# Patient Record
Sex: MALE | Race: OTHER | NOT HISPANIC OR LATINO | URBAN - METROPOLITAN AREA
[De-identification: names, ages, dates, MRNs, and addresses within clinical notes are randomized per-mention and may not be internally consistent; named-entity substitution may affect disease eponyms.]

---

## 2017-03-30 ENCOUNTER — OUTPATIENT (OUTPATIENT)
Dept: OUTPATIENT SERVICES | Facility: HOSPITAL | Age: 67
LOS: 1 days | Discharge: HOME | End: 2017-03-30
Payer: MEDICARE

## 2017-03-31 DIAGNOSIS — F20.9 SCHIZOPHRENIA, UNSPECIFIED: ICD-10-CM

## 2017-04-27 PROCEDURE — 99214 OFFICE O/P EST MOD 30 MIN: CPT

## 2017-07-31 ENCOUNTER — RX RENEWAL (OUTPATIENT)
Age: 67
End: 2017-07-31

## 2017-09-15 ENCOUNTER — RESULT REVIEW (OUTPATIENT)
Age: 67
End: 2017-09-15

## 2019-01-29 PROCEDURE — 99213 OFFICE O/P EST LOW 20 MIN: CPT

## 2020-01-01 ENCOUNTER — INPATIENT (INPATIENT)
Facility: HOSPITAL | Age: 70
LOS: 38 days | DRG: 4 | End: 2020-05-05
Attending: INTERNAL MEDICINE | Admitting: STUDENT IN AN ORGANIZED HEALTH CARE EDUCATION/TRAINING PROGRAM
Payer: MEDICARE

## 2020-01-01 ENCOUNTER — TRANSCRIPTION ENCOUNTER (OUTPATIENT)
Age: 70
End: 2020-01-01

## 2020-01-01 VITALS
HEIGHT: 69 IN | DIASTOLIC BLOOD PRESSURE: 71 MMHG | OXYGEN SATURATION: 89 % | WEIGHT: 169.98 LBS | HEART RATE: 82 BPM | TEMPERATURE: 99 F | SYSTOLIC BLOOD PRESSURE: 157 MMHG | RESPIRATION RATE: 24 BRPM

## 2020-01-01 VITALS — HEART RATE: 69 BPM | TEMPERATURE: 96 F | RESPIRATION RATE: 13 BRPM | OXYGEN SATURATION: 78 %

## 2020-01-01 DIAGNOSIS — U07.1 COVID-19: ICD-10-CM

## 2020-01-01 DIAGNOSIS — G93.41 METABOLIC ENCEPHALOPATHY: ICD-10-CM

## 2020-01-01 DIAGNOSIS — Z51.5 ENCOUNTER FOR PALLIATIVE CARE: ICD-10-CM

## 2020-01-01 DIAGNOSIS — Z71.89 OTHER SPECIFIED COUNSELING: ICD-10-CM

## 2020-01-01 DIAGNOSIS — R09.02 HYPOXEMIA: ICD-10-CM

## 2020-01-01 DIAGNOSIS — Z93.0 TRACHEOSTOMY STATUS: ICD-10-CM

## 2020-01-01 DIAGNOSIS — J96.01 ACUTE RESPIRATORY FAILURE WITH HYPOXIA: ICD-10-CM

## 2020-01-01 DIAGNOSIS — J96.00 ACUTE RESPIRATORY FAILURE, UNSPECIFIED WHETHER WITH HYPOXIA OR HYPERCAPNIA: ICD-10-CM

## 2020-01-01 DIAGNOSIS — R53.2 FUNCTIONAL QUADRIPLEGIA: ICD-10-CM

## 2020-01-01 LAB
-  AMIKACIN: SIGNIFICANT CHANGE UP
-  AMIKACIN: SIGNIFICANT CHANGE UP
-  AZTREONAM: SIGNIFICANT CHANGE UP
-  AZTREONAM: SIGNIFICANT CHANGE UP
-  CEFEPIME: SIGNIFICANT CHANGE UP
-  CEFEPIME: SIGNIFICANT CHANGE UP
-  CEFTAZIDIME: SIGNIFICANT CHANGE UP
-  CEFTAZIDIME: SIGNIFICANT CHANGE UP
-  CIPROFLOXACIN: SIGNIFICANT CHANGE UP
-  CIPROFLOXACIN: SIGNIFICANT CHANGE UP
-  COAGULASE NEGATIVE STAPHYLOCOCCUS: SIGNIFICANT CHANGE UP
-  GENTAMICIN: SIGNIFICANT CHANGE UP
-  GENTAMICIN: SIGNIFICANT CHANGE UP
-  IMIPENEM: SIGNIFICANT CHANGE UP
-  IMIPENEM: SIGNIFICANT CHANGE UP
-  LEVOFLOXACIN: SIGNIFICANT CHANGE UP
-  LEVOFLOXACIN: SIGNIFICANT CHANGE UP
-  MEROPENEM: SIGNIFICANT CHANGE UP
-  MEROPENEM: SIGNIFICANT CHANGE UP
-  PIPERACILLIN/TAZOBACTAM: SIGNIFICANT CHANGE UP
-  PIPERACILLIN/TAZOBACTAM: SIGNIFICANT CHANGE UP
-  TOBRAMYCIN: SIGNIFICANT CHANGE UP
-  TOBRAMYCIN: SIGNIFICANT CHANGE UP
4/8 RATIO: 2.11 RATIO — SIGNIFICANT CHANGE UP (ref 0.86–4.14)
4/8 RATIO: 2.32 RATIO — SIGNIFICANT CHANGE UP (ref 0.86–4.14)
4/8 RATIO: 3.01 RATIO — SIGNIFICANT CHANGE UP (ref 0.86–4.14)
4/8 RATIO: 3.46 RATIO — SIGNIFICANT CHANGE UP (ref 0.86–4.14)
4/8 RATIO: 3.81 RATIO — SIGNIFICANT CHANGE UP (ref 0.86–4.14)
ABS CD8: 123 /UL — SIGNIFICANT CHANGE UP (ref 90–775)
ABS CD8: 144 /UL — SIGNIFICANT CHANGE UP (ref 90–775)
ABS CD8: 145 /UL — SIGNIFICANT CHANGE UP (ref 90–775)
ABS CD8: 215 /UL — SIGNIFICANT CHANGE UP (ref 90–775)
ABS CD8: 81 /UL — LOW (ref 90–775)
ALBUMIN SERPL ELPH-MCNC: 1.9 G/DL — LOW (ref 3.3–5)
ALBUMIN SERPL ELPH-MCNC: 2.1 G/DL — LOW (ref 3.3–5)
ALBUMIN SERPL ELPH-MCNC: 2.2 G/DL — LOW (ref 3.3–5)
ALBUMIN SERPL ELPH-MCNC: 2.3 G/DL — LOW (ref 3.3–5)
ALBUMIN SERPL ELPH-MCNC: 2.4 G/DL — LOW (ref 3.3–5)
ALBUMIN SERPL ELPH-MCNC: 2.5 G/DL — LOW (ref 3.3–5)
ALBUMIN SERPL ELPH-MCNC: 2.6 G/DL — LOW (ref 3.3–5)
ALBUMIN SERPL ELPH-MCNC: 2.7 G/DL — LOW (ref 3.3–5)
ALBUMIN SERPL ELPH-MCNC: 2.8 G/DL — LOW (ref 3.3–5)
ALBUMIN SERPL ELPH-MCNC: 2.9 G/DL — LOW (ref 3.3–5)
ALBUMIN SERPL ELPH-MCNC: 2.9 G/DL — LOW (ref 3.3–5)
ALBUMIN SERPL ELPH-MCNC: 3 G/DL — LOW (ref 3.3–5)
ALBUMIN SERPL ELPH-MCNC: 3.1 G/DL — LOW (ref 3.3–5)
ALBUMIN SERPL ELPH-MCNC: 3.4 G/DL — SIGNIFICANT CHANGE UP (ref 3.3–5)
ALP SERPL-CCNC: 101 U/L — SIGNIFICANT CHANGE UP (ref 40–120)
ALP SERPL-CCNC: 101 U/L — SIGNIFICANT CHANGE UP (ref 40–120)
ALP SERPL-CCNC: 108 U/L — SIGNIFICANT CHANGE UP (ref 40–120)
ALP SERPL-CCNC: 111 U/L — SIGNIFICANT CHANGE UP (ref 40–120)
ALP SERPL-CCNC: 114 U/L — SIGNIFICANT CHANGE UP (ref 40–120)
ALP SERPL-CCNC: 115 U/L — SIGNIFICANT CHANGE UP (ref 40–120)
ALP SERPL-CCNC: 128 U/L — HIGH (ref 40–120)
ALP SERPL-CCNC: 137 U/L — HIGH (ref 40–120)
ALP SERPL-CCNC: 144 U/L — HIGH (ref 40–120)
ALP SERPL-CCNC: 146 U/L — HIGH (ref 40–120)
ALP SERPL-CCNC: 155 U/L — HIGH (ref 40–120)
ALP SERPL-CCNC: 155 U/L — HIGH (ref 40–120)
ALP SERPL-CCNC: 157 U/L — HIGH (ref 40–120)
ALP SERPL-CCNC: 158 U/L — HIGH (ref 40–120)
ALP SERPL-CCNC: 163 U/L — HIGH (ref 40–120)
ALP SERPL-CCNC: 166 U/L — HIGH (ref 40–120)
ALP SERPL-CCNC: 168 U/L — HIGH (ref 40–120)
ALP SERPL-CCNC: 170 U/L — HIGH (ref 40–120)
ALP SERPL-CCNC: 198 U/L — HIGH (ref 40–120)
ALP SERPL-CCNC: 205 U/L — HIGH (ref 40–120)
ALP SERPL-CCNC: 52 U/L — SIGNIFICANT CHANGE UP (ref 40–120)
ALP SERPL-CCNC: 53 U/L — SIGNIFICANT CHANGE UP (ref 40–120)
ALP SERPL-CCNC: 55 U/L — SIGNIFICANT CHANGE UP (ref 40–120)
ALP SERPL-CCNC: 59 U/L — SIGNIFICANT CHANGE UP (ref 40–120)
ALP SERPL-CCNC: 60 U/L — SIGNIFICANT CHANGE UP (ref 40–120)
ALP SERPL-CCNC: 60 U/L — SIGNIFICANT CHANGE UP (ref 40–120)
ALP SERPL-CCNC: 61 U/L — SIGNIFICANT CHANGE UP (ref 40–120)
ALP SERPL-CCNC: 62 U/L — SIGNIFICANT CHANGE UP (ref 40–120)
ALP SERPL-CCNC: 62 U/L — SIGNIFICANT CHANGE UP (ref 40–120)
ALP SERPL-CCNC: 64 U/L — SIGNIFICANT CHANGE UP (ref 40–120)
ALP SERPL-CCNC: 67 U/L — SIGNIFICANT CHANGE UP (ref 40–120)
ALP SERPL-CCNC: 92 U/L — SIGNIFICANT CHANGE UP (ref 40–120)
ALP SERPL-CCNC: 93 U/L — SIGNIFICANT CHANGE UP (ref 40–120)
ALP SERPL-CCNC: 98 U/L — SIGNIFICANT CHANGE UP (ref 40–120)
ALP SERPL-CCNC: 98 U/L — SIGNIFICANT CHANGE UP (ref 40–120)
ALP SERPL-CCNC: 99 U/L — SIGNIFICANT CHANGE UP (ref 40–120)
ALT FLD-CCNC: 14 U/L — SIGNIFICANT CHANGE UP (ref 10–45)
ALT FLD-CCNC: 14 U/L — SIGNIFICANT CHANGE UP (ref 10–45)
ALT FLD-CCNC: 15 U/L — SIGNIFICANT CHANGE UP (ref 10–45)
ALT FLD-CCNC: 17 U/L — SIGNIFICANT CHANGE UP (ref 10–45)
ALT FLD-CCNC: 17 U/L — SIGNIFICANT CHANGE UP (ref 10–45)
ALT FLD-CCNC: 18 U/L — SIGNIFICANT CHANGE UP (ref 10–45)
ALT FLD-CCNC: 19 U/L — SIGNIFICANT CHANGE UP (ref 10–45)
ALT FLD-CCNC: 20 U/L — SIGNIFICANT CHANGE UP (ref 10–45)
ALT FLD-CCNC: 21 U/L — SIGNIFICANT CHANGE UP (ref 10–45)
ALT FLD-CCNC: 22 U/L — SIGNIFICANT CHANGE UP (ref 10–45)
ALT FLD-CCNC: 23 U/L — SIGNIFICANT CHANGE UP (ref 10–45)
ALT FLD-CCNC: 23 U/L — SIGNIFICANT CHANGE UP (ref 10–45)
ALT FLD-CCNC: 24 U/L — SIGNIFICANT CHANGE UP (ref 10–45)
ALT FLD-CCNC: 25 U/L — SIGNIFICANT CHANGE UP (ref 10–45)
ALT FLD-CCNC: 29 U/L — SIGNIFICANT CHANGE UP (ref 10–45)
ALT FLD-CCNC: 30 U/L — SIGNIFICANT CHANGE UP (ref 10–45)
ALT FLD-CCNC: 32 U/L — SIGNIFICANT CHANGE UP (ref 10–45)
ALT FLD-CCNC: 33 U/L — SIGNIFICANT CHANGE UP (ref 10–45)
ALT FLD-CCNC: 34 U/L — SIGNIFICANT CHANGE UP (ref 10–45)
ALT FLD-CCNC: 36 U/L — SIGNIFICANT CHANGE UP (ref 10–45)
ALT FLD-CCNC: 36 U/L — SIGNIFICANT CHANGE UP (ref 10–45)
ALT FLD-CCNC: 38 U/L — SIGNIFICANT CHANGE UP (ref 10–45)
ALT FLD-CCNC: 39 U/L — SIGNIFICANT CHANGE UP (ref 10–45)
ALT FLD-CCNC: 42 U/L — SIGNIFICANT CHANGE UP (ref 10–45)
ALT FLD-CCNC: 44 U/L — SIGNIFICANT CHANGE UP (ref 10–45)
ALT FLD-CCNC: 47 U/L — HIGH (ref 10–45)
ALT FLD-CCNC: 52 U/L — HIGH (ref 10–45)
ANION GAP SERPL CALC-SCNC: 10 MMOL/L — SIGNIFICANT CHANGE UP (ref 5–17)
ANION GAP SERPL CALC-SCNC: 11 MMOL/L — SIGNIFICANT CHANGE UP (ref 5–17)
ANION GAP SERPL CALC-SCNC: 12 MMOL/L — SIGNIFICANT CHANGE UP (ref 5–17)
ANION GAP SERPL CALC-SCNC: 13 MMOL/L — SIGNIFICANT CHANGE UP (ref 5–17)
ANION GAP SERPL CALC-SCNC: 13 MMOL/L — SIGNIFICANT CHANGE UP (ref 5–17)
ANION GAP SERPL CALC-SCNC: 14 MMOL/L — SIGNIFICANT CHANGE UP (ref 5–17)
ANION GAP SERPL CALC-SCNC: 15 MMOL/L — SIGNIFICANT CHANGE UP (ref 5–17)
ANION GAP SERPL CALC-SCNC: 16 MMOL/L — SIGNIFICANT CHANGE UP (ref 5–17)
ANION GAP SERPL CALC-SCNC: 18 MMOL/L — HIGH (ref 5–17)
ANION GAP SERPL CALC-SCNC: 5 MMOL/L — SIGNIFICANT CHANGE UP (ref 5–17)
ANION GAP SERPL CALC-SCNC: 7 MMOL/L — SIGNIFICANT CHANGE UP (ref 5–17)
ANION GAP SERPL CALC-SCNC: 8 MMOL/L — SIGNIFICANT CHANGE UP (ref 5–17)
ANION GAP SERPL CALC-SCNC: 9 MMOL/L — SIGNIFICANT CHANGE UP (ref 5–17)
APPEARANCE UR: ABNORMAL
APPEARANCE UR: CLEAR — SIGNIFICANT CHANGE UP
APPEARANCE UR: CLEAR — SIGNIFICANT CHANGE UP
APTT BLD: 31.3 SEC — SIGNIFICANT CHANGE UP (ref 27.5–36.3)
APTT BLD: 31.7 SEC — SIGNIFICANT CHANGE UP (ref 27.5–36.3)
APTT BLD: 32 SEC — SIGNIFICANT CHANGE UP (ref 27.5–36.3)
APTT BLD: 32 SEC — SIGNIFICANT CHANGE UP (ref 27.5–36.3)
APTT BLD: 32.1 SEC — SIGNIFICANT CHANGE UP (ref 27.5–36.3)
APTT BLD: 32.1 SEC — SIGNIFICANT CHANGE UP (ref 27.5–36.3)
APTT BLD: 32.4 SEC — SIGNIFICANT CHANGE UP (ref 27.5–36.3)
APTT BLD: 34.3 SEC — SIGNIFICANT CHANGE UP (ref 27.5–36.3)
APTT BLD: 34.4 SEC — SIGNIFICANT CHANGE UP (ref 27.5–36.3)
APTT BLD: 34.7 SEC — SIGNIFICANT CHANGE UP (ref 27.5–36.3)
APTT BLD: 35.4 SEC — SIGNIFICANT CHANGE UP (ref 27.5–36.3)
APTT BLD: 36.6 SEC — HIGH (ref 27.5–36.3)
APTT BLD: 37.1 SEC — HIGH (ref 27.5–36.3)
APTT BLD: 37.2 SEC — HIGH (ref 27.5–36.3)
APTT BLD: 37.4 SEC — HIGH (ref 27.5–36.3)
APTT BLD: 40.4 SEC — HIGH (ref 27.5–36.3)
APTT BLD: 41.1 SEC — HIGH (ref 27.5–36.3)
APTT BLD: 41.2 SEC — HIGH (ref 27.5–36.3)
AST SERPL-CCNC: 10 U/L — SIGNIFICANT CHANGE UP (ref 10–40)
AST SERPL-CCNC: 10 U/L — SIGNIFICANT CHANGE UP (ref 10–40)
AST SERPL-CCNC: 11 U/L — SIGNIFICANT CHANGE UP (ref 10–40)
AST SERPL-CCNC: 12 U/L — SIGNIFICANT CHANGE UP (ref 10–40)
AST SERPL-CCNC: 13 U/L — SIGNIFICANT CHANGE UP (ref 10–40)
AST SERPL-CCNC: 14 U/L — SIGNIFICANT CHANGE UP (ref 10–40)
AST SERPL-CCNC: 15 U/L — SIGNIFICANT CHANGE UP (ref 10–40)
AST SERPL-CCNC: 16 U/L — SIGNIFICANT CHANGE UP (ref 10–40)
AST SERPL-CCNC: 16 U/L — SIGNIFICANT CHANGE UP (ref 10–40)
AST SERPL-CCNC: 17 U/L — SIGNIFICANT CHANGE UP (ref 10–40)
AST SERPL-CCNC: 19 U/L — SIGNIFICANT CHANGE UP (ref 10–40)
AST SERPL-CCNC: 20 U/L — SIGNIFICANT CHANGE UP (ref 10–40)
AST SERPL-CCNC: 20 U/L — SIGNIFICANT CHANGE UP (ref 10–40)
AST SERPL-CCNC: 21 U/L — SIGNIFICANT CHANGE UP (ref 10–40)
AST SERPL-CCNC: 22 U/L — SIGNIFICANT CHANGE UP (ref 10–40)
AST SERPL-CCNC: 24 U/L — SIGNIFICANT CHANGE UP (ref 10–40)
AST SERPL-CCNC: 24 U/L — SIGNIFICANT CHANGE UP (ref 10–40)
AST SERPL-CCNC: 25 U/L — SIGNIFICANT CHANGE UP (ref 10–40)
AST SERPL-CCNC: 25 U/L — SIGNIFICANT CHANGE UP (ref 10–40)
AST SERPL-CCNC: 29 U/L — SIGNIFICANT CHANGE UP (ref 10–40)
AST SERPL-CCNC: 30 U/L — SIGNIFICANT CHANGE UP (ref 10–40)
AST SERPL-CCNC: 49 U/L — HIGH (ref 10–40)
AST SERPL-CCNC: 65 U/L — HIGH (ref 10–40)
AST SERPL-CCNC: 66 U/L — HIGH (ref 10–40)
AST SERPL-CCNC: 9 U/L — LOW (ref 10–40)
BACTERIA # UR AUTO: ABNORMAL
BASE EXCESS BLDA CALC-SCNC: 0.4 MMOL/L — SIGNIFICANT CHANGE UP (ref -2–2)
BASE EXCESS BLDA CALC-SCNC: 4.7 MMOL/L — HIGH (ref -2–2)
BASE EXCESS BLDA CALC-SCNC: 8.5 MMOL/L — HIGH (ref -2–2)
BASE EXCESS BLDV CALC-SCNC: -1.4 MMOL/L — SIGNIFICANT CHANGE UP (ref -2–2)
BASOPHILS # BLD AUTO: 0 K/UL — SIGNIFICANT CHANGE UP (ref 0–0.2)
BASOPHILS # BLD AUTO: 0.01 K/UL — SIGNIFICANT CHANGE UP (ref 0–0.2)
BASOPHILS # BLD AUTO: 0.01 K/UL — SIGNIFICANT CHANGE UP (ref 0–0.2)
BASOPHILS # BLD AUTO: 0.02 K/UL — SIGNIFICANT CHANGE UP (ref 0–0.2)
BASOPHILS # BLD AUTO: 0.04 K/UL — SIGNIFICANT CHANGE UP (ref 0–0.2)
BASOPHILS # BLD AUTO: 0.04 K/UL — SIGNIFICANT CHANGE UP (ref 0–0.2)
BASOPHILS # BLD AUTO: 0.05 K/UL — SIGNIFICANT CHANGE UP (ref 0–0.2)
BASOPHILS # BLD AUTO: 0.07 K/UL — SIGNIFICANT CHANGE UP (ref 0–0.2)
BASOPHILS NFR BLD AUTO: 0 % — SIGNIFICANT CHANGE UP (ref 0–2)
BASOPHILS NFR BLD AUTO: 0.1 % — SIGNIFICANT CHANGE UP (ref 0–2)
BASOPHILS NFR BLD AUTO: 0.2 % — SIGNIFICANT CHANGE UP (ref 0–2)
BASOPHILS NFR BLD AUTO: 0.3 % — SIGNIFICANT CHANGE UP (ref 0–2)
BASOPHILS NFR BLD AUTO: 0.3 % — SIGNIFICANT CHANGE UP (ref 0–2)
BASOPHILS NFR BLD AUTO: 0.4 % — SIGNIFICANT CHANGE UP (ref 0–2)
BASOPHILS NFR BLD AUTO: 0.5 % — SIGNIFICANT CHANGE UP (ref 0–2)
BASOPHILS NFR BLD AUTO: 0.5 % — SIGNIFICANT CHANGE UP (ref 0–2)
BILIRUB SERPL-MCNC: 0.1 MG/DL — LOW (ref 0.2–1.2)
BILIRUB SERPL-MCNC: 0.2 MG/DL — SIGNIFICANT CHANGE UP (ref 0.2–1.2)
BILIRUB SERPL-MCNC: 0.3 MG/DL — SIGNIFICANT CHANGE UP (ref 0.2–1.2)
BILIRUB SERPL-MCNC: 0.4 MG/DL — SIGNIFICANT CHANGE UP (ref 0.2–1.2)
BILIRUB SERPL-MCNC: 0.5 MG/DL — SIGNIFICANT CHANGE UP (ref 0.2–1.2)
BILIRUB SERPL-MCNC: 0.5 MG/DL — SIGNIFICANT CHANGE UP (ref 0.2–1.2)
BILIRUB SERPL-MCNC: 0.7 MG/DL — SIGNIFICANT CHANGE UP (ref 0.2–1.2)
BILIRUB SERPL-MCNC: 0.8 MG/DL — SIGNIFICANT CHANGE UP (ref 0.2–1.2)
BILIRUB SERPL-MCNC: 0.8 MG/DL — SIGNIFICANT CHANGE UP (ref 0.2–1.2)
BILIRUB UR-MCNC: NEGATIVE — SIGNIFICANT CHANGE UP
BLD GP AB SCN SERPL QL: NEGATIVE — SIGNIFICANT CHANGE UP
BLD GP AB SCN SERPL QL: NEGATIVE — SIGNIFICANT CHANGE UP
BUN SERPL-MCNC: 11 MG/DL — SIGNIFICANT CHANGE UP (ref 7–23)
BUN SERPL-MCNC: 12 MG/DL — SIGNIFICANT CHANGE UP (ref 7–23)
BUN SERPL-MCNC: 12 MG/DL — SIGNIFICANT CHANGE UP (ref 7–23)
BUN SERPL-MCNC: 14 MG/DL — SIGNIFICANT CHANGE UP (ref 7–23)
BUN SERPL-MCNC: 14 MG/DL — SIGNIFICANT CHANGE UP (ref 7–23)
BUN SERPL-MCNC: 16 MG/DL — SIGNIFICANT CHANGE UP (ref 7–23)
BUN SERPL-MCNC: 17 MG/DL — SIGNIFICANT CHANGE UP (ref 7–23)
BUN SERPL-MCNC: 19 MG/DL — SIGNIFICANT CHANGE UP (ref 7–23)
BUN SERPL-MCNC: 20 MG/DL — SIGNIFICANT CHANGE UP (ref 7–23)
BUN SERPL-MCNC: 22 MG/DL — SIGNIFICANT CHANGE UP (ref 7–23)
BUN SERPL-MCNC: 24 MG/DL — HIGH (ref 7–23)
BUN SERPL-MCNC: 24 MG/DL — HIGH (ref 7–23)
BUN SERPL-MCNC: 26 MG/DL — HIGH (ref 7–23)
BUN SERPL-MCNC: 26 MG/DL — HIGH (ref 7–23)
BUN SERPL-MCNC: 29 MG/DL — HIGH (ref 7–23)
BUN SERPL-MCNC: 30 MG/DL — HIGH (ref 7–23)
BUN SERPL-MCNC: 32 MG/DL — HIGH (ref 7–23)
BUN SERPL-MCNC: 34 MG/DL — HIGH (ref 7–23)
BUN SERPL-MCNC: 35 MG/DL — HIGH (ref 7–23)
BUN SERPL-MCNC: 36 MG/DL — HIGH (ref 7–23)
BUN SERPL-MCNC: 36 MG/DL — HIGH (ref 7–23)
BUN SERPL-MCNC: 37 MG/DL — HIGH (ref 7–23)
BUN SERPL-MCNC: 38 MG/DL — HIGH (ref 7–23)
BUN SERPL-MCNC: 39 MG/DL — HIGH (ref 7–23)
BUN SERPL-MCNC: 40 MG/DL — HIGH (ref 7–23)
BUN SERPL-MCNC: 42 MG/DL — HIGH (ref 7–23)
BUN SERPL-MCNC: 43 MG/DL — HIGH (ref 7–23)
BUN SERPL-MCNC: 44 MG/DL — HIGH (ref 7–23)
BUN SERPL-MCNC: 7 MG/DL — SIGNIFICANT CHANGE UP (ref 7–23)
BUN SERPL-MCNC: 8 MG/DL — SIGNIFICANT CHANGE UP (ref 7–23)
BUN SERPL-MCNC: 9 MG/DL — SIGNIFICANT CHANGE UP (ref 7–23)
C DIFF GDH STL QL: NEGATIVE — SIGNIFICANT CHANGE UP
C DIFF GDH STL QL: SIGNIFICANT CHANGE UP
CA-I SERPL-SCNC: 1.14 MMOL/L — SIGNIFICANT CHANGE UP (ref 1.12–1.3)
CALCIUM SERPL-MCNC: 7.8 MG/DL — LOW (ref 8.4–10.5)
CALCIUM SERPL-MCNC: 7.9 MG/DL — LOW (ref 8.4–10.5)
CALCIUM SERPL-MCNC: 8 MG/DL — LOW (ref 8.4–10.5)
CALCIUM SERPL-MCNC: 8 MG/DL — LOW (ref 8.4–10.5)
CALCIUM SERPL-MCNC: 8.1 MG/DL — LOW (ref 8.4–10.5)
CALCIUM SERPL-MCNC: 8.2 MG/DL — LOW (ref 8.4–10.5)
CALCIUM SERPL-MCNC: 8.2 MG/DL — LOW (ref 8.4–10.5)
CALCIUM SERPL-MCNC: 8.3 MG/DL — LOW (ref 8.4–10.5)
CALCIUM SERPL-MCNC: 8.4 MG/DL — SIGNIFICANT CHANGE UP (ref 8.4–10.5)
CALCIUM SERPL-MCNC: 8.5 MG/DL — SIGNIFICANT CHANGE UP (ref 8.4–10.5)
CALCIUM SERPL-MCNC: 8.6 MG/DL — SIGNIFICANT CHANGE UP (ref 8.4–10.5)
CALCIUM SERPL-MCNC: 8.7 MG/DL — SIGNIFICANT CHANGE UP (ref 8.4–10.5)
CALCIUM SERPL-MCNC: 8.8 MG/DL — SIGNIFICANT CHANGE UP (ref 8.4–10.5)
CALCIUM SERPL-MCNC: 8.8 MG/DL — SIGNIFICANT CHANGE UP (ref 8.4–10.5)
CALCIUM SERPL-MCNC: 8.9 MG/DL — SIGNIFICANT CHANGE UP (ref 8.4–10.5)
CALCIUM SERPL-MCNC: 8.9 MG/DL — SIGNIFICANT CHANGE UP (ref 8.4–10.5)
CALCIUM SERPL-MCNC: 9 MG/DL — SIGNIFICANT CHANGE UP (ref 8.4–10.5)
CALCIUM SERPL-MCNC: 9.2 MG/DL — SIGNIFICANT CHANGE UP (ref 8.4–10.5)
CALCIUM SERPL-MCNC: 9.2 MG/DL — SIGNIFICANT CHANGE UP (ref 8.4–10.5)
CALCIUM SERPL-MCNC: 9.3 MG/DL — SIGNIFICANT CHANGE UP (ref 8.4–10.5)
CD3 BLASTS SPEC-ACNC: 338 /UL — LOW (ref 396–2024)
CD3 BLASTS SPEC-ACNC: 406 /UL — SIGNIFICANT CHANGE UP (ref 396–2024)
CD3 BLASTS SPEC-ACNC: 669 /UL — SIGNIFICANT CHANGE UP (ref 396–2024)
CD3 BLASTS SPEC-ACNC: 67 % — SIGNIFICANT CHANGE UP (ref 58–84)
CD3 BLASTS SPEC-ACNC: 720 /UL — SIGNIFICANT CHANGE UP (ref 396–2024)
CD3 BLASTS SPEC-ACNC: 73 % — SIGNIFICANT CHANGE UP (ref 58–84)
CD3 BLASTS SPEC-ACNC: 743 /UL — SIGNIFICANT CHANGE UP (ref 396–2024)
CD3 BLASTS SPEC-ACNC: 80 % — SIGNIFICANT CHANGE UP (ref 58–84)
CD3 BLASTS SPEC-ACNC: 80 % — SIGNIFICANT CHANGE UP (ref 58–84)
CD3 BLASTS SPEC-ACNC: 85 % — HIGH (ref 58–84)
CD4 %: 43 % — SIGNIFICANT CHANGE UP (ref 30–56)
CD4 %: 52 % — SIGNIFICANT CHANGE UP (ref 30–56)
CD4 %: 53 % — SIGNIFICANT CHANGE UP (ref 30–56)
CD4 %: 59 % — HIGH (ref 30–56)
CD4 %: 66 % — HIGH (ref 30–56)
CD8 %: 17 % — SIGNIFICANT CHANGE UP (ref 11–43)
CD8 %: 20 % — SIGNIFICANT CHANGE UP (ref 11–43)
CD8 %: 23 % — SIGNIFICANT CHANGE UP (ref 11–43)
CHLORIDE BLDV-SCNC: 100 MMOL/L — SIGNIFICANT CHANGE UP (ref 96–108)
CHLORIDE SERPL-SCNC: 100 MMOL/L — SIGNIFICANT CHANGE UP (ref 96–108)
CHLORIDE SERPL-SCNC: 101 MMOL/L — SIGNIFICANT CHANGE UP (ref 96–108)
CHLORIDE SERPL-SCNC: 101 MMOL/L — SIGNIFICANT CHANGE UP (ref 96–108)
CHLORIDE SERPL-SCNC: 102 MMOL/L — SIGNIFICANT CHANGE UP (ref 96–108)
CHLORIDE SERPL-SCNC: 103 MMOL/L — SIGNIFICANT CHANGE UP (ref 96–108)
CHLORIDE SERPL-SCNC: 103 MMOL/L — SIGNIFICANT CHANGE UP (ref 96–108)
CHLORIDE SERPL-SCNC: 104 MMOL/L — SIGNIFICANT CHANGE UP (ref 96–108)
CHLORIDE SERPL-SCNC: 104 MMOL/L — SIGNIFICANT CHANGE UP (ref 96–108)
CHLORIDE SERPL-SCNC: 105 MMOL/L — SIGNIFICANT CHANGE UP (ref 96–108)
CHLORIDE SERPL-SCNC: 106 MMOL/L — SIGNIFICANT CHANGE UP (ref 96–108)
CHLORIDE SERPL-SCNC: 107 MMOL/L — SIGNIFICANT CHANGE UP (ref 96–108)
CHLORIDE SERPL-SCNC: 108 MMOL/L — SIGNIFICANT CHANGE UP (ref 96–108)
CHLORIDE SERPL-SCNC: 109 MMOL/L — HIGH (ref 96–108)
CHLORIDE SERPL-SCNC: 93 MMOL/L — LOW (ref 96–108)
CHLORIDE SERPL-SCNC: 94 MMOL/L — LOW (ref 96–108)
CHLORIDE SERPL-SCNC: 96 MMOL/L — SIGNIFICANT CHANGE UP (ref 96–108)
CHLORIDE SERPL-SCNC: 96 MMOL/L — SIGNIFICANT CHANGE UP (ref 96–108)
CHLORIDE SERPL-SCNC: 97 MMOL/L — SIGNIFICANT CHANGE UP (ref 96–108)
CHLORIDE SERPL-SCNC: 98 MMOL/L — SIGNIFICANT CHANGE UP (ref 96–108)
CHLORIDE SERPL-SCNC: 98 MMOL/L — SIGNIFICANT CHANGE UP (ref 96–108)
CHLORIDE SERPL-SCNC: 99 MMOL/L — SIGNIFICANT CHANGE UP (ref 96–108)
CHOLEST SERPL-MCNC: 108 MG/DL — SIGNIFICANT CHANGE UP (ref 10–199)
CK MB CFR SERPL CALC: 1.5 NG/ML — SIGNIFICANT CHANGE UP (ref 0–6.7)
CK SERPL-CCNC: 11 U/L — LOW (ref 30–200)
CK SERPL-CCNC: 14 U/L — LOW (ref 30–200)
CK SERPL-CCNC: 18 U/L — LOW (ref 30–200)
CK SERPL-CCNC: 19 U/L — LOW (ref 30–200)
CK SERPL-CCNC: 20 U/L — LOW (ref 30–200)
CK SERPL-CCNC: 24 U/L — LOW (ref 30–200)
CK SERPL-CCNC: 379 U/L — HIGH (ref 30–200)
CK SERPL-CCNC: 44 U/L — SIGNIFICANT CHANGE UP (ref 30–200)
CK SERPL-CCNC: 45 U/L — SIGNIFICANT CHANGE UP (ref 30–200)
CK SERPL-CCNC: 47 U/L — SIGNIFICANT CHANGE UP (ref 30–200)
CK SERPL-CCNC: 49 U/L — SIGNIFICANT CHANGE UP (ref 30–200)
CK SERPL-CCNC: 637 U/L — HIGH (ref 30–200)
CO2 BLDA-SCNC: 23 MMOL/L — SIGNIFICANT CHANGE UP (ref 22–30)
CO2 BLDA-SCNC: 31 MMOL/L — HIGH (ref 22–30)
CO2 BLDA-SCNC: 36 MMOL/L — HIGH (ref 22–30)
CO2 BLDV-SCNC: 25 MMOL/L — SIGNIFICANT CHANGE UP (ref 22–30)
CO2 SERPL-SCNC: 16 MMOL/L — LOW (ref 22–31)
CO2 SERPL-SCNC: 19 MMOL/L — LOW (ref 22–31)
CO2 SERPL-SCNC: 20 MMOL/L — LOW (ref 22–31)
CO2 SERPL-SCNC: 21 MMOL/L — LOW (ref 22–31)
CO2 SERPL-SCNC: 23 MMOL/L — SIGNIFICANT CHANGE UP (ref 22–31)
CO2 SERPL-SCNC: 24 MMOL/L — SIGNIFICANT CHANGE UP (ref 22–31)
CO2 SERPL-SCNC: 25 MMOL/L — SIGNIFICANT CHANGE UP (ref 22–31)
CO2 SERPL-SCNC: 26 MMOL/L — SIGNIFICANT CHANGE UP (ref 22–31)
CO2 SERPL-SCNC: 26 MMOL/L — SIGNIFICANT CHANGE UP (ref 22–31)
CO2 SERPL-SCNC: 27 MMOL/L — SIGNIFICANT CHANGE UP (ref 22–31)
CO2 SERPL-SCNC: 28 MMOL/L — SIGNIFICANT CHANGE UP (ref 22–31)
CO2 SERPL-SCNC: 29 MMOL/L — SIGNIFICANT CHANGE UP (ref 22–31)
CO2 SERPL-SCNC: 30 MMOL/L — SIGNIFICANT CHANGE UP (ref 22–31)
CO2 SERPL-SCNC: 31 MMOL/L — SIGNIFICANT CHANGE UP (ref 22–31)
CO2 SERPL-SCNC: 32 MMOL/L — HIGH (ref 22–31)
CO2 SERPL-SCNC: 33 MMOL/L — HIGH (ref 22–31)
CO2 SERPL-SCNC: 34 MMOL/L — HIGH (ref 22–31)
CO2 SERPL-SCNC: 35 MMOL/L — HIGH (ref 22–31)
CO2 SERPL-SCNC: 37 MMOL/L — HIGH (ref 22–31)
CO2 SERPL-SCNC: 37 MMOL/L — HIGH (ref 22–31)
CO2 SERPL-SCNC: 38 MMOL/L — HIGH (ref 22–31)
CO2 SERPL-SCNC: 41 MMOL/L — HIGH (ref 22–31)
COLOR SPEC: SIGNIFICANT CHANGE UP
COLOR SPEC: YELLOW — SIGNIFICANT CHANGE UP
COLOR SPEC: YELLOW — SIGNIFICANT CHANGE UP
CORTIS AM PEAK SERPL-MCNC: 4.1 UG/DL — LOW (ref 6–18.4)
CREAT SERPL-MCNC: 0.3 MG/DL — LOW (ref 0.5–1.3)
CREAT SERPL-MCNC: 0.31 MG/DL — LOW (ref 0.5–1.3)
CREAT SERPL-MCNC: 0.35 MG/DL — LOW (ref 0.5–1.3)
CREAT SERPL-MCNC: 0.35 MG/DL — LOW (ref 0.5–1.3)
CREAT SERPL-MCNC: 0.37 MG/DL — LOW (ref 0.5–1.3)
CREAT SERPL-MCNC: 0.38 MG/DL — LOW (ref 0.5–1.3)
CREAT SERPL-MCNC: 0.41 MG/DL — LOW (ref 0.5–1.3)
CREAT SERPL-MCNC: 0.43 MG/DL — LOW (ref 0.5–1.3)
CREAT SERPL-MCNC: 0.47 MG/DL — LOW (ref 0.5–1.3)
CREAT SERPL-MCNC: 0.47 MG/DL — LOW (ref 0.5–1.3)
CREAT SERPL-MCNC: 0.48 MG/DL — LOW (ref 0.5–1.3)
CREAT SERPL-MCNC: 0.51 MG/DL — SIGNIFICANT CHANGE UP (ref 0.5–1.3)
CREAT SERPL-MCNC: 0.52 MG/DL — SIGNIFICANT CHANGE UP (ref 0.5–1.3)
CREAT SERPL-MCNC: 0.54 MG/DL — SIGNIFICANT CHANGE UP (ref 0.5–1.3)
CREAT SERPL-MCNC: 0.57 MG/DL — SIGNIFICANT CHANGE UP (ref 0.5–1.3)
CREAT SERPL-MCNC: 0.61 MG/DL — SIGNIFICANT CHANGE UP (ref 0.5–1.3)
CREAT SERPL-MCNC: 0.62 MG/DL — SIGNIFICANT CHANGE UP (ref 0.5–1.3)
CREAT SERPL-MCNC: 0.63 MG/DL — SIGNIFICANT CHANGE UP (ref 0.5–1.3)
CREAT SERPL-MCNC: 0.65 MG/DL — SIGNIFICANT CHANGE UP (ref 0.5–1.3)
CREAT SERPL-MCNC: 0.66 MG/DL — SIGNIFICANT CHANGE UP (ref 0.5–1.3)
CREAT SERPL-MCNC: 0.67 MG/DL — SIGNIFICANT CHANGE UP (ref 0.5–1.3)
CREAT SERPL-MCNC: 0.68 MG/DL — SIGNIFICANT CHANGE UP (ref 0.5–1.3)
CREAT SERPL-MCNC: 0.69 MG/DL — SIGNIFICANT CHANGE UP (ref 0.5–1.3)
CREAT SERPL-MCNC: 0.7 MG/DL — SIGNIFICANT CHANGE UP (ref 0.5–1.3)
CREAT SERPL-MCNC: 0.73 MG/DL — SIGNIFICANT CHANGE UP (ref 0.5–1.3)
CREAT SERPL-MCNC: 0.73 MG/DL — SIGNIFICANT CHANGE UP (ref 0.5–1.3)
CREAT SERPL-MCNC: 0.74 MG/DL — SIGNIFICANT CHANGE UP (ref 0.5–1.3)
CREAT SERPL-MCNC: 0.78 MG/DL — SIGNIFICANT CHANGE UP (ref 0.5–1.3)
CREAT SERPL-MCNC: 0.79 MG/DL — SIGNIFICANT CHANGE UP (ref 0.5–1.3)
CREAT SERPL-MCNC: 0.87 MG/DL — SIGNIFICANT CHANGE UP (ref 0.5–1.3)
CREAT SERPL-MCNC: 0.89 MG/DL — SIGNIFICANT CHANGE UP (ref 0.5–1.3)
CREAT SERPL-MCNC: 1.01 MG/DL — SIGNIFICANT CHANGE UP (ref 0.5–1.3)
CREAT SERPL-MCNC: 1.06 MG/DL — SIGNIFICANT CHANGE UP (ref 0.5–1.3)
CREAT SERPL-MCNC: 1.11 MG/DL — SIGNIFICANT CHANGE UP (ref 0.5–1.3)
CREAT SERPL-MCNC: <0.3 MG/DL — LOW (ref 0.5–1.3)
CRP SERPL-MCNC: 1 MG/DL — HIGH (ref 0–0.4)
CRP SERPL-MCNC: 10.36 MG/DL — HIGH (ref 0–0.4)
CRP SERPL-MCNC: 10.73 MG/DL — HIGH (ref 0–0.4)
CRP SERPL-MCNC: 11.41 MG/DL — HIGH (ref 0–0.4)
CRP SERPL-MCNC: 11.54 MG/DL — HIGH (ref 0–0.4)
CRP SERPL-MCNC: 11.8 MG/DL — HIGH (ref 0–0.4)
CRP SERPL-MCNC: 12.56 MG/DL — HIGH (ref 0–0.4)
CRP SERPL-MCNC: 13.18 MG/DL — HIGH (ref 0–0.4)
CRP SERPL-MCNC: 13.34 MG/DL — HIGH (ref 0–0.4)
CRP SERPL-MCNC: 13.55 MG/DL — HIGH (ref 0–0.4)
CRP SERPL-MCNC: 14.5 MG/DL — HIGH (ref 0–0.4)
CRP SERPL-MCNC: 14.85 MG/DL — HIGH (ref 0–0.4)
CRP SERPL-MCNC: 15.09 MG/DL — HIGH (ref 0–0.4)
CRP SERPL-MCNC: 15.88 MG/DL — HIGH (ref 0–0.4)
CRP SERPL-MCNC: 16.09 MG/DL — HIGH (ref 0–0.4)
CRP SERPL-MCNC: 16.32 MG/DL — HIGH (ref 0–0.4)
CRP SERPL-MCNC: 17.66 MG/DL — HIGH (ref 0–0.4)
CRP SERPL-MCNC: 19.68 MG/DL — HIGH (ref 0–0.4)
CRP SERPL-MCNC: 2.79 MG/DL — HIGH (ref 0–0.4)
CRP SERPL-MCNC: 5.7 MG/DL — HIGH (ref 0–0.4)
CRP SERPL-MCNC: 5.78 MG/DL — HIGH (ref 0–0.4)
CRP SERPL-MCNC: 6.08 MG/DL — HIGH (ref 0–0.4)
CRP SERPL-MCNC: 7.43 MG/DL — HIGH (ref 0–0.4)
CRP SERPL-MCNC: 7.89 MG/DL — HIGH (ref 0–0.4)
CRP SERPL-MCNC: 8.64 MG/DL — HIGH (ref 0–0.4)
CRP SERPL-MCNC: 8.95 MG/DL — HIGH (ref 0–0.4)
CULTURE RESULTS: NO GROWTH — SIGNIFICANT CHANGE UP
CULTURE RESULTS: NO GROWTH — SIGNIFICANT CHANGE UP
CULTURE RESULTS: SIGNIFICANT CHANGE UP
D DIMER BLD IA.RAPID-MCNC: 177 NG/ML DDU — SIGNIFICANT CHANGE UP
D DIMER BLD IA.RAPID-MCNC: 202 NG/ML DDU — SIGNIFICANT CHANGE UP
D DIMER BLD IA.RAPID-MCNC: 218 NG/ML DDU — SIGNIFICANT CHANGE UP
D DIMER BLD IA.RAPID-MCNC: 224 NG/ML DDU — SIGNIFICANT CHANGE UP
D DIMER BLD IA.RAPID-MCNC: 248 NG/ML DDU — HIGH
D DIMER BLD IA.RAPID-MCNC: 282 NG/ML DDU — HIGH
D DIMER BLD IA.RAPID-MCNC: 294 NG/ML DDU — HIGH
D DIMER BLD IA.RAPID-MCNC: 351 NG/ML DDU — HIGH
D DIMER BLD IA.RAPID-MCNC: 361 NG/ML DDU — HIGH
D DIMER BLD IA.RAPID-MCNC: 387 NG/ML DDU — HIGH
D DIMER BLD IA.RAPID-MCNC: 417 NG/ML DDU — HIGH
D DIMER BLD IA.RAPID-MCNC: 489 NG/ML DDU — HIGH
D DIMER BLD IA.RAPID-MCNC: 531 NG/ML DDU — HIGH
D DIMER BLD IA.RAPID-MCNC: 551 NG/ML DDU — HIGH
D DIMER BLD IA.RAPID-MCNC: 552 NG/ML DDU — HIGH
D DIMER BLD IA.RAPID-MCNC: 572 NG/ML DDU — HIGH
DIFF PNL FLD: ABNORMAL
DIFF PNL FLD: ABNORMAL
DIFF PNL FLD: SIGNIFICANT CHANGE UP
EOSINOPHIL # BLD AUTO: 0 K/UL — SIGNIFICANT CHANGE UP (ref 0–0.5)
EOSINOPHIL # BLD AUTO: 0.03 K/UL — SIGNIFICANT CHANGE UP (ref 0–0.5)
EOSINOPHIL # BLD AUTO: 0.05 K/UL — SIGNIFICANT CHANGE UP (ref 0–0.5)
EOSINOPHIL # BLD AUTO: 0.11 K/UL — SIGNIFICANT CHANGE UP (ref 0–0.5)
EOSINOPHIL # BLD AUTO: 0.14 K/UL — SIGNIFICANT CHANGE UP (ref 0–0.5)
EOSINOPHIL # BLD AUTO: 0.25 K/UL — SIGNIFICANT CHANGE UP (ref 0–0.5)
EOSINOPHIL # BLD AUTO: 0.26 K/UL — SIGNIFICANT CHANGE UP (ref 0–0.5)
EOSINOPHIL # BLD AUTO: 0.26 K/UL — SIGNIFICANT CHANGE UP (ref 0–0.5)
EOSINOPHIL # BLD AUTO: 0.54 K/UL — HIGH (ref 0–0.5)
EOSINOPHIL NFR BLD AUTO: 0 % — SIGNIFICANT CHANGE UP (ref 0–6)
EOSINOPHIL NFR BLD AUTO: 0.3 % — SIGNIFICANT CHANGE UP (ref 0–6)
EOSINOPHIL NFR BLD AUTO: 0.6 % — SIGNIFICANT CHANGE UP (ref 0–6)
EOSINOPHIL NFR BLD AUTO: 1 % — SIGNIFICANT CHANGE UP (ref 0–6)
EOSINOPHIL NFR BLD AUTO: 1 % — SIGNIFICANT CHANGE UP (ref 0–6)
EOSINOPHIL NFR BLD AUTO: 2 % — SIGNIFICANT CHANGE UP (ref 0–6)
EOSINOPHIL NFR BLD AUTO: 2.1 % — SIGNIFICANT CHANGE UP (ref 0–6)
EOSINOPHIL NFR BLD AUTO: 2.3 % — SIGNIFICANT CHANGE UP (ref 0–6)
EOSINOPHIL NFR BLD AUTO: 5.7 % — SIGNIFICANT CHANGE UP (ref 0–6)
EPI CELLS # UR: SIGNIFICANT CHANGE UP
ERYTHROCYTE [SEDIMENTATION RATE] IN BLOOD: 115 MM/HR — HIGH (ref 0–20)
ERYTHROCYTE [SEDIMENTATION RATE] IN BLOOD: 120 MM/HR — HIGH (ref 0–20)
ERYTHROCYTE [SEDIMENTATION RATE] IN BLOOD: 43 MM/HR — HIGH (ref 0–20)
ERYTHROCYTE [SEDIMENTATION RATE] IN BLOOD: 64 MM/HR — HIGH (ref 0–20)
ERYTHROCYTE [SEDIMENTATION RATE] IN BLOOD: 74 MM/HR — HIGH (ref 0–20)
ERYTHROCYTE [SEDIMENTATION RATE] IN BLOOD: 86 MM/HR — HIGH (ref 0–20)
ERYTHROCYTE [SEDIMENTATION RATE] IN BLOOD: 90 MM/HR — HIGH (ref 0–20)
ERYTHROCYTE [SEDIMENTATION RATE] IN BLOOD: 97 MM/HR — HIGH (ref 0–20)
FERRITIN SERPL-MCNC: 1036 NG/ML — HIGH (ref 30–400)
FERRITIN SERPL-MCNC: 1095 NG/ML — HIGH (ref 30–400)
FERRITIN SERPL-MCNC: 432 NG/ML — HIGH (ref 30–400)
FERRITIN SERPL-MCNC: 505 NG/ML — HIGH (ref 30–400)
FERRITIN SERPL-MCNC: 513 NG/ML — HIGH (ref 30–400)
FERRITIN SERPL-MCNC: 558 NG/ML — HIGH (ref 30–400)
FERRITIN SERPL-MCNC: 560 NG/ML — HIGH (ref 30–400)
FERRITIN SERPL-MCNC: 584 NG/ML — HIGH (ref 30–400)
FERRITIN SERPL-MCNC: 607 NG/ML — HIGH (ref 30–400)
FERRITIN SERPL-MCNC: 660 NG/ML — HIGH (ref 30–400)
FERRITIN SERPL-MCNC: 662 NG/ML — HIGH (ref 30–400)
FERRITIN SERPL-MCNC: 715 NG/ML — HIGH (ref 30–400)
FERRITIN SERPL-MCNC: 724 NG/ML — HIGH (ref 30–400)
FERRITIN SERPL-MCNC: 730 NG/ML — HIGH (ref 30–400)
FERRITIN SERPL-MCNC: 758 NG/ML — HIGH (ref 30–400)
FERRITIN SERPL-MCNC: 973 NG/ML — HIGH (ref 30–400)
FIBRINOGEN PPP-MCNC: 678 MG/DL — HIGH (ref 350–510)
FIBRINOGEN PPP-MCNC: 679 MG/DL — HIGH (ref 350–510)
FIBRINOGEN PPP-MCNC: 964 MG/DL — HIGH (ref 350–510)
GAMMA INTERFERON BACKGROUND BLD IA-ACNC: 0.01 IU/ML — SIGNIFICANT CHANGE UP
GAS PNL BLDA: SIGNIFICANT CHANGE UP
GAS PNL BLDV: 132 MMOL/L — LOW (ref 135–145)
GAS PNL BLDV: SIGNIFICANT CHANGE UP
GAS PNL BLDV: SIGNIFICANT CHANGE UP
GIANT PLATELETS BLD QL SMEAR: PRESENT — SIGNIFICANT CHANGE UP
GLUCOSE BLDC GLUCOMTR-MCNC: 100 MG/DL — HIGH (ref 70–99)
GLUCOSE BLDC GLUCOMTR-MCNC: 104 MG/DL — HIGH (ref 70–99)
GLUCOSE BLDC GLUCOMTR-MCNC: 105 MG/DL — HIGH (ref 70–99)
GLUCOSE BLDC GLUCOMTR-MCNC: 107 MG/DL — HIGH (ref 70–99)
GLUCOSE BLDC GLUCOMTR-MCNC: 109 MG/DL — HIGH (ref 70–99)
GLUCOSE BLDC GLUCOMTR-MCNC: 111 MG/DL — HIGH (ref 70–99)
GLUCOSE BLDC GLUCOMTR-MCNC: 112 MG/DL — HIGH (ref 70–99)
GLUCOSE BLDC GLUCOMTR-MCNC: 116 MG/DL — HIGH (ref 70–99)
GLUCOSE BLDC GLUCOMTR-MCNC: 117 MG/DL — HIGH (ref 70–99)
GLUCOSE BLDC GLUCOMTR-MCNC: 118 MG/DL — HIGH (ref 70–99)
GLUCOSE BLDC GLUCOMTR-MCNC: 120 MG/DL — HIGH (ref 70–99)
GLUCOSE BLDC GLUCOMTR-MCNC: 122 MG/DL — HIGH (ref 70–99)
GLUCOSE BLDC GLUCOMTR-MCNC: 123 MG/DL — HIGH (ref 70–99)
GLUCOSE BLDC GLUCOMTR-MCNC: 127 MG/DL — HIGH (ref 70–99)
GLUCOSE BLDC GLUCOMTR-MCNC: 127 MG/DL — HIGH (ref 70–99)
GLUCOSE BLDC GLUCOMTR-MCNC: 128 MG/DL — HIGH (ref 70–99)
GLUCOSE BLDC GLUCOMTR-MCNC: 129 MG/DL — HIGH (ref 70–99)
GLUCOSE BLDC GLUCOMTR-MCNC: 130 MG/DL — HIGH (ref 70–99)
GLUCOSE BLDC GLUCOMTR-MCNC: 130 MG/DL — HIGH (ref 70–99)
GLUCOSE BLDC GLUCOMTR-MCNC: 131 MG/DL — HIGH (ref 70–99)
GLUCOSE BLDC GLUCOMTR-MCNC: 132 MG/DL — HIGH (ref 70–99)
GLUCOSE BLDC GLUCOMTR-MCNC: 134 MG/DL — HIGH (ref 70–99)
GLUCOSE BLDC GLUCOMTR-MCNC: 134 MG/DL — HIGH (ref 70–99)
GLUCOSE BLDC GLUCOMTR-MCNC: 138 MG/DL — HIGH (ref 70–99)
GLUCOSE BLDC GLUCOMTR-MCNC: 139 MG/DL — HIGH (ref 70–99)
GLUCOSE BLDC GLUCOMTR-MCNC: 142 MG/DL — HIGH (ref 70–99)
GLUCOSE BLDC GLUCOMTR-MCNC: 144 MG/DL — HIGH (ref 70–99)
GLUCOSE BLDC GLUCOMTR-MCNC: 145 MG/DL — HIGH (ref 70–99)
GLUCOSE BLDC GLUCOMTR-MCNC: 147 MG/DL — HIGH (ref 70–99)
GLUCOSE BLDC GLUCOMTR-MCNC: 148 MG/DL — HIGH (ref 70–99)
GLUCOSE BLDC GLUCOMTR-MCNC: 150 MG/DL — HIGH (ref 70–99)
GLUCOSE BLDC GLUCOMTR-MCNC: 152 MG/DL — HIGH (ref 70–99)
GLUCOSE BLDC GLUCOMTR-MCNC: 153 MG/DL — HIGH (ref 70–99)
GLUCOSE BLDC GLUCOMTR-MCNC: 153 MG/DL — HIGH (ref 70–99)
GLUCOSE BLDC GLUCOMTR-MCNC: 154 MG/DL — HIGH (ref 70–99)
GLUCOSE BLDC GLUCOMTR-MCNC: 157 MG/DL — HIGH (ref 70–99)
GLUCOSE BLDC GLUCOMTR-MCNC: 158 MG/DL — HIGH (ref 70–99)
GLUCOSE BLDC GLUCOMTR-MCNC: 158 MG/DL — HIGH (ref 70–99)
GLUCOSE BLDC GLUCOMTR-MCNC: 160 MG/DL — HIGH (ref 70–99)
GLUCOSE BLDC GLUCOMTR-MCNC: 162 MG/DL — HIGH (ref 70–99)
GLUCOSE BLDC GLUCOMTR-MCNC: 163 MG/DL — HIGH (ref 70–99)
GLUCOSE BLDC GLUCOMTR-MCNC: 164 MG/DL — HIGH (ref 70–99)
GLUCOSE BLDC GLUCOMTR-MCNC: 164 MG/DL — HIGH (ref 70–99)
GLUCOSE BLDC GLUCOMTR-MCNC: 165 MG/DL — HIGH (ref 70–99)
GLUCOSE BLDC GLUCOMTR-MCNC: 170 MG/DL — HIGH (ref 70–99)
GLUCOSE BLDC GLUCOMTR-MCNC: 171 MG/DL — HIGH (ref 70–99)
GLUCOSE BLDC GLUCOMTR-MCNC: 175 MG/DL — HIGH (ref 70–99)
GLUCOSE BLDC GLUCOMTR-MCNC: 175 MG/DL — HIGH (ref 70–99)
GLUCOSE BLDC GLUCOMTR-MCNC: 176 MG/DL — HIGH (ref 70–99)
GLUCOSE BLDC GLUCOMTR-MCNC: 177 MG/DL — HIGH (ref 70–99)
GLUCOSE BLDC GLUCOMTR-MCNC: 179 MG/DL — HIGH (ref 70–99)
GLUCOSE BLDC GLUCOMTR-MCNC: 180 MG/DL — HIGH (ref 70–99)
GLUCOSE BLDC GLUCOMTR-MCNC: 181 MG/DL — HIGH (ref 70–99)
GLUCOSE BLDC GLUCOMTR-MCNC: 183 MG/DL — HIGH (ref 70–99)
GLUCOSE BLDC GLUCOMTR-MCNC: 185 MG/DL — HIGH (ref 70–99)
GLUCOSE BLDC GLUCOMTR-MCNC: 186 MG/DL — HIGH (ref 70–99)
GLUCOSE BLDC GLUCOMTR-MCNC: 187 MG/DL — HIGH (ref 70–99)
GLUCOSE BLDC GLUCOMTR-MCNC: 187 MG/DL — HIGH (ref 70–99)
GLUCOSE BLDC GLUCOMTR-MCNC: 190 MG/DL — HIGH (ref 70–99)
GLUCOSE BLDC GLUCOMTR-MCNC: 191 MG/DL — HIGH (ref 70–99)
GLUCOSE BLDC GLUCOMTR-MCNC: 191 MG/DL — HIGH (ref 70–99)
GLUCOSE BLDC GLUCOMTR-MCNC: 194 MG/DL — HIGH (ref 70–99)
GLUCOSE BLDC GLUCOMTR-MCNC: 195 MG/DL — HIGH (ref 70–99)
GLUCOSE BLDC GLUCOMTR-MCNC: 198 MG/DL — HIGH (ref 70–99)
GLUCOSE BLDC GLUCOMTR-MCNC: 198 MG/DL — HIGH (ref 70–99)
GLUCOSE BLDC GLUCOMTR-MCNC: 199 MG/DL — HIGH (ref 70–99)
GLUCOSE BLDC GLUCOMTR-MCNC: 199 MG/DL — HIGH (ref 70–99)
GLUCOSE BLDC GLUCOMTR-MCNC: 200 MG/DL — HIGH (ref 70–99)
GLUCOSE BLDC GLUCOMTR-MCNC: 202 MG/DL — HIGH (ref 70–99)
GLUCOSE BLDC GLUCOMTR-MCNC: 203 MG/DL — HIGH (ref 70–99)
GLUCOSE BLDC GLUCOMTR-MCNC: 204 MG/DL — HIGH (ref 70–99)
GLUCOSE BLDC GLUCOMTR-MCNC: 204 MG/DL — HIGH (ref 70–99)
GLUCOSE BLDC GLUCOMTR-MCNC: 206 MG/DL — HIGH (ref 70–99)
GLUCOSE BLDC GLUCOMTR-MCNC: 206 MG/DL — HIGH (ref 70–99)
GLUCOSE BLDC GLUCOMTR-MCNC: 207 MG/DL — HIGH (ref 70–99)
GLUCOSE BLDC GLUCOMTR-MCNC: 207 MG/DL — HIGH (ref 70–99)
GLUCOSE BLDC GLUCOMTR-MCNC: 208 MG/DL — HIGH (ref 70–99)
GLUCOSE BLDC GLUCOMTR-MCNC: 209 MG/DL — HIGH (ref 70–99)
GLUCOSE BLDC GLUCOMTR-MCNC: 211 MG/DL — HIGH (ref 70–99)
GLUCOSE BLDC GLUCOMTR-MCNC: 214 MG/DL — HIGH (ref 70–99)
GLUCOSE BLDC GLUCOMTR-MCNC: 215 MG/DL — HIGH (ref 70–99)
GLUCOSE BLDC GLUCOMTR-MCNC: 216 MG/DL — HIGH (ref 70–99)
GLUCOSE BLDC GLUCOMTR-MCNC: 220 MG/DL — HIGH (ref 70–99)
GLUCOSE BLDC GLUCOMTR-MCNC: 223 MG/DL — HIGH (ref 70–99)
GLUCOSE BLDC GLUCOMTR-MCNC: 224 MG/DL — HIGH (ref 70–99)
GLUCOSE BLDC GLUCOMTR-MCNC: 225 MG/DL — HIGH (ref 70–99)
GLUCOSE BLDC GLUCOMTR-MCNC: 226 MG/DL — HIGH (ref 70–99)
GLUCOSE BLDC GLUCOMTR-MCNC: 230 MG/DL — HIGH (ref 70–99)
GLUCOSE BLDC GLUCOMTR-MCNC: 234 MG/DL — HIGH (ref 70–99)
GLUCOSE BLDC GLUCOMTR-MCNC: 235 MG/DL — HIGH (ref 70–99)
GLUCOSE BLDC GLUCOMTR-MCNC: 240 MG/DL — HIGH (ref 70–99)
GLUCOSE BLDC GLUCOMTR-MCNC: 241 MG/DL — HIGH (ref 70–99)
GLUCOSE BLDC GLUCOMTR-MCNC: 253 MG/DL — HIGH (ref 70–99)
GLUCOSE BLDC GLUCOMTR-MCNC: 266 MG/DL — HIGH (ref 70–99)
GLUCOSE BLDC GLUCOMTR-MCNC: 266 MG/DL — HIGH (ref 70–99)
GLUCOSE BLDC GLUCOMTR-MCNC: 53 MG/DL — LOW (ref 70–99)
GLUCOSE BLDC GLUCOMTR-MCNC: 61 MG/DL — LOW (ref 70–99)
GLUCOSE BLDC GLUCOMTR-MCNC: 70 MG/DL — SIGNIFICANT CHANGE UP (ref 70–99)
GLUCOSE BLDC GLUCOMTR-MCNC: 75 MG/DL — SIGNIFICANT CHANGE UP (ref 70–99)
GLUCOSE BLDC GLUCOMTR-MCNC: 87 MG/DL — SIGNIFICANT CHANGE UP (ref 70–99)
GLUCOSE BLDC GLUCOMTR-MCNC: 88 MG/DL — SIGNIFICANT CHANGE UP (ref 70–99)
GLUCOSE BLDC GLUCOMTR-MCNC: 98 MG/DL — SIGNIFICANT CHANGE UP (ref 70–99)
GLUCOSE BLDV-MCNC: 191 MG/DL — HIGH (ref 70–99)
GLUCOSE SERPL-MCNC: 107 MG/DL — HIGH (ref 70–99)
GLUCOSE SERPL-MCNC: 108 MG/DL — HIGH (ref 70–99)
GLUCOSE SERPL-MCNC: 112 MG/DL — HIGH (ref 70–99)
GLUCOSE SERPL-MCNC: 116 MG/DL — HIGH (ref 70–99)
GLUCOSE SERPL-MCNC: 122 MG/DL — HIGH (ref 70–99)
GLUCOSE SERPL-MCNC: 134 MG/DL — HIGH (ref 70–99)
GLUCOSE SERPL-MCNC: 139 MG/DL — HIGH (ref 70–99)
GLUCOSE SERPL-MCNC: 139 MG/DL — HIGH (ref 70–99)
GLUCOSE SERPL-MCNC: 140 MG/DL — HIGH (ref 70–99)
GLUCOSE SERPL-MCNC: 149 MG/DL — HIGH (ref 70–99)
GLUCOSE SERPL-MCNC: 149 MG/DL — HIGH (ref 70–99)
GLUCOSE SERPL-MCNC: 151 MG/DL — HIGH (ref 70–99)
GLUCOSE SERPL-MCNC: 155 MG/DL — HIGH (ref 70–99)
GLUCOSE SERPL-MCNC: 156 MG/DL — HIGH (ref 70–99)
GLUCOSE SERPL-MCNC: 158 MG/DL — HIGH (ref 70–99)
GLUCOSE SERPL-MCNC: 168 MG/DL — HIGH (ref 70–99)
GLUCOSE SERPL-MCNC: 170 MG/DL — HIGH (ref 70–99)
GLUCOSE SERPL-MCNC: 171 MG/DL — HIGH (ref 70–99)
GLUCOSE SERPL-MCNC: 173 MG/DL — HIGH (ref 70–99)
GLUCOSE SERPL-MCNC: 175 MG/DL — HIGH (ref 70–99)
GLUCOSE SERPL-MCNC: 175 MG/DL — HIGH (ref 70–99)
GLUCOSE SERPL-MCNC: 177 MG/DL — HIGH (ref 70–99)
GLUCOSE SERPL-MCNC: 181 MG/DL — HIGH (ref 70–99)
GLUCOSE SERPL-MCNC: 188 MG/DL — HIGH (ref 70–99)
GLUCOSE SERPL-MCNC: 192 MG/DL — HIGH (ref 70–99)
GLUCOSE SERPL-MCNC: 194 MG/DL — HIGH (ref 70–99)
GLUCOSE SERPL-MCNC: 195 MG/DL — HIGH (ref 70–99)
GLUCOSE SERPL-MCNC: 199 MG/DL — HIGH (ref 70–99)
GLUCOSE SERPL-MCNC: 210 MG/DL — HIGH (ref 70–99)
GLUCOSE SERPL-MCNC: 211 MG/DL — HIGH (ref 70–99)
GLUCOSE SERPL-MCNC: 218 MG/DL — HIGH (ref 70–99)
GLUCOSE SERPL-MCNC: 219 MG/DL — HIGH (ref 70–99)
GLUCOSE SERPL-MCNC: 225 MG/DL — HIGH (ref 70–99)
GLUCOSE SERPL-MCNC: 232 MG/DL — HIGH (ref 70–99)
GLUCOSE SERPL-MCNC: 234 MG/DL — HIGH (ref 70–99)
GLUCOSE SERPL-MCNC: 244 MG/DL — HIGH (ref 70–99)
GLUCOSE SERPL-MCNC: 324 MG/DL — HIGH (ref 70–99)
GLUCOSE SERPL-MCNC: 99 MG/DL — SIGNIFICANT CHANGE UP (ref 70–99)
GLUCOSE UR QL: ABNORMAL
GLUCOSE UR QL: NEGATIVE — SIGNIFICANT CHANGE UP
GLUCOSE UR QL: NEGATIVE — SIGNIFICANT CHANGE UP
GRAM STN FLD: SIGNIFICANT CHANGE UP
HAV IGM SER-ACNC: SIGNIFICANT CHANGE UP
HBA1C BLD-MCNC: 7.2 % — HIGH (ref 4–5.6)
HBV CORE AB SER-ACNC: SIGNIFICANT CHANGE UP
HBV CORE IGM SER-ACNC: SIGNIFICANT CHANGE UP
HBV SURFACE AB SER-ACNC: SIGNIFICANT CHANGE UP
HBV SURFACE AG SER-ACNC: SIGNIFICANT CHANGE UP
HBV SURFACE AG SER-ACNC: SIGNIFICANT CHANGE UP
HCO3 BLDA-SCNC: 22 MMOL/L — SIGNIFICANT CHANGE UP (ref 21–29)
HCO3 BLDA-SCNC: 30 MMOL/L — HIGH (ref 21–29)
HCO3 BLDA-SCNC: 34 MMOL/L — HIGH (ref 21–29)
HCO3 BLDV-SCNC: 23 MMOL/L — SIGNIFICANT CHANGE UP (ref 21–29)
HCT VFR BLD CALC: 27.1 % — LOW (ref 39–50)
HCT VFR BLD CALC: 27.4 % — LOW (ref 39–50)
HCT VFR BLD CALC: 27.6 % — LOW (ref 39–50)
HCT VFR BLD CALC: 27.8 % — LOW (ref 39–50)
HCT VFR BLD CALC: 27.9 % — LOW (ref 39–50)
HCT VFR BLD CALC: 28.1 % — LOW (ref 39–50)
HCT VFR BLD CALC: 28.3 % — LOW (ref 39–50)
HCT VFR BLD CALC: 28.4 % — LOW (ref 39–50)
HCT VFR BLD CALC: 28.6 % — LOW (ref 39–50)
HCT VFR BLD CALC: 28.6 % — LOW (ref 39–50)
HCT VFR BLD CALC: 29.2 % — LOW (ref 39–50)
HCT VFR BLD CALC: 29.3 % — LOW (ref 39–50)
HCT VFR BLD CALC: 29.4 % — LOW (ref 39–50)
HCT VFR BLD CALC: 29.7 % — LOW (ref 39–50)
HCT VFR BLD CALC: 29.8 % — LOW (ref 39–50)
HCT VFR BLD CALC: 29.9 % — LOW (ref 39–50)
HCT VFR BLD CALC: 30.1 % — LOW (ref 39–50)
HCT VFR BLD CALC: 30.1 % — LOW (ref 39–50)
HCT VFR BLD CALC: 30.3 % — LOW (ref 39–50)
HCT VFR BLD CALC: 31.6 % — LOW (ref 39–50)
HCT VFR BLD CALC: 32.1 % — LOW (ref 39–50)
HCT VFR BLD CALC: 32.5 % — LOW (ref 39–50)
HCT VFR BLD CALC: 32.8 % — LOW (ref 39–50)
HCT VFR BLD CALC: 34.1 % — LOW (ref 39–50)
HCT VFR BLD CALC: 34.4 % — LOW (ref 39–50)
HCT VFR BLD CALC: 34.6 % — LOW (ref 39–50)
HCT VFR BLD CALC: 34.7 % — LOW (ref 39–50)
HCT VFR BLD CALC: 35.5 % — LOW (ref 39–50)
HCT VFR BLD CALC: 35.9 % — LOW (ref 39–50)
HCT VFR BLD CALC: 36.1 % — LOW (ref 39–50)
HCT VFR BLD CALC: 36.8 % — LOW (ref 39–50)
HCT VFR BLD CALC: 36.8 % — LOW (ref 39–50)
HCT VFR BLD CALC: 37 % — LOW (ref 39–50)
HCT VFR BLD CALC: 37.3 % — LOW (ref 39–50)
HCT VFR BLD CALC: 37.3 % — LOW (ref 39–50)
HCT VFR BLD CALC: 38.5 % — LOW (ref 39–50)
HCT VFR BLD CALC: 39 % — SIGNIFICANT CHANGE UP (ref 39–50)
HCT VFR BLD CALC: 40.1 % — SIGNIFICANT CHANGE UP (ref 34.5–45)
HCT VFR BLDA CALC: 42 % — SIGNIFICANT CHANGE UP (ref 39–50)
HCV AB S/CO SERPL IA: 0.27 S/CO — SIGNIFICANT CHANGE UP (ref 0–0.99)
HCV AB S/CO SERPL IA: 0.87 S/CO — SIGNIFICANT CHANGE UP (ref 0–0.99)
HCV AB S/CO SERPL IA: 0.96 S/CO — SIGNIFICANT CHANGE UP (ref 0–0.99)
HCV AB SERPL-IMP: SIGNIFICANT CHANGE UP
HCV RNA FLD QL NAA+PROBE: SIGNIFICANT CHANGE UP
HCV RNA SPEC QL PROBE+SIG AMP: SIGNIFICANT CHANGE UP
HDLC SERPL-MCNC: 35 MG/DL — LOW
HGB BLD CALC-MCNC: 13.6 G/DL — SIGNIFICANT CHANGE UP (ref 11.5–15.5)
HGB BLD-MCNC: 10.1 G/DL — LOW (ref 13–17)
HGB BLD-MCNC: 10.5 G/DL — LOW (ref 13–17)
HGB BLD-MCNC: 10.8 G/DL — LOW (ref 13–17)
HGB BLD-MCNC: 11 G/DL — LOW (ref 13–17)
HGB BLD-MCNC: 11.2 G/DL — LOW (ref 13–17)
HGB BLD-MCNC: 11.4 G/DL — LOW (ref 13–17)
HGB BLD-MCNC: 11.5 G/DL — LOW (ref 13–17)
HGB BLD-MCNC: 11.7 G/DL — LOW (ref 13–17)
HGB BLD-MCNC: 11.7 G/DL — LOW (ref 13–17)
HGB BLD-MCNC: 11.8 G/DL — LOW (ref 13–17)
HGB BLD-MCNC: 11.9 G/DL — LOW (ref 13–17)
HGB BLD-MCNC: 12 G/DL — LOW (ref 13–17)
HGB BLD-MCNC: 12.1 G/DL — LOW (ref 13–17)
HGB BLD-MCNC: 12.1 G/DL — LOW (ref 13–17)
HGB BLD-MCNC: 13.1 G/DL — SIGNIFICANT CHANGE UP (ref 11.5–15.5)
HGB BLD-MCNC: 8 G/DL — LOW (ref 13–17)
HGB BLD-MCNC: 8.1 G/DL — LOW (ref 13–17)
HGB BLD-MCNC: 8.1 G/DL — LOW (ref 13–17)
HGB BLD-MCNC: 8.2 G/DL — LOW (ref 13–17)
HGB BLD-MCNC: 8.2 G/DL — LOW (ref 13–17)
HGB BLD-MCNC: 8.3 G/DL — LOW (ref 13–17)
HGB BLD-MCNC: 8.4 G/DL — LOW (ref 13–17)
HGB BLD-MCNC: 8.5 G/DL — LOW (ref 13–17)
HGB BLD-MCNC: 8.6 G/DL — LOW (ref 13–17)
HGB BLD-MCNC: 8.7 G/DL — LOW (ref 13–17)
HGB BLD-MCNC: 8.8 G/DL — LOW (ref 13–17)
HGB BLD-MCNC: 8.9 G/DL — LOW (ref 13–17)
HGB BLD-MCNC: 9 G/DL — LOW (ref 13–17)
HGB BLD-MCNC: 9.1 G/DL — LOW (ref 13–17)
HGB BLD-MCNC: 9.2 G/DL — LOW (ref 13–17)
HGB BLD-MCNC: 9.6 G/DL — LOW (ref 13–17)
HGB BLD-MCNC: 9.8 G/DL — LOW (ref 13–17)
HIV 1+2 AB+HIV1 P24 AG SERPL QL IA: SIGNIFICANT CHANGE UP
HOROWITZ INDEX BLDA+IHG-RTO: 60 — SIGNIFICANT CHANGE UP
HYALINE CASTS # UR AUTO: ABNORMAL /LPF
IMM GRANULOCYTES NFR BLD AUTO: 0.7 % — SIGNIFICANT CHANGE UP (ref 0–1.5)
IMM GRANULOCYTES NFR BLD AUTO: 0.8 % — SIGNIFICANT CHANGE UP (ref 0–1.5)
IMM GRANULOCYTES NFR BLD AUTO: 1.3 % — SIGNIFICANT CHANGE UP (ref 0–1.5)
IMM GRANULOCYTES NFR BLD AUTO: 1.5 % — SIGNIFICANT CHANGE UP (ref 0–1.5)
IMM GRANULOCYTES NFR BLD AUTO: 1.7 % — HIGH (ref 0–1.5)
IMM GRANULOCYTES NFR BLD AUTO: 1.9 % — HIGH (ref 0–1.5)
IMM GRANULOCYTES NFR BLD AUTO: 2.5 % — HIGH (ref 0–1.5)
IMM GRANULOCYTES NFR BLD AUTO: 3.8 % — HIGH (ref 0–1.5)
IMM GRANULOCYTES NFR BLD AUTO: 3.8 % — HIGH (ref 0–1.5)
IMM GRANULOCYTES NFR BLD AUTO: 5.3 % — HIGH (ref 0–1.5)
IMM GRANULOCYTES NFR BLD AUTO: 6.3 % — HIGH (ref 0–1.5)
INR BLD: 0.96 RATIO — SIGNIFICANT CHANGE UP (ref 0.88–1.16)
INR BLD: 0.96 RATIO — SIGNIFICANT CHANGE UP (ref 0.88–1.16)
INR BLD: 1 RATIO — SIGNIFICANT CHANGE UP (ref 0.88–1.16)
INR BLD: 1.03 RATIO — SIGNIFICANT CHANGE UP (ref 0.88–1.16)
INR BLD: 1.13 RATIO — SIGNIFICANT CHANGE UP (ref 0.88–1.16)
INR BLD: 1.18 RATIO — HIGH (ref 0.88–1.16)
INR BLD: 1.18 RATIO — HIGH (ref 0.88–1.16)
INR BLD: 1.19 RATIO — HIGH (ref 0.88–1.16)
INR BLD: 1.25 RATIO — HIGH (ref 0.88–1.16)
INR BLD: 1.28 RATIO — HIGH (ref 0.88–1.16)
INR BLD: 1.32 RATIO — HIGH (ref 0.88–1.16)
INR BLD: 1.34 RATIO — HIGH (ref 0.88–1.16)
INR BLD: 1.42 RATIO — HIGH (ref 0.88–1.16)
INR BLD: 1.44 RATIO — HIGH (ref 0.88–1.16)
INR BLD: 1.49 RATIO — HIGH (ref 0.88–1.16)
INR BLD: 1.49 RATIO — HIGH (ref 0.88–1.16)
INR BLD: 1.58 RATIO — HIGH (ref 0.88–1.16)
INR BLD: 1.59 RATIO — HIGH (ref 0.88–1.16)
INR BLD: 1.64 RATIO — HIGH (ref 0.88–1.16)
KETONES UR-MCNC: NEGATIVE — SIGNIFICANT CHANGE UP
LACTATE BLDV-MCNC: 3.1 MMOL/L — HIGH (ref 0.7–2)
LDH SERPL L TO P-CCNC: 146 U/L — SIGNIFICANT CHANGE UP (ref 50–242)
LDH SERPL L TO P-CCNC: 156 U/L — SIGNIFICANT CHANGE UP (ref 50–242)
LDH SERPL L TO P-CCNC: 158 U/L — SIGNIFICANT CHANGE UP (ref 50–242)
LDH SERPL L TO P-CCNC: 167 U/L — SIGNIFICANT CHANGE UP (ref 50–242)
LDH SERPL L TO P-CCNC: 171 U/L — SIGNIFICANT CHANGE UP (ref 50–242)
LDH SERPL L TO P-CCNC: 178 U/L — SIGNIFICANT CHANGE UP (ref 50–242)
LDH SERPL L TO P-CCNC: 195 U/L — SIGNIFICANT CHANGE UP (ref 50–242)
LDH SERPL L TO P-CCNC: 239 U/L — SIGNIFICANT CHANGE UP (ref 50–242)
LDH SERPL L TO P-CCNC: 280 U/L — HIGH (ref 50–242)
LDH SERPL L TO P-CCNC: 290 U/L — HIGH (ref 50–242)
LDH SERPL L TO P-CCNC: 355 U/L — HIGH (ref 50–242)
LDH SERPL L TO P-CCNC: 383 U/L — HIGH (ref 50–242)
LDH SERPL L TO P-CCNC: 429 U/L — HIGH (ref 50–242)
LDH SERPL L TO P-CCNC: 588 U/L — HIGH (ref 50–242)
LEGIONELLA DNA SPEC NAA+PROBE: NEGATIVE — SIGNIFICANT CHANGE UP
LEUKOCYTE ESTERASE UR-ACNC: NEGATIVE — SIGNIFICANT CHANGE UP
LG PLATELETS BLD QL AUTO: SLIGHT — SIGNIFICANT CHANGE UP
LIPID PNL WITH DIRECT LDL SERPL: 45 MG/DL — SIGNIFICANT CHANGE UP
LMWH PPP CHRO-ACNC: 0.35 IU/ML — LOW (ref 0.5–1.1)
LYMPHOCYTES # BLD AUTO: 0.36 K/UL — LOW (ref 1–3.3)
LYMPHOCYTES # BLD AUTO: 0.39 K/UL — LOW (ref 1–3.3)
LYMPHOCYTES # BLD AUTO: 0.41 K/UL — LOW (ref 1–3.3)
LYMPHOCYTES # BLD AUTO: 0.51 K/UL — LOW (ref 1–3.3)
LYMPHOCYTES # BLD AUTO: 0.76 K/UL — LOW (ref 1–3.3)
LYMPHOCYTES # BLD AUTO: 0.78 K/UL — LOW (ref 1–3.3)
LYMPHOCYTES # BLD AUTO: 0.79 K/UL — LOW (ref 1–3.3)
LYMPHOCYTES # BLD AUTO: 0.83 K/UL — LOW (ref 1–3.3)
LYMPHOCYTES # BLD AUTO: 0.87 K/UL — LOW (ref 1–3.3)
LYMPHOCYTES # BLD AUTO: 1.06 K/UL — SIGNIFICANT CHANGE UP (ref 1–3.3)
LYMPHOCYTES # BLD AUTO: 1.07 K/UL — SIGNIFICANT CHANGE UP (ref 1–3.3)
LYMPHOCYTES # BLD AUTO: 1.23 K/UL — SIGNIFICANT CHANGE UP (ref 1–3.3)
LYMPHOCYTES # BLD AUTO: 10.9 % — LOW (ref 13–44)
LYMPHOCYTES # BLD AUTO: 12.3 % — LOW (ref 13–44)
LYMPHOCYTES # BLD AUTO: 16.3 % — SIGNIFICANT CHANGE UP (ref 13–44)
LYMPHOCYTES # BLD AUTO: 3 % — LOW (ref 13–44)
LYMPHOCYTES # BLD AUTO: 3.3 % — LOW (ref 13–44)
LYMPHOCYTES # BLD AUTO: 4.1 % — LOW (ref 13–44)
LYMPHOCYTES # BLD AUTO: 6.6 % — LOW (ref 13–44)
LYMPHOCYTES # BLD AUTO: 7.4 % — LOW (ref 13–44)
LYMPHOCYTES # BLD AUTO: 9 % — LOW (ref 13–44)
LYMPHOCYTES # BLD AUTO: 9 % — LOW (ref 13–44)
LYMPHOCYTES # BLD AUTO: 9.2 % — LOW (ref 13–44)
LYMPHOCYTES # BLD AUTO: 9.4 % — LOW (ref 13–44)
M TB IFN-G BLD-IMP: NEGATIVE — SIGNIFICANT CHANGE UP
M TB IFN-G CD4+ BCKGRND COR BLD-ACNC: 0 IU/ML — SIGNIFICANT CHANGE UP
M TB IFN-G CD4+CD8+ BCKGRND COR BLD-ACNC: 0 IU/ML — SIGNIFICANT CHANGE UP
MAGNESIUM SERPL-MCNC: 1.8 MG/DL — SIGNIFICANT CHANGE UP (ref 1.6–2.6)
MAGNESIUM SERPL-MCNC: 1.8 MG/DL — SIGNIFICANT CHANGE UP (ref 1.6–2.6)
MAGNESIUM SERPL-MCNC: 1.9 MG/DL — SIGNIFICANT CHANGE UP (ref 1.6–2.6)
MAGNESIUM SERPL-MCNC: 2 MG/DL — SIGNIFICANT CHANGE UP (ref 1.6–2.6)
MAGNESIUM SERPL-MCNC: 2.1 MG/DL — SIGNIFICANT CHANGE UP (ref 1.6–2.6)
MAGNESIUM SERPL-MCNC: 2.2 MG/DL — SIGNIFICANT CHANGE UP (ref 1.6–2.6)
MAGNESIUM SERPL-MCNC: 2.3 MG/DL — SIGNIFICANT CHANGE UP (ref 1.6–2.6)
MAGNESIUM SERPL-MCNC: 2.4 MG/DL — SIGNIFICANT CHANGE UP (ref 1.6–2.6)
MAGNESIUM SERPL-MCNC: 2.5 MG/DL — SIGNIFICANT CHANGE UP (ref 1.6–2.6)
MAGNESIUM SERPL-MCNC: 2.6 MG/DL — SIGNIFICANT CHANGE UP (ref 1.6–2.6)
MAGNESIUM SERPL-MCNC: 2.7 MG/DL — HIGH (ref 1.6–2.6)
MAGNESIUM SERPL-MCNC: 2.8 MG/DL — HIGH (ref 1.6–2.6)
MAGNESIUM SERPL-MCNC: 2.8 MG/DL — HIGH (ref 1.6–2.6)
MAGNESIUM SERPL-MCNC: 2.9 MG/DL — HIGH (ref 1.6–2.6)
MANUAL SMEAR VERIFICATION: SIGNIFICANT CHANGE UP
MCHC RBC-ENTMCNC: 27.3 PG — SIGNIFICANT CHANGE UP (ref 27–34)
MCHC RBC-ENTMCNC: 27.4 PG — SIGNIFICANT CHANGE UP (ref 27–34)
MCHC RBC-ENTMCNC: 27.5 PG — SIGNIFICANT CHANGE UP (ref 27–34)
MCHC RBC-ENTMCNC: 27.6 PG — SIGNIFICANT CHANGE UP (ref 27–34)
MCHC RBC-ENTMCNC: 27.8 PG — SIGNIFICANT CHANGE UP (ref 27–34)
MCHC RBC-ENTMCNC: 27.8 PG — SIGNIFICANT CHANGE UP (ref 27–34)
MCHC RBC-ENTMCNC: 27.9 PG — SIGNIFICANT CHANGE UP (ref 27–34)
MCHC RBC-ENTMCNC: 28 PG — SIGNIFICANT CHANGE UP (ref 27–34)
MCHC RBC-ENTMCNC: 28 PG — SIGNIFICANT CHANGE UP (ref 27–34)
MCHC RBC-ENTMCNC: 28.1 PG — SIGNIFICANT CHANGE UP (ref 27–34)
MCHC RBC-ENTMCNC: 28.2 PG — SIGNIFICANT CHANGE UP (ref 27–34)
MCHC RBC-ENTMCNC: 28.3 PG — SIGNIFICANT CHANGE UP (ref 27–34)
MCHC RBC-ENTMCNC: 28.4 PG — SIGNIFICANT CHANGE UP (ref 27–34)
MCHC RBC-ENTMCNC: 28.5 PG — SIGNIFICANT CHANGE UP (ref 27–34)
MCHC RBC-ENTMCNC: 28.6 PG — SIGNIFICANT CHANGE UP (ref 27–34)
MCHC RBC-ENTMCNC: 28.7 GM/DL — LOW (ref 32–36)
MCHC RBC-ENTMCNC: 28.7 PG — SIGNIFICANT CHANGE UP (ref 27–34)
MCHC RBC-ENTMCNC: 28.8 GM/DL — LOW (ref 32–36)
MCHC RBC-ENTMCNC: 28.8 GM/DL — LOW (ref 32–36)
MCHC RBC-ENTMCNC: 28.8 PG — SIGNIFICANT CHANGE UP (ref 27–34)
MCHC RBC-ENTMCNC: 29 PG — SIGNIFICANT CHANGE UP (ref 27–34)
MCHC RBC-ENTMCNC: 29.1 PG — SIGNIFICANT CHANGE UP (ref 27–34)
MCHC RBC-ENTMCNC: 29.2 GM/DL — LOW (ref 32–36)
MCHC RBC-ENTMCNC: 29.2 GM/DL — LOW (ref 32–36)
MCHC RBC-ENTMCNC: 29.2 PG — SIGNIFICANT CHANGE UP (ref 27–34)
MCHC RBC-ENTMCNC: 29.3 GM/DL — LOW (ref 32–36)
MCHC RBC-ENTMCNC: 29.3 PG — SIGNIFICANT CHANGE UP (ref 27–34)
MCHC RBC-ENTMCNC: 29.4 GM/DL — LOW (ref 32–36)
MCHC RBC-ENTMCNC: 29.4 GM/DL — LOW (ref 32–36)
MCHC RBC-ENTMCNC: 29.6 GM/DL — LOW (ref 32–36)
MCHC RBC-ENTMCNC: 29.6 GM/DL — LOW (ref 32–36)
MCHC RBC-ENTMCNC: 29.6 PG — SIGNIFICANT CHANGE UP (ref 27–34)
MCHC RBC-ENTMCNC: 29.7 GM/DL — LOW (ref 32–36)
MCHC RBC-ENTMCNC: 29.7 PG — SIGNIFICANT CHANGE UP (ref 27–34)
MCHC RBC-ENTMCNC: 29.9 GM/DL — LOW (ref 32–36)
MCHC RBC-ENTMCNC: 29.9 GM/DL — LOW (ref 32–36)
MCHC RBC-ENTMCNC: 30.2 GM/DL — LOW (ref 32–36)
MCHC RBC-ENTMCNC: 30.3 GM/DL — LOW (ref 32–36)
MCHC RBC-ENTMCNC: 30.4 GM/DL — LOW (ref 32–36)
MCHC RBC-ENTMCNC: 30.6 GM/DL — LOW (ref 32–36)
MCHC RBC-ENTMCNC: 30.7 GM/DL — LOW (ref 32–36)
MCHC RBC-ENTMCNC: 30.8 GM/DL — LOW (ref 32–36)
MCHC RBC-ENTMCNC: 31 GM/DL — LOW (ref 32–36)
MCHC RBC-ENTMCNC: 31.4 GM/DL — LOW (ref 32–36)
MCHC RBC-ENTMCNC: 31.8 GM/DL — LOW (ref 32–36)
MCHC RBC-ENTMCNC: 31.9 GM/DL — LOW (ref 32–36)
MCHC RBC-ENTMCNC: 32.3 GM/DL — SIGNIFICANT CHANGE UP (ref 32–36)
MCHC RBC-ENTMCNC: 32.3 GM/DL — SIGNIFICANT CHANGE UP (ref 32–36)
MCHC RBC-ENTMCNC: 32.4 GM/DL — SIGNIFICANT CHANGE UP (ref 32–36)
MCHC RBC-ENTMCNC: 32.6 GM/DL — SIGNIFICANT CHANGE UP (ref 32–36)
MCHC RBC-ENTMCNC: 32.7 GM/DL — SIGNIFICANT CHANGE UP (ref 32–36)
MCHC RBC-ENTMCNC: 32.9 GM/DL — SIGNIFICANT CHANGE UP (ref 32–36)
MCHC RBC-ENTMCNC: 32.9 GM/DL — SIGNIFICANT CHANGE UP (ref 32–36)
MCHC RBC-ENTMCNC: 33.2 GM/DL — SIGNIFICANT CHANGE UP (ref 32–36)
MCHC RBC-ENTMCNC: 33.5 GM/DL — SIGNIFICANT CHANGE UP (ref 32–36)
MCHC RBC-ENTMCNC: 33.7 GM/DL — SIGNIFICANT CHANGE UP (ref 32–36)
MCV RBC AUTO: 86.5 FL — SIGNIFICANT CHANGE UP (ref 80–100)
MCV RBC AUTO: 86.8 FL — SIGNIFICANT CHANGE UP (ref 80–100)
MCV RBC AUTO: 87 FL — SIGNIFICANT CHANGE UP (ref 80–100)
MCV RBC AUTO: 87.6 FL — SIGNIFICANT CHANGE UP (ref 80–100)
MCV RBC AUTO: 87.7 FL — SIGNIFICANT CHANGE UP (ref 80–100)
MCV RBC AUTO: 89.4 FL — SIGNIFICANT CHANGE UP (ref 80–100)
MCV RBC AUTO: 89.5 FL — SIGNIFICANT CHANGE UP (ref 80–100)
MCV RBC AUTO: 90.1 FL — SIGNIFICANT CHANGE UP (ref 80–100)
MCV RBC AUTO: 90.2 FL — SIGNIFICANT CHANGE UP (ref 80–100)
MCV RBC AUTO: 91.4 FL — SIGNIFICANT CHANGE UP (ref 80–100)
MCV RBC AUTO: 91.9 FL — SIGNIFICANT CHANGE UP (ref 80–100)
MCV RBC AUTO: 92.1 FL — SIGNIFICANT CHANGE UP (ref 80–100)
MCV RBC AUTO: 92.7 FL — SIGNIFICANT CHANGE UP (ref 80–100)
MCV RBC AUTO: 93.1 FL — SIGNIFICANT CHANGE UP (ref 80–100)
MCV RBC AUTO: 93.1 FL — SIGNIFICANT CHANGE UP (ref 80–100)
MCV RBC AUTO: 93.3 FL — SIGNIFICANT CHANGE UP (ref 80–100)
MCV RBC AUTO: 93.5 FL — SIGNIFICANT CHANGE UP (ref 80–100)
MCV RBC AUTO: 93.8 FL — SIGNIFICANT CHANGE UP (ref 80–100)
MCV RBC AUTO: 93.9 FL — SIGNIFICANT CHANGE UP (ref 80–100)
MCV RBC AUTO: 94 FL — SIGNIFICANT CHANGE UP (ref 80–100)
MCV RBC AUTO: 94.2 FL — SIGNIFICANT CHANGE UP (ref 80–100)
MCV RBC AUTO: 94.7 FL — SIGNIFICANT CHANGE UP (ref 80–100)
MCV RBC AUTO: 94.9 FL — SIGNIFICANT CHANGE UP (ref 80–100)
MCV RBC AUTO: 95 FL — SIGNIFICANT CHANGE UP (ref 80–100)
MCV RBC AUTO: 95.1 FL — SIGNIFICANT CHANGE UP (ref 80–100)
MCV RBC AUTO: 95.3 FL — SIGNIFICANT CHANGE UP (ref 80–100)
MCV RBC AUTO: 95.5 FL — SIGNIFICANT CHANGE UP (ref 80–100)
MCV RBC AUTO: 95.6 FL — SIGNIFICANT CHANGE UP (ref 80–100)
MCV RBC AUTO: 95.6 FL — SIGNIFICANT CHANGE UP (ref 80–100)
MCV RBC AUTO: 95.7 FL — SIGNIFICANT CHANGE UP (ref 80–100)
MCV RBC AUTO: 95.7 FL — SIGNIFICANT CHANGE UP (ref 80–100)
MCV RBC AUTO: 96.1 FL — SIGNIFICANT CHANGE UP (ref 80–100)
MCV RBC AUTO: 97.4 FL — SIGNIFICANT CHANGE UP (ref 80–100)
METAMYELOCYTES # FLD: 4 % — HIGH (ref 0–0)
METHOD TYPE: SIGNIFICANT CHANGE UP
MONOCYTES # BLD AUTO: 0.38 K/UL — SIGNIFICANT CHANGE UP (ref 0–0.9)
MONOCYTES # BLD AUTO: 0.42 K/UL — SIGNIFICANT CHANGE UP (ref 0–0.9)
MONOCYTES # BLD AUTO: 0.47 K/UL — SIGNIFICANT CHANGE UP (ref 0–0.9)
MONOCYTES # BLD AUTO: 0.52 K/UL — SIGNIFICANT CHANGE UP (ref 0–0.9)
MONOCYTES # BLD AUTO: 0.54 K/UL — SIGNIFICANT CHANGE UP (ref 0–0.9)
MONOCYTES # BLD AUTO: 0.57 K/UL — SIGNIFICANT CHANGE UP (ref 0–0.9)
MONOCYTES # BLD AUTO: 0.62 K/UL — SIGNIFICANT CHANGE UP (ref 0–0.9)
MONOCYTES # BLD AUTO: 0.68 K/UL — SIGNIFICANT CHANGE UP (ref 0–0.9)
MONOCYTES # BLD AUTO: 0.72 K/UL — SIGNIFICANT CHANGE UP (ref 0–0.9)
MONOCYTES # BLD AUTO: 0.77 K/UL — SIGNIFICANT CHANGE UP (ref 0–0.9)
MONOCYTES # BLD AUTO: 0.78 K/UL — SIGNIFICANT CHANGE UP (ref 0–0.9)
MONOCYTES # BLD AUTO: 0.84 K/UL — SIGNIFICANT CHANGE UP (ref 0–0.9)
MONOCYTES NFR BLD AUTO: 4.9 % — SIGNIFICANT CHANGE UP (ref 2–14)
MONOCYTES NFR BLD AUTO: 5 % — SIGNIFICANT CHANGE UP (ref 2–14)
MONOCYTES NFR BLD AUTO: 5.1 % — SIGNIFICANT CHANGE UP (ref 2–14)
MONOCYTES NFR BLD AUTO: 5.8 % — SIGNIFICANT CHANGE UP (ref 2–14)
MONOCYTES NFR BLD AUTO: 6 % — SIGNIFICANT CHANGE UP (ref 2–14)
MONOCYTES NFR BLD AUTO: 6.1 % — SIGNIFICANT CHANGE UP (ref 2–14)
MONOCYTES NFR BLD AUTO: 6.2 % — SIGNIFICANT CHANGE UP (ref 2–14)
MONOCYTES NFR BLD AUTO: 6.3 % — SIGNIFICANT CHANGE UP (ref 2–14)
MONOCYTES NFR BLD AUTO: 7.1 % — SIGNIFICANT CHANGE UP (ref 2–14)
MONOCYTES NFR BLD AUTO: 7.5 % — SIGNIFICANT CHANGE UP (ref 2–14)
MONOCYTES NFR BLD AUTO: 8.4 % — SIGNIFICANT CHANGE UP (ref 2–14)
MONOCYTES NFR BLD AUTO: 8.6 % — SIGNIFICANT CHANGE UP (ref 2–14)
MYELOCYTES NFR BLD: 1 % — HIGH (ref 0–0)
NEUTROPHILS # BLD AUTO: 10.7 K/UL — HIGH (ref 1.8–7.4)
NEUTROPHILS # BLD AUTO: 10.88 K/UL — HIGH (ref 1.8–7.4)
NEUTROPHILS # BLD AUTO: 3.6 K/UL — SIGNIFICANT CHANGE UP (ref 1.8–7.4)
NEUTROPHILS # BLD AUTO: 4.83 K/UL — SIGNIFICANT CHANGE UP (ref 1.8–7.4)
NEUTROPHILS # BLD AUTO: 5.79 K/UL — SIGNIFICANT CHANGE UP (ref 1.8–7.4)
NEUTROPHILS # BLD AUTO: 6.3 K/UL — SIGNIFICANT CHANGE UP (ref 1.8–7.4)
NEUTROPHILS # BLD AUTO: 7.39 K/UL — SIGNIFICANT CHANGE UP (ref 1.8–7.4)
NEUTROPHILS # BLD AUTO: 8.73 K/UL — HIGH (ref 1.8–7.4)
NEUTROPHILS # BLD AUTO: 9.14 K/UL — HIGH (ref 1.8–7.4)
NEUTROPHILS # BLD AUTO: 9.21 K/UL — HIGH (ref 1.8–7.4)
NEUTROPHILS # BLD AUTO: 9.31 K/UL — HIGH (ref 1.8–7.4)
NEUTROPHILS # BLD AUTO: 9.47 K/UL — HIGH (ref 1.8–7.4)
NEUTROPHILS NFR BLD AUTO: 74.1 % — SIGNIFICANT CHANGE UP (ref 43–77)
NEUTROPHILS NFR BLD AUTO: 77.8 % — HIGH (ref 43–77)
NEUTROPHILS NFR BLD AUTO: 78.1 % — HIGH (ref 43–77)
NEUTROPHILS NFR BLD AUTO: 78.6 % — HIGH (ref 43–77)
NEUTROPHILS NFR BLD AUTO: 80.3 % — HIGH (ref 43–77)
NEUTROPHILS NFR BLD AUTO: 80.9 % — HIGH (ref 43–77)
NEUTROPHILS NFR BLD AUTO: 81 % — HIGH (ref 43–77)
NEUTROPHILS NFR BLD AUTO: 81.2 % — HIGH (ref 43–77)
NEUTROPHILS NFR BLD AUTO: 81.3 % — HIGH (ref 43–77)
NEUTROPHILS NFR BLD AUTO: 82 % — HIGH (ref 43–77)
NEUTROPHILS NFR BLD AUTO: 85.3 % — HIGH (ref 43–77)
NEUTROPHILS NFR BLD AUTO: 87.6 % — HIGH (ref 43–77)
NEUTS BAND # BLD: 2 % — SIGNIFICANT CHANGE UP (ref 0–8)
NITRITE UR-MCNC: NEGATIVE — SIGNIFICANT CHANGE UP
NRBC # BLD: 0 /100 WBCS — SIGNIFICANT CHANGE UP (ref 0–0)
NRBC # BLD: 0 /100 — SIGNIFICANT CHANGE UP (ref 0–0)
NT-PROBNP SERPL-SCNC: 261 PG/ML — SIGNIFICANT CHANGE UP (ref 0–300)
ORGANISM # SPEC MICROSCOPIC CNT: SIGNIFICANT CHANGE UP
PCO2 BLDA: 30 MMHG — LOW (ref 32–46)
PCO2 BLDA: 48 MMHG — HIGH (ref 32–46)
PCO2 BLDA: 61 MMHG — HIGH (ref 32–46)
PCO2 BLDV: 42 MMHG — SIGNIFICANT CHANGE UP (ref 35–50)
PH BLDA: 7.37 — SIGNIFICANT CHANGE UP (ref 7.35–7.45)
PH BLDA: 7.4 — SIGNIFICANT CHANGE UP (ref 7.35–7.45)
PH BLDA: 7.49 — HIGH (ref 7.35–7.45)
PH BLDV: 7.37 — SIGNIFICANT CHANGE UP (ref 7.35–7.45)
PH UR: 6 — SIGNIFICANT CHANGE UP (ref 5–8)
PH UR: 6 — SIGNIFICANT CHANGE UP (ref 5–8)
PH UR: 6.5 — SIGNIFICANT CHANGE UP (ref 5–8)
PHOSPHATE SERPL-MCNC: 1.8 MG/DL — LOW (ref 2.5–4.5)
PHOSPHATE SERPL-MCNC: 1.8 MG/DL — LOW (ref 2.5–4.5)
PHOSPHATE SERPL-MCNC: 2.1 MG/DL — LOW (ref 2.5–4.5)
PHOSPHATE SERPL-MCNC: 2.1 MG/DL — LOW (ref 2.5–4.5)
PHOSPHATE SERPL-MCNC: 2.3 MG/DL — LOW (ref 2.5–4.5)
PHOSPHATE SERPL-MCNC: 2.3 MG/DL — LOW (ref 2.5–4.5)
PHOSPHATE SERPL-MCNC: 2.4 MG/DL — LOW (ref 2.5–4.5)
PHOSPHATE SERPL-MCNC: 2.5 MG/DL — SIGNIFICANT CHANGE UP (ref 2.5–4.5)
PHOSPHATE SERPL-MCNC: 2.6 MG/DL — SIGNIFICANT CHANGE UP (ref 2.5–4.5)
PHOSPHATE SERPL-MCNC: 2.6 MG/DL — SIGNIFICANT CHANGE UP (ref 2.5–4.5)
PHOSPHATE SERPL-MCNC: 2.7 MG/DL — SIGNIFICANT CHANGE UP (ref 2.5–4.5)
PHOSPHATE SERPL-MCNC: 2.8 MG/DL — SIGNIFICANT CHANGE UP (ref 2.5–4.5)
PHOSPHATE SERPL-MCNC: 2.9 MG/DL — SIGNIFICANT CHANGE UP (ref 2.5–4.5)
PHOSPHATE SERPL-MCNC: 2.9 MG/DL — SIGNIFICANT CHANGE UP (ref 2.5–4.5)
PHOSPHATE SERPL-MCNC: 3 MG/DL — SIGNIFICANT CHANGE UP (ref 2.5–4.5)
PHOSPHATE SERPL-MCNC: 3.1 MG/DL — SIGNIFICANT CHANGE UP (ref 2.5–4.5)
PHOSPHATE SERPL-MCNC: 3.1 MG/DL — SIGNIFICANT CHANGE UP (ref 2.5–4.5)
PHOSPHATE SERPL-MCNC: 3.2 MG/DL — SIGNIFICANT CHANGE UP (ref 2.5–4.5)
PHOSPHATE SERPL-MCNC: 3.2 MG/DL — SIGNIFICANT CHANGE UP (ref 2.5–4.5)
PHOSPHATE SERPL-MCNC: 3.4 MG/DL — SIGNIFICANT CHANGE UP (ref 2.5–4.5)
PHOSPHATE SERPL-MCNC: 3.5 MG/DL — SIGNIFICANT CHANGE UP (ref 2.5–4.5)
PHOSPHATE SERPL-MCNC: 3.7 MG/DL — SIGNIFICANT CHANGE UP (ref 2.5–4.5)
PHOSPHATE SERPL-MCNC: 3.8 MG/DL — SIGNIFICANT CHANGE UP (ref 2.5–4.5)
PHOSPHATE SERPL-MCNC: 3.8 MG/DL — SIGNIFICANT CHANGE UP (ref 2.5–4.5)
PHOSPHATE SERPL-MCNC: 3.9 MG/DL — SIGNIFICANT CHANGE UP (ref 2.5–4.5)
PHOSPHATE SERPL-MCNC: 3.9 MG/DL — SIGNIFICANT CHANGE UP (ref 2.5–4.5)
PHOSPHATE SERPL-MCNC: 4.1 MG/DL — SIGNIFICANT CHANGE UP (ref 2.5–4.5)
PHOSPHATE SERPL-MCNC: 4.2 MG/DL — SIGNIFICANT CHANGE UP (ref 2.5–4.5)
PHOSPHATE SERPL-MCNC: 5 MG/DL — HIGH (ref 2.5–4.5)
PLAT MORPH BLD: ABNORMAL
PLATELET # BLD AUTO: 155 K/UL — SIGNIFICANT CHANGE UP (ref 150–400)
PLATELET # BLD AUTO: 162 K/UL — SIGNIFICANT CHANGE UP (ref 150–400)
PLATELET # BLD AUTO: 198 K/UL — SIGNIFICANT CHANGE UP (ref 150–400)
PLATELET # BLD AUTO: 199 K/UL — SIGNIFICANT CHANGE UP (ref 150–400)
PLATELET # BLD AUTO: 202 K/UL — SIGNIFICANT CHANGE UP (ref 150–400)
PLATELET # BLD AUTO: 208 K/UL — SIGNIFICANT CHANGE UP (ref 150–400)
PLATELET # BLD AUTO: 210 K/UL — SIGNIFICANT CHANGE UP (ref 150–400)
PLATELET # BLD AUTO: 216 K/UL — SIGNIFICANT CHANGE UP (ref 150–400)
PLATELET # BLD AUTO: 224 K/UL — SIGNIFICANT CHANGE UP (ref 150–400)
PLATELET # BLD AUTO: 226 K/UL — SIGNIFICANT CHANGE UP (ref 150–400)
PLATELET # BLD AUTO: 226 K/UL — SIGNIFICANT CHANGE UP (ref 150–400)
PLATELET # BLD AUTO: 228 K/UL — SIGNIFICANT CHANGE UP (ref 150–400)
PLATELET # BLD AUTO: 231 K/UL — SIGNIFICANT CHANGE UP (ref 150–400)
PLATELET # BLD AUTO: 231 K/UL — SIGNIFICANT CHANGE UP (ref 150–400)
PLATELET # BLD AUTO: 232 K/UL — SIGNIFICANT CHANGE UP (ref 150–400)
PLATELET # BLD AUTO: 235 K/UL — SIGNIFICANT CHANGE UP (ref 150–400)
PLATELET # BLD AUTO: 236 K/UL — SIGNIFICANT CHANGE UP (ref 150–400)
PLATELET # BLD AUTO: 242 K/UL — SIGNIFICANT CHANGE UP (ref 150–400)
PLATELET # BLD AUTO: 249 K/UL — SIGNIFICANT CHANGE UP (ref 150–400)
PLATELET # BLD AUTO: 254 K/UL — SIGNIFICANT CHANGE UP (ref 150–400)
PLATELET # BLD AUTO: 254 K/UL — SIGNIFICANT CHANGE UP (ref 150–400)
PLATELET # BLD AUTO: 256 K/UL — SIGNIFICANT CHANGE UP (ref 150–400)
PLATELET # BLD AUTO: 260 K/UL — SIGNIFICANT CHANGE UP (ref 150–400)
PLATELET # BLD AUTO: 261 K/UL — SIGNIFICANT CHANGE UP (ref 150–400)
PLATELET # BLD AUTO: 265 K/UL — SIGNIFICANT CHANGE UP (ref 150–400)
PLATELET # BLD AUTO: 269 K/UL — SIGNIFICANT CHANGE UP (ref 150–400)
PLATELET # BLD AUTO: 271 K/UL — SIGNIFICANT CHANGE UP (ref 150–400)
PLATELET # BLD AUTO: 272 K/UL — SIGNIFICANT CHANGE UP (ref 150–400)
PLATELET # BLD AUTO: 286 K/UL — SIGNIFICANT CHANGE UP (ref 150–400)
PLATELET # BLD AUTO: 291 K/UL — SIGNIFICANT CHANGE UP (ref 150–400)
PLATELET # BLD AUTO: 298 K/UL — SIGNIFICANT CHANGE UP (ref 150–400)
PLATELET # BLD AUTO: 301 K/UL — SIGNIFICANT CHANGE UP (ref 150–400)
PLATELET # BLD AUTO: 304 K/UL — SIGNIFICANT CHANGE UP (ref 150–400)
PLATELET # BLD AUTO: 308 K/UL — SIGNIFICANT CHANGE UP (ref 150–400)
PLATELET # BLD AUTO: 325 K/UL — SIGNIFICANT CHANGE UP (ref 150–400)
PLATELET # BLD AUTO: 331 K/UL — SIGNIFICANT CHANGE UP (ref 150–400)
PLATELET # BLD AUTO: 342 K/UL — SIGNIFICANT CHANGE UP (ref 150–400)
PLATELET # BLD AUTO: 366 K/UL — SIGNIFICANT CHANGE UP (ref 150–400)
PLATELET CLUMP BLD QL SMEAR: ABNORMAL
PO2 BLDA: 104 MMHG — SIGNIFICANT CHANGE UP (ref 74–108)
PO2 BLDA: 106 MMHG — SIGNIFICANT CHANGE UP (ref 74–108)
PO2 BLDA: 49 MMHG — CRITICAL LOW (ref 74–108)
PO2 BLDV: 27 MMHG — SIGNIFICANT CHANGE UP (ref 25–45)
POTASSIUM BLDV-SCNC: 4 MMOL/L — SIGNIFICANT CHANGE UP (ref 3.5–5.3)
POTASSIUM SERPL-MCNC: 3.2 MMOL/L — LOW (ref 3.5–5.3)
POTASSIUM SERPL-MCNC: 3.4 MMOL/L — LOW (ref 3.5–5.3)
POTASSIUM SERPL-MCNC: 3.6 MMOL/L — SIGNIFICANT CHANGE UP (ref 3.5–5.3)
POTASSIUM SERPL-MCNC: 3.7 MMOL/L — SIGNIFICANT CHANGE UP (ref 3.5–5.3)
POTASSIUM SERPL-MCNC: 3.9 MMOL/L — SIGNIFICANT CHANGE UP (ref 3.5–5.3)
POTASSIUM SERPL-MCNC: 4 MMOL/L — SIGNIFICANT CHANGE UP (ref 3.5–5.3)
POTASSIUM SERPL-MCNC: 4.1 MMOL/L — SIGNIFICANT CHANGE UP (ref 3.5–5.3)
POTASSIUM SERPL-MCNC: 4.1 MMOL/L — SIGNIFICANT CHANGE UP (ref 3.5–5.3)
POTASSIUM SERPL-MCNC: 4.2 MMOL/L — SIGNIFICANT CHANGE UP (ref 3.5–5.3)
POTASSIUM SERPL-MCNC: 4.3 MMOL/L — SIGNIFICANT CHANGE UP (ref 3.5–5.3)
POTASSIUM SERPL-MCNC: 4.4 MMOL/L — SIGNIFICANT CHANGE UP (ref 3.5–5.3)
POTASSIUM SERPL-MCNC: 4.5 MMOL/L — SIGNIFICANT CHANGE UP (ref 3.5–5.3)
POTASSIUM SERPL-MCNC: 4.6 MMOL/L — SIGNIFICANT CHANGE UP (ref 3.5–5.3)
POTASSIUM SERPL-MCNC: 4.7 MMOL/L — SIGNIFICANT CHANGE UP (ref 3.5–5.3)
POTASSIUM SERPL-MCNC: 4.8 MMOL/L — SIGNIFICANT CHANGE UP (ref 3.5–5.3)
POTASSIUM SERPL-MCNC: 4.9 MMOL/L — SIGNIFICANT CHANGE UP (ref 3.5–5.3)
POTASSIUM SERPL-MCNC: 4.9 MMOL/L — SIGNIFICANT CHANGE UP (ref 3.5–5.3)
POTASSIUM SERPL-MCNC: 5 MMOL/L — SIGNIFICANT CHANGE UP (ref 3.5–5.3)
POTASSIUM SERPL-MCNC: 5 MMOL/L — SIGNIFICANT CHANGE UP (ref 3.5–5.3)
POTASSIUM SERPL-MCNC: 5.1 MMOL/L — SIGNIFICANT CHANGE UP (ref 3.5–5.3)
POTASSIUM SERPL-MCNC: 5.1 MMOL/L — SIGNIFICANT CHANGE UP (ref 3.5–5.3)
POTASSIUM SERPL-MCNC: 5.6 MMOL/L — HIGH (ref 3.5–5.3)
POTASSIUM SERPL-SCNC: 3.2 MMOL/L — LOW (ref 3.5–5.3)
POTASSIUM SERPL-SCNC: 3.4 MMOL/L — LOW (ref 3.5–5.3)
POTASSIUM SERPL-SCNC: 3.6 MMOL/L — SIGNIFICANT CHANGE UP (ref 3.5–5.3)
POTASSIUM SERPL-SCNC: 3.7 MMOL/L — SIGNIFICANT CHANGE UP (ref 3.5–5.3)
POTASSIUM SERPL-SCNC: 3.9 MMOL/L — SIGNIFICANT CHANGE UP (ref 3.5–5.3)
POTASSIUM SERPL-SCNC: 4 MMOL/L — SIGNIFICANT CHANGE UP (ref 3.5–5.3)
POTASSIUM SERPL-SCNC: 4.1 MMOL/L — SIGNIFICANT CHANGE UP (ref 3.5–5.3)
POTASSIUM SERPL-SCNC: 4.1 MMOL/L — SIGNIFICANT CHANGE UP (ref 3.5–5.3)
POTASSIUM SERPL-SCNC: 4.2 MMOL/L — SIGNIFICANT CHANGE UP (ref 3.5–5.3)
POTASSIUM SERPL-SCNC: 4.3 MMOL/L — SIGNIFICANT CHANGE UP (ref 3.5–5.3)
POTASSIUM SERPL-SCNC: 4.4 MMOL/L — SIGNIFICANT CHANGE UP (ref 3.5–5.3)
POTASSIUM SERPL-SCNC: 4.5 MMOL/L — SIGNIFICANT CHANGE UP (ref 3.5–5.3)
POTASSIUM SERPL-SCNC: 4.6 MMOL/L — SIGNIFICANT CHANGE UP (ref 3.5–5.3)
POTASSIUM SERPL-SCNC: 4.7 MMOL/L — SIGNIFICANT CHANGE UP (ref 3.5–5.3)
POTASSIUM SERPL-SCNC: 4.8 MMOL/L — SIGNIFICANT CHANGE UP (ref 3.5–5.3)
POTASSIUM SERPL-SCNC: 4.9 MMOL/L — SIGNIFICANT CHANGE UP (ref 3.5–5.3)
POTASSIUM SERPL-SCNC: 4.9 MMOL/L — SIGNIFICANT CHANGE UP (ref 3.5–5.3)
POTASSIUM SERPL-SCNC: 5 MMOL/L — SIGNIFICANT CHANGE UP (ref 3.5–5.3)
POTASSIUM SERPL-SCNC: 5 MMOL/L — SIGNIFICANT CHANGE UP (ref 3.5–5.3)
POTASSIUM SERPL-SCNC: 5.1 MMOL/L — SIGNIFICANT CHANGE UP (ref 3.5–5.3)
POTASSIUM SERPL-SCNC: 5.1 MMOL/L — SIGNIFICANT CHANGE UP (ref 3.5–5.3)
POTASSIUM SERPL-SCNC: 5.6 MMOL/L — HIGH (ref 3.5–5.3)
PROCALCITONIN SERPL-MCNC: 0.08 NG/ML — SIGNIFICANT CHANGE UP (ref 0.02–0.1)
PROCALCITONIN SERPL-MCNC: 0.14 NG/ML — HIGH (ref 0.02–0.1)
PROCALCITONIN SERPL-MCNC: 0.16 NG/ML — HIGH (ref 0.02–0.1)
PROCALCITONIN SERPL-MCNC: 0.24 NG/ML — HIGH (ref 0.02–0.1)
PROCALCITONIN SERPL-MCNC: 0.27 NG/ML — HIGH (ref 0.02–0.1)
PROCALCITONIN SERPL-MCNC: 0.27 NG/ML — HIGH (ref 0.02–0.1)
PROCALCITONIN SERPL-MCNC: 0.29 NG/ML — HIGH (ref 0.02–0.1)
PROCALCITONIN SERPL-MCNC: 0.35 NG/ML — HIGH (ref 0.02–0.1)
PROCALCITONIN SERPL-MCNC: 0.35 NG/ML — HIGH (ref 0.02–0.1)
PROCALCITONIN SERPL-MCNC: 0.39 NG/ML — HIGH (ref 0.02–0.1)
PROCALCITONIN SERPL-MCNC: 0.42 NG/ML — HIGH (ref 0.02–0.1)
PROCALCITONIN SERPL-MCNC: 0.42 NG/ML — HIGH (ref 0.02–0.1)
PROCALCITONIN SERPL-MCNC: 0.53 NG/ML — HIGH (ref 0.02–0.1)
PROCALCITONIN SERPL-MCNC: 0.54 NG/ML — HIGH (ref 0.02–0.1)
PROCALCITONIN SERPL-MCNC: 0.74 NG/ML — HIGH (ref 0.02–0.1)
PROT SERPL-MCNC: 5.4 G/DL — LOW (ref 6–8.3)
PROT SERPL-MCNC: 5.5 G/DL — LOW (ref 6–8.3)
PROT SERPL-MCNC: 5.6 G/DL — LOW (ref 6–8.3)
PROT SERPL-MCNC: 5.7 G/DL — LOW (ref 6–8.3)
PROT SERPL-MCNC: 5.8 G/DL — LOW (ref 6–8.3)
PROT SERPL-MCNC: 5.9 G/DL — LOW (ref 6–8.3)
PROT SERPL-MCNC: 6 G/DL — SIGNIFICANT CHANGE UP (ref 6–8.3)
PROT SERPL-MCNC: 6.1 G/DL — SIGNIFICANT CHANGE UP (ref 6–8.3)
PROT SERPL-MCNC: 6.2 G/DL — SIGNIFICANT CHANGE UP (ref 6–8.3)
PROT SERPL-MCNC: 6.2 G/DL — SIGNIFICANT CHANGE UP (ref 6–8.3)
PROT SERPL-MCNC: 6.3 G/DL — SIGNIFICANT CHANGE UP (ref 6–8.3)
PROT SERPL-MCNC: 6.4 G/DL — SIGNIFICANT CHANGE UP (ref 6–8.3)
PROT SERPL-MCNC: 6.5 G/DL — SIGNIFICANT CHANGE UP (ref 6–8.3)
PROT SERPL-MCNC: 6.7 G/DL — SIGNIFICANT CHANGE UP (ref 6–8.3)
PROT SERPL-MCNC: 6.8 G/DL — SIGNIFICANT CHANGE UP (ref 6–8.3)
PROT SERPL-MCNC: 6.8 G/DL — SIGNIFICANT CHANGE UP (ref 6–8.3)
PROT SERPL-MCNC: 7.2 G/DL — SIGNIFICANT CHANGE UP (ref 6–8.3)
PROT UR-MCNC: 100 — SIGNIFICANT CHANGE UP
PROT UR-MCNC: ABNORMAL
PROT UR-MCNC: ABNORMAL
PROTHROM AB SERPL-ACNC: 10.9 SEC — SIGNIFICANT CHANGE UP (ref 10–12.9)
PROTHROM AB SERPL-ACNC: 10.9 SEC — SIGNIFICANT CHANGE UP (ref 10–12.9)
PROTHROM AB SERPL-ACNC: 11.5 SEC — SIGNIFICANT CHANGE UP (ref 10–12.9)
PROTHROM AB SERPL-ACNC: 11.9 SEC — SIGNIFICANT CHANGE UP (ref 10–12.9)
PROTHROM AB SERPL-ACNC: 12.9 SEC — SIGNIFICANT CHANGE UP (ref 10–12.9)
PROTHROM AB SERPL-ACNC: 13.6 SEC — HIGH (ref 10–12.9)
PROTHROM AB SERPL-ACNC: 13.6 SEC — HIGH (ref 10–12.9)
PROTHROM AB SERPL-ACNC: 13.7 SEC — HIGH (ref 10–12.9)
PROTHROM AB SERPL-ACNC: 14.3 SEC — HIGH (ref 10–12.9)
PROTHROM AB SERPL-ACNC: 14.7 SEC — HIGH (ref 10–12.9)
PROTHROM AB SERPL-ACNC: 15.3 SEC — HIGH (ref 10–12.9)
PROTHROM AB SERPL-ACNC: 15.5 SEC — HIGH (ref 10–12.9)
PROTHROM AB SERPL-ACNC: 16.4 SEC — HIGH (ref 10–12.9)
PROTHROM AB SERPL-ACNC: 16.8 SEC — HIGH (ref 10–12.9)
PROTHROM AB SERPL-ACNC: 17.3 SEC — HIGH (ref 10–12.9)
PROTHROM AB SERPL-ACNC: 17.4 SEC — HIGH (ref 10–12.9)
PROTHROM AB SERPL-ACNC: 18.4 SEC — HIGH (ref 10–12.9)
PROTHROM AB SERPL-ACNC: 18.6 SEC — HIGH (ref 10–12.9)
PROTHROM AB SERPL-ACNC: 19 SEC — HIGH (ref 10–12.9)
QUANT TB PLUS MITOGEN MINUS NIL: 0.52 IU/ML — SIGNIFICANT CHANGE UP
RAPID RVP RESULT: SIGNIFICANT CHANGE UP
RBC # BLD: 2.88 M/UL — LOW (ref 4.2–5.8)
RBC # BLD: 2.89 M/UL — LOW (ref 4.2–5.8)
RBC # BLD: 2.91 M/UL — LOW (ref 4.2–5.8)
RBC # BLD: 2.93 M/UL — LOW (ref 4.2–5.8)
RBC # BLD: 2.94 M/UL — LOW (ref 4.2–5.8)
RBC # BLD: 2.94 M/UL — LOW (ref 4.2–5.8)
RBC # BLD: 2.97 M/UL — LOW (ref 4.2–5.8)
RBC # BLD: 2.99 M/UL — LOW (ref 4.2–5.8)
RBC # BLD: 2.99 M/UL — LOW (ref 4.2–5.8)
RBC # BLD: 3.04 M/UL — LOW (ref 4.2–5.8)
RBC # BLD: 3.04 M/UL — LOW (ref 4.2–5.8)
RBC # BLD: 3.06 M/UL — LOW (ref 4.2–5.8)
RBC # BLD: 3.15 M/UL — LOW (ref 4.2–5.8)
RBC # BLD: 3.16 M/UL — LOW (ref 4.2–5.8)
RBC # BLD: 3.16 M/UL — LOW (ref 4.2–5.8)
RBC # BLD: 3.17 M/UL — LOW (ref 4.2–5.8)
RBC # BLD: 3.18 M/UL — LOW (ref 4.2–5.8)
RBC # BLD: 3.2 M/UL — LOW (ref 4.2–5.8)
RBC # BLD: 3.27 M/UL — LOW (ref 4.2–5.8)
RBC # BLD: 3.38 M/UL — LOW (ref 4.2–5.8)
RBC # BLD: 3.39 M/UL — LOW (ref 4.2–5.8)
RBC # BLD: 3.61 M/UL — LOW (ref 4.2–5.8)
RBC # BLD: 3.71 M/UL — LOW (ref 4.2–5.8)
RBC # BLD: 3.77 M/UL — LOW (ref 4.2–5.8)
RBC # BLD: 3.84 M/UL — LOW (ref 4.2–5.8)
RBC # BLD: 3.89 M/UL — LOW (ref 4.2–5.8)
RBC # BLD: 3.92 M/UL — LOW (ref 4.2–5.8)
RBC # BLD: 3.97 M/UL — LOW (ref 4.2–5.8)
RBC # BLD: 4.01 M/UL — LOW (ref 4.2–5.8)
RBC # BLD: 4.01 M/UL — LOW (ref 4.2–5.8)
RBC # BLD: 4.06 M/UL — LOW (ref 4.2–5.8)
RBC # BLD: 4.1 M/UL — LOW (ref 4.2–5.8)
RBC # BLD: 4.16 M/UL — LOW (ref 4.2–5.8)
RBC # BLD: 4.18 M/UL — LOW (ref 4.2–5.8)
RBC # BLD: 4.19 M/UL — LOW (ref 4.2–5.8)
RBC # BLD: 4.2 M/UL — SIGNIFICANT CHANGE UP (ref 4.2–5.8)
RBC # BLD: 4.26 M/UL — SIGNIFICANT CHANGE UP (ref 4.2–5.8)
RBC # BLD: 4.58 M/UL — SIGNIFICANT CHANGE UP (ref 3.8–5.2)
RBC # FLD: 13 % — SIGNIFICANT CHANGE UP (ref 10.3–14.5)
RBC # FLD: 13.1 % — SIGNIFICANT CHANGE UP (ref 10.3–14.5)
RBC # FLD: 13.1 % — SIGNIFICANT CHANGE UP (ref 10.3–14.5)
RBC # FLD: 13.2 % — SIGNIFICANT CHANGE UP (ref 10.3–14.5)
RBC # FLD: 13.3 % — SIGNIFICANT CHANGE UP (ref 10.3–14.5)
RBC # FLD: 13.3 % — SIGNIFICANT CHANGE UP (ref 10.3–14.5)
RBC # FLD: 13.5 % — SIGNIFICANT CHANGE UP (ref 10.3–14.5)
RBC # FLD: 13.5 % — SIGNIFICANT CHANGE UP (ref 10.3–14.5)
RBC # FLD: 13.6 % — SIGNIFICANT CHANGE UP (ref 10.3–14.5)
RBC # FLD: 13.6 % — SIGNIFICANT CHANGE UP (ref 10.3–14.5)
RBC # FLD: 13.7 % — SIGNIFICANT CHANGE UP (ref 10.3–14.5)
RBC # FLD: 14.1 % — SIGNIFICANT CHANGE UP (ref 10.3–14.5)
RBC # FLD: 14.1 % — SIGNIFICANT CHANGE UP (ref 10.3–14.5)
RBC # FLD: 14.2 % — SIGNIFICANT CHANGE UP (ref 10.3–14.5)
RBC # FLD: 14.3 % — SIGNIFICANT CHANGE UP (ref 10.3–14.5)
RBC # FLD: 14.4 % — SIGNIFICANT CHANGE UP (ref 10.3–14.5)
RBC # FLD: 14.5 % — SIGNIFICANT CHANGE UP (ref 10.3–14.5)
RBC # FLD: 14.5 % — SIGNIFICANT CHANGE UP (ref 10.3–14.5)
RBC # FLD: 14.6 % — HIGH (ref 10.3–14.5)
RBC # FLD: 14.9 % — HIGH (ref 10.3–14.5)
RBC # FLD: 15.1 % — HIGH (ref 10.3–14.5)
RBC # FLD: 15.1 % — HIGH (ref 10.3–14.5)
RBC BLD AUTO: SIGNIFICANT CHANGE UP
RBC CASTS # UR COMP ASSIST: SIGNIFICANT CHANGE UP /HPF (ref 0–4)
RH IG SCN BLD-IMP: POSITIVE — SIGNIFICANT CHANGE UP
RH IG SCN BLD-IMP: POSITIVE — SIGNIFICANT CHANGE UP
SAO2 % BLDA: 86 % — LOW (ref 92–96)
SAO2 % BLDA: 98 % — HIGH (ref 92–96)
SAO2 % BLDA: 98 % — HIGH (ref 92–96)
SAO2 % BLDV: 41 % — LOW (ref 67–88)
SARS-COV-2 RNA SPEC QL NAA+PROBE: DETECTED
SARS-COV-2 RNA SPEC QL NAA+PROBE: DETECTED
SODIUM SERPL-SCNC: 133 MMOL/L — LOW (ref 135–145)
SODIUM SERPL-SCNC: 134 MMOL/L — LOW (ref 135–145)
SODIUM SERPL-SCNC: 135 MMOL/L — SIGNIFICANT CHANGE UP (ref 135–145)
SODIUM SERPL-SCNC: 136 MMOL/L — SIGNIFICANT CHANGE UP (ref 135–145)
SODIUM SERPL-SCNC: 136 MMOL/L — SIGNIFICANT CHANGE UP (ref 135–145)
SODIUM SERPL-SCNC: 137 MMOL/L — SIGNIFICANT CHANGE UP (ref 135–145)
SODIUM SERPL-SCNC: 138 MMOL/L — SIGNIFICANT CHANGE UP (ref 135–145)
SODIUM SERPL-SCNC: 138 MMOL/L — SIGNIFICANT CHANGE UP (ref 135–145)
SODIUM SERPL-SCNC: 139 MMOL/L — SIGNIFICANT CHANGE UP (ref 135–145)
SODIUM SERPL-SCNC: 140 MMOL/L — SIGNIFICANT CHANGE UP (ref 135–145)
SODIUM SERPL-SCNC: 142 MMOL/L — SIGNIFICANT CHANGE UP (ref 135–145)
SODIUM SERPL-SCNC: 143 MMOL/L — SIGNIFICANT CHANGE UP (ref 135–145)
SODIUM SERPL-SCNC: 144 MMOL/L — SIGNIFICANT CHANGE UP (ref 135–145)
SODIUM SERPL-SCNC: 145 MMOL/L — SIGNIFICANT CHANGE UP (ref 135–145)
SODIUM SERPL-SCNC: 146 MMOL/L — HIGH (ref 135–145)
SODIUM SERPL-SCNC: 147 MMOL/L — HIGH (ref 135–145)
SODIUM SERPL-SCNC: 151 MMOL/L — HIGH (ref 135–145)
SP GR SPEC: 1.01 — SIGNIFICANT CHANGE UP (ref 1.01–1.02)
SP GR SPEC: 1.02 — SIGNIFICANT CHANGE UP (ref 1.01–1.02)
SP GR SPEC: 1.02 — SIGNIFICANT CHANGE UP (ref 1.01–1.02)
SPECIMEN SOURCE: SIGNIFICANT CHANGE UP
T-CELL CD4 SUBSET PNL BLD: 242 /UL — LOW (ref 325–1251)
T-CELL CD4 SUBSET PNL BLD: 259 /UL — LOW (ref 325–1251)
T-CELL CD4 SUBSET PNL BLD: 498 /UL — SIGNIFICANT CHANGE UP (ref 325–1251)
T-CELL CD4 SUBSET PNL BLD: 500 /UL — SIGNIFICANT CHANGE UP (ref 325–1251)
T-CELL CD4 SUBSET PNL BLD: 553 /UL — SIGNIFICANT CHANGE UP (ref 325–1251)
T3 SERPL-MCNC: 43 NG/DL — LOW (ref 80–200)
T3 SERPL-MCNC: 45 NG/DL — LOW (ref 80–200)
T3FREE SERPL-MCNC: 1.05 PG/ML — LOW (ref 1.8–4.6)
T4 AB SER-ACNC: 4.6 UG/DL — SIGNIFICANT CHANGE UP (ref 4.6–12)
T4 AB SER-ACNC: 4.9 UG/DL — SIGNIFICANT CHANGE UP (ref 4.6–12)
T4 FREE SERPL-MCNC: 0.87 NG/DL — SIGNIFICANT CHANGE UP
TOTAL CHOLESTEROL/HDL RATIO MEASUREMENT: 3.1 RATIO — LOW (ref 3.4–9.6)
TRIGL SERPL-MCNC: 103 MG/DL — SIGNIFICANT CHANGE UP (ref 10–149)
TRIGL SERPL-MCNC: 107 MG/DL — SIGNIFICANT CHANGE UP (ref 10–149)
TRIGL SERPL-MCNC: 128 MG/DL — SIGNIFICANT CHANGE UP (ref 10–149)
TRIGL SERPL-MCNC: 135 MG/DL — SIGNIFICANT CHANGE UP (ref 10–149)
TRIGL SERPL-MCNC: 143 MG/DL — SIGNIFICANT CHANGE UP (ref 10–149)
TRIGL SERPL-MCNC: 203 MG/DL — HIGH (ref 10–149)
TROPONIN T, HIGH SENSITIVITY RESULT: 12 NG/L — SIGNIFICANT CHANGE UP (ref 0–51)
TROPONIN T, HIGH SENSITIVITY RESULT: 18 NG/L — SIGNIFICANT CHANGE UP (ref 0–51)
TROPONIN T, HIGH SENSITIVITY RESULT: 22 NG/L — SIGNIFICANT CHANGE UP (ref 0–51)
TROPONIN T, HIGH SENSITIVITY RESULT: 23 NG/L — SIGNIFICANT CHANGE UP (ref 0–51)
TROPONIN T, HIGH SENSITIVITY RESULT: 23 NG/L — SIGNIFICANT CHANGE UP (ref 0–51)
TROPONIN T, HIGH SENSITIVITY RESULT: 25 NG/L — SIGNIFICANT CHANGE UP (ref 0–51)
TROPONIN T, HIGH SENSITIVITY RESULT: 28 NG/L — SIGNIFICANT CHANGE UP (ref 0–51)
TSH SERPL-MCNC: 0.24 UIU/ML — LOW (ref 0.27–4.2)
TSH SERPL-MCNC: 0.83 UIU/ML — SIGNIFICANT CHANGE UP (ref 0.27–4.2)
TSH SERPL-MCNC: 13.9 UIU/ML — HIGH (ref 0.27–4.2)
TSH SERPL-MCNC: 6.2 UIU/ML — HIGH (ref 0.27–4.2)
UROBILINOGEN FLD QL: ABNORMAL
UROBILINOGEN FLD QL: NEGATIVE — SIGNIFICANT CHANGE UP
UROBILINOGEN FLD QL: SIGNIFICANT CHANGE UP
VANCOMYCIN TROUGH SERPL-MCNC: 11.4 UG/ML — SIGNIFICANT CHANGE UP (ref 10–20)
VANCOMYCIN TROUGH SERPL-MCNC: 18 UG/ML — SIGNIFICANT CHANGE UP (ref 10–20)
VANCOMYCIN TROUGH SERPL-MCNC: 22.1 UG/ML — HIGH (ref 10–20)
WBC # BLD: 10.16 K/UL — SIGNIFICANT CHANGE UP (ref 3.8–10.5)
WBC # BLD: 10.75 K/UL — HIGH (ref 3.8–10.5)
WBC # BLD: 10.91 K/UL — HIGH (ref 3.8–10.5)
WBC # BLD: 11.05 K/UL — HIGH (ref 3.8–10.5)
WBC # BLD: 11.2 K/UL — HIGH (ref 3.8–10.5)
WBC # BLD: 11.27 K/UL — HIGH (ref 3.8–10.5)
WBC # BLD: 11.35 K/UL — HIGH (ref 3.8–10.5)
WBC # BLD: 11.41 K/UL — HIGH (ref 3.8–10.5)
WBC # BLD: 11.48 K/UL — HIGH (ref 3.8–10.5)
WBC # BLD: 11.49 K/UL — HIGH (ref 3.8–10.5)
WBC # BLD: 11.66 K/UL — HIGH (ref 3.8–10.5)
WBC # BLD: 11.79 K/UL — HIGH (ref 3.8–10.5)
WBC # BLD: 11.8 K/UL — HIGH (ref 3.8–10.5)
WBC # BLD: 11.88 K/UL — HIGH (ref 3.8–10.5)
WBC # BLD: 12.95 K/UL — HIGH (ref 3.8–10.5)
WBC # BLD: 13.13 K/UL — HIGH (ref 3.8–10.5)
WBC # BLD: 13.7 K/UL — HIGH (ref 3.8–10.5)
WBC # BLD: 14.98 K/UL — HIGH (ref 3.8–10.5)
WBC # BLD: 15.29 K/UL — HIGH (ref 3.8–10.5)
WBC # BLD: 16.8 K/UL — HIGH (ref 3.8–10.5)
WBC # BLD: 18.01 K/UL — HIGH (ref 3.8–10.5)
WBC # BLD: 26.19 K/UL — HIGH (ref 3.8–10.5)
WBC # BLD: 4.86 K/UL — SIGNIFICANT CHANGE UP (ref 3.8–10.5)
WBC # BLD: 6.17 K/UL — SIGNIFICANT CHANGE UP (ref 3.8–10.5)
WBC # BLD: 6.2 K/UL — SIGNIFICANT CHANGE UP (ref 3.8–10.5)
WBC # BLD: 6.38 K/UL — SIGNIFICANT CHANGE UP (ref 3.8–10.5)
WBC # BLD: 7.16 K/UL — SIGNIFICANT CHANGE UP (ref 3.8–10.5)
WBC # BLD: 7.39 K/UL — SIGNIFICANT CHANGE UP (ref 3.8–10.5)
WBC # BLD: 7.69 K/UL — SIGNIFICANT CHANGE UP (ref 3.8–10.5)
WBC # BLD: 7.75 K/UL — SIGNIFICANT CHANGE UP (ref 3.8–10.5)
WBC # BLD: 8.09 K/UL — SIGNIFICANT CHANGE UP (ref 3.8–10.5)
WBC # BLD: 8.18 K/UL — SIGNIFICANT CHANGE UP (ref 3.8–10.5)
WBC # BLD: 8.28 K/UL — SIGNIFICANT CHANGE UP (ref 3.8–10.5)
WBC # BLD: 8.34 K/UL — SIGNIFICANT CHANGE UP (ref 3.8–10.5)
WBC # BLD: 9.41 K/UL — SIGNIFICANT CHANGE UP (ref 3.8–10.5)
WBC # BLD: 9.67 K/UL — SIGNIFICANT CHANGE UP (ref 3.8–10.5)
WBC # BLD: 9.96 K/UL — SIGNIFICANT CHANGE UP (ref 3.8–10.5)
WBC # BLD: 9.97 K/UL — SIGNIFICANT CHANGE UP (ref 3.8–10.5)
WBC # FLD AUTO: 10.16 K/UL — SIGNIFICANT CHANGE UP (ref 3.8–10.5)
WBC # FLD AUTO: 10.75 K/UL — HIGH (ref 3.8–10.5)
WBC # FLD AUTO: 10.91 K/UL — HIGH (ref 3.8–10.5)
WBC # FLD AUTO: 11.05 K/UL — HIGH (ref 3.8–10.5)
WBC # FLD AUTO: 11.2 K/UL — HIGH (ref 3.8–10.5)
WBC # FLD AUTO: 11.27 K/UL — HIGH (ref 3.8–10.5)
WBC # FLD AUTO: 11.35 K/UL — HIGH (ref 3.8–10.5)
WBC # FLD AUTO: 11.41 K/UL — HIGH (ref 3.8–10.5)
WBC # FLD AUTO: 11.48 K/UL — HIGH (ref 3.8–10.5)
WBC # FLD AUTO: 11.49 K/UL — HIGH (ref 3.8–10.5)
WBC # FLD AUTO: 11.66 K/UL — HIGH (ref 3.8–10.5)
WBC # FLD AUTO: 11.79 K/UL — HIGH (ref 3.8–10.5)
WBC # FLD AUTO: 11.8 K/UL — HIGH (ref 3.8–10.5)
WBC # FLD AUTO: 11.88 K/UL — HIGH (ref 3.8–10.5)
WBC # FLD AUTO: 12.95 K/UL — HIGH (ref 3.8–10.5)
WBC # FLD AUTO: 13.13 K/UL — HIGH (ref 3.8–10.5)
WBC # FLD AUTO: 13.7 K/UL — HIGH (ref 3.8–10.5)
WBC # FLD AUTO: 14.98 K/UL — HIGH (ref 3.8–10.5)
WBC # FLD AUTO: 15.29 K/UL — HIGH (ref 3.8–10.5)
WBC # FLD AUTO: 16.8 K/UL — HIGH (ref 3.8–10.5)
WBC # FLD AUTO: 18.01 K/UL — HIGH (ref 3.8–10.5)
WBC # FLD AUTO: 26.19 K/UL — HIGH (ref 3.8–10.5)
WBC # FLD AUTO: 4.86 K/UL — SIGNIFICANT CHANGE UP (ref 3.8–10.5)
WBC # FLD AUTO: 6.17 K/UL — SIGNIFICANT CHANGE UP (ref 3.8–10.5)
WBC # FLD AUTO: 6.2 K/UL — SIGNIFICANT CHANGE UP (ref 3.8–10.5)
WBC # FLD AUTO: 6.38 K/UL — SIGNIFICANT CHANGE UP (ref 3.8–10.5)
WBC # FLD AUTO: 7.16 K/UL — SIGNIFICANT CHANGE UP (ref 3.8–10.5)
WBC # FLD AUTO: 7.39 K/UL — SIGNIFICANT CHANGE UP (ref 3.8–10.5)
WBC # FLD AUTO: 7.69 K/UL — SIGNIFICANT CHANGE UP (ref 3.8–10.5)
WBC # FLD AUTO: 7.75 K/UL — SIGNIFICANT CHANGE UP (ref 3.8–10.5)
WBC # FLD AUTO: 8.09 K/UL — SIGNIFICANT CHANGE UP (ref 3.8–10.5)
WBC # FLD AUTO: 8.18 K/UL — SIGNIFICANT CHANGE UP (ref 3.8–10.5)
WBC # FLD AUTO: 8.28 K/UL — SIGNIFICANT CHANGE UP (ref 3.8–10.5)
WBC # FLD AUTO: 8.34 K/UL — SIGNIFICANT CHANGE UP (ref 3.8–10.5)
WBC # FLD AUTO: 9.41 K/UL — SIGNIFICANT CHANGE UP (ref 3.8–10.5)
WBC # FLD AUTO: 9.67 K/UL — SIGNIFICANT CHANGE UP (ref 3.8–10.5)
WBC # FLD AUTO: 9.96 K/UL — SIGNIFICANT CHANGE UP (ref 3.8–10.5)
WBC # FLD AUTO: 9.97 K/UL — SIGNIFICANT CHANGE UP (ref 3.8–10.5)
WBC UR QL: SIGNIFICANT CHANGE UP

## 2020-01-01 PROCEDURE — 99291 CRITICAL CARE FIRST HOUR: CPT

## 2020-01-01 PROCEDURE — 99291 CRITICAL CARE FIRST HOUR: CPT | Mod: 25

## 2020-01-01 PROCEDURE — 99292 CRITICAL CARE ADDL 30 MIN: CPT

## 2020-01-01 PROCEDURE — 99291 CRITICAL CARE FIRST HOUR: CPT | Mod: CS

## 2020-01-01 PROCEDURE — 93010 ELECTROCARDIOGRAM REPORT: CPT

## 2020-01-01 PROCEDURE — 71045 X-RAY EXAM CHEST 1 VIEW: CPT | Mod: 26

## 2020-01-01 PROCEDURE — 71045 X-RAY EXAM CHEST 1 VIEW: CPT | Mod: 26,77

## 2020-01-01 PROCEDURE — 71045 X-RAY EXAM CHEST 1 VIEW: CPT | Mod: 26,CS

## 2020-01-01 PROCEDURE — 99223 1ST HOSP IP/OBS HIGH 75: CPT | Mod: CS

## 2020-01-01 PROCEDURE — 36556 INSERT NON-TUNNEL CV CATH: CPT | Mod: GC

## 2020-01-01 PROCEDURE — 31600 PLANNED TRACHEOSTOMY: CPT | Mod: CS

## 2020-01-01 PROCEDURE — 99292 CRITICAL CARE ADDL 30 MIN: CPT | Mod: CS

## 2020-01-01 PROCEDURE — 93010 ELECTROCARDIOGRAM REPORT: CPT | Mod: 59

## 2020-01-01 PROCEDURE — 99231 SBSQ HOSP IP/OBS SF/LOW 25: CPT

## 2020-01-01 PROCEDURE — 99231 SBSQ HOSP IP/OBS SF/LOW 25: CPT | Mod: CS

## 2020-01-01 PROCEDURE — 99497 ADVNCD CARE PLAN 30 MIN: CPT | Mod: CS

## 2020-01-01 PROCEDURE — 36620 INSERTION CATHETER ARTERY: CPT | Mod: GC

## 2020-01-01 PROCEDURE — 74018 RADEX ABDOMEN 1 VIEW: CPT | Mod: 26

## 2020-01-01 PROCEDURE — 36556 INSERT NON-TUNNEL CV CATH: CPT

## 2020-01-01 PROCEDURE — 31500 INSERT EMERGENCY AIRWAY: CPT

## 2020-01-01 RX ORDER — FAMOTIDINE 10 MG/ML
20 INJECTION INTRAVENOUS
Refills: 0 | Status: DISCONTINUED | OUTPATIENT
Start: 2020-01-01 | End: 2020-01-01

## 2020-01-01 RX ORDER — ROBINUL 0.2 MG/ML
0.2 INJECTION INTRAMUSCULAR; INTRAVENOUS ONCE
Refills: 0 | Status: COMPLETED | OUTPATIENT
Start: 2020-01-01 | End: 2020-01-01

## 2020-01-01 RX ORDER — CLONAZEPAM 1 MG
1 TABLET ORAL EVERY 8 HOURS
Refills: 0 | Status: DISCONTINUED | OUTPATIENT
Start: 2020-01-01 | End: 2020-01-01

## 2020-01-01 RX ORDER — KETAMINE HYDROCHLORIDE 100 MG/ML
2 INJECTION INTRAMUSCULAR; INTRAVENOUS
Qty: 1000 | Refills: 0 | Status: DISCONTINUED | OUTPATIENT
Start: 2020-01-01 | End: 2020-01-01

## 2020-01-01 RX ORDER — FUROSEMIDE 40 MG
20 TABLET ORAL EVERY 12 HOURS
Refills: 0 | Status: DISCONTINUED | OUTPATIENT
Start: 2020-01-01 | End: 2020-01-01

## 2020-01-01 RX ORDER — NOREPINEPHRINE BITARTRATE/D5W 8 MG/250ML
0.05 PLASTIC BAG, INJECTION (ML) INTRAVENOUS
Qty: 8 | Refills: 0 | Status: DISCONTINUED | OUTPATIENT
Start: 2020-01-01 | End: 2020-01-01

## 2020-01-01 RX ORDER — POTASSIUM CHLORIDE 20 MEQ
40 PACKET (EA) ORAL ONCE
Refills: 0 | Status: COMPLETED | OUTPATIENT
Start: 2020-01-01 | End: 2020-01-01

## 2020-01-01 RX ORDER — MIDAZOLAM HYDROCHLORIDE 1 MG/ML
0.05 INJECTION, SOLUTION INTRAMUSCULAR; INTRAVENOUS
Qty: 100 | Refills: 0 | Status: DISCONTINUED | OUTPATIENT
Start: 2020-01-01 | End: 2020-01-01

## 2020-01-01 RX ORDER — DEXMEDETOMIDINE HYDROCHLORIDE IN 0.9% SODIUM CHLORIDE 4 UG/ML
0.2 INJECTION INTRAVENOUS
Qty: 200 | Refills: 0 | Status: DISCONTINUED | OUTPATIENT
Start: 2020-01-01 | End: 2020-01-01

## 2020-01-01 RX ORDER — FUROSEMIDE 40 MG
40 TABLET ORAL EVERY 12 HOURS
Refills: 0 | Status: DISCONTINUED | OUTPATIENT
Start: 2020-01-01 | End: 2020-01-01

## 2020-01-01 RX ORDER — INSULIN LISPRO 100/ML
VIAL (ML) SUBCUTANEOUS EVERY 6 HOURS
Refills: 0 | Status: DISCONTINUED | OUTPATIENT
Start: 2020-01-01 | End: 2020-01-01

## 2020-01-01 RX ORDER — OXYCODONE HYDROCHLORIDE 5 MG/1
10 TABLET ORAL EVERY 4 HOURS
Refills: 0 | Status: DISCONTINUED | OUTPATIENT
Start: 2020-01-01 | End: 2020-01-01

## 2020-01-01 RX ORDER — ROBINUL 0.2 MG/ML
2 INJECTION INTRAMUSCULAR; INTRAVENOUS ONCE
Refills: 0 | Status: DISCONTINUED | OUTPATIENT
Start: 2020-01-01 | End: 2020-01-01

## 2020-01-01 RX ORDER — ROCURONIUM BROMIDE 10 MG/ML
50 VIAL (ML) INTRAVENOUS ONCE
Refills: 0 | Status: COMPLETED | OUTPATIENT
Start: 2020-01-01 | End: 2020-01-01

## 2020-01-01 RX ORDER — POTASSIUM CHLORIDE 20 MEQ
20 PACKET (EA) ORAL ONCE
Refills: 0 | Status: COMPLETED | OUTPATIENT
Start: 2020-01-01 | End: 2020-01-01

## 2020-01-01 RX ORDER — PROPOFOL 10 MG/ML
15 INJECTION, EMULSION INTRAVENOUS
Qty: 500 | Refills: 0 | Status: DISCONTINUED | OUTPATIENT
Start: 2020-01-01 | End: 2020-01-01

## 2020-01-01 RX ORDER — ALBUTEROL 90 UG/1
2 AEROSOL, METERED ORAL EVERY 8 HOURS
Refills: 0 | Status: DISCONTINUED | OUTPATIENT
Start: 2020-01-01 | End: 2020-01-01

## 2020-01-01 RX ORDER — FENTANYL CITRATE 50 UG/ML
50 INJECTION INTRAVENOUS ONCE
Refills: 0 | Status: DISCONTINUED | OUTPATIENT
Start: 2020-01-01 | End: 2020-01-01

## 2020-01-01 RX ORDER — ATROPINE SULFATE 0.1 MG/ML
0.5 SYRINGE (ML) INJECTION ONCE
Refills: 0 | Status: COMPLETED | OUTPATIENT
Start: 2020-01-01 | End: 2020-01-01

## 2020-01-01 RX ORDER — CEFTRIAXONE 500 MG/1
1000 INJECTION, POWDER, FOR SOLUTION INTRAMUSCULAR; INTRAVENOUS ONCE
Refills: 0 | Status: COMPLETED | OUTPATIENT
Start: 2020-01-01 | End: 2020-01-01

## 2020-01-01 RX ORDER — VASOPRESSIN 20 [USP'U]/ML
0.04 INJECTION INTRAVENOUS
Qty: 50 | Refills: 0 | Status: DISCONTINUED | OUTPATIENT
Start: 2020-01-01 | End: 2020-01-01

## 2020-01-01 RX ORDER — THIAMINE MONONITRATE (VIT B1) 100 MG
200 TABLET ORAL
Refills: 0 | Status: DISCONTINUED | OUTPATIENT
Start: 2020-01-01 | End: 2020-01-01

## 2020-01-01 RX ORDER — OXYCODONE HYDROCHLORIDE 5 MG/1
10 TABLET ORAL EVERY 6 HOURS
Refills: 0 | Status: DISCONTINUED | OUTPATIENT
Start: 2020-01-01 | End: 2020-01-01

## 2020-01-01 RX ORDER — CHLORHEXIDINE GLUCONATE 213 G/1000ML
1 SOLUTION TOPICAL
Refills: 0 | Status: DISCONTINUED | OUTPATIENT
Start: 2020-01-01 | End: 2020-01-01

## 2020-01-01 RX ORDER — IPRATROPIUM/ALBUTEROL SULFATE 18-103MCG
3 AEROSOL WITH ADAPTER (GRAM) INHALATION ONCE
Refills: 0 | Status: DISCONTINUED | OUTPATIENT
Start: 2020-01-01 | End: 2020-01-01

## 2020-01-01 RX ORDER — HYDROMORPHONE HYDROCHLORIDE 2 MG/ML
2.5 INJECTION INTRAMUSCULAR; INTRAVENOUS; SUBCUTANEOUS
Qty: 100 | Refills: 0 | Status: DISCONTINUED | OUTPATIENT
Start: 2020-01-01 | End: 2020-01-01

## 2020-01-01 RX ORDER — INSULIN GLARGINE 100 [IU]/ML
14 INJECTION, SOLUTION SUBCUTANEOUS
Refills: 0 | Status: DISCONTINUED | OUTPATIENT
Start: 2020-01-01 | End: 2020-01-01

## 2020-01-01 RX ORDER — VANCOMYCIN HCL 1 G
750 VIAL (EA) INTRAVENOUS EVERY 12 HOURS
Refills: 0 | Status: DISCONTINUED | OUTPATIENT
Start: 2020-01-01 | End: 2020-01-01

## 2020-01-01 RX ORDER — FAMOTIDINE 10 MG/ML
20 INJECTION INTRAVENOUS DAILY
Refills: 0 | Status: DISCONTINUED | OUTPATIENT
Start: 2020-01-01 | End: 2020-01-01

## 2020-01-01 RX ORDER — FUROSEMIDE 40 MG
40 TABLET ORAL ONCE
Refills: 0 | Status: DISCONTINUED | OUTPATIENT
Start: 2020-01-01 | End: 2020-01-01

## 2020-01-01 RX ORDER — METOCLOPRAMIDE HCL 10 MG
5 TABLET ORAL THREE TIMES A DAY
Refills: 0 | Status: DISCONTINUED | OUTPATIENT
Start: 2020-01-01 | End: 2020-01-01

## 2020-01-01 RX ORDER — MIDAZOLAM HYDROCHLORIDE 1 MG/ML
0.02 INJECTION, SOLUTION INTRAMUSCULAR; INTRAVENOUS
Qty: 100 | Refills: 0 | Status: DISCONTINUED | OUTPATIENT
Start: 2020-01-01 | End: 2020-01-01

## 2020-01-01 RX ORDER — HYDROMORPHONE HYDROCHLORIDE 2 MG/ML
1 INJECTION INTRAMUSCULAR; INTRAVENOUS; SUBCUTANEOUS ONCE
Refills: 0 | Status: DISCONTINUED | OUTPATIENT
Start: 2020-01-01 | End: 2020-01-01

## 2020-01-01 RX ORDER — CALCIUM GLUCONATE 100 MG/ML
1 VIAL (ML) INTRAVENOUS ONCE
Refills: 0 | Status: COMPLETED | OUTPATIENT
Start: 2020-01-01 | End: 2020-01-01

## 2020-01-01 RX ORDER — FUROSEMIDE 40 MG
40 TABLET ORAL ONCE
Refills: 0 | Status: COMPLETED | OUTPATIENT
Start: 2020-01-01 | End: 2020-01-01

## 2020-01-01 RX ORDER — AZITHROMYCIN 500 MG/1
TABLET, FILM COATED ORAL
Refills: 0 | Status: DISCONTINUED | OUTPATIENT
Start: 2020-01-01 | End: 2020-01-01

## 2020-01-01 RX ORDER — VANCOMYCIN HCL 1 G
1250 VIAL (EA) INTRAVENOUS EVERY 12 HOURS
Refills: 0 | Status: DISCONTINUED | OUTPATIENT
Start: 2020-01-01 | End: 2020-01-01

## 2020-01-01 RX ORDER — DEXTROSE 50 % IN WATER 50 %
25 SYRINGE (ML) INTRAVENOUS ONCE
Refills: 0 | Status: COMPLETED | OUTPATIENT
Start: 2020-01-01 | End: 2020-01-01

## 2020-01-01 RX ORDER — CLONAZEPAM 1 MG
2 TABLET ORAL EVERY 8 HOURS
Refills: 0 | Status: DISCONTINUED | OUTPATIENT
Start: 2020-01-01 | End: 2020-01-01

## 2020-01-01 RX ORDER — ASCORBIC ACID 60 MG
1500 TABLET,CHEWABLE ORAL EVERY 6 HOURS
Refills: 0 | Status: DISCONTINUED | OUTPATIENT
Start: 2020-01-01 | End: 2020-01-01

## 2020-01-01 RX ORDER — SODIUM CHLORIDE 9 MG/ML
500 INJECTION INTRAMUSCULAR; INTRAVENOUS; SUBCUTANEOUS ONCE
Refills: 0 | Status: COMPLETED | OUTPATIENT
Start: 2020-01-01 | End: 2020-01-01

## 2020-01-01 RX ORDER — LEVOTHYROXINE SODIUM 125 MCG
0 TABLET ORAL
Qty: 0 | Refills: 0 | DISCHARGE

## 2020-01-01 RX ORDER — MORPHINE SULFATE 50 MG/1
6 CAPSULE, EXTENDED RELEASE ORAL ONCE
Refills: 0 | Status: DISCONTINUED | OUTPATIENT
Start: 2020-01-01 | End: 2020-01-01

## 2020-01-01 RX ORDER — PROPOFOL 10 MG/ML
20 INJECTION, EMULSION INTRAVENOUS
Qty: 1000 | Refills: 0 | Status: DISCONTINUED | OUTPATIENT
Start: 2020-01-01 | End: 2020-01-01

## 2020-01-01 RX ORDER — AZITHROMYCIN 500 MG/1
500 TABLET, FILM COATED ORAL ONCE
Refills: 0 | Status: COMPLETED | OUTPATIENT
Start: 2020-01-01 | End: 2020-01-01

## 2020-01-01 RX ORDER — HUMAN INSULIN 100 [IU]/ML
11 INJECTION, SUSPENSION SUBCUTANEOUS EVERY 6 HOURS
Refills: 0 | Status: DISCONTINUED | OUTPATIENT
Start: 2020-01-01 | End: 2020-01-01

## 2020-01-01 RX ORDER — HEPARIN SODIUM 5000 [USP'U]/ML
5000 INJECTION INTRAVENOUS; SUBCUTANEOUS EVERY 8 HOURS
Refills: 0 | Status: DISCONTINUED | OUTPATIENT
Start: 2020-01-01 | End: 2020-01-01

## 2020-01-01 RX ORDER — DOPAMINE HYDROCHLORIDE 40 MG/ML
5.01 INJECTION, SOLUTION, CONCENTRATE INTRAVENOUS
Qty: 400 | Refills: 0 | Status: DISCONTINUED | OUTPATIENT
Start: 2020-01-01 | End: 2020-01-01

## 2020-01-01 RX ORDER — SODIUM CHLORIDE 9 MG/ML
1000 INJECTION, SOLUTION INTRAVENOUS
Refills: 0 | Status: DISCONTINUED | OUTPATIENT
Start: 2020-01-01 | End: 2020-01-01

## 2020-01-01 RX ORDER — FUROSEMIDE 40 MG
60 TABLET ORAL EVERY 8 HOURS
Refills: 0 | Status: DISCONTINUED | OUTPATIENT
Start: 2020-01-01 | End: 2020-01-01

## 2020-01-01 RX ORDER — ETOMIDATE 2 MG/ML
20 INJECTION INTRAVENOUS ONCE
Refills: 0 | Status: COMPLETED | OUTPATIENT
Start: 2020-01-01 | End: 2020-01-01

## 2020-01-01 RX ORDER — FENTANYL CITRATE 50 UG/ML
100 INJECTION INTRAVENOUS ONCE
Refills: 0 | Status: DISCONTINUED | OUTPATIENT
Start: 2020-01-01 | End: 2020-01-01

## 2020-01-01 RX ORDER — SENNA PLUS 8.6 MG/1
10 TABLET ORAL AT BEDTIME
Refills: 0 | Status: DISCONTINUED | OUTPATIENT
Start: 2020-01-01 | End: 2020-01-01

## 2020-01-01 RX ORDER — MAGNESIUM SULFATE 500 MG/ML
1 VIAL (ML) INJECTION ONCE
Refills: 0 | Status: COMPLETED | OUTPATIENT
Start: 2020-01-01 | End: 2020-01-01

## 2020-01-01 RX ORDER — HYDROMORPHONE HYDROCHLORIDE 2 MG/ML
2 INJECTION INTRAMUSCULAR; INTRAVENOUS; SUBCUTANEOUS ONCE
Refills: 0 | Status: DISCONTINUED | OUTPATIENT
Start: 2020-01-01 | End: 2020-01-01

## 2020-01-01 RX ORDER — MIDAZOLAM HYDROCHLORIDE 1 MG/ML
2 INJECTION, SOLUTION INTRAMUSCULAR; INTRAVENOUS ONCE
Refills: 0 | Status: DISCONTINUED | OUTPATIENT
Start: 2020-01-01 | End: 2020-01-01

## 2020-01-01 RX ORDER — MEPERIDINE HYDROCHLORIDE 50 MG/ML
50 INJECTION INTRAMUSCULAR; INTRAVENOUS; SUBCUTANEOUS ONCE
Refills: 0 | Status: DISCONTINUED | OUTPATIENT
Start: 2020-01-01 | End: 2020-01-01

## 2020-01-01 RX ORDER — HYDROXYCHLOROQUINE SULFATE 200 MG
200 TABLET ORAL EVERY 12 HOURS
Refills: 0 | Status: COMPLETED | OUTPATIENT
Start: 2020-01-01 | End: 2020-01-01

## 2020-01-01 RX ORDER — POTASSIUM PHOSPHATE, MONOBASIC POTASSIUM PHOSPHATE, DIBASIC 236; 224 MG/ML; MG/ML
15 INJECTION, SOLUTION INTRAVENOUS ONCE
Refills: 0 | Status: COMPLETED | OUTPATIENT
Start: 2020-01-01 | End: 2020-01-01

## 2020-01-01 RX ORDER — CEFEPIME 1 G/1
INJECTION, POWDER, FOR SOLUTION INTRAMUSCULAR; INTRAVENOUS
Refills: 0 | Status: COMPLETED | OUTPATIENT
Start: 2020-01-01 | End: 2020-01-01

## 2020-01-01 RX ORDER — MIDAZOLAM HYDROCHLORIDE 1 MG/ML
4 INJECTION, SOLUTION INTRAMUSCULAR; INTRAVENOUS ONCE
Refills: 0 | Status: DISCONTINUED | OUTPATIENT
Start: 2020-01-01 | End: 2020-01-01

## 2020-01-01 RX ORDER — DEXAMETHASONE 0.5 MG/5ML
20 ELIXIR ORAL DAILY
Refills: 0 | Status: COMPLETED | OUTPATIENT
Start: 2020-01-01 | End: 2020-01-01

## 2020-01-01 RX ORDER — ACETAMINOPHEN 500 MG
1000 TABLET ORAL ONCE
Refills: 0 | Status: COMPLETED | OUTPATIENT
Start: 2020-01-01 | End: 2020-01-01

## 2020-01-01 RX ORDER — SODIUM CHLORIDE 9 MG/ML
250 INJECTION, SOLUTION INTRAVENOUS ONCE
Refills: 0 | Status: COMPLETED | OUTPATIENT
Start: 2020-01-01 | End: 2020-01-01

## 2020-01-01 RX ORDER — CEFEPIME 1 G/1
2000 INJECTION, POWDER, FOR SOLUTION INTRAMUSCULAR; INTRAVENOUS EVERY 12 HOURS
Refills: 0 | Status: COMPLETED | OUTPATIENT
Start: 2020-01-01 | End: 2020-01-01

## 2020-01-01 RX ORDER — CEFEPIME 1 G/1
INJECTION, POWDER, FOR SOLUTION INTRAMUSCULAR; INTRAVENOUS
Refills: 0 | Status: DISCONTINUED | OUTPATIENT
Start: 2020-01-01 | End: 2020-01-01

## 2020-01-01 RX ORDER — ROCURONIUM BROMIDE 10 MG/ML
40 VIAL (ML) INTRAVENOUS ONCE
Refills: 0 | Status: DISCONTINUED | OUTPATIENT
Start: 2020-01-01 | End: 2020-01-01

## 2020-01-01 RX ORDER — MAGNESIUM SULFATE 500 MG/ML
2 VIAL (ML) INJECTION ONCE
Refills: 0 | Status: COMPLETED | OUTPATIENT
Start: 2020-01-01 | End: 2020-01-01

## 2020-01-01 RX ORDER — VASOPRESSIN 20 [USP'U]/ML
0.02 INJECTION INTRAVENOUS
Qty: 50 | Refills: 0 | Status: DISCONTINUED | OUTPATIENT
Start: 2020-01-01 | End: 2020-01-01

## 2020-01-01 RX ORDER — CEFEPIME 1 G/1
1000 INJECTION, POWDER, FOR SOLUTION INTRAMUSCULAR; INTRAVENOUS EVERY 8 HOURS
Refills: 0 | Status: DISCONTINUED | OUTPATIENT
Start: 2020-01-01 | End: 2020-01-01

## 2020-01-01 RX ORDER — SODIUM,POTASSIUM PHOSPHATES 278-250MG
1 POWDER IN PACKET (EA) ORAL EVERY 8 HOURS
Refills: 0 | Status: COMPLETED | OUTPATIENT
Start: 2020-01-01 | End: 2020-01-01

## 2020-01-01 RX ORDER — LACTULOSE 10 G/15ML
10 SOLUTION ORAL EVERY 12 HOURS
Refills: 0 | Status: COMPLETED | OUTPATIENT
Start: 2020-01-01 | End: 2020-01-01

## 2020-01-01 RX ORDER — VANCOMYCIN HCL 1 G
1000 VIAL (EA) INTRAVENOUS EVERY 12 HOURS
Refills: 0 | Status: DISCONTINUED | OUTPATIENT
Start: 2020-01-01 | End: 2020-01-01

## 2020-01-01 RX ORDER — VECURONIUM BROMIDE 20 MG/1
10 INJECTION, POWDER, FOR SOLUTION INTRAVENOUS ONCE
Refills: 0 | Status: COMPLETED | OUTPATIENT
Start: 2020-01-01 | End: 2020-01-01

## 2020-01-01 RX ORDER — KETAMINE HYDROCHLORIDE 100 MG/ML
1000 INJECTION INTRAMUSCULAR; INTRAVENOUS
Qty: 1000 | Refills: 0 | Status: DISCONTINUED | OUTPATIENT
Start: 2020-01-01 | End: 2020-01-01

## 2020-01-01 RX ORDER — VANCOMYCIN HCL 1 G
1000 VIAL (EA) INTRAVENOUS ONCE
Refills: 0 | Status: DISCONTINUED | OUTPATIENT
Start: 2020-01-01 | End: 2020-01-01

## 2020-01-01 RX ORDER — PANTOPRAZOLE SODIUM 20 MG/1
40 TABLET, DELAYED RELEASE ORAL DAILY
Refills: 0 | Status: DISCONTINUED | OUTPATIENT
Start: 2020-01-01 | End: 2020-01-01

## 2020-01-01 RX ORDER — PHENYLEPHRINE HYDROCHLORIDE 10 MG/ML
0.4 INJECTION INTRAVENOUS
Qty: 40 | Refills: 0 | Status: DISCONTINUED | OUTPATIENT
Start: 2020-01-01 | End: 2020-01-01

## 2020-01-01 RX ORDER — MORPHINE SULFATE 50 MG/1
1.5 CAPSULE, EXTENDED RELEASE ORAL
Qty: 100 | Refills: 0 | Status: DISCONTINUED | OUTPATIENT
Start: 2020-01-01 | End: 2020-01-01

## 2020-01-01 RX ORDER — FUROSEMIDE 40 MG
40 TABLET ORAL DAILY
Refills: 0 | Status: DISCONTINUED | OUTPATIENT
Start: 2020-01-01 | End: 2020-01-01

## 2020-01-01 RX ORDER — ACETAMINOPHEN 500 MG
650 TABLET ORAL EVERY 6 HOURS
Refills: 0 | Status: DISCONTINUED | OUTPATIENT
Start: 2020-01-01 | End: 2020-01-01

## 2020-01-01 RX ORDER — MORPHINE SULFATE 50 MG/1
12 CAPSULE, EXTENDED RELEASE ORAL ONCE
Refills: 0 | Status: DISCONTINUED | OUTPATIENT
Start: 2020-01-01 | End: 2020-01-01

## 2020-01-01 RX ORDER — POTASSIUM CHLORIDE 20 MEQ
10 PACKET (EA) ORAL
Refills: 0 | Status: COMPLETED | OUTPATIENT
Start: 2020-01-01 | End: 2020-01-01

## 2020-01-01 RX ORDER — FUROSEMIDE 40 MG
20 TABLET ORAL ONCE
Refills: 0 | Status: COMPLETED | OUTPATIENT
Start: 2020-01-01 | End: 2020-01-01

## 2020-01-01 RX ORDER — ALBUTEROL 90 UG/1
2 AEROSOL, METERED ORAL EVERY 4 HOURS
Refills: 0 | Status: DISCONTINUED | OUTPATIENT
Start: 2020-01-01 | End: 2020-01-01

## 2020-01-01 RX ORDER — PROPOFOL 10 MG/ML
15 INJECTION, EMULSION INTRAVENOUS
Qty: 1000 | Refills: 0 | Status: DISCONTINUED | OUTPATIENT
Start: 2020-01-01 | End: 2020-01-01

## 2020-01-01 RX ORDER — HUMAN INSULIN 100 [IU]/ML
8 INJECTION, SUSPENSION SUBCUTANEOUS EVERY 6 HOURS
Refills: 0 | Status: DISCONTINUED | OUTPATIENT
Start: 2020-01-01 | End: 2020-01-01

## 2020-01-01 RX ORDER — SODIUM CHLORIDE 9 MG/ML
250 INJECTION INTRAMUSCULAR; INTRAVENOUS; SUBCUTANEOUS ONCE
Refills: 0 | Status: COMPLETED | OUTPATIENT
Start: 2020-01-01 | End: 2020-01-01

## 2020-01-01 RX ORDER — HYDROMORPHONE HYDROCHLORIDE 2 MG/ML
1 INJECTION INTRAMUSCULAR; INTRAVENOUS; SUBCUTANEOUS EVERY 4 HOURS
Refills: 0 | Status: DISCONTINUED | OUTPATIENT
Start: 2020-01-01 | End: 2020-01-01

## 2020-01-01 RX ORDER — OXYCODONE HYDROCHLORIDE 5 MG/1
20 TABLET ORAL EVERY 8 HOURS
Refills: 0 | Status: DISCONTINUED | OUTPATIENT
Start: 2020-01-01 | End: 2020-01-01

## 2020-01-01 RX ORDER — CLONAZEPAM 1 MG
0.5 TABLET ORAL EVERY 8 HOURS
Refills: 0 | Status: DISCONTINUED | OUTPATIENT
Start: 2020-01-01 | End: 2020-01-01

## 2020-01-01 RX ORDER — NOREPINEPHRINE BITARTRATE/D5W 8 MG/250ML
0.11 PLASTIC BAG, INJECTION (ML) INTRAVENOUS
Qty: 16 | Refills: 0 | Status: DISCONTINUED | OUTPATIENT
Start: 2020-01-01 | End: 2020-01-01

## 2020-01-01 RX ORDER — POTASSIUM CHLORIDE 20 MEQ
40 PACKET (EA) ORAL
Refills: 0 | Status: COMPLETED | OUTPATIENT
Start: 2020-01-01 | End: 2020-01-01

## 2020-01-01 RX ORDER — LEVOTHYROXINE SODIUM 125 MCG
37.5 TABLET ORAL AT BEDTIME
Refills: 0 | Status: DISCONTINUED | OUTPATIENT
Start: 2020-01-01 | End: 2020-01-01

## 2020-01-01 RX ORDER — KETAMINE HYDROCHLORIDE 100 MG/ML
0.25 INJECTION INTRAMUSCULAR; INTRAVENOUS
Qty: 1000 | Refills: 0 | Status: DISCONTINUED | OUTPATIENT
Start: 2020-01-01 | End: 2020-01-01

## 2020-01-01 RX ORDER — HUMAN INSULIN 100 [IU]/ML
9 INJECTION, SUSPENSION SUBCUTANEOUS EVERY 6 HOURS
Refills: 0 | Status: DISCONTINUED | OUTPATIENT
Start: 2020-01-01 | End: 2020-01-01

## 2020-01-01 RX ORDER — ROCURONIUM BROMIDE 10 MG/ML
70 VIAL (ML) INTRAVENOUS ONCE
Refills: 0 | Status: DISCONTINUED | OUTPATIENT
Start: 2020-01-01 | End: 2020-01-01

## 2020-01-01 RX ORDER — MIDAZOLAM HYDROCHLORIDE 1 MG/ML
1 INJECTION, SOLUTION INTRAMUSCULAR; INTRAVENOUS ONCE
Refills: 0 | Status: DISCONTINUED | OUTPATIENT
Start: 2020-01-01 | End: 2020-01-01

## 2020-01-01 RX ORDER — OXYCODONE HYDROCHLORIDE 5 MG/1
20 TABLET ORAL EVERY 6 HOURS
Refills: 0 | Status: DISCONTINUED | OUTPATIENT
Start: 2020-01-01 | End: 2020-01-01

## 2020-01-01 RX ORDER — DEXMEDETOMIDINE HYDROCHLORIDE IN 0.9% SODIUM CHLORIDE 4 UG/ML
0.5 INJECTION INTRAVENOUS
Qty: 200 | Refills: 0 | Status: DISCONTINUED | OUTPATIENT
Start: 2020-01-01 | End: 2020-01-01

## 2020-01-01 RX ORDER — DOBUTAMINE HCL 250MG/20ML
2.5 VIAL (ML) INTRAVENOUS
Qty: 500 | Refills: 0 | Status: DISCONTINUED | OUTPATIENT
Start: 2020-01-01 | End: 2020-01-01

## 2020-01-01 RX ORDER — CEFEPIME 1 G/1
2000 INJECTION, POWDER, FOR SOLUTION INTRAMUSCULAR; INTRAVENOUS ONCE
Refills: 0 | Status: COMPLETED | OUTPATIENT
Start: 2020-01-01 | End: 2020-01-01

## 2020-01-01 RX ORDER — VECURONIUM BROMIDE 20 MG/1
20 INJECTION, POWDER, FOR SOLUTION INTRAVENOUS ONCE
Refills: 0 | Status: COMPLETED | OUTPATIENT
Start: 2020-01-01 | End: 2020-01-01

## 2020-01-01 RX ORDER — DEXAMETHASONE 0.5 MG/5ML
10 ELIXIR ORAL DAILY
Refills: 0 | Status: COMPLETED | OUTPATIENT
Start: 2020-01-01 | End: 2020-01-01

## 2020-01-01 RX ORDER — INSULIN GLARGINE 100 [IU]/ML
10 INJECTION, SOLUTION SUBCUTANEOUS
Refills: 0 | Status: DISCONTINUED | OUTPATIENT
Start: 2020-01-01 | End: 2020-01-01

## 2020-01-01 RX ORDER — ANAKINRA 100MG/0.67
100 SYRINGE (ML) SUBCUTANEOUS
Refills: 0 | Status: COMPLETED | OUTPATIENT
Start: 2020-01-01 | End: 2020-01-01

## 2020-01-01 RX ORDER — NOREPINEPHRINE BITARTRATE/D5W 8 MG/250ML
0.12 PLASTIC BAG, INJECTION (ML) INTRAVENOUS
Qty: 8 | Refills: 0 | Status: DISCONTINUED | OUTPATIENT
Start: 2020-01-01 | End: 2020-01-01

## 2020-01-01 RX ORDER — ASCORBIC ACID 60 MG
1500 TABLET,CHEWABLE ORAL EVERY 6 HOURS
Refills: 0 | Status: COMPLETED | OUTPATIENT
Start: 2020-01-01 | End: 2020-01-01

## 2020-01-01 RX ORDER — FUROSEMIDE 40 MG
40 TABLET ORAL EVERY 8 HOURS
Refills: 0 | Status: DISCONTINUED | OUTPATIENT
Start: 2020-01-01 | End: 2020-01-01

## 2020-01-01 RX ORDER — INSULIN GLARGINE 100 [IU]/ML
10 INJECTION, SOLUTION SUBCUTANEOUS ONCE
Refills: 0 | Status: COMPLETED | OUTPATIENT
Start: 2020-01-01 | End: 2020-01-01

## 2020-01-01 RX ORDER — PIPERACILLIN AND TAZOBACTAM 4; .5 G/20ML; G/20ML
3.38 INJECTION, POWDER, LYOPHILIZED, FOR SOLUTION INTRAVENOUS ONCE
Refills: 0 | Status: COMPLETED | OUTPATIENT
Start: 2020-01-01 | End: 2020-01-01

## 2020-01-01 RX ORDER — CEFEPIME 1 G/1
1000 INJECTION, POWDER, FOR SOLUTION INTRAMUSCULAR; INTRAVENOUS ONCE
Refills: 0 | Status: COMPLETED | OUTPATIENT
Start: 2020-01-01 | End: 2020-01-01

## 2020-01-01 RX ORDER — HYDROXYCHLOROQUINE SULFATE 200 MG
400 TABLET ORAL EVERY 12 HOURS
Refills: 0 | Status: COMPLETED | OUTPATIENT
Start: 2020-01-01 | End: 2020-01-01

## 2020-01-01 RX ORDER — AZITHROMYCIN 500 MG/1
500 TABLET, FILM COATED ORAL EVERY 24 HOURS
Refills: 0 | Status: DISCONTINUED | OUTPATIENT
Start: 2020-01-01 | End: 2020-01-01

## 2020-01-01 RX ORDER — NOREPINEPHRINE BITARTRATE/D5W 8 MG/250ML
0.03 PLASTIC BAG, INJECTION (ML) INTRAVENOUS
Qty: 8 | Refills: 0 | Status: DISCONTINUED | OUTPATIENT
Start: 2020-01-01 | End: 2020-01-01

## 2020-01-01 RX ORDER — POLYETHYLENE GLYCOL 3350 17 G/17G
17 POWDER, FOR SOLUTION ORAL EVERY 12 HOURS
Refills: 0 | Status: DISCONTINUED | OUTPATIENT
Start: 2020-01-01 | End: 2020-01-01

## 2020-01-01 RX ORDER — DEXMEDETOMIDINE HYDROCHLORIDE IN 0.9% SODIUM CHLORIDE 4 UG/ML
1 INJECTION INTRAVENOUS
Qty: 200 | Refills: 0 | Status: DISCONTINUED | OUTPATIENT
Start: 2020-01-01 | End: 2020-01-01

## 2020-01-01 RX ORDER — POTASSIUM CHLORIDE 20 MEQ
20 PACKET (EA) ORAL ONCE
Refills: 0 | Status: DISCONTINUED | OUTPATIENT
Start: 2020-01-01 | End: 2020-01-01

## 2020-01-01 RX ORDER — CLONAZEPAM 1 MG
0.5 TABLET ORAL EVERY 6 HOURS
Refills: 0 | Status: DISCONTINUED | OUTPATIENT
Start: 2020-01-01 | End: 2020-01-01

## 2020-01-01 RX ORDER — NOREPINEPHRINE BITARTRATE/D5W 8 MG/250ML
0.04 PLASTIC BAG, INJECTION (ML) INTRAVENOUS
Qty: 8 | Refills: 0 | Status: DISCONTINUED | OUTPATIENT
Start: 2020-01-01 | End: 2020-01-01

## 2020-01-01 RX ORDER — HALOPERIDOL DECANOATE 100 MG/ML
0.5 INJECTION INTRAMUSCULAR EVERY 12 HOURS
Refills: 0 | Status: DISCONTINUED | OUTPATIENT
Start: 2020-01-01 | End: 2020-01-01

## 2020-01-01 RX ORDER — HYDROMORPHONE HYDROCHLORIDE 2 MG/ML
0.5 INJECTION INTRAMUSCULAR; INTRAVENOUS; SUBCUTANEOUS ONCE
Refills: 0 | Status: DISCONTINUED | OUTPATIENT
Start: 2020-01-01 | End: 2020-01-01

## 2020-01-01 RX ORDER — MORPHINE SULFATE 50 MG/1
3 CAPSULE, EXTENDED RELEASE ORAL ONCE
Refills: 0 | Status: DISCONTINUED | OUTPATIENT
Start: 2020-01-01 | End: 2020-01-01

## 2020-01-01 RX ORDER — HYDROMORPHONE HYDROCHLORIDE 2 MG/ML
0.5 INJECTION INTRAMUSCULAR; INTRAVENOUS; SUBCUTANEOUS
Refills: 0 | Status: DISCONTINUED | OUTPATIENT
Start: 2020-01-01 | End: 2020-01-01

## 2020-01-01 RX ORDER — CHLORHEXIDINE GLUCONATE 213 G/1000ML
15 SOLUTION TOPICAL EVERY 12 HOURS
Refills: 0 | Status: DISCONTINUED | OUTPATIENT
Start: 2020-01-01 | End: 2020-01-01

## 2020-01-01 RX ORDER — LEVOTHYROXINE SODIUM 125 MCG
50 TABLET ORAL AT BEDTIME
Refills: 0 | Status: DISCONTINUED | OUTPATIENT
Start: 2020-01-01 | End: 2020-01-01

## 2020-01-01 RX ORDER — IPRATROPIUM BROMIDE 0.2 MG/ML
1 SOLUTION, NON-ORAL INHALATION EVERY 6 HOURS
Refills: 0 | Status: DISCONTINUED | OUTPATIENT
Start: 2020-01-01 | End: 2020-01-01

## 2020-01-01 RX ORDER — OXYCODONE HYDROCHLORIDE 5 MG/1
10 TABLET ORAL EVERY 8 HOURS
Refills: 0 | Status: DISCONTINUED | OUTPATIENT
Start: 2020-01-01 | End: 2020-01-01

## 2020-01-01 RX ORDER — HYDROXYCHLOROQUINE SULFATE 200 MG
TABLET ORAL
Refills: 0 | Status: COMPLETED | OUTPATIENT
Start: 2020-01-01 | End: 2020-01-01

## 2020-01-01 RX ORDER — ALBUTEROL 90 UG/1
1 AEROSOL, METERED ORAL ONCE
Refills: 0 | Status: COMPLETED | OUTPATIENT
Start: 2020-01-01 | End: 2020-01-01

## 2020-01-01 RX ORDER — DOPAMINE HYDROCHLORIDE 40 MG/ML
5 INJECTION, SOLUTION, CONCENTRATE INTRAVENOUS
Qty: 400 | Refills: 0 | Status: DISCONTINUED | OUTPATIENT
Start: 2020-01-01 | End: 2020-01-01

## 2020-01-01 RX ORDER — MORPHINE SULFATE 50 MG/1
9 CAPSULE, EXTENDED RELEASE ORAL
Refills: 0 | Status: DISCONTINUED | OUTPATIENT
Start: 2020-01-01 | End: 2020-01-01

## 2020-01-01 RX ORDER — LACTULOSE 10 G/15ML
20 SOLUTION ORAL EVERY 8 HOURS
Refills: 0 | Status: DISCONTINUED | OUTPATIENT
Start: 2020-01-01 | End: 2020-01-01

## 2020-01-01 RX ORDER — ANAKINRA 100MG/0.67
100 SYRINGE (ML) SUBCUTANEOUS
Refills: 0 | Status: DISCONTINUED | OUTPATIENT
Start: 2020-01-01 | End: 2020-01-01

## 2020-01-01 RX ORDER — ACETAMINOPHEN 500 MG
650 TABLET ORAL ONCE
Refills: 0 | Status: COMPLETED | OUTPATIENT
Start: 2020-01-01 | End: 2020-01-01

## 2020-01-01 RX ORDER — CEFTRIAXONE 500 MG/1
1000 INJECTION, POWDER, FOR SOLUTION INTRAMUSCULAR; INTRAVENOUS EVERY 24 HOURS
Refills: 0 | Status: DISCONTINUED | OUTPATIENT
Start: 2020-01-01 | End: 2020-01-01

## 2020-01-01 RX ORDER — HYDROMORPHONE HYDROCHLORIDE 2 MG/ML
0.25 INJECTION INTRAMUSCULAR; INTRAVENOUS; SUBCUTANEOUS
Qty: 100 | Refills: 0 | Status: DISCONTINUED | OUTPATIENT
Start: 2020-01-01 | End: 2020-01-01

## 2020-01-01 RX ORDER — KETAMINE HYDROCHLORIDE 100 MG/ML
0.2 INJECTION INTRAMUSCULAR; INTRAVENOUS
Qty: 1000 | Refills: 0 | Status: DISCONTINUED | OUTPATIENT
Start: 2020-01-01 | End: 2020-01-01

## 2020-01-01 RX ORDER — KETAMINE HYDROCHLORIDE 100 MG/ML
0.05 INJECTION INTRAMUSCULAR; INTRAVENOUS
Qty: 1000 | Refills: 0 | Status: DISCONTINUED | OUTPATIENT
Start: 2020-01-01 | End: 2020-01-01

## 2020-01-01 RX ORDER — QUETIAPINE FUMARATE 200 MG/1
25 TABLET, FILM COATED ORAL
Refills: 0 | Status: DISCONTINUED | OUTPATIENT
Start: 2020-01-01 | End: 2020-01-01

## 2020-01-01 RX ORDER — ROCURONIUM BROMIDE 10 MG/ML
8 VIAL (ML) INTRAVENOUS
Qty: 500 | Refills: 0 | Status: DISCONTINUED | OUTPATIENT
Start: 2020-01-01 | End: 2020-01-01

## 2020-01-01 RX ORDER — MORPHINE SULFATE 50 MG/1
3 CAPSULE, EXTENDED RELEASE ORAL
Qty: 100 | Refills: 0 | Status: DISCONTINUED | OUTPATIENT
Start: 2020-01-01 | End: 2020-01-01

## 2020-01-01 RX ORDER — SODIUM CHLORIDE 9 MG/ML
10 INJECTION INTRAMUSCULAR; INTRAVENOUS; SUBCUTANEOUS
Refills: 0 | Status: DISCONTINUED | OUTPATIENT
Start: 2020-01-01 | End: 2020-01-01

## 2020-01-01 RX ORDER — ENOXAPARIN SODIUM 100 MG/ML
40 INJECTION SUBCUTANEOUS
Refills: 0 | Status: DISCONTINUED | OUTPATIENT
Start: 2020-01-01 | End: 2020-01-01

## 2020-01-01 RX ORDER — PIPERACILLIN AND TAZOBACTAM 4; .5 G/20ML; G/20ML
3.38 INJECTION, POWDER, LYOPHILIZED, FOR SOLUTION INTRAVENOUS EVERY 8 HOURS
Refills: 0 | Status: DISCONTINUED | OUTPATIENT
Start: 2020-01-01 | End: 2020-01-01

## 2020-01-01 RX ORDER — ASPIRIN/CALCIUM CARB/MAGNESIUM 324 MG
81 TABLET ORAL DAILY
Refills: 0 | Status: DISCONTINUED | OUTPATIENT
Start: 2020-01-01 | End: 2020-01-01

## 2020-01-01 RX ORDER — ALBUMIN HUMAN 25 %
250 VIAL (ML) INTRAVENOUS ONCE
Refills: 0 | Status: COMPLETED | OUTPATIENT
Start: 2020-01-01 | End: 2020-01-01

## 2020-01-01 RX ORDER — POTASSIUM PHOSPHATE, MONOBASIC POTASSIUM PHOSPHATE, DIBASIC 236; 224 MG/ML; MG/ML
30 INJECTION, SOLUTION INTRAVENOUS ONCE
Refills: 0 | Status: COMPLETED | OUTPATIENT
Start: 2020-01-01 | End: 2020-01-01

## 2020-01-01 RX ORDER — LEVOTHYROXINE SODIUM 125 MCG
75 TABLET ORAL AT BEDTIME
Refills: 0 | Status: DISCONTINUED | OUTPATIENT
Start: 2020-01-01 | End: 2020-01-01

## 2020-01-01 RX ADMIN — HUMAN INSULIN 9 UNIT(S): 100 INJECTION, SUSPENSION SUBCUTANEOUS at 02:25

## 2020-01-01 RX ADMIN — Medication 200 MILLIGRAM(S): at 22:26

## 2020-01-01 RX ADMIN — KETAMINE HYDROCHLORIDE 1.16 MILLIGRAM(S): 100 INJECTION INTRAMUSCULAR; INTRAVENOUS at 08:00

## 2020-01-01 RX ADMIN — HYDROMORPHONE HYDROCHLORIDE 3 MG/HR: 2 INJECTION INTRAMUSCULAR; INTRAVENOUS; SUBCUTANEOUS at 18:23

## 2020-01-01 RX ADMIN — Medication 60 MILLIGRAM(S): at 02:59

## 2020-01-01 RX ADMIN — MIDAZOLAM HYDROCHLORIDE 2 MILLIGRAM(S): 1 INJECTION, SOLUTION INTRAMUSCULAR; INTRAVENOUS at 17:48

## 2020-01-01 RX ADMIN — Medication 81 MILLIGRAM(S): at 12:37

## 2020-01-01 RX ADMIN — HUMAN INSULIN 9 UNIT(S): 100 INJECTION, SUSPENSION SUBCUTANEOUS at 11:38

## 2020-01-01 RX ADMIN — ENOXAPARIN SODIUM 40 MILLIGRAM(S): 100 INJECTION SUBCUTANEOUS at 16:32

## 2020-01-01 RX ADMIN — HUMAN INSULIN 8 UNIT(S): 100 INJECTION, SUSPENSION SUBCUTANEOUS at 01:33

## 2020-01-01 RX ADMIN — Medication 2 MILLIGRAM(S): at 21:21

## 2020-01-01 RX ADMIN — Medication 81 MILLIGRAM(S): at 11:13

## 2020-01-01 RX ADMIN — CHLORHEXIDINE GLUCONATE 15 MILLILITER(S): 213 SOLUTION TOPICAL at 05:27

## 2020-01-01 RX ADMIN — CHLORHEXIDINE GLUCONATE 1 APPLICATION(S): 213 SOLUTION TOPICAL at 04:47

## 2020-01-01 RX ADMIN — SODIUM CHLORIDE 750 MILLILITER(S): 9 INJECTION INTRAMUSCULAR; INTRAVENOUS; SUBCUTANEOUS at 20:00

## 2020-01-01 RX ADMIN — Medication 4: at 05:36

## 2020-01-01 RX ADMIN — Medication 4: at 11:52

## 2020-01-01 RX ADMIN — Medication 250 MILLIGRAM(S): at 17:55

## 2020-01-01 RX ADMIN — HALOPERIDOL DECANOATE 0.5 MILLIGRAM(S): 100 INJECTION INTRAMUSCULAR at 16:32

## 2020-01-01 RX ADMIN — CHLORHEXIDINE GLUCONATE 1 APPLICATION(S): 213 SOLUTION TOPICAL at 04:14

## 2020-01-01 RX ADMIN — Medication 2: at 05:06

## 2020-01-01 RX ADMIN — Medication 75 MICROGRAM(S): at 22:04

## 2020-01-01 RX ADMIN — CEFEPIME 100 MILLIGRAM(S): 1 INJECTION, POWDER, FOR SOLUTION INTRAMUSCULAR; INTRAVENOUS at 18:39

## 2020-01-01 RX ADMIN — Medication 1 TABLET(S): at 00:20

## 2020-01-01 RX ADMIN — Medication 40 MILLIGRAM(S): at 05:54

## 2020-01-01 RX ADMIN — SENNA PLUS 10 MILLILITER(S): 8.6 TABLET ORAL at 22:55

## 2020-01-01 RX ADMIN — Medication 0.5 MILLIGRAM(S): at 05:17

## 2020-01-01 RX ADMIN — Medication 1 APPLICATION(S): at 18:45

## 2020-01-01 RX ADMIN — HALOPERIDOL DECANOATE 0.5 MILLIGRAM(S): 100 INJECTION INTRAMUSCULAR at 17:07

## 2020-01-01 RX ADMIN — Medication 400 MILLIGRAM(S): at 12:05

## 2020-01-01 RX ADMIN — Medication 2: at 02:00

## 2020-01-01 RX ADMIN — VECURONIUM BROMIDE 20 MILLIGRAM(S): 20 INJECTION, POWDER, FOR SOLUTION INTRAVENOUS at 17:47

## 2020-01-01 RX ADMIN — MIDAZOLAM HYDROCHLORIDE 4 MILLIGRAM(S): 1 INJECTION, SOLUTION INTRAMUSCULAR; INTRAVENOUS at 18:05

## 2020-01-01 RX ADMIN — Medication 2: at 02:02

## 2020-01-01 RX ADMIN — SENNA PLUS 10 MILLILITER(S): 8.6 TABLET ORAL at 21:33

## 2020-01-01 RX ADMIN — ENOXAPARIN SODIUM 40 MILLIGRAM(S): 100 INJECTION SUBCUTANEOUS at 05:11

## 2020-01-01 RX ADMIN — KETAMINE HYDROCHLORIDE 15.4 MG/KG/HR: 100 INJECTION INTRAMUSCULAR; INTRAVENOUS at 14:17

## 2020-01-01 RX ADMIN — Medication 81 MILLIGRAM(S): at 11:23

## 2020-01-01 RX ADMIN — Medication 40 MILLIGRAM(S): at 10:28

## 2020-01-01 RX ADMIN — CHLORHEXIDINE GLUCONATE 1 APPLICATION(S): 213 SOLUTION TOPICAL at 04:28

## 2020-01-01 RX ADMIN — OXYCODONE HYDROCHLORIDE 10 MILLIGRAM(S): 5 TABLET ORAL at 05:59

## 2020-01-01 RX ADMIN — POLYETHYLENE GLYCOL 3350 17 GRAM(S): 17 POWDER, FOR SOLUTION ORAL at 06:18

## 2020-01-01 RX ADMIN — Medication 100 MILLIGRAM(S): at 05:08

## 2020-01-01 RX ADMIN — POLYETHYLENE GLYCOL 3350 17 GRAM(S): 17 POWDER, FOR SOLUTION ORAL at 04:20

## 2020-01-01 RX ADMIN — Medication 4: at 20:00

## 2020-01-01 RX ADMIN — Medication 20 MILLIGRAM(S): at 05:01

## 2020-01-01 RX ADMIN — SODIUM CHLORIDE 10 MILLILITER(S): 9 INJECTION INTRAMUSCULAR; INTRAVENOUS; SUBCUTANEOUS at 19:13

## 2020-01-01 RX ADMIN — Medication 20 MILLIGRAM(S): at 17:35

## 2020-01-01 RX ADMIN — HUMAN INSULIN 9 UNIT(S): 100 INJECTION, SUSPENSION SUBCUTANEOUS at 09:49

## 2020-01-01 RX ADMIN — FAMOTIDINE 20 MILLIGRAM(S): 10 INJECTION INTRAVENOUS at 17:52

## 2020-01-01 RX ADMIN — HUMAN INSULIN 11 UNIT(S): 100 INJECTION, SUSPENSION SUBCUTANEOUS at 04:39

## 2020-01-01 RX ADMIN — HUMAN INSULIN 11 UNIT(S): 100 INJECTION, SUSPENSION SUBCUTANEOUS at 12:13

## 2020-01-01 RX ADMIN — SENNA PLUS 10 MILLILITER(S): 8.6 TABLET ORAL at 20:51

## 2020-01-01 RX ADMIN — FAMOTIDINE 20 MILLIGRAM(S): 10 INJECTION INTRAVENOUS at 16:59

## 2020-01-01 RX ADMIN — CEFEPIME 100 MILLIGRAM(S): 1 INJECTION, POWDER, FOR SOLUTION INTRAMUSCULAR; INTRAVENOUS at 17:51

## 2020-01-01 RX ADMIN — Medication 17.3 MICROGRAM(S)/KG/MIN: at 17:49

## 2020-01-01 RX ADMIN — CEFEPIME 100 MILLIGRAM(S): 1 INJECTION, POWDER, FOR SOLUTION INTRAMUSCULAR; INTRAVENOUS at 14:16

## 2020-01-01 RX ADMIN — SENNA PLUS 10 MILLILITER(S): 8.6 TABLET ORAL at 01:33

## 2020-01-01 RX ADMIN — SENNA PLUS 10 MILLILITER(S): 8.6 TABLET ORAL at 20:44

## 2020-01-01 RX ADMIN — CHLORHEXIDINE GLUCONATE 15 MILLILITER(S): 213 SOLUTION TOPICAL at 16:51

## 2020-01-01 RX ADMIN — MIDAZOLAM HYDROCHLORIDE 1.54 MG/KG/HR: 1 INJECTION, SOLUTION INTRAMUSCULAR; INTRAVENOUS at 07:38

## 2020-01-01 RX ADMIN — Medication 1 MILLIGRAM(S): at 13:26

## 2020-01-01 RX ADMIN — CHLORHEXIDINE GLUCONATE 15 MILLILITER(S): 213 SOLUTION TOPICAL at 05:19

## 2020-01-01 RX ADMIN — SENNA PLUS 10 MILLILITER(S): 8.6 TABLET ORAL at 22:51

## 2020-01-01 RX ADMIN — CHLORHEXIDINE GLUCONATE 15 MILLILITER(S): 213 SOLUTION TOPICAL at 03:02

## 2020-01-01 RX ADMIN — FAMOTIDINE 20 MILLIGRAM(S): 10 INJECTION INTRAVENOUS at 06:01

## 2020-01-01 RX ADMIN — ENOXAPARIN SODIUM 40 MILLIGRAM(S): 100 INJECTION SUBCUTANEOUS at 17:08

## 2020-01-01 RX ADMIN — Medication 81 MILLIGRAM(S): at 13:27

## 2020-01-01 RX ADMIN — HYDROMORPHONE HYDROCHLORIDE 1 MG/HR: 2 INJECTION INTRAMUSCULAR; INTRAVENOUS; SUBCUTANEOUS at 07:37

## 2020-01-01 RX ADMIN — Medication 81 MILLIGRAM(S): at 14:40

## 2020-01-01 RX ADMIN — OXYCODONE HYDROCHLORIDE 20 MILLIGRAM(S): 5 TABLET ORAL at 17:26

## 2020-01-01 RX ADMIN — Medication 650 MILLIGRAM(S): at 20:49

## 2020-01-01 RX ADMIN — Medication 20 MILLIGRAM(S): at 05:02

## 2020-01-01 RX ADMIN — HUMAN INSULIN 11 UNIT(S): 100 INJECTION, SUSPENSION SUBCUTANEOUS at 04:45

## 2020-01-01 RX ADMIN — Medication 1 APPLICATION(S): at 16:17

## 2020-01-01 RX ADMIN — OXYCODONE HYDROCHLORIDE 10 MILLIGRAM(S): 5 TABLET ORAL at 23:05

## 2020-01-01 RX ADMIN — CHLORHEXIDINE GLUCONATE 15 MILLILITER(S): 213 SOLUTION TOPICAL at 17:02

## 2020-01-01 RX ADMIN — HUMAN INSULIN 11 UNIT(S): 100 INJECTION, SUSPENSION SUBCUTANEOUS at 01:54

## 2020-01-01 RX ADMIN — Medication 153 MILLIGRAM(S): at 12:07

## 2020-01-01 RX ADMIN — CHLORHEXIDINE GLUCONATE 15 MILLILITER(S): 213 SOLUTION TOPICAL at 16:23

## 2020-01-01 RX ADMIN — CHLORHEXIDINE GLUCONATE 15 MILLILITER(S): 213 SOLUTION TOPICAL at 05:03

## 2020-01-01 RX ADMIN — HYDROMORPHONE HYDROCHLORIDE 1 MILLIGRAM(S): 2 INJECTION INTRAMUSCULAR; INTRAVENOUS; SUBCUTANEOUS at 18:08

## 2020-01-01 RX ADMIN — CHLORHEXIDINE GLUCONATE 15 MILLILITER(S): 213 SOLUTION TOPICAL at 05:02

## 2020-01-01 RX ADMIN — HUMAN INSULIN 11 UNIT(S): 100 INJECTION, SUSPENSION SUBCUTANEOUS at 13:06

## 2020-01-01 RX ADMIN — HUMAN INSULIN 11 UNIT(S): 100 INJECTION, SUSPENSION SUBCUTANEOUS at 18:12

## 2020-01-01 RX ADMIN — MIDAZOLAM HYDROCHLORIDE 1 MILLIGRAM(S): 1 INJECTION, SOLUTION INTRAMUSCULAR; INTRAVENOUS at 02:39

## 2020-01-01 RX ADMIN — Medication 2 MILLIGRAM(S): at 04:11

## 2020-01-01 RX ADMIN — Medication 4: at 06:05

## 2020-01-01 RX ADMIN — HUMAN INSULIN 8 UNIT(S): 100 INJECTION, SUSPENSION SUBCUTANEOUS at 02:36

## 2020-01-01 RX ADMIN — Medication 81 MILLIGRAM(S): at 10:23

## 2020-01-01 RX ADMIN — CHLORHEXIDINE GLUCONATE 15 MILLILITER(S): 213 SOLUTION TOPICAL at 17:06

## 2020-01-01 RX ADMIN — Medication 102 MILLIGRAM(S): at 04:47

## 2020-01-01 RX ADMIN — HALOPERIDOL DECANOATE 0.5 MILLIGRAM(S): 100 INJECTION INTRAMUSCULAR at 05:07

## 2020-01-01 RX ADMIN — OXYCODONE HYDROCHLORIDE 10 MILLIGRAM(S): 5 TABLET ORAL at 05:16

## 2020-01-01 RX ADMIN — FAMOTIDINE 20 MILLIGRAM(S): 10 INJECTION INTRAVENOUS at 07:39

## 2020-01-01 RX ADMIN — INSULIN GLARGINE 10 UNIT(S): 100 INJECTION, SOLUTION SUBCUTANEOUS at 01:58

## 2020-01-01 RX ADMIN — Medication 0.5 MILLIGRAM(S): at 17:29

## 2020-01-01 RX ADMIN — Medication 2: at 11:10

## 2020-01-01 RX ADMIN — Medication 2 MILLIGRAM(S): at 16:18

## 2020-01-01 RX ADMIN — SENNA PLUS 10 MILLILITER(S): 8.6 TABLET ORAL at 22:31

## 2020-01-01 RX ADMIN — FAMOTIDINE 20 MILLIGRAM(S): 10 INJECTION INTRAVENOUS at 16:18

## 2020-01-01 RX ADMIN — HUMAN INSULIN 11 UNIT(S): 100 INJECTION, SUSPENSION SUBCUTANEOUS at 10:25

## 2020-01-01 RX ADMIN — ENOXAPARIN SODIUM 40 MILLIGRAM(S): 100 INJECTION SUBCUTANEOUS at 05:02

## 2020-01-01 RX ADMIN — CHLORHEXIDINE GLUCONATE 15 MILLILITER(S): 213 SOLUTION TOPICAL at 04:28

## 2020-01-01 RX ADMIN — CEFEPIME 100 MILLIGRAM(S): 1 INJECTION, POWDER, FOR SOLUTION INTRAMUSCULAR; INTRAVENOUS at 06:27

## 2020-01-01 RX ADMIN — CEFEPIME 100 MILLIGRAM(S): 1 INJECTION, POWDER, FOR SOLUTION INTRAMUSCULAR; INTRAVENOUS at 04:47

## 2020-01-01 RX ADMIN — Medication 2: at 02:54

## 2020-01-01 RX ADMIN — OXYCODONE HYDROCHLORIDE 20 MILLIGRAM(S): 5 TABLET ORAL at 12:36

## 2020-01-01 RX ADMIN — Medication 1 APPLICATION(S): at 17:40

## 2020-01-01 RX ADMIN — Medication 2: at 17:20

## 2020-01-01 RX ADMIN — OXYCODONE HYDROCHLORIDE 20 MILLIGRAM(S): 5 TABLET ORAL at 04:50

## 2020-01-01 RX ADMIN — Medication 50 MICROGRAM(S): at 22:53

## 2020-01-01 RX ADMIN — Medication 110 MILLIGRAM(S): at 13:19

## 2020-01-01 RX ADMIN — CHLORHEXIDINE GLUCONATE 15 MILLILITER(S): 213 SOLUTION TOPICAL at 05:17

## 2020-01-01 RX ADMIN — Medication 2: at 08:00

## 2020-01-01 RX ADMIN — HUMAN INSULIN 9 UNIT(S): 100 INJECTION, SUSPENSION SUBCUTANEOUS at 01:30

## 2020-01-01 RX ADMIN — Medication 75 MICROGRAM(S): at 21:21

## 2020-01-01 RX ADMIN — FAMOTIDINE 20 MILLIGRAM(S): 10 INJECTION INTRAVENOUS at 16:14

## 2020-01-01 RX ADMIN — Medication 2 MILLIGRAM(S): at 14:16

## 2020-01-01 RX ADMIN — Medication 62.5 MILLIMOLE(S): at 01:30

## 2020-01-01 RX ADMIN — Medication 2: at 13:16

## 2020-01-01 RX ADMIN — POLYETHYLENE GLYCOL 3350 17 GRAM(S): 17 POWDER, FOR SOLUTION ORAL at 05:07

## 2020-01-01 RX ADMIN — Medication 40 MILLIGRAM(S): at 19:15

## 2020-01-01 RX ADMIN — Medication 40 MILLIGRAM(S): at 14:35

## 2020-01-01 RX ADMIN — Medication 40 MILLIEQUIVALENT(S): at 02:07

## 2020-01-01 RX ADMIN — OXYCODONE HYDROCHLORIDE 20 MILLIGRAM(S): 5 TABLET ORAL at 22:50

## 2020-01-01 RX ADMIN — PROPOFOL 9.25 MICROGRAM(S)/KG/MIN: 10 INJECTION, EMULSION INTRAVENOUS at 18:16

## 2020-01-01 RX ADMIN — Medication 166.67 MILLIGRAM(S): at 05:09

## 2020-01-01 RX ADMIN — Medication 1 APPLICATION(S): at 04:07

## 2020-01-01 RX ADMIN — CHLORHEXIDINE GLUCONATE 15 MILLILITER(S): 213 SOLUTION TOPICAL at 04:47

## 2020-01-01 RX ADMIN — Medication 2 MILLIGRAM(S): at 13:27

## 2020-01-01 RX ADMIN — Medication 1 APPLICATION(S): at 17:02

## 2020-01-01 RX ADMIN — Medication 100 GRAM(S): at 01:29

## 2020-01-01 RX ADMIN — Medication 40 MILLIEQUIVALENT(S): at 23:11

## 2020-01-01 RX ADMIN — Medication 40 MILLIGRAM(S): at 12:53

## 2020-01-01 RX ADMIN — FENTANYL CITRATE 100 MICROGRAM(S): 50 INJECTION INTRAVENOUS at 17:50

## 2020-01-01 RX ADMIN — PANTOPRAZOLE SODIUM 40 MILLIGRAM(S): 20 TABLET, DELAYED RELEASE ORAL at 11:35

## 2020-01-01 RX ADMIN — Medication 40 MILLIGRAM(S): at 17:53

## 2020-01-01 RX ADMIN — ENOXAPARIN SODIUM 40 MILLIGRAM(S): 100 INJECTION SUBCUTANEOUS at 04:20

## 2020-01-01 RX ADMIN — HUMAN INSULIN 9 UNIT(S): 100 INJECTION, SUSPENSION SUBCUTANEOUS at 08:00

## 2020-01-01 RX ADMIN — POLYETHYLENE GLYCOL 3350 17 GRAM(S): 17 POWDER, FOR SOLUTION ORAL at 06:13

## 2020-01-01 RX ADMIN — Medication 75 MICROGRAM(S): at 21:45

## 2020-01-01 RX ADMIN — SENNA PLUS 10 MILLILITER(S): 8.6 TABLET ORAL at 21:50

## 2020-01-01 RX ADMIN — Medication 81 MILLIGRAM(S): at 12:00

## 2020-01-01 RX ADMIN — ENOXAPARIN SODIUM 40 MILLIGRAM(S): 100 INJECTION SUBCUTANEOUS at 17:59

## 2020-01-01 RX ADMIN — Medication 40 MILLIGRAM(S): at 06:11

## 2020-01-01 RX ADMIN — HALOPERIDOL DECANOATE 0.5 MILLIGRAM(S): 100 INJECTION INTRAMUSCULAR at 04:11

## 2020-01-01 RX ADMIN — HUMAN INSULIN 8 UNIT(S): 100 INJECTION, SUSPENSION SUBCUTANEOUS at 13:19

## 2020-01-01 RX ADMIN — KETAMINE HYDROCHLORIDE 0.39 MG/KG/HR: 100 INJECTION INTRAMUSCULAR; INTRAVENOUS at 21:39

## 2020-01-01 RX ADMIN — Medication 2 MILLIGRAM(S): at 14:14

## 2020-01-01 RX ADMIN — Medication 1 APPLICATION(S): at 18:27

## 2020-01-01 RX ADMIN — POLYETHYLENE GLYCOL 3350 17 GRAM(S): 17 POWDER, FOR SOLUTION ORAL at 05:24

## 2020-01-01 RX ADMIN — POLYETHYLENE GLYCOL 3350 17 GRAM(S): 17 POWDER, FOR SOLUTION ORAL at 17:08

## 2020-01-01 RX ADMIN — MORPHINE SULFATE 12 MILLIGRAM(S): 50 CAPSULE, EXTENDED RELEASE ORAL at 14:41

## 2020-01-01 RX ADMIN — Medication 37.5 MICROGRAM(S): at 22:34

## 2020-01-01 RX ADMIN — Medication 40 MILLIEQUIVALENT(S): at 09:05

## 2020-01-01 RX ADMIN — HUMAN INSULIN 8 UNIT(S): 100 INJECTION, SUSPENSION SUBCUTANEOUS at 08:44

## 2020-01-01 RX ADMIN — Medication 250 MILLIGRAM(S): at 09:50

## 2020-01-01 RX ADMIN — FAMOTIDINE 20 MILLIGRAM(S): 10 INJECTION INTRAVENOUS at 05:02

## 2020-01-01 RX ADMIN — Medication 1 APPLICATION(S): at 04:22

## 2020-01-01 RX ADMIN — AZITHROMYCIN 500 MILLIGRAM(S): 500 TABLET, FILM COATED ORAL at 20:33

## 2020-01-01 RX ADMIN — Medication 2 MILLIGRAM(S): at 05:17

## 2020-01-01 RX ADMIN — FAMOTIDINE 20 MILLIGRAM(S): 10 INJECTION INTRAVENOUS at 17:26

## 2020-01-01 RX ADMIN — Medication 50 MICROGRAM(S): at 22:31

## 2020-01-01 RX ADMIN — Medication 1 APPLICATION(S): at 19:39

## 2020-01-01 RX ADMIN — POLYETHYLENE GLYCOL 3350 17 GRAM(S): 17 POWDER, FOR SOLUTION ORAL at 04:10

## 2020-01-01 RX ADMIN — Medication 153 MILLIGRAM(S): at 20:12

## 2020-01-01 RX ADMIN — HALOPERIDOL DECANOATE 0.5 MILLIGRAM(S): 100 INJECTION INTRAMUSCULAR at 05:28

## 2020-01-01 RX ADMIN — POLYETHYLENE GLYCOL 3350 17 GRAM(S): 17 POWDER, FOR SOLUTION ORAL at 16:18

## 2020-01-01 RX ADMIN — OXYCODONE HYDROCHLORIDE 20 MILLIGRAM(S): 5 TABLET ORAL at 10:24

## 2020-01-01 RX ADMIN — Medication 2: at 17:08

## 2020-01-01 RX ADMIN — Medication 2 MILLIGRAM(S): at 05:08

## 2020-01-01 RX ADMIN — POLYETHYLENE GLYCOL 3350 17 GRAM(S): 17 POWDER, FOR SOLUTION ORAL at 05:08

## 2020-01-01 RX ADMIN — CHLORHEXIDINE GLUCONATE 15 MILLILITER(S): 213 SOLUTION TOPICAL at 17:34

## 2020-01-01 RX ADMIN — MORPHINE SULFATE 6 MILLIGRAM(S): 50 CAPSULE, EXTENDED RELEASE ORAL at 13:05

## 2020-01-01 RX ADMIN — Medication 40 MILLIGRAM(S): at 05:28

## 2020-01-01 RX ADMIN — HUMAN INSULIN 11 UNIT(S): 100 INJECTION, SUSPENSION SUBCUTANEOUS at 05:07

## 2020-01-01 RX ADMIN — SENNA PLUS 10 MILLILITER(S): 8.6 TABLET ORAL at 21:40

## 2020-01-01 RX ADMIN — HUMAN INSULIN 9 UNIT(S): 100 INJECTION, SUSPENSION SUBCUTANEOUS at 20:00

## 2020-01-01 RX ADMIN — FAMOTIDINE 20 MILLIGRAM(S): 10 INJECTION INTRAVENOUS at 05:54

## 2020-01-01 RX ADMIN — Medication 81 MILLIGRAM(S): at 12:06

## 2020-01-01 RX ADMIN — OXYCODONE HYDROCHLORIDE 10 MILLIGRAM(S): 5 TABLET ORAL at 17:22

## 2020-01-01 RX ADMIN — AZITHROMYCIN 250 MILLIGRAM(S): 500 TABLET, FILM COATED ORAL at 22:26

## 2020-01-01 RX ADMIN — FAMOTIDINE 20 MILLIGRAM(S): 10 INJECTION INTRAVENOUS at 05:01

## 2020-01-01 RX ADMIN — CHLORHEXIDINE GLUCONATE 15 MILLILITER(S): 213 SOLUTION TOPICAL at 05:16

## 2020-01-01 RX ADMIN — Medication 153 MILLIGRAM(S): at 04:46

## 2020-01-01 RX ADMIN — ENOXAPARIN SODIUM 40 MILLIGRAM(S): 100 INJECTION SUBCUTANEOUS at 17:52

## 2020-01-01 RX ADMIN — OXYCODONE HYDROCHLORIDE 10 MILLIGRAM(S): 5 TABLET ORAL at 13:46

## 2020-01-01 RX ADMIN — Medication 2: at 12:17

## 2020-01-01 RX ADMIN — OXYCODONE HYDROCHLORIDE 10 MILLIGRAM(S): 5 TABLET ORAL at 13:21

## 2020-01-01 RX ADMIN — HUMAN INSULIN 11 UNIT(S): 100 INJECTION, SUSPENSION SUBCUTANEOUS at 23:44

## 2020-01-01 RX ADMIN — OXYCODONE HYDROCHLORIDE 20 MILLIGRAM(S): 5 TABLET ORAL at 13:07

## 2020-01-01 RX ADMIN — HUMAN INSULIN 11 UNIT(S): 100 INJECTION, SUSPENSION SUBCUTANEOUS at 12:39

## 2020-01-01 RX ADMIN — FENTANYL CITRATE 100 MICROGRAM(S): 50 INJECTION INTRAVENOUS at 09:59

## 2020-01-01 RX ADMIN — CHLORHEXIDINE GLUCONATE 15 MILLILITER(S): 213 SOLUTION TOPICAL at 04:31

## 2020-01-01 RX ADMIN — Medication 40 MILLIGRAM(S): at 00:14

## 2020-01-01 RX ADMIN — CHLORHEXIDINE GLUCONATE 15 MILLILITER(S): 213 SOLUTION TOPICAL at 17:15

## 2020-01-01 RX ADMIN — HYDROMORPHONE HYDROCHLORIDE 0.5 MILLIGRAM(S): 2 INJECTION INTRAMUSCULAR; INTRAVENOUS; SUBCUTANEOUS at 18:00

## 2020-01-01 RX ADMIN — POLYETHYLENE GLYCOL 3350 17 GRAM(S): 17 POWDER, FOR SOLUTION ORAL at 03:45

## 2020-01-01 RX ADMIN — SENNA PLUS 10 MILLILITER(S): 8.6 TABLET ORAL at 22:26

## 2020-01-01 RX ADMIN — Medication 2: at 05:20

## 2020-01-01 RX ADMIN — CHLORHEXIDINE GLUCONATE 1 APPLICATION(S): 213 SOLUTION TOPICAL at 04:09

## 2020-01-01 RX ADMIN — Medication 1 APPLICATION(S): at 17:22

## 2020-01-01 RX ADMIN — Medication 1 APPLICATION(S): at 17:08

## 2020-01-01 RX ADMIN — Medication 2: at 10:31

## 2020-01-01 RX ADMIN — OXYCODONE HYDROCHLORIDE 10 MILLIGRAM(S): 5 TABLET ORAL at 04:21

## 2020-01-01 RX ADMIN — SENNA PLUS 10 MILLILITER(S): 8.6 TABLET ORAL at 01:14

## 2020-01-01 RX ADMIN — Medication 1 APPLICATION(S): at 17:59

## 2020-01-01 RX ADMIN — INSULIN GLARGINE 10 UNIT(S): 100 INJECTION, SOLUTION SUBCUTANEOUS at 02:08

## 2020-01-01 RX ADMIN — HUMAN INSULIN 11 UNIT(S): 100 INJECTION, SUSPENSION SUBCUTANEOUS at 18:20

## 2020-01-01 RX ADMIN — Medication 102 MILLIGRAM(S): at 04:09

## 2020-01-01 RX ADMIN — OXYCODONE HYDROCHLORIDE 20 MILLIGRAM(S): 5 TABLET ORAL at 04:18

## 2020-01-01 RX ADMIN — CHLORHEXIDINE GLUCONATE 15 MILLILITER(S): 213 SOLUTION TOPICAL at 04:51

## 2020-01-01 RX ADMIN — Medication 0.5 MILLIGRAM(S): at 13:51

## 2020-01-01 RX ADMIN — HALOPERIDOL DECANOATE 0.5 MILLIGRAM(S): 100 INJECTION INTRAMUSCULAR at 19:26

## 2020-01-01 RX ADMIN — AZITHROMYCIN 250 MILLIGRAM(S): 500 TABLET, FILM COATED ORAL at 19:02

## 2020-01-01 RX ADMIN — CEFEPIME 100 MILLIGRAM(S): 1 INJECTION, POWDER, FOR SOLUTION INTRAMUSCULAR; INTRAVENOUS at 04:00

## 2020-01-01 RX ADMIN — Medication 0.5 MILLIGRAM(S): at 22:29

## 2020-01-01 RX ADMIN — Medication 2 MILLIGRAM(S): at 21:33

## 2020-01-01 RX ADMIN — Medication 40 MILLIGRAM(S): at 17:50

## 2020-01-01 RX ADMIN — Medication 2 MILLIGRAM(S): at 23:10

## 2020-01-01 RX ADMIN — Medication 1 APPLICATION(S): at 17:09

## 2020-01-01 RX ADMIN — ENOXAPARIN SODIUM 40 MILLIGRAM(S): 100 INJECTION SUBCUTANEOUS at 04:11

## 2020-01-01 RX ADMIN — OXYCODONE HYDROCHLORIDE 20 MILLIGRAM(S): 5 TABLET ORAL at 10:29

## 2020-01-01 RX ADMIN — Medication 2 MILLIGRAM(S): at 12:09

## 2020-01-01 RX ADMIN — Medication 37.5 MICROGRAM(S): at 03:05

## 2020-01-01 RX ADMIN — POLYETHYLENE GLYCOL 3350 17 GRAM(S): 17 POWDER, FOR SOLUTION ORAL at 18:28

## 2020-01-01 RX ADMIN — FENTANYL CITRATE 100 MICROGRAM(S): 50 INJECTION INTRAVENOUS at 19:00

## 2020-01-01 RX ADMIN — FAMOTIDINE 20 MILLIGRAM(S): 10 INJECTION INTRAVENOUS at 16:50

## 2020-01-01 RX ADMIN — ENOXAPARIN SODIUM 40 MILLIGRAM(S): 100 INJECTION SUBCUTANEOUS at 18:18

## 2020-01-01 RX ADMIN — HYDROMORPHONE HYDROCHLORIDE 0.5 MILLIGRAM(S): 2 INJECTION INTRAMUSCULAR; INTRAVENOUS; SUBCUTANEOUS at 00:30

## 2020-01-01 RX ADMIN — KETAMINE HYDROCHLORIDE 1.93 MG/KG/HR: 100 INJECTION INTRAMUSCULAR; INTRAVENOUS at 21:21

## 2020-01-01 RX ADMIN — SENNA PLUS 10 MILLILITER(S): 8.6 TABLET ORAL at 22:13

## 2020-01-01 RX ADMIN — OXYCODONE HYDROCHLORIDE 10 MILLIGRAM(S): 5 TABLET ORAL at 10:33

## 2020-01-01 RX ADMIN — Medication 2: at 21:05

## 2020-01-01 RX ADMIN — ENOXAPARIN SODIUM 40 MILLIGRAM(S): 100 INJECTION SUBCUTANEOUS at 04:07

## 2020-01-01 RX ADMIN — FAMOTIDINE 20 MILLIGRAM(S): 10 INJECTION INTRAVENOUS at 05:28

## 2020-01-01 RX ADMIN — Medication 40 MILLIGRAM(S): at 05:36

## 2020-01-01 RX ADMIN — HALOPERIDOL DECANOATE 0.5 MILLIGRAM(S): 100 INJECTION INTRAMUSCULAR at 05:08

## 2020-01-01 RX ADMIN — ENOXAPARIN SODIUM 40 MILLIGRAM(S): 100 INJECTION SUBCUTANEOUS at 06:13

## 2020-01-01 RX ADMIN — FAMOTIDINE 20 MILLIGRAM(S): 10 INJECTION INTRAVENOUS at 18:30

## 2020-01-01 RX ADMIN — AZITHROMYCIN 250 MILLIGRAM(S): 500 TABLET, FILM COATED ORAL at 23:53

## 2020-01-01 RX ADMIN — POLYETHYLENE GLYCOL 3350 17 GRAM(S): 17 POWDER, FOR SOLUTION ORAL at 17:13

## 2020-01-01 RX ADMIN — Medication 81 MILLIGRAM(S): at 12:39

## 2020-01-01 RX ADMIN — OXYCODONE HYDROCHLORIDE 10 MILLIGRAM(S): 5 TABLET ORAL at 17:35

## 2020-01-01 RX ADMIN — Medication 37.5 MICROGRAM(S): at 21:38

## 2020-01-01 RX ADMIN — Medication 2: at 06:36

## 2020-01-01 RX ADMIN — Medication 0.5 MILLIGRAM(S): at 04:59

## 2020-01-01 RX ADMIN — CHLORHEXIDINE GLUCONATE 15 MILLILITER(S): 213 SOLUTION TOPICAL at 04:50

## 2020-01-01 RX ADMIN — OXYCODONE HYDROCHLORIDE 10 MILLIGRAM(S): 5 TABLET ORAL at 16:19

## 2020-01-01 RX ADMIN — Medication 0.5 MILLIGRAM(S): at 15:31

## 2020-01-01 RX ADMIN — MIDAZOLAM HYDROCHLORIDE 1.54 MG/KG/HR: 1 INJECTION, SOLUTION INTRAMUSCULAR; INTRAVENOUS at 20:10

## 2020-01-01 RX ADMIN — PIPERACILLIN AND TAZOBACTAM 25 GRAM(S): 4; .5 INJECTION, POWDER, LYOPHILIZED, FOR SOLUTION INTRAVENOUS at 09:49

## 2020-01-01 RX ADMIN — CEFEPIME 100 MILLIGRAM(S): 1 INJECTION, POWDER, FOR SOLUTION INTRAMUSCULAR; INTRAVENOUS at 22:36

## 2020-01-01 RX ADMIN — ENOXAPARIN SODIUM 40 MILLIGRAM(S): 100 INJECTION SUBCUTANEOUS at 16:18

## 2020-01-01 RX ADMIN — Medication 40 MILLIEQUIVALENT(S): at 20:00

## 2020-01-01 RX ADMIN — Medication 40 MILLIEQUIVALENT(S): at 13:15

## 2020-01-01 RX ADMIN — Medication 37.5 MICROGRAM(S): at 20:44

## 2020-01-01 RX ADMIN — ENOXAPARIN SODIUM 40 MILLIGRAM(S): 100 INJECTION SUBCUTANEOUS at 06:38

## 2020-01-01 RX ADMIN — Medication 0.5 MILLIGRAM(S): at 16:16

## 2020-01-01 RX ADMIN — Medication 75 MICROGRAM(S): at 01:02

## 2020-01-01 RX ADMIN — Medication 81 MILLIGRAM(S): at 11:45

## 2020-01-01 RX ADMIN — FAMOTIDINE 20 MILLIGRAM(S): 10 INJECTION INTRAVENOUS at 17:06

## 2020-01-01 RX ADMIN — Medication 25 MILLILITER(S): at 23:45

## 2020-01-01 RX ADMIN — MORPHINE SULFATE 6 MILLIGRAM(S): 50 CAPSULE, EXTENDED RELEASE ORAL at 14:08

## 2020-01-01 RX ADMIN — Medication 40 MILLIGRAM(S): at 23:11

## 2020-01-01 RX ADMIN — ENOXAPARIN SODIUM 40 MILLIGRAM(S): 100 INJECTION SUBCUTANEOUS at 07:39

## 2020-01-01 RX ADMIN — ENOXAPARIN SODIUM 40 MILLIGRAM(S): 100 INJECTION SUBCUTANEOUS at 16:55

## 2020-01-01 RX ADMIN — Medication 40 MILLIEQUIVALENT(S): at 22:04

## 2020-01-01 RX ADMIN — Medication 40 MILLIEQUIVALENT(S): at 17:08

## 2020-01-01 RX ADMIN — QUETIAPINE FUMARATE 25 MILLIGRAM(S): 200 TABLET, FILM COATED ORAL at 05:17

## 2020-01-01 RX ADMIN — HALOPERIDOL DECANOATE 0.5 MILLIGRAM(S): 100 INJECTION INTRAMUSCULAR at 17:22

## 2020-01-01 RX ADMIN — HYDROMORPHONE HYDROCHLORIDE 0.5 MILLIGRAM(S): 2 INJECTION INTRAMUSCULAR; INTRAVENOUS; SUBCUTANEOUS at 16:20

## 2020-01-01 RX ADMIN — HUMAN INSULIN 8 UNIT(S): 100 INJECTION, SUSPENSION SUBCUTANEOUS at 13:48

## 2020-01-01 RX ADMIN — ROBINUL 0.2 MILLIGRAM(S): 0.2 INJECTION INTRAMUSCULAR; INTRAVENOUS at 16:45

## 2020-01-01 RX ADMIN — CHLORHEXIDINE GLUCONATE 15 MILLILITER(S): 213 SOLUTION TOPICAL at 04:14

## 2020-01-01 RX ADMIN — HUMAN INSULIN 9 UNIT(S): 100 INJECTION, SUSPENSION SUBCUTANEOUS at 13:00

## 2020-01-01 RX ADMIN — PROPOFOL 9.25 MICROGRAM(S)/KG/MIN: 10 INJECTION, EMULSION INTRAVENOUS at 21:11

## 2020-01-01 RX ADMIN — POTASSIUM PHOSPHATE, MONOBASIC POTASSIUM PHOSPHATE, DIBASIC 83.33 MILLIMOLE(S): 236; 224 INJECTION, SOLUTION INTRAVENOUS at 06:13

## 2020-01-01 RX ADMIN — CHLORHEXIDINE GLUCONATE 1 APPLICATION(S): 213 SOLUTION TOPICAL at 06:04

## 2020-01-01 RX ADMIN — CHLORHEXIDINE GLUCONATE 15 MILLILITER(S): 213 SOLUTION TOPICAL at 18:31

## 2020-01-01 RX ADMIN — Medication 2 MILLIGRAM(S): at 20:57

## 2020-01-01 RX ADMIN — HUMAN INSULIN 9 UNIT(S): 100 INJECTION, SUSPENSION SUBCUTANEOUS at 09:54

## 2020-01-01 RX ADMIN — ENOXAPARIN SODIUM 40 MILLIGRAM(S): 100 INJECTION SUBCUTANEOUS at 05:00

## 2020-01-01 RX ADMIN — PIPERACILLIN AND TAZOBACTAM 25 GRAM(S): 4; .5 INJECTION, POWDER, LYOPHILIZED, FOR SOLUTION INTRAVENOUS at 09:27

## 2020-01-01 RX ADMIN — Medication 0.5 MILLIGRAM(S): at 20:00

## 2020-01-01 RX ADMIN — SODIUM CHLORIDE 500 MILLILITER(S): 9 INJECTION INTRAMUSCULAR; INTRAVENOUS; SUBCUTANEOUS at 01:05

## 2020-01-01 RX ADMIN — Medication 100 MILLIGRAM(S): at 05:54

## 2020-01-01 RX ADMIN — Medication 81 MILLIGRAM(S): at 12:12

## 2020-01-01 RX ADMIN — ENOXAPARIN SODIUM 40 MILLIGRAM(S): 100 INJECTION SUBCUTANEOUS at 04:59

## 2020-01-01 RX ADMIN — Medication 50 MILLIEQUIVALENT(S): at 11:40

## 2020-01-01 RX ADMIN — Medication 7.95 MICROGRAM(S)/KG/MIN: at 14:17

## 2020-01-01 RX ADMIN — Medication 200 MILLIGRAM(S): at 09:53

## 2020-01-01 RX ADMIN — HYDROMORPHONE HYDROCHLORIDE 1 MILLIGRAM(S): 2 INJECTION INTRAMUSCULAR; INTRAVENOUS; SUBCUTANEOUS at 03:10

## 2020-01-01 RX ADMIN — Medication 2 MILLIGRAM(S): at 13:21

## 2020-01-01 RX ADMIN — OXYCODONE HYDROCHLORIDE 20 MILLIGRAM(S): 5 TABLET ORAL at 01:04

## 2020-01-01 RX ADMIN — Medication 250 MILLIGRAM(S): at 16:54

## 2020-01-01 RX ADMIN — FAMOTIDINE 20 MILLIGRAM(S): 10 INJECTION INTRAVENOUS at 04:47

## 2020-01-01 RX ADMIN — DEXMEDETOMIDINE HYDROCHLORIDE IN 0.9% SODIUM CHLORIDE 9.64 MICROGRAM(S)/KG/HR: 4 INJECTION INTRAVENOUS at 01:26

## 2020-01-01 RX ADMIN — HYDROMORPHONE HYDROCHLORIDE 1 MILLIGRAM(S): 2 INJECTION INTRAMUSCULAR; INTRAVENOUS; SUBCUTANEOUS at 11:55

## 2020-01-01 RX ADMIN — OXYCODONE HYDROCHLORIDE 20 MILLIGRAM(S): 5 TABLET ORAL at 12:39

## 2020-01-01 RX ADMIN — Medication 2 MILLIGRAM(S): at 22:37

## 2020-01-01 RX ADMIN — CEFEPIME 100 MILLIGRAM(S): 1 INJECTION, POWDER, FOR SOLUTION INTRAMUSCULAR; INTRAVENOUS at 16:17

## 2020-01-01 RX ADMIN — Medication 100 MILLIGRAM(S): at 11:40

## 2020-01-01 RX ADMIN — HUMAN INSULIN 9 UNIT(S): 100 INJECTION, SUSPENSION SUBCUTANEOUS at 14:35

## 2020-01-01 RX ADMIN — OXYCODONE HYDROCHLORIDE 10 MILLIGRAM(S): 5 TABLET ORAL at 20:09

## 2020-01-01 RX ADMIN — OXYCODONE HYDROCHLORIDE 20 MILLIGRAM(S): 5 TABLET ORAL at 22:29

## 2020-01-01 RX ADMIN — HUMAN INSULIN 11 UNIT(S): 100 INJECTION, SUSPENSION SUBCUTANEOUS at 17:15

## 2020-01-01 RX ADMIN — HUMAN INSULIN 11 UNIT(S): 100 INJECTION, SUSPENSION SUBCUTANEOUS at 12:08

## 2020-01-01 RX ADMIN — Medication 153 MILLIGRAM(S): at 05:33

## 2020-01-01 RX ADMIN — POTASSIUM PHOSPHATE, MONOBASIC POTASSIUM PHOSPHATE, DIBASIC 62.5 MILLIMOLE(S): 236; 224 INJECTION, SOLUTION INTRAVENOUS at 03:05

## 2020-01-01 RX ADMIN — HYDROMORPHONE HYDROCHLORIDE 2 MILLIGRAM(S): 2 INJECTION INTRAMUSCULAR; INTRAVENOUS; SUBCUTANEOUS at 18:00

## 2020-01-01 RX ADMIN — KETAMINE HYDROCHLORIDE 15.4 MG/KG/HR: 100 INJECTION INTRAMUSCULAR; INTRAVENOUS at 23:05

## 2020-01-01 RX ADMIN — Medication 250 MILLIGRAM(S): at 22:24

## 2020-01-01 RX ADMIN — FAMOTIDINE 20 MILLIGRAM(S): 10 INJECTION INTRAVENOUS at 17:08

## 2020-01-01 RX ADMIN — SENNA PLUS 10 MILLILITER(S): 8.6 TABLET ORAL at 21:45

## 2020-01-01 RX ADMIN — HUMAN INSULIN 11 UNIT(S): 100 INJECTION, SUSPENSION SUBCUTANEOUS at 17:25

## 2020-01-01 RX ADMIN — ENOXAPARIN SODIUM 40 MILLIGRAM(S): 100 INJECTION SUBCUTANEOUS at 07:23

## 2020-01-01 RX ADMIN — Medication 20 MILLIEQUIVALENT(S): at 04:47

## 2020-01-01 RX ADMIN — CHLORHEXIDINE GLUCONATE 1 APPLICATION(S): 213 SOLUTION TOPICAL at 05:18

## 2020-01-01 RX ADMIN — CHLORHEXIDINE GLUCONATE 15 MILLILITER(S): 213 SOLUTION TOPICAL at 05:08

## 2020-01-01 RX ADMIN — Medication 1 APPLICATION(S): at 18:08

## 2020-01-01 RX ADMIN — Medication 102 MILLIGRAM(S): at 19:01

## 2020-01-01 RX ADMIN — OXYCODONE HYDROCHLORIDE 10 MILLIGRAM(S): 5 TABLET ORAL at 02:35

## 2020-01-01 RX ADMIN — CHLORHEXIDINE GLUCONATE 1 APPLICATION(S): 213 SOLUTION TOPICAL at 04:02

## 2020-01-01 RX ADMIN — POLYETHYLENE GLYCOL 3350 17 GRAM(S): 17 POWDER, FOR SOLUTION ORAL at 07:40

## 2020-01-01 RX ADMIN — PANTOPRAZOLE SODIUM 40 MILLIGRAM(S): 20 TABLET, DELAYED RELEASE ORAL at 12:06

## 2020-01-01 RX ADMIN — POLYETHYLENE GLYCOL 3350 17 GRAM(S): 17 POWDER, FOR SOLUTION ORAL at 05:17

## 2020-01-01 RX ADMIN — Medication 2: at 14:21

## 2020-01-01 RX ADMIN — Medication 110 MILLIGRAM(S): at 04:17

## 2020-01-01 RX ADMIN — Medication 2 MILLIGRAM(S): at 04:56

## 2020-01-01 RX ADMIN — FENTANYL CITRATE 100 MICROGRAM(S): 50 INJECTION INTRAVENOUS at 20:15

## 2020-01-01 RX ADMIN — Medication 50 MICROGRAM(S): at 23:33

## 2020-01-01 RX ADMIN — Medication 4: at 06:06

## 2020-01-01 RX ADMIN — ENOXAPARIN SODIUM 40 MILLIGRAM(S): 100 INJECTION SUBCUTANEOUS at 17:29

## 2020-01-01 RX ADMIN — Medication 250 MILLIGRAM(S): at 05:52

## 2020-01-01 RX ADMIN — HUMAN INSULIN 11 UNIT(S): 100 INJECTION, SUSPENSION SUBCUTANEOUS at 04:49

## 2020-01-01 RX ADMIN — Medication 200 MILLIGRAM(S): at 21:39

## 2020-01-01 RX ADMIN — CEFEPIME 100 MILLIGRAM(S): 1 INJECTION, POWDER, FOR SOLUTION INTRAMUSCULAR; INTRAVENOUS at 17:08

## 2020-01-01 RX ADMIN — Medication 200 MILLIGRAM(S): at 21:40

## 2020-01-01 RX ADMIN — OXYCODONE HYDROCHLORIDE 20 MILLIGRAM(S): 5 TABLET ORAL at 04:13

## 2020-01-01 RX ADMIN — CEFEPIME 100 MILLIGRAM(S): 1 INJECTION, POWDER, FOR SOLUTION INTRAMUSCULAR; INTRAVENOUS at 12:36

## 2020-01-01 RX ADMIN — Medication 0.5 MILLIGRAM(S): at 06:13

## 2020-01-01 RX ADMIN — Medication 6: at 17:33

## 2020-01-01 RX ADMIN — Medication 2: at 20:47

## 2020-01-01 RX ADMIN — Medication 2: at 22:51

## 2020-01-01 RX ADMIN — CEFEPIME 100 MILLIGRAM(S): 1 INJECTION, POWDER, FOR SOLUTION INTRAMUSCULAR; INTRAVENOUS at 04:10

## 2020-01-01 RX ADMIN — MORPHINE SULFATE 1.5 MG/HR: 50 CAPSULE, EXTENDED RELEASE ORAL at 14:06

## 2020-01-01 RX ADMIN — OXYCODONE HYDROCHLORIDE 10 MILLIGRAM(S): 5 TABLET ORAL at 00:00

## 2020-01-01 RX ADMIN — Medication 81 MILLIGRAM(S): at 13:15

## 2020-01-01 RX ADMIN — HYDROMORPHONE HYDROCHLORIDE 1 MILLIGRAM(S): 2 INJECTION INTRAMUSCULAR; INTRAVENOUS; SUBCUTANEOUS at 18:40

## 2020-01-01 RX ADMIN — Medication 2 MILLIGRAM(S): at 12:07

## 2020-01-01 RX ADMIN — FAMOTIDINE 20 MILLIGRAM(S): 10 INJECTION INTRAVENOUS at 04:54

## 2020-01-01 RX ADMIN — Medication 4: at 09:24

## 2020-01-01 RX ADMIN — Medication 8: at 01:20

## 2020-01-01 RX ADMIN — Medication 2: at 04:50

## 2020-01-01 RX ADMIN — SENNA PLUS 10 MILLILITER(S): 8.6 TABLET ORAL at 21:27

## 2020-01-01 RX ADMIN — Medication 40 MILLIGRAM(S): at 17:09

## 2020-01-01 RX ADMIN — Medication 50 MICROGRAM(S): at 22:00

## 2020-01-01 RX ADMIN — Medication 4: at 11:56

## 2020-01-01 RX ADMIN — ENOXAPARIN SODIUM 40 MILLIGRAM(S): 100 INJECTION SUBCUTANEOUS at 17:34

## 2020-01-01 RX ADMIN — Medication 1 TABLET(S): at 01:06

## 2020-01-01 RX ADMIN — CHLORHEXIDINE GLUCONATE 15 MILLILITER(S): 213 SOLUTION TOPICAL at 17:40

## 2020-01-01 RX ADMIN — FAMOTIDINE 20 MILLIGRAM(S): 10 INJECTION INTRAVENOUS at 05:34

## 2020-01-01 RX ADMIN — POLYETHYLENE GLYCOL 3350 17 GRAM(S): 17 POWDER, FOR SOLUTION ORAL at 16:51

## 2020-01-01 RX ADMIN — Medication 1 APPLICATION(S): at 19:35

## 2020-01-01 RX ADMIN — ENOXAPARIN SODIUM 40 MILLIGRAM(S): 100 INJECTION SUBCUTANEOUS at 18:36

## 2020-01-01 RX ADMIN — CHLORHEXIDINE GLUCONATE 1 APPLICATION(S): 213 SOLUTION TOPICAL at 04:59

## 2020-01-01 RX ADMIN — HUMAN INSULIN 9 UNIT(S): 100 INJECTION, SUSPENSION SUBCUTANEOUS at 21:18

## 2020-01-01 RX ADMIN — Medication 650 MILLIGRAM(S): at 01:48

## 2020-01-01 RX ADMIN — Medication 37.5 MICROGRAM(S): at 22:00

## 2020-01-01 RX ADMIN — CHLORHEXIDINE GLUCONATE 15 MILLILITER(S): 213 SOLUTION TOPICAL at 06:13

## 2020-01-01 RX ADMIN — CHLORHEXIDINE GLUCONATE 15 MILLILITER(S): 213 SOLUTION TOPICAL at 06:14

## 2020-01-01 RX ADMIN — CHLORHEXIDINE GLUCONATE 1 APPLICATION(S): 213 SOLUTION TOPICAL at 06:01

## 2020-01-01 RX ADMIN — Medication 40 MILLIGRAM(S): at 16:17

## 2020-01-01 RX ADMIN — Medication 2 MILLIGRAM(S): at 12:12

## 2020-01-01 RX ADMIN — HALOPERIDOL DECANOATE 0.5 MILLIGRAM(S): 100 INJECTION INTRAMUSCULAR at 04:48

## 2020-01-01 RX ADMIN — HYDROMORPHONE HYDROCHLORIDE 0.5 MILLIGRAM(S): 2 INJECTION INTRAMUSCULAR; INTRAVENOUS; SUBCUTANEOUS at 15:00

## 2020-01-01 RX ADMIN — HALOPERIDOL DECANOATE 0.5 MILLIGRAM(S): 100 INJECTION INTRAMUSCULAR at 18:19

## 2020-01-01 RX ADMIN — SENNA PLUS 10 MILLILITER(S): 8.6 TABLET ORAL at 23:33

## 2020-01-01 RX ADMIN — POLYETHYLENE GLYCOL 3350 17 GRAM(S): 17 POWDER, FOR SOLUTION ORAL at 18:37

## 2020-01-01 RX ADMIN — Medication 1 APPLICATION(S): at 05:07

## 2020-01-01 RX ADMIN — OXYCODONE HYDROCHLORIDE 10 MILLIGRAM(S): 5 TABLET ORAL at 16:54

## 2020-01-01 RX ADMIN — PANTOPRAZOLE SODIUM 40 MILLIGRAM(S): 20 TABLET, DELAYED RELEASE ORAL at 11:39

## 2020-01-01 RX ADMIN — OXYCODONE HYDROCHLORIDE 10 MILLIGRAM(S): 5 TABLET ORAL at 05:03

## 2020-01-01 RX ADMIN — HUMAN INSULIN 11 UNIT(S): 100 INJECTION, SUSPENSION SUBCUTANEOUS at 11:52

## 2020-01-01 RX ADMIN — SENNA PLUS 10 MILLILITER(S): 8.6 TABLET ORAL at 22:37

## 2020-01-01 RX ADMIN — HUMAN INSULIN 11 UNIT(S): 100 INJECTION, SUSPENSION SUBCUTANEOUS at 14:33

## 2020-01-01 RX ADMIN — HUMAN INSULIN 11 UNIT(S): 100 INJECTION, SUSPENSION SUBCUTANEOUS at 18:10

## 2020-01-01 RX ADMIN — HUMAN INSULIN 11 UNIT(S): 100 INJECTION, SUSPENSION SUBCUTANEOUS at 05:18

## 2020-01-01 RX ADMIN — ENOXAPARIN SODIUM 40 MILLIGRAM(S): 100 INJECTION SUBCUTANEOUS at 18:08

## 2020-01-01 RX ADMIN — HUMAN INSULIN 11 UNIT(S): 100 INJECTION, SUSPENSION SUBCUTANEOUS at 06:24

## 2020-01-01 RX ADMIN — OXYCODONE HYDROCHLORIDE 10 MILLIGRAM(S): 5 TABLET ORAL at 11:24

## 2020-01-01 RX ADMIN — HYDROMORPHONE HYDROCHLORIDE 0.5 MILLIGRAM(S): 2 INJECTION INTRAMUSCULAR; INTRAVENOUS; SUBCUTANEOUS at 08:50

## 2020-01-01 RX ADMIN — Medication 2 MILLIGRAM(S): at 21:47

## 2020-01-01 RX ADMIN — Medication 60 MILLIGRAM(S): at 11:40

## 2020-01-01 RX ADMIN — Medication 4: at 01:58

## 2020-01-01 RX ADMIN — Medication 200 MILLIGRAM(S): at 08:06

## 2020-01-01 RX ADMIN — HUMAN INSULIN 11 UNIT(S): 100 INJECTION, SUSPENSION SUBCUTANEOUS at 12:00

## 2020-01-01 RX ADMIN — Medication 2 MILLIGRAM(S): at 14:06

## 2020-01-01 RX ADMIN — MIDAZOLAM HYDROCHLORIDE 2 MILLIGRAM(S): 1 INJECTION, SOLUTION INTRAMUSCULAR; INTRAVENOUS at 19:00

## 2020-01-01 RX ADMIN — HYDROMORPHONE HYDROCHLORIDE 0.5 MILLIGRAM(S): 2 INJECTION INTRAMUSCULAR; INTRAVENOUS; SUBCUTANEOUS at 03:00

## 2020-01-01 RX ADMIN — HUMAN INSULIN 11 UNIT(S): 100 INJECTION, SUSPENSION SUBCUTANEOUS at 17:31

## 2020-01-01 RX ADMIN — Medication 81 MILLIGRAM(S): at 13:16

## 2020-01-01 RX ADMIN — CHLORHEXIDINE GLUCONATE 15 MILLILITER(S): 213 SOLUTION TOPICAL at 17:09

## 2020-01-01 RX ADMIN — CHLORHEXIDINE GLUCONATE 1 APPLICATION(S): 213 SOLUTION TOPICAL at 05:26

## 2020-01-01 RX ADMIN — CEFEPIME 100 MILLIGRAM(S): 1 INJECTION, POWDER, FOR SOLUTION INTRAMUSCULAR; INTRAVENOUS at 13:27

## 2020-01-01 RX ADMIN — HYDROMORPHONE HYDROCHLORIDE 1 MILLIGRAM(S): 2 INJECTION INTRAMUSCULAR; INTRAVENOUS; SUBCUTANEOUS at 13:00

## 2020-01-01 RX ADMIN — Medication 81 MILLIGRAM(S): at 11:42

## 2020-01-01 RX ADMIN — Medication 2 MILLIGRAM(S): at 13:31

## 2020-01-01 RX ADMIN — OXYCODONE HYDROCHLORIDE 10 MILLIGRAM(S): 5 TABLET ORAL at 17:29

## 2020-01-01 RX ADMIN — OXYCODONE HYDROCHLORIDE 10 MILLIGRAM(S): 5 TABLET ORAL at 17:08

## 2020-01-01 RX ADMIN — Medication 1 TABLET(S): at 09:13

## 2020-01-01 RX ADMIN — HYDROMORPHONE HYDROCHLORIDE 0.5 MILLIGRAM(S): 2 INJECTION INTRAMUSCULAR; INTRAVENOUS; SUBCUTANEOUS at 18:05

## 2020-01-01 RX ADMIN — Medication 2 MILLIGRAM(S): at 20:50

## 2020-01-01 RX ADMIN — OXYCODONE HYDROCHLORIDE 10 MILLIGRAM(S): 5 TABLET ORAL at 13:20

## 2020-01-01 RX ADMIN — Medication 60 MILLIGRAM(S): at 17:52

## 2020-01-01 RX ADMIN — HUMAN INSULIN 8 UNIT(S): 100 INJECTION, SUSPENSION SUBCUTANEOUS at 13:16

## 2020-01-01 RX ADMIN — OXYCODONE HYDROCHLORIDE 10 MILLIGRAM(S): 5 TABLET ORAL at 01:48

## 2020-01-01 RX ADMIN — HUMAN INSULIN 11 UNIT(S): 100 INJECTION, SUSPENSION SUBCUTANEOUS at 22:51

## 2020-01-01 RX ADMIN — CEFEPIME 100 MILLIGRAM(S): 1 INJECTION, POWDER, FOR SOLUTION INTRAMUSCULAR; INTRAVENOUS at 13:32

## 2020-01-01 RX ADMIN — Medication 250 MILLIGRAM(S): at 18:30

## 2020-01-01 RX ADMIN — Medication 50 MILLIEQUIVALENT(S): at 09:05

## 2020-01-01 RX ADMIN — HYDROMORPHONE HYDROCHLORIDE 1 MILLIGRAM(S): 2 INJECTION INTRAMUSCULAR; INTRAVENOUS; SUBCUTANEOUS at 12:08

## 2020-01-01 RX ADMIN — Medication 1 APPLICATION(S): at 17:00

## 2020-01-01 RX ADMIN — CHLORHEXIDINE GLUCONATE 15 MILLILITER(S): 213 SOLUTION TOPICAL at 05:34

## 2020-01-01 RX ADMIN — Medication 2: at 02:24

## 2020-01-01 RX ADMIN — LACTULOSE 10 GRAM(S): 10 SOLUTION ORAL at 17:09

## 2020-01-01 RX ADMIN — MIDAZOLAM HYDROCHLORIDE 4 MILLIGRAM(S): 1 INJECTION, SOLUTION INTRAMUSCULAR; INTRAVENOUS at 19:00

## 2020-01-01 RX ADMIN — Medication 40 MILLIGRAM(S): at 17:25

## 2020-01-01 RX ADMIN — CHLORHEXIDINE GLUCONATE 1 APPLICATION(S): 213 SOLUTION TOPICAL at 05:01

## 2020-01-01 RX ADMIN — FAMOTIDINE 20 MILLIGRAM(S): 10 INJECTION INTRAVENOUS at 05:17

## 2020-01-01 RX ADMIN — HUMAN INSULIN 11 UNIT(S): 100 INJECTION, SUSPENSION SUBCUTANEOUS at 12:09

## 2020-01-01 RX ADMIN — FAMOTIDINE 20 MILLIGRAM(S): 10 INJECTION INTRAVENOUS at 11:45

## 2020-01-01 RX ADMIN — POLYETHYLENE GLYCOL 3350 17 GRAM(S): 17 POWDER, FOR SOLUTION ORAL at 17:53

## 2020-01-01 RX ADMIN — CEFEPIME 100 MILLIGRAM(S): 1 INJECTION, POWDER, FOR SOLUTION INTRAMUSCULAR; INTRAVENOUS at 05:56

## 2020-01-01 RX ADMIN — FENTANYL CITRATE 50 MICROGRAM(S): 50 INJECTION INTRAVENOUS at 15:39

## 2020-01-01 RX ADMIN — Medication 4: at 00:45

## 2020-01-01 RX ADMIN — ENOXAPARIN SODIUM 40 MILLIGRAM(S): 100 INJECTION SUBCUTANEOUS at 17:12

## 2020-01-01 RX ADMIN — Medication 650 MILLIGRAM(S): at 14:16

## 2020-01-01 RX ADMIN — Medication 1 APPLICATION(S): at 17:28

## 2020-01-01 RX ADMIN — Medication 2: at 04:39

## 2020-01-01 RX ADMIN — Medication 62.5 MILLIMOLE(S): at 09:28

## 2020-01-01 RX ADMIN — Medication 125 MILLILITER(S): at 01:33

## 2020-01-01 RX ADMIN — Medication 2 MILLIGRAM(S): at 04:19

## 2020-01-01 RX ADMIN — Medication 50 MICROGRAM(S): at 21:50

## 2020-01-01 RX ADMIN — Medication 2: at 16:18

## 2020-01-01 RX ADMIN — Medication 62.5 MILLIMOLE(S): at 06:00

## 2020-01-01 RX ADMIN — POLYETHYLENE GLYCOL 3350 17 GRAM(S): 17 POWDER, FOR SOLUTION ORAL at 19:28

## 2020-01-01 RX ADMIN — FAMOTIDINE 20 MILLIGRAM(S): 10 INJECTION INTRAVENOUS at 18:37

## 2020-01-01 RX ADMIN — FAMOTIDINE 20 MILLIGRAM(S): 10 INJECTION INTRAVENOUS at 04:57

## 2020-01-01 RX ADMIN — OXYCODONE HYDROCHLORIDE 10 MILLIGRAM(S): 5 TABLET ORAL at 04:11

## 2020-01-01 RX ADMIN — HUMAN INSULIN 11 UNIT(S): 100 INJECTION, SUSPENSION SUBCUTANEOUS at 23:50

## 2020-01-01 RX ADMIN — OXYCODONE HYDROCHLORIDE 10 MILLIGRAM(S): 5 TABLET ORAL at 22:30

## 2020-01-01 RX ADMIN — Medication 1 APPLICATION(S): at 05:26

## 2020-01-01 RX ADMIN — Medication 40 MILLIGRAM(S): at 11:36

## 2020-01-01 RX ADMIN — Medication 6: at 18:40

## 2020-01-01 RX ADMIN — MIDAZOLAM HYDROCHLORIDE 1 MILLIGRAM(S): 1 INJECTION, SOLUTION INTRAMUSCULAR; INTRAVENOUS at 22:29

## 2020-01-01 RX ADMIN — ENOXAPARIN SODIUM 40 MILLIGRAM(S): 100 INJECTION SUBCUTANEOUS at 05:56

## 2020-01-01 RX ADMIN — Medication 1 APPLICATION(S): at 16:00

## 2020-01-01 RX ADMIN — Medication 2: at 06:07

## 2020-01-01 RX ADMIN — OXYCODONE HYDROCHLORIDE 10 MILLIGRAM(S): 5 TABLET ORAL at 17:09

## 2020-01-01 RX ADMIN — PIPERACILLIN AND TAZOBACTAM 25 GRAM(S): 4; .5 INJECTION, POWDER, LYOPHILIZED, FOR SOLUTION INTRAVENOUS at 23:17

## 2020-01-01 RX ADMIN — Medication 1 APPLICATION(S): at 16:19

## 2020-01-01 RX ADMIN — Medication 40 MILLIGRAM(S): at 17:06

## 2020-01-01 RX ADMIN — HYDROMORPHONE HYDROCHLORIDE 1 MILLIGRAM(S): 2 INJECTION INTRAMUSCULAR; INTRAVENOUS; SUBCUTANEOUS at 10:51

## 2020-01-01 RX ADMIN — HALOPERIDOL DECANOATE 0.5 MILLIGRAM(S): 100 INJECTION INTRAMUSCULAR at 17:35

## 2020-01-01 RX ADMIN — POLYETHYLENE GLYCOL 3350 17 GRAM(S): 17 POWDER, FOR SOLUTION ORAL at 05:27

## 2020-01-01 RX ADMIN — PIPERACILLIN AND TAZOBACTAM 25 GRAM(S): 4; .5 INJECTION, POWDER, LYOPHILIZED, FOR SOLUTION INTRAVENOUS at 16:41

## 2020-01-01 RX ADMIN — Medication 40 MILLIGRAM(S): at 13:09

## 2020-01-01 RX ADMIN — ENOXAPARIN SODIUM 40 MILLIGRAM(S): 100 INJECTION SUBCUTANEOUS at 16:23

## 2020-01-01 RX ADMIN — Medication 100 MILLIGRAM(S): at 00:26

## 2020-01-01 RX ADMIN — HUMAN INSULIN 11 UNIT(S): 100 INJECTION, SUSPENSION SUBCUTANEOUS at 10:31

## 2020-01-01 RX ADMIN — CHLORHEXIDINE GLUCONATE 1 APPLICATION(S): 213 SOLUTION TOPICAL at 04:56

## 2020-01-01 RX ADMIN — HUMAN INSULIN 11 UNIT(S): 100 INJECTION, SUSPENSION SUBCUTANEOUS at 11:23

## 2020-01-01 RX ADMIN — Medication 153 MILLIGRAM(S): at 02:08

## 2020-01-01 RX ADMIN — FAMOTIDINE 20 MILLIGRAM(S): 10 INJECTION INTRAVENOUS at 16:27

## 2020-01-01 RX ADMIN — OXYCODONE HYDROCHLORIDE 10 MILLIGRAM(S): 5 TABLET ORAL at 10:39

## 2020-01-01 RX ADMIN — CHLORHEXIDINE GLUCONATE 15 MILLILITER(S): 213 SOLUTION TOPICAL at 18:20

## 2020-01-01 RX ADMIN — HUMAN INSULIN 11 UNIT(S): 100 INJECTION, SUSPENSION SUBCUTANEOUS at 21:05

## 2020-01-01 RX ADMIN — Medication 2: at 04:48

## 2020-01-01 RX ADMIN — CHLORHEXIDINE GLUCONATE 15 MILLILITER(S): 213 SOLUTION TOPICAL at 05:54

## 2020-01-01 RX ADMIN — FAMOTIDINE 20 MILLIGRAM(S): 10 INJECTION INTRAVENOUS at 16:23

## 2020-01-01 RX ADMIN — HUMAN INSULIN 11 UNIT(S): 100 INJECTION, SUSPENSION SUBCUTANEOUS at 06:02

## 2020-01-01 RX ADMIN — Medication 0.5 MILLIGRAM(S): at 21:15

## 2020-01-01 RX ADMIN — FAMOTIDINE 20 MILLIGRAM(S): 10 INJECTION INTRAVENOUS at 04:20

## 2020-01-01 RX ADMIN — Medication 200 MILLIGRAM(S): at 09:05

## 2020-01-01 RX ADMIN — FAMOTIDINE 20 MILLIGRAM(S): 10 INJECTION INTRAVENOUS at 17:21

## 2020-01-01 RX ADMIN — CHLORHEXIDINE GLUCONATE 15 MILLILITER(S): 213 SOLUTION TOPICAL at 17:51

## 2020-01-01 RX ADMIN — POLYETHYLENE GLYCOL 3350 17 GRAM(S): 17 POWDER, FOR SOLUTION ORAL at 17:41

## 2020-01-01 RX ADMIN — HYDROMORPHONE HYDROCHLORIDE 0.5 MILLIGRAM(S): 2 INJECTION INTRAMUSCULAR; INTRAVENOUS; SUBCUTANEOUS at 18:45

## 2020-01-01 RX ADMIN — Medication 40 MILLIEQUIVALENT(S): at 01:25

## 2020-01-01 RX ADMIN — OXYCODONE HYDROCHLORIDE 20 MILLIGRAM(S): 5 TABLET ORAL at 11:30

## 2020-01-01 RX ADMIN — Medication 2 MILLIGRAM(S): at 21:50

## 2020-01-01 RX ADMIN — FAMOTIDINE 20 MILLIGRAM(S): 10 INJECTION INTRAVENOUS at 17:11

## 2020-01-01 RX ADMIN — ENOXAPARIN SODIUM 40 MILLIGRAM(S): 100 INJECTION SUBCUTANEOUS at 17:25

## 2020-01-01 RX ADMIN — OXYCODONE HYDROCHLORIDE 10 MILLIGRAM(S): 5 TABLET ORAL at 22:54

## 2020-01-01 RX ADMIN — FAMOTIDINE 20 MILLIGRAM(S): 10 INJECTION INTRAVENOUS at 16:32

## 2020-01-01 RX ADMIN — Medication 81 MILLIGRAM(S): at 10:33

## 2020-01-01 RX ADMIN — Medication 2 MILLIGRAM(S): at 07:38

## 2020-01-01 RX ADMIN — Medication 50 MILLIEQUIVALENT(S): at 13:30

## 2020-01-01 RX ADMIN — Medication 75 MICROGRAM(S): at 21:55

## 2020-01-01 RX ADMIN — Medication 1 APPLICATION(S): at 05:03

## 2020-01-01 RX ADMIN — Medication 2: at 04:36

## 2020-01-01 RX ADMIN — FAMOTIDINE 20 MILLIGRAM(S): 10 INJECTION INTRAVENOUS at 16:03

## 2020-01-01 RX ADMIN — HUMAN INSULIN 11 UNIT(S): 100 INJECTION, SUSPENSION SUBCUTANEOUS at 23:10

## 2020-01-01 RX ADMIN — ENOXAPARIN SODIUM 40 MILLIGRAM(S): 100 INJECTION SUBCUTANEOUS at 17:11

## 2020-01-01 RX ADMIN — OXYCODONE HYDROCHLORIDE 20 MILLIGRAM(S): 5 TABLET ORAL at 06:00

## 2020-01-01 RX ADMIN — Medication 2 MILLIGRAM(S): at 13:15

## 2020-01-01 RX ADMIN — Medication 1 APPLICATION(S): at 17:13

## 2020-01-01 RX ADMIN — HALOPERIDOL DECANOATE 0.5 MILLIGRAM(S): 100 INJECTION INTRAMUSCULAR at 16:27

## 2020-01-01 RX ADMIN — CEFTRIAXONE 100 MILLIGRAM(S): 500 INJECTION, POWDER, FOR SOLUTION INTRAMUSCULAR; INTRAVENOUS at 03:48

## 2020-01-01 RX ADMIN — Medication 4: at 23:59

## 2020-01-01 RX ADMIN — Medication 4: at 12:08

## 2020-01-01 RX ADMIN — HYDROMORPHONE HYDROCHLORIDE 0.5 MILLIGRAM(S): 2 INJECTION INTRAMUSCULAR; INTRAVENOUS; SUBCUTANEOUS at 01:36

## 2020-01-01 RX ADMIN — Medication 50 MICROGRAM(S): at 20:57

## 2020-01-01 RX ADMIN — CHLORHEXIDINE GLUCONATE 1 APPLICATION(S): 213 SOLUTION TOPICAL at 05:02

## 2020-01-01 RX ADMIN — FAMOTIDINE 20 MILLIGRAM(S): 10 INJECTION INTRAVENOUS at 06:17

## 2020-01-01 RX ADMIN — Medication 2: at 00:38

## 2020-01-01 RX ADMIN — HYDROMORPHONE HYDROCHLORIDE 2 MILLIGRAM(S): 2 INJECTION INTRAMUSCULAR; INTRAVENOUS; SUBCUTANEOUS at 15:34

## 2020-01-01 RX ADMIN — LACTULOSE 20 GRAM(S): 10 SOLUTION ORAL at 05:35

## 2020-01-01 RX ADMIN — CHLORHEXIDINE GLUCONATE 1 APPLICATION(S): 213 SOLUTION TOPICAL at 05:17

## 2020-01-01 RX ADMIN — Medication 50 MICROGRAM(S): at 21:47

## 2020-01-01 RX ADMIN — KETAMINE HYDROCHLORIDE 1.16 MILLIGRAM(S): 100 INJECTION INTRAMUSCULAR; INTRAVENOUS at 07:38

## 2020-01-01 RX ADMIN — HUMAN INSULIN 11 UNIT(S): 100 INJECTION, SUSPENSION SUBCUTANEOUS at 18:39

## 2020-01-01 RX ADMIN — CHLORHEXIDINE GLUCONATE 15 MILLILITER(S): 213 SOLUTION TOPICAL at 04:09

## 2020-01-01 RX ADMIN — Medication 4: at 06:00

## 2020-01-01 RX ADMIN — FAMOTIDINE 20 MILLIGRAM(S): 10 INJECTION INTRAVENOUS at 04:30

## 2020-01-01 RX ADMIN — Medication 2 MILLIGRAM(S): at 06:04

## 2020-01-01 RX ADMIN — HUMAN INSULIN 9 UNIT(S): 100 INJECTION, SUSPENSION SUBCUTANEOUS at 06:36

## 2020-01-01 RX ADMIN — CHLORHEXIDINE GLUCONATE 15 MILLILITER(S): 213 SOLUTION TOPICAL at 16:17

## 2020-01-01 RX ADMIN — FAMOTIDINE 20 MILLIGRAM(S): 10 INJECTION INTRAVENOUS at 04:11

## 2020-01-01 RX ADMIN — Medication 2: at 14:35

## 2020-01-01 RX ADMIN — Medication 40 MILLIGRAM(S): at 00:06

## 2020-01-01 RX ADMIN — Medication 50 MILLIGRAM(S): at 18:35

## 2020-01-01 RX ADMIN — Medication 102 MILLIGRAM(S): at 20:00

## 2020-01-01 RX ADMIN — Medication 153 MILLIGRAM(S): at 02:40

## 2020-01-01 RX ADMIN — KETAMINE HYDROCHLORIDE 15.4 MG/KG/HR: 100 INJECTION INTRAMUSCULAR; INTRAVENOUS at 16:32

## 2020-01-01 RX ADMIN — OXYCODONE HYDROCHLORIDE 10 MILLIGRAM(S): 5 TABLET ORAL at 01:07

## 2020-01-01 RX ADMIN — FAMOTIDINE 20 MILLIGRAM(S): 10 INJECTION INTRAVENOUS at 07:57

## 2020-01-01 RX ADMIN — CHLORHEXIDINE GLUCONATE 15 MILLILITER(S): 213 SOLUTION TOPICAL at 05:01

## 2020-01-01 RX ADMIN — HUMAN INSULIN 11 UNIT(S): 100 INJECTION, SUSPENSION SUBCUTANEOUS at 01:04

## 2020-01-01 RX ADMIN — Medication 2: at 00:15

## 2020-01-01 RX ADMIN — POLYETHYLENE GLYCOL 3350 17 GRAM(S): 17 POWDER, FOR SOLUTION ORAL at 04:12

## 2020-01-01 RX ADMIN — HUMAN INSULIN 11 UNIT(S): 100 INJECTION, SUSPENSION SUBCUTANEOUS at 23:56

## 2020-01-01 RX ADMIN — Medication 17.3 MICROGRAM(S)/KG/MIN: at 20:09

## 2020-01-01 RX ADMIN — OXYCODONE HYDROCHLORIDE 10 MILLIGRAM(S): 5 TABLET ORAL at 13:51

## 2020-01-01 RX ADMIN — CEFEPIME 100 MILLIGRAM(S): 1 INJECTION, POWDER, FOR SOLUTION INTRAMUSCULAR; INTRAVENOUS at 22:52

## 2020-01-01 RX ADMIN — HUMAN INSULIN 11 UNIT(S): 100 INJECTION, SUSPENSION SUBCUTANEOUS at 22:52

## 2020-01-01 RX ADMIN — ENOXAPARIN SODIUM 40 MILLIGRAM(S): 100 INJECTION SUBCUTANEOUS at 18:00

## 2020-01-01 RX ADMIN — Medication 2 MILLIGRAM(S): at 21:00

## 2020-01-01 RX ADMIN — FAMOTIDINE 20 MILLIGRAM(S): 10 INJECTION INTRAVENOUS at 05:03

## 2020-01-01 RX ADMIN — HUMAN INSULIN 11 UNIT(S): 100 INJECTION, SUSPENSION SUBCUTANEOUS at 17:46

## 2020-01-01 RX ADMIN — Medication 2: at 19:00

## 2020-01-01 RX ADMIN — Medication 2: at 05:18

## 2020-01-01 RX ADMIN — OXYCODONE HYDROCHLORIDE 10 MILLIGRAM(S): 5 TABLET ORAL at 13:30

## 2020-01-01 RX ADMIN — OXYCODONE HYDROCHLORIDE 20 MILLIGRAM(S): 5 TABLET ORAL at 22:31

## 2020-01-01 RX ADMIN — Medication 81 MILLIGRAM(S): at 12:53

## 2020-01-01 RX ADMIN — ENOXAPARIN SODIUM 40 MILLIGRAM(S): 100 INJECTION SUBCUTANEOUS at 17:15

## 2020-01-01 RX ADMIN — Medication 2 MILLIGRAM(S): at 05:02

## 2020-01-01 RX ADMIN — Medication 4: at 12:23

## 2020-01-01 RX ADMIN — Medication 1 APPLICATION(S): at 04:14

## 2020-01-01 RX ADMIN — OXYCODONE HYDROCHLORIDE 10 MILLIGRAM(S): 5 TABLET ORAL at 11:46

## 2020-01-01 RX ADMIN — ENOXAPARIN SODIUM 40 MILLIGRAM(S): 100 INJECTION SUBCUTANEOUS at 05:28

## 2020-01-01 RX ADMIN — MIDAZOLAM HYDROCHLORIDE 2 MILLIGRAM(S): 1 INJECTION, SOLUTION INTRAMUSCULAR; INTRAVENOUS at 20:15

## 2020-01-01 RX ADMIN — POLYETHYLENE GLYCOL 3350 17 GRAM(S): 17 POWDER, FOR SOLUTION ORAL at 17:02

## 2020-01-01 RX ADMIN — HYDROMORPHONE HYDROCHLORIDE 1 MG/HR: 2 INJECTION INTRAMUSCULAR; INTRAVENOUS; SUBCUTANEOUS at 08:00

## 2020-01-01 RX ADMIN — POLYETHYLENE GLYCOL 3350 17 GRAM(S): 17 POWDER, FOR SOLUTION ORAL at 05:18

## 2020-01-01 RX ADMIN — Medication 102 MILLIGRAM(S): at 22:25

## 2020-01-01 RX ADMIN — Medication 2: at 04:42

## 2020-01-01 RX ADMIN — Medication 100 GRAM(S): at 09:48

## 2020-01-01 RX ADMIN — HEPARIN SODIUM 5000 UNIT(S): 5000 INJECTION INTRAVENOUS; SUBCUTANEOUS at 05:24

## 2020-01-01 RX ADMIN — HYDROMORPHONE HYDROCHLORIDE 1 MILLIGRAM(S): 2 INJECTION INTRAMUSCULAR; INTRAVENOUS; SUBCUTANEOUS at 18:49

## 2020-01-01 RX ADMIN — Medication 100 MILLIGRAM(S): at 04:47

## 2020-01-01 RX ADMIN — VECURONIUM BROMIDE 10 MILLIGRAM(S): 20 INJECTION, POWDER, FOR SOLUTION INTRAVENOUS at 04:21

## 2020-01-01 RX ADMIN — Medication 2: at 09:54

## 2020-01-01 RX ADMIN — Medication 100 MILLIGRAM(S): at 14:04

## 2020-01-01 RX ADMIN — ENOXAPARIN SODIUM 40 MILLIGRAM(S): 100 INJECTION SUBCUTANEOUS at 04:30

## 2020-01-01 RX ADMIN — ENOXAPARIN SODIUM 40 MILLIGRAM(S): 100 INJECTION SUBCUTANEOUS at 17:40

## 2020-01-01 RX ADMIN — HUMAN INSULIN 11 UNIT(S): 100 INJECTION, SUSPENSION SUBCUTANEOUS at 17:27

## 2020-01-01 RX ADMIN — ENOXAPARIN SODIUM 40 MILLIGRAM(S): 100 INJECTION SUBCUTANEOUS at 17:06

## 2020-01-01 RX ADMIN — OXYCODONE HYDROCHLORIDE 20 MILLIGRAM(S): 5 TABLET ORAL at 23:26

## 2020-01-01 RX ADMIN — CEFEPIME 100 MILLIGRAM(S): 1 INJECTION, POWDER, FOR SOLUTION INTRAMUSCULAR; INTRAVENOUS at 06:14

## 2020-01-01 RX ADMIN — Medication 100 MILLIGRAM(S): at 00:12

## 2020-01-01 RX ADMIN — OXYCODONE HYDROCHLORIDE 20 MILLIGRAM(S): 5 TABLET ORAL at 11:13

## 2020-01-01 RX ADMIN — HUMAN INSULIN 9 UNIT(S): 100 INJECTION, SUSPENSION SUBCUTANEOUS at 02:15

## 2020-01-01 RX ADMIN — Medication 250 MILLIGRAM(S): at 05:57

## 2020-01-01 RX ADMIN — HYDROMORPHONE HYDROCHLORIDE 0.5 MILLIGRAM(S): 2 INJECTION INTRAMUSCULAR; INTRAVENOUS; SUBCUTANEOUS at 12:39

## 2020-01-01 RX ADMIN — Medication 2 MILLIGRAM(S): at 22:50

## 2020-01-01 RX ADMIN — Medication 40 MILLIGRAM(S): at 02:30

## 2020-01-01 RX ADMIN — ENOXAPARIN SODIUM 40 MILLIGRAM(S): 100 INJECTION SUBCUTANEOUS at 17:26

## 2020-01-01 RX ADMIN — Medication 2 MILLIGRAM(S): at 05:28

## 2020-01-01 RX ADMIN — OXYCODONE HYDROCHLORIDE 10 MILLIGRAM(S): 5 TABLET ORAL at 05:17

## 2020-01-01 RX ADMIN — Medication 2 MILLIGRAM(S): at 13:46

## 2020-01-01 RX ADMIN — Medication 2: at 17:00

## 2020-01-01 RX ADMIN — Medication 40 MILLIGRAM(S): at 12:20

## 2020-01-01 RX ADMIN — CHLORHEXIDINE GLUCONATE 15 MILLILITER(S): 213 SOLUTION TOPICAL at 17:39

## 2020-01-01 RX ADMIN — Medication 1 APPLICATION(S): at 06:00

## 2020-01-01 RX ADMIN — FAMOTIDINE 20 MILLIGRAM(S): 10 INJECTION INTRAVENOUS at 06:04

## 2020-01-01 RX ADMIN — Medication 81 MILLIGRAM(S): at 11:49

## 2020-01-01 RX ADMIN — Medication 1 APPLICATION(S): at 03:03

## 2020-01-01 RX ADMIN — Medication 153 MILLIGRAM(S): at 09:41

## 2020-01-01 RX ADMIN — Medication 153 MILLIGRAM(S): at 10:34

## 2020-01-01 RX ADMIN — HUMAN INSULIN 11 UNIT(S): 100 INJECTION, SUSPENSION SUBCUTANEOUS at 11:35

## 2020-01-01 RX ADMIN — HUMAN INSULIN 11 UNIT(S): 100 INJECTION, SUSPENSION SUBCUTANEOUS at 17:08

## 2020-01-01 RX ADMIN — Medication 153 MILLIGRAM(S): at 17:51

## 2020-01-01 RX ADMIN — Medication 60 MILLIGRAM(S): at 17:09

## 2020-01-01 RX ADMIN — Medication 2 MILLIGRAM(S): at 14:01

## 2020-01-01 RX ADMIN — HUMAN INSULIN 11 UNIT(S): 100 INJECTION, SUSPENSION SUBCUTANEOUS at 12:16

## 2020-01-01 RX ADMIN — HYDROMORPHONE HYDROCHLORIDE 1 MILLIGRAM(S): 2 INJECTION INTRAMUSCULAR; INTRAVENOUS; SUBCUTANEOUS at 02:00

## 2020-01-01 RX ADMIN — FAMOTIDINE 20 MILLIGRAM(S): 10 INJECTION INTRAVENOUS at 18:08

## 2020-01-01 RX ADMIN — POLYETHYLENE GLYCOL 3350 17 GRAM(S): 17 POWDER, FOR SOLUTION ORAL at 06:14

## 2020-01-01 RX ADMIN — HYDROMORPHONE HYDROCHLORIDE 0.5 MILLIGRAM(S): 2 INJECTION INTRAMUSCULAR; INTRAVENOUS; SUBCUTANEOUS at 03:02

## 2020-01-01 RX ADMIN — Medication 50 GRAM(S): at 09:50

## 2020-01-01 RX ADMIN — CEFEPIME 100 MILLIGRAM(S): 1 INJECTION, POWDER, FOR SOLUTION INTRAMUSCULAR; INTRAVENOUS at 22:03

## 2020-01-01 RX ADMIN — Medication 153 MILLIGRAM(S): at 01:46

## 2020-01-01 RX ADMIN — Medication 2: at 08:54

## 2020-01-01 RX ADMIN — Medication 20 MILLIGRAM(S): at 17:21

## 2020-01-01 RX ADMIN — HUMAN INSULIN 9 UNIT(S): 100 INJECTION, SUSPENSION SUBCUTANEOUS at 18:28

## 2020-01-01 RX ADMIN — CEFEPIME 100 MILLIGRAM(S): 1 INJECTION, POWDER, FOR SOLUTION INTRAMUSCULAR; INTRAVENOUS at 05:33

## 2020-01-01 RX ADMIN — HUMAN INSULIN 11 UNIT(S): 100 INJECTION, SUSPENSION SUBCUTANEOUS at 06:01

## 2020-01-01 RX ADMIN — Medication 81 MILLIGRAM(S): at 12:47

## 2020-01-01 RX ADMIN — ETOMIDATE 20 MILLIGRAM(S): 2 INJECTION INTRAVENOUS at 19:47

## 2020-01-01 RX ADMIN — HYDROMORPHONE HYDROCHLORIDE 1 MILLIGRAM(S): 2 INJECTION INTRAMUSCULAR; INTRAVENOUS; SUBCUTANEOUS at 02:30

## 2020-01-01 RX ADMIN — OXYCODONE HYDROCHLORIDE 10 MILLIGRAM(S): 5 TABLET ORAL at 12:13

## 2020-01-01 RX ADMIN — HUMAN INSULIN 11 UNIT(S): 100 INJECTION, SUSPENSION SUBCUTANEOUS at 00:30

## 2020-01-01 RX ADMIN — OXYCODONE HYDROCHLORIDE 20 MILLIGRAM(S): 5 TABLET ORAL at 17:59

## 2020-01-01 RX ADMIN — ENOXAPARIN SODIUM 40 MILLIGRAM(S): 100 INJECTION SUBCUTANEOUS at 18:25

## 2020-01-01 RX ADMIN — LACTULOSE 20 GRAM(S): 10 SOLUTION ORAL at 22:33

## 2020-01-01 RX ADMIN — OXYCODONE HYDROCHLORIDE 10 MILLIGRAM(S): 5 TABLET ORAL at 01:06

## 2020-01-01 RX ADMIN — MEPERIDINE HYDROCHLORIDE 50 MILLIGRAM(S): 50 INJECTION INTRAMUSCULAR; INTRAVENOUS; SUBCUTANEOUS at 21:35

## 2020-01-01 RX ADMIN — Medication 153 MILLIGRAM(S): at 17:55

## 2020-01-01 RX ADMIN — HUMAN INSULIN 9 UNIT(S): 100 INJECTION, SUSPENSION SUBCUTANEOUS at 02:23

## 2020-01-01 RX ADMIN — HUMAN INSULIN 11 UNIT(S): 100 INJECTION, SUSPENSION SUBCUTANEOUS at 17:06

## 2020-01-01 RX ADMIN — CEFEPIME 100 MILLIGRAM(S): 1 INJECTION, POWDER, FOR SOLUTION INTRAMUSCULAR; INTRAVENOUS at 20:10

## 2020-01-01 RX ADMIN — Medication 40 MILLIEQUIVALENT(S): at 01:56

## 2020-01-01 RX ADMIN — OXYCODONE HYDROCHLORIDE 10 MILLIGRAM(S): 5 TABLET ORAL at 14:55

## 2020-01-01 RX ADMIN — CHLORHEXIDINE GLUCONATE 15 MILLILITER(S): 213 SOLUTION TOPICAL at 04:49

## 2020-01-01 RX ADMIN — Medication 110 MILLIGRAM(S): at 04:53

## 2020-01-01 RX ADMIN — PIPERACILLIN AND TAZOBACTAM 200 GRAM(S): 4; .5 INJECTION, POWDER, LYOPHILIZED, FOR SOLUTION INTRAVENOUS at 01:25

## 2020-01-01 RX ADMIN — OXYCODONE HYDROCHLORIDE 20 MILLIGRAM(S): 5 TABLET ORAL at 00:53

## 2020-01-01 RX ADMIN — OXYCODONE HYDROCHLORIDE 20 MILLIGRAM(S): 5 TABLET ORAL at 06:04

## 2020-01-01 RX ADMIN — OXYCODONE HYDROCHLORIDE 10 MILLIGRAM(S): 5 TABLET ORAL at 23:14

## 2020-01-01 RX ADMIN — Medication 1 APPLICATION(S): at 04:23

## 2020-01-01 RX ADMIN — CHLORHEXIDINE GLUCONATE 15 MILLILITER(S): 213 SOLUTION TOPICAL at 17:26

## 2020-01-01 RX ADMIN — HYDROMORPHONE HYDROCHLORIDE 1 MILLIGRAM(S): 2 INJECTION INTRAMUSCULAR; INTRAVENOUS; SUBCUTANEOUS at 11:23

## 2020-01-01 RX ADMIN — Medication 50 MICROGRAM(S): at 23:10

## 2020-01-01 RX ADMIN — Medication 102 MILLIGRAM(S): at 08:07

## 2020-01-01 RX ADMIN — CHLORHEXIDINE GLUCONATE 15 MILLILITER(S): 213 SOLUTION TOPICAL at 16:32

## 2020-01-01 RX ADMIN — ENOXAPARIN SODIUM 40 MILLIGRAM(S): 100 INJECTION SUBCUTANEOUS at 05:54

## 2020-01-01 RX ADMIN — HUMAN INSULIN 11 UNIT(S): 100 INJECTION, SUSPENSION SUBCUTANEOUS at 10:05

## 2020-01-01 RX ADMIN — Medication 40 MILLIGRAM(S): at 18:26

## 2020-01-01 RX ADMIN — Medication 2: at 17:07

## 2020-01-01 RX ADMIN — Medication 0.5 MILLIGRAM(S): at 01:07

## 2020-01-01 RX ADMIN — PROPOFOL 6.94 MICROGRAM(S)/KG/MIN: 10 INJECTION, EMULSION INTRAVENOUS at 21:24

## 2020-01-01 RX ADMIN — OXYCODONE HYDROCHLORIDE 20 MILLIGRAM(S): 5 TABLET ORAL at 12:09

## 2020-01-01 RX ADMIN — SENNA PLUS 10 MILLILITER(S): 8.6 TABLET ORAL at 21:20

## 2020-01-01 RX ADMIN — OXYCODONE HYDROCHLORIDE 20 MILLIGRAM(S): 5 TABLET ORAL at 01:40

## 2020-01-01 RX ADMIN — FAMOTIDINE 20 MILLIGRAM(S): 10 INJECTION INTRAVENOUS at 05:08

## 2020-01-01 RX ADMIN — Medication 75 MICROGRAM(S): at 00:00

## 2020-01-01 RX ADMIN — Medication 2 MILLIGRAM(S): at 13:59

## 2020-01-01 RX ADMIN — Medication 2 MILLIGRAM(S): at 20:58

## 2020-01-01 RX ADMIN — ENOXAPARIN SODIUM 40 MILLIGRAM(S): 100 INJECTION SUBCUTANEOUS at 07:57

## 2020-01-01 RX ADMIN — FAMOTIDINE 20 MILLIGRAM(S): 10 INJECTION INTRAVENOUS at 18:27

## 2020-01-01 RX ADMIN — Medication 1 APPLICATION(S): at 05:02

## 2020-01-01 RX ADMIN — Medication 37.5 MICROGRAM(S): at 21:47

## 2020-01-01 RX ADMIN — ROBINUL 0.2 MILLIGRAM(S): 0.2 INJECTION INTRAMUSCULAR; INTRAVENOUS at 16:06

## 2020-01-01 RX ADMIN — FAMOTIDINE 20 MILLIGRAM(S): 10 INJECTION INTRAVENOUS at 05:00

## 2020-01-01 RX ADMIN — ENOXAPARIN SODIUM 40 MILLIGRAM(S): 100 INJECTION SUBCUTANEOUS at 04:47

## 2020-01-01 RX ADMIN — CHLORHEXIDINE GLUCONATE 15 MILLILITER(S): 213 SOLUTION TOPICAL at 19:25

## 2020-01-01 RX ADMIN — PROPOFOL 9.25 MICROGRAM(S)/KG/MIN: 10 INJECTION, EMULSION INTRAVENOUS at 01:24

## 2020-01-01 RX ADMIN — Medication 81 MILLIGRAM(S): at 10:29

## 2020-01-01 RX ADMIN — CEFEPIME 100 MILLIGRAM(S): 1 INJECTION, POWDER, FOR SOLUTION INTRAMUSCULAR; INTRAVENOUS at 00:00

## 2020-01-01 RX ADMIN — OXYCODONE HYDROCHLORIDE 20 MILLIGRAM(S): 5 TABLET ORAL at 05:02

## 2020-01-01 RX ADMIN — Medication 81 MILLIGRAM(S): at 11:40

## 2020-01-01 RX ADMIN — FAMOTIDINE 20 MILLIGRAM(S): 10 INJECTION INTRAVENOUS at 17:51

## 2020-01-01 RX ADMIN — OXYCODONE HYDROCHLORIDE 10 MILLIGRAM(S): 5 TABLET ORAL at 05:00

## 2020-01-01 RX ADMIN — HUMAN INSULIN 11 UNIT(S): 100 INJECTION, SUSPENSION SUBCUTANEOUS at 02:02

## 2020-01-01 RX ADMIN — Medication 50 MICROGRAM(S): at 22:50

## 2020-01-01 RX ADMIN — SENNA PLUS 10 MILLILITER(S): 8.6 TABLET ORAL at 21:43

## 2020-01-01 RX ADMIN — ENOXAPARIN SODIUM 40 MILLIGRAM(S): 100 INJECTION SUBCUTANEOUS at 05:17

## 2020-01-01 RX ADMIN — Medication 102 MILLIGRAM(S): at 07:27

## 2020-01-01 RX ADMIN — Medication 81 MILLIGRAM(S): at 11:24

## 2020-01-01 RX ADMIN — HUMAN INSULIN 11 UNIT(S): 100 INJECTION, SUSPENSION SUBCUTANEOUS at 01:01

## 2020-01-01 RX ADMIN — Medication 60 MILLIGRAM(S): at 02:51

## 2020-01-01 RX ADMIN — FAMOTIDINE 20 MILLIGRAM(S): 10 INJECTION INTRAVENOUS at 04:48

## 2020-01-01 RX ADMIN — CEFEPIME 100 MILLIGRAM(S): 1 INJECTION, POWDER, FOR SOLUTION INTRAMUSCULAR; INTRAVENOUS at 20:08

## 2020-01-01 RX ADMIN — MIDAZOLAM HYDROCHLORIDE 4 MILLIGRAM(S): 1 INJECTION, SOLUTION INTRAMUSCULAR; INTRAVENOUS at 17:15

## 2020-01-01 RX ADMIN — PANTOPRAZOLE SODIUM 40 MILLIGRAM(S): 20 TABLET, DELAYED RELEASE ORAL at 12:13

## 2020-01-01 RX ADMIN — Medication 2 MILLIGRAM(S): at 23:25

## 2020-01-01 RX ADMIN — Medication 4: at 06:19

## 2020-01-01 RX ADMIN — Medication 40 MILLIEQUIVALENT(S): at 04:11

## 2020-01-01 RX ADMIN — Medication 40 MILLIGRAM(S): at 01:19

## 2020-01-01 RX ADMIN — Medication 1 APPLICATION(S): at 06:04

## 2020-01-01 RX ADMIN — HALOPERIDOL DECANOATE 0.5 MILLIGRAM(S): 100 INJECTION INTRAMUSCULAR at 17:20

## 2020-01-01 RX ADMIN — OXYCODONE HYDROCHLORIDE 10 MILLIGRAM(S): 5 TABLET ORAL at 18:21

## 2020-01-01 RX ADMIN — ENOXAPARIN SODIUM 40 MILLIGRAM(S): 100 INJECTION SUBCUTANEOUS at 16:14

## 2020-01-01 RX ADMIN — HYDROMORPHONE HYDROCHLORIDE 0.5 MILLIGRAM(S): 2 INJECTION INTRAMUSCULAR; INTRAVENOUS; SUBCUTANEOUS at 18:21

## 2020-01-01 RX ADMIN — LACTULOSE 10 GRAM(S): 10 SOLUTION ORAL at 05:43

## 2020-01-01 RX ADMIN — Medication 2: at 17:26

## 2020-01-01 RX ADMIN — HUMAN INSULIN 11 UNIT(S): 100 INJECTION, SUSPENSION SUBCUTANEOUS at 04:42

## 2020-01-01 RX ADMIN — HALOPERIDOL DECANOATE 0.5 MILLIGRAM(S): 100 INJECTION INTRAMUSCULAR at 05:02

## 2020-01-01 RX ADMIN — FAMOTIDINE 20 MILLIGRAM(S): 10 INJECTION INTRAVENOUS at 04:22

## 2020-01-01 RX ADMIN — Medication 100 MILLIGRAM(S): at 12:12

## 2020-01-01 RX ADMIN — Medication 1 APPLICATION(S): at 19:27

## 2020-01-01 RX ADMIN — Medication 7.95 MICROGRAM(S)/KG/MIN: at 00:20

## 2020-01-01 RX ADMIN — ENOXAPARIN SODIUM 40 MILLIGRAM(S): 100 INJECTION SUBCUTANEOUS at 17:09

## 2020-01-01 RX ADMIN — Medication 40 MILLIGRAM(S): at 14:41

## 2020-01-01 RX ADMIN — CHLORHEXIDINE GLUCONATE 15 MILLILITER(S): 213 SOLUTION TOPICAL at 05:18

## 2020-01-01 RX ADMIN — Medication 2 MILLIGRAM(S): at 14:56

## 2020-01-01 RX ADMIN — Medication 2: at 06:09

## 2020-01-01 RX ADMIN — Medication 1 APPLICATION(S): at 16:24

## 2020-01-01 RX ADMIN — Medication 1 APPLICATION(S): at 04:12

## 2020-01-01 RX ADMIN — OXYCODONE HYDROCHLORIDE 10 MILLIGRAM(S): 5 TABLET ORAL at 05:02

## 2020-01-01 RX ADMIN — CHLORHEXIDINE GLUCONATE 15 MILLILITER(S): 213 SOLUTION TOPICAL at 16:59

## 2020-01-01 RX ADMIN — Medication 1 APPLICATION(S): at 05:18

## 2020-01-01 RX ADMIN — Medication 1 APPLICATION(S): at 17:35

## 2020-01-01 RX ADMIN — Medication 153 MILLIGRAM(S): at 14:03

## 2020-01-01 RX ADMIN — Medication 2: at 06:33

## 2020-01-01 RX ADMIN — SODIUM CHLORIDE 1000 MILLILITER(S): 9 INJECTION INTRAMUSCULAR; INTRAVENOUS; SUBCUTANEOUS at 03:15

## 2020-01-01 RX ADMIN — FAMOTIDINE 20 MILLIGRAM(S): 10 INJECTION INTRAVENOUS at 17:59

## 2020-01-01 RX ADMIN — Medication 1 APPLICATION(S): at 05:19

## 2020-01-01 RX ADMIN — CHLORHEXIDINE GLUCONATE 15 MILLILITER(S): 213 SOLUTION TOPICAL at 04:10

## 2020-01-01 RX ADMIN — Medication 40 MILLIGRAM(S): at 04:21

## 2020-01-01 RX ADMIN — Medication 166.67 MILLIGRAM(S): at 05:55

## 2020-01-01 RX ADMIN — SENNA PLUS 10 MILLILITER(S): 8.6 TABLET ORAL at 21:42

## 2020-01-01 RX ADMIN — Medication 2 MILLIGRAM(S): at 22:54

## 2020-01-01 RX ADMIN — HYDROMORPHONE HYDROCHLORIDE 0.5 MILLIGRAM(S): 2 INJECTION INTRAMUSCULAR; INTRAVENOUS; SUBCUTANEOUS at 10:32

## 2020-01-01 RX ADMIN — INSULIN GLARGINE 14 UNIT(S): 100 INJECTION, SOLUTION SUBCUTANEOUS at 02:30

## 2020-01-01 RX ADMIN — Medication 1 APPLICATION(S): at 05:00

## 2020-01-01 RX ADMIN — HUMAN INSULIN 8 UNIT(S): 100 INJECTION, SUSPENSION SUBCUTANEOUS at 03:13

## 2020-01-01 RX ADMIN — ENOXAPARIN SODIUM 40 MILLIGRAM(S): 100 INJECTION SUBCUTANEOUS at 05:43

## 2020-01-01 RX ADMIN — CEFEPIME 100 MILLIGRAM(S): 1 INJECTION, POWDER, FOR SOLUTION INTRAMUSCULAR; INTRAVENOUS at 05:00

## 2020-01-01 RX ADMIN — ENOXAPARIN SODIUM 40 MILLIGRAM(S): 100 INJECTION SUBCUTANEOUS at 17:20

## 2020-01-01 RX ADMIN — ENOXAPARIN SODIUM 40 MILLIGRAM(S): 100 INJECTION SUBCUTANEOUS at 04:10

## 2020-01-01 RX ADMIN — Medication 40 MILLIEQUIVALENT(S): at 00:30

## 2020-01-01 RX ADMIN — ENOXAPARIN SODIUM 40 MILLIGRAM(S): 100 INJECTION SUBCUTANEOUS at 04:18

## 2020-01-01 RX ADMIN — SENNA PLUS 10 MILLILITER(S): 8.6 TABLET ORAL at 20:55

## 2020-01-01 RX ADMIN — HUMAN INSULIN 8 UNIT(S): 100 INJECTION, SUSPENSION SUBCUTANEOUS at 06:33

## 2020-01-01 RX ADMIN — Medication 4: at 00:53

## 2020-01-01 RX ADMIN — POLYETHYLENE GLYCOL 3350 17 GRAM(S): 17 POWDER, FOR SOLUTION ORAL at 16:24

## 2020-01-01 RX ADMIN — FAMOTIDINE 20 MILLIGRAM(S): 10 INJECTION INTRAVENOUS at 04:59

## 2020-01-01 RX ADMIN — FAMOTIDINE 20 MILLIGRAM(S): 10 INJECTION INTRAVENOUS at 06:38

## 2020-01-01 RX ADMIN — HUMAN INSULIN 11 UNIT(S): 100 INJECTION, SUSPENSION SUBCUTANEOUS at 00:37

## 2020-01-01 RX ADMIN — Medication 102 MILLIGRAM(S): at 04:56

## 2020-01-01 RX ADMIN — Medication 2 MILLIGRAM(S): at 01:02

## 2020-01-01 RX ADMIN — Medication 2: at 18:01

## 2020-01-01 RX ADMIN — CHLORHEXIDINE GLUCONATE 15 MILLILITER(S): 213 SOLUTION TOPICAL at 06:00

## 2020-01-01 RX ADMIN — Medication 81 MILLIGRAM(S): at 11:35

## 2020-01-01 RX ADMIN — LACTULOSE 20 GRAM(S): 10 SOLUTION ORAL at 04:53

## 2020-01-01 RX ADMIN — Medication 81 MILLIGRAM(S): at 12:41

## 2020-01-01 RX ADMIN — HUMAN INSULIN 8 UNIT(S): 100 INJECTION, SUSPENSION SUBCUTANEOUS at 08:21

## 2020-01-01 RX ADMIN — QUETIAPINE FUMARATE 25 MILLIGRAM(S): 200 TABLET, FILM COATED ORAL at 21:13

## 2020-01-01 RX ADMIN — HYDROMORPHONE HYDROCHLORIDE 1 MILLIGRAM(S): 2 INJECTION INTRAMUSCULAR; INTRAVENOUS; SUBCUTANEOUS at 15:00

## 2020-01-01 RX ADMIN — Medication 50 MILLIEQUIVALENT(S): at 12:18

## 2020-01-01 RX ADMIN — Medication 4: at 21:17

## 2020-01-01 RX ADMIN — Medication 50 MICROGRAM(S): at 20:50

## 2020-01-01 RX ADMIN — Medication 40 MILLIGRAM(S): at 07:58

## 2020-01-01 RX ADMIN — Medication 81 MILLIGRAM(S): at 13:35

## 2020-01-01 RX ADMIN — FAMOTIDINE 20 MILLIGRAM(S): 10 INJECTION INTRAVENOUS at 18:26

## 2020-01-01 RX ADMIN — OXYCODONE HYDROCHLORIDE 10 MILLIGRAM(S): 5 TABLET ORAL at 05:10

## 2020-01-01 RX ADMIN — Medication 2 MILLIGRAM(S): at 20:51

## 2020-01-01 RX ADMIN — OXYCODONE HYDROCHLORIDE 10 MILLIGRAM(S): 5 TABLET ORAL at 21:48

## 2020-01-01 RX ADMIN — SENNA PLUS 10 MILLILITER(S): 8.6 TABLET ORAL at 02:20

## 2020-01-01 RX ADMIN — Medication 2 MILLIGRAM(S): at 11:46

## 2020-01-01 RX ADMIN — VASOPRESSIN 2.4 UNIT(S)/MIN: 20 INJECTION INTRAVENOUS at 21:21

## 2020-01-01 RX ADMIN — CEFEPIME 100 MILLIGRAM(S): 1 INJECTION, POWDER, FOR SOLUTION INTRAMUSCULAR; INTRAVENOUS at 13:15

## 2020-01-01 RX ADMIN — Medication 4: at 12:39

## 2020-01-01 RX ADMIN — FAMOTIDINE 20 MILLIGRAM(S): 10 INJECTION INTRAVENOUS at 18:19

## 2020-01-01 RX ADMIN — POLYETHYLENE GLYCOL 3350 17 GRAM(S): 17 POWDER, FOR SOLUTION ORAL at 17:29

## 2020-01-01 RX ADMIN — Medication 4: at 17:31

## 2020-01-01 RX ADMIN — Medication 2 MILLIGRAM(S): at 13:22

## 2020-01-01 RX ADMIN — HYDROMORPHONE HYDROCHLORIDE 1 MILLIGRAM(S): 2 INJECTION INTRAMUSCULAR; INTRAVENOUS; SUBCUTANEOUS at 00:00

## 2020-01-01 RX ADMIN — OXYCODONE HYDROCHLORIDE 10 MILLIGRAM(S): 5 TABLET ORAL at 20:57

## 2020-01-01 RX ADMIN — Medication 153 MILLIGRAM(S): at 01:38

## 2020-01-01 RX ADMIN — FAMOTIDINE 20 MILLIGRAM(S): 10 INJECTION INTRAVENOUS at 16:17

## 2020-01-01 RX ADMIN — Medication 2: at 00:45

## 2020-01-01 RX ADMIN — OXYCODONE HYDROCHLORIDE 20 MILLIGRAM(S): 5 TABLET ORAL at 05:18

## 2020-01-01 RX ADMIN — Medication 6: at 20:34

## 2020-01-01 RX ADMIN — CHLORHEXIDINE GLUCONATE 1 APPLICATION(S): 213 SOLUTION TOPICAL at 05:50

## 2020-01-01 RX ADMIN — OXYCODONE HYDROCHLORIDE 10 MILLIGRAM(S): 5 TABLET ORAL at 21:15

## 2020-01-01 RX ADMIN — POLYETHYLENE GLYCOL 3350 17 GRAM(S): 17 POWDER, FOR SOLUTION ORAL at 17:22

## 2020-01-01 RX ADMIN — CHLORHEXIDINE GLUCONATE 15 MILLILITER(S): 213 SOLUTION TOPICAL at 17:29

## 2020-01-01 RX ADMIN — HUMAN INSULIN 8 UNIT(S): 100 INJECTION, SUSPENSION SUBCUTANEOUS at 21:45

## 2020-01-01 RX ADMIN — Medication 5 MILLIGRAM(S): at 04:07

## 2020-01-01 RX ADMIN — HYDROMORPHONE HYDROCHLORIDE 1 MG/HR: 2 INJECTION INTRAMUSCULAR; INTRAVENOUS; SUBCUTANEOUS at 20:08

## 2020-01-01 RX ADMIN — Medication 75 MICROGRAM(S): at 22:37

## 2020-01-01 RX ADMIN — OXYCODONE HYDROCHLORIDE 20 MILLIGRAM(S): 5 TABLET ORAL at 04:57

## 2020-01-01 RX ADMIN — CEFEPIME 100 MILLIGRAM(S): 1 INJECTION, POWDER, FOR SOLUTION INTRAMUSCULAR; INTRAVENOUS at 05:08

## 2020-01-01 RX ADMIN — Medication 2 MILLIGRAM(S): at 04:09

## 2020-01-01 RX ADMIN — Medication 100 GRAM(S): at 18:01

## 2020-01-01 RX ADMIN — Medication 2 MILLIGRAM(S): at 13:16

## 2020-01-01 RX ADMIN — Medication 1 APPLICATION(S): at 16:51

## 2020-01-01 RX ADMIN — OXYCODONE HYDROCHLORIDE 20 MILLIGRAM(S): 5 TABLET ORAL at 16:59

## 2020-01-01 RX ADMIN — HUMAN INSULIN 9 UNIT(S): 100 INJECTION, SUSPENSION SUBCUTANEOUS at 08:55

## 2020-01-01 RX ADMIN — Medication 200 MILLIGRAM(S): at 22:04

## 2020-01-01 RX ADMIN — Medication 20 MILLIGRAM(S): at 04:59

## 2020-01-01 RX ADMIN — OXYCODONE HYDROCHLORIDE 10 MILLIGRAM(S): 5 TABLET ORAL at 13:19

## 2020-01-01 RX ADMIN — Medication 50 MICROGRAM(S): at 21:18

## 2020-01-01 RX ADMIN — HUMAN INSULIN 9 UNIT(S): 100 INJECTION, SUSPENSION SUBCUTANEOUS at 14:20

## 2020-01-01 RX ADMIN — ENOXAPARIN SODIUM 40 MILLIGRAM(S): 100 INJECTION SUBCUTANEOUS at 18:30

## 2020-01-01 RX ADMIN — Medication 50 MILLIEQUIVALENT(S): at 13:27

## 2020-01-01 RX ADMIN — CEFEPIME 100 MILLIGRAM(S): 1 INJECTION, POWDER, FOR SOLUTION INTRAMUSCULAR; INTRAVENOUS at 20:58

## 2020-01-01 RX ADMIN — SODIUM CHLORIDE 500 MILLILITER(S): 9 INJECTION, SOLUTION INTRAVENOUS at 17:45

## 2020-01-01 RX ADMIN — POLYETHYLENE GLYCOL 3350 17 GRAM(S): 17 POWDER, FOR SOLUTION ORAL at 05:43

## 2020-01-01 RX ADMIN — Medication 4: at 11:34

## 2020-01-01 RX ADMIN — Medication 75 MICROGRAM(S): at 20:57

## 2020-01-01 RX ADMIN — CHLORHEXIDINE GLUCONATE 15 MILLILITER(S): 213 SOLUTION TOPICAL at 18:36

## 2020-01-01 RX ADMIN — OXYCODONE HYDROCHLORIDE 10 MILLIGRAM(S): 5 TABLET ORAL at 05:18

## 2020-01-01 RX ADMIN — FAMOTIDINE 20 MILLIGRAM(S): 10 INJECTION INTRAVENOUS at 05:22

## 2020-01-01 RX ADMIN — Medication 81 MILLIGRAM(S): at 11:32

## 2020-01-01 RX ADMIN — Medication 6: at 11:38

## 2020-01-01 RX ADMIN — FAMOTIDINE 20 MILLIGRAM(S): 10 INJECTION INTRAVENOUS at 06:14

## 2020-01-01 RX ADMIN — POLYETHYLENE GLYCOL 3350 17 GRAM(S): 17 POWDER, FOR SOLUTION ORAL at 04:52

## 2020-01-01 RX ADMIN — Medication 1 MILLIGRAM(S): at 13:25

## 2020-01-01 RX ADMIN — Medication 60 MILLIGRAM(S): at 02:00

## 2020-01-01 RX ADMIN — ENOXAPARIN SODIUM 40 MILLIGRAM(S): 100 INJECTION SUBCUTANEOUS at 06:04

## 2020-01-01 RX ADMIN — Medication 650 MILLIGRAM(S): at 23:45

## 2020-01-01 RX ADMIN — DOPAMINE HYDROCHLORIDE 14.5 MICROGRAM(S)/KG/MIN: 40 INJECTION, SOLUTION, CONCENTRATE INTRAVENOUS at 11:20

## 2020-01-01 RX ADMIN — ENOXAPARIN SODIUM 40 MILLIGRAM(S): 100 INJECTION SUBCUTANEOUS at 18:33

## 2020-01-01 RX ADMIN — FAMOTIDINE 20 MILLIGRAM(S): 10 INJECTION INTRAVENOUS at 05:06

## 2020-01-01 RX ADMIN — Medication 250 MILLIGRAM(S): at 00:00

## 2020-01-01 RX ADMIN — OXYCODONE HYDROCHLORIDE 20 MILLIGRAM(S): 5 TABLET ORAL at 20:50

## 2020-01-01 RX ADMIN — FAMOTIDINE 20 MILLIGRAM(S): 10 INJECTION INTRAVENOUS at 17:40

## 2020-01-01 RX ADMIN — OXYCODONE HYDROCHLORIDE 10 MILLIGRAM(S): 5 TABLET ORAL at 12:36

## 2020-01-01 RX ADMIN — Medication 4: at 06:11

## 2020-01-01 RX ADMIN — Medication 0.5 MILLIGRAM(S): at 04:21

## 2020-01-01 RX ADMIN — Medication 1 APPLICATION(S): at 05:35

## 2020-01-01 RX ADMIN — POLYETHYLENE GLYCOL 3350 17 GRAM(S): 17 POWDER, FOR SOLUTION ORAL at 17:59

## 2020-01-01 RX ADMIN — Medication 2 MILLIGRAM(S): at 15:04

## 2020-01-01 RX ADMIN — POLYETHYLENE GLYCOL 3350 17 GRAM(S): 17 POWDER, FOR SOLUTION ORAL at 04:02

## 2020-01-01 RX ADMIN — Medication 1 APPLICATION(S): at 04:49

## 2020-01-01 RX ADMIN — HALOPERIDOL DECANOATE 0.5 MILLIGRAM(S): 100 INJECTION INTRAMUSCULAR at 17:08

## 2020-01-01 RX ADMIN — FAMOTIDINE 20 MILLIGRAM(S): 10 INJECTION INTRAVENOUS at 04:10

## 2020-01-01 RX ADMIN — Medication 1 APPLICATION(S): at 18:21

## 2020-01-01 RX ADMIN — POLYETHYLENE GLYCOL 3350 17 GRAM(S): 17 POWDER, FOR SOLUTION ORAL at 05:00

## 2020-01-01 RX ADMIN — Medication 50 MILLIGRAM(S): at 10:00

## 2020-01-01 RX ADMIN — Medication 2: at 13:31

## 2020-01-01 RX ADMIN — Medication 81 MILLIGRAM(S): at 13:46

## 2020-01-01 RX ADMIN — FAMOTIDINE 20 MILLIGRAM(S): 10 INJECTION INTRAVENOUS at 16:19

## 2020-01-01 RX ADMIN — ENOXAPARIN SODIUM 40 MILLIGRAM(S): 100 INJECTION SUBCUTANEOUS at 16:17

## 2020-01-01 RX ADMIN — Medication 1 APPLICATION(S): at 18:37

## 2020-01-01 RX ADMIN — Medication 1 APPLICATION(S): at 05:54

## 2020-01-01 RX ADMIN — Medication 2: at 01:12

## 2020-01-01 RX ADMIN — CHLORHEXIDINE GLUCONATE 15 MILLILITER(S): 213 SOLUTION TOPICAL at 06:01

## 2020-01-01 RX ADMIN — ENOXAPARIN SODIUM 40 MILLIGRAM(S): 100 INJECTION SUBCUTANEOUS at 06:17

## 2020-01-01 RX ADMIN — CHLORHEXIDINE GLUCONATE 15 MILLILITER(S): 213 SOLUTION TOPICAL at 16:14

## 2020-01-01 RX ADMIN — POLYETHYLENE GLYCOL 3350 17 GRAM(S): 17 POWDER, FOR SOLUTION ORAL at 17:00

## 2020-01-01 RX ADMIN — Medication 2: at 14:33

## 2020-01-01 RX ADMIN — Medication 1 APPLICATION(S): at 04:08

## 2020-01-01 RX ADMIN — Medication 50 GRAM(S): at 04:35

## 2020-01-01 RX ADMIN — Medication 75 MICROGRAM(S): at 21:35

## 2020-01-01 RX ADMIN — Medication 1 APPLICATION(S): at 04:47

## 2020-01-01 RX ADMIN — Medication 4: at 06:17

## 2020-01-01 RX ADMIN — Medication 166.67 MILLIGRAM(S): at 17:33

## 2020-01-01 RX ADMIN — OXYCODONE HYDROCHLORIDE 10 MILLIGRAM(S): 5 TABLET ORAL at 05:06

## 2020-01-01 RX ADMIN — Medication 40 MILLIGRAM(S): at 04:47

## 2020-01-01 RX ADMIN — Medication 102 MILLIGRAM(S): at 07:54

## 2020-01-01 RX ADMIN — Medication 40 MILLIGRAM(S): at 13:33

## 2020-01-01 RX ADMIN — HYDROMORPHONE HYDROCHLORIDE 1 MILLIGRAM(S): 2 INJECTION INTRAMUSCULAR; INTRAVENOUS; SUBCUTANEOUS at 14:30

## 2020-01-01 RX ADMIN — Medication 81 MILLIGRAM(S): at 13:19

## 2020-01-01 RX ADMIN — Medication 40 MILLIGRAM(S): at 20:57

## 2020-01-01 RX ADMIN — CEFTRIAXONE 100 MILLIGRAM(S): 500 INJECTION, POWDER, FOR SOLUTION INTRAMUSCULAR; INTRAVENOUS at 18:53

## 2020-01-01 RX ADMIN — OXYCODONE HYDROCHLORIDE 10 MILLIGRAM(S): 5 TABLET ORAL at 22:37

## 2020-01-01 RX ADMIN — Medication 1 APPLICATION(S): at 06:18

## 2020-01-01 RX ADMIN — OXYCODONE HYDROCHLORIDE 10 MILLIGRAM(S): 5 TABLET ORAL at 02:07

## 2020-01-01 RX ADMIN — HUMAN INSULIN 9 UNIT(S): 100 INJECTION, SUSPENSION SUBCUTANEOUS at 13:34

## 2020-01-01 RX ADMIN — FENTANYL CITRATE 50 MICROGRAM(S): 50 INJECTION INTRAVENOUS at 18:00

## 2020-01-01 RX ADMIN — HYDROMORPHONE HYDROCHLORIDE 1 MILLIGRAM(S): 2 INJECTION INTRAMUSCULAR; INTRAVENOUS; SUBCUTANEOUS at 23:00

## 2020-01-01 RX ADMIN — Medication 37.5 MICROGRAM(S): at 22:28

## 2020-01-01 RX ADMIN — Medication 2 MILLIGRAM(S): at 01:31

## 2020-01-01 RX ADMIN — Medication 153 MILLIGRAM(S): at 17:08

## 2020-01-01 RX ADMIN — ENOXAPARIN SODIUM 40 MILLIGRAM(S): 100 INJECTION SUBCUTANEOUS at 05:16

## 2020-01-01 RX ADMIN — Medication 100 MILLIGRAM(S): at 20:16

## 2020-01-01 RX ADMIN — Medication 50 MICROGRAM(S): at 22:30

## 2020-01-01 RX ADMIN — CHLORHEXIDINE GLUCONATE 15 MILLILITER(S): 213 SOLUTION TOPICAL at 16:18

## 2020-01-01 RX ADMIN — Medication 50 MICROGRAM(S): at 22:54

## 2020-01-01 RX ADMIN — MIDAZOLAM HYDROCHLORIDE 1 MILLIGRAM(S): 1 INJECTION, SOLUTION INTRAMUSCULAR; INTRAVENOUS at 05:00

## 2020-01-01 RX ADMIN — Medication 1 APPLICATION(S): at 04:48

## 2020-01-01 RX ADMIN — Medication 81 MILLIGRAM(S): at 12:35

## 2020-01-01 RX ADMIN — ENOXAPARIN SODIUM 40 MILLIGRAM(S): 100 INJECTION SUBCUTANEOUS at 05:08

## 2020-01-01 RX ADMIN — Medication 40 MILLIGRAM(S): at 12:43

## 2020-01-01 RX ADMIN — HYDROMORPHONE HYDROCHLORIDE 0.5 MILLIGRAM(S): 2 INJECTION INTRAMUSCULAR; INTRAVENOUS; SUBCUTANEOUS at 10:26

## 2020-01-01 RX ADMIN — CHLORHEXIDINE GLUCONATE 15 MILLILITER(S): 213 SOLUTION TOPICAL at 16:55

## 2020-01-01 RX ADMIN — Medication 125 MILLILITER(S): at 05:19

## 2020-01-01 RX ADMIN — HYDROMORPHONE HYDROCHLORIDE 1 MILLIGRAM(S): 2 INJECTION INTRAMUSCULAR; INTRAVENOUS; SUBCUTANEOUS at 01:28

## 2020-01-01 RX ADMIN — CEFEPIME 100 MILLIGRAM(S): 1 INJECTION, POWDER, FOR SOLUTION INTRAMUSCULAR; INTRAVENOUS at 05:02

## 2020-01-01 RX ADMIN — ALBUTEROL 1 PUFF(S): 90 AEROSOL, METERED ORAL at 17:14

## 2020-01-01 RX ADMIN — CEFTRIAXONE 1000 MILLIGRAM(S): 500 INJECTION, POWDER, FOR SOLUTION INTRAMUSCULAR; INTRAVENOUS at 19:00

## 2020-01-01 RX ADMIN — Medication 4: at 18:04

## 2020-01-01 RX ADMIN — CHLORHEXIDINE GLUCONATE 15 MILLILITER(S): 213 SOLUTION TOPICAL at 17:19

## 2020-01-01 RX ADMIN — Medication 102 MILLIGRAM(S): at 20:12

## 2020-01-01 RX ADMIN — Medication 102 MILLIGRAM(S): at 11:39

## 2020-01-01 RX ADMIN — Medication 100 MILLIGRAM(S): at 16:17

## 2020-01-01 RX ADMIN — Medication 4: at 02:15

## 2020-01-01 RX ADMIN — FAMOTIDINE 20 MILLIGRAM(S): 10 INJECTION INTRAVENOUS at 04:18

## 2020-01-01 RX ADMIN — KETAMINE HYDROCHLORIDE 15.4 MG/KG/HR: 100 INJECTION INTRAMUSCULAR; INTRAVENOUS at 05:25

## 2020-01-01 RX ADMIN — CEFEPIME 100 MILLIGRAM(S): 1 INJECTION, POWDER, FOR SOLUTION INTRAMUSCULAR; INTRAVENOUS at 04:51

## 2020-01-01 RX ADMIN — Medication 1 TABLET(S): at 17:21

## 2020-01-01 RX ADMIN — INSULIN GLARGINE 10 UNIT(S): 100 INJECTION, SOLUTION SUBCUTANEOUS at 08:07

## 2020-01-01 RX ADMIN — Medication 2 MILLIGRAM(S): at 12:37

## 2020-01-01 RX ADMIN — Medication 2: at 17:52

## 2020-01-01 RX ADMIN — FAMOTIDINE 20 MILLIGRAM(S): 10 INJECTION INTRAVENOUS at 04:51

## 2020-01-01 RX ADMIN — Medication 2: at 17:43

## 2020-01-01 RX ADMIN — FAMOTIDINE 20 MILLIGRAM(S): 10 INJECTION INTRAVENOUS at 17:29

## 2020-01-01 RX ADMIN — CEFTRIAXONE 100 MILLIGRAM(S): 500 INJECTION, POWDER, FOR SOLUTION INTRAMUSCULAR; INTRAVENOUS at 01:24

## 2020-01-01 RX ADMIN — HUMAN INSULIN 11 UNIT(S): 100 INJECTION, SUSPENSION SUBCUTANEOUS at 01:50

## 2020-01-01 RX ADMIN — OXYCODONE HYDROCHLORIDE 10 MILLIGRAM(S): 5 TABLET ORAL at 05:34

## 2020-01-01 RX ADMIN — Medication 102 MILLIGRAM(S): at 07:51

## 2020-01-01 RX ADMIN — Medication 37.5 MICROGRAM(S): at 21:40

## 2020-01-01 RX ADMIN — HUMAN INSULIN 11 UNIT(S): 100 INJECTION, SUSPENSION SUBCUTANEOUS at 14:56

## 2020-01-01 RX ADMIN — Medication 40 MILLIGRAM(S): at 05:08

## 2020-01-01 RX ADMIN — OXYCODONE HYDROCHLORIDE 10 MILLIGRAM(S): 5 TABLET ORAL at 06:13

## 2020-01-01 RX ADMIN — CHLORHEXIDINE GLUCONATE 15 MILLILITER(S): 213 SOLUTION TOPICAL at 16:45

## 2020-01-01 RX ADMIN — HUMAN INSULIN 11 UNIT(S): 100 INJECTION, SUSPENSION SUBCUTANEOUS at 04:48

## 2020-01-01 RX ADMIN — POLYETHYLENE GLYCOL 3350 17 GRAM(S): 17 POWDER, FOR SOLUTION ORAL at 05:35

## 2020-01-01 RX ADMIN — Medication 2 MILLIGRAM(S): at 13:32

## 2020-01-01 RX ADMIN — FAMOTIDINE 20 MILLIGRAM(S): 10 INJECTION INTRAVENOUS at 04:07

## 2020-01-01 RX ADMIN — SODIUM CHLORIDE 1500 MILLILITER(S): 9 INJECTION INTRAMUSCULAR; INTRAVENOUS; SUBCUTANEOUS at 05:55

## 2020-01-01 RX ADMIN — CHLORHEXIDINE GLUCONATE 15 MILLILITER(S): 213 SOLUTION TOPICAL at 04:59

## 2020-01-01 RX ADMIN — Medication 40 MILLIGRAM(S): at 05:43

## 2020-01-01 RX ADMIN — Medication 1 APPLICATION(S): at 05:59

## 2020-01-01 RX ADMIN — Medication 2: at 12:00

## 2020-01-01 RX ADMIN — CHLORHEXIDINE GLUCONATE 15 MILLILITER(S): 213 SOLUTION TOPICAL at 04:21

## 2020-01-01 RX ADMIN — ENOXAPARIN SODIUM 40 MILLIGRAM(S): 100 INJECTION SUBCUTANEOUS at 19:26

## 2020-01-01 RX ADMIN — PROPOFOL 9.25 MICROGRAM(S)/KG/MIN: 10 INJECTION, EMULSION INTRAVENOUS at 05:17

## 2020-01-01 RX ADMIN — Medication 2 MILLIGRAM(S): at 21:13

## 2020-01-01 RX ADMIN — OXYCODONE HYDROCHLORIDE 20 MILLIGRAM(S): 5 TABLET ORAL at 12:30

## 2020-01-01 RX ADMIN — Medication 4: at 08:52

## 2020-01-01 RX ADMIN — Medication 81 MILLIGRAM(S): at 11:46

## 2020-01-01 RX ADMIN — Medication 4: at 12:12

## 2020-01-01 RX ADMIN — SENNA PLUS 10 MILLILITER(S): 8.6 TABLET ORAL at 01:03

## 2020-01-01 RX ADMIN — Medication 102 MILLIGRAM(S): at 05:17

## 2020-01-01 RX ADMIN — HUMAN INSULIN 11 UNIT(S): 100 INJECTION, SUSPENSION SUBCUTANEOUS at 16:24

## 2020-01-01 RX ADMIN — OXYCODONE HYDROCHLORIDE 10 MILLIGRAM(S): 5 TABLET ORAL at 16:23

## 2020-01-01 RX ADMIN — CEFEPIME 100 MILLIGRAM(S): 1 INJECTION, POWDER, FOR SOLUTION INTRAMUSCULAR; INTRAVENOUS at 13:16

## 2020-01-01 RX ADMIN — ENOXAPARIN SODIUM 40 MILLIGRAM(S): 100 INJECTION SUBCUTANEOUS at 04:48

## 2020-01-01 RX ADMIN — HUMAN INSULIN 8 UNIT(S): 100 INJECTION, SUSPENSION SUBCUTANEOUS at 02:55

## 2020-01-01 RX ADMIN — HUMAN INSULIN 11 UNIT(S): 100 INJECTION, SUSPENSION SUBCUTANEOUS at 16:47

## 2020-01-01 RX ADMIN — HYDROMORPHONE HYDROCHLORIDE 1 MILLIGRAM(S): 2 INJECTION INTRAMUSCULAR; INTRAVENOUS; SUBCUTANEOUS at 10:26

## 2020-01-01 RX ADMIN — KETAMINE HYDROCHLORIDE 1.16 MILLIGRAM(S): 100 INJECTION INTRAMUSCULAR; INTRAVENOUS at 20:08

## 2020-01-01 RX ADMIN — PROPOFOL 9.25 MICROGRAM(S)/KG/MIN: 10 INJECTION, EMULSION INTRAVENOUS at 14:43

## 2020-01-01 RX ADMIN — OXYCODONE HYDROCHLORIDE 10 MILLIGRAM(S): 5 TABLET ORAL at 17:30

## 2020-01-01 RX ADMIN — CHLORHEXIDINE GLUCONATE 15 MILLILITER(S): 213 SOLUTION TOPICAL at 17:08

## 2020-01-01 RX ADMIN — Medication 1 APPLICATION(S): at 17:06

## 2020-01-01 RX ADMIN — Medication 2 MILLIGRAM(S): at 22:31

## 2020-01-01 RX ADMIN — OXYCODONE HYDROCHLORIDE 20 MILLIGRAM(S): 5 TABLET ORAL at 17:27

## 2020-01-01 RX ADMIN — HYDROMORPHONE HYDROCHLORIDE 2 MILLIGRAM(S): 2 INJECTION INTRAMUSCULAR; INTRAVENOUS; SUBCUTANEOUS at 15:50

## 2020-01-01 RX ADMIN — Medication 50 MILLIEQUIVALENT(S): at 04:20

## 2020-01-01 RX ADMIN — CHLORHEXIDINE GLUCONATE 1 APPLICATION(S): 213 SOLUTION TOPICAL at 06:14

## 2020-01-01 RX ADMIN — HYDROMORPHONE HYDROCHLORIDE 0.5 MILLIGRAM(S): 2 INJECTION INTRAMUSCULAR; INTRAVENOUS; SUBCUTANEOUS at 02:35

## 2020-01-01 RX ADMIN — Medication 60 MILLIGRAM(S): at 10:34

## 2020-01-01 RX ADMIN — CEFEPIME 100 MILLIGRAM(S): 1 INJECTION, POWDER, FOR SOLUTION INTRAMUSCULAR; INTRAVENOUS at 13:46

## 2020-01-01 RX ADMIN — Medication 100 MILLIGRAM(S): at 23:47

## 2020-01-01 RX ADMIN — Medication 110 MILLIGRAM(S): at 07:38

## 2020-01-01 RX ADMIN — OXYCODONE HYDROCHLORIDE 20 MILLIGRAM(S): 5 TABLET ORAL at 16:32

## 2020-01-01 RX ADMIN — Medication 40 MILLIGRAM(S): at 22:04

## 2020-01-01 RX ADMIN — POLYETHYLENE GLYCOL 3350 17 GRAM(S): 17 POWDER, FOR SOLUTION ORAL at 17:09

## 2020-01-01 RX ADMIN — OXYCODONE HYDROCHLORIDE 20 MILLIGRAM(S): 5 TABLET ORAL at 05:07

## 2020-01-01 RX ADMIN — KETAMINE HYDROCHLORIDE 15.4 MG/KG/HR: 100 INJECTION INTRAMUSCULAR; INTRAVENOUS at 17:48

## 2020-01-01 RX ADMIN — ENOXAPARIN SODIUM 40 MILLIGRAM(S): 100 INJECTION SUBCUTANEOUS at 04:53

## 2020-01-01 RX ADMIN — Medication 2 MILLIGRAM(S): at 12:39

## 2020-01-01 RX ADMIN — HUMAN INSULIN 11 UNIT(S): 100 INJECTION, SUSPENSION SUBCUTANEOUS at 08:32

## 2020-01-01 RX ADMIN — CHLORHEXIDINE GLUCONATE 15 MILLILITER(S): 213 SOLUTION TOPICAL at 16:03

## 2020-01-01 RX ADMIN — CHLORHEXIDINE GLUCONATE 1 APPLICATION(S): 213 SOLUTION TOPICAL at 03:02

## 2020-01-01 RX ADMIN — Medication 50 MICROGRAM(S): at 21:28

## 2020-01-01 RX ADMIN — Medication 2: at 11:40

## 2020-01-01 RX ADMIN — Medication 2: at 01:30

## 2020-01-01 RX ADMIN — Medication 2: at 18:19

## 2020-01-01 RX ADMIN — Medication 1 APPLICATION(S): at 16:18

## 2020-01-01 RX ADMIN — CHLORHEXIDINE GLUCONATE 1 APPLICATION(S): 213 SOLUTION TOPICAL at 04:49

## 2020-01-01 RX ADMIN — HUMAN INSULIN 11 UNIT(S): 100 INJECTION, SUSPENSION SUBCUTANEOUS at 17:00

## 2020-01-01 RX ADMIN — ENOXAPARIN SODIUM 40 MILLIGRAM(S): 100 INJECTION SUBCUTANEOUS at 05:50

## 2020-01-01 RX ADMIN — POLYETHYLENE GLYCOL 3350 17 GRAM(S): 17 POWDER, FOR SOLUTION ORAL at 06:38

## 2020-01-01 RX ADMIN — POLYETHYLENE GLYCOL 3350 17 GRAM(S): 17 POWDER, FOR SOLUTION ORAL at 20:55

## 2020-01-01 RX ADMIN — Medication 17.3 MICROGRAM(S)/KG/MIN: at 07:38

## 2020-01-01 RX ADMIN — ENOXAPARIN SODIUM 40 MILLIGRAM(S): 100 INJECTION SUBCUTANEOUS at 16:05

## 2020-01-01 RX ADMIN — Medication 102 MILLIGRAM(S): at 04:20

## 2020-01-01 RX ADMIN — SENNA PLUS 10 MILLILITER(S): 8.6 TABLET ORAL at 22:34

## 2020-01-01 RX ADMIN — POLYETHYLENE GLYCOL 3350 17 GRAM(S): 17 POWDER, FOR SOLUTION ORAL at 04:57

## 2020-01-01 RX ADMIN — CHLORHEXIDINE GLUCONATE 15 MILLILITER(S): 213 SOLUTION TOPICAL at 04:02

## 2020-01-01 RX ADMIN — Medication 81 MILLIGRAM(S): at 12:08

## 2020-01-01 RX ADMIN — ENOXAPARIN SODIUM 40 MILLIGRAM(S): 100 INJECTION SUBCUTANEOUS at 17:50

## 2020-01-01 RX ADMIN — CHLORHEXIDINE GLUCONATE 15 MILLILITER(S): 213 SOLUTION TOPICAL at 17:21

## 2020-01-01 RX ADMIN — Medication 2 MILLIGRAM(S): at 05:01

## 2020-01-01 RX ADMIN — CEFEPIME 100 MILLIGRAM(S): 1 INJECTION, POWDER, FOR SOLUTION INTRAMUSCULAR; INTRAVENOUS at 06:01

## 2020-01-01 RX ADMIN — HUMAN INSULIN 8 UNIT(S): 100 INJECTION, SUSPENSION SUBCUTANEOUS at 20:51

## 2020-01-01 RX ADMIN — Medication 2: at 18:10

## 2020-01-01 RX ADMIN — CHLORHEXIDINE GLUCONATE 15 MILLILITER(S): 213 SOLUTION TOPICAL at 18:07

## 2020-01-01 RX ADMIN — CHLORHEXIDINE GLUCONATE 1 APPLICATION(S): 213 SOLUTION TOPICAL at 04:51

## 2020-01-01 RX ADMIN — HUMAN INSULIN 11 UNIT(S): 100 INJECTION, SUSPENSION SUBCUTANEOUS at 00:53

## 2020-01-01 RX ADMIN — Medication 4: at 13:55

## 2020-01-01 RX ADMIN — Medication 81 MILLIGRAM(S): at 10:40

## 2020-01-01 RX ADMIN — POLYETHYLENE GLYCOL 3350 17 GRAM(S): 17 POWDER, FOR SOLUTION ORAL at 04:38

## 2020-01-01 RX ADMIN — HUMAN INSULIN 9 UNIT(S): 100 INJECTION, SUSPENSION SUBCUTANEOUS at 02:34

## 2020-01-01 RX ADMIN — HUMAN INSULIN 11 UNIT(S): 100 INJECTION, SUSPENSION SUBCUTANEOUS at 06:07

## 2020-01-01 RX ADMIN — ENOXAPARIN SODIUM 40 MILLIGRAM(S): 100 INJECTION SUBCUTANEOUS at 16:19

## 2020-01-01 RX ADMIN — Medication 200 MILLIGRAM(S): at 11:40

## 2020-01-01 RX ADMIN — OXYCODONE HYDROCHLORIDE 20 MILLIGRAM(S): 5 TABLET ORAL at 23:50

## 2020-01-01 RX ADMIN — Medication 50 MICROGRAM(S): at 22:01

## 2020-01-01 RX ADMIN — OXYCODONE HYDROCHLORIDE 10 MILLIGRAM(S): 5 TABLET ORAL at 19:27

## 2020-01-01 RX ADMIN — PROPOFOL 9.25 MICROGRAM(S)/KG/MIN: 10 INJECTION, EMULSION INTRAVENOUS at 05:52

## 2020-01-01 RX ADMIN — Medication 50 GRAM(S): at 04:21

## 2020-01-01 RX ADMIN — SODIUM CHLORIDE 1000 MILLILITER(S): 9 INJECTION, SOLUTION INTRAVENOUS at 10:00

## 2020-01-01 RX ADMIN — Medication 50 MILLIGRAM(S): at 19:10

## 2020-01-01 RX ADMIN — Medication 2: at 16:50

## 2020-01-01 RX ADMIN — Medication 2 MILLIGRAM(S): at 05:03

## 2020-01-01 RX ADMIN — Medication 110 MILLIGRAM(S): at 04:30

## 2020-01-01 RX ADMIN — Medication 153 MILLIGRAM(S): at 23:46

## 2020-01-01 RX ADMIN — Medication 62.5 MILLIMOLE(S): at 04:49

## 2020-01-01 RX ADMIN — Medication 50 MILLIEQUIVALENT(S): at 02:55

## 2020-01-01 RX ADMIN — OXYCODONE HYDROCHLORIDE 10 MILLIGRAM(S): 5 TABLET ORAL at 23:39

## 2020-01-01 RX ADMIN — CHLORHEXIDINE GLUCONATE 1 APPLICATION(S): 213 SOLUTION TOPICAL at 04:21

## 2020-01-01 RX ADMIN — Medication 37.5 MICROGRAM(S): at 21:43

## 2020-01-01 RX ADMIN — OXYCODONE HYDROCHLORIDE 10 MILLIGRAM(S): 5 TABLET ORAL at 05:51

## 2020-01-01 RX ADMIN — MIDAZOLAM HYDROCHLORIDE 3.86 MG/KG/HR: 1 INJECTION, SOLUTION INTRAMUSCULAR; INTRAVENOUS at 13:51

## 2020-01-01 RX ADMIN — MIDAZOLAM HYDROCHLORIDE 1.54 MG/KG/HR: 1 INJECTION, SOLUTION INTRAMUSCULAR; INTRAVENOUS at 08:00

## 2020-01-01 RX ADMIN — HUMAN INSULIN 11 UNIT(S): 100 INJECTION, SUSPENSION SUBCUTANEOUS at 00:16

## 2020-01-01 RX ADMIN — Medication 0.5 MILLIGRAM(S): at 12:14

## 2020-01-01 RX ADMIN — OXYCODONE HYDROCHLORIDE 10 MILLIGRAM(S): 5 TABLET ORAL at 16:27

## 2020-01-01 RX ADMIN — Medication 2 MILLIGRAM(S): at 21:29

## 2020-01-01 RX ADMIN — HUMAN INSULIN 11 UNIT(S): 100 INJECTION, SUSPENSION SUBCUTANEOUS at 12:14

## 2020-01-01 RX ADMIN — QUETIAPINE FUMARATE 25 MILLIGRAM(S): 200 TABLET, FILM COATED ORAL at 16:54

## 2020-01-01 RX ADMIN — Medication 5 MILLIGRAM(S): at 21:47

## 2020-01-01 RX ADMIN — CHLORHEXIDINE GLUCONATE 15 MILLILITER(S): 213 SOLUTION TOPICAL at 06:38

## 2020-01-01 RX ADMIN — Medication 250 MILLIGRAM(S): at 18:42

## 2020-01-01 RX ADMIN — HUMAN INSULIN 11 UNIT(S): 100 INJECTION, SUSPENSION SUBCUTANEOUS at 12:23

## 2020-01-01 RX ADMIN — OXYCODONE HYDROCHLORIDE 20 MILLIGRAM(S): 5 TABLET ORAL at 18:03

## 2020-01-01 RX ADMIN — Medication 4: at 16:08

## 2020-01-01 RX ADMIN — CHLORHEXIDINE GLUCONATE 1 APPLICATION(S): 213 SOLUTION TOPICAL at 04:11

## 2020-01-01 RX ADMIN — OXYCODONE HYDROCHLORIDE 10 MILLIGRAM(S): 5 TABLET ORAL at 09:52

## 2020-01-01 RX ADMIN — SENNA PLUS 10 MILLILITER(S): 8.6 TABLET ORAL at 22:27

## 2020-01-01 RX ADMIN — POLYETHYLENE GLYCOL 3350 17 GRAM(S): 17 POWDER, FOR SOLUTION ORAL at 17:43

## 2020-01-01 RX ADMIN — OXYCODONE HYDROCHLORIDE 10 MILLIGRAM(S): 5 TABLET ORAL at 17:23

## 2020-01-01 RX ADMIN — HUMAN INSULIN 11 UNIT(S): 100 INJECTION, SUSPENSION SUBCUTANEOUS at 14:11

## 2020-01-01 RX ADMIN — Medication 2 MILLIGRAM(S): at 21:34

## 2020-01-01 RX ADMIN — Medication 2 MILLIGRAM(S): at 05:07

## 2020-01-01 RX ADMIN — HUMAN INSULIN 11 UNIT(S): 100 INJECTION, SUSPENSION SUBCUTANEOUS at 13:00

## 2020-01-01 RX ADMIN — Medication 1 APPLICATION(S): at 16:54

## 2020-01-01 RX ADMIN — Medication 153 MILLIGRAM(S): at 15:34

## 2020-01-01 RX ADMIN — Medication 40 MILLIGRAM(S): at 18:30

## 2020-01-01 RX ADMIN — HUMAN INSULIN 8 UNIT(S): 100 INJECTION, SUSPENSION SUBCUTANEOUS at 21:10

## 2020-01-01 RX ADMIN — HYDROMORPHONE HYDROCHLORIDE 1 MILLIGRAM(S): 2 INJECTION INTRAMUSCULAR; INTRAVENOUS; SUBCUTANEOUS at 14:00

## 2020-01-01 RX ADMIN — Medication 2: at 21:00

## 2020-01-01 RX ADMIN — Medication 1 APPLICATION(S): at 06:38

## 2020-01-01 RX ADMIN — Medication 153 MILLIGRAM(S): at 08:43

## 2020-01-01 RX ADMIN — CHLORHEXIDINE GLUCONATE 15 MILLILITER(S): 213 SOLUTION TOPICAL at 18:18

## 2020-01-01 RX ADMIN — POLYETHYLENE GLYCOL 3350 17 GRAM(S): 17 POWDER, FOR SOLUTION ORAL at 18:30

## 2020-01-01 RX ADMIN — FAMOTIDINE 20 MILLIGRAM(S): 10 INJECTION INTRAVENOUS at 19:26

## 2020-01-01 RX ADMIN — ENOXAPARIN SODIUM 40 MILLIGRAM(S): 100 INJECTION SUBCUTANEOUS at 05:34

## 2020-01-01 RX ADMIN — Medication 250 MILLIGRAM(S): at 01:23

## 2020-01-01 RX ADMIN — OXYCODONE HYDROCHLORIDE 10 MILLIGRAM(S): 5 TABLET ORAL at 16:16

## 2020-01-01 RX ADMIN — HUMAN INSULIN 11 UNIT(S): 100 INJECTION, SUSPENSION SUBCUTANEOUS at 04:57

## 2020-01-01 RX ADMIN — Medication 1 MILLIGRAM(S): at 21:05

## 2020-01-01 RX ADMIN — Medication 100 MILLIGRAM(S): at 17:08

## 2020-01-01 RX ADMIN — HUMAN INSULIN 11 UNIT(S): 100 INJECTION, SUSPENSION SUBCUTANEOUS at 05:16

## 2020-01-01 RX ADMIN — Medication 50 MILLIEQUIVALENT(S): at 10:26

## 2020-01-01 RX ADMIN — LACTULOSE 20 GRAM(S): 10 SOLUTION ORAL at 13:17

## 2020-01-01 RX ADMIN — ENOXAPARIN SODIUM 40 MILLIGRAM(S): 100 INJECTION SUBCUTANEOUS at 16:27

## 2020-01-01 RX ADMIN — Medication 81 MILLIGRAM(S): at 13:06

## 2020-01-01 RX ADMIN — Medication 40 MILLIGRAM(S): at 05:56

## 2020-01-01 RX ADMIN — Medication 1 APPLICATION(S): at 04:57

## 2020-01-01 RX ADMIN — Medication 250 MILLIGRAM(S): at 06:33

## 2020-01-01 RX ADMIN — ENOXAPARIN SODIUM 40 MILLIGRAM(S): 100 INJECTION SUBCUTANEOUS at 17:21

## 2020-01-01 RX ADMIN — Medication 50 MILLIEQUIVALENT(S): at 02:36

## 2020-01-01 RX ADMIN — Medication 1 MILLIGRAM(S): at 04:59

## 2020-01-01 RX ADMIN — HALOPERIDOL DECANOATE 0.5 MILLIGRAM(S): 100 INJECTION INTRAMUSCULAR at 06:01

## 2020-01-01 RX ADMIN — OXYCODONE HYDROCHLORIDE 10 MILLIGRAM(S): 5 TABLET ORAL at 12:07

## 2020-01-01 RX ADMIN — HALOPERIDOL DECANOATE 0.5 MILLIGRAM(S): 100 INJECTION INTRAMUSCULAR at 04:47

## 2020-01-01 RX ADMIN — POLYETHYLENE GLYCOL 3350 17 GRAM(S): 17 POWDER, FOR SOLUTION ORAL at 05:19

## 2020-01-01 RX ADMIN — OXYCODONE HYDROCHLORIDE 10 MILLIGRAM(S): 5 TABLET ORAL at 04:47

## 2020-01-01 RX ADMIN — HUMAN INSULIN 11 UNIT(S): 100 INJECTION, SUSPENSION SUBCUTANEOUS at 16:50

## 2020-01-01 RX ADMIN — FAMOTIDINE 20 MILLIGRAM(S): 10 INJECTION INTRAVENOUS at 19:39

## 2020-01-01 RX ADMIN — Medication 81 MILLIGRAM(S): at 11:16

## 2020-01-01 RX ADMIN — HUMAN INSULIN 9 UNIT(S): 100 INJECTION, SUSPENSION SUBCUTANEOUS at 13:12

## 2020-01-01 RX ADMIN — CHLORHEXIDINE GLUCONATE 1 APPLICATION(S): 213 SOLUTION TOPICAL at 05:09

## 2020-01-01 RX ADMIN — Medication 1 APPLICATION(S): at 05:20

## 2020-01-01 RX ADMIN — Medication 2 MILLIGRAM(S): at 06:01

## 2020-01-01 RX ADMIN — Medication 2 MILLIGRAM(S): at 04:47

## 2020-01-01 RX ADMIN — Medication 1 APPLICATION(S): at 06:14

## 2020-01-01 RX ADMIN — SENNA PLUS 10 MILLILITER(S): 8.6 TABLET ORAL at 20:10

## 2020-01-01 RX ADMIN — Medication 250 MILLIGRAM(S): at 05:30

## 2020-01-01 RX ADMIN — ENOXAPARIN SODIUM 40 MILLIGRAM(S): 100 INJECTION SUBCUTANEOUS at 16:49

## 2020-01-01 RX ADMIN — Medication 1 APPLICATION(S): at 06:13

## 2020-01-01 RX ADMIN — HUMAN INSULIN 9 UNIT(S): 100 INJECTION, SUSPENSION SUBCUTANEOUS at 09:25

## 2020-01-01 RX ADMIN — POLYETHYLENE GLYCOL 3350 17 GRAM(S): 17 POWDER, FOR SOLUTION ORAL at 05:54

## 2020-01-01 RX ADMIN — ROBINUL 0.2 MILLIGRAM(S): 0.2 INJECTION INTRAMUSCULAR; INTRAVENOUS at 13:30

## 2020-01-01 RX ADMIN — HYDROMORPHONE HYDROCHLORIDE 1 MILLIGRAM(S): 2 INJECTION INTRAMUSCULAR; INTRAVENOUS; SUBCUTANEOUS at 20:30

## 2020-01-01 RX ADMIN — Medication 400 MILLIGRAM(S): at 00:36

## 2020-01-01 RX ADMIN — Medication 75 MICROGRAM(S): at 20:35

## 2020-01-01 RX ADMIN — SENNA PLUS 10 MILLILITER(S): 8.6 TABLET ORAL at 21:38

## 2020-01-01 RX ADMIN — CHLORHEXIDINE GLUCONATE 15 MILLILITER(S): 213 SOLUTION TOPICAL at 04:07

## 2020-01-01 RX ADMIN — OXYCODONE HYDROCHLORIDE 10 MILLIGRAM(S): 5 TABLET ORAL at 04:57

## 2020-01-01 RX ADMIN — Medication 40 MILLIGRAM(S): at 16:18

## 2020-01-01 RX ADMIN — Medication 2 MILLIGRAM(S): at 04:18

## 2020-01-01 RX ADMIN — CHLORHEXIDINE GLUCONATE 15 MILLILITER(S): 213 SOLUTION TOPICAL at 05:24

## 2020-01-01 RX ADMIN — Medication 153 MILLIGRAM(S): at 05:24

## 2020-01-01 RX ADMIN — HUMAN INSULIN 9 UNIT(S): 100 INJECTION, SUSPENSION SUBCUTANEOUS at 02:00

## 2020-01-01 RX ADMIN — OXYCODONE HYDROCHLORIDE 10 MILLIGRAM(S): 5 TABLET ORAL at 05:29

## 2020-01-01 RX ADMIN — OXYCODONE HYDROCHLORIDE 10 MILLIGRAM(S): 5 TABLET ORAL at 23:11

## 2020-01-01 RX ADMIN — CHLORHEXIDINE GLUCONATE 1 APPLICATION(S): 213 SOLUTION TOPICAL at 04:31

## 2020-01-01 RX ADMIN — CHLORHEXIDINE GLUCONATE 15 MILLILITER(S): 213 SOLUTION TOPICAL at 18:28

## 2020-01-01 RX ADMIN — Medication 40 MILLIGRAM(S): at 18:37

## 2020-01-01 RX ADMIN — Medication 110 MILLIGRAM(S): at 05:02

## 2020-01-01 RX ADMIN — Medication 1 APPLICATION(S): at 05:47

## 2020-01-01 RX ADMIN — SENNA PLUS 10 MILLILITER(S): 8.6 TABLET ORAL at 22:23

## 2020-01-01 RX ADMIN — Medication 40 MILLIGRAM(S): at 01:00

## 2020-01-01 RX ADMIN — Medication 40 MILLIGRAM(S): at 06:18

## 2020-01-01 RX ADMIN — ENOXAPARIN SODIUM 40 MILLIGRAM(S): 100 INJECTION SUBCUTANEOUS at 05:06

## 2020-01-01 RX ADMIN — Medication 1 APPLICATION(S): at 16:33

## 2020-01-01 RX ADMIN — FAMOTIDINE 20 MILLIGRAM(S): 10 INJECTION INTRAVENOUS at 17:35

## 2020-01-01 RX ADMIN — CHLORHEXIDINE GLUCONATE 15 MILLILITER(S): 213 SOLUTION TOPICAL at 17:12

## 2020-01-01 RX ADMIN — CHLORHEXIDINE GLUCONATE 15 MILLILITER(S): 213 SOLUTION TOPICAL at 18:01

## 2020-01-01 RX ADMIN — OXYCODONE HYDROCHLORIDE 10 MILLIGRAM(S): 5 TABLET ORAL at 00:37

## 2020-01-01 RX ADMIN — FAMOTIDINE 20 MILLIGRAM(S): 10 INJECTION INTRAVENOUS at 17:20

## 2020-01-01 RX ADMIN — Medication 2: at 23:38

## 2020-01-01 RX ADMIN — Medication 1 APPLICATION(S): at 04:50

## 2020-01-01 RX ADMIN — HUMAN INSULIN 11 UNIT(S): 100 INJECTION, SUSPENSION SUBCUTANEOUS at 05:47

## 2020-01-01 RX ADMIN — CHLORHEXIDINE GLUCONATE 1 APPLICATION(S): 213 SOLUTION TOPICAL at 03:48

## 2020-01-01 RX ADMIN — CHLORHEXIDINE GLUCONATE 15 MILLILITER(S): 213 SOLUTION TOPICAL at 17:58

## 2020-01-01 RX ADMIN — Medication 400 MILLIGRAM(S): at 18:17

## 2020-01-01 RX ADMIN — CEFEPIME 100 MILLIGRAM(S): 1 INJECTION, POWDER, FOR SOLUTION INTRAMUSCULAR; INTRAVENOUS at 21:34

## 2020-01-01 RX ADMIN — Medication 40 MILLIGRAM(S): at 13:00

## 2020-01-01 RX ADMIN — CHLORHEXIDINE GLUCONATE 15 MILLILITER(S): 213 SOLUTION TOPICAL at 05:50

## 2020-01-01 RX ADMIN — Medication 2 MILLIGRAM(S): at 22:03

## 2020-01-01 RX ADMIN — CHLORHEXIDINE GLUCONATE 15 MILLILITER(S): 213 SOLUTION TOPICAL at 16:27

## 2020-01-01 RX ADMIN — Medication 4: at 11:16

## 2020-01-01 RX ADMIN — Medication 153 MILLIGRAM(S): at 21:11

## 2020-01-01 RX ADMIN — QUETIAPINE FUMARATE 25 MILLIGRAM(S): 200 TABLET, FILM COATED ORAL at 05:51

## 2020-01-01 RX ADMIN — HUMAN INSULIN 9 UNIT(S): 100 INJECTION, SUSPENSION SUBCUTANEOUS at 21:16

## 2020-01-01 RX ADMIN — Medication 4: at 01:37

## 2020-01-01 RX ADMIN — Medication 2: at 17:15

## 2020-01-01 RX ADMIN — CHLORHEXIDINE GLUCONATE 1 APPLICATION(S): 213 SOLUTION TOPICAL at 04:22

## 2020-01-01 RX ADMIN — Medication 50 MICROGRAM(S): at 23:25

## 2020-01-01 RX ADMIN — CHLORHEXIDINE GLUCONATE 15 MILLILITER(S): 213 SOLUTION TOPICAL at 04:56

## 2020-01-01 RX ADMIN — Medication 1 APPLICATION(S): at 05:51

## 2020-01-01 RX ADMIN — HYDROMORPHONE HYDROCHLORIDE 1 MILLIGRAM(S): 2 INJECTION INTRAMUSCULAR; INTRAVENOUS; SUBCUTANEOUS at 20:13

## 2020-01-01 RX ADMIN — Medication 2: at 13:06

## 2020-01-01 RX ADMIN — Medication 7.4 MICROGRAM(S)/KG/MIN: at 20:01

## 2020-01-01 RX ADMIN — HEPARIN SODIUM 5000 UNIT(S): 5000 INJECTION INTRAVENOUS; SUBCUTANEOUS at 00:35

## 2020-01-01 RX ADMIN — POLYETHYLENE GLYCOL 3350 17 GRAM(S): 17 POWDER, FOR SOLUTION ORAL at 04:47

## 2020-01-01 RX ADMIN — Medication 650 MILLIGRAM(S): at 04:30

## 2020-01-01 RX ADMIN — OXYCODONE HYDROCHLORIDE 10 MILLIGRAM(S): 5 TABLET ORAL at 01:14

## 2020-01-01 RX ADMIN — Medication 1 APPLICATION(S): at 17:07

## 2020-01-01 RX ADMIN — CHLORHEXIDINE GLUCONATE 1 APPLICATION(S): 213 SOLUTION TOPICAL at 05:00

## 2020-01-01 RX ADMIN — Medication 153 MILLIGRAM(S): at 11:40

## 2020-01-01 RX ADMIN — Medication 4: at 02:36

## 2020-01-01 RX ADMIN — AZITHROMYCIN 250 MILLIGRAM(S): 500 TABLET, FILM COATED ORAL at 23:09

## 2020-01-01 RX ADMIN — POLYETHYLENE GLYCOL 3350 17 GRAM(S): 17 POWDER, FOR SOLUTION ORAL at 17:06

## 2020-01-01 RX ADMIN — Medication 1 APPLICATION(S): at 05:06

## 2020-01-01 RX ADMIN — Medication 50 MICROGRAM(S): at 23:00

## 2020-01-01 RX ADMIN — POLYETHYLENE GLYCOL 3350 17 GRAM(S): 17 POWDER, FOR SOLUTION ORAL at 16:33

## 2020-01-01 RX ADMIN — Medication 1 TABLET(S): at 15:55

## 2020-01-01 RX ADMIN — Medication 0.5 MILLIGRAM(S): at 10:40

## 2020-01-01 RX ADMIN — ENOXAPARIN SODIUM 40 MILLIGRAM(S): 100 INJECTION SUBCUTANEOUS at 18:20

## 2020-01-01 RX ADMIN — Medication 20 MILLIGRAM(S): at 14:29

## 2020-01-01 RX ADMIN — Medication 2: at 04:45

## 2020-01-01 RX ADMIN — HUMAN INSULIN 9 UNIT(S): 100 INJECTION, SUSPENSION SUBCUTANEOUS at 20:33

## 2020-01-01 RX ADMIN — Medication 1 APPLICATION(S): at 17:53

## 2020-01-01 RX ADMIN — HUMAN INSULIN 11 UNIT(S): 100 INJECTION, SUSPENSION SUBCUTANEOUS at 05:06

## 2020-01-01 RX ADMIN — MORPHINE SULFATE 3 MILLIGRAM(S): 50 CAPSULE, EXTENDED RELEASE ORAL at 12:08

## 2020-01-01 RX ADMIN — Medication 2 MILLIGRAM(S): at 21:45

## 2020-01-01 RX ADMIN — OXYCODONE HYDROCHLORIDE 10 MILLIGRAM(S): 5 TABLET ORAL at 09:33

## 2020-01-01 RX ADMIN — ENOXAPARIN SODIUM 40 MILLIGRAM(S): 100 INJECTION SUBCUTANEOUS at 05:01

## 2020-01-01 RX ADMIN — Medication 75 MICROGRAM(S): at 21:38

## 2020-01-01 RX ADMIN — Medication 40 MILLIEQUIVALENT(S): at 22:26

## 2020-01-01 RX ADMIN — Medication 102 MILLIGRAM(S): at 20:21

## 2020-01-01 RX ADMIN — ENOXAPARIN SODIUM 40 MILLIGRAM(S): 100 INJECTION SUBCUTANEOUS at 06:01

## 2020-03-27 NOTE — ED PROVIDER NOTE - CLINICAL SUMMARY MEDICAL DECISION MAKING FREE TEXT BOX
70M p/w sob in setting of flu like symptoms. Pt visibly dyspneic with 88% o2 sat on 4LNC. Will switch to NRB and trial mdi. if pts sat do not  / he appears to be tiring, will have to escalate to intubation. Labs, gas, xray, ekg, cultures, reassess. 70M p/w sob in setting of flu like symptoms. Pt visibly dyspneic with 88% o2 sat on 4LNC. Will switch to NRB and trial mdi. if pts sat do not  / he appears to be tiring, will have to escalate to intubation. Labs, gas, xray, ekg, cultures, reassess. ZR

## 2020-03-27 NOTE — ED PROVIDER NOTE - OBJECTIVE STATEMENT
70M hx dm, hypothyroid, hld, p/w cough, low grade fever x 2-3 days, now with SOB since this AM. Pt denies cp. Says he feels sob at rest and dyspneic with exertion. Non smoker, no cardiac history or stents.

## 2020-03-27 NOTE — H&P ADULT - NSHPLABSRESULTS_GEN_ALL_CORE
CBC Full  -  ( 27 Mar 2020 17:20 )  WBC Count : 4.86 K/uL  Hemoglobin : 13.1 g/dL  Hematocrit : 40.1 %  Platelet Count - Automated : 155 K/uL  Mean Cell Volume : 87.6 fl  Mean Cell Hemoglobin : 28.6 pg  Mean Cell Hemoglobin Concentration : 32.7 gm/dL  Auto Neutrophil # : 3.60 K/uL  Auto Lymphocyte # : 0.79 K/uL  Auto Monocyte # : 0.42 K/uL  Auto Eosinophil # : 0.00 K/uL  Auto Basophil # : 0.01 K/uL  Auto Neutrophil % : 74.1 %  Auto Lymphocyte % : 16.3 %  Auto Monocyte % : 8.6 %  Auto Eosinophil % : 0.0 %  Auto Basophil % : 0.2 %    03-27    134<L>  |  97  |  8   ----------------------------<  194<H>  3.7   |  21<L>  |  0.61    Ca    8.4      27 Mar 2020 17:20    TPro  6.4  /  Alb  3.4  /  TBili  0.2  /  DBili  x   /  AST  65<H>  /  ALT  47<H>  /  AlkPhos  64  03-27    LIVER FUNCTIONS - ( 27 Mar 2020 17:20 )  Alb: 3.4 g/dL / Pro: 6.4 g/dL / ALK PHOS: 64 U/L / ALT: 47 U/L / AST: 65 U/L / GGT: x           17:05 - VBG - pH: 7.37  | pCO2: 42    | pO2: 27    | Lactate: 3.1      COVID-19 PCR . (03.27.20 @ 17:41)    COVID-19 PCR: Detected: All “detected” results on this new test are considered presumptively  positive results, are clinically actionable, and specimens will be  forwarded to [facility] for confirmation testing.  Another report (corrected report) will only be issued if discordant  results occur.  This test has been validated by [employer] to be accurate;  though it has not been FDA cleared/approved by the usual pathway.  As with all laboratory tests, results should be correlated with clinical  findings.        EKG:       Imaging:  < from: Xray Chest 1 View-PORTABLE IMMEDIATE (03.27.20 @ 20:01) >    AP radiograph of the chest demonstrates slight improvement in BILATERAL perihilar infiltrates. The cardiac silhouette is normal in size. Osseous structures are intact.    Impression:slight improvement in BILATERAL perihilar infiltrates.      < end of copied text >

## 2020-03-27 NOTE — ED PROVIDER NOTE - PROGRESS NOTE DETAILS
Jovi Starks DO, PGY-3: pt holding 90-92% sat on NRB, still appears sob and sat abruptly drops even when NRB taken off briefly. Attending would like an ICU consult given pt likely to decline in next 24hrs. Jovi Starks DO, PGY-3: MICU consulted for patient's persistent hypoxia and tenuous respiratory status. MICU currently cannot offer bed unless pt intubated. Will continue to monitor situation. hypoxic on 15 l of o2 ,will be intubating the patient Allison Henley M.D. Resident  Pt hypoxic to 83 % on 15L NC aqnd 15L NRB. repositioned pt in bed, spo2 89%, with significant work of breathing, able to speak in single words. pt verbally consented to intubation for respiratory failure. Daughter aware of intubation after patient was intubated. Daughter is internist, and would appreciate updates.

## 2020-03-27 NOTE — H&P ADULT - NSHPPHYSICALEXAM_GEN_ALL_CORE
PHYSICAL EXAM:    Vital Signs Last 24 Hrs  T(C): 37.1 (27 Mar 2020 18:51), Max: 37.2 (27 Mar 2020 16:33)  T(F): 98.7 (27 Mar 2020 18:51), Max: 98.9 (27 Mar 2020 16:33)  HR: 80 (27 Mar 2020 18:51) (80 - 83)  BP: 152/63 (27 Mar 2020 18:51) (125/58 - 157/71)  BP(mean): --  RR: 30 (27 Mar 2020 18:51) (22 - 30)  SpO2: 90% (27 Mar 2020 18:51) (88% - 91%)    General: Sedated  HEENT: ETT in place  Neck: No JVD  Heart: RRR.  Normal S1 and S2.  No murmurs  Lungs: Coarse breath sounds  Abdomen: BS+, soft, NT/ND.  No organomegaly.  Skin: Warm and dry.  No rashes.  Extremities: No edema.  2+ peripheral pulses b/l.  Musculoskeletal: No edema  Neuro: Sedated

## 2020-03-27 NOTE — ED PROVIDER NOTE - CARE PLAN
Principal Discharge DX:	Dyspnea Principal Discharge DX:	Hypoxia  Secondary Diagnosis:	Respiratory failure

## 2020-03-27 NOTE — H&P ADULT - ASSESSMENT
64 M former smoker with no other known PMH who presented to [facility] on 3/23 with fevers, cough, and dyspnea 4 days prior present found to have hypoxic respiratory failure 2/2 COVID-19    #Neuro  - sedated on propofol    #Cardiovascular  - levophed titrate to maintain MAP > 65  - serial EKG for QTc check    #Pulm  Currently with hypoxic respiratory failure 2/2 COVID 19  - c/w mechanical ventilation with lung protective protocol  - titrate to ABG  - c/w albuterol MDI    #Renal  - Cr wnl  - monitor I/O  - replete electrolytes K > 4, Mg > 2    #ID  1. COVID infection  - c/w plaquenil (3/27- ), ascorbic acid, and thiamine   - c/w azithro (3/27-   - trend fever curve  - trend ferritin, CRP, CK, procal, pro-BNP, ESR, ddimer,   - c/w CTX x 5 days for empiric CAP coverage    #GI  1. Nutrition  - place OGT, CXR to confirm placement    2. Elevated transaminases   - mild elevation likely in setting of viral infection  - continue to trend      #Skin  - peripherals    #Ethics  - full code

## 2020-03-27 NOTE — ED PROCEDURE NOTE - ATTENDING CONTRIBUTION TO CARE
pt with COVID-19 with persistent, refractory hypoxia despite O2 via nasal cannula and NRB.  intubed with procedural sedation Etomidate 20mg and Alex 70 mgs.  pt intubated with VL on first pass without difficulty or trauma. Cuff pressure checked.

## 2020-03-27 NOTE — ED ADULT NURSE NOTE - OBJECTIVE STATEMENT
70 year old male presents by EMS from home stating fevers x1 week associated with a cough. Pt presents as 87% on 4LNC and tachypneic. No chest pain, reports SOB. Crackles auscultated in all lobes. Nonproductive cough. Denies abd pain, nausea, vomiting, diarrhea, or urinary symptoms. Abd soft non tender, nondistended. Moves all extremities x4. reports feeling weak and lethargic. Reports dizziness earlier in the week. Reports a baseline tremor x2 years and will be followed up by his PMD 70 year old male presents by EMS from home stating fevers x1 week associated with a cough. Pt presents as 87% on 4LNC and tachypneic. No chest pain, reports SOB. Crackles auscultated in all lobes. Nonproductive cough. Denies abd pain, nausea, vomiting, diarrhea, or urinary symptoms. Abd soft non tender, nondistended. Moves all extremities x4. reports feeling weak and lethargic. Reports dizziness earlier in the week. Reports a baseline tremor x2 years and will be followed up by his PMD. Pt denies hx of foreign travel, no hx of recent surgeries.     Wife (Leonila) [phone]/ daughter (Ileana) [phone] 70 year old male presents by EMS from home stating fevers x1 week associated with a sob/ cough. Pt presents as 87% on 4LNC and tachypneic. No chest pain, reports SOB. Crackles auscultated in all lobes. Nonproductive cough. Denies abd pain, nausea, vomiting, diarrhea, or urinary symptoms. Abd soft non tender, nondistended. Moves all extremities x4. reports feeling weak and lethargic. Reports dizziness earlier in the week. Reports a baseline tremor x2 years and will be followed up by his PMD. Pt denies hx of foreign travel, no hx of recent surgeries.     Wife (Leonila) [phone]/ daughter (Ileana) [phone]

## 2020-03-27 NOTE — CONSULT NOTE ADULT - SUBJECTIVE AND OBJECTIVE BOX
CHIEF COMPLAINT:    HPI:  70M hx dm, hypothyroid, hld, p/w cough, low grade fever x 2-3 days, now with SOB since this AM. Pt denies cp. Says he feels sob at rest and dyspneic with exertion. Non smoker, no cardiac history or stents.    PAST MEDICAL & SURGICAL HISTORY:  Hyperlipidemia  Hypothyroid  No significant past surgical history      FAMILY HISTORY:      SOCIAL HISTORY:  Smoking: __ packs x ___ years  EtOH Use:  Marital Status: yes  Occupation:  Recent Travel:  Country of Birth:  Advance Directives: full code    Allergies    No Known Allergies    Intolerances        HOME MEDICATIONS:    OBJECTIVE:  ICU Vital Signs Last 24 Hrs  T(C): 37.1 (27 Mar 2020 18:51), Max: 37.2 (27 Mar 2020 16:33)  T(F): 98.7 (27 Mar 2020 18:51), Max: 98.9 (27 Mar 2020 16:33)  HR: 80 (27 Mar 2020 18:51) (80 - 83)  BP: 152/63 (27 Mar 2020 18:51) (125/58 - 157/71)  BP(mean): --  ABP: --  ABP(mean): --  RR: 30 (27 Mar 2020 18:51) (22 - 30)  SpO2: 90% (27 Mar 2020 18:51) (88% - 91%)        CAPILLARY BLOOD GLUCOSE          PHYSICAL EXAM:  General: resp distress  HEENT: dry mucous membranes  Respiratory: tachypnea, 94% on NRB +6L NC  Cardiovascular: regular tachycardia  Abdomen: soft  Extremities: no LE edema  Neurological: AOx3    HOSPITAL MEDICATIONS:  MEDICATIONS  (STANDING):  acetaminophen   Tablet .. 650 milliGRAM(s) Oral once    MEDICATIONS  (PRN):      LABS:                        13.1   4.86  )-----------( 155      ( 27 Mar 2020 17:20 )             40.1     03-27    134<L>  |  97  |  8   ----------------------------<  194<H>  3.7   |  21<L>  |  0.61    Ca    8.4      27 Mar 2020 17:20    TPro  6.4  /  Alb  3.4  /  TBili  0.2  /  DBili  x   /  AST  65<H>  /  ALT  47<H>  /  AlkPhos  64  03-27          Venous Blood Gas:  03-27 @ 17:05  7.37/42/27/23/41  VBG Lactate: 3.1      MICROBIOLOGY:     RADIOLOGY:  [ ] Reviewed and interpreted by me    EKG: sinus tachycardia

## 2020-03-27 NOTE — H&P ADULT - ATTENDING COMMENTS
care provided 3/27/20  I have personally provided 40 minutes of critical care time concurrently with the resident/fellow.  This time excludes time spent on separate procedures and time spent teaching.  I have reviewed the resident/fellow's documentation and I agree with the assessment and plan of care.     Patient seen and examined.  Agree with resident note as above.  Patient with hx as noted including HTN DM who presents with cough/fever/dyspnea to ER. Was briefly stable on NRB and then had worsening hypoxemia and required intubation in ER- now accepted to COVID ICU for further care.  Paitent with acute resp failure due to COVID19 pneumonia.  Plan as above and I have edited as appropriate.

## 2020-03-27 NOTE — CONSULT NOTE ADULT - ATTENDING COMMENTS
Patient seen and examined.  Agree with resident note as above.  On arrival to ER to examine patient he was becoming increasingly hypoxic and required intubation - now accepted to ICU service.  Please see H&P for full details.

## 2020-03-27 NOTE — CONSULT NOTE ADULT - ASSESSMENT
70M hx dm, hypothyroid, hld, p/w cough, low grade fever x 2-3 days, now with SOB x2 days w/ hypoxemic respiratory failure and bilateral infiltrate secondary to COVID.    COVID  - Daily CBC w/ diff, Q12 CMP w/ Mg, Phos, Daily ABG w/ lactate  -would check ABG now    If PaO2>60   - Every 2-3 days ESR, t-cell, D-dimer, LDH, Ferritin, CKs, Coags, Trop, CRP  -consider intermittent proning  -consider early COVID trial  -if elevated CRP>30 could consider methylprednisolone 1mg/kg daily  -keep net even I+Os  - EKG BID while on Chloroquine, keep Mg >3 and K >4  - Vitamin C 1500mg Q6 x 6 days  - Thiamine 200mg Q12 x 6 days  - Chloroquine 500 mg BID x 5 days (If no chloroquine then hydroxycloroquine 400mg BID x 1 day, then 200mg BID x 4 days)  - can continue cftx/azithro  - monitor QTC  -f/u BCx  Not currently MICU candidate please reconsult as needed. 70M hx dm, hypothyroid, hld, p/w cough, low grade fever x 2-3 days, now with SOB x2 days w/ hypoxemic respiratory failure and bilateral infiltrate secondary to COVID.    COVID  - Daily CBC w/ diff, Q12 CMP w/ Mg, Phos, Daily ABG w/ lactate  -would check ABG now    If PaO2>60   - Every 2-3 days ESR, t-cell, D-dimer, LDH, Ferritin, CKs, Coags, Trop, CRP  -consider intermittent proning  -consider early COVID trial  -if elevated CRP>30 could consider methylprednisolone 1mg/kg daily  -keep net even I+Os  - EKG BID while on Chloroquine, keep Mg >3 and K >4  - Vitamin C 1500mg Q6 x 6 days  - Thiamine 200mg Q12 x 6 days  - Chloroquine 500 mg BID x 5 days (If no chloroquine then hydroxycloroquine 400mg BID x 1 day, then 200mg BID x 4 days)  - can continue cftx/azithro  - monitor QTC  -f/u BCx    Patient decompensated during evaluation and intubated. Care to be assumed by PICU COVID attending

## 2020-03-27 NOTE — H&P ADULT - HISTORY OF PRESENT ILLNESS
70M with PMH presents with T dm, hypothyroidism, hld, p/w cough, low grade fever x 2-3 days. Today patient had worsening dyspnea at rest as well as exertion which prompted to come to the ED. Patient otherwise denied chest pain, leg swelling. He denies prior lung disease. Denies any history of smoking.     In ED, patient was noted to be hypoxic on 6L NC to 89% and significant tachypnea. VS otherwise, afebrile, HR in 80s, SBP 150s. Labs notable for CBC wnl. CMP with mild elevated transaminases AST 65 and ALT 47. PAtient noted to be positive for COVID 19. Due to worsening respiratory distress patient was emergently intubated in the ER.

## 2020-03-28 NOTE — PROGRESS NOTE ADULT - ATTENDING COMMENTS
Patient seen and evaluated with team, agree with above.    64M PMH former smoker, HTN, HLD, and DM2 presents with acute hypoxemic respiratory failure and ARDS due to COVID-19 pneumonia with septic shock. Has diffuse B lines on ultrasound with normal LV systolic function, LV>RV, and dilated IVC. Continue hydroxychloroquine and azithromycin. ECG BID Patient seen and evaluated with team, agree with above.    64M PMH former smoker, HTN, HLD, and DM2 presents with acute hypoxemic respiratory failure and ARDS due to COVID-19 pneumonia with septic shock. Has diffuse B lines on ultrasound with normal LV systolic function, LV>RV, and dilated IVC. Continue hydroxychloroquine and azithromycin. ECG BID. Keep K>4, Mg>2.2. Start methylprednisolone. High dose vitamin C and thiamine. Will try to arrange for anakinra given cytokine storm. Lung protective ventilation. Now on volume a/c 20/420/40%/10. Close monitoring of kidney function and strict I/Os. DVT ppx with enoxaparin. Prognosis guarded, full code.    CC time spent: 35 min

## 2020-03-28 NOTE — PROGRESS NOTE ADULT - ASSESSMENT
64 M former smoker with no other known PMH who presented to [facility] on 3/23 with fevers, cough, and dyspnea 4 days prior present found to have hypoxic respiratory failure 2/2 COVID-19    #Neuro  - sedated on propofol  - Maintain Rass of -3 to -4 / vent compliance     #Cardiovascular  Distributive shock 2/2 to Covid 19  - Vasopressor support as needed for goal MAP of 65m or greater   - serial EKG for QTc check  - monitor CK   - f/u with daily POCUS for assessment of cardiac function     #Pulm  Currently with hypoxic respiratory failure 2/2 COVID 19  - c/w mechanical ventilation with lung protective protocol now on 14/420/14/80%  - titrate to ABG  - c/w albuterol MDI  - will attempt to yusuf Vent settings for goal Pao2 of 60 or greater     #Renal  - Cr wnl on admission (P) CMP this morning   - strict  I/O  - replete electrolytes K  4, Mg > 2    #ID  1. COVID infection  - c/w plaquenil (3/27- ), ascorbic acid, and thiamine   - c/w azithro (3/27-   - trend fever curve  - trend ferritin, CRP, CK, procal, pro-BNP, ESR, ddimer,   - c/w CTX x 5 days for empiric CAP coverage    #GI  1. Nutrition  - Continue with TF as tolerated   - monitor gastric residual   - bowel regimen     2. Elevated transaminases   - mild elevation likely in setting of viral infection  - continue to trend      #Ethics  - full code    VTE  - continue with heparin SQ 64 M former smoker with no other known PMH who presented to [facility] on 3/23 with fevers, cough, and dyspnea 4 days prior present found to have hypoxic respiratory failure 2/2 COVID-19    #Neuro  - sedated on propofol/versed  - Maintain Rass of -3 to -4 / vent compliance     #Cardiovascular  Distributive (Septic) Shock secondary to Covid 19  - Currently on Levophed (Goal MAP > 65)  - Monitor CK   - Daily POCUS for assessment of cardiac function     #Pulm  Acute hypoxic respiratory failure secondary to COVID 19 s/p intubation  - C/w mechanical ventilation with lung protective protocol now on 20/420/40%/14  - Titrate setting based on O2 sat/ABG  - Albuterol MDI  - Will give 40mg IV Lasix x 1 given significant B-lines on POCUS  - Peak: 30, Plateau: 20    #Renal  Anion Gap Metabolic Acidosis, Bicarb 16: (Gap 18):   - Unclear etiology at this time; Potentially lab error given normal Bicarb on ABG. No evidence of DKA, Uremic acidosis, lactic acidosis or toxicities.   - Cr/ stable (WNL)  - strict I/O (24 hour: +46.3ml)  - replete electrolytes K  4, Mg > 2  - Lasix 40mg IV x 1 given significant B-lines    #ID  COVID 19 infection  - c/w plaquenil (3/27-4/1), ascorbic acid, and thiamine   - c/w azithro (3/27- 3/31)  - trend fever curve  - trend ferritin, CRP, CK, procal, pro-BNP, ESR, ddimer,   - c/w CTX x 5 days for empiric CAP coverage    # GI/Hep  Elevated transaminases   - Mild elevation likely in setting of viral infection  - continue to trend     Nutrition  - Continue with TF as tolerated   - monitor gastric residual   - bowel regimen     #Ethics  - full code    VTE  - Switch to Lovenox given COVID infection

## 2020-03-28 NOTE — PROGRESS NOTE ADULT - SUBJECTIVE AND OBJECTIVE BOX
CHIEF COMPLAINT: (+) fever and SOB    Interval Events:  70M with PMH presents with T dm, hypothyroidism, hld, p/w cough, low grade fever x 2-3 days.  Found to be in hypoxic respiratory failure (+) COVID -19     REVIEW OF SYSTEMS:  Constitutional: [ ] fevers [ ] chills [ ] weight loss [ ] weight gain  HEENT: [ ] dry eyes [ ] eye irritation [ ] postnasal drip [ ] nasal congestion  CV: [ ] chest pain [ ] orthopnea [ ] palpitations [ ] murmur  Resp: [ ] cough [ ] shortness of breath [ ] dyspnea [ ] wheezing [ ] sputum [ ] hemoptysis  GI: [ ] nausea [ ] vomiting [ ] diarrhea [ ] constipation [ ] abd pain [ ] dysphagia   : [ ] dysuria [ ] nocturia [ ] hematuria [ ] increased urinary frequency  Musculoskeletal: [ ] back pain [ ] myalgias [ ] arthralgias [ ] fracture  Skin: [ ] rash [ ] itch  Neurological: [ ] headache [ ] dizziness [ ] syncope [ ] weakness [ ] numbness  Psychiatric: [ ] anxiety [ ] depression  Endocrine: [ ] diabetes [ ] thyroid problem  Hematologic/Lymphatic: [ ] anemia [ ] bleeding problem  Allergic/Immunologic: [ ] itchy eyes [ ] nasal discharge [ ] hives [ ] angioedema  [ ] All other systems negative  [x ] Unable to assess ROS because ________    OBJECTIVE:  ICU Vital Signs Last 24 Hrs  T(C): 37.2 (28 Mar 2020 04:00), Max: 37.6 (27 Mar 2020 23:00)  T(F): 99 (28 Mar 2020 04:00), Max: 99.7 (27 Mar 2020 23:00)  HR: 63 (28 Mar 2020 05:30) (63 - 91)  BP: 88/52 (27 Mar 2020 23:30) (74/45 - 157/71)  BP(mean): 68 (27 Mar 2020 23:30) (54 - 80)  ABP: 66/29 (28 Mar 2020 05:30) (66/29 - 129/55)  ABP(mean): 40 (28 Mar 2020 05:30) (40 - 73)  RR: 20 (28 Mar 2020 04:00) (16 - 30)  SpO2: 100% (28 Mar 2020 05:30) (88% - 100%)    Mode: AC/ CMV (Assist Control/ Continuous Mandatory Ventilation), RR (machine): 20, TV (machine): 420, FiO2: 80, PEEP: 14, ITime: 1, MAP: 19, PIP: 32     @ 07:01  -   @ 05:54  --------------------------------------------------------  IN: 560.5 mL / OUT: 560 mL / NET: 0.5 mL      CAPILLARY BLOOD GLUCOSE      POCT Blood Glucose.: 215 mg/dL (28 Mar 2020 05:38)      PHYSICAL EXAM:  General:   HEENT:   Lymph Nodes:  Neck:   Respiratory:   Cardiovascular:   Abdomen:   Extremities:   Skin:   Neurological:  Psychiatry:    LINES:  CVP A line     HOSPITAL MEDICATIONS:  MEDICATIONS  (STANDING):  ascorbic acid IVPB 1500 milliGRAM(s) IV Intermittent every 6 hours  aspirin  chewable 81 milliGRAM(s) Oral daily  azithromycin  IVPB      azithromycin  IVPB 500 milliGRAM(s) IV Intermittent every 24 hours  cefTRIAXone   IVPB 1000 milliGRAM(s) IV Intermittent every 24 hours  chlorhexidine 0.12% Liquid 15 milliLiter(s) Oral Mucosa every 12 hours  chlorhexidine 4% Liquid 1 Application(s) Topical <User Schedule>  chlorhexidine 4% Liquid 1 Application(s) Topical <User Schedule>  heparin  Injectable 5000 Unit(s) SubCutaneous every 8 hours  hydroxychloroquine   Oral   hydroxychloroquine 400 milliGRAM(s) Oral every 12 hours  hydroxychloroquine 200 milliGRAM(s) Oral every 12 hours  insulin lispro (HumaLOG) corrective regimen sliding scale   SubCutaneous every 6 hours  midazolam Infusion 0.02 mG/kG/Hr (1.54 mL/Hr) IV Continuous <Continuous>  norepinephrine Infusion 0.05 MICROgram(s)/kG/Min (7.23 mL/Hr) IV Continuous <Continuous>  pantoprazole  Injectable 40 milliGRAM(s) IV Push daily  polyethylene glycol 3350 17 Gram(s) Oral every 12 hours  propofol Infusion 15 MICROgram(s)/kG/Min (6.94 mL/Hr) IV Continuous <Continuous>  senna Syrup 10 milliLiter(s) Oral at bedtime  thiamine IVPB 200 milliGRAM(s) IV Intermittent <User Schedule>    MEDICATIONS  (PRN):  sodium chloride 0.9% lock flush 10 milliLiter(s) IV Push every 1 hour PRN Pre/post blood products, medications, blood draw, and to maintain line patency      LABS:                        13.1   4.86  )-----------( 155      ( 27 Mar 2020 17:20 )             40.1     Hgb Trend: 13.1<--      134<L>  |  97  |  8   ----------------------------<  194<H>  3.7   |  21<L>  |  0.61    Ca    8.4      27 Mar 2020 17:20  Phos  3.8       Mg     2.1         TPro  6.4  /  Alb  3.4  /  TBili  0.2  /  DBili  x   /  AST  65<H>  /  ALT  47<H>  /  AlkPhos  64      Creatinine Trend: 0.61<--  PT/INR - ( 28 Mar 2020 01:58 )   PT: 10.9 sec;   INR: 0.96 ratio         PTT - ( 28 Mar 2020 01:58 )  PTT:32.4 sec  Urinalysis Basic - ( 27 Mar 2020 23:52 )    Color: Yellow / Appearance: Clear / S.022 / pH: x  Gluc: x / Ketone: Negative  / Bili: Negative / Urobili: Negative   Blood: x / Protein: 100 / Nitrite: Negative   Leuk Esterase: Negative / RBC: 5 /hpf / WBC 2 /HPF   Sq Epi: x / Non Sq Epi: 3 /hpf / Bacteria: Negative      Arterial Blood Gas:   @ 01:30  7.32/44/126/22/98/-3.5  ABG lactate: --    Venous Blood Gas:   @ 17:05  7.37/42/27/23/41  VBG Lactate: 3.1      MICROBIOLOGY:   (P)   RADIOLOGY:  < from: Xray Chest 1 View-PORTABLE IMMEDIATE (20 @ 20:01) >  Chest:  one view.      Comparison: 2020    AP radiograph of the chest demonstrates slight improvement in BILATERAL perihilar infiltrates. The cardiac silhouette is normal in size. Osseous structures are intact.    Impression:slight improvement in BILATERAL perihilar infiltrates.    < from: Xray Chest 1 View- PORTABLE-Urgent (20 @ 17:25) >  FINDINGS:    The heart size cannot be assessed on this projection.  Patchy bilateral lung opacities.  No pleural effusion or pneumothorax.    IMPRESSION:  Patchy bilateral lung opacities. Compatible with pneumonia.      < end of copied text >        < end of copied text >      EKG:      Veronika Stanford [facility] - [place]  ex 1621 CHIEF COMPLAINT: (+) fever and SOB    Interval Events:  70M with PMH presents with T dm, hypothyroidism, hld, p/w cough, low grade fever x 2-3 days.  Found to be in hypoxic respiratory failure (+) COVID -19     REVIEW OF SYSTEMS:  Constitutional: [ ] fevers [ ] chills [ ] weight loss [ ] weight gain  HEENT: [ ] dry eyes [ ] eye irritation [ ] postnasal drip [ ] nasal congestion  CV: [ ] chest pain [ ] orthopnea [ ] palpitations [ ] murmur  Resp: [ ] cough [ ] shortness of breath [ ] dyspnea [ ] wheezing [ ] sputum [ ] hemoptysis  GI: [ ] nausea [ ] vomiting [ ] diarrhea [ ] constipation [ ] abd pain [ ] dysphagia   : [ ] dysuria [ ] nocturia [ ] hematuria [ ] increased urinary frequency  Musculoskeletal: [ ] back pain [ ] myalgias [ ] arthralgias [ ] fracture  Skin: [ ] rash [ ] itch  Neurological: [ ] headache [ ] dizziness [ ] syncope [ ] weakness [ ] numbness  Psychiatric: [ ] anxiety [ ] depression  Endocrine: [ ] diabetes [ ] thyroid problem  Hematologic/Lymphatic: [ ] anemia [ ] bleeding problem  Allergic/Immunologic: [ ] itchy eyes [ ] nasal discharge [ ] hives [ ] angioedema  [ ] All other systems negative  [x ] Unable to assess ROS because intubated/sedated    OBJECTIVE:  ICU Vital Signs Last 24 Hrs  T(C): 37.2 (28 Mar 2020 04:00), Max: 37.6 (27 Mar 2020 23:00)  T(F): 99 (28 Mar 2020 04:00), Max: 99.7 (27 Mar 2020 23:00)  HR: 63 (28 Mar 2020 05:30) (63 - 91)  BP: 88/52 (27 Mar 2020 23:30) (74/45 - 157/71)  BP(mean): 68 (27 Mar 2020 23:30) (54 - 80)  ABP: 66/29 (28 Mar 2020 05:30) (66/29 - 129/55)  ABP(mean): 40 (28 Mar 2020 05:30) (40 - 73)  RR: 20 (28 Mar 2020 04:00) (16 - 30)  SpO2: 100% (28 Mar 2020 05:30) (88% - 100%)    Mode: AC/ CMV (Assist Control/ Continuous Mandatory Ventilation), RR (machine): 20, TV (machine): 420, FiO2: 80, PEEP: 14, ITime: 1, MAP: 19, PIP: 32    03- @ 07:01  -   @ 05:54  --------------------------------------------------------  IN: 560.5 mL / OUT: 560 mL / NET: 0.5 mL    CAPILLARY BLOOD GLUCOSE    POCT Blood Glucose.: 215 mg/dL (28 Mar 2020 05:38)    PHYSICAL EXAM:  General: NAD  HEENT: NC/AT  Respiratory: Normal breath sounds/Intubated  Cardiovascular: RRR, S1+S2  Abdomen: NT, ND, BS+. Soft  Extremities: No edema or cyanosis  Skin: Warm/Dry  Neurological: Intubated/Sedated    LINES:  CVP A line     [facility] MEDICATIONS:  MEDICATIONS  (STANDING):  ascorbic acid IVPB 1500 milliGRAM(s) IV Intermittent every 6 hours  aspirin  chewable 81 milliGRAM(s) Oral daily  azithromycin  IVPB      azithromycin  IVPB 500 milliGRAM(s) IV Intermittent every 24 hours  cefTRIAXone   IVPB 1000 milliGRAM(s) IV Intermittent every 24 hours  chlorhexidine 0.12% Liquid 15 milliLiter(s) Oral Mucosa every 12 hours  chlorhexidine 4% Liquid 1 Application(s) Topical <User Schedule>  chlorhexidine 4% Liquid 1 Application(s) Topical <User Schedule>  heparin  Injectable 5000 Unit(s) SubCutaneous every 8 hours  hydroxychloroquine   Oral   hydroxychloroquine 400 milliGRAM(s) Oral every 12 hours  hydroxychloroquine 200 milliGRAM(s) Oral every 12 hours  insulin lispro (HumaLOG) corrective regimen sliding scale   SubCutaneous every 6 hours  midazolam Infusion 0.02 mG/kG/Hr (1.54 mL/Hr) IV Continuous <Continuous>  norepinephrine Infusion 0.05 MICROgram(s)/kG/Min (7.23 mL/Hr) IV Continuous <Continuous>  pantoprazole  Injectable 40 milliGRAM(s) IV Push daily  polyethylene glycol 3350 17 Gram(s) Oral every 12 hours  propofol Infusion 15 MICROgram(s)/kG/Min (6.94 mL/Hr) IV Continuous <Continuous>  senna Syrup 10 milliLiter(s) Oral at bedtime  thiamine IVPB 200 milliGRAM(s) IV Intermittent <User Schedule>    MEDICATIONS  (PRN):  sodium chloride 0.9% lock flush 10 milliLiter(s) IV Push every 1 hour PRN Pre/post blood products, medications, blood draw, and to maintain line patency    LABS:                        13.1   4.86  )-----------( 155      ( 27 Mar 2020 17:20 )             40.1     Hgb Trend: 13.1<--      134<L>  |  97  |  8   ----------------------------<  194<H>  3.7   |  21<L>  |  0.61    Ca    8.4      27 Mar 2020 17:20  Phos  3.8       Mg     2.1         TPro  6.4  /  Alb  3.4  /  TBili  0.2  /  DBili  x   /  AST  65<H>  /  ALT  47<H>  /  AlkPhos  64      Creatinine Trend: 0.61<--  PT/INR - ( 28 Mar 2020 01:58 )   PT: 10.9 sec;   INR: 0.96 ratio       PTT - ( 28 Mar 2020 01:58 )  PTT:32.4 sec  Urinalysis Basic - ( 27 Mar 2020 23:52 )    Color: Yellow / Appearance: Clear / S.022 / pH: x  Gluc: x / Ketone: Negative  / Bili: Negative / Urobili: Negative   Blood: x / Protein: 100 / Nitrite: Negative   Leuk Esterase: Negative / RBC: 5 /hpf / WBC 2 /HPF   Sq Epi: x / Non Sq Epi: 3 /hpf / Bacteria: Negative    Arterial Blood Gas:   @ 01:30  7.32/44/126/22/98/-3.5  ABG lactate: --    Venous Blood Gas:   @ 17:05  7.37/42/27/23/41  VBG Lactate: 3.1    MICROBIOLOGY:   (P)   RADIOLOGY:  < from: Xray Chest 1 View-PORTABLE IMMEDIATE (20 @ 20:01) >  Chest:  one view.      Comparison: 2020    AP radiograph of the chest demonstrates slight improvement in BILATERAL perihilar infiltrates. The cardiac silhouette is normal in size. Osseous structures are intact.    Impression:slight improvement in BILATERAL perihilar infiltrates.    < from: Xray Chest 1 View- PORTABLE-Urgent (20 @ 17:25) >  FINDINGS:    The heart size cannot be assessed on this projection.  Patchy bilateral lung opacities.  No pleural effusion or pneumothorax.    IMPRESSION:  Patchy bilateral lung opacities. Compatible with pneumonia.      < end of copied text >    < end of copied text >    EKG:      Veronika Stanford [facility] - [place]  ex 1622 CHIEF COMPLAINT: (+) fever and SOB    Interval Events:  70M with PMH presents with T dm, hypothyroidism, hld, p/w cough, low grade fever x 2-3 days. Found to be in hypoxic respiratory failure (+) COVID -19     REVIEW OF SYSTEMS:  Constitutional: [ ] fevers [ ] chills [ ] weight loss [ ] weight gain  HEENT: [ ] dry eyes [ ] eye irritation [ ] postnasal drip [ ] nasal congestion  CV: [ ] chest pain [ ] orthopnea [ ] palpitations [ ] murmur  Resp: [ ] cough [ ] shortness of breath [ ] dyspnea [ ] wheezing [ ] sputum [ ] hemoptysis  GI: [ ] nausea [ ] vomiting [ ] diarrhea [ ] constipation [ ] abd pain [ ] dysphagia   : [ ] dysuria [ ] nocturia [ ] hematuria [ ] increased urinary frequency  Musculoskeletal: [ ] back pain [ ] myalgias [ ] arthralgias [ ] fracture  Skin: [ ] rash [ ] itch  Neurological: [ ] headache [ ] dizziness [ ] syncope [ ] weakness [ ] numbness  Psychiatric: [ ] anxiety [ ] depression  Endocrine: [ ] diabetes [ ] thyroid problem  Hematologic/Lymphatic: [ ] anemia [ ] bleeding problem  Allergic/Immunologic: [ ] itchy eyes [ ] nasal discharge [ ] hives [ ] angioedema  [ ] All other systems negative  [x ] Unable to assess ROS because intubated/sedated    OBJECTIVE:  ICU Vital Signs Last 24 Hrs  T(C): 37.2 (28 Mar 2020 04:00), Max: 37.6 (27 Mar 2020 23:00)  T(F): 99 (28 Mar 2020 04:00), Max: 99.7 (27 Mar 2020 23:00)  HR: 63 (28 Mar 2020 05:30) (63 - 91)  BP: 88/52 (27 Mar 2020 23:30) (74/45 - 157/71)  BP(mean): 68 (27 Mar 2020 23:30) (54 - 80)  ABP: 66/29 (28 Mar 2020 05:30) (66/29 - 129/55)  ABP(mean): 40 (28 Mar 2020 05:30) (40 - 73)  RR: 20 (28 Mar 2020 04:00) (16 - 30)  SpO2: 100% (28 Mar 2020 05:30) (88% - 100%)    Mode: AC/ CMV (Assist Control/ Continuous Mandatory Ventilation), RR (machine): 20, TV (machine): 420, FiO2: 80, PEEP: 14, ITime: 1, MAP: 19, PIP: 32    03-27 @ 07:01  -   @ 05:54  --------------------------------------------------------  IN: 560.5 mL / OUT: 560 mL / NET: 0.5 mL    CAPILLARY BLOOD GLUCOSE    POCT Blood Glucose.: 215 mg/dL (28 Mar 2020 05:38)    PHYSICAL EXAM:  General: NAD  HEENT: NC/AT  Respiratory: Normal breath sounds anteriorly /Intubated  Cardiovascular: RRR, S1+S2  Abdomen: NT, ND, BS+. Soft  Extremities: No edema or cyanosis  Skin: Cool extremities with palpable pulses  Neurological: Intubated/Sedated    LINES:  CVP A line     HOSPITAL MEDICATIONS:  MEDICATIONS  (STANDING):  ascorbic acid IVPB 1500 milliGRAM(s) IV Intermittent every 6 hours  aspirin  chewable 81 milliGRAM(s) Oral daily  azithromycin  IVPB      azithromycin  IVPB 500 milliGRAM(s) IV Intermittent every 24 hours  cefTRIAXone   IVPB 1000 milliGRAM(s) IV Intermittent every 24 hours  chlorhexidine 0.12% Liquid 15 milliLiter(s) Oral Mucosa every 12 hours  chlorhexidine 4% Liquid 1 Application(s) Topical <User Schedule>  chlorhexidine 4% Liquid 1 Application(s) Topical <User Schedule>  heparin  Injectable 5000 Unit(s) SubCutaneous every 8 hours  hydroxychloroquine   Oral   hydroxychloroquine 400 milliGRAM(s) Oral every 12 hours  hydroxychloroquine 200 milliGRAM(s) Oral every 12 hours  insulin lispro (HumaLOG) corrective regimen sliding scale   SubCutaneous every 6 hours  midazolam Infusion 0.02 mG/kG/Hr (1.54 mL/Hr) IV Continuous <Continuous>  norepinephrine Infusion 0.05 MICROgram(s)/kG/Min (7.23 mL/Hr) IV Continuous <Continuous>  pantoprazole  Injectable 40 milliGRAM(s) IV Push daily  polyethylene glycol 3350 17 Gram(s) Oral every 12 hours  propofol Infusion 15 MICROgram(s)/kG/Min (6.94 mL/Hr) IV Continuous <Continuous>  senna Syrup 10 milliLiter(s) Oral at bedtime  thiamine IVPB 200 milliGRAM(s) IV Intermittent <User Schedule>    MEDICATIONS  (PRN):  sodium chloride 0.9% lock flush 10 milliLiter(s) IV Push every 1 hour PRN Pre/post blood products, medications, blood draw, and to maintain line patency    LABS:                        13.1   4.86  )-----------( 155      ( 27 Mar 2020 17:20 )             40.1     Hgb Trend: 13.1<--      134<L>  |  97  |  8   ----------------------------<  194<H>  3.7   |  21<L>  |  0.61    Ca    8.4      27 Mar 2020 17:20  Phos  3.8       Mg     2.1         TPro  6.4  /  Alb  3.4  /  TBili  0.2  /  DBili  x   /  AST  65<H>  /  ALT  47<H>  /  AlkPhos  64      Creatinine Trend: 0.61<--  PT/INR - ( 28 Mar 2020 01:58 )   PT: 10.9 sec;   INR: 0.96 ratio       PTT - ( 28 Mar 2020 01:58 )  PTT:32.4 sec  Urinalysis Basic - ( 27 Mar 2020 23:52 )    Color: Yellow / Appearance: Clear / S.022 / pH: x  Gluc: x / Ketone: Negative  / Bili: Negative / Urobili: Negative   Blood: x / Protein: 100 / Nitrite: Negative   Leuk Esterase: Negative / RBC: 5 /hpf / WBC 2 /HPF   Sq Epi: x / Non Sq Epi: 3 /hpf / Bacteria: Negative    Arterial Blood Gas:   @ 01:30  7.32/44/126/22/98/-3.5  ABG lactate: --    Venous Blood Gas:   @ 17:05  7.37/42/27/23/41  VBG Lactate: 3.1    MICROBIOLOGY:   (P)   RADIOLOGY:  < from: Xray Chest 1 View-PORTABLE IMMEDIATE (20 @ 20:01) >  Chest:  one view.      Comparison: 2020    AP radiograph of the chest demonstrates slight improvement in BILATERAL perihilar infiltrates. The cardiac silhouette is normal in size. Osseous structures are intact.    Impression:slight improvement in BILATERAL perihilar infiltrates.    < from: Xray Chest 1 View- PORTABLE-Urgent (20 @ 17:25) >  FINDINGS:    The heart size cannot be assessed on this projection.  Patchy bilateral lung opacities.  No pleural effusion or pneumothorax.    IMPRESSION:  Patchy bilateral lung opacities. Compatible with pneumonia.      < end of copied text >    < end of copied text >    EKG:      Veronika [facility] - [place]  ex 1622

## 2020-03-29 NOTE — PROGRESS NOTE ADULT - ASSESSMENT
64 M former smoker with no other known PMH who presented to [facility] on 3/23 with fevers, cough, and dyspnea 4 days prior present found to have hypoxic respiratory failure 2/2 COVID-19    #Neuro  - sedated on propofol/versed  - Maintain Rass of -3 to -4 / vent compliance     #Cardiovascular  Distributive (Septic) Shock secondary to Covid 19  - Currently on Levophed (Goal MAP > 65)  - Monitor CK   - Daily POCUS for assessment of cardiac function     #Pulm  Acute hypoxic respiratory failure secondary to COVID 19 s/p intubation  - C/w mechanical ventilation with lung protective protocol now on 20/420/40%/8  - Titrate setting based on O2 sat/ABG  - Albuterol MDI  - may need additional  Lasix prior to extubation     #Renal  Anion Gap Metabolic Acidosis, Bicarb 16: (Gap 18):   - Unclear etiology at this time; Potentially lab error given normal Bicarb on ABG. No evidence of DKA, Uremic acidosis, lactic acidosis or toxicities.   - Cr/ stable (WNL)  - strict I/O (24 hour: +46.3ml)  - replete electrolytes K  4, Mg > 2      #ID  COVID 19 infection  - c/w plaquenil (3/27-4/1), ascorbic acid, and thiamine   - c/w azithro (3/27- 3/31)  - trend fever curve  - trend ferritin, CRP, CK, procal, pro-BNP, ESR, ddimer,   - c/w CTX x 5 days for empiric CAP coverage    # GI/Hep  Elevated transaminases   - Mild elevation likely in setting of viral infection  - continue to trend     Nutrition  - Continue with TF as tolerated   - monitor gastric residual   - bowel regimen     #Ethics  - full code    VTE  - Switch to Lovenox given COVID infection

## 2020-03-29 NOTE — PROGRESS NOTE ADULT - SUBJECTIVE AND OBJECTIVE BOX
CHIEF COMPLAINT: Hypoxic respiratory failure     Interval Events:  70M with PMH presents with T dm, hypothyroidism, hld, p/w cough, low grade fever x 2-3 days. Found to be in hypoxic respiratory failure (+) COVID -19     Overnight:         REVIEW OF SYSTEMS:  Constitutional: [ ] fevers [ ] chills [ ] weight loss [ ] weight gain  HEENT: [ ] dry eyes [ ] eye irritation [ ] postnasal drip [ ] nasal congestion  CV: [ ] chest pain [ ] orthopnea [ ] palpitations [ ] murmur  Resp: [ ] cough [ ] shortness of breath [ ] dyspnea [ ] wheezing [ ] sputum [ ] hemoptysis  GI: [ ] nausea [ ] vomiting [ ] diarrhea [ ] constipation [ ] abd pain [ ] dysphagia   : [ ] dysuria [ ] nocturia [ ] hematuria [ ] increased urinary frequency  Musculoskeletal: [ ] back pain [ ] myalgias [ ] arthralgias [ ] fracture  Skin: [ ] rash [ ] itch  Neurological: [ ] headache [ ] dizziness [ ] syncope [ ] weakness [ ] numbness  Psychiatric: [ ] anxiety [ ] depression  Endocrine: [ ] diabetes [ ] thyroid problem  Hematologic/Lymphatic: [ ] anemia [ ] bleeding problem  Allergic/Immunologic: [ ] itchy eyes [ ] nasal discharge [ ] hives [ ] angioedema  [ ] All other systems negative  [ ] Unable to assess ROS because ________    OBJECTIVE:  ICU Vital Signs Last 24 Hrs  T(C): 37.2 (29 Mar 2020 04:00), Max: 37.2 (29 Mar 2020 04:00)  T(F): 98.9 (29 Mar 2020 04:00), Max: 98.9 (29 Mar 2020 04:00)  HR: 70 (29 Mar 2020 06:45) (49 - 94)  BP: --  BP(mean): --  ABP: 118/51 (29 Mar 2020 06:45) (39/41 - 148/57)  ABP(mean): 73 (29 Mar 2020 06:45) (39 - 92)  RR: 20 (28 Mar 2020 08:00) (20 - 20)  SpO2: 90% (29 Mar 2020 06:45) (88% - 100%)    Mode: AC/ CMV (Assist Control/ Continuous Mandatory Ventilation), RR (machine): 20, TV (machine): 420, FiO2: 40, PEEP: 8, ITime: 1, MAP: 14, PIP: 27    03-28 @ 07:01  -   @ 07:00  --------------------------------------------------------  IN: 2218.1 mL / OUT: 4145 mL / NET: -1926.9 mL      CAPILLARY BLOOD GLUCOSE      POCT Blood Glucose.: 191 mg/dL (29 Mar 2020 05:45)      PHYSICAL EXAM:  General:   HEENT:   Lymph Nodes:  Neck:   Respiratory:   Cardiovascular:   Abdomen:   Extremities:   Skin:   Neurological:  Psychiatry:    LINES:    HOSPITAL MEDICATIONS:  MEDICATIONS  (STANDING):  anakinra Injectable 100 milliGRAM(s) SubCutaneous <User Schedule>  ascorbic acid IVPB 1500 milliGRAM(s) IV Intermittent every 6 hours  aspirin  chewable 81 milliGRAM(s) Oral daily  azithromycin  IVPB      azithromycin  IVPB 500 milliGRAM(s) IV Intermittent every 24 hours  cefTRIAXone   IVPB 1000 milliGRAM(s) IV Intermittent every 24 hours  chlorhexidine 0.12% Liquid 15 milliLiter(s) Oral Mucosa every 12 hours  chlorhexidine 4% Liquid 1 Application(s) Topical <User Schedule>  chlorhexidine 4% Liquid 1 Application(s) Topical <User Schedule>  enoxaparin Injectable 40 milliGRAM(s) SubCutaneous two times a day  hydroxychloroquine   Oral   hydroxychloroquine 200 milliGRAM(s) Oral every 12 hours  insulin lispro (HumaLOG) corrective regimen sliding scale   SubCutaneous every 6 hours  methylPREDNISolone sodium succinate Injectable 40 milliGRAM(s) IV Push every 12 hours  midazolam Infusion 0.02 mG/kG/Hr (1.54 mL/Hr) IV Continuous <Continuous>  norepinephrine Infusion 0.05 MICROgram(s)/kG/Min (7.23 mL/Hr) IV Continuous <Continuous>  pantoprazole  Injectable 40 milliGRAM(s) IV Push daily  phenylephrine    Infusion 0.4 MICROgram(s)/kG/Min (11.6 mL/Hr) IV Continuous <Continuous>  polyethylene glycol 3350 17 Gram(s) Oral every 12 hours  propofol Infusion 15 MICROgram(s)/kG/Min (6.94 mL/Hr) IV Continuous <Continuous>  senna Syrup 10 milliLiter(s) Oral at bedtime  thiamine IVPB 200 milliGRAM(s) IV Intermittent <User Schedule>  vasopressin Infusion 0.04 Unit(s)/Min (2.4 mL/Hr) IV Continuous <Continuous>    MEDICATIONS  (PRN):  sodium chloride 0.9% lock flush 10 milliLiter(s) IV Push every 1 hour PRN Pre/post blood products, medications, blood draw, and to maintain line patency      LABS:                        12.1   6.17  )-----------( 162      ( 29 Mar 2020 00:38 )             36.1     Hgb Trend: 12.1<--, 13.1<--      140  |  109<H>  |  7   ----------------------------<  211<H>  4.0   |  19<L>  |  0.68    Ca    8.0<L>      29 Mar 2020 00:32  Phos  2.5       Mg     2.1         TPro  5.7<L>  /  Alb  2.3<L>  /  TBili  0.2  /  DBili  x   /  AST  49<H>  /  ALT  44  /  AlkPhos  55      Creatinine Trend: 0.68<--, 0.67<--, 0.61<--  PT/INR - ( 28 Mar 2020 01:58 )   PT: 10.9 sec;   INR: 0.96 ratio         PTT - ( 28 Mar 2020 01:58 )  PTT:32.4 sec  Urinalysis Basic - ( 27 Mar 2020 23:52 )    Color: Yellow / Appearance: Clear / S.022 / pH: x  Gluc: x / Ketone: Negative  / Bili: Negative / Urobili: Negative   Blood: x / Protein: 100 / Nitrite: Negative   Leuk Esterase: Negative / RBC: 5 /hpf / WBC 2 /HPF   Sq Epi: x / Non Sq Epi: 3 /hpf / Bacteria: Negative      Arterial Blood Gas:   @ 23:52  7.38/39/62/23/92/-1.6  ABG lactate: --  Arterial Blood Gas:   @ 19:33  7.36/39/67/21/93/-3.3  ABG lactate: --  Arterial Blood Gas:   @ 01:30  7.32/44/126/22/98/-3.5  ABG lactate: --    Venous Blood Gas:   @ 17:05  7.37/42//  VBG Lactate: 3.1      MICROBIOLOGY:     RADIOLOGY:  [ ] Reviewed and interpreted by me    EKG:      Veronika Stanford [facility] - BC  ex [id number] CHIEF COMPLAINT: Hypoxic respiratory failure     Interval Events:  70M with PMH presents with T dm, hypothyroidism, hld, p/w cough, low grade fever x 2-3 days. Found to be in hypoxic respiratory failure (+) COVID -19     Overnight:   No events tolerating Peep of 8 (+) bradycardia over night        REVIEW OF SYSTEMS:  Constitutional: [ ] fevers [ ] chills [ ] weight loss [ ] weight gain  HEENT: [ ] dry eyes [ ] eye irritation [ ] postnasal drip [ ] nasal congestion  CV: [ ] chest pain [ ] orthopnea [ ] palpitations [ ] murmur  Resp: [ ] cough [ ] shortness of breath [ ] dyspnea [ ] wheezing [ ] sputum [ ] hemoptysis  GI: [ ] nausea [ ] vomiting [ ] diarrhea [ ] constipation [ ] abd pain [ ] dysphagia   : [ ] dysuria [ ] nocturia [ ] hematuria [ ] increased urinary frequency  Musculoskeletal: [ ] back pain [ ] myalgias [ ] arthralgias [ ] fracture  Skin: [ ] rash [ ] itch  Neurological: [ ] headache [ ] dizziness [ ] syncope [ ] weakness [ ] numbness  Psychiatric: [ ] anxiety [ ] depression  Endocrine: [ ] diabetes [ ] thyroid problem  Hematologic/Lymphatic: [ ] anemia [ ] bleeding problem  Allergic/Immunologic: [ ] itchy eyes [ ] nasal discharge [ ] hives [ ] angioedema  [ ] All other systems negative  [x ] Unable to assess ROS because ________    OBJECTIVE:  ICU Vital Signs Last 24 Hrs  T(C): 37.2 (29 Mar 2020 04:00), Max: 37.2 (29 Mar 2020 04:00)  T(F): 98.9 (29 Mar 2020 04:00), Max: 98.9 (29 Mar 2020 04:00)  HR: 70 (29 Mar 2020 06:45) (49 - 94)  BP: --  BP(mean): --  ABP: 118/51 (29 Mar 2020 06:45) (39/41 - 148/57)  ABP(mean): 73 (29 Mar 2020 06:45) (39 - 92)  RR: 20 (28 Mar 2020 08:00) (20 - 20)  SpO2: 90% (29 Mar 2020 06:45) (88% - 100%)    Mode: AC/ CMV (Assist Control/ Continuous Mandatory Ventilation), RR (machine): 20, TV (machine): 420, FiO2: 40, PEEP: 8, ITime: 1, MAP: 14, PIP: 27     @ 07:01  -   @ 07:00  --------------------------------------------------------  IN: 2218.1 mL / OUT: 4145 mL / NET: -1926.9 mL      CAPILLARY BLOOD GLUCOSE      POCT Blood Glucose.: 191 mg/dL (29 Mar 2020 05:45)      PHYSICAL EXAM:  General:   HEENT:   Lymph Nodes:  Neck:   Respiratory:   Cardiovascular:   Abdomen:   Extremities:   Skin:   Neurological:  Psychiatry:    LINES:    HOSPITAL MEDICATIONS:  MEDICATIONS  (STANDING):  anakinra Injectable 100 milliGRAM(s) SubCutaneous <User Schedule>  ascorbic acid IVPB 1500 milliGRAM(s) IV Intermittent every 6 hours  aspirin  chewable 81 milliGRAM(s) Oral daily  azithromycin  IVPB      azithromycin  IVPB 500 milliGRAM(s) IV Intermittent every 24 hours  cefTRIAXone   IVPB 1000 milliGRAM(s) IV Intermittent every 24 hours  chlorhexidine 0.12% Liquid 15 milliLiter(s) Oral Mucosa every 12 hours  chlorhexidine 4% Liquid 1 Application(s) Topical <User Schedule>  chlorhexidine 4% Liquid 1 Application(s) Topical <User Schedule>  enoxaparin Injectable 40 milliGRAM(s) SubCutaneous two times a day  hydroxychloroquine   Oral   hydroxychloroquine 200 milliGRAM(s) Oral every 12 hours  insulin lispro (HumaLOG) corrective regimen sliding scale   SubCutaneous every 6 hours  methylPREDNISolone sodium succinate Injectable 40 milliGRAM(s) IV Push every 12 hours  midazolam Infusion 0.02 mG/kG/Hr (1.54 mL/Hr) IV Continuous <Continuous>  norepinephrine Infusion 0.05 MICROgram(s)/kG/Min (7.23 mL/Hr) IV Continuous <Continuous>  pantoprazole  Injectable 40 milliGRAM(s) IV Push daily  phenylephrine    Infusion 0.4 MICROgram(s)/kG/Min (11.6 mL/Hr) IV Continuous <Continuous>  polyethylene glycol 3350 17 Gram(s) Oral every 12 hours  propofol Infusion 15 MICROgram(s)/kG/Min (6.94 mL/Hr) IV Continuous <Continuous>  senna Syrup 10 milliLiter(s) Oral at bedtime  thiamine IVPB 200 milliGRAM(s) IV Intermittent <User Schedule>  vasopressin Infusion 0.04 Unit(s)/Min (2.4 mL/Hr) IV Continuous <Continuous>    MEDICATIONS  (PRN):  sodium chloride 0.9% lock flush 10 milliLiter(s) IV Push every 1 hour PRN Pre/post blood products, medications, blood draw, and to maintain line patency      LABS:                        12.1   6.17  )-----------( 162      ( 29 Mar 2020 00:38 )             36.1     Hgb Trend: 12.1<--, 13.1<--      140  |  109<H>  |  7   ----------------------------<  211<H>  4.0   |  19<L>  |  0.68    Ca    8.0<L>      29 Mar 2020 00:32  Phos  2.5       Mg     2.1         TPro  5.7<L>  /  Alb  2.3<L>  /  TBili  0.2  /  DBili  x   /  AST  49<H>  /  ALT  44  /  AlkPhos  55      Creatinine Trend: 0.68<--, 0.67<--, 0.61<--  PT/INR - ( 28 Mar 2020 01:58 )   PT: 10.9 sec;   INR: 0.96 ratio         PTT - ( 28 Mar 2020 01:58 )  PTT:32.4 sec  Urinalysis Basic - ( 27 Mar 2020 23:52 )    Color: Yellow / Appearance: Clear / S.022 / pH: x  Gluc: x / Ketone: Negative  / Bili: Negative / Urobili: Negative   Blood: x / Protein: 100 / Nitrite: Negative   Leuk Esterase: Negative / RBC: 5 /hpf / WBC 2 /HPF   Sq Epi: x / Non Sq Epi: 3 /hpf / Bacteria: Negative      Arterial Blood Gas:   @ 23:52  7.38/39/62/23/92/-1.6  ABG lactate: --  Arterial Blood Gas:   @ 19:33  7.36/39/67//93/-3.3  ABG lactate: --  Arterial Blood Gas:   @ 01:30  7.32/44/126/22/98/-3.5  ABG lactate: --    Venous Blood Gas:   @ 17:05  7.37/42/27/23/41  VBG Lactate: 3.1      MICROBIOLOGY:   Culture - Sputum . (20 @ 06:14)    Gram Stain:   Rare Squamous epithelial cells per low power field  Few polymorphonuclear leukocytes per low power field  No organisms seen    Specimen Source: .Sputum Sputum    Culture - Urine (20 @ 03:35)    Specimen Source: .Urine Catheterized    Culture Results:   No growth    Culture - Blood (20 @ 21:20)    Specimen Source: .Blood Blood-Peripheral    Culture Results:   No growth to date.    Culture - Blood (20 @ 21:20)    Specimen Source: .Blood Blood-Peripheral    Culture Results:   No growth to date.    RADIOLOGY:    < from: Xray Chest 1 View- PORTABLE-Urgent (20 @ 02:08) >    IMPRESSION:     Lines and tubes in good position.    Unchanged diffuse bilateral patchy opacities..    < end of copied text >      EKG:        Veronika Stanford [facility] - [facility]  ex [id number]

## 2020-03-29 NOTE — PROGRESS NOTE ADULT - ATTENDING COMMENTS
Patient seen and evaluated with team, agree with above.    64M PMH former smoker, HTN, HLD, and DM2 presents with acute hypoxemic respiratory failure and ARDS due to COVID-19 pneumonia with septic shock. Has diffuse B lines on ultrasound with normal LV systolic function, LV>RV, and dilated IVC. Continue hydroxychloroquine and azithromycin. ECG BID, QTc 434. Keep K>4, Mg>2.2. Continue methylprednisolone. Start anakinra for cytokine storm. Check quantiferon. High dose vitamin C and thiamine. Lung protective ventilation. Now on volume a/c 20/420/60%/8. Close monitoring of kidney function and strict I/Os. Diuresis with furosemide. DVT ppx with enoxaparin. Resolved hypothermia. Sinus bradycardia. Prognosis guarded, full code.    CC time spent: 35 min Patient seen and evaluated with team, agree with above.    64M PMH former smoker, HTN, HLD, and DM2 presents with acute hypoxemic respiratory failure and ARDS due to COVID-19 pneumonia with septic shock. Has diffuse B lines on ultrasound with normal LV systolic function, LV>RV, and dilated IVC. Continue hydroxychloroquine and azithromycin. ECG BID, QTc 434. Keep K>4, Mg>2.2. Continue methylprednisolone. Start anakinra for cytokine storm. Check quantiferon. High dose vitamin C and thiamine. Lung protective ventilation. Now on volume a/c 20/420/60%/8. Pplat 20, driving pressure 14. Close monitoring of kidney function and strict I/Os. Diuresis with furosemide. DVT ppx with enoxaparin. Resolved hypothermia. Sinus bradycardia. Prognosis guarded, full code.    CC time spent: 35 min

## 2020-03-30 NOTE — PROGRESS NOTE ADULT - ATTENDING COMMENTS
Patient seen and evaluated with team, agree with above.    64 year old former smoker with HTN, HLD, and DM2 presents with acute hypoxemic respiratory failure and ARDS due to COVID-19 pneumonia.     AHRF, ARDS, COVID-19:  Continue Plaquenil, Anakinra, Solumedrol, VC, Thiamine. Complete Azithro today. Quant pending.   Continue lung protective ventilation  Remains critically ill, on vasopressor support    RYLEE: Suspect ATN in the setting of distributive shock 2/2 COVID. Creatinine 0.68-->1.09-->1.11 UOP dropping off, redosed lasix, will need close monitoring of renal function and strict I/Os.     Sinus bradycardia: ECG reviewed. Medication list reviewed. Close eye on I/O, electrolytes. BP stable    DVT ppx with enoxaparin.   Prognosis guarded, full code.    CC time spent: 35 min

## 2020-03-30 NOTE — DIETITIAN INITIAL EVALUATION ADULT. - ENERGY NEEDS
Ht: 5'9", admission Wt: 170lbs, BMI: 25.1kg/m2, IBW: 160lbs(+/-10%), 116%IBW  Pertinent information: 70 year old male with PMHx of DM, hypothyroidism, HLD. Presented with fever, cough and SOB. Found to be COVID19+, required emergent intubation in ED.   No Edema, Skin: intact

## 2020-03-30 NOTE — PROGRESS NOTE ADULT - SUBJECTIVE AND OBJECTIVE BOX
INTERVAL HPI/OVERNIGHT EVENTS:    SUBJECTIVE: intubated.      OBJECTIVE:    VITAL SIGNS:  ICU Vital Signs Last 24 Hrs  T(C): 36.4 (30 Mar 2020 04:00), Max: 37.1 (30 Mar 2020 00:00)  T(F): 97.5 (30 Mar 2020 04:00), Max: 98.7 (30 Mar 2020 00:00)  HR: 53 (30 Mar 2020 07:00) (49 - 85)  BP: --  BP(mean): --  ABP: 113/48 (30 Mar 2020 07:00) (101/46 - 162/60)  ABP(mean): 71 (30 Mar 2020 07:00) (66 - 97)  RR: 28 (30 Mar 2020 07:00) (28 - 30)  SpO2: 94% (30 Mar 2020 07:00) (86% - 98%)    Mode: AC/ CMV (Assist Control/ Continuous Mandatory Ventilation), RR (machine): 28, TV (machine): 420, FiO2: 60, PEEP: 12, ITime: 1, MAP: 18, PIP: 32    03-29 @ 07:01  -  03-30 @ 07:00  --------------------------------------------------------  IN: 2178.8 mL / OUT: 2005 mL / NET: 173.8 mL      CAPILLARY BLOOD GLUCOSE      POCT Blood Glucose.: 234 mg/dL (29 Mar 2020 17:53)      PHYSICAL EXAM:    General: NAD  HEENT: NC/AT; PERRL, clear conjunctiva  Neck: supple  Respiratory: CTA b/l  Cardiovascular: +S1/S2; RRR  Abdomen: soft, NT/ND; +BS x4  Extremities: WWP, 2+ peripheral pulses b/l; no LE edema  Skin: normal color and turgor; no rash    Neurological:    MEDICATIONS:  MEDICATIONS  (STANDING):  ALBUTerol    90 MICROgram(s) HFA Inhaler 2 Puff(s) Inhalation every 4 hours  anakinra Injectable 100 milliGRAM(s) SubCutaneous <User Schedule>  ascorbic acid IVPB 1500 milliGRAM(s) IV Intermittent every 6 hours  aspirin  chewable 81 milliGRAM(s) Oral daily  azithromycin  IVPB      azithromycin  IVPB 500 milliGRAM(s) IV Intermittent every 24 hours  cefepime   IVPB      cefepime   IVPB 2000 milliGRAM(s) IV Intermittent every 12 hours  chlorhexidine 0.12% Liquid 15 milliLiter(s) Oral Mucosa every 12 hours  enoxaparin Injectable 40 milliGRAM(s) SubCutaneous two times a day  hydroxychloroquine   Oral   hydroxychloroquine 200 milliGRAM(s) Oral every 12 hours  insulin glargine Injectable (LANTUS) 10 Unit(s) SubCutaneous <User Schedule>  insulin lispro (HumaLOG) corrective regimen sliding scale   SubCutaneous every 6 hours  levothyroxine Injectable 37.5 MICROGram(s) IV Push at bedtime  methylPREDNISolone sodium succinate Injectable 40 milliGRAM(s) IV Push every 12 hours  midazolam Infusion 0.02 mG/kG/Hr (1.54 mL/Hr) IV Continuous <Continuous>  norepinephrine Infusion 0.05 MICROgram(s)/kG/Min (7.23 mL/Hr) IV Continuous <Continuous>  pantoprazole  Injectable 40 milliGRAM(s) IV Push daily  polyethylene glycol 3350 17 Gram(s) Oral every 12 hours  senna Syrup 10 milliLiter(s) Oral at bedtime  thiamine IVPB 200 milliGRAM(s) IV Intermittent <User Schedule>  vancomycin  IVPB 1250 milliGRAM(s) IV Intermittent every 12 hours  vasopressin Infusion 0.04 Unit(s)/Min (2.4 mL/Hr) IV Continuous <Continuous>    MEDICATIONS  (PRN):  sodium chloride 0.9% lock flush 10 milliLiter(s) IV Push every 1 hour PRN Pre/post blood products, medications, blood draw, and to maintain line patency      ALLERGIES:  Allergies    No Known Allergies    Intolerances        LABS:                        11.4   6.38  )-----------( 208      ( 30 Mar 2020 01:10 )             34.7     03-30    140  |  106  |  22  ----------------------------<  324<H>  4.9   |  20<L>  |  1.06    Ca    8.1<L>      30 Mar 2020 01:10  Phos  2.9     03-30  Mg     1.9     03-30    TPro  5.9<L>  /  Alb  2.5<L>  /  TBili  0.1<L>  /  DBili  x   /  AST  29  /  ALT  38  /  AlkPhos  52  03-30    PT/INR - ( 30 Mar 2020 01:10 )   PT: 10.9 sec;   INR: 0.96 ratio    PTT - ( 30 Mar 2020 01:10 )  PTT:32.0 sec      RADIOLOGY & ADDITIONAL TESTS: Reviewed.  < from: Xray Chest 1 View- PORTABLE-Urgent (03.28.20 @ 02:08) >  PROCEDURE DATE:  03/28/2020      INTERPRETATION:  CLINICAL INFORMATION: Endotracheal and OG tube placement. COVID positive.    TECHNIQUE: Frontal radiograph of the chest.    COMPARISON: Chest radiograph 2/27/2020.    IMPRESSION:     Lines and tubes in good position.    Unchanged diffuse bilateral patchy opacities..    < end of copied text > INTERVAL HPI/OVERNIGHT EVENTS:    SUBJECTIVE: intubated.      OBJECTIVE:    VITAL SIGNS:  ICU Vital Signs Last 24 Hrs  T(C): 36.4 (30 Mar 2020 04:00), Max: 37.1 (30 Mar 2020 00:00)  T(F): 97.5 (30 Mar 2020 04:00), Max: 98.7 (30 Mar 2020 00:00)  HR: 53 (30 Mar 2020 07:00) (49 - 85)  BP: --  BP(mean): --  ABP: 113/48 (30 Mar 2020 07:00) (101/46 - 162/60)  ABP(mean): 71 (30 Mar 2020 07:00) (66 - 97)  RR: 28 (30 Mar 2020 07:00) (28 - 30)  SpO2: 94% (30 Mar 2020 07:00) (86% - 98%)    Mode: AC/ CMV (Assist Control/ Continuous Mandatory Ventilation), RR (machine): 28, TV (machine): 420, FiO2: 60, PEEP: 12, ITime: 1, MAP: 18, PIP: 32    03-29 @ 07:01  -  03-30 @ 07:00  --------------------------------------------------------  IN: 2178.8 mL / OUT: 2005 mL / NET: 173.8 mL      CAPILLARY BLOOD GLUCOSE      POCT Blood Glucose.: 234 mg/dL (29 Mar 2020 17:53)      PHYSICAL EXAM:    General: NAD      Neurological:    MEDICATIONS:  MEDICATIONS  (STANDING):  ALBUTerol    90 MICROgram(s) HFA Inhaler 2 Puff(s) Inhalation every 4 hours  anakinra Injectable 100 milliGRAM(s) SubCutaneous <User Schedule>  ascorbic acid IVPB 1500 milliGRAM(s) IV Intermittent every 6 hours  aspirin  chewable 81 milliGRAM(s) Oral daily  azithromycin  IVPB      azithromycin  IVPB 500 milliGRAM(s) IV Intermittent every 24 hours  cefepime   IVPB      cefepime   IVPB 2000 milliGRAM(s) IV Intermittent every 12 hours  chlorhexidine 0.12% Liquid 15 milliLiter(s) Oral Mucosa every 12 hours  enoxaparin Injectable 40 milliGRAM(s) SubCutaneous two times a day  hydroxychloroquine   Oral   hydroxychloroquine 200 milliGRAM(s) Oral every 12 hours  insulin glargine Injectable (LANTUS) 10 Unit(s) SubCutaneous <User Schedule>  insulin lispro (HumaLOG) corrective regimen sliding scale   SubCutaneous every 6 hours  levothyroxine Injectable 37.5 MICROGram(s) IV Push at bedtime  methylPREDNISolone sodium succinate Injectable 40 milliGRAM(s) IV Push every 12 hours  midazolam Infusion 0.02 mG/kG/Hr (1.54 mL/Hr) IV Continuous <Continuous>  norepinephrine Infusion 0.05 MICROgram(s)/kG/Min (7.23 mL/Hr) IV Continuous <Continuous>  pantoprazole  Injectable 40 milliGRAM(s) IV Push daily  polyethylene glycol 3350 17 Gram(s) Oral every 12 hours  senna Syrup 10 milliLiter(s) Oral at bedtime  thiamine IVPB 200 milliGRAM(s) IV Intermittent <User Schedule>  vancomycin  IVPB 1250 milliGRAM(s) IV Intermittent every 12 hours  vasopressin Infusion 0.04 Unit(s)/Min (2.4 mL/Hr) IV Continuous <Continuous>    MEDICATIONS  (PRN):  sodium chloride 0.9% lock flush 10 milliLiter(s) IV Push every 1 hour PRN Pre/post blood products, medications, blood draw, and to maintain line patency      ALLERGIES:  Allergies    No Known Allergies    Intolerances        LABS:                        11.4   6.38  )-----------( 208      ( 30 Mar 2020 01:10 )             34.7     03-30    140  |  106  |  22  ----------------------------<  324<H>  4.9   |  20<L>  |  1.06    Ca    8.1<L>      30 Mar 2020 01:10  Phos  2.9     03-30  Mg     1.9     03-30    TPro  5.9<L>  /  Alb  2.5<L>  /  TBili  0.1<L>  /  DBili  x   /  AST  29  /  ALT  38  /  AlkPhos  52  03-30    PT/INR - ( 30 Mar 2020 01:10 )   PT: 10.9 sec;   INR: 0.96 ratio    PTT - ( 30 Mar 2020 01:10 )  PTT:32.0 sec        RADIOLOGY & ADDITIONAL TESTS: Reviewed.  < from: Xray Chest 1 View- PORTABLE-Urgent (03.28.20 @ 02:08) >  PROCEDURE DATE:  03/28/2020      INTERPRETATION:  CLINICAL INFORMATION: Endotracheal and OG tube placement. COVID positive.    TECHNIQUE: Frontal radiograph of the chest.    COMPARISON: Chest radiograph 2/27/2020.    IMPRESSION:     Lines and tubes in good position.    Unchanged diffuse bilateral patchy opacities..    < end of copied text > INTERVAL HPI/OVERNIGHT EVENTS:    SUBJECTIVE: intubated.   limited contact for COVID positive patient.       OBJECTIVE:    VITAL SIGNS:  ICU Vital Signs Last 24 Hrs  T(C): 36.4 (30 Mar 2020 04:00), Max: 37.1 (30 Mar 2020 00:00)  T(F): 97.5 (30 Mar 2020 04:00), Max: 98.7 (30 Mar 2020 00:00)  HR: 53 (30 Mar 2020 07:00) (49 - 85)  BP: --  BP(mean): --  ABP: 113/48 (30 Mar 2020 07:00) (101/46 - 162/60)  ABP(mean): 71 (30 Mar 2020 07:00) (66 - 97)  RR: 28 (30 Mar 2020 07:00) (28 - 30)  SpO2: 94% (30 Mar 2020 07:00) (86% - 98%)    Mode: AC/ CMV (Assist Control/ Continuous Mandatory Ventilation), RR (machine): 28, TV (machine): 420, FiO2: 60, PEEP: 12, ITime: 1, MAP: 18, PIP: 32    03-29 @ 07:01  -  03-30 @ 07:00  --------------------------------------------------------  IN: 2178.8 mL / OUT: 2005 mL / NET: 173.8 mL      CAPILLARY BLOOD GLUCOSE      POCT Blood Glucose.: 234 mg/dL (29 Mar 2020 17:53)      PHYSICAL EXAM:    General: NAD      Neurological:    MEDICATIONS:  MEDICATIONS  (STANDING):  ALBUTerol    90 MICROgram(s) HFA Inhaler 2 Puff(s) Inhalation every 4 hours  anakinra Injectable 100 milliGRAM(s) SubCutaneous <User Schedule>  ascorbic acid IVPB 1500 milliGRAM(s) IV Intermittent every 6 hours  aspirin  chewable 81 milliGRAM(s) Oral daily  azithromycin  IVPB      azithromycin  IVPB 500 milliGRAM(s) IV Intermittent every 24 hours  cefepime   IVPB      cefepime   IVPB 2000 milliGRAM(s) IV Intermittent every 12 hours  chlorhexidine 0.12% Liquid 15 milliLiter(s) Oral Mucosa every 12 hours  enoxaparin Injectable 40 milliGRAM(s) SubCutaneous two times a day  hydroxychloroquine   Oral   hydroxychloroquine 200 milliGRAM(s) Oral every 12 hours  insulin glargine Injectable (LANTUS) 10 Unit(s) SubCutaneous <User Schedule>  insulin lispro (HumaLOG) corrective regimen sliding scale   SubCutaneous every 6 hours  levothyroxine Injectable 37.5 MICROGram(s) IV Push at bedtime  methylPREDNISolone sodium succinate Injectable 40 milliGRAM(s) IV Push every 12 hours  midazolam Infusion 0.02 mG/kG/Hr (1.54 mL/Hr) IV Continuous <Continuous>  norepinephrine Infusion 0.05 MICROgram(s)/kG/Min (7.23 mL/Hr) IV Continuous <Continuous>  pantoprazole  Injectable 40 milliGRAM(s) IV Push daily  polyethylene glycol 3350 17 Gram(s) Oral every 12 hours  senna Syrup 10 milliLiter(s) Oral at bedtime  thiamine IVPB 200 milliGRAM(s) IV Intermittent <User Schedule>  vancomycin  IVPB 1250 milliGRAM(s) IV Intermittent every 12 hours  vasopressin Infusion 0.04 Unit(s)/Min (2.4 mL/Hr) IV Continuous <Continuous>    MEDICATIONS  (PRN):  sodium chloride 0.9% lock flush 10 milliLiter(s) IV Push every 1 hour PRN Pre/post blood products, medications, blood draw, and to maintain line patency      ALLERGIES:  Allergies    No Known Allergies    Intolerances        LABS:                        11.4   6.38  )-----------( 208      ( 30 Mar 2020 01:10 )             34.7     03-30    140  |  106  |  22  ----------------------------<  324<H>  4.9   |  20<L>  |  1.06    Ca    8.1<L>      30 Mar 2020 01:10  Phos  2.9     03-30  Mg     1.9     03-30    TPro  5.9<L>  /  Alb  2.5<L>  /  TBili  0.1<L>  /  DBili  x   /  AST  29  /  ALT  38  /  AlkPhos  52  03-30    PT/INR - ( 30 Mar 2020 01:10 )   PT: 10.9 sec;   INR: 0.96 ratio    PTT - ( 30 Mar 2020 01:10 )  PTT:32.0 sec        RADIOLOGY & ADDITIONAL TESTS: Reviewed.  < from: Xray Chest 1 View- PORTABLE-Urgent (03.28.20 @ 02:08) >  PROCEDURE DATE:  03/28/2020      INTERPRETATION:  CLINICAL INFORMATION: Endotracheal and OG tube placement. COVID positive.    TECHNIQUE: Frontal radiograph of the chest.    COMPARISON: Chest radiograph 2/27/2020.    IMPRESSION:     Lines and tubes in good position.    Unchanged diffuse bilateral patchy opacities..    < end of copied text > INTERVAL HPI/OVERNIGHT EVENTS:  O/N: PaO2 75 FiO2 40 PEEP 12  Given cefepime, culture for fever  Started on vanco  Platelets decreased.       SUBJECTIVE: intubated.   limited contact for COVID positive patient.       OBJECTIVE:    VITAL SIGNS:  ICU Vital Signs Last 24 Hrs  T(C): 36.4 (30 Mar 2020 04:00), Max: 37.1 (30 Mar 2020 00:00)  T(F): 97.5 (30 Mar 2020 04:00), Max: 98.7 (30 Mar 2020 00:00)  HR: 53 (30 Mar 2020 07:00) (49 - 85)  BP: --  BP(mean): --  ABP: 113/48 (30 Mar 2020 07:00) (101/46 - 162/60)  ABP(mean): 71 (30 Mar 2020 07:00) (66 - 97)  RR: 28 (30 Mar 2020 07:00) (28 - 30)  SpO2: 94% (30 Mar 2020 07:00) (86% - 98%)    Mode: AC/ CMV (Assist Control/ Continuous Mandatory Ventilation), RR (machine): 28, TV (machine): 420, FiO2: 60, PEEP: 12, ITime: 1, MAP: 18, PIP: 32    03-29 @ 07:01  -  03-30 @ 07:00  --------------------------------------------------------  IN: 2178.8 mL / OUT: 2005 mL / NET: 173.8 mL      CAPILLARY BLOOD GLUCOSE      POCT Blood Glucose.: 234 mg/dL (29 Mar 2020 17:53)      PHYSICAL EXAM:    General: NAD      Neurological:    MEDICATIONS:  MEDICATIONS  (STANDING):  ALBUTerol    90 MICROgram(s) HFA Inhaler 2 Puff(s) Inhalation every 4 hours  anakinra Injectable 100 milliGRAM(s) SubCutaneous <User Schedule>  ascorbic acid IVPB 1500 milliGRAM(s) IV Intermittent every 6 hours  aspirin  chewable 81 milliGRAM(s) Oral daily  azithromycin  IVPB      azithromycin  IVPB 500 milliGRAM(s) IV Intermittent every 24 hours  cefepime   IVPB      cefepime   IVPB 2000 milliGRAM(s) IV Intermittent every 12 hours  chlorhexidine 0.12% Liquid 15 milliLiter(s) Oral Mucosa every 12 hours  enoxaparin Injectable 40 milliGRAM(s) SubCutaneous two times a day  hydroxychloroquine   Oral   hydroxychloroquine 200 milliGRAM(s) Oral every 12 hours  insulin glargine Injectable (LANTUS) 10 Unit(s) SubCutaneous <User Schedule>  insulin lispro (HumaLOG) corrective regimen sliding scale   SubCutaneous every 6 hours  levothyroxine Injectable 37.5 MICROGram(s) IV Push at bedtime  methylPREDNISolone sodium succinate Injectable 40 milliGRAM(s) IV Push every 12 hours  midazolam Infusion 0.02 mG/kG/Hr (1.54 mL/Hr) IV Continuous <Continuous>  norepinephrine Infusion 0.05 MICROgram(s)/kG/Min (7.23 mL/Hr) IV Continuous <Continuous>  pantoprazole  Injectable 40 milliGRAM(s) IV Push daily  polyethylene glycol 3350 17 Gram(s) Oral every 12 hours  senna Syrup 10 milliLiter(s) Oral at bedtime  thiamine IVPB 200 milliGRAM(s) IV Intermittent <User Schedule>  vancomycin  IVPB 1250 milliGRAM(s) IV Intermittent every 12 hours  vasopressin Infusion 0.04 Unit(s)/Min (2.4 mL/Hr) IV Continuous <Continuous>    MEDICATIONS  (PRN):  sodium chloride 0.9% lock flush 10 milliLiter(s) IV Push every 1 hour PRN Pre/post blood products, medications, blood draw, and to maintain line patency      ALLERGIES:  Allergies    No Known Allergies    Intolerances        LABS:                        11.4   6.38  )-----------( 208      ( 30 Mar 2020 01:10 )             34.7     03-30    140  |  106  |  22  ----------------------------<  324<H>  4.9   |  20<L>  |  1.06    Ca    8.1<L>      30 Mar 2020 01:10  Phos  2.9     03-30  Mg     1.9     03-30    TPro  5.9<L>  /  Alb  2.5<L>  /  TBili  0.1<L>  /  DBili  x   /  AST  29  /  ALT  38  /  AlkPhos  52  03-30    PT/INR - ( 30 Mar 2020 01:10 )   PT: 10.9 sec;   INR: 0.96 ratio    PTT - ( 30 Mar 2020 01:10 )  PTT:32.0 sec        RADIOLOGY & ADDITIONAL TESTS: Reviewed.  < from: Xray Chest 1 View- PORTABLE-Urgent (03.28.20 @ 02:08) >  PROCEDURE DATE:  03/28/2020      INTERPRETATION:  CLINICAL INFORMATION: Endotracheal and OG tube placement. COVID positive.    TECHNIQUE: Frontal radiograph of the chest.    COMPARISON: Chest radiograph 2/27/2020.    IMPRESSION:     Lines and tubes in good position.    Unchanged diffuse bilateral patchy opacities..    < end of copied text >

## 2020-03-30 NOTE — DIETITIAN INITIAL EVALUATION ADULT. - PHYSICAL APPEARANCE
other (specify) Unable to conduct Nutrition Focused Physical Assessment due to contact/isolation precautions r/t current medical condition.

## 2020-03-30 NOTE — PROGRESS NOTE ADULT - ASSESSMENT
64 M former smoker with no other known PMH who presented to [facility] on 3/23 with fevers, cough, and dyspnea 4 days prior present found to have hypoxic respiratory failure 2/2 COVID-19. Intubated on 3/27    #Neuro  - sedated on versed    #Cardiovascular  Currently on vaso 0.04  goal MAP >65  - wean as tolerated     #Pulm  Acute hypoxic respiratory failure secondary to COVID 19   - C/w mechanical ventilation with lung protective protocol now on 28/420/60%/12  - Last ABG: pH 7.33 pCO2 55 pO2 82 HCO3 28 SaO2 95%  - Albuterol MDI    #Renal  - trend SCR  - respiratory acidosis   - strict I/O  - K> 4 Mg>3    #ID  COVID 19 infection  - c/w plaquenil (3/27-4/1), ascorbic acid, and thiamine   - c/w azithro (3/27- 3/31)  - check EKG BID   - trend fever curve  - trend ferritin, CRP, CK, procal, pro-BNP, ESR, ddimer,   - vanc/cefepime started last night - continue for total 5 days   - anakinra (3/28- )    # GI/Hep  - Feeds:  - Bowel regimen:     #Endo  - cw synthroid  - Lantus 10  - ISS         #Heme  trend H/H  - cw lovenox, cw ASA       #Ethics  - full code 64 M former smoker with no other known PMH who presented to [facility] on 3/23 with fevers, cough, and dyspnea 4 days prior present found to have hypoxic respiratory failure 2/2 COVID-19. Intubated on 3/27    #Neuro  - sedated on versed    #Cardiovascular  Currently on vaso 0.04  goal MAP >65  - wean as tolerated  sinus bradycardia - check lytes     #Pulm  Acute hypoxic respiratory failure secondary to COVID 19   - C/w mechanical ventilation with lung protective protocol now on 28/420/60%/12  - Last ABG: pH 7.33 pCO2 55 pO2 82 HCO3 28 SaO2 95%  - Albuterol MDI    #Renal  - trend SCR  - respiratory acidosis   - strict I/O  - K> 4 Mg>3    #ID  COVID 19 infection  - c/w plaquenil (3/27-4/1), ascorbic acid, and thiamine   - c/w azithro (3/27- 3/31)  - check EKG BID   - trend fever curve  - trend ferritin, CRP, CK, procal, pro-BNP, ESR, ddimer,   - vanc/cefepime started last night - continue for total 5 days   - anakinra (3/28- )    # GI/Hep  - Feeds: continue  - Bowel regimen: escalate as needed     #Endo  - cw synthroid  - Lantus 10  - ISS         #Heme  trend H/H  - cw lovenox, cw ASA       #Ethics  - full code

## 2020-03-30 NOTE — DIETITIAN INITIAL EVALUATION ADULT. - ENTERAL
Recommend to increase Glucerna 1.5 to 300ml bolus q6hrs. Increase volume will provide 1800kcals and 99gm protein (23.3kcals.kg and 1.28gm pro/kg based on admission Wt: 77.1kg)

## 2020-03-30 NOTE — DIETITIAN INITIAL EVALUATION ADULT. - OTHER INFO
Limited subjective information collected at this time.   Diet and weight history PTA unknown.     Currently intubated, sedated, receiving Glucerna 1.5 @ 100ml bolus q6hrs. (600kcals and 33gm protein). See recommendations below to better meet Pt's estimated nutrient needs.   No known GI distress, chewing/swallowing difficulty, micronutrient supplements PTA. NKFA

## 2020-03-30 NOTE — DIETITIAN INITIAL EVALUATION ADULT. - PERTINENT MEDS FT
Kineret, hydroxychloroquine, Ascorbic acid, Thiamine, Lantus, insulin sliding scale, synthroid, versed, Levophed, protonix, miralax, senna.

## 2020-03-30 NOTE — DIETITIAN INITIAL EVALUATION ADULT. - REASON INDICATOR FOR ASSESSMENT
Pt seen for LOS   Pt intubated/sedated. Due to medical condition and airborne/contact isolation precautions, unable to obtain comprehensive nutrition assessment. Information obtained via chart review.

## 2020-03-31 NOTE — PROGRESS NOTE ADULT - ASSESSMENT
64 M former smoker with no other known PMH who presented to [facility] on 3/23 with fevers, cough, and dyspnea 4 days prior present found to have hypoxic respiratory failure 2/2 COVID-19. Intubated on 3/27    #Neuro  - sedated on versed and dilaudid  - goal RASS-5 for now    #Cardiovascular  Sinus bradycardia to 40's - low 50's. May be 2/2 benzo ggt. MAPs still >65  - decreased versed  - continue with dopamine, started last night  - monitor lytes  - EKG QD    #Pulm  Acute hypoxic respiratory failure secondary to COVID 19   - ARDSNet protocol  - COVID 19+ on 3/27  - Vent settings: 28/400/60%/10; Last ABG: pH 7.44 pCO2 37 pO2 66 HCO3 25 SaO2 92%  - Albuterol MDI    #Renal  - RYLEE with SCr 1.01, baseline 0.6  - strict I/O  - K> 4 Mg>3  - dose lasix BID    #ID  COVID 19 infection  - c/w plaquenil (3/27-4/1), ascorbic acid, and thiamine   - c/w azithro (3/27- 3/31)  - EKG as above   - COVID daily labs: CBC, CMP, coags, CRP  - COVID every other day labs: ferritin, CK, procal, pro-BNP, ESR, ddimer, LDH  - vanc/cefepime started 3/30 -  continue for total 5 days   - anakinra (3/28- 3/31 )    # GI/Hep  - High residuals this morning; will hold for now  - Monitor stool count     #Endo  - cw synthroid  - Lantus 10  - ISS     #Heme  trend H/H  - cw lovenox, cw ASA       #Ethics  - full code 64 M former smoker with no other known PMH who presented to [facility] on 3/23 with fevers, cough, and dyspnea 4 days prior present found to have hypoxic respiratory failure 2/2 COVID-19. Intubated on 3/27    #Neuro  - sedated on versed and dilaudid  - goal RASS-5 for now    #Cardiovascular  Sinus bradycardia to 40's - low 50's. May be 2/2 benzo ggt. MAPs still >65  - decreased versed  - continue with dopamine, started last night  - monitor lytes  - EKG QD    #Pulm  Acute hypoxic respiratory failure secondary to COVID 19   - ARDSNet protocol  - COVID 19+ on 3/27  - Vent settings: 28/400/60%/10; Last ABG: pH 7.44 pCO2 37 pO2 66 HCO3 25 SaO2 92%; Plan to increase PEEP to 12.   - Albuterol MDI    #Renal  - RYLEE with SCr 1.01, baseline 0.6  - strict I/O  - K> 4 Mg>3  - cw lasix 60mg IV TID     #ID  COVID 19 infection  - c/w plaquenil (3/27-4/1), ascorbic acid, and thiamine   - c/w azithro (3/27- 3/31)  - EKG as above   - COVID daily labs: CBC, CMP, coags, CRP  - COVID every other day labs: ferritin, CK, procal, pro-BNP, ESR, ddimer, LDH  - vanc/cefepime started 3/30 -  continue for total 5 days   - anakinra (3/28- 3/31 )    # GI/Hep  - High residuals this morning; will hold for now  - Monitor stool count     #Endo  - cw synthroid  - Lantus 14 units at 2am - will change to NPH tomorrow   - ISS     #Heme  trend H/H  - cw lovenox, cw ASA       #Ethics  - full code

## 2020-03-31 NOTE — PROGRESS NOTE ADULT - ATTENDING COMMENTS
Patient seen and evaluated with team, agree with above.    64 year old former smoker with HTN, HLD, and DM2 presents with acute hypoxemic respiratory failure and ARDS due to COVID-19 pneumonia.     AHRF, ARDS, COVID-19:  - Continue Plaquenil, Anakinra, Solumedrol, VC, Thiamine. Completed Azithro. Quant pending.   - Continue lung protective ventilation 25/400/60/10. ARDS protocol  - Will complete 5d Cefepime for presumed pneumonia    RYLEE: Suspect ATN in the setting of distributive shock 2/2 COVID. Creatinine 0.68-->1.09-->1.11-->1.01. UOP/Cr improved with diuresis, will continue lasix 60mg q 6 hr. Strict I/O. will need close monitoring of renal function and strict I/Os.     Sinus bradycardia: Improved on DA, off Levo. Close eye on I/O, electrolytes.    DVT ppx with enoxaparin.   Remains critically ill, prognosis guarded, full code.  CC time spent: 35 min

## 2020-03-31 NOTE — PROGRESS NOTE ADULT - SUBJECTIVE AND OBJECTIVE BOX
INTERVAL HPI/OVERNIGHT EVENTS:    O/N: Bradycardic, so stopped versed and vasopressin  Added dopamine/ levo  Given 60 IV lasix           OBJECTIVE:    VITAL SIGNS:  ICU Vital Signs Last 24 Hrs  T(C): 36.4 (31 Mar 2020 04:00), Max: 37.2 (30 Mar 2020 11:00)  T(F): 97.5 (31 Mar 2020 04:00), Max: 98.9 (30 Mar 2020 11:00)  HR: 58 (31 Mar 2020 07:00) (37 - 66)  BP: --  BP(mean): --  ABP: 147/50 (31 Mar 2020 07:00) (99/41 - 175/65)  ABP(mean): 78 (31 Mar 2020 07:00) (40 - 94)  RR: 25 (31 Mar 2020 07:00) (25 - 29)  SpO2: 91% (31 Mar 2020 07:00) (85% - 99%)    Mode: AC/ CMV (Assist Control/ Continuous Mandatory Ventilation), RR (machine): 25, TV (machine): 400, FiO2: 60, PEEP: 10, ITime: 1, MAP: 16, PIP: 29    03-30 @ 07:01  -  03-31 @ 07:00  --------------------------------------------------------  IN: 2516.9 mL / OUT: 1940 mL / NET: 576.9 mL      CAPILLARY BLOOD GLUCOSE      POCT Blood Glucose.: 253 mg/dL (30 Mar 2020 17:00)      PHYSICAL EXAM:      MEDICATIONS:  MEDICATIONS  (STANDING):  ALBUTerol    90 MICROgram(s) HFA Inhaler 2 Puff(s) Inhalation every 4 hours  anakinra Injectable 100 milliGRAM(s) SubCutaneous <User Schedule>  ascorbic acid IVPB 1500 milliGRAM(s) IV Intermittent every 6 hours  aspirin  chewable 81 milliGRAM(s) Oral daily  cefepime   IVPB      cefepime   IVPB 2000 milliGRAM(s) IV Intermittent every 12 hours  chlorhexidine 0.12% Liquid 15 milliLiter(s) Oral Mucosa every 12 hours  DOPamine Infusion 5 MICROgram(s)/kG/Min (14.5 mL/Hr) IV Continuous <Continuous>  enoxaparin Injectable 40 milliGRAM(s) SubCutaneous two times a day  furosemide   Injectable 60 milliGRAM(s) IV Push every 8 hours  HYDROmorphone Infusion 0.25 mG/Hr (0.25 mL/Hr) IV Continuous <Continuous>  hydroxychloroquine   Oral   hydroxychloroquine 200 milliGRAM(s) Oral every 12 hours  insulin glargine Injectable (LANTUS) 14 Unit(s) SubCutaneous <User Schedule>  insulin lispro (HumaLOG) corrective regimen sliding scale   SubCutaneous every 6 hours  lactulose Syrup 10 Gram(s) Oral every 12 hours  levothyroxine Injectable 37.5 MICROGram(s) IV Push at bedtime  methylPREDNISolone sodium succinate Injectable 40 milliGRAM(s) IV Push every 12 hours  midazolam Infusion 0.02 mG/kG/Hr (1.54 mL/Hr) IV Continuous <Continuous>  norepinephrine Infusion 0.05 MICROgram(s)/kG/Min (7.23 mL/Hr) IV Continuous <Continuous>  pantoprazole  Injectable 40 milliGRAM(s) IV Push daily  polyethylene glycol 3350 17 Gram(s) Oral every 12 hours  senna Syrup 10 milliLiter(s) Oral at bedtime  thiamine IVPB 200 milliGRAM(s) IV Intermittent <User Schedule>  vancomycin  IVPB 1250 milliGRAM(s) IV Intermittent every 12 hours    MEDICATIONS  (PRN):  sodium chloride 0.9% lock flush 10 milliLiter(s) IV Push every 1 hour PRN Pre/post blood products, medications, blood draw, and to maintain line patency      ALLERGIES:  Allergies    No Known Allergies    Intolerances        LABS:                        10.5   7.16  )-----------( 231      ( 31 Mar 2020 01:17 )             32.5     03-31    140  |  105  |  44<H>  ----------------------------<  244<H>  4.2   |  21<L>  |  1.01    Ca    8.0<L>      31 Mar 2020 01:15  Phos  4.1     03-31  Mg     2.4     03-31    TPro  5.7<L>  /  Alb  2.5<L>  /  TBili  0.1<L>  /  DBili  x   /  AST  20  /  ALT  32  /  AlkPhos  53  03-31    PT/INR - ( 30 Mar 2020 13:18 )   PT: 11.5 sec;   INR: 1.00 ratio         PTT - ( 30 Mar 2020 01:10 )  PTT:32.0 sec      RADIOLOGY & ADDITIONAL TESTS: Reviewed.

## 2020-04-01 NOTE — PROGRESS NOTE ADULT - ATTENDING COMMENTS
Patient seen and evaluated with team, agree with above.    64 year old former smoker with HTN, HLD, and DM2 presents with acute hypoxemic respiratory failure and ARDS due to COVID-19 pneumonia.     AHRF, ARDS, COVID-19:  - Plaquenil, Anakinra, Solumedrol, VC, Thiamine. Completed Azithro. Quant pending.   - Continue lung protective ventilation ARDS protocol  - Will complete 5d Cefepime for presumed pneumonia    RYLEE: Suspect ATN in the setting of distributive shock 2/2 COVID. Creatinine 0.68-->1.09-->1.11-->1.01-->0.75. UOP/Cr improved with diuresis, will continue lasix 60mg q 6 hr. Strict I/O. will need close monitoring of renal function and strict I/Os.     Sinus bradycardia: Improved on DA, off Levo. Close eye on I/O, electrolytes.    DVT ppx with enoxaparin.   Remains critically ill, prognosis guarded, full code.  CC time spent: 35 min

## 2020-04-01 NOTE — PROGRESS NOTE ADULT - ASSESSMENT
64 M former smoker with no other known PMH who presented to [facility] on 3/23 with fevers, cough, and dyspnea 4 days prior present found to have hypoxic respiratory failure 2/2 COVID-19. Intubated on 3/27    #Neuro  - sedated on versed, dilaudid, ketamine   - goal RASS-5 for now    #Cardiovascular  Sinus bradycardia to 50's. May be 2/2 benzo ggt. MAPs still >65  - continue with dopamine  - monitor lytes  - EKG QD    #Pulm  Acute hypoxic respiratory failure secondary to COVID 19   - ARDSNet protocol  - COVID 19+ on 3/27  - Vent settings: 25/400/80%/14; Last ABG: pH 7.4 pCO2 44 pO2 88 HCO3 27 SaO2 96%;  - Albuterol MDI    #Renal  - Had Kumar with brenda SCr 1.1; now trending down to 0.79, baseline 0.6  - strict I/O  - K> 4 Mg>3  - cw lasix 60mg IV TID     #ID  COVID 19 infection  - c/w plaquenil (3/27-4/1), ascorbic acid, and thiamine   - c/w azithro (3/27- 3/31)  - EKG as above   - COVID daily labs: CBC, CMP, coags, CRP  - COVID every other day labs: ferritin, CK, procal, pro-BNP, ESR, ddimer, LDH  - vanc/cefepime started 3/30 -  continue for total 5 days   - anakinra (3/28- 3/31)    # GI/Hep  - Bolus feeds; previously had high residuals, will check this AM  - Monitor stool count - no stools for several days; will increase lactulose as needed.     #Endo  - cw synthroid  - NPH 8 q6 - monitor FS  - ISS     #Heme  trend H/H  - cw lovenox, cw ASA       #Ethics  - full code 64 M former smoker with no other known PMH who presented to [facility] on 3/23 with fevers, cough, and dyspnea 4 days prior present found to have hypoxic respiratory failure 2/2 COVID-19. Intubated on 3/27    #Neuro  - sedated on versed, dilaudid, ketamine   - goal RASS-5 for now    #Cardiovascular  Sinus bradycardia to 50's. May be 2/2 benzo ggt. MAPs still >65  - continue with dopamine  - monitor lytes  - EKG QD    #Pulm  Acute hypoxic respiratory failure secondary to COVID 19   - ARDSNet protocol  - COVID 19+ on 3/27  - Vent settings: 25/400/80%/14; Last ABG: pH 7.4 pCO2 44 pO2 88 HCO3 27 SaO2 96%;  - Albuterol MDI    #Renal  - Had Kumar with brenda SCr 1.1; now trending down to 0.79, baseline 0.6  - strict I/O  - K> 4 Mg>3  - cw lasix 60mg IV TID     #ID  COVID 19 infection  - c/w plaquenil (3/27-4/1), ascorbic acid, and thiamine   - c/w azithro (3/27- 3/31)  - EKG as above   - COVID daily labs: CBC, CMP, coags, CRP  - COVID every other day labs: ferritin, CK, procal, pro-BNP, ESR, ddimer, LDH  - vanc/cefepime started 3/30 -  continue for total 5 days   - anakinra (3/28- 3/31)    # GI/Hep  - Bolus feeds; previously had high residuals, will check this AM  - Monitor stool count - no stools for several days; will increase lactulose as needed.   - switch protonix to pepcid     #Endo  - cw synthroid  - NPH 8 q6 - monitor FS  - ISS     #Heme  trend H/H  - cw lovenox, cw ASA     #Ethics  - full code

## 2020-04-01 NOTE — PROGRESS NOTE ADULT - SUBJECTIVE AND OBJECTIVE BOX
INTERVAL HPI/OVERNIGHT EVENTS:    O/N: did not synchronize with vent settings  dilaudid given, ketamine added  Fio2 at 60%  Lasix change to 60mg q8 IV  NPH changed to 8 q6hr.     OBJECTIVE:    VITAL SIGNS:  ICU Vital Signs Last 24 Hrs  T(C): 36.4 (01 Apr 2020 00:00), Max: 36.6 (31 Mar 2020 20:00)  T(F): 97.5 (01 Apr 2020 00:00), Max: 97.8 (31 Mar 2020 20:00)  HR: 53 (01 Apr 2020 08:00) (48 - 69)  BP: --  BP(mean): --  ABP: 145/53 (01 Apr 2020 08:00) (119/48 - 154/59)  ABP(mean): 81 (01 Apr 2020 08:00) (62 - 87)  RR: --  SpO2: 94% (01 Apr 2020 08:00) (86% - 95%)    Mode: AC/ CMV (Assist Control/ Continuous Mandatory Ventilation), RR (machine): 25, TV (machine): 400, FiO2: 80, PEEP: 14, ITime: 1, MAP: 18, PIP: 35    03-31 @ 07:01 - 04-01 @ 07:00  --------------------------------------------------------  IN: 2531.6 mL / OUT: 4340 mL / NET: -1808.4 mL    04-01 @ 07:01 - 04-01 @ 08:23  --------------------------------------------------------  IN: 428.3 mL / OUT: 250 mL / NET: 178.3 mL      CAPILLARY BLOOD GLUCOSE      POCT Blood Glucose.: 207 mg/dL (01 Apr 2020 06:10)          MEDICATIONS:  MEDICATIONS  (STANDING):  ALBUTerol    90 MICROgram(s) HFA Inhaler 2 Puff(s) Inhalation every 4 hours  ascorbic acid IVPB 1500 milliGRAM(s) IV Intermittent every 6 hours  aspirin  chewable 81 milliGRAM(s) Oral daily  cefepime   IVPB      cefepime   IVPB 2000 milliGRAM(s) IV Intermittent every 12 hours  chlorhexidine 0.12% Liquid 15 milliLiter(s) Oral Mucosa every 12 hours  DOPamine Infusion 5 MICROgram(s)/kG/Min (14.5 mL/Hr) IV Continuous <Continuous>  enoxaparin Injectable 40 milliGRAM(s) SubCutaneous two times a day  furosemide   Injectable 60 milliGRAM(s) IV Push every 8 hours  HYDROmorphone Infusion 1 mG/Hr (1 mL/Hr) IV Continuous <Continuous>  hydroxychloroquine   Oral   hydroxychloroquine 200 milliGRAM(s) Oral every 12 hours  insulin lispro (HumaLOG) corrective regimen sliding scale   SubCutaneous every 6 hours  insulin NPH human recombinant 8 Unit(s) SubCutaneous every 6 hours  ketamine Infusion 0.15 mG/kG/Hr (1.16 mL/Hr) IV Continuous <Continuous>  levothyroxine Injectable 37.5 MICROGram(s) IV Push at bedtime  methylPREDNISolone sodium succinate Injectable 40 milliGRAM(s) IV Push every 12 hours  midazolam Infusion 0.02 mG/kG/Hr (1.54 mL/Hr) IV Continuous <Continuous>  pantoprazole  Injectable 40 milliGRAM(s) IV Push daily  petrolatum Ophthalmic Ointment 1 Application(s) Both EYES two times a day  polyethylene glycol 3350 17 Gram(s) Oral every 12 hours  senna Syrup 10 milliLiter(s) Oral at bedtime  thiamine IVPB 200 milliGRAM(s) IV Intermittent <User Schedule>  vancomycin  IVPB 750 milliGRAM(s) IV Intermittent every 12 hours    MEDICATIONS  (PRN):  sodium chloride 0.9% lock flush 10 milliLiter(s) IV Push every 1 hour PRN Pre/post blood products, medications, blood draw, and to maintain line patency      ALLERGIES:  Allergies    No Known Allergies    Intolerances        LABS:                        11.5   8.28  )-----------( 242      ( 01 Apr 2020 02:41 )             34.1     04-01    143  |  104  |  44<H>  ----------------------------<  218<H>  4.2   |  24  |  0.79    Ca    8.3<L>      01 Apr 2020 02:41  Phos  4.1     04-01  Mg     2.6     04-01    TPro  6.2  /  Alb  2.8<L>  /  TBili  0.1<L>  /  DBili  x   /  AST  15  /  ALT  32  /  AlkPhos  59  04-01    PT/INR - ( 30 Mar 2020 13:18 )   PT: 11.5 sec;   INR: 1.00 ratio               RADIOLOGY & ADDITIONAL TESTS: Reviewed.

## 2020-04-02 NOTE — PROGRESS NOTE ADULT - ATTENDING COMMENTS
Patient has hypoxic respiratory failure and ARDS in setting of COVID.   Vent settings adjusted throughout day, currently on 28/350/10/60%FiO2.   Atetmpted to bring down to 50% FiO2, had abrupt episode of hypotension to MAP 35, quickly resolved with initiaion of levophed x 2 today.   -unclear etiology, POCUS at bedside without evidence of myocarditis, normal to slightly hyperdynamic LV.  -HOLD lasix, no need for fluids at this time,.  -stopped dopamine at this time and increase levophed, monitor HR.    Patient remains critically ill and required continued ICU care.   Critical care time 50 minutes. Patient has hypoxic respiratory failure and ARDS in setting of COVID.   Vent settings adjusted throughout day, currently on 28/350/10/60%FiO2.   Atetmpted to bring down to 50% FiO2, had abrupt episode of hypotension to MAP 35, quickly resolved with initiaion of levophed x 2 today.   -unclear etiology, POCUS at bedside without evidence of myocarditis, normal to slightly hyperdynamic LV.  -HOLD lasix, no need for fluids at this time,.  -stopped dopamine at this time and increase levophed, monitor HR. Patient with labile BP, hypothermic, bradycardic, concerned for autonomic process, will continue to search for one.     Patient remains critically ill and required continued ICU care.   Critical care time 50 minutes.

## 2020-04-02 NOTE — PROGRESS NOTE ADULT - SUBJECTIVE AND OBJECTIVE BOX
INTERVAL HPI/OVERNIGHT EVENTS:    FiO2 decreased to 60%  No BM's so given lactulose  TTE ordered     OBJECTIVE:    VITAL SIGNS:  ICU Vital Signs Last 24 Hrs  T(C): 35.8 (02 Apr 2020 00:00), Max: 35.8 (01 Apr 2020 08:00)  T(F): 96.4 (02 Apr 2020 00:00), Max: 96.4 (01 Apr 2020 08:00)  HR: 54 (02 Apr 2020 07:00) (51 - 58)  BP: --  BP(mean): --  ABP: 145/55 (02 Apr 2020 07:00) (104/44 - 145/55)  ABP(mean): 84 (02 Apr 2020 07:00) (63 - 84)  RR: 28 (02 Apr 2020 06:00) (28 - 28)  SpO2: 97% (02 Apr 2020 07:00) (92% - 97%)    Mode: AC/ CMV (Assist Control/ Continuous Mandatory Ventilation), RR (machine): 28, TV (machine): 350, FiO2: 60, PEEP: 12, ITime: 1, MAP: 18, PIP: 39    04-01 @ 07:01  -  04-02 @ 07:00  --------------------------------------------------------  IN: 3239.2 mL / OUT: 4305 mL / NET: -1065.8 mL      CAPILLARY BLOOD GLUCOSE      POCT Blood Glucose.: 180 mg/dL (02 Apr 2020 06:18)          MEDICATIONS:  MEDICATIONS  (STANDING):  ALBUTerol    90 MICROgram(s) HFA Inhaler 2 Puff(s) Inhalation every 4 hours  aspirin  chewable 81 milliGRAM(s) Oral daily  cefepime   IVPB      cefepime   IVPB 2000 milliGRAM(s) IV Intermittent every 12 hours  chlorhexidine 0.12% Liquid 15 milliLiter(s) Oral Mucosa every 12 hours  DOPamine Infusion 5 MICROgram(s)/kG/Min (14.5 mL/Hr) IV Continuous <Continuous>  enoxaparin Injectable 40 milliGRAM(s) SubCutaneous two times a day  famotidine Injectable 20 milliGRAM(s) IV Push two times a day  furosemide   Injectable 60 milliGRAM(s) IV Push every 8 hours  HYDROmorphone Infusion 1 mG/Hr (1 mL/Hr) IV Continuous <Continuous>  insulin lispro (HumaLOG) corrective regimen sliding scale   SubCutaneous every 6 hours  insulin NPH human recombinant 8 Unit(s) SubCutaneous every 6 hours  ketamine Infusion 0.15 mG/kG/Hr (1.16 mL/Hr) IV Continuous <Continuous>  lactulose Syrup 20 Gram(s) Oral every 8 hours  levothyroxine Injectable 37.5 MICROGram(s) IV Push at bedtime  methylPREDNISolone sodium succinate Injectable 40 milliGRAM(s) IV Push every 12 hours  midazolam Infusion 0.02 mG/kG/Hr (1.54 mL/Hr) IV Continuous <Continuous>  petrolatum Ophthalmic Ointment 1 Application(s) Both EYES two times a day  polyethylene glycol 3350 17 Gram(s) Oral every 12 hours  senna Syrup 10 milliLiter(s) Oral at bedtime  thiamine IVPB 200 milliGRAM(s) IV Intermittent <User Schedule>  vancomycin  IVPB 750 milliGRAM(s) IV Intermittent every 12 hours    MEDICATIONS  (PRN):  sodium chloride 0.9% lock flush 10 milliLiter(s) IV Push every 1 hour PRN Pre/post blood products, medications, blood draw, and to maintain line patency      ALLERGIES:  Allergies    No Known Allergies    Intolerances        LABS:                        11.9   8.09  )-----------( 261      ( 02 Apr 2020 02:28 )             36.8     04-02    143  |  101  |  42<H>  ----------------------------<  170<H>  4.4   |  27  |  0.69    Ca    8.4      02 Apr 2020 02:28  Phos  3.9     04-02  Mg     2.8     04-02    TPro  6.0  /  Alb  3.0<L>  /  TBili  0.2  /  DBili  x   /  AST  9<L>  /  ALT  24  /  AlkPhos  61  04-02          RADIOLOGY & ADDITIONAL TESTS: Reviewed.

## 2020-04-02 NOTE — PROGRESS NOTE ADULT - ASSESSMENT
64 M former smoker with no other known PMH who presented to [facility] on 3/23 with fevers, cough, and dyspnea 4 days prior present found to have hypoxic respiratory failure 2/2 COVID-19. Intubated on 3/27    #Neuro  - sedated on versed, dilaudid, ketamine   - goal RASS-5 for now    #Cardiovascular  Sinus bradycardia to 50's. May be 2/2 benzo ggt. MAPs still >65  - continue with dopamine  - monitor lytes  - EKG QD  - TTE    #Pulm  Acute hypoxic respiratory failure secondary to COVID 19   - ARDSNet protocol  - COVID 19+ on 3/27  - Vent settings: 28/350/60%/12; Last ABG: pH 7.41 pCO2 46 pO2 103 HCO3 32 SaO2 98%;  - Albuterol MDI - not receiving from Respiratory team   - Solumedrol (40mg BID 3/28-4/2)    #Renal  - Had Kumar with brenda SCr 1.1; now trending down to 0.69, baseline 0.6  - strict I/O  - K> 4 Mg>3  - cw lasix 60mg IV TID     #ID  COVID 19 infection  - s/p plaquenil (3/27-4/1), ascorbic acid, and thiamine   - c/w azithro (3/27- 3/31)  - EKG as above   - COVID daily labs: CBC, CMP, coags, CRP  - COVID every other day labs: ferritin, CK, procal, pro-BNP, ESR, ddimer, LDH  - vanc/cefepime started 3/30 -  continue for total 5 days; day 4  - anakinra (3/28- 3/31)    # GI/Hep  - Bolus feeds; previously had high residuals, will check this AM  - Monitor stool count - no stools for several days; will increase lactulose as needed.   - switch protonix to pepcid     #Endo  - cw synthroid  - NPH 8 q6 - monitor FS  - ISS     #Heme  trend H/H  - cw lovenox, cw ASA     #Ethics  - full code 64 M former smoker with no other known PMH who presented to [facility] on 3/23 with fevers, cough, and dyspnea 4 days prior present found to have hypoxic respiratory failure 2/2 COVID-19. Intubated on 3/27    #Neuro  - sedated on versed, dilaudid, ketamine   - goal RASS-5 for now    #Cardiovascular  Sinus bradycardia to 50's. May be 2/2 benzo ggt. MAPs still >65  - continue with dopamine; titrate off.   - monitor lytes  - EKG QD  - TTE    #Pulm  Acute hypoxic respiratory failure secondary to COVID 19   - ARDSNet protocol  - COVID 19+ on 3/27  - Vent settings: 28/350/60%/12; Last ABG: pH 7.41 pCO2 46 pO2 103 HCO3 32 SaO2 98%;  - Albuterol MDI - not receiving from Respiratory team   - Solumedrol (40mg BID 3/28-4/2)    #Renal  - Had Kumar with brenda SCr 1.1; now trending down to 0.69, baseline 0.6  - strict I/O  - K> 4 Mg>3  - cw lasix 60mg IV TID     #ID  COVID 19 infection  - s/p plaquenil (3/27-4/1), ascorbic acid, and thiamine   - c/w azithro (3/27- 3/31)  - EKG as above   - COVID daily labs: CBC, CMP, coags, CRP  - COVID every other day labs: ferritin, CK, procal, pro-BNP, ESR, ddimer, LDH  - vanc/cefepime started 3/30 -  continue for total 5 days; day 4  - anakinra (3/28- 3/31)    # GI/Hep  - Bolus feeds; previously had high residuals, will check this AM  - Monitor stool count - no stools for several days; will increase lactulose as needed.   - cw pepcid     #Endo  - cw synthroid  - NPH 8 q6 - monitor FS  - ISS     #Heme  trend H/H  - cw lovenox, cw ASA     #Ethics  - full code

## 2020-04-03 NOTE — CHART NOTE - NSCHARTNOTEFT_GEN_A_CORE
Patient with progressive hypoxemia and desaturation on monitor. Called to bedside for evaluation.   Patient having difficulty obtaining tidal volumes on ventilator and with noted saturations in mid 80s. PEEP and FiO2 increased temporarily to improve saturation. Patient noted to have a distended abdomen with decreased BS concerning for ileus/obstruction. Will get AXR. Patient with bilateral breath sounds and lower likelihood of pneumothorax. Will check CXR to evaluate ETT position. 1 cc of air placed in ETT cuff in case of cuff deflation. We will monitor to see if this loses volume over the next few hours and if it does may need to consider ETT change. Will increase sedation and use paralytics to improve ventilator synchrony. Will sit patient upright to offload intraabdominal pressure from abdominal distension.    Lungs: Bilateral breath sounds with coarse sound at base. No significant wheeze.  Cards: RRR, S1S2  Abdomen: distended with hypoactive bowel sounds.    If ABG with worsened P/F ratio may need to consider proning.  Patient previously on Anakinra with reduction in inflammatory markers - now with a rise in CRP. Will restart steroids and may need to consider anti-IL 6 therapy.  Dr. Gurrola spoke with patients daughter earlier.  I spoke with patients daughter a few minutes ago (~ 10pm) and updated her on his care and current plan. She is a physician in [place], [place] and has an understanding of his clinical course and is aware of the poor prognosis.    This patient was critically ill with one or more vital organ systems at a high probability of imminent or life threatening deterioration. Complex decision making was required to assess and treat single or multiple vital organ system failure and/or prevent further life threatening deterioration of the patient’s condition.  All recent labwork and imaging was reviewed. Patient required frequent bedside visits with therapy change.   I have provided 35 minutes of noncontinuous critical care time independent of procedures and/or teaching services.

## 2020-04-03 NOTE — PROVIDER CONTACT NOTE (CHANGE IN STATUS NOTIFICATION) - ACTION/TREATMENT ORDERED:
50mcg rocuronium, 20mg vecuronium IVP by MD Nolen. 100 fentanyl, 2mg Dilaudid, 8mg total versed IVP. Abdominal and chest x-ray ordered.     Nightshift team ordered additional 50mcg rocuronium, 100 fentanyl, 6mg total versed IVP, 1g Tylenol IV and rocuronium gtt. 50mcg rocuronium, 20mg vecuronium IVP by MD Nolen. 100 fentanyl, 2mg Dilaudid, 8mg total versed IVP. Abdominal and chest x-ray ordered.     Nightshift team ordered additional 50mcg rocuronium, 100 fentanyl, 6mg total versed IVP, 1g Tylenol IV and rocuronium gtt.    Pt stabilized, rocuronium gtt pending from pharmacy.

## 2020-04-03 NOTE — CHART NOTE - NSCHARTNOTEFT_GEN_A_CORE
Nutrition Follow Up Note  Patient seen for: Nutritional follow up     Source: RN, Medical record,    Chart reviewed, events noted: 70 year old male with PMHx of DM, hypothyroidism, HLD. Presented with fever, cough and SOB. Found to be COVID19+, required emergent intubation in ED. Pt remains intubated and sedated at this time. RYLEE improving.     Diet : NPO with EN via NGT  Glucerna 1.5 to 300ml bolus q6hrs. Provides 1800kcals and 99gm protein (23.3kcals.kg and 1.28gm pro/kg based on admission Wt: 77.1kg)    EN provision: Good. 1 bolus held on 3/31 and 3/2 for high residuals.   EN residuals  3/31: 350ml  : 450ml    GI: last BM on 3/29. Had been receiving senna and Miralax. Lactulose now added.       Daily Weight in k.1 (), Weight in k.1 (), Weight in k.1 ()  % Weight Change    Pertinent Medications: MEDICATIONS  (STANDING):  aspirin  chewable 81 milliGRAM(s) Oral daily  cefepime   IVPB      cefepime   IVPB 2000 milliGRAM(s) IV Intermittent every 12 hours  chlorhexidine 0.12% Liquid 15 milliLiter(s) Oral Mucosa every 12 hours  enoxaparin Injectable 40 milliGRAM(s) SubCutaneous two times a day  famotidine Injectable 20 milliGRAM(s) IV Push two times a day  HYDROmorphone Infusion 1 mG/Hr (1 mL/Hr) IV Continuous <Continuous>  insulin lispro (HumaLOG) corrective regimen sliding scale   SubCutaneous every 6 hours  insulin NPH human recombinant 8 Unit(s) SubCutaneous every 6 hours  ketamine Infusion 0.15 mG/kG/Hr (1.16 mL/Hr) IV Continuous <Continuous>  lactulose Syrup 20 Gram(s) Oral every 8 hours  levothyroxine Injectable 37.5 MICROGram(s) IV Push at bedtime  midazolam Infusion 0.02 mG/kG/Hr (1.54 mL/Hr) IV Continuous <Continuous>  norepinephrine Infusion 0.12 MICROgram(s)/kG/Min (17.3 mL/Hr) IV Continuous <Continuous>  petrolatum Ophthalmic Ointment 1 Application(s) Both EYES two times a day  polyethylene glycol 3350 17 Gram(s) Oral every 12 hours  senna Syrup 10 milliLiter(s) Oral at bedtime  vancomycin  IVPB 750 milliGRAM(s) IV Intermittent every 12 hours    MEDICATIONS  (PRN):  sodium chloride 0.9% lock flush 10 milliLiter(s) IV Push every 1 hour PRN Pre/post blood products, medications, blood draw, and to maintain line patency    Pertinent Labs:  @ 01:59: Na 142, BUN 42<H>, Cr 0.63, <H>, K+ 4.8, Phos 3.1, Mg 2.9<H>, Alk Phos 62, ALT/SGPT 24, AST/SGOT 13, HbA1c --    Finger Sticks: fair control   POCT Blood Glucose.: 208 mg/dL ( @ 08:20)  POCT Blood Glucose.: 223 mg/dL ( @ 05:23)  POCT Blood Glucose.: 130 mg/dL ( @ 20:59)  POCT Blood Glucose.: 160 mg/dL ( @ 11:49)      Skin per nursing documentation: intact  Edema: +1 generalized, +2 land hand and +3 left hand     Estimated Needs:   [ X] no change since previous assessment based on admission wt: 77.1kg)    Estimated Energy Needs  · From (20 adelina/kg)	1542  · To (25 adelina/kg)	1927    Estimated Protein Needs  · From (1.2 g/kg)	92.52  · To (1.4 g/kg)	107.94      Previous Nutrition Diagnosis: none    New Nutrition Diagnosis: None      Interventions:     Recommend  1. Recommend to continue Glucerna 1.5 to 300ml bolus q6hrs. Provides 1800kcals and 99gm protein (23.3kcals.kg and 1.28gm pro/kg based on admission Wt: 77.1kg)  2. Continue bowel regimen.  3. Consider Reglan to promote GI motility.   4. If altered GI tolerance continues, can consider Pivot 1.5 for improved GI tolerance.     Monitoring and Evaluation:     Continue to monitor Nutritional intake, Tolerance to diet prescription, weights, labs, skin integrity    RD remains available upon request and will follow up per protocol  Sarah Siegler RD, RDN, [facility] Pager #[phone]

## 2020-04-03 NOTE — PROGRESS NOTE ADULT - SUBJECTIVE AND OBJECTIVE BOX
INTERVAL HPI/OVERNIGHT EVENTS:    FiO2 changed to 50%    OBJECTIVE:    VITAL SIGNS:  ICU Vital Signs Last 24 Hrs  T(C): 36.1 (03 Apr 2020 04:00), Max: 36.5 (02 Apr 2020 20:00)  T(F): 96.9 (03 Apr 2020 04:00), Max: 97.7 (02 Apr 2020 20:00)  HR: 63 (03 Apr 2020 07:00) (52 - 67)  BP: 110/55 (03 Apr 2020 07:00) (63/37 - 146/65)  BP(mean): 78 (03 Apr 2020 07:00) (45 - 94)  ABP: 133/43 (03 Apr 2020 07:00) (-7/-11 - 178/53)  ABP(mean): 66 (03 Apr 2020 07:00) (-10 - 86)  RR: 28 (03 Apr 2020 07:00) (28 - 29)  SpO2: 92% (03 Apr 2020 07:00) (87% - 99%)    Mode: AC/ CMV (Assist Control/ Continuous Mandatory Ventilation), RR (machine): 28, TV (machine): 350, FiO2: 60, PEEP: 10, ITime: 1, MAP: 15, PIP: 26    04-02 @ 07:01  -  04-03 @ 07:00  --------------------------------------------------------  IN: 3461.5 mL / OUT: 1110 mL / NET: 2351.5 mL      CAPILLARY BLOOD GLUCOSE      POCT Blood Glucose.: 223 mg/dL (03 Apr 2020 05:23)      PHYSICAL EXAM:      MEDICATIONS:  MEDICATIONS  (STANDING):  aspirin  chewable 81 milliGRAM(s) Oral daily  cefepime   IVPB      cefepime   IVPB 2000 milliGRAM(s) IV Intermittent every 12 hours  chlorhexidine 0.12% Liquid 15 milliLiter(s) Oral Mucosa every 12 hours  enoxaparin Injectable 40 milliGRAM(s) SubCutaneous two times a day  famotidine Injectable 20 milliGRAM(s) IV Push two times a day  HYDROmorphone Infusion 1 mG/Hr (1 mL/Hr) IV Continuous <Continuous>  insulin lispro (HumaLOG) corrective regimen sliding scale   SubCutaneous every 6 hours  insulin NPH human recombinant 8 Unit(s) SubCutaneous every 6 hours  ketamine Infusion 0.15 mG/kG/Hr (1.16 mL/Hr) IV Continuous <Continuous>  lactulose Syrup 20 Gram(s) Oral every 8 hours  levothyroxine Injectable 37.5 MICROGram(s) IV Push at bedtime  midazolam Infusion 0.02 mG/kG/Hr (1.54 mL/Hr) IV Continuous <Continuous>  norepinephrine Infusion 0.12 MICROgram(s)/kG/Min (17.3 mL/Hr) IV Continuous <Continuous>  petrolatum Ophthalmic Ointment 1 Application(s) Both EYES two times a day  polyethylene glycol 3350 17 Gram(s) Oral every 12 hours  senna Syrup 10 milliLiter(s) Oral at bedtime  vancomycin  IVPB 750 milliGRAM(s) IV Intermittent every 12 hours    MEDICATIONS  (PRN):  sodium chloride 0.9% lock flush 10 milliLiter(s) IV Push every 1 hour PRN Pre/post blood products, medications, blood draw, and to maintain line patency      ALLERGIES:  Allergies    No Known Allergies    Intolerances        LABS:                        11.8   10.91 )-----------( 265      ( 03 Apr 2020 01:59 )             35.5     04-03    142  |  102  |  42<H>  ----------------------------<  155<H>  4.8   |  28  |  0.63    Ca    8.5      03 Apr 2020 01:59  Phos  3.1     04-03  Mg     2.9     04-03    TPro  6.1  /  Alb  2.7<L>  /  TBili  0.2  /  DBili  x   /  AST  13  /  ALT  24  /  AlkPhos  62  04-03    PT/INR - ( 03 Apr 2020 01:59 )   PT: 11.9 sec;   INR: 1.03 ratio         PTT - ( 03 Apr 2020 01:59 )  PTT:32.1 sec      RADIOLOGY & ADDITIONAL TESTS: Reviewed.

## 2020-04-03 NOTE — PROGRESS NOTE ADULT - ASSESSMENT
64 M former smoker with no other known PMH who presented to [facility] on 3/23 with fevers, cough, and dyspnea 4 days prior present found to have hypoxic respiratory failure 2/2 COVID-19. Intubated on 3/27    #Neuro  - sedated on versed, dilaudid, ketamine   - goal RASS-5 for now    #Cardiovascular  Sinus bradycardia to 50's. May be 2/2 benzo ggt. Had episodes of transient hypotension  - dc'ed dopamine on 4/2. Started on levo, titrate to MAP>65  - Bedside POCUS without evidence of cardiac dysfunction    #Pulm  Acute hypoxic respiratory failure secondary to COVID 19   - ARDSNet protocol  - COVID 19+ on 3/27  - Vent settings: 28/350/50%/10; Last ABG: pH 7.42 pCO2 48 pO2 80 HCO3 31 SaO2 985%;  - Solumedrol (40mg BID 3/28-4/2)    #Renal  - Had Kumar with brenda SCr 1.1; now trending down to 0.63  - strict I/O  - K> 4 Mg>3  - cw lasix 60mg IV TID     #ID  COVID 19 infection  - s/p plaquenil (3/27-4/1), ascorbic acid, and thiamine   - c/w azithro (3/27- 3/31)  - EKG as above   - COVID daily labs: CBC, CMP, coags, CRP  - COVID every other day labs: ferritin, CK, procal, pro-BNP, ESR, ddimer, LDH  - vanc/cefepime started 3/30 -  continue for total 5 days; day 4  - anakinra (3/28- 3/31)    # GI/Hep  - Bolus feeds; previously had high residuals   - Monitor stool count - no stools for several days; will increase lactulose as needed.   - cw pepcid     #Endo  - cw synthroid  - NPH 8 q6 - monitor FS  - ISS     #Heme  trend H/H  - cw lovenox, cw ASA     #Ethics  - full code 64 M former smoker with no other known PMH who presented to [facility] on 3/23 with fevers, cough, and dyspnea 4 days prior present found to have hypoxic respiratory failure 2/2 COVID-19. Intubated on 3/27    #Neuro  - sedated on versed, dilaudid, ketamine; goal to decrease ketamine today.   - goal RASS-5 for now    #Cardiovascular  Sinus bradycardia to 50's. May be 2/2 benzo ggt. Had episodes of transient hypotension  - dc'ed dopamine on 4/2. Started on levo, titrate to MAP>65  - Bedside POCUS without evidence of cardiac dysfunction    #Pulm  Acute hypoxic respiratory failure secondary to COVID 19   - ARDSNet protocol  - COVID 19+ on 3/27  - Vent settings: 28/350/50%/10; Last ABG: pH 7.42 pCO2 48 pO2 80 HCO3 31 SaO2 95%;  - Solumedrol (40mg BID 3/28-4/2)    #Renal  - Had Kumar with brenda SCr 1.1; now trending down to 0.63  - strict I/O  - K> 4 Mg>3  - lasix prn     #ID  COVID 19 infection  - s/p plaquenil (3/27-4/1), ascorbic acid, and thiamine   - c/w azithro (3/27- 3/31)  - EKG as above   - COVID daily labs: CBC, CMP, coags, CRP  - COVID every other day labs: ferritin, CK, procal, pro-BNP, ESR, ddimer, LDH  - vanc/cefepime started 3/30 -  continue for total 5 days; day 4  - anakinra (3/28- 3/31)    # GI/Hep  - Bolus feeds; previously had high residuals   - Monitor stool count - no stools for several days; SMOG enema today   - cw pepcid     #Endo  - cw synthroid  - NPH 8 q6 - monitor FS  - ISS     #Heme  trend H/H  - cw lovenox, cw ASA     #Ethics  - full code

## 2020-04-03 NOTE — PROGRESS NOTE ADULT - ATTENDING COMMENTS
Patient with ARDS in setting of COVID  Patient was doing well throughout day, however acute decompensation in afternoon, with high peak pressures, over breathing vent with elevated platuau pressures, transiently improved with versed pushes, but recurred. Paralyzed patient. Xray abdomen reviewed-->ileus. lactate normal, malia/brown colored stools, lessl ikely bowel ischemia  Poor prognosis at this time, continue close ICU monitoring. Family made aware of changes  Critical care time 75 minutes.

## 2020-04-03 NOTE — PROVIDER CONTACT NOTE (CHANGE IN STATUS NOTIFICATION) - SITUATION
Pt desaturating on pulse ox, pulling no volume on ventilator. RN and respiratory therapist at bedside.

## 2020-04-03 NOTE — PROVIDER CONTACT NOTE (CHANGE IN STATUS NOTIFICATION) - ASSESSMENT
Patients abdomen is extremely distended but soft, larger on the left than the right side. MD Philippe at doorway. Pt pulling zero volume on ventilator, destating to O2 in 70s%, tachypnic to 35 and stacking breaths, tachycardic to 120s. Respiratory therapist and RN at bedside. RT suctioned, ambu bag with no improvement, OG tube connected to suction. Patients abdomen is extremely distended but soft, larger on the left than the right side. MD Philippe at doorway. Pt pulling zero to minimal volume on ventilator, destating to O2 in 70s%, tachypnic to 35 and stacking breaths, tachycardic to 120s. Respiratory therapist and RN at bedside. RT suctioned, ambu bag with no improvement, OG tube connected to suction.

## 2020-04-03 NOTE — PROVIDER CONTACT NOTE (CHANGE IN STATUS NOTIFICATION) - BACKGROUND
Pt COVID positive, admitted 3/29. PMH diabetes, hypothyroid, HLD, fever and cough for 2-3 days prior to admission. After bowel movement, pt head of bed lowered slightly to begin cleaning. Dilaudid gtt at 1mg/hr, ketamine gtt at 0.15mg/kg/hr, versed gtt at 0.15mg/kg/hr, levo at 0.08 infusing. BCx2 sent 4/2

## 2020-04-04 NOTE — PROGRESS NOTE ADULT - SUBJECTIVE AND OBJECTIVE BOX
Patient is a 70y old  Male who presents with a chief complaint of respiratory distress, COVID 19 (03 Apr 2020 08:14)      24 hour events: ***    REVIEW OF SYSTEMS:  Constitutional: [ ] fevers [ ] chills [ ] weight loss [ ] weight gain  HEENT: [ ] dry eyes [ ] eye irritation [ ] postnasal drip [ ] nasal congestion  CV: [ ] chest pain [ ] orthopnea [ ] palpitations [ ] murmur  Resp: [ ] cough [ ] shortness of breath [ ] dyspnea [ ] wheezing [ ] sputum [ ] hemoptysis  GI: [ ] nausea [ ] vomiting [ ] diarrhea [ ] constipation [ ] abd pain [ ] dysphagia   : [ ] dysuria [ ] nocturia [ ] hematuria [ ] increased urinary frequency  Musculoskeletal: [ ] back pain [ ] myalgias [ ] arthralgias [ ] fracture  Skin: [ ] rash [ ] itch  Neurological: [ ] headache [ ] dizziness [ ] syncope [ ] weakness [ ] numbness  Psychiatric: [ ] anxiety [ ] depression  Endocrine: [ ] diabetes [ ] thyroid problem  Hematologic/Lymphatic: [ ] anemia [ ] bleeding problem  Allergic/Immunologic: [ ] itchy eyes [ ] nasal discharge [ ] hives [ ] angioedema  [ ] All other systems negative  [ ] Unable to assess ROS because ________    OBJECTIVE:  ICU Vital Signs Last 24 Hrs  T(C): 35.6 (04 Apr 2020 04:00), Max: 37.8 (03 Apr 2020 14:00)  T(F): 96.1 (04 Apr 2020 04:00), Max: 100.1 (03 Apr 2020 14:00)  HR: 60 (04 Apr 2020 06:00) (60 - 125)  BP: 115/58 (03 Apr 2020 16:00) (107/55 - 125/61)  BP(mean): 82 (03 Apr 2020 16:00) (77 - 88)  ABP: 108/47 (04 Apr 2020 06:00) (108/47 - 163/49)  ABP(mean): 68 (04 Apr 2020 06:00) (58 - 91)  RR: 32 (04 Apr 2020 06:00) (19 - 40)  SpO2: 95% (04 Apr 2020 06:00) (88% - 98%)    Mode: AC/ CMV (Assist Control/ Continuous Mandatory Ventilation), RR (machine): 32, TV (machine): 300, FiO2: 80, PEEP: 12, ITime: 1, MAP: 20, PIP: 41    04-03 @ 07:01  -  04-04 @ 07:00  --------------------------------------------------------  IN: 1973.2 mL / OUT: 1380 mL / NET: 593.2 mL      CAPILLARY BLOOD GLUCOSE      POCT Blood Glucose.: 190 mg/dL (03 Apr 2020 20:47)      PHYSICAL EXAM:  GENERAL: NAD, well-developed  HEAD:  Atraumatic, Normocephalic  EYES: EOMI, PERRLA, conjunctiva and sclera clear  NECK: Supple, No JVD, Thyroid not palpable, non tender, Trachea midline  CHEST/LUNG: ( )ETT in place, ( )Tracheostomy in place, ( )no chest deformity,  ( )  Normal expansion/effort/palpation,  ( )Normal percussion/auscultation,  Clear to auscultation bilaterally; No wheeze  HEART: Regular rate and rhythm; No murmurs, rubs, or gallops, ( ) No JVD, ( )Normal Pulses, ( )Edema   ABDOMEN: Soft, Nontender, Nondistended; Bowel sounds presen, ( ) No Masses, (  ) No organomegaly,  (  ) Non-tender normal BS   : Garcia in Place, Voiding freely  Musculoskeletal/EXTREMITIES:(  ) Normal strength, movement, and tone, (  ) No focal atropy, (  ) Normal ROM, (  ) Normal digits and nails,  2+ Peripheral Pulses, No clubbing, cyanosis, or edema  PSYCH: AAOx3, (  ) Normal mood/affect/judgment/insight, (  ) Intact memory,   NEUROLOGY: non-focal, exam  SKIN: No rashes or lesions      LINES:    HOSPITAL MEDICATIONS:  MEDICATIONS  (STANDING):  aspirin  chewable 81 milliGRAM(s) Oral daily  chlorhexidine 0.12% Liquid 15 milliLiter(s) Oral Mucosa every 12 hours  enoxaparin Injectable 40 milliGRAM(s) SubCutaneous two times a day  famotidine Injectable 20 milliGRAM(s) IV Push two times a day  HYDROmorphone Infusion 3 mG/Hr (3 mL/Hr) IV Continuous <Continuous>  insulin lispro (HumaLOG) corrective regimen sliding scale   SubCutaneous every 6 hours  insulin NPH human recombinant 9 Unit(s) SubCutaneous every 6 hours  ketamine Infusion 0.15 mG/kG/Hr (1.16 mL/Hr) IV Continuous <Continuous>  lactulose Syrup 20 Gram(s) Oral every 8 hours  levothyroxine Injectable 37.5 MICROGram(s) IV Push at bedtime  methylPREDNISolone sodium succinate Injectable 40 milliGRAM(s) IV Push every 12 hours  metoclopramide Injectable 5 milliGRAM(s) IV Push three times a day  midazolam Infusion 0.02 mG/kG/Hr (1.54 mL/Hr) IV Continuous <Continuous>  norepinephrine Infusion 0.12 MICROgram(s)/kG/Min (17.3 mL/Hr) IV Continuous <Continuous>  petrolatum Ophthalmic Ointment 1 Application(s) Both EYES two times a day  polyethylene glycol 3350 17 Gram(s) Oral every 12 hours  rocuronium Infusion 8 MICROgram(s)/kG/Min (7.4 mL/Hr) IV Continuous <Continuous>  senna Syrup 10 milliLiter(s) Oral at bedtime  vancomycin  IVPB 1000 milliGRAM(s) IV Intermittent every 12 hours    MEDICATIONS  (PRN):  sodium chloride 0.9% lock flush 10 milliLiter(s) IV Push every 1 hour PRN Pre/post blood products, medications, blood draw, and to maintain line patency      LABS:                        11.8   11.41 )-----------( 269      ( 04 Apr 2020 01:38 )             38.5     Hgb Trend: 11.8<--, 11.8<--, 11.8<--, 11.9<--, 11.5<--  04-04    138  |  100  |  36<H>  ----------------------------<  219<H>  4.3   |  26  |  0.73    Ca    8.2<L>      04 Apr 2020 01:38  Phos  3.9     04-04  Mg     2.6     04-04    TPro  6.1  /  Alb  2.9<L>  /  TBili  0.3  /  DBili  x   /  AST  15  /  ALT  22  /  AlkPhos  67  04-04    Creatinine Trend: 0.73<--, 0.66<--, 0.63<--, 0.69<--, 0.73<--, 0.79<--  PT/INR - ( 03 Apr 2020 01:59 )   PT: 11.9 sec;   INR: 1.03 ratio         PTT - ( 03 Apr 2020 01:59 )  PTT:32.1 sec    Arterial Blood Gas:  04-04 @ 01:58  7.31/61/100/30/97/2.6  ABG lactate: --  Arterial Blood Gas:  04-03 @ 17:43  7.34/58/72/30/93/3.7  ABG lactate: --  Arterial Blood Gas:  04-03 @ 10:36  7.42/47/62/30/91/5.1  ABG lactate: --  Arterial Blood Gas:  04-03 @ 01:19  7.42/48/80/31/95/5.9  ABG lactate: --  Arterial Blood Gas:  04-02 @ 16:18  7.44/46/96/31/98/6.7  ABG lactate: --  Arterial Blood Gas:  04-02 @ 08:58  7.44/48/75/32/95/7.3  ABG lactate: --        EKG:    MICROBIOLOGY:     RADIOLOGY:  [ ] Reviewed and interpreted by me    EKG:  MICROBIOLOGY:     Radiology: ***    Bedside lung ultrasound: ***    Bedside ECHO: ***    EKG:    CENTRAL LINE: Y/N          DATE INSERTED:              REMOVE: Y/N    GARCIA: Y/N                        DATE INSERTED:              REMOVE: Y/N    A-LINE: Y/N                       DATE INSERTED:              REMOVE: Y/N    GLOBAL ISSUE/BEST PRACTICE:  Analgesia:  Sedation:  HOB elevation: yes  Stress ulcer prophylaxis:  VTE prophylaxis:  Glycemic control:  Nutrition:    CODE STATUS: ***  [facility] discussion: Y Patient is a 70y old  Male who presents with a chief complaint of respiratory distress, COVID 19 (03 Apr 2020 08:14)      24 hour events:     -Overnight @ approx 10 pm pt w/ progressive hypoxemia & desats to 80s w/ distended abdomen & decreased BS. Sedation increased d/t vent desynchrony.     REVIEW OF SYSTEMS:  Constitutional: [ ] fevers [ ] chills [ ] weight loss [ ] weight gain  HEENT: [ ] dry eyes [ ] eye irritation [ ] postnasal drip [ ] nasal congestion  CV: [ ] chest pain [ ] orthopnea [ ] palpitations [ ] murmur  Resp: [ ] cough [ ] shortness of breath [ ] dyspnea [ ] wheezing [ ] sputum [ ] hemoptysis  GI: [ ] nausea [ ] vomiting [ ] diarrhea [ ] constipation [ ] abd pain [ ] dysphagia   : [ ] dysuria [ ] nocturia [ ] hematuria [ ] increased urinary frequency  Musculoskeletal: [ ] back pain [ ] myalgias [ ] arthralgias [ ] fracture  Skin: [ ] rash [ ] itch  Neurological: [ ] headache [ ] dizziness [ ] syncope [ ] weakness [ ] numbness  Psychiatric: [ ] anxiety [ ] depression  Endocrine: [ ] diabetes [ ] thyroid problem  Hematologic/Lymphatic: [ ] anemia [ ] bleeding problem  Allergic/Immunologic: [ ] itchy eyes [ ] nasal discharge [ ] hives [ ] angioedema  [ ] All other systems negative  [ ] Unable to assess ROS because ________    OBJECTIVE:  ICU Vital Signs Last 24 Hrs  T(C): 35.6 (04 Apr 2020 04:00), Max: 37.8 (03 Apr 2020 14:00)  T(F): 96.1 (04 Apr 2020 04:00), Max: 100.1 (03 Apr 2020 14:00)  HR: 60 (04 Apr 2020 06:00) (60 - 125)  BP: 115/58 (03 Apr 2020 16:00) (107/55 - 125/61)  BP(mean): 82 (03 Apr 2020 16:00) (77 - 88)  ABP: 108/47 (04 Apr 2020 06:00) (108/47 - 163/49)  ABP(mean): 68 (04 Apr 2020 06:00) (58 - 91)  RR: 32 (04 Apr 2020 06:00) (19 - 40)  SpO2: 95% (04 Apr 2020 06:00) (88% - 98%)    Mode: AC/ CMV (Assist Control/ Continuous Mandatory Ventilation), RR (machine): 32, TV (machine): 300, FiO2: 80, PEEP: 12, ITime: 1, MAP: 20, PIP: 41    04-03 @ 07:01  -  04-04 @ 07:00  --------------------------------------------------------  IN: 1973.2 mL / OUT: 1380 mL / NET: 593.2 mL      CAPILLARY BLOOD GLUCOSE      POCT Blood Glucose.: 190 mg/dL (03 Apr 2020 20:47)      PHYSICAL EXAM:  GENERAL: NAD, well-developed  HEAD:  Atraumatic, Normocephalic  EYES: EOMI, PERRLA, conjunctiva and sclera clear  NECK: Supple, No JVD, Thyroid not palpable, non tender, Trachea midline  CHEST/LUNG: ( )ETT in place, ( )Tracheostomy in place, ( )no chest deformity,  ( )  Normal expansion/effort/palpation,  ( )Normal percussion/auscultation,  Clear to auscultation bilaterally; No wheeze  HEART: Regular rate and rhythm; No murmurs, rubs, or gallops, ( ) No JVD, ( )Normal Pulses, ( )Edema   ABDOMEN: Soft, Nontender, Nondistended; Bowel sounds presen, ( ) No Masses, (  ) No organomegaly,  (  ) Non-tender normal BS   : Garcia in Place, Voiding freely  Musculoskeletal/EXTREMITIES:(  ) Normal strength, movement, and tone, (  ) No focal atropy, (  ) Normal ROM, (  ) Normal digits and nails,  2+ Peripheral Pulses, No clubbing, cyanosis, or edema  PSYCH: AAOx3, (  ) Normal mood/affect/judgment/insight, (  ) Intact memory,   NEUROLOGY: non-focal, exam  SKIN: No rashes or lesions      LINES:    HOSPITAL MEDICATIONS:  MEDICATIONS  (STANDING):  aspirin  chewable 81 milliGRAM(s) Oral daily  chlorhexidine 0.12% Liquid 15 milliLiter(s) Oral Mucosa every 12 hours  enoxaparin Injectable 40 milliGRAM(s) SubCutaneous two times a day  famotidine Injectable 20 milliGRAM(s) IV Push two times a day  HYDROmorphone Infusion 3 mG/Hr (3 mL/Hr) IV Continuous <Continuous>  insulin lispro (HumaLOG) corrective regimen sliding scale   SubCutaneous every 6 hours  insulin NPH human recombinant 9 Unit(s) SubCutaneous every 6 hours  ketamine Infusion 0.15 mG/kG/Hr (1.16 mL/Hr) IV Continuous <Continuous>  lactulose Syrup 20 Gram(s) Oral every 8 hours  levothyroxine Injectable 37.5 MICROGram(s) IV Push at bedtime  methylPREDNISolone sodium succinate Injectable 40 milliGRAM(s) IV Push every 12 hours  metoclopramide Injectable 5 milliGRAM(s) IV Push three times a day  midazolam Infusion 0.02 mG/kG/Hr (1.54 mL/Hr) IV Continuous <Continuous>  norepinephrine Infusion 0.12 MICROgram(s)/kG/Min (17.3 mL/Hr) IV Continuous <Continuous>  petrolatum Ophthalmic Ointment 1 Application(s) Both EYES two times a day  polyethylene glycol 3350 17 Gram(s) Oral every 12 hours  rocuronium Infusion 8 MICROgram(s)/kG/Min (7.4 mL/Hr) IV Continuous <Continuous>  senna Syrup 10 milliLiter(s) Oral at bedtime  vancomycin  IVPB 1000 milliGRAM(s) IV Intermittent every 12 hours    MEDICATIONS  (PRN):  sodium chloride 0.9% lock flush 10 milliLiter(s) IV Push every 1 hour PRN Pre/post blood products, medications, blood draw, and to maintain line patency      LABS:                        11.8   11.41 )-----------( 269      ( 04 Apr 2020 01:38 )             38.5     Hgb Trend: 11.8<--, 11.8<--, 11.8<--, 11.9<--, 11.5<--  04-04    138  |  100  |  36<H>  ----------------------------<  219<H>  4.3   |  26  |  0.73    Ca    8.2<L>      04 Apr 2020 01:38  Phos  3.9     04-04  Mg     2.6     04-04    TPro  6.1  /  Alb  2.9<L>  /  TBili  0.3  /  DBili  x   /  AST  15  /  ALT  22  /  AlkPhos  67  04-04    Creatinine Trend: 0.73<--, 0.66<--, 0.63<--, 0.69<--, 0.73<--, 0.79<--  PT/INR - ( 03 Apr 2020 01:59 )   PT: 11.9 sec;   INR: 1.03 ratio         PTT - ( 03 Apr 2020 01:59 )  PTT:32.1 sec    Arterial Blood Gas:  04-04 @ 01:58  7.31/61/100/30/97/2.6  ABG lactate: --  Arterial Blood Gas:  04-03 @ 17:43  7.34/58/72/30/93/3.7  ABG lactate: --  Arterial Blood Gas:  04-03 @ 10:36  7.42/47/62/30/91/5.1  ABG lactate: --  Arterial Blood Gas:  04-03 @ 01:19  7.42/48/80/31/95/5.9  ABG lactate: --  Arterial Blood Gas:  04-02 @ 16:18  7.44/46/96/31/98/6.7  ABG lactate: --  Arterial Blood Gas:  04-02 @ 08:58  7.44/48/75/32/95/7.3  ABG lactate: --        EKG:    MICROBIOLOGY:     RADIOLOGY:  [ ] Reviewed and interpreted by me    EKG:  MICROBIOLOGY:     Radiology: ***    Bedside lung ultrasound: ***    Bedside ECHO: ***    EKG:    CENTRAL LINE: Y/N          DATE INSERTED:              REMOVE: Y/N    GARCIA: Y/N                        DATE INSERTED:              REMOVE: Y/N    A-LINE: Y/N                       DATE INSERTED:              REMOVE: Y/N    GLOBAL ISSUE/BEST PRACTICE:  Analgesia:  Sedation:  HOB elevation: yes  Stress ulcer prophylaxis:  VTE prophylaxis:  Glycemic control:  Nutrition:    CODE STATUS: ***  [facility] discussion: Y Patient is a 70y old  Male who presents with a chief complaint of respiratory distress, COVID 19 (03 Apr 2020 08:14)      24 hour events:     -Overnight @ approx 10 pm pt w/ progressive hypoxemia & desats to 80s w/ distended abdomen & decreased BS. Sedation increased d/t vent desynchrony. XR abd showed distended abd w/ concern for ileus vs obstruction   -s/p BMs, OG to intermittent suction w/ bowel rest, prokinetics, reglan dc  -NPH 8 -> 9 Q6h overnight     REVIEW OF SYSTEMS:  Constitutional: [ ] fevers [ ] chills [ ] weight loss [ ] weight gain  HEENT: [ ] dry eyes [ ] eye irritation [ ] postnasal drip [ ] nasal congestion  CV: [ ] chest pain [ ] orthopnea [ ] palpitations [ ] murmur  Resp: [ ] cough [ ] shortness of breath [ ] dyspnea [ ] wheezing [ ] sputum [ ] hemoptysis  GI: [ ] nausea [ ] vomiting [ ] diarrhea [ ] constipation [ ] abd pain [ ] dysphagia   : [ ] dysuria [ ] nocturia [ ] hematuria [ ] increased urinary frequency  Musculoskeletal: [ ] back pain [ ] myalgias [ ] arthralgias [ ] fracture  Skin: [ ] rash [ ] itch  Neurological: [ ] headache [ ] dizziness [ ] syncope [ ] weakness [ ] numbness  Psychiatric: [ ] anxiety [ ] depression  Endocrine: [ ] diabetes [ ] thyroid problem  Hematologic/Lymphatic: [ ] anemia [ ] bleeding problem  Allergic/Immunologic: [ ] itchy eyes [ ] nasal discharge [ ] hives [ ] angioedema  [ ] All other systems negative  [ ] Unable to assess ROS because ________    OBJECTIVE:  ICU Vital Signs Last 24 Hrs  T(C): 35.6 (04 Apr 2020 04:00), Max: 37.8 (03 Apr 2020 14:00)  T(F): 96.1 (04 Apr 2020 04:00), Max: 100.1 (03 Apr 2020 14:00)  HR: 60 (04 Apr 2020 06:00) (60 - 125)  BP: 115/58 (03 Apr 2020 16:00) (107/55 - 125/61)  BP(mean): 82 (03 Apr 2020 16:00) (77 - 88)  ABP: 108/47 (04 Apr 2020 06:00) (108/47 - 163/49)  ABP(mean): 68 (04 Apr 2020 06:00) (58 - 91)  RR: 32 (04 Apr 2020 06:00) (19 - 40)  SpO2: 95% (04 Apr 2020 06:00) (88% - 98%)    Mode: AC/ CMV (Assist Control/ Continuous Mandatory Ventilation), RR (machine): 32, TV (machine): 300, FiO2: 80, PEEP: 12, ITime: 1, MAP: 20, PIP: 41    04-03 @ 07:01  -  04-04 @ 07:00  --------------------------------------------------------  IN: 1973.2 mL / OUT: 1380 mL / NET: 593.2 mL      CAPILLARY BLOOD GLUCOSE      POCT Blood Glucose.: 190 mg/dL (03 Apr 2020 20:47)      PHYSICAL EXAM:  GENERAL: NAD, well-developed  HEAD:  Atraumatic, Normocephalic  EYES: EOMI, PERRLA, conjunctiva and sclera clear  NECK: Supple, No JVD, Thyroid not palpable, non tender, Trachea midline  CHEST/LUNG: ( )ETT in place, ( )Tracheostomy in place, ( )no chest deformity,  ( )  Normal expansion/effort/palpation,  ( )Normal percussion/auscultation,  Clear to auscultation bilaterally; No wheeze  HEART: Regular rate and rhythm; No murmurs, rubs, or gallops, ( ) No JVD, ( )Normal Pulses, ( )Edema   ABDOMEN: Soft, Nontender, Nondistended; Bowel sounds presen, ( ) No Masses, (  ) No organomegaly,  (  ) Non-tender normal BS   : Garcia in Place, Voiding freely  Musculoskeletal/EXTREMITIES:(  ) Normal strength, movement, and tone, (  ) No focal atropy, (  ) Normal ROM, (  ) Normal digits and nails,  2+ Peripheral Pulses, No clubbing, cyanosis, or edema  PSYCH: AAOx3, (  ) Normal mood/affect/judgment/insight, (  ) Intact memory,   NEUROLOGY: non-focal, exam  SKIN: No rashes or lesions      LINES:    HOSPITAL MEDICATIONS:  MEDICATIONS  (STANDING):  aspirin  chewable 81 milliGRAM(s) Oral daily  chlorhexidine 0.12% Liquid 15 milliLiter(s) Oral Mucosa every 12 hours  enoxaparin Injectable 40 milliGRAM(s) SubCutaneous two times a day  famotidine Injectable 20 milliGRAM(s) IV Push two times a day  HYDROmorphone Infusion 3 mG/Hr (3 mL/Hr) IV Continuous <Continuous>  insulin lispro (HumaLOG) corrective regimen sliding scale   SubCutaneous every 6 hours  insulin NPH human recombinant 9 Unit(s) SubCutaneous every 6 hours  ketamine Infusion 0.15 mG/kG/Hr (1.16 mL/Hr) IV Continuous <Continuous>  lactulose Syrup 20 Gram(s) Oral every 8 hours  levothyroxine Injectable 37.5 MICROGram(s) IV Push at bedtime  methylPREDNISolone sodium succinate Injectable 40 milliGRAM(s) IV Push every 12 hours  metoclopramide Injectable 5 milliGRAM(s) IV Push three times a day  midazolam Infusion 0.02 mG/kG/Hr (1.54 mL/Hr) IV Continuous <Continuous>  norepinephrine Infusion 0.12 MICROgram(s)/kG/Min (17.3 mL/Hr) IV Continuous <Continuous>  petrolatum Ophthalmic Ointment 1 Application(s) Both EYES two times a day  polyethylene glycol 3350 17 Gram(s) Oral every 12 hours  rocuronium Infusion 8 MICROgram(s)/kG/Min (7.4 mL/Hr) IV Continuous <Continuous>  senna Syrup 10 milliLiter(s) Oral at bedtime  vancomycin  IVPB 1000 milliGRAM(s) IV Intermittent every 12 hours    MEDICATIONS  (PRN):  sodium chloride 0.9% lock flush 10 milliLiter(s) IV Push every 1 hour PRN Pre/post blood products, medications, blood draw, and to maintain line patency      LABS:                        11.8   11.41 )-----------( 269      ( 04 Apr 2020 01:38 )             38.5     Hgb Trend: 11.8<--, 11.8<--, 11.8<--, 11.9<--, 11.5<--  04-04    138  |  100  |  36<H>  ----------------------------<  219<H>  4.3   |  26  |  0.73    Ca    8.2<L>      04 Apr 2020 01:38  Phos  3.9     04-04  Mg     2.6     04-04    TPro  6.1  /  Alb  2.9<L>  /  TBili  0.3  /  DBili  x   /  AST  15  /  ALT  22  /  AlkPhos  67  04-04    Creatinine Trend: 0.73<--, 0.66<--, 0.63<--, 0.69<--, 0.73<--, 0.79<--  PT/INR - ( 03 Apr 2020 01:59 )   PT: 11.9 sec;   INR: 1.03 ratio         PTT - ( 03 Apr 2020 01:59 )  PTT:32.1 sec    Arterial Blood Gas:  04-04 @ 01:58  7.31/61/100/30/97/2.6  ABG lactate: --  Arterial Blood Gas:  04-03 @ 17:43  7.34/58/72/30/93/3.7  ABG lactate: --  Arterial Blood Gas:  04-03 @ 10:36  7.42/47/62/30/91/5.1  ABG lactate: --  Arterial Blood Gas:  04-03 @ 01:19  7.42/48/80/31/95/5.9  ABG lactate: --  Arterial Blood Gas:  04-02 @ 16:18  7.44/46/96/31/98/6.7  ABG lactate: --  Arterial Blood Gas:  04-02 @ 08:58  7.44/48/75/32/95/7.3  ABG lactate: --        EKG:    MICROBIOLOGY:     RADIOLOGY:  [ ] Reviewed and interpreted by me    EKG:  MICROBIOLOGY:     Radiology: ***    Bedside lung ultrasound: ***    Bedside ECHO: ***    EKG:    CENTRAL LINE: Y/N          DATE INSERTED:              REMOVE: Y/N    GARCIA: Y/N                        DATE INSERTED:              REMOVE: Y/N    A-LINE: Y/N                       DATE INSERTED:              REMOVE: Y/N    GLOBAL ISSUE/BEST PRACTICE:  Analgesia:  Sedation:  HOB elevation: yes  Stress ulcer prophylaxis:  VTE prophylaxis:  Glycemic control:  Nutrition:    CODE STATUS: ***  [facility] discussion: Y Patient is a 70y old  Male who presents with a chief complaint of respiratory distress, COVID 19 (03 Apr 2020 08:14)      24 hour events:     -Overnight @ approx 10 pm pt w/ progressive hypoxemia & desats to 80s w/ distended abdomen & decreased BS. Sedation increased d/t vent desynchrony. XR abd showed distended abd w/ concern for ileus vs obstruction   -Later in evening, fio2 decr back to 60 from 100%, RR 28 -> 32  -s/p BMs, OG to intermittent suction w/ bowel rest, prokinetics, reglan dc  -NPH 8 -> 9 Q6h overnight d/t hyperglycemia   - Pt started on new course of 3 days solumedrol 40mg BID      REVIEW OF SYSTEMS:    [x] Unable to assess ROS because _intubated_______    OBJECTIVE:  ICU Vital Signs Last 24 Hrs  T(C): 35.6 (04 Apr 2020 04:00), Max: 37.8 (03 Apr 2020 14:00)  T(F): 96.1 (04 Apr 2020 04:00), Max: 100.1 (03 Apr 2020 14:00)  HR: 60 (04 Apr 2020 06:00) (60 - 125)  BP: 115/58 (03 Apr 2020 16:00) (107/55 - 125/61)  BP(mean): 82 (03 Apr 2020 16:00) (77 - 88)  ABP: 108/47 (04 Apr 2020 06:00) (108/47 - 163/49)  ABP(mean): 68 (04 Apr 2020 06:00) (58 - 91)  RR: 32 (04 Apr 2020 06:00) (19 - 40)  SpO2: 95% (04 Apr 2020 06:00) (88% - 98%)    Mode: AC/ CMV (Assist Control/ Continuous Mandatory Ventilation), RR (machine): 32, TV (machine): 300, FiO2: 80, PEEP: 12, ITime: 1, MAP: 20, PIP: 41    04-03 @ 07:01  -  04-04 @ 07:00  --------------------------------------------------------  IN: 1973.2 mL / OUT: 1380 mL / NET: 593.2 mL      CAPILLARY BLOOD GLUCOSE      POCT Blood Glucose.: 190 mg/dL (03 Apr 2020 20:47)        PHYSICAL EXAM:  General: NAD  HEENT: NC/AT  Respiratory: Normal breath sounds anteriorly /Intubated  Cardiovascular: RRR, S1+S2  Abdomen: NT, ND, BS+. Soft  Extremities: No edema or cyanosis  Skin: Cool extremities with palpable pulses  Neurological: Intubated/Sedated      LINES:    HOSPITAL MEDICATIONS:  MEDICATIONS  (STANDING):  aspirin  chewable 81 milliGRAM(s) Oral daily  chlorhexidine 0.12% Liquid 15 milliLiter(s) Oral Mucosa every 12 hours  enoxaparin Injectable 40 milliGRAM(s) SubCutaneous two times a day  famotidine Injectable 20 milliGRAM(s) IV Push two times a day  HYDROmorphone Infusion 3 mG/Hr (3 mL/Hr) IV Continuous <Continuous>  insulin lispro (HumaLOG) corrective regimen sliding scale   SubCutaneous every 6 hours  insulin NPH human recombinant 9 Unit(s) SubCutaneous every 6 hours  ketamine Infusion 0.15 mG/kG/Hr (1.16 mL/Hr) IV Continuous <Continuous>  lactulose Syrup 20 Gram(s) Oral every 8 hours  levothyroxine Injectable 37.5 MICROGram(s) IV Push at bedtime  methylPREDNISolone sodium succinate Injectable 40 milliGRAM(s) IV Push every 12 hours  metoclopramide Injectable 5 milliGRAM(s) IV Push three times a day  midazolam Infusion 0.02 mG/kG/Hr (1.54 mL/Hr) IV Continuous <Continuous>  norepinephrine Infusion 0.12 MICROgram(s)/kG/Min (17.3 mL/Hr) IV Continuous <Continuous>  petrolatum Ophthalmic Ointment 1 Application(s) Both EYES two times a day  polyethylene glycol 3350 17 Gram(s) Oral every 12 hours  rocuronium Infusion 8 MICROgram(s)/kG/Min (7.4 mL/Hr) IV Continuous <Continuous>  senna Syrup 10 milliLiter(s) Oral at bedtime  vancomycin  IVPB 1000 milliGRAM(s) IV Intermittent every 12 hours    MEDICATIONS  (PRN):  sodium chloride 0.9% lock flush 10 milliLiter(s) IV Push every 1 hour PRN Pre/post blood products, medications, blood draw, and to maintain line patency      LABS:                        11.8   11.41 )-----------( 269      ( 04 Apr 2020 01:38 )             38.5     Hgb Trend: 11.8<--, 11.8<--, 11.8<--, 11.9<--, 11.5<--  04-04    138  |  100  |  36<H>  ----------------------------<  219<H>  4.3   |  26  |  0.73    Ca    8.2<L>      04 Apr 2020 01:38  Phos  3.9     04-04  Mg     2.6     04-04    TPro  6.1  /  Alb  2.9<L>  /  TBili  0.3  /  DBili  x   /  AST  15  /  ALT  22  /  AlkPhos  67  04-04    Creatinine Trend: 0.73<--, 0.66<--, 0.63<--, 0.69<--, 0.73<--, 0.79<--  PT/INR - ( 03 Apr 2020 01:59 )   PT: 11.9 sec;   INR: 1.03 ratio         PTT - ( 03 Apr 2020 01:59 )  PTT:32.1 sec    Arterial Blood Gas:  04-04 @ 01:58  7.31/61/100/30/97/2.6  ABG lactate: --  Arterial Blood Gas:  04-03 @ 17:43  7.34/58/72/30/93/3.7  ABG lactate: --  Arterial Blood Gas:  04-03 @ 10:36  7.42/47/62/30/91/5.1  ABG lactate: --  Arterial Blood Gas:  04-03 @ 01:19  7.42/48/80/31/95/5.9  ABG lactate: --  Arterial Blood Gas:  04-02 @ 16:18  7.44/46/96/31/98/6.7  ABG lactate: --  Arterial Blood Gas:  04-02 @ 08:58  7.44/48/75/32/95/7.3  ABG lactate: --        EKG:    MICROBIOLOGY:     RADIOLOGY:  [ ] Reviewed and interpreted by me    EKG:  MICROBIOLOGY:     Radiology: abd xray (4/3):   No abnormal bowel distention.         [facility] discussion: MARIAH

## 2020-04-04 NOTE — PROGRESS NOTE ADULT - ATTENDING COMMENTS
Patient with ARDS in setting of COVID  Patient with eventful night yesterday, paralyzed, but not proned.     #ARDS  -patient with PaO2 61, P/F ratio around 100  -adjusted vent settings to increase TV (6cc/kg around 420) and reduced RR to match minute ventilation  -would like to prone, but concern given distended abdomen whether proning will worsen patient, along with bradycardia that came back after resolving 48 hours ago.   -if P/F ratio worsens, will try to prone.  -restarted steroids, unclear utility?-->will limit to 3 day course      #Ileus  -bowel rest and intermittent suction.     #Bradycardia  -now back on levo, will monitor HR. If stays in 50's will not , if continues to drop will consider restarting dopamine.     Patient is critically ill and requires continued ICU level care  Critical care time 75 minutes,

## 2020-04-05 NOTE — PROGRESS NOTE ADULT - SUBJECTIVE AND OBJECTIVE BOX
Interval/Overnight Events:    REVIEW OF SYSTEMS:    [x] Unable to assess ROS because patient is intubated/sedated    OBJECTIVE:  ICU Vital Signs Last 24 Hrs  T(C): 36 (04 Apr 2020 20:00), Max: 36 (04 Apr 2020 20:00)  T(F): 96.8 (04 Apr 2020 20:00), Max: 96.8 (04 Apr 2020 20:00)  HR: 41 (05 Apr 2020 07:00) (40 - 63)  BP: --  BP(mean): --  ABP: 129/51 (05 Apr 2020 07:00) (105/40 - 173/61)  ABP(mean): 77 (05 Apr 2020 07:00) (60 - 102)  RR: 26 (05 Apr 2020 07:00) (26 - 32)  SpO2: 91% (05 Apr 2020 07:00) (89% - 96%)    Mode: AC/ CMV (Assist Control/ Continuous Mandatory Ventilation), RR (machine): 26, TV (machine): 26, FiO2: 60, PEEP: 13, ITime: 1, MAP: 19, PIP: 37    04-04 @ 07:01  -  04-05 @ 07:00  --------------------------------------------------------  IN: 904.1 mL / OUT: 1175 mL / NET: -270.9 mL      CAPILLARY BLOOD GLUCOSE      POCT Blood Glucose.: 211 mg/dL (05 Apr 2020 05:53)      PHYSICAL EXAM:  General: NAD  HEENT: NC/AT  Respiratory: Normal breath sounds anteriorly /Intubated  Cardiovascular: RRR, S1+S2  Abdomen: NT, ND, BS+. Soft  Extremities: No edema or cyanosis  Skin: Cool extremities with palpable pulses  Neurological: Intubated/Sedated    LINES:    HOSPITAL MEDICATIONS:  Standing Meds:  aspirin  chewable 81 milliGRAM(s) Oral daily  chlorhexidine 0.12% Liquid 15 milliLiter(s) Oral Mucosa every 12 hours  enoxaparin Injectable 40 milliGRAM(s) SubCutaneous two times a day  famotidine Injectable 20 milliGRAM(s) IV Push two times a day  HYDROmorphone Infusion 3 mG/Hr IV Continuous <Continuous>  insulin lispro (HumaLOG) corrective regimen sliding scale   SubCutaneous every 6 hours  insulin NPH human recombinant 9 Unit(s) SubCutaneous every 6 hours  ketamine Infusion 0.15 mG/kG/Hr IV Continuous <Continuous>  levothyroxine Injectable 37.5 MICROGram(s) IV Push at bedtime  methylPREDNISolone sodium succinate Injectable 40 milliGRAM(s) IV Push every 12 hours  midazolam Infusion 0.02 mG/kG/Hr IV Continuous <Continuous>  norepinephrine Infusion 0.12 MICROgram(s)/kG/Min IV Continuous <Continuous>  petrolatum Ophthalmic Ointment 1 Application(s) Both EYES two times a day  polyethylene glycol 3350 17 Gram(s) Oral every 12 hours  rocuronium Infusion 8 MICROgram(s)/kG/Min IV Continuous <Continuous>  senna Syrup 10 milliLiter(s) Oral at bedtime  vancomycin  IVPB 1000 milliGRAM(s) IV Intermittent every 12 hours      PRN Meds:  sodium chloride 0.9% lock flush 10 milliLiter(s) IV Push every 1 hour PRN      LABS:                        11.7   13.13 )-----------( 249      ( 05 Apr 2020 00:35 )             35.9     Hgb Trend: 11.7<--, 11.8<--, 11.8<--, 11.8<--, 11.9<--  04-05    139  |  104  |  26<H>  ----------------------------<  173<H>  5.1   |  26  |  0.62    Ca    8.7      05 Apr 2020 00:35  Phos  2.6     04-05  Mg     2.8     04-05    TPro  5.8<L>  /  Alb  2.6<L>  /  TBili  0.8  /  DBili  x   /  AST  12  /  ALT  21  /  AlkPhos  62  04-05    Creatinine Trend: 0.62<--, 0.73<--, 0.66<--, 0.63<--, 0.69<--, 0.73<--      Arterial Blood Gas:  04-05 @ 00:15  7.41/47/72/29/94/4.4  ABG lactate: --  Arterial Blood Gas:  04-04 @ 18:00  7.41/48/60/30/90/4.7  ABG lactate: --  Arterial Blood Gas:  04-04 @ 15:09  7.38/52/62/30/90/4.3  ABG lactate: --  Arterial Blood Gas:  04-04 @ 09:29  7.40/48/104/30/98/4.7  ABG lactate: --  Arterial Blood Gas:  04-04 @ 01:58  7.31/61/100/30/97/2.6  ABG lactate: --  Arterial Blood Gas:  04-03 @ 17:43  7.34/58/72/30/93/3.7  ABG lactate: --  Arterial Blood Gas:  04-03 @ 10:36  7.42/47/62/30/91/5.1  ABG lactate: --        MICROBIOLOGY:     RADIOLOGY:  [ ] Reviewed and interpreted by me Interval/Overnight Events:  - Agitation causes exacerbation of bradycardia.    REVIEW OF SYSTEMS:    [x] Unable to assess ROS because patient is intubated/sedated    OBJECTIVE:  ICU Vital Signs Last 24 Hrs  T(C): 36 (04 Apr 2020 20:00), Max: 36 (04 Apr 2020 20:00)  T(F): 96.8 (04 Apr 2020 20:00), Max: 96.8 (04 Apr 2020 20:00)  HR: 41 (05 Apr 2020 07:00) (40 - 63)  BP: --  BP(mean): --  ABP: 129/51 (05 Apr 2020 07:00) (105/40 - 173/61)  ABP(mean): 77 (05 Apr 2020 07:00) (60 - 102)  RR: 26 (05 Apr 2020 07:00) (26 - 32)  SpO2: 91% (05 Apr 2020 07:00) (89% - 96%)    Mode: AC/ CMV (Assist Control/ Continuous Mandatory Ventilation), RR (machine): 26, TV (machine): 26, FiO2: 60, PEEP: 13, ITime: 1, MAP: 19, PIP: 37    04-04 @ 07:01  -  04-05 @ 07:00  --------------------------------------------------------  IN: 904.1 mL / OUT: 1175 mL / NET: -270.9 mL      CAPILLARY BLOOD GLUCOSE      POCT Blood Glucose.: 211 mg/dL (05 Apr 2020 05:53)      PHYSICAL EXAM:  General: NAD  HEENT: NC/AT  Respiratory: Normal breath sounds anteriorly /Intubated  Cardiovascular: RRR, S1+S2  Abdomen: NT, ND, BS+. Soft  Extremities: No edema or cyanosis  Skin: Cool extremities with palpable pulses  Neurological: Intubated/Sedated    LINES:    HOSPITAL MEDICATIONS:  Standing Meds:  aspirin  chewable 81 milliGRAM(s) Oral daily  chlorhexidine 0.12% Liquid 15 milliLiter(s) Oral Mucosa every 12 hours  enoxaparin Injectable 40 milliGRAM(s) SubCutaneous two times a day  famotidine Injectable 20 milliGRAM(s) IV Push two times a day  HYDROmorphone Infusion 3 mG/Hr IV Continuous <Continuous>  insulin lispro (HumaLOG) corrective regimen sliding scale   SubCutaneous every 6 hours  insulin NPH human recombinant 9 Unit(s) SubCutaneous every 6 hours  ketamine Infusion 0.15 mG/kG/Hr IV Continuous <Continuous>  levothyroxine Injectable 37.5 MICROGram(s) IV Push at bedtime  methylPREDNISolone sodium succinate Injectable 40 milliGRAM(s) IV Push every 12 hours  midazolam Infusion 0.02 mG/kG/Hr IV Continuous <Continuous>  norepinephrine Infusion 0.12 MICROgram(s)/kG/Min IV Continuous <Continuous>  petrolatum Ophthalmic Ointment 1 Application(s) Both EYES two times a day  polyethylene glycol 3350 17 Gram(s) Oral every 12 hours  rocuronium Infusion 8 MICROgram(s)/kG/Min IV Continuous <Continuous>  senna Syrup 10 milliLiter(s) Oral at bedtime  vancomycin  IVPB 1000 milliGRAM(s) IV Intermittent every 12 hours      PRN Meds:  sodium chloride 0.9% lock flush 10 milliLiter(s) IV Push every 1 hour PRN      LABS:                        11.7   13.13 )-----------( 249      ( 05 Apr 2020 00:35 )             35.9     Hgb Trend: 11.7<--, 11.8<--, 11.8<--, 11.8<--, 11.9<--  04-05    139  |  104  |  26<H>  ----------------------------<  173<H>  5.1   |  26  |  0.62    Ca    8.7      05 Apr 2020 00:35  Phos  2.6     04-05  Mg     2.8     04-05    TPro  5.8<L>  /  Alb  2.6<L>  /  TBili  0.8  /  DBili  x   /  AST  12  /  ALT  21  /  AlkPhos  62  04-05    Creatinine Trend: 0.62<--, 0.73<--, 0.66<--, 0.63<--, 0.69<--, 0.73<--      Arterial Blood Gas:  04-05 @ 00:15  7.41/47/72/29/94/4.4  ABG lactate: --  Arterial Blood Gas:  04-04 @ 18:00  7.41/48/60/30/90/4.7  ABG lactate: --  Arterial Blood Gas:  04-04 @ 15:09  7.38/52/62/30/90/4.3  ABG lactate: --  Arterial Blood Gas:  04-04 @ 09:29  7.40/48/104/30/98/4.7  ABG lactate: --  Arterial Blood Gas:  04-04 @ 01:58  7.31/61/100/30/97/2.6  ABG lactate: --  Arterial Blood Gas:  04-03 @ 17:43  7.34/58/72/30/93/3.7  ABG lactate: --  Arterial Blood Gas:  04-03 @ 10:36  7.42/47/62/30/91/5.1  ABG lactate: --        MICROBIOLOGY:     RADIOLOGY:  [ ] Reviewed and interpreted by me

## 2020-04-05 NOTE — CONSULT NOTE ADULT - ATTENDING COMMENTS
Agree with above plans. he has baseline sinus bradycardia and any vagal stimulation will slow further.

## 2020-04-05 NOTE — PROGRESS NOTE ADULT - ATTENDING COMMENTS
Patient with ARDS in setting of COVID.    #ARDS  -paralyzed, gas currently stable on gas settings  -would D/C paralysis tomorrow   -s/p COVID cocktail    #Bradycardia  -unclear etiology. Patietn bradycardic, hypothermic. On steroids (day 2/3 solumedrol 40mv IV BID), no adrenal insufficiency, normal TSH.   -patient on levophed, not cushing's reflex, pupils pinpoint but equal.   -EP appreciated glyco vs. atropine if HR sustains below 40.  -once not paralyzed, would bring down Dilaudid first and attempt to wean off sedation which should help bradycardia    Patient is critically ill and requires continued ICU level care  Critical care time spent 60 minutes.

## 2020-04-05 NOTE — CONSULT NOTE ADULT - SUBJECTIVE AND OBJECTIVE BOX
Patient seen and evaluated @   Reason for consult:     HPI: 70 year old M pt with PMH of HLD, DM, and hypothyroidism p/w fevers and respiratory symptoms, found to be covid positive and intubated on 3/27. EP consulted for bradycardia in the setting of intubation with narcotic, sedatives, and paralytic. Of note, pt was on dopamine earlier in the hospital course due to sinus bradycardia but discontinued. On 4/3, pt became tachycardic while on levophed.    Today, pt remains intubated and appropriately sedated and paralyzed. Telemetry and EKGs reviewed, showing sinus bradycardia with no AV block/BBB patterns.    Primary Service HPI:  70M with PMH presents with T dm, hypothyroidism, hld, p/w cough, low grade fever x 2-3 days. Today patient had worsening dyspnea at rest as well as exertion which prompted to come to the ED. Patient otherwise denied chest pain, leg swelling. He denies prior lung disease. Denies any history of smoking.     In ED, patient was noted to be hypoxic on 6L NC to 89% and significant tachypnea. VS otherwise, afebrile, HR in 80s, SBP 150s. Labs notable for CBC wnl. CMP with mild elevated transaminases AST 65 and ALT 47. PAtient noted to be positive for COVID 19. Due to worsening respiratory distress patient was emergently intubated in the ER. (27 Mar 2020 21:38)    PMH:   Hyperlipidemia  Hypothyroid    PSH:   No significant past surgical history    Medications:   aspirin  chewable 81 milliGRAM(s) Oral daily  chlorhexidine 0.12% Liquid 15 milliLiter(s) Oral Mucosa every 12 hours  enoxaparin Injectable 40 milliGRAM(s) SubCutaneous two times a day  famotidine Injectable 20 milliGRAM(s) IV Push two times a day  HYDROmorphone Infusion 3 mG/Hr IV Continuous <Continuous>  insulin lispro (HumaLOG) corrective regimen sliding scale   SubCutaneous every 6 hours  insulin NPH human recombinant 9 Unit(s) SubCutaneous every 6 hours  ketamine Infusion 0.15 mG/kG/Hr IV Continuous <Continuous>  levothyroxine Injectable 37.5 MICROGram(s) IV Push at bedtime  methylPREDNISolone sodium succinate Injectable 40 milliGRAM(s) IV Push every 12 hours  midazolam Infusion 0.02 mG/kG/Hr IV Continuous <Continuous>  norepinephrine Infusion 0.12 MICROgram(s)/kG/Min IV Continuous <Continuous>  petrolatum Ophthalmic Ointment 1 Application(s) Both EYES two times a day  polyethylene glycol 3350 17 Gram(s) Oral every 12 hours  rocuronium Infusion 8 MICROgram(s)/kG/Min IV Continuous <Continuous>  senna Syrup 10 milliLiter(s) Oral at bedtime  sodium chloride 0.9% lock flush 10 milliLiter(s) IV Push every 1 hour PRN    Allergies:  No Known Allergies    FAMILY HISTORY:    Social History:  Smoking:  Alcohol:  Drugs:    Review of Systems:  Constitutional: [ ] Fever [ ] Chills [ ] Fatigue [ ] Weight change   HEENT: [ ] Blurred vision [ ] Eye Pain [ ] Headache [ ] Runny nose [ ] Sore Throat   Respiratory: [ ] Cough [ ] Wheezing [ ] Shortness of breath  Cardiovascular: [ ] Chest Pain [ ] Palpitations [ ] FLOR [ ] PND [ ] Orthopnea  Gastrointestinal: [ ] Abdominal Pain [ ] Diarrhea [ ] Constipation [ ] Hemorrhoids [ ] Nausea [ ] Vomiting  Genitourinary: [ ] Nocturia [ ] Dysuria [ ] Incontinence  Extremities: [ ] Swelling [ ] Joint Pain  Neurologic: [ ] Focal deficit [ ] Paresthesias [ ] Syncope  Lymphatic: [ ] Swelling [ ] Lymphadenopathy   Skin: [ ] Rash [ ] Ecchymoses [ ] Wounds [ ] Lesions  Psychiatry: [ ] Depression [ ] Suicidal/Homicidal Ideation [ ] Anxiety [ ] Sleep Disturbances  [ ] 10 point review of systems is otherwise negative except as mentioned above            [ ]Unable to obtain    Physical Exam:  T(C): 36 (20 @ 20:00), Max: 36 (20 @ 20:00)  HR: 41 (20 @ 07:00) (40 - 63)  BP: --  RR: 26 (20 @ 07:00) (26 - 32)  SpO2: 91% (20 @ 07:00) (89% - 96%)  Wt(kg): --     @ 07:01  -   07:00  --------------------------------------------------------  IN: 904.1 mL / OUT: 1175 mL / NET: -270.9 mL      Daily     Daily     Physical Exam: not performed, please refer to primary team note    Cardiovascular Diagnostic Testing:  ECG: Sinus bradycardia, normal OK and QRS intervals    Echo:    Stress Testing:    Cath:    Interpretation of Telemetry: Sinus bradycardia    Imaging:    Labs:                        11.7   13.13 )-----------( 249      ( 2020 00:35 )             35.9     04-05    139  |  104  |  26<H>  ----------------------------<  173<H>  5.1   |  26  |  0.62    Ca    8.7      2020 00:35  Phos  2.6     04-05  Mg     2.8     04-05    TPro  5.8<L>  /  Alb  2.6<L>  /  TBili  0.8  /  DBili  x   /  AST  12  /  ALT  21  /  AlkPhos  62  04-05            Total Cholesterol: --  LDL: --  HDL: --  T    Hemoglobin A1C, Whole Blood: 7.2 % ( @ 08:24)    Thyroid Stimulating Hormone, Serum: 0.24 uIU/mL ( @ 06:38)  Thyroid Stimulating Hormone, Serum: 0.83 uIU/mL ( @ 09:44)

## 2020-04-05 NOTE — CONSULT NOTE ADULT - ASSESSMENT
70 year old M pt with PMH of HLD, DM, and hypothyroidism p/w fevers and respiratory symptoms, found to be covid positive and intubated on 3/27. EP consulted for bradycardia in the setting of intubation with narcotic, sedatives, and paralytic.     - intubated and appropriately sedated/paralyzed  - telemetry and ekg's reviewed, sinus bradycardia with intact AV and infra-his conduction  - continue to wean dilaudid and levophed as BP tolerated  - would not start inotropic/choronotropic agents at this time  - to consider atropine prn for HR<40  - will continue to follow, please call with further questions    Parker Henley, #[phone]  Cardiology Fellow

## 2020-04-05 NOTE — PROGRESS NOTE ADULT - ASSESSMENT
64 M former smoker with no other known PMH who presented to [facility] on 3/23 with fevers, cough, and dyspnea 4 days prior present found to have hypoxic respiratory failure 2/2 COVID-19. Intubated on 3/27    #Neuro  - sedated on versed, dilaudid, and ketamine. Paralyzed with rocuronium  - goal RASS-5 for now     #Cardiovascular  Sinus bradycardia to low 40's. May be 2/2 dilaudid ggt. Reduce dilaudid to 2  - continue levo, titrate to MAP>65  - may consider restarting dopamine  - Bedside POCUS without evidence of cardiac dysfunction    #Pulm  Acute hypoxic respiratory failure secondary to COVID 19   - ARDSNet protocol  - COVID 19+ on 3/27  - Vent settings: 26/350/60%/13; follow ABG  - Solumedrol (40mg BID 3/28-4/2)  - 2nd course Solumedrol 40mg BID (4/4 - 4/6)    #Renal  - RYLEE resolved  - strict I/O  - K> 4 Mg>3    #ID  COVID 19 infection  - s/p plaquenil (3/27-4/1), ascorbic acid, and thiamine   - c/w azithro (3/27- 3/31)  - EKG as above   - COVID daily labs: CBC, CMP, coags, CRP  - COVID every other day labs: ferritin, CK, procal, pro-BNP, ESR, ddimer, LDH  - vanc/cefepime started 3/30 -  course completed 4/4  - anakinra (3/28- 3/31)  - WBC uptrending    # GI/Hep  - Bolus feeds   - last BM 4/4 12pm    #Endo  - cw synthroid  - NPH 9 q6 - monitor FS, goal < 180  - ISS    #Heme  - trend H/H  - cw lovenox, cw ASA     #Ethics  - full code 64 M former smoker with no other known PMH who presented to [facility] on 3/23 with fevers, cough, and dyspnea 4 days prior present found to have hypoxic respiratory failure 2/2 COVID-19. Intubated on 3/27    #Neuro  - sedated on versed, dilaudid, and ketamine. Paralyzed with rocuronium  - goal RASS-5 for now     #Cardiovascular  Sinus bradycardia to low 40's. May be 2/2 dilaudid ggt. Reduce dilaudid to 2.5  - continue levo, titrate to MAP>65  - Appreciate EP recommendations for bradycardia. Will consider anticholinergic agents for HR < 40  - Bedside POCUS without evidence of cardiac dysfunction    #Pulm  Acute hypoxic respiratory failure secondary to COVID 19   - ARDSNet protocol  - COVID 19+ on 3/27  - Vent settings: 26/350/60%/13; follow ABG  - Solumedrol (40mg BID 3/28-4/2)  - 2nd course Solumedrol 40mg BID (4/4 - 4/6)    #Renal  - RYLEE resolved  - strict I/O  - K> 4 Mg>3    #ID  COVID 19 infection  - s/p plaquenil (3/27-4/1), ascorbic acid, and thiamine   - c/w azithro (3/27- 3/31)  - EKG as above   - COVID daily labs: CBC, CMP, coags, CRP  - COVID every other day labs: ferritin, CK, procal, pro-BNP, ESR, ddimer, LDH  - vanc/cefepime started 3/30 -  course completed 4/4  - anakinra (3/28- 3/31)  - WBC uptrending    # GI/Hep  - Bolus feeds to 200 q6  - last BM 4/4 12pm  -     #Endo  - cw synthroid  - NPH 9 q6 - monitor FS, goal < 180  - ISS    #Heme  - trend H/H  - cw lovenox, cw ASA     #Ethics  - full code

## 2020-04-06 NOTE — PROGRESS NOTE ADULT - SUBJECTIVE AND OBJECTIVE BOX
COVID ICU Progress Note    Age/Gender: 69y/o Male  Comorbidities: T2DM, hypothyroidism, HLD    Admitted: 03-27  Intubated: 03-27    Interval/Overnight Events: Pt found to bradycardic (SR 30-40), warmed for hypothermia, HR now 70-80       COVID-PCR (03-27): Detected  RVP (03-27): NotDetec    (Flu A: --,  Flu B: --,  RSV: --)    RESPIRATORY  [ X ] Acute respiratory failure with: [ X ] hypoxemia   /   [  ] hypercapnia  [  ] ARDS    Daily vent settings / pressures:  --26 / 330:   50% / +12  /   (peak) /   (plateau)  --__ / __:   % / +  /   (peak) /   (plateau)    RR: 26 / min  IBW: 70 kg  TV: 330 mL (4 mL/kg)  (04-06-20 @ 02:12): 7.37 / 84 (PaO2) / 52 / 30  (04-05-20 @ 15:11): 7.41 / 83 (PaO2) / 46 / 29      CARDIOVASCULAR  Septic shock  --[ x ] norepinephrine infusion  --[  ] vasopressin infusion      NEURO  --[  ] propofol  --[  ] dexmedetomidine  --[ x ] midazolam  --[ X ] fentanyl  --[  ] cisatracurium  --[ x ] ketamine  -- [ x ] dilaudid      RENAL  --[  ] CKD stage __ (baseline Cr: __)  --[  ] RYLEE current BUN __ /Cr __  --[  ] on renal replacement therapy:   --[  ] Lasix infusion (started __/__)  --[ x ] Lasix intermittent 40mg IV BID  --[  ] electrolyte abnormalities:      ID  --[ X ] hydroxychloroquine x 5 days ( 03/27 - 04/01)      --[  ] thiamine x 5 days (__/__ - __/__)  --[  ] vitamin C x 5 days (__/__ - __/__)  --[  ] azithromycin (dates: 03/28 - 03/31)  --[  ] ceftriaxone (dates: __/__ - __/__)  --[  ] other:     Procalcitonin:  0.24 (04-06)  0.08 (04-02)  0.14 (04-01)  0.29 (03-31)  0.39 (03-30)    Relevant positive cultures: N/A  Relevant pending cultures: N/A      GI  --[ X ] TF Glucerna (Bolus 200cc Q6)  ----goal rate:  __ mL/hr + __ packets ProSource daily ([  ] assuming propofol @ 50 mcg/kg/min)  --[ X ] famotidine  --[  ] pantoprazole      ENDOCRINE  --[  ] insulin regimen: [  ] infusion,  [  ] Lantus,  [  ] NPH,  [  ] sliding scale  --[  ] stress-dose steroids      HEME  --[  ] chemical VTE prophylaxis: enoxaparin 40 mg daily      LINES  [ X ] central venous catheter: insertion date 3/27,  location RIJ  [ X ] arterial line: insertion date 3/27,  location R radial

## 2020-04-06 NOTE — CHART NOTE - NSCHARTNOTEFT_GEN_A_CORE
EP Note:    Telemetry & chart reviewed.    Telemetry shows SR 60-70's bpm; periods of sinus darnell 40's bpm yesterday evening    71y/o male with HLD, DM, hypothyroidism p/w covid s/p intubation & had period of sinus bradycardia in the setting of intubation & sedatives  --No EP intervention needed at this time as sinus darnell likely secondary to vagotonia      Siobhan Lim PA-C  [id number]

## 2020-04-07 NOTE — PROGRESS NOTE ADULT - SUBJECTIVE AND OBJECTIVE BOX
CLARITA POWELL  MRN-[id number]  Patient is a 70y old  Male who presents with a chief complaint of respiratory distress, COVID 19 (07 Apr 2020 11:37)    HPI:  70M with PMH presents with T dm, hypothyroidism, hld, p/w cough, low grade fever x 2-3 days. Today patient had worsening dyspnea at rest as well as exertion which prompted to come to the ED. Patient otherwise denied chest pain, leg swelling. He denies prior lung disease. Denies any history of smoking.     In ED, patient was noted to be hypoxic on 6L NC to 89% and significant tachypnea. VS otherwise, afebrile, HR in 80s, SBP 150s. Labs notable for CBC wnl. CMP with mild elevated transaminases AST 65 and ALT 47. PAtient noted to be positive for COVID 19. Due to worsening respiratory distress patient was emergently intubated in the ER. (27 Mar 2020 21:38)      Surgery/Hospital course:    Today:    REVIEW OF SYSTEMS:   Unable to assess 2/2 intubated and sedated    Physical Exam:  Vital Signs Last 24 Hrs  T(C): 38 (07 Apr 2020 18:00), Max: 38 (07 Apr 2020 18:00)  T(F): 100.4 (07 Apr 2020 18:00), Max: 100.4 (07 Apr 2020 18:00)  HR: 92 (07 Apr 2020 22:00) (64 - 102)  BP: --  BP(mean): --  RR: 26 (07 Apr 2020 22:00) (26 - 36)  SpO2: 95% (07 Apr 2020 22:00) (86% - 97%)  Gen:  Intubated and sedated- See prior physical exam 2/2 Covid protocol      ============================I/O===========================   I&O's Detail    06 Apr 2020 07:01  -  07 Apr 2020 07:00  --------------------------------------------------------  IN:    0.9% NaCl: 60 mL    Enteral Tube Flush: 210 mL    Glucerna 1.5: 600 mL    HYDROmorphone  Infusion: 55 mL    HYDROmorphone  Infusion: 5 mL    ketamine Infusion: 44.4 mL    midazolam Infusion: 278.4 mL    norepinephrine Infusion: 415.2 mL    rocuronium Infusion: 16.6 mL  Total IN: 1684.6 mL    OUT:    Indwelling Catheter - Urethral: 3170 mL  Total OUT: 3170 mL    Total NET: -1485.4 mL      07 Apr 2020 07:01  -  07 Apr 2020 22:42  --------------------------------------------------------  IN:    0.9% NaCl: 80 mL    Enteral Tube Flush: 30 mL    Glucerna 1.5: 200 mL    HYDROmorphone  Infusion: 40 mL    ketamine Infusion: 9.5 mL    ketamine Infusion: 1.9 mL    ketamine Infusion.: 19 mL    midazolam Infusion: 185.6 mL    norepinephrine Infusion: 252.7 mL    rocuronium Infusion: 96.2 mL  Total IN: 914.9 mL    OUT:    Indwelling Catheter - Urethral: 2660 mL  Total OUT: 2660 mL    Total NET: -1745.1 mL        ============================ LABS =========================                        12.0   9.97  )-----------( 298      ( 07 Apr 2020 02:53 )             39.0     04-07    143  |  102  |  44<H>  ----------------------------<  210<H>  5.6<H>   |  29  |  0.89    Ca    8.5      07 Apr 2020 02:53  Phos  3.8     04-07  Mg     2.6     04-07    TPro  5.4<L>  /  Alb  2.8<L>  /  TBili  0.5  /  DBili  x   /  AST  24  /  ALT  52<H>  /  AlkPhos  115  04-07    LIVER FUNCTIONS - ( 07 Apr 2020 02:53 )  Alb: 2.8 g/dL / Pro: 5.4 g/dL / ALK PHOS: 115 U/L / ALT: 52 U/L / AST: 24 U/L / GGT: x           PT/INR - ( 07 Apr 2020 02:53 )   PT: 13.6 sec;   INR: 1.18 ratio         PTT - ( 07 Apr 2020 02:53 )  PTT:34.7 sec  ABG - ( 07 Apr 2020 18:10 )  pH, Arterial: 7.44  pH, Blood: x     /  pCO2: 54    /  pO2: 121   / HCO3: 36    / Base Excess: 10.4  /  SaO2: 98                  ======================Micro/Rad/Cardio=================  Culture: Reviewed   CXR: Reviewed  Echo:Reviewed  ======================================================  PAST MEDICAL & SURGICAL HISTORY:  Hyperlipidemia  Hypothyroid  No significant past surgical history    ====================ASSESSMENT ==============        ====================== NEUROLOGY=====================  HYDROmorphone Infusion 2.5 mG/Hr (2.5 mL/Hr) IV Continuous <Continuous>  ketamine Infusion. 0.25 mG/kG/Hr (1.93 mL/Hr) IV Continuous <Continuous>  midazolam Infusion 0.02 mG/kG/Hr (1.54 mL/Hr) IV Continuous <Continuous>  rocuronium Infusion 8 MICROgram(s)/kG/Min (7.4 mL/Hr) IV Continuous <Continuous>      Continue to wean sedation/paralytics as tolerated  ==================== RESPIRATORY======================  Mechanical Ventilation:  Mode: AC/ CMV (Assist Control/ Continuous Mandatory Ventilation)  RR (machine): 26  TV (machine): 330  FiO2: 60  PEEP: 13  ITime: 1  MAP: 20  PIP: 32        Continue to wean FiO2 (goal to 30%, then wean PEEP as tolerated)  O2 sat goal >92%  ====================CARDIOVASCULAR==================  furosemide   Injectable 40 milliGRAM(s) IV Push every 12 hours  norepinephrine Infusion 0.12 MICROgram(s)/kG/Min (17.3 mL/Hr) IV Continuous <Continuous>      Wean vasopressors as tolerated, MAP goal 65-75  ===================HEMATOLOGIC/ONC ===================  aspirin  chewable 81 milliGRAM(s) Oral daily  enoxaparin Injectable 40 milliGRAM(s) SubCutaneous two times a day      Continue Lovenox/Argatroban for VTE prophylaxis  ===================== RENAL =========================  Continue monitoring urine output      Monitor BUN/SCr  Renally dose medications  Continue to diurese as tolerated with Lasix/Bumex  Continue CVVHD  Continue iHD as tolerated  Renal following, appreciate recommendations    ==================== GASTROINTESTINAL===================  famotidine Injectable 20 milliGRAM(s) IV Push two times a day  polyethylene glycol 3350 17 Gram(s) Oral every 12 hours  senna Syrup 10 milliLiter(s) Oral at bedtime  sodium chloride 0.9% lock flush 10 milliLiter(s) IV Push every 1 hour PRN Pre/post blood products, medications, blood draw, and to maintain line patency      Continue tube feeds: goal:   Continue pepcid for GI prophylaxis    =======================    ENDOCRINE  =====================  insulin lispro (HumaLOG) corrective regimen sliding scale   SubCutaneous every 6 hours  insulin NPH human recombinant 9 Unit(s) SubCutaneous every 6 hours  levothyroxine Injectable 50 MICROGram(s) IV Push at bedtime      Stress hyperglycemia, continue insulin drip, Lantus/ISS   Continue [id number] for 5 day course    ========================INFECTIOUS DISEASE================      +COVID  -Completed Plaquenil  -s/p Vit C/Thiamine  -s/p Anakirna    Continue antibiotics for       Patient requires continuous monitoring with bedside rhythm monitoring, arterial line, pulse oximetry, ventilator monitoring and intermittent blood gas analysis.  Care plan discussed with ICU care team.  Patient remained critical; required more than usual post op care; I have spent 30 minutes providing non-routine post op care, revaluated multiple times during the day. CLARITA POWELL  MRN-[id number]  Patient is a 70y old  Male who presents with a chief complaint of respiratory distress, COVID 19 (07 Apr 2020 11:37)    HPI:  ·	70M with PMH presents with T dm, hypothyroidism, hld, p/w cough, low grade fever x 2-3 days. Today patient had worsening dyspnea at rest as well as exertion which prompted to come to the ED. Patient otherwise denied chest pain, leg swelling. He denies prior lung disease. Denies any history of smoking. In ED, patient was noted to be hypoxic on 6L NC to 89% and significant tachypnea. VS otherwise, afebrile, HR in 80s, SBP 150s. Labs notable for CBC wnl. CMP with mild elevated transaminases AST 65 and ALT 47. PAtient noted to be positive for COVID 19. Due to worsening respiratory distress patient was emergently intubated in the ER. (27 Mar 2020 21:38)      Surgery/Hospital course:  COVID-19 PCR . (03.27.20 @ 17:41)    COVID-19 PCR: Detected:   ·	Admitted 3/27, Intubated 3/27  ·	Today: Patient with worsening hypoxia p/f ratio of 71, Proned today at 1150am, P/F ratio improved to 202 currently. Fio2 currently on 60%.    REVIEW OF SYSTEMS:   Unable to assess 2/2 intubated and sedated    Physical Exam:  Vital Signs Last 24 Hrs  T(C): 38 (07 Apr 2020 18:00), Max: 38 (07 Apr 2020 18:00)  T(F): 100.4 (07 Apr 2020 18:00), Max: 100.4 (07 Apr 2020 18:00)  HR: 92 (07 Apr 2020 22:00) (64 - 102)  BP: --  BP(mean): --  RR: 26 (07 Apr 2020 22:00) (26 - 36)  SpO2: 95% (07 Apr 2020 22:00) (86% - 97%)  Gen:  Intubated and sedated- See prior physical exam 2/2 Covid protocol      ============================I/O===========================   I&O's Detail    06 Apr 2020 07:01  -  07 Apr 2020 07:00  --------------------------------------------------------  IN:    0.9% NaCl: 60 mL    Enteral Tube Flush: 210 mL    Glucerna 1.5: 600 mL    HYDROmorphone  Infusion: 55 mL    HYDROmorphone  Infusion: 5 mL    ketamine Infusion: 44.4 mL    midazolam Infusion: 278.4 mL    norepinephrine Infusion: 415.2 mL    rocuronium Infusion: 16.6 mL  Total IN: 1684.6 mL    OUT:    Indwelling Catheter - Urethral: 3170 mL  Total OUT: 3170 mL    Total NET: -1485.4 mL      07 Apr 2020 07:01  -  07 Apr 2020 22:42  --------------------------------------------------------  IN:    0.9% NaCl: 80 mL    Enteral Tube Flush: 30 mL    Glucerna 1.5: 200 mL    HYDROmorphone  Infusion: 40 mL    ketamine Infusion: 9.5 mL    ketamine Infusion: 1.9 mL    ketamine Infusion.: 19 mL    midazolam Infusion: 185.6 mL    norepinephrine Infusion: 252.7 mL    rocuronium Infusion: 96.2 mL  Total IN: 914.9 mL    OUT:    Indwelling Catheter - Urethral: 2660 mL  Total OUT: 2660 mL    Total NET: -1745.1 mL        ============================ LABS =========================                        12.0   9.97  )-----------( 298      ( 07 Apr 2020 02:53 )             39.0     04-07    143  |  102  |  44<H>  ----------------------------<  210<H>  5.6<H>   |  29  |  0.89    Ca    8.5      07 Apr 2020 02:53  Phos  3.8     04-07  Mg     2.6     04-07    TPro  5.4<L>  /  Alb  2.8<L>  /  TBili  0.5  /  DBili  x   /  AST  24  /  ALT  52<H>  /  AlkPhos  115  04-07    LIVER FUNCTIONS - ( 07 Apr 2020 02:53 )  Alb: 2.8 g/dL / Pro: 5.4 g/dL / ALK PHOS: 115 U/L / ALT: 52 U/L / AST: 24 U/L / GGT: x           PT/INR - ( 07 Apr 2020 02:53 )   PT: 13.6 sec;   INR: 1.18 ratio         PTT - ( 07 Apr 2020 02:53 )  PTT:34.7 sec  ABG - ( 07 Apr 2020 18:10 )  pH, Arterial: 7.44  pH, Blood: x     /  pCO2: 54    /  pO2: 121   / HCO3: 36    / Base Excess: 10.4  /  SaO2: 98        COVID-19 PCR . (03.27.20 @ 17:41)    COVID-19 PCR: Detected: All “detected” results on this new test are considered presumptively  positive results, are clinically actionable, and specimens will be  forwarded to [facility] for confirmation testing.  Another report (corrected report) will only be issued if discordant  results occur.        ======================Micro/Rad/Cardio=================  Culture: Reviewed   CXR: Reviewed  Echo:Reviewed  ======================================================  PAST MEDICAL & SURGICAL HISTORY:  Hyperlipidemia  Hypothyroid  No significant past surgical history    ====================ASSESSMENT ==============  COVID-19: [ X ] confirmed    /    [  ] suspected    /    [  ] ruled out    [ X ] acute respiratory failure with hypoxemia    /   [ X ] hypercapnia    /    [ X ] ARDS  ----[ X ] mechanical ventilation  ----[ X ] high PEEP  ----[ X ] continuous sedation to synchronize with mechanical ventilator  ----[x ] paralysis  ----[ x ] prone positioning    [ X ] septic shock secondary to COVID-19 requiring:  ----[ X ] invasive hemodynamic monitoring  ----[ x ] vasopressors    [  ] acute kidney injury secondary to septic shock requiring:  ----[  ] intensive fluid management in the setting of ARDS  ----[  ] renal replacement therapy        ====================== NEUROLOGY=====================  HYDROmorphone Infusion 2.5 mG/Hr (2.5 mL/Hr) IV Continuous <Continuous>  ketamine Infusion. 0.25 mG/kG/Hr (1.93 mL/Hr) IV Continuous <Continuous>  midazolam Infusion 0.02 mG/kG/Hr (1.54 mL/Hr) IV Continuous <Continuous>  rocuronium Infusion 8 MICROgram(s)/kG/Min (7.4 mL/Hr) IV Continuous <Continuous>      Continue to wean sedation/paralytics as tolerated  ==================== RESPIRATORY======================  Mechanical Ventilation:  Mode: AC/ CMV (Assist Control/ Continuous Mandatory Ventilation)  RR (machine): 26  TV (machine): 330  FiO2: 60  PEEP: 13  ITime: 1  MAP: 20  PIP: 32  Pplat: 27      Continue to wean FiO2 (goal to 30%, then wean PEEP as tolerated)  O2 sat goal >92%  ====================CARDIOVASCULAR==================  furosemide   Injectable 40 milliGRAM(s) IV Push every 12 hours  norepinephrine Infusion 0.12 MICROgram(s)/kG/Min (17.3 mL/Hr) IV Continuous <Continuous>      Wean vasopressors as tolerated, MAP goal 65-75  ===================HEMATOLOGIC/ONC ===================  aspirin  chewable 81 milliGRAM(s) Oral daily  enoxaparin Injectable 40 milliGRAM(s) SubCutaneous two times a day      Continue Lovenox 40mg BID for VTE prophylaxis  ===================== RENAL =========================  Continue monitoring urine output  Bun/Cr 44/0.89  -200ml/Hr  Net -1.1 L/24 hrs      Monitor BUN/SCr  Continue to diurese as tolerated with Lasix 40mg BID    ==================== GASTROINTESTINAL===================  famotidine Injectable 20 milliGRAM(s) IV Push two times a day  polyethylene glycol 3350 17 Gram(s) Oral every 12 hours  senna Syrup 10 milliLiter(s) Oral at bedtime  sodium chloride 0.9% lock flush 10 milliLiter(s) IV Push every 1 hour PRN Pre/post blood products, medications, blood draw, and to maintain line patency      Continue tube feeds: Glucerna Bolus feeds 200ml q 6hrs   Continue pepcid for GI prophylaxis    =======================    ENDOCRINE  =====================  insulin lispro (HumaLOG) corrective regimen sliding scale   SubCutaneous every 6 hours  insulin NPH human recombinant 9 Unit(s) SubCutaneous every 6 hours  levothyroxine Injectable 50 MICROGram(s) IV Push at bedtime      Stress hyperglycemia, continue insulin drip, NPH and ISS   Completed Solu-medrol for 5 day course    ========================INFECTIOUS DISEASE================  WBC 9.9k, T100.4 Overnight, All cultures negative  Continue to monitor off abx    +COVID  -Completed Plaquenil and azithromycin  -s/p Anakirna      Patient requires continuous monitoring with bedside rhythm monitoring, arterial line, pulse oximetry, ventilator monitoring and intermittent blood gas analysis.  Care plan discussed with ICU care team.  Patient remained critical; required more than usual post op care; I have spent 30 minutes providing non-routine post op care, revaluated multiple times during the day.

## 2020-04-07 NOTE — PROGRESS NOTE ADULT - SUBJECTIVE AND OBJECTIVE BOX
CLARITA POWELL  MRN-[id number]  Patient is a 70y old  Male who presents with a chief complaint of respiratory distress, COVID 19 (06 Apr 2020 12:11)    HPI:  70M with PMH presents with T dm, hypothyroidism, hld, p/w cough, low grade fever x 2-3 days. Today patient had worsening dyspnea at rest as well as exertion which prompted to come to the ED. Patient otherwise denied chest pain, leg swelling. He denies prior lung disease. Denies any history of smoking.     In ED, patient was noted to be hypoxic on 6L NC to 89% and significant tachypnea. VS otherwise, afebrile, HR in 80s, SBP 150s. Labs notable for CBC wnl. CMP with mild elevated transaminases AST 65 and ALT 47. PAtient noted to be positive for COVID 19. Due to worsening respiratory distress patient was emergently intubated in the ER. (27 Mar 2020 21:38)      Surgery/Hospital course:  COVID 19 Positive   3/27 Intubated     REVIEW OF SYSTEMS:  Not obtainable, vented     Vital Signs Last 24 Hrs  T(C): 35.3 (07 Apr 2020 04:00), Max: 36.7 (06 Apr 2020 12:00)  T(F): 95.5 (07 Apr 2020 04:00), Max: 98.1 (06 Apr 2020 12:00)  HR: 93 (07 Apr 2020 10:00) (64 - 93)  BP: --  BP(mean): --  RR: 26 (07 Apr 2020 10:00) (26 - 36)  SpO2: 88% (07 Apr 2020 10:00) (86% - 95%)    ============================I/O===========================   I&O's Detail    06 Apr 2020 07:01  -  07 Apr 2020 07:00  --------------------------------------------------------  IN:    0.9% NaCl: 60 mL    Enteral Tube Flush: 210 mL    Glucerna 1.5: 600 mL    HYDROmorphone  Infusion: 55 mL    HYDROmorphone  Infusion: 5 mL    ketamine Infusion: 44.4 mL    midazolam Infusion: 278.4 mL    norepinephrine Infusion: 415.2 mL    rocuronium Infusion: 16.6 mL  Total IN: 1684.6 mL    OUT:    Indwelling Catheter - Urethral: 3170 mL  Total OUT: 3170 mL    Total NET: -1485.4 mL      07 Apr 2020 07:01  -  07 Apr 2020 11:37  --------------------------------------------------------  IN:    0.9% NaCl: 15 mL    HYDROmorphone  Infusion: 7.5 mL    ketamine Infusion: 3.8 mL    ketamine Infusion: 1.9 mL    midazolam Infusion: 34.8 mL    norepinephrine Infusion: 56.4 mL  Total IN: 119.4 mL    OUT:    Indwelling Catheter - Urethral: 500 mL  Total OUT: 500 mL    Total NET: -380.6 mL        ============================ LABS =========================                        12.0   9.97  )-----------( 298      ( 07 Apr 2020 02:53 )             39.0     04-07    143  |  102  |  44<H>  ----------------------------<  210<H>  5.6<H>   |  29  |  0.89    Ca    8.5      07 Apr 2020 02:53  Phos  3.8     04-07  Mg     2.6     04-07    TPro  5.4<L>  /  Alb  2.8<L>  /  TBili  0.5  /  DBili  x   /  AST  24  /  ALT  52<H>  /  AlkPhos  115  04-07    LIVER FUNCTIONS - ( 07 Apr 2020 02:53 )  Alb: 2.8 g/dL / Pro: 5.4 g/dL / ALK PHOS: 115 U/L / ALT: 52 U/L / AST: 24 U/L / GGT: x           PT/INR - ( 07 Apr 2020 02:53 )   PT: 13.6 sec;   INR: 1.18 ratio         PTT - ( 07 Apr 2020 02:53 )  PTT:34.7 sec  ABG - ( 07 Apr 2020 08:45 )  pH, Arterial: 7.35  pH, Blood: x     /  pCO2: 64    /  pO2: 66    / HCO3: 34    / Base Excess: 6.8   /  SaO2: 90                  ======================Micro/Rad/Cardio=================  Culture: Reviewed   CXR: Reviewed  ======================================================  PAST MEDICAL & SURGICAL HISTORY:  Hyperlipidemia  Hypothyroid  No significant past surgical history    ====================ASSESSMENT ==============  COVID 19 Positive   3/27 Intubated   Hypoxic respiratory failure   Fluid overload     Plan:  ====================== NEUROLOGY=====================  Continue sedation   Paralyzed with rocuronium     HYDROmorphone Infusion 2.5 mG/Hr (2.5 mL/Hr) IV Continuous <Continuous>  ketamine Infusion 1000 milliGRAM(s) (1.93 mL/Hr) IV Continuous <Continuous>  midazolam Infusion 0.02 mG/kG/Hr (1.54 mL/Hr) IV Continuous <Continuous>  rocuronium Infusion 8 MICROgram(s)/kG/Min (7.4 mL/Hr) IV Continuous <Continuous>  rocuronium Injectable 50 milliGRAM(s) IV Push once    ==================== RESPIRATORY======================  On full vent support     Mechanical Ventilation:  Mode: AC/ CMV (Assist Control/ Continuous Mandatory Ventilation)  RR (machine): 26  TV (machine): 330  FiO2: 90  PEEP: 12  ITime: 1  MAP: 18  PIP: 34      ====================CARDIOVASCULAR==================  Pressor support for hypotension     norepinephrine Infusion 0.12 MICROgram(s)/kG/Min (17.3 mL/Hr) IV Continuous <Continuous>    ===================HEMATOLOGIC/ONC ===================  aspirin  chewable 81 milliGRAM(s) Oral daily  DVT prophylaxis enoxaparin Injectable 40 milliGRAM(s) SubCutaneous two times a day    ===================== RENAL =========================  Continue monitoring urine output  Diuresis with Lasix     furosemide   Injectable 40 milliGRAM(s) IV Push every 12 hours  ==================== GASTROINTESTINAL===================  Tolerating tube feeds     atropine Injectable 0.5 milliGRAM(s) IV Push once  GI prophylaxis, famotidine Injectable 20 milliGRAM(s) IV Push two times a day  polyethylene glycol 3350 17 Gram(s) Oral every 12 hours  senna Syrup 10 milliLiter(s) Oral at bedtime  sodium chloride 0.9% lock flush 10 milliLiter(s) IV Push every 1 hour PRN Pre/post blood products, medications, blood draw, and to maintain line patency    =======================    ENDOCRINE  =====================  Glucose control     insulin lispro (HumaLOG) corrective regimen sliding scale   SubCutaneous every 6 hours  insulin NPH human recombinant 9 Unit(s) SubCutaneous every 6 hours  levothyroxine Injectable 50 MICROGram(s) IV Push at bedtime  methylPREDNISolone sodium succinate Injectable 40 milliGRAM(s) IV Push every 12 hours    ========================INFECTIOUS DISEASE================  Completed COVID medications       Patient requires continuous monitoring with bedside rhythm monitoring, pulse ox monitoring, and intermittent blood gas analysis. Care plan discussed with ICU care team. Patient remained critical and required more than usual post op care.    By signing my name below, I, Pretty Wallace, attest that this documentation has been prepared under the direction and in the presence of GERRY Escalante.  Electronically signed: Jonathan Lal, 04-07-20 @ 11:37    I, Nasim Oliver, personally performed the services described in this documentation. all medical record entries made by the jonathan were at my direction and in my presence. I have reviewed the chart and agree that the record reflects my personal performance and is accurate and complete  Electronically signed: GERRY Escalante CLARITA POWELL  MRN-[id number]  Patient is a 70y old  Male who presents with a chief complaint of respiratory distress, COVID 19 (06 Apr 2020 12:11)    HPI:  70M with PMH presents with T dm, hypothyroidism, hld, p/w cough, low grade fever x 2-3 days. Today patient had worsening dyspnea at rest as well as exertion which prompted to come to the ED. Patient otherwise denied chest pain, leg swelling. He denies prior lung disease. Denies any history of smoking.     In ED, patient was noted to be hypoxic on 6L NC to 89% and significant tachypnea. VS otherwise, afebrile, HR in 80s, SBP 150s. Labs notable for CBC wnl. CMP with mild elevated transaminases AST 65 and ALT 47. PAtient noted to be positive for COVID 19. Due to worsening respiratory distress patient was emergently intubated in the ER. (27 Mar 2020 21:38)    Surgery/Hospital course:  COVID 19 Positive   3/27 Intubated     REVIEW OF SYSTEMS:  Not obtainable, vented     Vital Signs Last 24 Hrs  T(C): 35.3 (07 Apr 2020 04:00), Max: 36.7 (06 Apr 2020 12:00)  T(F): 95.5 (07 Apr 2020 04:00), Max: 98.1 (06 Apr 2020 12:00)  HR: 93 (07 Apr 2020 10:00) (64 - 93)  BP: --  BP(mean): --  RR: 26 (07 Apr 2020 10:00) (26 - 36)  SpO2: 88% (07 Apr 2020 10:00) (86% - 95%)    ============================I/O===========================   I&O's Detail    06 Apr 2020 07:01  -  07 Apr 2020 07:00  --------------------------------------------------------  IN:    0.9% NaCl: 60 mL    Enteral Tube Flush: 210 mL    Glucerna 1.5: 600 mL    HYDROmorphone  Infusion: 55 mL    HYDROmorphone  Infusion: 5 mL    ketamine Infusion: 44.4 mL    midazolam Infusion: 278.4 mL    norepinephrine Infusion: 415.2 mL    rocuronium Infusion: 16.6 mL  Total IN: 1684.6 mL    OUT:    Indwelling Catheter - Urethral: 3170 mL  Total OUT: 3170 mL    Total NET: -1485.4 mL    07 Apr 2020 07:01  -  07 Apr 2020 11:37  --------------------------------------------------------  IN:    0.9% NaCl: 15 mL    HYDROmorphone  Infusion: 7.5 mL    ketamine Infusion: 3.8 mL    ketamine Infusion: 1.9 mL    midazolam Infusion: 34.8 mL    norepinephrine Infusion: 56.4 mL  Total IN: 119.4 mL    OUT:    Indwelling Catheter - Urethral: 500 mL  Total OUT: 500 mL    Total NET: -380.6 mL    ============================ LABS =========================                        12.0   9.97  )-----------( 298      ( 07 Apr 2020 02:53 )             39.0     143  |  102  |  44<H>  ----------------------------<  210<H>  5.6<H>   |  29  |  0.89    Ca    8.5      07 Apr 2020 02:53  Phos  3.8     04-07  Mg     2.6     04-07    TPro  5.4<L>  /  Alb  2.8<L>  /  TBili  0.5  /  DBili  x   /  AST  24  /  ALT  52<H>  /  AlkPhos  115  04-07    LIVER FUNCTIONS - ( 07 Apr 2020 02:53 )  Alb: 2.8 g/dL / Pro: 5.4 g/dL / ALK PHOS: 115 U/L / ALT: 52 U/L / AST: 24 U/L / GGT: x           PT/INR - ( 07 Apr 2020 02:53 )   PT: 13.6 sec;   INR: 1.18 ratio       PTT - ( 07 Apr 2020 02:53 )  PTT:34.7 sec  ABG - ( 07 Apr 2020 08:45 )  pH, Arterial: 7.35  pH, Blood: x     /  pCO2: 64    /  pO2: 66    / HCO3: 34    / Base Excess: 6.8   /  SaO2: 90        ======================Micro/Rad/Cardio=================  Culture: Reviewed   CXR: Reviewed  ======================================================  PAST MEDICAL & SURGICAL HISTORY:  Hyperlipidemia  Hypothyroid  No significant past surgical history    ====================ASSESSMENT ==============  COVID 19 Positive   3/27 Intubated   Hypoxic respiratory failure   Fluid overload     Plan:  ====================== NEUROLOGY=====================  Continue sedation with ketamine, versed, dilaudid  Paralyzed with rocuronium     HYDROmorphone Infusion 2.5 mG/Hr (2.5 mL/Hr) IV Continuous <Continuous>  ketamine Infusion 1000 milliGRAM(s) (1.93 mL/Hr) IV Continuous <Continuous>  midazolam Infusion 0.02 mG/kG/Hr (1.54 mL/Hr) IV Continuous <Continuous>  rocuronium Infusion 8 MICROgram(s)/kG/Min (7.4 mL/Hr) IV Continuous <Continuous>  rocuronium Injectable 50 milliGRAM(s) IV Push once    ==================== RESPIRATORY======================  On full vent support. Proned @ 12pm to improve oxygenation. Must be turned supine 4/8 @ 6-8am.    Mechanical Ventilation:  Mode: AC/ CMV (Assist Control/ Continuous Mandatory Ventilation)  RR (machine): 26  TV (machine): 330  FiO2: 90  PEEP: 12  ITime: 1  MAP: 18  PIP: 34    ====================CARDIOVASCULAR==================  Pressor support for hypotension     norepinephrine Infusion 0.12 MICROgram(s)/kG/Min (17.3 mL/Hr) IV Continuous <Continuous>    ===================HEMATOLOGIC/ONC ===================  aspirin  chewable 81 milliGRAM(s) Oral daily  DVT prophylaxis enoxaparin Injectable 40 milliGRAM(s) SubCutaneous two times a day    ===================== RENAL =========================  Continue monitoring urine output. Diuresis with Lasix     furosemide   Injectable 40 milliGRAM(s) IV Push every 12 hours  ==================== GASTROINTESTINAL===================  Tolerating tube feeds     GI prophylaxis, famotidine Injectable 20 milliGRAM(s) IV Push two times a day  polyethylene glycol 3350 17 Gram(s) Oral every 12 hours  senna Syrup 10 milliLiter(s) Oral at bedtime  sodium chloride 0.9% lock flush 10 milliLiter(s) IV Push every 1 hour PRN Pre/post blood products, medications, blood draw, and to maintain line patency    =======================    ENDOCRINE  =====================  Glucose control with NPH, ISS. Synthroid increased due to elevated TFTs.    insulin lispro (HumaLOG) corrective regimen sliding scale   SubCutaneous every 6 hours  insulin NPH human recombinant 9 Unit(s) SubCutaneous every 6 hours  levothyroxine Injectable 50 MICROGram(s) IV Push at bedtime    ========================INFECTIOUS DISEASE================  Completed COVID medications.    I have spent 40 minutes of critical care time with this patient.    Patient requires continuous monitoring with bedside rhythm monitoring, pulse ox monitoring, and intermittent blood gas analysis. Care plan discussed with ICU care team. Patient remained critical and required more than usual post op care.    By signing my name below, I, Pretty Wallace, attest that this documentation has been prepared under the direction and in the presence of GERRY Escalante.  Electronically signed: Rohan Lal, 04-07-20 @ 11:37    I, Nasim Oliver, personally performed the services described in this documentation. all medical record entries made by the kevinibe were at my direction and in my presence. I have reviewed the chart and agree that the record reflects my personal performance and is accurate and complete  Electronically signed: GERRY Escalante

## 2020-04-08 NOTE — PROGRESS NOTE ADULT - SUBJECTIVE AND OBJECTIVE BOX
CLARITA POWELL  MRN-[id number]  Patient is a 70y old  Male who presents with a chief complaint of respiratory distress, COVID 19 (08 Apr 2020 08:49)    HPI:  70M with PMH presents with T dm, hypothyroidism, hld, p/w cough, low grade fever x 2-3 days. Today patient had worsening dyspnea at rest as well as exertion which prompted to come to the ED. Patient otherwise denied chest pain, leg swelling. He denies prior lung disease. Denies any history of smoking.     In ED, patient was noted to be hypoxic on 6L NC to 89% and significant tachypnea. VS otherwise, afebrile, HR in 80s, SBP 150s. Labs notable for CBC wnl. CMP with mild elevated transaminases AST 65 and ALT 47. PAtient noted to be positive for COVID 19. Due to worsening respiratory distress patient was emergently intubated in the ER. (27 Mar 2020 21:38)      Surgery/Hospital course:  COVID 19 Positive   3/27 Intubated     Today:  Patient is afebrile, on full vent support. Continue to wean sedatives and paralytics as tolerated. Will be starting PO regimen. Is on no antibiotics.     ============================I/O===========================   I&O's Detail    07 Apr 2020 07:01  -  08 Apr 2020 07:00  --------------------------------------------------------  IN:    0.9% NaCl: 120 mL    Enteral Tube Flush: 30 mL    Glucerna 1.5: 400 mL    HYDROmorphone  Infusion: 47.5 mL    HYDROmorphone  Infusion: 12.5 mL    ketamine Infusion: 1.9 mL    ketamine Infusion: 9.5 mL    ketamine Infusion.: 34.2 mL    midazolam Infusion: 220.4 mL    midazolam Infusion: 58 mL    norepinephrine Infusion: 306.3 mL    rocuronium Infusion: 155.4 mL  Total IN: 1395.7 mL    OUT:    Indwelling Catheter - Urethral: 3960 mL  Total OUT: 3960 mL    Total NET: -2564.3 mL      08 Apr 2020 07:01  -  08 Apr 2020 22:59  --------------------------------------------------------  IN:    0.9% NaCl: 60 mL    HYDROmorphone  Infusion: 37.5 mL    ketamine Infusion.: 28.5 mL    midazolam Infusion: 174 mL    norepinephrine Infusion: 122.6 mL    rocuronium Infusion: 74 mL  Total IN: 496.6 mL    OUT:    Indwelling Catheter - Urethral: 1585 mL  Total OUT: 1585 mL    Total NET: -1088.4 mL        ============================ LABS =========================                        11.7   9.67  )-----------( 256      ( 08 Apr 2020 01:54 )             37.3     04-08    144  |  100  |  40<H>  ----------------------------<  122<H>  4.7   |  34<H>  |  0.78    Ca    8.9      08 Apr 2020 02:05  Phos  2.6     04-08  Mg     2.5     04-08    TPro  6.1  /  Alb  2.6<L>  /  TBili  0.5  /  DBili  x   /  AST  15  /  ALT  39  /  AlkPhos  93  04-08    LIVER FUNCTIONS - ( 08 Apr 2020 02:05 )  Alb: 2.6 g/dL / Pro: 6.1 g/dL / ALK PHOS: 93 U/L / ALT: 39 U/L / AST: 15 U/L / GGT: x           PT/INR - ( 07 Apr 2020 02:53 )   PT: 13.6 sec;   INR: 1.18 ratio         PTT - ( 07 Apr 2020 02:53 )  PTT:34.7 sec  ABG - ( 08 Apr 2020 11:01 )  pH, Arterial: 7.40  pH, Blood: x     /  pCO2: 67    /  pO2: 71    / HCO3: 40    / Base Excess: 13.0  /  SaO2: 99                  ======================Micro/Rad/Cardio=================  Culture: Reviewed   CXR: Reviewed  ======================================================  PAST MEDICAL & SURGICAL HISTORY:  Hyperlipidemia  Hypothyroid  No significant past surgical history    ====================ASSESSMENT ==============  COVID 19 Positive   3/27 Intubated   Hypoxic respiratory failure   Fluid overload    Plan:  ====================== NEUROLOGY=====================  Continue sedation with ketamine, versed, Dilaudid  Paralyzed with rocuronium   Continue to wean sedation/paralytics as tolerated    clonazePAM  Tablet 0.5 milliGRAM(s) Oral every 6 hours  HYDROmorphone Infusion 2.5 mG/Hr (2.5 mL/Hr) IV Continuous <Continuous>  ketamine Infusion. 0.25 mG/kG/Hr (1.93 mL/Hr) IV Continuous <Continuous>  midazolam Infusion 0.02 mG/kG/Hr (1.54 mL/Hr) IV Continuous <Continuous>  oxyCODONE    IR 10 milliGRAM(s) Oral every 6 hours    ==================== RESPIRATORY======================  On full vent support. Proned @ 12pm to improve oxygenation. Supine at 1600.  Continue to wean FiO2 (goal to 30%, then wean PEEP as tolerated)  O2 sat goal >92%    Mechanical Ventilation:  Mode: AC/ CMV (Assist Control/ Continuous Mandatory Ventilation)  RR (machine): 26  TV (machine): 330  FiO2: 40  PEEP: 12  ITime: 1  MAP: 17  PIP: 34    ====================CARDIOVASCULAR==================  Pressor support for hypotension     furosemide   Injectable 40 milliGRAM(s) IV Push every 12 hours  norepinephrine Infusion 0.12 MICROgram(s)/kG/Min (17.3 mL/Hr) IV Continuous <Continuous>    ===================HEMATOLOGIC/ONC ===================  aspirin  chewable 81 milliGRAM(s) Oral daily  enoxaparin Injectable 40 milliGRAM(s) SubCutaneous two times a day      Continue Lovenox/Argatroban for VTE prophylaxis  ===================== RENAL =========================  Continue monitoring urine output. Diuresis with Lasix     ==================== GASTROINTESTINAL===================  Continue tube feeds: goal:   Continue pepcid for GI prophylaxis    famotidine Injectable 20 milliGRAM(s) IV Push two times a day  polyethylene glycol 3350 17 Gram(s) Oral every 12 hours  senna Syrup 10 milliLiter(s) Oral at bedtime  sodium chloride 0.9% lock flush 10 milliLiter(s) IV Push every 1 hour PRN Pre/post blood products, medications, blood draw, and to maintain line patency      =======================    ENDOCRINE  =====================  Glucose control with NPH, ISS. Synthroid increased due to elevated TFTs.    insulin lispro (HumaLOG) corrective regimen sliding scale   SubCutaneous every 6 hours  insulin NPH human recombinant 9 Unit(s) SubCutaneous every 6 hours  levothyroxine Injectable 50 MICROGram(s) IV Push at bedtime    ========================INFECTIOUS DISEASE================  +COVID  -Completed Plaquenil  -s/p Vit C/Thiamine  -s/p Anakirna      Patient requires continuous monitoring with bedside rhythm monitoring, arterial line, pulse oximetry, ventilator monitoring and intermittent blood gas analysis.  Care plan discussed with ICU care team.  Patient remained critical; required more than usual post op care; I have spent 35 minutes providing non-routine post op care, revaluated multiple times during the day.    By signing my name below, I, Krystin Archer, attest that this documentation has been prepared under the direction and in the presence of Shantanu Ricks PA.  Electronically signed: Rohan Pritchett, 04-08-20 @ 22:59    I, Shantanu Ricks, personally performed the services described in this documentation. all medical record entries made by the kevinibe were at my direction and in my presence. I have reviewed the chart and agree that the record reflects my personal performance and is accurate and complete  Electronically signed: Shantanu Ricks PA 04-08-20 @ 22:59 CLARITA POWELL  MRN-[id number]  Patient is a 70y old  Male who presents with a chief complaint of respiratory distress, COVID 19 (08 Apr 2020 08:49)    HPI:  70M with PMH presents with T dm, hypothyroidism, hld, p/w cough, low grade fever x 2-3 days. Today patient had worsening dyspnea at rest as well as exertion which prompted to come to the ED. Patient otherwise denied chest pain, leg swelling. He denies prior lung disease. Denies any history of smoking.     In ED, patient was noted to be hypoxic on 6L NC to 89% and significant tachypnea. VS otherwise, afebrile, HR in 80s, SBP 150s. Labs notable for CBC wnl. CMP with mild elevated transaminases AST 65 and ALT 47. PAtient noted to be positive for COVID 19. Due to worsening respiratory distress patient was emergently intubated in the ER. (27 Mar 2020 21:38)      Surgery/Hospital course:  COVID 19 Positive   3/27 Intubated     Today:  Patient is afebrile, on full vent support. Continue to wean sedatives, currently off paralytics as tolerated. Will be starting PO regimen. Is on no antibiotics.     ============================I/O===========================   I&O's Detail    07 Apr 2020 07:01  -  08 Apr 2020 07:00  --------------------------------------------------------  IN:    0.9% NaCl: 120 mL    Enteral Tube Flush: 30 mL    Glucerna 1.5: 400 mL    HYDROmorphone  Infusion: 47.5 mL    HYDROmorphone  Infusion: 12.5 mL    ketamine Infusion: 1.9 mL    ketamine Infusion: 9.5 mL    ketamine Infusion.: 34.2 mL    midazolam Infusion: 220.4 mL    midazolam Infusion: 58 mL    norepinephrine Infusion: 306.3 mL    rocuronium Infusion: 155.4 mL  Total IN: 1395.7 mL    OUT:    Indwelling Catheter - Urethral: 3960 mL  Total OUT: 3960 mL    Total NET: -2564.3 mL      08 Apr 2020 07:01  -  08 Apr 2020 22:59  --------------------------------------------------------  IN:    0.9% NaCl: 60 mL    HYDROmorphone  Infusion: 37.5 mL    ketamine Infusion.: 28.5 mL    midazolam Infusion: 174 mL    norepinephrine Infusion: 122.6 mL    rocuronium Infusion: 74 mL  Total IN: 496.6 mL    OUT:    Indwelling Catheter - Urethral: 1585 mL  Total OUT: 1585 mL    Total NET: -1088.4 mL        ============================ LABS =========================                        11.7   9.67  )-----------( 256      ( 08 Apr 2020 01:54 )             37.3     04-08    144  |  100  |  40<H>  ----------------------------<  122<H>  4.7   |  34<H>  |  0.78    Ca    8.9      08 Apr 2020 02:05  Phos  2.6     04-08  Mg     2.5     04-08    TPro  6.1  /  Alb  2.6<L>  /  TBili  0.5  /  DBili  x   /  AST  15  /  ALT  39  /  AlkPhos  93  04-08    LIVER FUNCTIONS - ( 08 Apr 2020 02:05 )  Alb: 2.6 g/dL / Pro: 6.1 g/dL / ALK PHOS: 93 U/L / ALT: 39 U/L / AST: 15 U/L / GGT: x           PT/INR - ( 07 Apr 2020 02:53 )   PT: 13.6 sec;   INR: 1.18 ratio         PTT - ( 07 Apr 2020 02:53 )  PTT:34.7 sec  ABG - ( 08 Apr 2020 11:01 )  pH, Arterial: 7.40  pH, Blood: x     /  pCO2: 67    /  pO2: 71    / HCO3: 40    / Base Excess: 13.0  /  SaO2: 99                  ======================Micro/Rad/Cardio=================  Culture: Reviewed   CXR: Reviewed  ======================================================  PAST MEDICAL & SURGICAL HISTORY:  Hyperlipidemia  Hypothyroid  No significant past surgical history    ====================ASSESSMENT ==============  COVID 19 Positive   3/27 Intubated   Hypoxic respiratory failure   Fluid overload  COVID-19: [ X ] confirmed    /    [  ] suspected    /    [  ] ruled out    [ X ] acute respiratory failure with hypoxemia    /   [ X ] hypercapnia    /    [ X ] ARDS  ----[ X ] mechanical ventilation  ----[ X ] high PEEP  ----[ X ] continuous sedation to synchronize with mechanical ventilator  ----[  ] paralysis weaned today @1815  ----[ x ] prone positioning    [ X ] septic shock secondary to COVID-19 requiring:  ----[ X ] invasive hemodynamic monitoring  ----[ x ] vasopressors    [ x ] acute kidney injury secondary to septic shock requiring:  ----[ x ] intensive fluid management in the setting of ARDS, Diuresis  ----[  ] renal replacement therapy        Plan:  ====================== NEUROLOGY=====================  Continue sedation with ketamine, versed, Dilaudid d/c'd  Paralysis d/c'd at 1815   Continue to wean sedation/paralytics as tolerated  Transition to PO medications to aid in weaning    clonazePAM  Tablet 0.5 milliGRAM(s) Oral every 6 hours  HYDROmorphone Infusion 2.5 mG/Hr (2.5 mL/Hr) IV Continuous <Continuous>  ketamine Infusion. 0.25 mG/kG/Hr (1.93 mL/Hr) IV Continuous <Continuous>  midazolam Infusion 0.02 mG/kG/Hr (1.54 mL/Hr) IV Continuous <Continuous>  oxyCODONE    IR 10 milliGRAM(s) Oral every 6 hours    ==================== RESPIRATORY======================  On full vent support. Proned @ 12pm to improve oxygenation. Supine at 1600.  Continue to wean FiO2 (goal to 30%, then wean PEEP as tolerated)  O2 sat goal >92%    Mechanical Ventilation:  Mode: AC/ CMV (Assist Control/ Continuous Mandatory Ventilation)  RR (machine): 26  TV (machine): 360  FiO2: 40  PEEP: 12  ITime: 1  MAP: 17  PIP: 34    ====================CARDIOVASCULAR==================  Pressor support for hypotension   Patient Sinus Dario, -2.5L today with Lasix, will decrease to have more gentle diuresis.  Goal 1L negative daily   furosemide   Injectable 40 milliGRAM(s) IV Push every 12 hours  norepinephrine Infusion 0.06 MICROgram(s)/kG/Min (17.3 mL/Hr) IV Continuous <Continuous>    ===================HEMATOLOGIC/ONC ===================  aspirin  chewable 81 milliGRAM(s) Oral daily  enoxaparin Injectable 40 milliGRAM(s) SubCutaneous two times a day      Continue Lovenox/Argatroban for VTE prophylaxis  ===================== RENAL =========================  Continue monitoring urine output. Diuresis with Lasix     ==================== GASTROINTESTINAL===================  Continue tube feeds: Glucerna bolus feeds 200q6h   Continue pepcid for GI prophylaxis    famotidine Injectable 20 milliGRAM(s) IV Push two times a day  polyethylene glycol 3350 17 Gram(s) Oral every 12 hours  senna Syrup 10 milliLiter(s) Oral at bedtime  sodium chloride 0.9% lock flush 10 milliLiter(s) IV Push every 1 hour PRN Pre/post blood products, medications, blood draw, and to maintain line patency      =======================    ENDOCRINE  =====================  Glucose control with NPH, ISS. Synthroid increased due to elevated TFTs.    insulin lispro (HumaLOG) corrective regimen sliding scale   SubCutaneous every 6 hours  insulin NPH human recombinant 9 Unit(s) SubCutaneous every 6 hours  levothyroxine Injectable 50 MICROGram(s) IV Push at bedtime    ========================INFECTIOUS DISEASE================  +COVID  -Completed Plaquenil  -s/p Vit C/Thiamine  -s/p Anakirna  Solumedrol    Patient requires continuous monitoring with bedside rhythm monitoring, arterial line, pulse oximetry, ventilator monitoring and intermittent blood gas analysis.  Care plan discussed with ICU care team.  Patient remained critical; required more than usual post op care; I have spent 35 minutes providing non-routine post op care, revaluated multiple times during the day.    By signing my name below, I, Krystin Archer, attest that this documentation has been prepared under the direction and in the presence of Shantanu Ricks PA.  Electronically signed: Jonathan Pritchett, 04-08-20 @ 22:59    I, Shantanu Ricks, personally performed the services described in this documentation. all medical record entries made by the jonathan were at my direction and in my presence. I have reviewed the chart and agree that the record reflects my personal performance and is accurate and complete  Electronically signed: Shantanu Ricks PA 04-08-20 @ 22:59

## 2020-04-08 NOTE — CHART NOTE - NSCHARTNOTEFT_GEN_A_CORE
Nutrition Follow Up Note  Patient seen for: Nutritional follow up     Source: medical record,    Chart reviewed, events noted: 70 year old male with PMHx of DM, hypothyroidism, HLD. Presented with fever, cough and SOB. Found to be COVID19+, required emergent intubation in ED. Pt remains intubated and sedated at this time. RYLEE improving.     Diet :     Patient reports:     PO intake :     Source for PO intake:     Enteral /Parenteral Nutrition:       Daily Weight in k.1 (04-03)  % Weight Change    Pertinent Medications: MEDICATIONS  (STANDING):  aspirin  chewable 81 milliGRAM(s) Oral daily  chlorhexidine 0.12% Liquid 15 milliLiter(s) Oral Mucosa every 12 hours  enoxaparin Injectable 40 milliGRAM(s) SubCutaneous two times a day  famotidine Injectable 20 milliGRAM(s) IV Push two times a day  furosemide   Injectable 40 milliGRAM(s) IV Push every 12 hours  HYDROmorphone Infusion 2.5 mG/Hr (2.5 mL/Hr) IV Continuous <Continuous>  insulin lispro (HumaLOG) corrective regimen sliding scale   SubCutaneous every 6 hours  insulin NPH human recombinant 9 Unit(s) SubCutaneous every 6 hours  ketamine Infusion. 0.25 mG/kG/Hr (1.93 mL/Hr) IV Continuous <Continuous>  levothyroxine Injectable 50 MICROGram(s) IV Push at bedtime  midazolam Infusion 0.02 mG/kG/Hr (1.54 mL/Hr) IV Continuous <Continuous>  norepinephrine Infusion 0.12 MICROgram(s)/kG/Min (17.3 mL/Hr) IV Continuous <Continuous>  petrolatum Ophthalmic Ointment 1 Application(s) Both EYES two times a day  polyethylene glycol 3350 17 Gram(s) Oral every 12 hours  rocuronium Infusion 8 MICROgram(s)/kG/Min (7.4 mL/Hr) IV Continuous <Continuous>  senna Syrup 10 milliLiter(s) Oral at bedtime    MEDICATIONS  (PRN):  sodium chloride 0.9% lock flush 10 milliLiter(s) IV Push every 1 hour PRN Pre/post blood products, medications, blood draw, and to maintain line patency    Pertinent Labs:  @ 02:05: Na 144, BUN 40<H>, Cr 0.78, <H>, K+ 4.7, Phos 2.6, Mg 2.5, Alk Phos 93, ALT/SGPT 39, AST/SGOT 15, HbA1c --    Finger Sticks:  POCT Blood Glucose.: 191 mg/dL (08 @ 08:13)      Skin per nursing documentation:   Edema:    Estimated Needs:   [ ] no change since previous assessment  [ ] recalculated:     Previous Nutrition Diagnosis:   Nutrition Diagnosis is:    New Nutrition Diagnosis:  Related to:    As evidenced by:      Interventions:     Recommend  1)    Monitoring and Evaluation:     Continue to monitor Nutritional intake, Tolerance to diet prescription, weights, labs, skin integrity    RD remains available upon request and will follow up per protocol Nutrition Follow Up Note  Patient seen for: Nutritional follow up     Source: medical record,    Chart reviewed, events noted: 70 year old male with PMHx of DM, hypothyroidism, HLD. Presented with fever, cough and SOB. Found to be COVID19+, required emergent intubation in ED. Pt remains intubated and sedated and paralyzed.     Diet : NPO with EN  Glucerna 1.5 Bolus feeds at 200ml q6hrs.   Provides: 1200kcals and 66gm protein. (15.5kcals/kg and 0.85gm pro/kg based on admission Wt: 77.5kg)   Pt with good EN provision.     Nutrition events:   Last BM on , ordered for Miralax and senna only.   No further EN residuals noted.     Daily Weight in k.1 (04-03)  % Weight Change    Pertinent Medications: MEDICATIONS  (STANDING):  aspirin  chewable 81 milliGRAM(s) Oral daily  chlorhexidine 0.12% Liquid 15 milliLiter(s) Oral Mucosa every 12 hours  enoxaparin Injectable 40 milliGRAM(s) SubCutaneous two times a day  famotidine Injectable 20 milliGRAM(s) IV Push two times a day  furosemide   Injectable 40 milliGRAM(s) IV Push every 12 hours  HYDROmorphone Infusion 2.5 mG/Hr (2.5 mL/Hr) IV Continuous <Continuous>  insulin lispro (HumaLOG) corrective regimen sliding scale   SubCutaneous every 6 hours  insulin NPH human recombinant 9 Unit(s) SubCutaneous every 6 hours  ketamine Infusion. 0.25 mG/kG/Hr (1.93 mL/Hr) IV Continuous <Continuous>  levothyroxine Injectable 50 MICROGram(s) IV Push at bedtime  midazolam Infusion 0.02 mG/kG/Hr (1.54 mL/Hr) IV Continuous <Continuous>  norepinephrine Infusion 0.12 MICROgram(s)/kG/Min (17.3 mL/Hr) IV Continuous <Continuous>  petrolatum Ophthalmic Ointment 1 Application(s) Both EYES two times a day  polyethylene glycol 3350 17 Gram(s) Oral every 12 hours  rocuronium Infusion 8 MICROgram(s)/kG/Min (7.4 mL/Hr) IV Continuous <Continuous>  senna Syrup 10 milliLiter(s) Oral at bedtime    MEDICATIONS  (PRN):  sodium chloride 0.9% lock flush 10 milliLiter(s) IV Push every 1 hour PRN Pre/post blood products, medications, blood draw, and to maintain line patency    Pertinent Labs:  @ 02:05: Na 144, BUN 40<H>, Cr 0.78, <H>, K+ 4.7, Phos 2.6, Mg 2.5, Alk Phos 93, ALT/SGPT 39, AST/SGOT 15, HbA1c --    Finger Sticks:  POCT Blood Glucose.: 191 mg/dL ( @ 08:13)      Skin per nursing documentation: intact  Edema: +2 generalized     Estimated Needs:   [ X] no change since previous assessment  [ ] recalculated:     Previous Nutrition Diagnosis: None     New Nutrition Diagnosis: None       Interventions:     Recommend  1. Recommend to increase Glucerna 1.5 bolus to 300ml q6hrs. Increased rate will provide 1800kcals and 99gm protein (23.3kcals.kg and 1.28gm pro/kg based on admission Wt: 77.1kg)  2. Trend GI tolerance, consider more aggressive bowel regimen   3. Trend BG levels, renal indices, LFT's, electrolytes and triglycerides     Monitoring and Evaluation:     Continue to monitor Nutritional intake, Tolerance to diet prescription, weights, labs, skin integrity    RD remains available upon request and will follow up per protocol  Sarah Siegler RD, RDN, [facility] Pager #[phone]

## 2020-04-08 NOTE — PROGRESS NOTE ADULT - SUBJECTIVE AND OBJECTIVE BOX
CLARITA POWELL  MRN-[id number]  Patient is a 70y old  Male who presents with a chief complaint of respiratory distress, COVID 19 (07 Apr 2020 22:42)    HPI:  70M with PMH presents with T dm, hypothyroidism, hld, p/w cough, low grade fever x 2-3 days. Today patient had worsening dyspnea at rest as well as exertion which prompted to come to the ED. Patient otherwise denied chest pain, leg swelling. He denies prior lung disease. Denies any history of smoking.     In ED, patient was noted to be hypoxic on 6L NC to 89% and significant tachypnea. VS otherwise, afebrile, HR in 80s, SBP 150s. Labs notable for CBC wnl. CMP with mild elevated transaminases AST 65 and ALT 47. PAtient noted to be positive for COVID 19. Due to worsening respiratory distress patient was emergently intubated in the ER. (27 Mar 2020 21:38)      Surgery/Hospital course:  COVID 19 Postive   Intubated 3/27   4/7 Proned, with improved oxygenation     REVIEW OF SYSTEMS:  Not obtainable, vented     Vital Signs Last 24 Hrs  T(C): 38 (07 Apr 2020 18:00), Max: 38 (07 Apr 2020 18:00)  T(F): 100.4 (07 Apr 2020 18:00), Max: 100.4 (07 Apr 2020 18:00)  HR: 88 (08 Apr 2020 07:00) (74 - 102)  BP: --  BP(mean): --  RR: 26 (08 Apr 2020 07:00) (26 - 26)  SpO2: 94% (08 Apr 2020 07:00) (88% - 100%)    ============================I/O===========================   I&O's Detail    07 Apr 2020 07:01  -  08 Apr 2020 07:00  --------------------------------------------------------  IN:    0.9% NaCl: 120 mL    Enteral Tube Flush: 30 mL    Glucerna 1.5: 400 mL    HYDROmorphone  Infusion: 47.5 mL    HYDROmorphone  Infusion: 12.5 mL    ketamine Infusion: 1.9 mL    ketamine Infusion: 9.5 mL    ketamine Infusion.: 34.2 mL    midazolam Infusion: 220.4 mL    midazolam Infusion: 58 mL    norepinephrine Infusion: 306.3 mL    rocuronium Infusion: 155.4 mL  Total IN: 1395.7 mL    OUT:    Indwelling Catheter - Urethral: 3960 mL  Total OUT: 3960 mL    Total NET: -2564.3 mL        ============================ LABS =========================                        11.7   9.67  )-----------( 256      ( 08 Apr 2020 01:54 )             37.3     04-08    144  |  100  |  40<H>  ----------------------------<  122<H>  4.7   |  34<H>  |  0.78    Ca    8.9      08 Apr 2020 02:05  Phos  2.6     04-08  Mg     2.5     04-08    TPro  6.1  /  Alb  2.6<L>  /  TBili  0.5  /  DBili  x   /  AST  15  /  ALT  39  /  AlkPhos  93  04-08    LIVER FUNCTIONS - ( 08 Apr 2020 02:05 )  Alb: 2.6 g/dL / Pro: 6.1 g/dL / ALK PHOS: 93 U/L / ALT: 39 U/L / AST: 15 U/L / GGT: x           PT/INR - ( 07 Apr 2020 02:53 )   PT: 13.6 sec;   INR: 1.18 ratio         PTT - ( 07 Apr 2020 02:53 )  PTT:34.7 sec  ABG - ( 08 Apr 2020 05:34 )  pH, Arterial: 7.39  pH, Blood: x     /  pCO2: 67    /  pO2: 80    / HCO3: 40    / Base Excess: 12.5  /  SaO2: 95                  ======================Micro/Rad/Cardio=================  Culture: Reviewed   CXR: Reviewed  ======================================================  PAST MEDICAL & SURGICAL HISTORY:  Hyperlipidemia  Hypothyroid  No significant past surgical history    ====================ASSESSMENT ==============  COVID 19 Positive   Intubated 3/27   Hypoxic Respiratory failure   Fluid overload   Septic Shock     Plan:  ====================== NEUROLOGY=====================  Continue sedation with ketamine, versed, Dilaudid; wean as tolerated   Paralyzed with rocuronium     HYDROmorphone Infusion 2.5 mG/Hr (2.5 mL/Hr) IV Continuous <Continuous>  ketamine Infusion. 0.25 mG/kG/Hr (1.93 mL/Hr) IV Continuous <Continuous>  midazolam Infusion 0.02 mG/kG/Hr (1.54 mL/Hr) IV Continuous <Continuous>  rocuronium Infusion 8 MICROgram(s)/kG/Min (7.4 mL/Hr) IV Continuous <Continuous>    ==================== RESPIRATORY======================  On full vent support, wean PEEP as tolerated   Plan to deprone today     Mechanical Ventilation:  Mode: AC/ CMV (Assist Control/ Continuous Mandatory Ventilation)  RR (machine): 26  TV (machine): 330  FiO2: 40  PEEP: 13  ITime: 1  MAP: 18  PIP: 30      ====================CARDIOVASCULAR==================  Pressor support for hypotension     norepinephrine Infusion 0.12 MICROgram(s)/kG/Min (17.3 mL/Hr) IV Continuous <Continuous>    ===================HEMATOLOGIC/ONC ===================  aspirin  chewable 81 milliGRAM(s) Oral daily  DVT prophylaxis, enoxaparin Injectable 40 milliGRAM(s) SubCutaneous two times a day    ===================== RENAL =========================  Continue monitoring urine output  Diuresis with Lasix     furosemide   Injectable 40 milliGRAM(s) IV Push every 12 hours    ==================== GASTROINTESTINAL===================  Tolerating tube feeds, Glucerna 200     GI prophylaxis, famotidine Injectable 20 milliGRAM(s) IV Push two times a day  polyethylene glycol 3350 17 Gram(s) Oral every 12 hours  senna Syrup 10 milliLiter(s) Oral at bedtime  sodium chloride 0.9% lock flush 10 milliLiter(s) IV Push every 1 hour PRN Pre/post blood products, medications, blood draw, and to maintain line patency    =======================    ENDOCRINE  =====================  Glucose control     insulin lispro (HumaLOG) corrective regimen sliding scale   SubCutaneous every 6 hours  insulin NPH human recombinant 9 Unit(s) SubCutaneous every 6 hours  levothyroxine Injectable 50 MICROGram(s) IV Push at bedtime    ========================INFECTIOUS DISEASE================  Completed COVID medications (Azithromycin, Plaquenil, Anakinra, Solumedrol)      Patient requires continuous monitoring with bedside rhythm monitoring, pulse ox monitoring, and intermittent blood gas analysis. Care plan discussed with ICU care team. Patient remained critical and required more than usual post op care.    By signing my name below, I, Pretty Wallace, attest that this documentation has been prepared under the direction and in the presence of GERRY Escalante.  Electronically signed: Rohan Lal, 04-08-20 @ 08:50    I, Nasim Oliver, personally performed the services described in this documentation. all medical record entries made by the scribe were at my direction and in my presence. I have reviewed the chart and agree that the record reflects my personal performance and is accurate and complete  Electronically signed: GERRY Escalante CLARITA POWELL  MRN-[id number]  Patient is a 70y old  Male who presents with a chief complaint of respiratory distress, COVID 19 (07 Apr 2020 22:42)    HPI:  70M with PMH presents with T dm, hypothyroidism, hld, p/w cough, low grade fever x 2-3 days. Today patient had worsening dyspnea at rest as well as exertion which prompted to come to the ED. Patient otherwise denied chest pain, leg swelling. He denies prior lung disease. Denies any history of smoking.     In ED, patient was noted to be hypoxic on 6L NC to 89% and significant tachypnea. VS otherwise, afebrile, HR in 80s, SBP 150s. Labs notable for CBC wnl. CMP with mild elevated transaminases AST 65 and ALT 47. PAtient noted to be positive for COVID 19. Due to worsening respiratory distress patient was emergently intubated in the ER. (27 Mar 2020 21:38)    Surgery/Hospital course:  COVID 19 Postive   Intubated 3/27   4/7 Proned, with improved oxygenation  4/8 Deproned    REVIEW OF SYSTEMS:  Not obtainable, vented     Vital Signs Last 24 Hrs  T(C): 38 (07 Apr 2020 18:00), Max: 38 (07 Apr 2020 18:00)  T(F): 100.4 (07 Apr 2020 18:00), Max: 100.4 (07 Apr 2020 18:00)  HR: 88 (08 Apr 2020 07:00) (74 - 102)  BP: MAP 80  RR: 26 (08 Apr 2020 07:00) (26 - 26)  SpO2: 94% (08 Apr 2020 07:00) (88% - 100%)    ============================I/O===========================   I&O's Detail    07 Apr 2020 07:01  -  08 Apr 2020 07:00  --------------------------------------------------------  IN:    0.9% NaCl: 120 mL    Enteral Tube Flush: 30 mL    Glucerna 1.5: 400 mL    HYDROmorphone  Infusion: 47.5 mL    HYDROmorphone  Infusion: 12.5 mL    ketamine Infusion: 1.9 mL    ketamine Infusion: 9.5 mL    ketamine Infusion.: 34.2 mL    midazolam Infusion: 220.4 mL    midazolam Infusion: 58 mL    norepinephrine Infusion: 306.3 mL    rocuronium Infusion: 155.4 mL  Total IN: 1395.7 mL    OUT:    Indwelling Catheter - Urethral: 3960 mL  Total OUT: 3960 mL    Total NET: -2564.3 mL    ============================ LABS =========================                        11.7   9.67  )-----------( 256      ( 08 Apr 2020 01:54 )             37.3     144  |  100  |  40<H>  ----------------------------<  122<H>  4.7   |  34<H>  |  0.78    Ca    8.9      08 Apr 2020 02:05  Phos  2.6     04-08  Mg     2.5     04-08    TPro  6.1  /  Alb  2.6<L>  /  TBili  0.5  /  DBili  x   /  AST  15  /  ALT  39  /  AlkPhos  93  04-08    LIVER FUNCTIONS - ( 08 Apr 2020 02:05 )  Alb: 2.6 g/dL / Pro: 6.1 g/dL / ALK PHOS: 93 U/L / ALT: 39 U/L / AST: 15 U/L / GGT: x           PT/INR - ( 07 Apr 2020 02:53 )   PT: 13.6 sec;   INR: 1.18 ratio      PTT - ( 07 Apr 2020 02:53 )  PTT:34.7 sec  ABG - ( 08 Apr 2020 05:34 )  pH, Arterial: 7.39  pH, Blood: x     /  pCO2: 67    /  pO2: 80    / HCO3: 40    / Base Excess: 12.5  /  SaO2: 95        ======================Micro/Rad/Cardio=================  Culture: Reviewed   CXR: Reviewed  ======================================================  PAST MEDICAL & SURGICAL HISTORY:  Hyperlipidemia  Hypothyroid  No significant past surgical history    ====================ASSESSMENT ==============  COVID 19 Positive   Intubated 3/27   Hypoxic Respiratory failure   Fluid overload   Septic Shock     Plan:  ====================== NEUROLOGY=====================  Continue sedation with ketamine, versed, Dilaudid; wean as tolerated   Paralyzed with rocuronium, consider discontinuing if oxygenation remains adequate now that he's supine.    HYDROmorphone Infusion 2.5 mG/Hr (2.5 mL/Hr) IV Continuous <Continuous>  ketamine Infusion. 0.25 mG/kG/Hr (1.93 mL/Hr) IV Continuous <Continuous>  midazolam Infusion 0.02 mG/kG/Hr (1.54 mL/Hr) IV Continuous <Continuous>  rocuronium Infusion 8 MICROgram(s)/kG/Min (7.4 mL/Hr) IV Continuous <Continuous>    ==================== RESPIRATORY======================  On full vent support, wean PEEP & FiO2 as tolerated     Mechanical Ventilation:  Mode: AC/ CMV (Assist Control/ Continuous Mandatory Ventilation)  RR (machine): 26  TV (machine): 330  FiO2: 40  PEEP: 13  ITime: 1  MAP: 18  PIP: 30    ====================CARDIOVASCULAR==================  Pressor support for hypotension     norepinephrine Infusion 0.12 MICROgram(s)/kG/Min (17.3 mL/Hr) IV Continuous <Continuous>    ===================HEMATOLOGIC/ONC ===================  aspirin  chewable 81 milliGRAM(s) Oral daily  DVT prophylaxis, enoxaparin Injectable 40 milliGRAM(s) SubCutaneous two times a day    ===================== RENAL =========================  Continue monitoring urine output. Diuresis with Lasix     furosemide   Injectable 40 milliGRAM(s) IV Push every 12 hours    ==================== GASTROINTESTINAL===================  Tolerating tube feeds, Glucerna 200 Q6    GI prophylaxis, famotidine Injectable 20 milliGRAM(s) IV Push two times a day  polyethylene glycol 3350 17 Gram(s) Oral every 12 hours  senna Syrup 10 milliLiter(s) Oral at bedtime  sodium chloride 0.9% lock flush 10 milliLiter(s) IV Push every 1 hour PRN Pre/post blood products, medications, blood draw, and to maintain line patency    =======================    ENDOCRINE  =====================  Glucose control with NPH, sliding scale. Synthroid for hypothyroidism.    insulin lispro (HumaLOG) corrective regimen sliding scale   SubCutaneous every 6 hours  insulin NPH human recombinant 9 Unit(s) SubCutaneous every 6 hours  levothyroxine Injectable 50 MICROGram(s) IV Push at bedtime    ========================INFECTIOUS DISEASE================  Completed COVID medications (Azithromycin, Plaquenil, Anakinra, Solumedrol)    I have spent 40 minutes of critical care time with this patient.    Patient requires continuous monitoring with bedside rhythm monitoring, pulse ox monitoring, and intermittent blood gas analysis. Care plan discussed with ICU care team. Patient remained critical and required more than usual post op care.    By signing my name below, I, Pretty Wallace, attest that this documentation has been prepared under the direction and in the presence of GERRY Escalante.  Electronically signed: Jonathan Lal, 04-08-20 @ 08:50    I, Nasim Oliver, personally performed the services described in this documentation. all medical record entries made by the jonathan were at my direction and in my presence. I have reviewed the chart and agree that the record reflects my personal performance and is accurate and complete  Electronically signed: GERRY Escalante CLARITA POWELL  MRN-[id number]  Patient is a 70y old  Male who presents with a chief complaint of respiratory distress, COVID 19 (07 Apr 2020 22:42)    HPI:  70M with PMH presents with T dm, hypothyroidism, hld, p/w cough, low grade fever x 2-3 days. Today patient had worsening dyspnea at rest as well as exertion which prompted to come to the ED. Patient otherwise denied chest pain, leg swelling. He denies prior lung disease. Denies any history of smoking.     In ED, patient was noted to be hypoxic on 6L NC to 89% and significant tachypnea. VS otherwise, afebrile, HR in 80s, SBP 150s. Labs notable for CBC wnl. CMP with mild elevated transaminases AST 65 and ALT 47. PAtient noted to be positive for COVID 19. Due to worsening respiratory distress patient was emergently intubated in the ER. (27 Mar 2020 21:38)    Surgery/Hospital course:  COVID 19 Postive   Intubated 3/27   4/7 Proned, with improved oxygenation  4/8 Deproned    REVIEW OF SYSTEMS:  Not obtainable, vented     Vital Signs Last 24 Hrs  T(C): 38 (07 Apr 2020 18:00), Max: 38 (07 Apr 2020 18:00)  T(F): 100.4 (07 Apr 2020 18:00), Max: 100.4 (07 Apr 2020 18:00)  HR: 88 (08 Apr 2020 07:00) (74 - 102)  BP: MAP 80  RR: 26 (08 Apr 2020 07:00) (26 - 26)  SpO2: 94% (08 Apr 2020 07:00) (88% - 100%)    ============================I/O===========================   I&O's Detail    07 Apr 2020 07:01  -  08 Apr 2020 07:00  --------------------------------------------------------  IN:    0.9% NaCl: 120 mL    Enteral Tube Flush: 30 mL    Glucerna 1.5: 400 mL    HYDROmorphone  Infusion: 47.5 mL    HYDROmorphone  Infusion: 12.5 mL    ketamine Infusion: 1.9 mL    ketamine Infusion: 9.5 mL    ketamine Infusion.: 34.2 mL    midazolam Infusion: 220.4 mL    midazolam Infusion: 58 mL    norepinephrine Infusion: 306.3 mL    rocuronium Infusion: 155.4 mL  Total IN: 1395.7 mL    OUT:    Indwelling Catheter - Urethral: 3960 mL  Total OUT: 3960 mL    Total NET: -2564.3 mL    ============================ LABS =========================                        11.7   9.67  )-----------( 256      ( 08 Apr 2020 01:54 )             37.3     144  |  100  |  40<H>  ----------------------------<  122<H>  4.7   |  34<H>  |  0.78    Ca    8.9      08 Apr 2020 02:05  Phos  2.6     04-08  Mg     2.5     04-08    TPro  6.1  /  Alb  2.6<L>  /  TBili  0.5  /  DBili  x   /  AST  15  /  ALT  39  /  AlkPhos  93  04-08    LIVER FUNCTIONS - ( 08 Apr 2020 02:05 )  Alb: 2.6 g/dL / Pro: 6.1 g/dL / ALK PHOS: 93 U/L / ALT: 39 U/L / AST: 15 U/L / GGT: x           PT/INR - ( 07 Apr 2020 02:53 )   PT: 13.6 sec;   INR: 1.18 ratio      PTT - ( 07 Apr 2020 02:53 )  PTT:34.7 sec  ABG - ( 08 Apr 2020 05:34 )  pH, Arterial: 7.39  pH, Blood: x     /  pCO2: 67    /  pO2: 80    / HCO3: 40    / Base Excess: 12.5  /  SaO2: 95        ======================Micro/Rad/Cardio=================  Culture: Reviewed   CXR: Reviewed  ======================================================  PAST MEDICAL & SURGICAL HISTORY:  Hyperlipidemia  Hypothyroid  No significant past surgical history    ====================ASSESSMENT ==============  COVID 19 Positive   Intubated 3/27   Hypoxic Respiratory failure   Fluid overload   Septic Shock     Plan:  ====================== NEUROLOGY=====================  Continue sedation with ketamine, versed, Dilaudid; wean as tolerated   Paralyzed with rocuronium, consider discontinuing if oxygenation remains adequate now that he's supine.    HYDROmorphone Infusion 2.5 mG/Hr (2.5 mL/Hr) IV Continuous <Continuous>  ketamine Infusion. 0.25 mG/kG/Hr (1.93 mL/Hr) IV Continuous <Continuous>  midazolam Infusion 0.02 mG/kG/Hr (1.54 mL/Hr) IV Continuous <Continuous>  rocuronium Infusion 8 MICROgram(s)/kG/Min (7.4 mL/Hr) IV Continuous <Continuous>    ==================== RESPIRATORY======================  On full vent support, wean PEEP & FiO2 as tolerated     Mechanical Ventilation:  Mode: AC/ CMV (Assist Control/ Continuous Mandatory Ventilation)  RR (machine): 26  TV (machine): 330  FiO2: 40  PEEP: 13  ITime: 1  MAP: 18  PIP: 30    ====================CARDIOVASCULAR==================  Pressor support for hypotension     norepinephrine Infusion 0.12 MICROgram(s)/kG/Min (17.3 mL/Hr) IV Continuous <Continuous>    ===================HEMATOLOGIC/ONC ===================  aspirin  chewable 81 milliGRAM(s) Oral daily  DVT prophylaxis, enoxaparin Injectable 40 milliGRAM(s) SubCutaneous two times a day    ===================== RENAL =========================  Continue monitoring urine output. Diuresis with Lasix     furosemide   Injectable 40 milliGRAM(s) IV Push every 12 hours    ==================== GASTROINTESTINAL===================  Tolerating tube feeds, Glucerna 200 Q6    GI prophylaxis, famotidine Injectable 20 milliGRAM(s) IV Push two times a day  polyethylene glycol 3350 17 Gram(s) Oral every 12 hours  senna Syrup 10 milliLiter(s) Oral at bedtime  sodium chloride 0.9% lock flush 10 milliLiter(s) IV Push every 1 hour PRN Pre/post blood products, medications, blood draw, and to maintain line patency    =======================    ENDOCRINE  =====================  Glucose control with NPH, sliding scale. Synthroid for hypothyroidism.    insulin lispro (HumaLOG) corrective regimen sliding scale   SubCutaneous every 6 hours  insulin NPH human recombinant 9 Unit(s) SubCutaneous every 6 hours  levothyroxine Injectable 50 MICROGram(s) IV Push at bedtime    ========================INFECTIOUS DISEASE================  Completed COVID medications (Azithromycin, Plaquenil, Anakinra, Solumedrol)    I have spent 40 minutes of critical care time with this patient.    Patient requires continuous monitoring with bedside rhythm monitoring, pulse ox monitoring, and intermittent blood gas analysis. Care plan discussed with ICU care team. Patient remained critical and required more than usual post op care.    By signing my name below, I, Pretty Wallace, attest that this documentation has been prepared under the direction and in the presence of GERRY Escalante.  Electronically signed: Rohan Lal, 04-08-20 @ 08:50    I, Nasim Oliver, personally performed the services described in this documentation. all medical record entries made by the kevinibe were at my direction and in my presence. I have reviewed the chart and agree that the record reflects my personal performance and is accurate and complete  Electronically signed: GERRY Escalante     I spoke with patient's wife, Reanna, regarding the patient's clinical condition on 4/8 @ 11:35am. CLARITA POWELL  MRN-[id number]  Patient is a 70y old  Male who presents with a chief complaint of respiratory distress, COVID 19 (07 Apr 2020 22:42)    HPI:  70M with PMH presents with T dm, hypothyroidism, hld, p/w cough, low grade fever x 2-3 days. Today patient had worsening dyspnea at rest as well as exertion which prompted to come to the ED. Patient otherwise denied chest pain, leg swelling. He denies prior lung disease. Denies any history of smoking.     In ED, patient was noted to be hypoxic on 6L NC to 89% and significant tachypnea. VS otherwise, afebrile, HR in 80s, SBP 150s. Labs notable for CBC wnl. CMP with mild elevated transaminases AST 65 and ALT 47. PAtient noted to be positive for COVID 19. Due to worsening respiratory distress patient was emergently intubated in the ER. (27 Mar 2020 21:38)    Surgery/Hospital course:  COVID 19 Postive   Intubated 3/27   4/7 Proned, with improved oxygenation  4/8 Deproned    REVIEW OF SYSTEMS:  Not obtainable, vented     Vital Signs Last 24 Hrs  T(C): 38 (07 Apr 2020 18:00), Max: 38 (07 Apr 2020 18:00)  T(F): 100.4 (07 Apr 2020 18:00), Max: 100.4 (07 Apr 2020 18:00)  HR: 88 (08 Apr 2020 07:00) (74 - 102)  BP: MAP 80  RR: 26 (08 Apr 2020 07:00) (26 - 26)  SpO2: 94% (08 Apr 2020 07:00) (88% - 100%)    ============================I/O===========================   I&O's Detail    07 Apr 2020 07:01  -  08 Apr 2020 07:00  --------------------------------------------------------  IN:    0.9% NaCl: 120 mL    Enteral Tube Flush: 30 mL    Glucerna 1.5: 400 mL    HYDROmorphone  Infusion: 47.5 mL    HYDROmorphone  Infusion: 12.5 mL    ketamine Infusion: 1.9 mL    ketamine Infusion: 9.5 mL    ketamine Infusion.: 34.2 mL    midazolam Infusion: 220.4 mL    midazolam Infusion: 58 mL    norepinephrine Infusion: 306.3 mL    rocuronium Infusion: 155.4 mL  Total IN: 1395.7 mL    OUT:    Indwelling Catheter - Urethral: 3960 mL  Total OUT: 3960 mL    Total NET: -2564.3 mL    ============================ LABS =========================                        11.7   9.67  )-----------( 256      ( 08 Apr 2020 01:54 )             37.3     144  |  100  |  40<H>  ----------------------------<  122<H>  4.7   |  34<H>  |  0.78    Ca    8.9      08 Apr 2020 02:05  Phos  2.6     04-08  Mg     2.5     04-08    TPro  6.1  /  Alb  2.6<L>  /  TBili  0.5  /  DBili  x   /  AST  15  /  ALT  39  /  AlkPhos  93  04-08    LIVER FUNCTIONS - ( 08 Apr 2020 02:05 )  Alb: 2.6 g/dL / Pro: 6.1 g/dL / ALK PHOS: 93 U/L / ALT: 39 U/L / AST: 15 U/L / GGT: x           PT/INR - ( 07 Apr 2020 02:53 )   PT: 13.6 sec;   INR: 1.18 ratio      PTT - ( 07 Apr 2020 02:53 )  PTT:34.7 sec  ABG - ( 08 Apr 2020 05:34 )  pH, Arterial: 7.39  pH, Blood: x     /  pCO2: 67    /  pO2: 80    / HCO3: 40    / Base Excess: 12.5  /  SaO2: 95        ======================Micro/Rad/Cardio=================  Culture: Reviewed   CXR: Reviewed  ======================================================  PAST MEDICAL & SURGICAL HISTORY:  Hyperlipidemia  Hypothyroid  No significant past surgical history    ====================ASSESSMENT ==============  COVID 19 Positive   Intubated 3/27   Hypoxic Respiratory failure   Fluid overload   Septic Shock     Plan:  ====================== NEUROLOGY=====================  Continue sedation with ketamine, versed, Dilaudid; wean as tolerated   Paralyzed with rocuronium, consider discontinuing if oxygenation remains adequate now that he's supine.    HYDROmorphone Infusion 2.5 mG/Hr (2.5 mL/Hr) IV Continuous <Continuous>  ketamine Infusion. 0.25 mG/kG/Hr (1.93 mL/Hr) IV Continuous <Continuous>  midazolam Infusion 0.02 mG/kG/Hr (1.54 mL/Hr) IV Continuous <Continuous>  rocuronium Infusion 8 MICROgram(s)/kG/Min (7.4 mL/Hr) IV Continuous <Continuous>    ==================== RESPIRATORY======================  On full vent support, wean PEEP & FiO2 as tolerated     Mechanical Ventilation:  Mode: AC/ CMV (Assist Control/ Continuous Mandatory Ventilation)  RR (machine): 26  TV (machine): 330  FiO2: 40  PEEP: 13  ITime: 1  MAP: 18  PIP: 30    ====================CARDIOVASCULAR==================  Pressor support for hypotension     norepinephrine Infusion 0.12 MICROgram(s)/kG/Min (17.3 mL/Hr) IV Continuous <Continuous>    ===================HEMATOLOGIC/ONC ===================  aspirin  chewable 81 milliGRAM(s) Oral daily  DVT prophylaxis, enoxaparin Injectable 40 milliGRAM(s) SubCutaneous two times a day    ===================== RENAL =========================  Continue monitoring urine output. Diuresis with Lasix     furosemide   Injectable 40 milliGRAM(s) IV Push every 12 hours    ==================== GASTROINTESTINAL===================  Tolerating tube feeds, Glucerna 200 Q6    GI prophylaxis, famotidine Injectable 20 milliGRAM(s) IV Push two times a day  polyethylene glycol 3350 17 Gram(s) Oral every 12 hours  senna Syrup 10 milliLiter(s) Oral at bedtime  sodium chloride 0.9% lock flush 10 milliLiter(s) IV Push every 1 hour PRN Pre/post blood products, medications, blood draw, and to maintain line patency    =======================    ENDOCRINE  =====================  Glucose control with NPH, sliding scale. Synthroid for hypothyroidism.    insulin lispro (HumaLOG) corrective regimen sliding scale   SubCutaneous every 6 hours  insulin NPH human recombinant 9 Unit(s) SubCutaneous every 6 hours  levothyroxine Injectable 50 MICROGram(s) IV Push at bedtime    ========================INFECTIOUS DISEASE================  Completed COVID medications (Azithromycin, Plaquenil, Anakinra, Solumedrol)    I have spent 45 minutes of critical care time with this patient.    Patient requires continuous monitoring with bedside rhythm monitoring, pulse ox monitoring, and intermittent blood gas analysis. Care plan discussed with ICU care team. Patient remained critical and required more than usual post op care.    By signing my name below, I, Pretty Wallace, attest that this documentation has been prepared under the direction and in the presence of GERRY Escalante.  Electronically signed: Rohan Lal, 04-08-20 @ 08:50    I, Nasim Oliver, personally performed the services described in this documentation. all medical record entries made by the kevinibe were at my direction and in my presence. I have reviewed the chart and agree that the record reflects my personal performance and is accurate and complete  Electronically signed: GERRY Escalante     I spoke with patient's wife, Reanna, regarding the patient's clinical condition on 4/8 @ 11:35am.

## 2020-04-09 NOTE — PROGRESS NOTE ADULT - SUBJECTIVE AND OBJECTIVE BOX
Admit Date: 03-27-20  Length of Stay: 13d    70yMale    Reason for admission to ICU:  Patient is in acute hypoxic respiratory distress requiring intubation and sedation. Imaging c/w ARDS. Admitted to MICU for further observation and management.    INTERVAL HPI/OVERNIGHT EVENTS:   Hypoxia- increasing vent requirment      MEDICATIONS  (STANDING):  aspirin  chewable 81 milliGRAM(s) Oral daily  chlorhexidine 0.12% Liquid 15 milliLiter(s) Oral Mucosa every 12 hours  chlorhexidine 2% Cloths 1 Application(s) Topical <User Schedule>  clonazePAM  Tablet 0.5 milliGRAM(s) Oral every 6 hours  enoxaparin Injectable 40 milliGRAM(s) SubCutaneous two times a day  famotidine Injectable 20 milliGRAM(s) IV Push two times a day  furosemide   Injectable 40 milliGRAM(s) IV Push every 12 hours  insulin lispro (HumaLOG) corrective regimen sliding scale   SubCutaneous every 6 hours  insulin NPH human recombinant 9 Unit(s) SubCutaneous every 6 hours  ketamine Infusion. 0.25 mG/kG/Hr (1.93 mL/Hr) IV Continuous <Continuous>  levothyroxine Injectable 50 MICROGram(s) IV Push at bedtime  norepinephrine Infusion 0.12 MICROgram(s)/kG/Min (17.3 mL/Hr) IV Continuous <Continuous>  oxyCODONE    IR 10 milliGRAM(s) Oral every 6 hours  petrolatum Ophthalmic Ointment 1 Application(s) Both EYES two times a day  polyethylene glycol 3350 17 Gram(s) Oral every 12 hours  senna Syrup 10 milliLiter(s) Oral at bedtime    MEDICATIONS  (PRN):  sodium chloride 0.9% lock flush 10 milliLiter(s) IV Push every 1 hour PRN Pre/post blood products, medications, blood draw, and to maintain line patency      Vitals:  Vital Signs Last 24 Hrs  T(C): 38 (09 Apr 2020 20:00), Max: 38 (09 Apr 2020 20:00)  T(F): 100.4 (09 Apr 2020 20:00), Max: 100.4 (09 Apr 2020 20:00)  HR: 81 (09 Apr 2020 20:00) (56 - 81)  BP: --  BP(mean): --  RR: 29 (09 Apr 2020 20:00) (26 - 35)  SpO2: 90% (09 Apr 2020 20:00) (86% - 96%)    Vitals (Invasive):  ABP: 139/46 (04-09-20 @ 20:00) (87/40 - 147/49)  CVP(mm Hg): --  CVP(cm H2O): --  CO: --  CI: --  PA: --  PA(mean): --  PCWP: --  LA: --  RA: --  SVR: --  SVRI: --  PVR: --  PVRI: --    I&O's Summary    08 Apr 2020 07:01  -  09 Apr 2020 07:00  --------------------------------------------------------  IN: 910.8 mL / OUT: 2085 mL / NET: -1174.2 mL    09 Apr 2020 07:01  -  09 Apr 2020 20:59  --------------------------------------------------------  IN: 1270.3 mL / OUT: 1585 mL / NET: -314.7 mL    Labs:  COVID:  COVID-19 PCR: Detected (03-27-20 @ 17:41)                          11.2   11.49 )-----------( 254      ( 09 Apr 2020 02:12 )             36.8     04-09    147<H>  |  101  |  44<H>  ----------------------------<  171<H>  4.5   |  35<H>  |  0.74    Ca    9.2      09 Apr 2020 02:12  Phos  2.5     04-09  Mg     2.4     04-09    TPro  6.4  /  Alb  2.5<L>  /  TBili  0.7  /  DBili  x   /  AST  25  /  ALT  32  /  AlkPhos  98  04-09          COVID related labs:  D-dimer:  224  Calcitonin:  --  CRP:  --  LDH:  --  Lactate,Serum:  --  CK:  --  Troponin I:  --  Troponin T:  --  Troponin HS:  --  Ferritin, Serum: --  Natural Killer Cells:  --  BNP:  --      Blood Gases:  (ARTERIAL):  04-09-20 @ 20:12  pH 7.51 / pCO2 48 / pO2 67 / HCO3 38  Total CO2 39  FiO2 50  Oxygen Saturation 94  Total Hemoglobin, Calculated 12.1  Hematocrit, Calculated 37  Oxygen Content 16  Blood Gas Arterial - Calcium, Ionized 1.18  Blood Gas Arterial - Chloride 101  Blood Gas Arterial - Glucose 260  Blood Gas Arterial - Potassium 4.1  Blood Gas Arterial - Sodium 146  Blood Gas Arterial - Creatinine --  Base Excess, Arterial 13.1    (VENOUS):  03-27-20 @ 17:05  pH 7.37 / pCO2 42 / pO2 27 / HCO3 23  Total CO2, Venous 25  FiO2, Venous --  Oxygen Saturation 41  Blood Gas Venous - Creatinine --  Blood Gas Venous - Glucose 191  Blood Gas Venous - Hematocrit --  Blood Gas Venous Hemoglobin --  Blood Gas Venous - Lactate 3.1  Blood Gas Venous - Potassium 4.0  Blood Gas Venous - Sodium 132  Base Excess, Venous -1.4      Radiology:  CT chest results consistent with multifocal bilateral ground glass opacities  ID consulted, follow up recommendations    Plan:  - s/p Hydroxychloroquine 800 mG Q24H x 1 then 400 mG Q24H x 4 doses    Daily Labs:  CBC/CMP/Mg/Phos/CRP/PT, PTT & INR/D-Dimer/LDH/Ferritin/CK/Troponins    Every 3 day labs:   ESR/Tcell subset/D-dimer/LDH/Ferritin/CK/Troponins/Coags  COVID isolation protocol    Pulmonary:  Sepsis 2/2 COVID+  On arrival meeting /4 SIRS criteria ( ) with pulmonary source  WBC with % neutrophils, % lymphocytes, lactate  Hold antibiotics at present    Follow Ups:  T-Cell Subset  Urinalysis  Urine Cultures  Blood Cultures  Obtain HIV consent for testing when able    ARDS:  as above  Current vent settings:  Mode: AC/ CMV (Assist Control/ Continuous Mandatory Ventilation)  RR (machine): 26  TV (machine): 360  FiO2: 50  PEEP: 12  ITime: 1  MAP: 15  PIP: 32    Low threshold to prone patient in order to improve oxygenation and secretion drainage  Continue to closely monitor patient    Cardiovascular:  Hypotension  Patient intubated and requiring pressor support in the setting of sedation  Norepinephrine gtt  Goal MAP >65    Neurology:  Sedation  Patient currently on Ketamine gtt  RASS goal -4    Gastrointestinal:  Prophylaxis  - Pepcid 20mg IV daily while intubated    Genitourinary:  Decreased urine output  Roque in place  Lasix 40 BID  Continue to monitor strict I&O  Follow Up: Urinalysis, Creatinine    ID:  Sepsis 2/2 COVID+  as above  Follow up: Urinalysis,Blood Culture, Urine Culture  Consulted ID, follow recommandations    Diet: NPO  Electrolytes: Replete K<4, Mg<2    Code: FULL CODE    Disposition: Patient requires continued monitoring in MICU

## 2020-04-09 NOTE — CHART NOTE - NSCHARTNOTEFT_GEN_A_CORE
4/9 2pm Phone note    Spoke with patient's wife Reanna for updates. Updated her on current vent status and potential need to prone again. Patient is currently full code, wife was reluctant to change that status at this time.    Spoke with patient's daughter Ileana who is a physician in [place]. Updated on current status. Daughter also would like patient to remain full code at this time but will discuss the status with her mother.

## 2020-04-09 NOTE — PROGRESS NOTE ADULT - SUBJECTIVE AND OBJECTIVE BOX
CLARITA POWELL  MRN-[id number]  Patient is a 70y old  Male who presents with a chief complaint of respiratory distress, COVID 19 (08 Apr 2020 22:59)    HPI:  70M with PMH presents with T dm, hypothyroidism, hld, p/w cough, low grade fever x 2-3 days. Today patient had worsening dyspnea at rest as well as exertion which prompted to come to the ED. Patient otherwise denied chest pain, leg swelling. He denies prior lung disease. Denies any history of smoking.     In ED, patient was noted to be hypoxic on 6L NC to 89% and significant tachypnea. VS otherwise, afebrile, HR in 80s, SBP 150s. Labs notable for CBC wnl. CMP with mild elevated transaminases AST 65 and ALT 47. PAtient noted to be positive for COVID 19. Due to worsening respiratory distress patient was emergently intubated in the ER. (27 Mar 2020 21:38)      Surgery/Hospital course:  COVID 19 Positive  Intubated 3/27    REVIEW OF SYSTEMS:  Not obtainable, vented     Vital Signs Last 24 Hrs  T(C): 37.7 (09 Apr 2020 03:00), Max: 37.7 (08 Apr 2020 19:00)  T(F): 99.9 (09 Apr 2020 03:00), Max: 99.9 (08 Apr 2020 19:00)  HR: 74 (09 Apr 2020 07:00) (56 - 81)  BP: --  BP(mean): --  RR: 26 (09 Apr 2020 07:00) (26 - 26)  SpO2: 89% (09 Apr 2020 07:00) (86% - 97%)    ============================I/O===========================   I&O's Detail    08 Apr 2020 07:01  -  09 Apr 2020 07:00  --------------------------------------------------------  IN:    0.9% NaCl: 60 mL    Glucerna 1.5: 200 mL    HYDROmorphone  Infusion: 39 mL    ketamine Infusion.: 64.6 mL    midazolam Infusion: 245 mL    norepinephrine Infusion: 228.2 mL    rocuronium Infusion: 74 mL  Total IN: 910.8 mL    OUT:    Indwelling Catheter - Urethral: 2085 mL  Total OUT: 2085 mL    Total NET: -1174.2 mL        ============================ LABS =========================                        11.2   11.49 )-----------( 254      ( 09 Apr 2020 02:12 )             36.8     04-09    147<H>  |  101  |  44<H>  ----------------------------<  171<H>  4.5   |  35<H>  |  0.74    Ca    9.2      09 Apr 2020 02:12  Phos  2.5     04-09  Mg     2.4     04-09    TPro  6.4  /  Alb  2.5<L>  /  TBili  0.7  /  DBili  x   /  AST  25  /  ALT  32  /  AlkPhos  98  04-09    LIVER FUNCTIONS - ( 09 Apr 2020 02:12 )  Alb: 2.5 g/dL / Pro: 6.4 g/dL / ALK PHOS: 98 U/L / ALT: 32 U/L / AST: 25 U/L / GGT: x             ABG - ( 09 Apr 2020 02:02 )  pH, Arterial: 7.50  pH, Blood: x     /  pCO2: 51    /  pO2: 67    / HCO3: 39    / Base Excess: 13.9  /  SaO2: 94                  ======================Micro/Rad/Cardio=================  Culture: Reviewed   CXR: Reviewed  ======================================================  PAST MEDICAL & SURGICAL HISTORY:  Hyperlipidemia  Hypothyroid  No significant past surgical history    ====================ASSESSMENT ==============  COVID 19 Positive   3/27 Intubated   Hypoxic respiratory failure   Septic Shock  ARDS  Fluid overload    Plan:  ====================== NEUROLOGY=====================  Sedated with Klonopin, Ketamine, Midazolam     clonazePAM  Tablet 0.5 milliGRAM(s) Oral every 6 hours  ketamine Infusion. 0.25 mG/kG/Hr (1.93 mL/Hr) IV Continuous <Continuous>  midazolam Infusion 0.02 mG/kG/Hr (1.54 mL/Hr) IV Continuous <Continuous>  oxyCODONE    IR 10 milliGRAM(s) Oral every 6 hours    ==================== RESPIRATORY======================  On full vent support   Proned yesterday    Mechanical Ventilation:  Mode: AC/ CMV (Assist Control/ Continuous Mandatory Ventilation)  RR (machine): 26  TV (machine): 330  FiO2: 40  PEEP: 12  ITime: 1  MAP: 18  PIP: 35      ====================CARDIOVASCULAR==================  On pressor support for hypotension     norepinephrine Infusion 0.12 MICROgram(s)/kG/Min (17.3 mL/Hr) IV Continuous <Continuous>    ===================HEMATOLOGIC/ONC ===================  aspirin  chewable 81 milliGRAM(s) Oral daily  DVT prophylaxis, enoxaparin Injectable 40 milliGRAM(s) SubCutaneous two times a day    ===================== RENAL =========================  Continue monitoring urine output  Diuresis with Lasix     furosemide   Injectable 20 milliGRAM(s) IV Push every 12 hours  ==================== GASTROINTESTINAL===================  Tolerating tube feeds, Glucerna 200      GI prophylaxis, famotidine Injectable 20 milliGRAM(s) IV Push two times a day  polyethylene glycol 3350 17 Gram(s) Oral every 12 hours  senna Syrup 10 milliLiter(s) Oral at bedtime  sodium chloride 0.9% lock flush 10 milliLiter(s) IV Push every 1 hour PRN Pre/post blood products, medications, blood draw, and to maintain line patency    =======================    ENDOCRINE  =====================  Glucose control,   insulin lispro (HumaLOG) corrective regimen sliding scale   SubCutaneous every 6 hours  insulin NPH human recombinant 9 Unit(s) SubCutaneous every 6 hours  levothyroxine Injectable 50 MICROGram(s) IV Push at bedtime    ========================INFECTIOUS DISEASE================  Completed COVID medication (Plaquenil, Vit C/Thiamine, Anakirna, Solumedrol)     Lines: 3/27 RRAL, 3/27 RIJ TLC    Patient requires continuous monitoring with bedside rhythm monitoring, pulse ox monitoring, and intermittent blood gas analysis. Care plan discussed with ICU care team. Patient remained critical and required more than usual ICU care.    By signing my name below, I, Pretty Wallace, attest that this documentation has been prepared under the direction and in the presence of GERRY Romero.  Electronically signed: Jonathan Lal, 04-09-20 @ 07:21    I, Alana Fischer, personally performed the services described in this documentation. all medical record entries made by the jonathan were at my direction and in my presence. I have reviewed the chart and agree that the record reflects my personal performance and is accurate and complete  Electronically signed: GERRY Romero CLARITA POWELL  MRN-[id number]  Patient is a 70y old  Male who presents with a chief complaint of respiratory distress, COVID 19 (08 Apr 2020 22:59)    HPI:  70M with PMH presents with T dm, hypothyroidism, hld, p/w cough, low grade fever x 2-3 days. Today patient had worsening dyspnea at rest as well as exertion which prompted to come to the ED. Patient otherwise denied chest pain, leg swelling. He denies prior lung disease. Denies any history of smoking.     In ED, patient was noted to be hypoxic on 6L NC to 89% and significant tachypnea. VS otherwise, afebrile, HR in 80s, SBP 150s. Labs notable for CBC wnl. CMP with mild elevated transaminases AST 65 and ALT 47. PAtient noted to be positive for COVID 19. Due to worsening respiratory distress patient was emergently intubated in the ER. (27 Mar 2020 21:38)      Surgery/Hospital course:  COVID 19 Positive  Intubated 3/27    REVIEW OF SYSTEMS:  Not obtainable, vented     Vital Signs Last 24 Hrs  T(C): 37.7 (09 Apr 2020 03:00), Max: 37.7 (08 Apr 2020 19:00)  T(F): 99.9 (09 Apr 2020 03:00), Max: 99.9 (08 Apr 2020 19:00)  HR: 74 (09 Apr 2020 07:00) (56 - 81)  BP: --  BP(mean): --  RR: 26 (09 Apr 2020 07:00) (26 - 26)  SpO2: 89% (09 Apr 2020 07:00) (86% - 97%)    ============================I/O===========================   I&O's Detail    08 Apr 2020 07:01  -  09 Apr 2020 07:00  --------------------------------------------------------  IN:    0.9% NaCl: 60 mL    Glucerna 1.5: 200 mL    HYDROmorphone  Infusion: 39 mL    ketamine Infusion.: 64.6 mL    midazolam Infusion: 245 mL    norepinephrine Infusion: 228.2 mL    rocuronium Infusion: 74 mL  Total IN: 910.8 mL    OUT:    Indwelling Catheter - Urethral: 2085 mL  Total OUT: 2085 mL    Total NET: -1174.2 mL        ============================ LABS =========================                        11.2   11.49 )-----------( 254      ( 09 Apr 2020 02:12 )             36.8     04-09    147<H>  |  101  |  44<H>  ----------------------------<  171<H>  4.5   |  35<H>  |  0.74    Ca    9.2      09 Apr 2020 02:12  Phos  2.5     04-09  Mg     2.4     04-09    TPro  6.4  /  Alb  2.5<L>  /  TBili  0.7  /  DBili  x   /  AST  25  /  ALT  32  /  AlkPhos  98  04-09    LIVER FUNCTIONS - ( 09 Apr 2020 02:12 )  Alb: 2.5 g/dL / Pro: 6.4 g/dL / ALK PHOS: 98 U/L / ALT: 32 U/L / AST: 25 U/L / GGT: x             ABG - ( 09 Apr 2020 02:02 )  pH, Arterial: 7.50  pH, Blood: x     /  pCO2: 51    /  pO2: 67    / HCO3: 39    / Base Excess: 13.9  /  SaO2: 94                  ======================Micro/Rad/Cardio=================  Culture: Reviewed   CXR: Reviewed  ======================================================  PAST MEDICAL & SURGICAL HISTORY:  Hyperlipidemia  Hypothyroid  No significant past surgical history    ====================ASSESSMENT ==============  COVID 19 Positive   3/27 Intubated   Hypoxic respiratory failure   Septic Shock  ARDS  Fluid overload    Plan:  ====================== NEUROLOGY=====================  Sedated with Klonopin, Ketamine, Midazolam  Wean midazolam as tolerated     clonazePAM  Tablet 0.5 milliGRAM(s) Oral every 6 hours  ketamine Infusion. 0.25 mG/kG/Hr (1.93 mL/Hr) IV Continuous <Continuous>  midazolam Infusion 0.02 mG/kG/Hr (1.54 mL/Hr) IV Continuous <Continuous>  oxyCODONE    IR 10 milliGRAM(s) Oral every 6 hours    ==================== RESPIRATORY======================  On full vent support   Proned yesterday    Mechanical Ventilation:  Mode: AC/ CMV (Assist Control/ Continuous Mandatory Ventilation)  RR (machine): 26  TV (machine): 330  FiO2: 40  PEEP: 12  ITime: 1  MAP: 18  PIP: 35      ====================CARDIOVASCULAR==================  On pressor support for hypotension     norepinephrine Infusion 0.12 MICROgram(s)/kG/Min (17.3 mL/Hr) IV Continuous <Continuous>    ===================HEMATOLOGIC/ONC ===================  aspirin  chewable 81 milliGRAM(s) Oral daily  DVT prophylaxis, enoxaparin Injectable 40 milliGRAM(s) SubCutaneous two times a day    ===================== RENAL =========================  Continue monitoring urine output  Diuresis with Lasix     furosemide   Injectable 20 milliGRAM(s) IV Push every 12 hours  ==================== GASTROINTESTINAL===================  Tolerating tube feeds, Glucerna 200      GI prophylaxis, famotidine Injectable 20 milliGRAM(s) IV Push two times a day  polyethylene glycol 3350 17 Gram(s) Oral every 12 hours  senna Syrup 10 milliLiter(s) Oral at bedtime  sodium chloride 0.9% lock flush 10 milliLiter(s) IV Push every 1 hour PRN Pre/post blood products, medications, blood draw, and to maintain line patency    =======================    ENDOCRINE  =====================  Glucose control,   insulin lispro (HumaLOG) corrective regimen sliding scale   SubCutaneous every 6 hours  insulin NPH human recombinant 9 Unit(s) SubCutaneous every 6 hours  levothyroxine Injectable 50 MICROGram(s) IV Push at bedtime    ========================INFECTIOUS DISEASE================  Completed COVID medication (Plaquenil, Vit C/Thiamine, Anakirna, Solumedrol)     Lines: 3/27 RRAL, 3/27 RIJ TLC    Patient requires continuous monitoring with bedside rhythm monitoring, pulse ox monitoring, and intermittent blood gas analysis. Care plan discussed with ICU care team. Patient remained critical and required more than usual ICU care.    By signing my name below, I, Pretty Wallace, attest that this documentation has been prepared under the direction and in the presence of GERRY Romero.  Electronically signed: Jonathan Lal, 04-09-20 @ 07:21    I, Alana Fischer, personally performed the services described in this documentation. all medical record entries made by the jonathan were at my direction and in my presence. I have reviewed the chart and agree that the record reflects my personal performance and is accurate and complete  Electronically signed: GERRY Romero CLARITA POWELL  MRN-[id number]  Patient is a 70y old  Male who presents with a chief complaint of respiratory distress, COVID 19 (08 Apr 2020 22:59)    HPI:  70M with PMH presents with T dm, hypothyroidism, hld, p/w cough, low grade fever x 2-3 days. Today patient had worsening dyspnea at rest as well as exertion which prompted to come to the ED. Patient otherwise denied chest pain, leg swelling. He denies prior lung disease. Denies any history of smoking.     In ED, patient was noted to be hypoxic on 6L NC to 89% and significant tachypnea. VS otherwise, afebrile, HR in 80s, SBP 150s. Labs notable for CBC wnl. CMP with mild elevated transaminases AST 65 and ALT 47. PAtient noted to be positive for COVID 19. Due to worsening respiratory distress patient was emergently intubated in the ER. (27 Mar 2020 21:38)      Surgery/Hospital course:  COVID 19 Positive  Intubated 3/27    REVIEW OF SYSTEMS:  Not obtainable, vented     Vital Signs Last 24 Hrs  T(C): 37.7 (09 Apr 2020 03:00), Max: 37.7 (08 Apr 2020 19:00)  T(F): 99.9 (09 Apr 2020 03:00), Max: 99.9 (08 Apr 2020 19:00)  HR: 74 (09 Apr 2020 07:00) (56 - 81)  BP: --  BP(mean): --  RR: 26 (09 Apr 2020 07:00) (26 - 26)  SpO2: 89% (09 Apr 2020 07:00) (86% - 97%)    Physical Exam:  Gen: Awake, alert   CNS: nonfocal  Neck: no JVD   Res: clear, no wheezing  CVS: regular rhythm, normal S1/S2  Abd: soft, non-distended, bowel sounds present   Skin: no rash  Ext: no edema     ============================I/O===========================   I&O's Detail    08 Apr 2020 07:01  -  09 Apr 2020 07:00  --------------------------------------------------------  IN:    0.9% NaCl: 60 mL    Glucerna 1.5: 200 mL    HYDROmorphone  Infusion: 39 mL    ketamine Infusion.: 64.6 mL    midazolam Infusion: 245 mL    norepinephrine Infusion: 228.2 mL    rocuronium Infusion: 74 mL  Total IN: 910.8 mL    OUT:    Indwelling Catheter - Urethral: 2085 mL  Total OUT: 2085 mL    Total NET: -1174.2 mL        ============================ LABS =========================                        11.2   11.49 )-----------( 254      ( 09 Apr 2020 02:12 )             36.8     04-09    147<H>  |  101  |  44<H>  ----------------------------<  171<H>  4.5   |  35<H>  |  0.74    Ca    9.2      09 Apr 2020 02:12  Phos  2.5     04-09  Mg     2.4     04-09    TPro  6.4  /  Alb  2.5<L>  /  TBili  0.7  /  DBili  x   /  AST  25  /  ALT  32  /  AlkPhos  98  04-09    LIVER FUNCTIONS - ( 09 Apr 2020 02:12 )  Alb: 2.5 g/dL / Pro: 6.4 g/dL / ALK PHOS: 98 U/L / ALT: 32 U/L / AST: 25 U/L / GGT: x             ABG - ( 09 Apr 2020 02:02 )  pH, Arterial: 7.50  pH, Blood: x     /  pCO2: 51    /  pO2: 67    / HCO3: 39    / Base Excess: 13.9  /  SaO2: 94                  ======================Micro/Rad/Cardio=================  Culture: Reviewed   CXR: Reviewed  ======================================================  PAST MEDICAL & SURGICAL HISTORY:  Hyperlipidemia  Hypothyroid  No significant past surgical history    ====================ASSESSMENT ==============  COVID 19 Positive   3/27 Intubated   Hypoxic respiratory failure   Septic Shock  ARDS  Fluid overload    Plan:  ====================== NEUROLOGY=====================  Sedated with Klonopin, Ketamine, Midazolam  Wean midazolam as tolerated     clonazePAM  Tablet 0.5 milliGRAM(s) Oral every 6 hours  ketamine Infusion. 0.25 mG/kG/Hr (1.93 mL/Hr) IV Continuous <Continuous>  midazolam Infusion 0.02 mG/kG/Hr (1.54 mL/Hr) IV Continuous <Continuous>  oxyCODONE    IR 10 milliGRAM(s) Oral every 6 hours    ==================== RESPIRATORY======================  On full vent support   Proned yesterday    Mechanical Ventilation:  Mode: AC/ CMV (Assist Control/ Continuous Mandatory Ventilation)  RR (machine): 26  TV (machine): 330  FiO2: 40  PEEP: 12  ITime: 1  MAP: 18  PIP: 35      ====================CARDIOVASCULAR==================  On pressor support for hypotension     norepinephrine Infusion 0.12 MICROgram(s)/kG/Min (17.3 mL/Hr) IV Continuous <Continuous>    ===================HEMATOLOGIC/ONC ===================  aspirin  chewable 81 milliGRAM(s) Oral daily  DVT prophylaxis, enoxaparin Injectable 40 milliGRAM(s) SubCutaneous two times a day    ===================== RENAL =========================  Continue monitoring urine output  Diuresis with Lasix     furosemide   Injectable 20 milliGRAM(s) IV Push every 12 hours  ==================== GASTROINTESTINAL===================  Tolerating tube feeds, Glucerna 200      GI prophylaxis, famotidine Injectable 20 milliGRAM(s) IV Push two times a day  polyethylene glycol 3350 17 Gram(s) Oral every 12 hours  senna Syrup 10 milliLiter(s) Oral at bedtime  sodium chloride 0.9% lock flush 10 milliLiter(s) IV Push every 1 hour PRN Pre/post blood products, medications, blood draw, and to maintain line patency    =======================    ENDOCRINE  =====================  Glucose control,   insulin lispro (HumaLOG) corrective regimen sliding scale   SubCutaneous every 6 hours  insulin NPH human recombinant 9 Unit(s) SubCutaneous every 6 hours  levothyroxine Injectable 50 MICROGram(s) IV Push at bedtime    ========================INFECTIOUS DISEASE================  Completed COVID medication (Plaquenil, Vit C/Thiamine, Anakirna, Solumedrol)     Lines: 3/27 RRAL, 3/27 RIJ TLC    Patient requires continuous monitoring with bedside rhythm monitoring, pulse ox monitoring, and intermittent blood gas analysis. Care plan discussed with ICU care team. Patient remained critical and required more than usual ICU care.    By signing my name below, I, Pretty Wallace, attest that this documentation has been prepared under the direction and in the presence of GERRY Romero.  Electronically signed: Rohan Lal, 04-09-20 @ 07:21    I, Alana Fischer, personally performed the services described in this documentation. all medical record entries made by the kevinibtrae were at my direction and in my presence. I have reviewed the chart and agree that the record reflects my personal performance and is accurate and complete  Electronically signed: GERRY Romero CLARITA POWELL  MRN-[id number]  Patient is a 70y old  Male who presents with a chief complaint of respiratory distress, COVID 19 (08 Apr 2020 22:59)    HPI:  70M with PMH presents with T dm, hypothyroidism, hld, p/w cough, low grade fever x 2-3 days. Today patient had worsening dyspnea at rest as well as exertion which prompted to come to the ED. Patient otherwise denied chest pain, leg swelling. He denies prior lung disease. Denies any history of smoking.     In ED, patient was noted to be hypoxic on 6L NC to 89% and significant tachypnea. VS otherwise, afebrile, HR in 80s, SBP 150s. Labs notable for CBC wnl. CMP with mild elevated transaminases AST 65 and ALT 47. PAtient noted to be positive for COVID 19. Due to worsening respiratory distress patient was emergently intubated in the ER. (27 Mar 2020 21:38)      Surgery/Hospital course:  COVID 19 Positive  Intubated 3/27  4/9 Paralysis weaned off yesterday, unproned with maintained sats, tolerating diuresis    REVIEW OF SYSTEMS:  Not obtainable, vented     Vital Signs Last 24 Hrs  T(C): 37.7 (09 Apr 2020 03:00), Max: 37.7 (08 Apr 2020 19:00)  T(F): 99.9 (09 Apr 2020 03:00), Max: 99.9 (08 Apr 2020 19:00)  HR: 74 (09 Apr 2020 07:00) (56 - 81)  BP: --  BP(mean): --  RR: 26 (09 Apr 2020 07:00) (26 - 26)  SpO2: 89% (09 Apr 2020 07:00) (86% - 97%)    Physical Exam:  Gen: intubated, sedated  CNS: does not follow commands, not currently overbreathing vent  Res: diminished bilaterally  CVS: regular rhythm, normal S1/S2  Abd: soft, non-distended, bowel sounds present   Skin: no rash  Ext: no edema     ============================I/O===========================   I&O's Detail    08 Apr 2020 07:01  -  09 Apr 2020 07:00  --------------------------------------------------------  IN:    0.9% NaCl: 60 mL    Glucerna 1.5: 200 mL    HYDROmorphone  Infusion: 39 mL    ketamine Infusion.: 64.6 mL    midazolam Infusion: 245 mL    norepinephrine Infusion: 228.2 mL    rocuronium Infusion: 74 mL  Total IN: 910.8 mL    OUT:    Indwelling Catheter - Urethral: 2085 mL  Total OUT: 2085 mL    Total NET: -1174.2 mL        ============================ LABS =========================                        11.2   11.49 )-----------( 254      ( 09 Apr 2020 02:12 )             36.8     04-09    147<H>  |  101  |  44<H>  ----------------------------<  171<H>  4.5   |  35<H>  |  0.74    Ca    9.2      09 Apr 2020 02:12  Phos  2.5     04-09  Mg     2.4     04-09    TPro  6.4  /  Alb  2.5<L>  /  TBili  0.7  /  DBili  x   /  AST  25  /  ALT  32  /  AlkPhos  98  04-09    LIVER FUNCTIONS - ( 09 Apr 2020 02:12 )  Alb: 2.5 g/dL / Pro: 6.4 g/dL / ALK PHOS: 98 U/L / ALT: 32 U/L / AST: 25 U/L / GGT: x             ABG - ( 09 Apr 2020 02:02 )  pH, Arterial: 7.50  pH, Blood: x     /  pCO2: 51    /  pO2: 67    / HCO3: 39    / Base Excess: 13.9  /  SaO2: 94                  ======================Micro/Rad/Cardio=================  Culture: Reviewed   CXR: Reviewed  ======================================================  PAST MEDICAL & SURGICAL HISTORY:  Hyperlipidemia  Hypothyroid  No significant past surgical history    ====================ASSESSMENT ==============  COVID 19 Positive   3/27 Intubated   Hypoxic respiratory failure   Septic Shock  ARDS  Fluid overload    Plan:  ====================== NEUROLOGY=====================  Sedated with Klonopin, Ketamine, oxycodone po  Versed weaned off  Follow up neuro status as sedation comes off    clonazePAM  Tablet 0.5 milliGRAM(s) Oral every 6 hours  ketamine Infusion. 0.25 mG/kG/Hr (1.93 mL/Hr) IV Continuous <Continuous>  oxyCODONE    IR 10 milliGRAM(s) Oral every 6 hours    ==================== RESPIRATORY======================  On full vent support, FIO2 40%, sat low 90s  Proned yesterday, supine today, consider proning again    Mechanical Ventilation:  Mode: AC/ CMV (Assist Control/ Continuous Mandatory Ventilation)  RR (machine): 26  TV (machine): 330  FiO2: 40  PEEP: 12  ITime: 1  MAP: 18  PIP: 35      ====================CARDIOVASCULAR==================  On levophed support for hypotension, weaning down    norepinephrine Infusion 0.12 MICROgram(s)/kG/Min (17.3 mL/Hr) IV Continuous <Continuous>    ===================HEMATOLOGIC/ONC ===================  DVT prophylaxis    aspirin  chewable 81 milliGRAM(s) Oral daily  enoxaparin Injectable 40 milliGRAM(s) SubCutaneous two times a day    ===================== RENAL =========================  Lasix bid for goal -1L, tolerating well  Continue monitoring urine output    furosemide   Injectable 20 milliGRAM(s) IV Push every 12 hours  ==================== GASTROINTESTINAL===================  Tolerating bolus tube feeds, Glucerna 200q6  Continue bowel regimen    GI prophylaxis, famotidine Injectable 20 milliGRAM(s) IV Push two times a day  polyethylene glycol 3350 17 Gram(s) Oral every 12 hours  senna Syrup 10 milliLiter(s) Oral at bedtime  sodium chloride 0.9% lock flush 10 milliLiter(s) IV Push every 1 hour PRN Pre/post blood products, medications, blood draw, and to maintain line patency    =======================    ENDOCRINE  =====================  Glucose , with NPH given with tube feed bolus, continue current regimen  Continue levothyroxine, home med    insulin lispro (HumaLOG) corrective regimen sliding scale   SubCutaneous every 6 hours  insulin NPH human recombinant 9 Unit(s) SubCutaneous every 6 hours  levothyroxine Injectable 50 MICROGram(s) IV Push at bedtime    ========================INFECTIOUS DISEASE================  Completed COVID medication (Plaquenil, Vit C/Thiamine, Anakirna, Solumedrol)     Lines: 3/27 RRAL, 3/27 RIJ TLC    Patient required acute postoperative critical care management and it at risk for life threatening decompensation. I provided 40 minutes of non-continuous care to the patient.     Patient requires continuous monitoring with bedside rhythm monitoring, pulse ox monitoring, and intermittent blood gas analysis. Care plan discussed with ICU care team. Patient remained critical and required more than usual ICU care.    By signing my name below, I, Pretty Wallace, attest that this documentation has been prepared under the direction and in the presence of GERRY Romero.  Electronically signed: Rohan Lal, 04-09-20 @ 07:21    I, Alana Fischer, personally performed the services described in this documentation. all medical record entries made by the kevinibtrae were at my direction and in my presence. I have reviewed the chart and agree that the record reflects my personal performance and is accurate and complete  Electronically signed: GERRY Romero

## 2020-04-10 NOTE — PROGRESS NOTE ADULT - SUBJECTIVE AND OBJECTIVE BOX
Admit Date: 03-27-20  Length of Stay: 14d    70yMale    Reason for admission to ICU:  Patient is in acute hypoxic respiratory distress requiring intubation and sedation. Imaging c/w ARDS. Admitted to MICU for further observation and management.    INTERVAL HPI/OVERNIGHT EVENTS:  restart versed gtt      MEDICATIONS  (STANDING):  aspirin  chewable 81 milliGRAM(s) Oral daily  chlorhexidine 0.12% Liquid 15 milliLiter(s) Oral Mucosa every 12 hours  chlorhexidine 2% Cloths 1 Application(s) Topical <User Schedule>  clonazePAM  Tablet 0.5 milliGRAM(s) Oral every 8 hours  enoxaparin Injectable 40 milliGRAM(s) SubCutaneous two times a day  famotidine Injectable 20 milliGRAM(s) IV Push two times a day  insulin lispro (HumaLOG) corrective regimen sliding scale   SubCutaneous every 6 hours  insulin NPH human recombinant 11 Unit(s) SubCutaneous every 6 hours  ketamine Infusion. 0.25 mG/kG/Hr (1.93 mL/Hr) IV Continuous <Continuous>  levothyroxine Injectable 50 MICROGram(s) IV Push at bedtime  midazolam Infusion 0.05 mG/kG/Hr (3.86 mL/Hr) IV Continuous <Continuous>  norepinephrine Infusion 0.12 MICROgram(s)/kG/Min (17.3 mL/Hr) IV Continuous <Continuous>  oxyCODONE    IR 10 milliGRAM(s) Oral every 8 hours  petrolatum Ophthalmic Ointment 1 Application(s) Both EYES two times a day  polyethylene glycol 3350 17 Gram(s) Oral every 12 hours  senna Syrup 10 milliLiter(s) Oral at bedtime  sodium chloride 0.9% Bolus 250 milliLiter(s) IV Bolus once    MEDICATIONS  (PRN):  sodium chloride 0.9% lock flush 10 milliLiter(s) IV Push every 1 hour PRN Pre/post blood products, medications, blood draw, and to maintain line patency      Vitals:  Vital Signs Last 24 Hrs  T(C): 36.6 (10 Apr 2020 20:00), Max: 37.4 (10 Apr 2020 00:00)  T(F): 97.9 (10 Apr 2020 20:00), Max: 99.3 (10 Apr 2020 00:00)  HR: 73 (10 Apr 2020 21:35) (71 - 93)  BP: --  BP(mean): --  RR: 30 (10 Apr 2020 21:00) (26 - 45)  SpO2: 96% (10 Apr 2020 21:35) (90% - 96%)    Vitals (Invasive):  ABP: 118/49 (04-10-20 @ 21:00) (118/49 - 174/60)  CVP(mm Hg): --  CVP(cm H2O): --  CO: --  CI: --  PA: --  PA(mean): --  PCWP: --  LA: --  RA: --  SVR: --  SVRI: --  PVR: --  PVRI: --    I&O's Summary    09 Apr 2020 07:01  -  10 Apr 2020 07:00  --------------------------------------------------------  IN: 1870.6 mL / OUT: 2700 mL / NET: -829.4 mL    10 Apr 2020 07:01  -  10 Apr 2020 22:10  --------------------------------------------------------  IN: 1810.9 mL / OUT: 1300 mL / NET: 510.9 mL    Labs:  COVID:  COVID-19 PCR: Detected (03-27-20 @ 17:41)                          10.8   14.60 )-----------( 242      ( 10 Apr 2020 02:33 )             34.9     04-10    152<H>  |  108  |  42<H>  ----------------------------<  159<H>  3.8   |  33<H>  |  0.67    Ca    7.5<L>      10 Apr 2020 02:39  Phos  3.5     04-10  Mg     2.2     04-10    TPro  5.7<L>  /  Alb  2.3<L>  /  TBili  0.4  /  DBili  x   /  AST  15  /  ALT  23  /  AlkPhos  87  04-10    PT/INR - ( 10 Apr 2020 02:32 )   PT: 14.7 sec;   INR: 1.28 ratio         PTT - ( 10 Apr 2020 02:32 )  PTT:31.7 sec      COVID related labs:  D-dimer:  --  Calcitonin:  --  CRP:  --  LDH:  171  Lactate,Serum:  --  CK:  49  Troponin I:  --  Troponin T:  --  Troponin HS:  22  Ferritin, Serum: --  Natural Killer Cells:  --  BNP:  261      Blood Gases:  (ARTERIAL):  04-10-20 @ 20:28  pH 7.42 / pCO2 61 / pO2 124 / HCO3 40  Total CO2 41  FiO2 50  Oxygen Saturation 98  Total Hemoglobin, Calculated 10.7  Hematocrit, Calculated 33  Oxygen Content 15  Blood Gas Arterial - Calcium, Ionized 1.15  Blood Gas Arterial - Chloride 103  Blood Gas Arterial - Glucose 243  Blood Gas Arterial - Potassium 3.6  Blood Gas Arterial - Sodium 145  Blood Gas Arterial - Creatinine --  Base Excess, Arterial 13.1    (VENOUS):      Radiology:  CT chest results consistent with multifocal bilateral ground glass opacities  ID consulted, follow up recommendations    Plan:  s/p Hydroxychloroquine 800 mG Q24H x 1 then 400 mG Q24H x 4 doses    Daily Labs:  CBC/CMP/Mg/Phos/CRP/PT, PTT & INR/D-Dimer/LDH/Ferritin/CK/Troponins    Every 3 day labs:   ESR/Tcell subset/D-dimer/LDH/Ferritin/CK/Troponins/Coags  COVID isolation protocol    Pulmonary:  Sepsis 2/2 COVID+  On arrival meeting /4 SIRS criteria ( ) with pulmonary source  WBC with % neutrophils, % lymphocytes, lactate  Status Post:  Vancomycin + Piperacillin / Tazobactam in ED, L NS bolus  Hold further antibiotics at present    Follow Ups:  T-Cell Subset  Urinalysis  Urine Cultures  Blood Cultures  Obtain HIV consent for testing when able    ARDS:  as above  Current vent settings:  Mode: AC/ CMV (Assist Control/ Continuous Mandatory Ventilation)  RR (machine): 26  TV (machine): 360  FiO2: 50  PEEP: 14  ITime: 1  MAP: 23  PIP: 36    Low threshold to prone patient in order to improve oxygenation and secretion drainage  Continue to closely monitor patient    Cardiovascular:  Hypotension  Patient intubated and requiring pressor support in the setting of sedation  Norepinephrine gtt  Goal MAP >65    Neurology:  Sedation  Patient currently on ketamine gtt and versed gtt  RASS goal -4    Gastrointestinal:  Prophylaxis  - pepcid 20mg IV daily while intubated    Genitourinary:  Decreased urine output  Roque in place  Continue to monitor strict I&O  Follow Up: Urinalysis, Creatinine    ID:  Sepsis 2/2 COVID+  as above  Follow up: Urinalysis,Blood Culture, Urine Culture  Consulted ID, follow recommandations    Diet: Glucerna Bolus 200 Q6  Electrolytes: Replete K<4, Mg<2    Code: FULL CODE    Disposition: Patient requires continued monitoring in MICU

## 2020-04-10 NOTE — PROGRESS NOTE ADULT - SUBJECTIVE AND OBJECTIVE BOX
CLARITA POWELL  MRN-[id number]  Patient is a 70y old  Male who presents with a chief complaint of respiratory distress, COVID 19 (09 Apr 2020 20:58)    HPI:  70M with PMH presents with T dm, hypothyroidism, hld, p/w cough, low grade fever x 2-3 days. Today patient had worsening dyspnea at rest as well as exertion which prompted to come to the ED. Patient otherwise denied chest pain, leg swelling. He denies prior lung disease. Denies any history of smoking.     In ED, patient was noted to be hypoxic on 6L NC to 89% and significant tachypnea. VS otherwise, afebrile, HR in 80s, SBP 150s. Labs notable for CBC wnl. CMP with mild elevated transaminases AST 65 and ALT 47. PAtient noted to be positive for COVID 19. Due to worsening respiratory distress patient was emergently intubated in the ER. (27 Mar 2020 21:38)      Surgery/Hospital course:  COVID 19 Positive  Intubated 3/27  4/9 Paralysis weaned off yesterday, unproned with maintained sats, tolerating diuresis      REVIEW OF SYSTEMS:  Not obtainable, vented     Vital Signs Last 24 Hrs  T(C): 37 (10 Apr 2020 04:00), Max: 38 (09 Apr 2020 20:00)  T(F): 98.6 (10 Apr 2020 04:00), Max: 100.4 (09 Apr 2020 20:00)  HR: 75 (10 Apr 2020 06:00) (70 - 93)  BP: --  BP(mean): --  RR: 28 (10 Apr 2020 06:00) (26 - 41)  SpO2: 91% (10 Apr 2020 06:00) (87% - 96%)    ============================I/O===========================   I&O's Detail    09 Apr 2020 07:01  -  10 Apr 2020 07:00  --------------------------------------------------------  IN:    0.9% NaCl: 120 mL    Enteral Tube Flush: 90 mL    Glucerna 1.5: 1200 mL    ketamine Infusion.: 206.8 mL    midazolam Infusion: 2.3 mL    norepinephrine Infusion: 251.5 mL  Total IN: 1870.6 mL    OUT:    Indwelling Catheter - Urethral: 2150 mL  Total OUT: 2150 mL    Total NET: -279.4 mL        ============================ LABS =========================                        10.8   14.60 )-----------( 242      ( 10 Apr 2020 02:33 )             34.9     04-10    152<H>  |  108  |  42<H>  ----------------------------<  159<H>  3.8   |  33<H>  |  0.67    Ca    7.5<L>      10 Apr 2020 02:39  Phos  3.5     04-10  Mg     2.2     04-10    TPro  5.7<L>  /  Alb  2.3<L>  /  TBili  0.4  /  DBili  x   /  AST  15  /  ALT  23  /  AlkPhos  87  04-10    LIVER FUNCTIONS - ( 10 Apr 2020 02:39 )  Alb: 2.3 g/dL / Pro: 5.7 g/dL / ALK PHOS: 87 U/L / ALT: 23 U/L / AST: 15 U/L / GGT: x           PT/INR - ( 10 Apr 2020 02:32 )   PT: 14.7 sec;   INR: 1.28 ratio         PTT - ( 10 Apr 2020 02:32 )  PTT:31.7 sec  ABG - ( 10 Apr 2020 01:52 )  pH, Arterial: 7.46  pH, Blood: x     /  pCO2: 38    /  pO2: 112   / HCO3: 27    / Base Excess: 3.6   /  SaO2: 98                  ======================Micro/Rad/Cardio=================  Culture: Reviewed   CXR: Reviewed  ======================================================  PAST MEDICAL & SURGICAL HISTORY:  Hyperlipidemia  Hypothyroid  No significant past surgical history    ====================ASSESSMENT ==============  COVID 19 Positive   3/27 Intubated   Hypoxic respiratory failure   Septic Shock  ARDS  Fluid overload    Plan:  ====================== NEUROLOGY=====================  Sedated with Klonopin, Ketamine, oxycodone po    clonazePAM  Tablet 0.5 milliGRAM(s) Oral every 6 hours  clonazePAM  Tablet 0.5 milliGRAM(s) Oral every 8 hours  ketamine Infusion. 0.25 mG/kG/Hr (1.93 mL/Hr) IV Continuous <Continuous>  oxyCODONE    IR 10 milliGRAM(s) Oral every 6 hours  oxyCODONE    IR 10 milliGRAM(s) Oral every 8 hours    ==================== RESPIRATORY======================  On full vent support     Mechanical Ventilation:  Mode: AC/ CMV (Assist Control/ Continuous Mandatory Ventilation)  RR (machine): 26  TV (machine): 360  FiO2: 50  PEEP: 14  ITime: 0.8  MAP: 22  PIP: 34      ====================CARDIOVASCULAR==================  On pressors support for hypotension     norepinephrine Infusion 0.12 MICROgram(s)/kG/Min (17.3 mL/Hr) IV Continuous <Continuous>    ===================HEMATOLOGIC/ONC ===================  aspirin  chewable 81 milliGRAM(s) Oral daily  VTE prophylaxis, enoxaparin Injectable 40 milliGRAM(s) SubCutaneous two times a day    ===================== RENAL =========================  Continue monitoring urine output  Diuresis with Lasix    furosemide   Injectable 40 milliGRAM(s) IV Push every 12 hours  ==================== GASTROINTESTINAL===================  Tolerating tube feeds, Glucerna 200     GI prophylaxis, famotidine Injectable 20 milliGRAM(s) IV Push two times a day  polyethylene glycol 3350 17 Gram(s) Oral every 12 hours  senna Syrup 10 milliLiter(s) Oral at bedtime  sodium chloride 0.9% lock flush 10 milliLiter(s) IV Push every 1 hour PRN Pre/post blood products, medications, blood draw, and to maintain line patency    =======================    ENDOCRINE  =====================  Glucose control,   insulin lispro (HumaLOG) corrective regimen sliding scale   SubCutaneous every 6 hours  insulin NPH human recombinant 11 Unit(s) SubCutaneous every 6 hours  levothyroxine Injectable 50 MICROGram(s) IV Push at bedtime    ========================INFECTIOUS DISEASE================  Completed COVID medication (Plaquenil, Vit C/Thiamine, Anakirna, Solumedrol)       Patient requires continuous monitoring with bedside rhythm monitoring, pulse ox monitoring, and intermittent blood gas analysis. Care plan discussed with ICU care team. Patient remained critical and required more than usual ICU care.    By signing my name below, I, Pretty Wallace, attest that this documentation has been prepared under the direction and in the presence of GERRY Smith.  Electronically signed: Rohan Lal, 04-10-20 @ 07:31    I, Jody Ambrosio, personally performed the services described in this documentation. all medical record entries made by the kevinibe were at my direction and in my presence. I have reviewed the chart and agree that the record reflects my personal performance and is accurate and complete  Electronically signed: GERRY Smith CLARITA POWELL  MRN-[id number]  Patient is a 70y old  Male who presents with a chief complaint of respiratory distress, COVID 19 (09 Apr 2020 20:58)    HPI:  70M with PMH presents with T dm, hypothyroidism, hld, p/w cough, low grade fever x 2-3 days. Today patient had worsening dyspnea at rest as well as exertion which prompted to come to the ED. Patient otherwise denied chest pain, leg swelling. He denies prior lung disease. Denies any history of smoking.     In ED, patient was noted to be hypoxic on 6L NC to 89% and significant tachypnea. VS otherwise, afebrile, HR in 80s, SBP 150s. Labs notable for CBC wnl. CMP with mild elevated transaminases AST 65 and ALT 47. PAtient noted to be positive for COVID 19. Due to worsening respiratory distress patient was emergently intubated in the ER. (27 Mar 2020 21:38)    Surgery/Hospital course:  COVID 19 Positive  Intubated 3/27  4/9 Paralysis weaned off yesterday, unproned with maintained sats, tolerating diuresis      REVIEW OF SYSTEMS:  Not obtainable, vented     Vital Signs Last 24 Hrs  T(C): 37 (10 Apr 2020 04:00), Max: 38 (09 Apr 2020 20:00)  T(F): 98.6 (10 Apr 2020 04:00), Max: 100.4 (09 Apr 2020 20:00)  HR: 75 (10 Apr 2020 06:00) (70 - 93)  BP: --  BP(mean): --  RR: 28 (10 Apr 2020 06:00) (26 - 41)  SpO2: 91% (10 Apr 2020 06:00) (87% - 96%)    ============================I/O===========================   I&O's Detail    09 Apr 2020 07:01  -  10 Apr 2020 07:00  --------------------------------------------------------  IN:    0.9% NaCl: 120 mL    Enteral Tube Flush: 90 mL    Glucerna 1.5: 1200 mL    ketamine Infusion.: 206.8 mL    midazolam Infusion: 2.3 mL    norepinephrine Infusion: 251.5 mL  Total IN: 1870.6 mL    OUT:    Indwelling Catheter - Urethral: 2150 mL  Total OUT: 2150 mL    Total NET: -279.4 mL        ============================ LABS =========================                        10.8   14.60 )-----------( 242      ( 10 Apr 2020 02:33 )             34.9     04-10    152<H>  |  108  |  42<H>  ----------------------------<  159<H>  3.8   |  33<H>  |  0.67    Ca    7.5<L>      10 Apr 2020 02:39  Phos  3.5     04-10  Mg     2.2     04-10    TPro  5.7<L>  /  Alb  2.3<L>  /  TBili  0.4  /  DBili  x   /  AST  15  /  ALT  23  /  AlkPhos  87  04-10    LIVER FUNCTIONS - ( 10 Apr 2020 02:39 )  Alb: 2.3 g/dL / Pro: 5.7 g/dL / ALK PHOS: 87 U/L / ALT: 23 U/L / AST: 15 U/L / GGT: x           PT/INR - ( 10 Apr 2020 02:32 )   PT: 14.7 sec;   INR: 1.28 ratio         PTT - ( 10 Apr 2020 02:32 )  PTT:31.7 sec  ABG - ( 10 Apr 2020 01:52 )  pH, Arterial: 7.46  pH, Blood: x     /  pCO2: 38    /  pO2: 112   / HCO3: 27    / Base Excess: 3.6   /  SaO2: 98                  ======================Micro/Rad/Cardio=================  Culture: Reviewed   CXR: Reviewed  ======================================================  PAST MEDICAL & SURGICAL HISTORY:  Hyperlipidemia  Hypothyroid  No significant past surgical history    ====================ASSESSMENT ==============  COVID 19 Positive   3/27 Intubated   Hypoxic respiratory failure   Septic Shock  ARDS  Fluid overload    Plan:  ====================== NEUROLOGY=====================  Ketamine increased for agitation    clonazePAM  Tablet 0.5 milliGRAM(s) Oral every 6 hours  clonazePAM  Tablet 0.5 milliGRAM(s) Oral every 8 hours  ketamine Infusion. 0.25 mG/kG/Hr (1.93 mL/Hr) IV Continuous <Continuous>  oxyCODONE    IR 10 milliGRAM(s) Oral every 6 hours  oxyCODONE    IR 10 milliGRAM(s) Oral every 8 hours    ==================== RESPIRATORY======================  Put on PRVC support overnight for hypoxia  On full vent support with high PEEP, no weaning, concern for dyssynchrony with vent     Mechanical Ventilation:  Mode: AC/ CMV (Assist Control/ Continuous Mandatory Ventilation)  RR (machine): 26  TV (machine): 360  FiO2: 50  PEEP: 14  ITime: 0.8  MAP: 22  PIP: 34      ====================CARDIOVASCULAR==================  On pressors support for hypotension     norepinephrine Infusion 0.12 MICROgram(s)/kG/Min (17.3 mL/Hr) IV Continuous <Continuous>    ===================HEMATOLOGIC/ONC ===================  aspirin  chewable 81 milliGRAM(s) Oral daily  VTE prophylaxis, enoxaparin Injectable 40 milliGRAM(s) SubCutaneous two times a day    ===================== RENAL =========================  Continue monitoring urine output, overall slightly negative   Diuresis with Lasix   Hypernatremia, will start free water 250 [id number], continue to monitor urine output and fluid balance     furosemide   Injectable 40 milliGRAM(s) IV Push every 12 hours  ==================== GASTROINTESTINAL===================  Tolerating bolus tube feeds, Glucerna 200 [id number]    GI prophylaxis, famotidine Injectable 20 milliGRAM(s) IV Push two times a day  polyethylene glycol 3350 17 Gram(s) Oral every 12 hours  senna Syrup 10 milliLiter(s) Oral at bedtime  sodium chloride 0.9% lock flush 10 milliLiter(s) IV Push every 1 hour PRN Pre/post blood products, medications, blood draw, and to maintain line patency    =======================    ENDOCRINE  =====================  Hyperglycemia, increased NPH to 11 [id number]    Glucose control,   insulin lispro (HumaLOG) corrective regimen sliding scale   SubCutaneous every 6 hours  insulin NPH human recombinant 11 Unit(s) SubCutaneous every 6 hours  levothyroxine Injectable 50 MICROGram(s) IV Push at bedtime    ========================INFECTIOUS DISEASE================  Continue to monitor WBC and fever   Completed COVID medication (Plaquenil, Vit C/Thiamine, Anakirna, Solumedrol)       Patient requires continuous monitoring with bedside rhythm monitoring, pulse ox monitoring, and intermittent blood gas analysis. Care plan discussed with ICU care team. Patient remained critical and required more than usual ICU care.    By signing my name below, I, Pretty Wallace, attest that this documentation has been prepared under the direction and in the presence of GERRY Smith.  Electronically signed: Jonathan Lal, 04-10-20 @ 07:31    I, Jody Ambrosio, personally performed the services described in this documentation. all medical record entries made by the jonathan were at my direction and in my presence. I have reviewed the chart and agree that the record reflects my personal performance and is accurate and complete  Electronically signed: GERRY Smith CLARITA POWELL  MRN-[id number]  Patient is a 70y old  Male who presents with a chief complaint of respiratory distress, COVID 19 (09 Apr 2020 20:58)    HPI:  70M with PMH presents with T dm, hypothyroidism, hld, p/w cough, low grade fever x 2-3 days. Today patient had worsening dyspnea at rest as well as exertion which prompted to come to the ED. Patient otherwise denied chest pain, leg swelling. He denies prior lung disease. Denies any history of smoking.     In ED, patient was noted to be hypoxic on 6L NC to 89% and significant tachypnea. VS otherwise, afebrile, HR in 80s, SBP 150s. Labs notable for CBC wnl. CMP with mild elevated transaminases AST 65 and ALT 47. PAtient noted to be positive for COVID 19. Due to worsening respiratory distress patient was emergently intubated in the ER. (27 Mar 2020 21:38)    Surgery/Hospital course:  COVID 19 Positive  Intubated 3/27  4/9 Paralysis weaned off yesterday, unproned with maintained sats, tolerating diuresis      REVIEW OF SYSTEMS:  Not obtainable, vented     Vital Signs Last 24 Hrs  T(C): 37 (10 Apr 2020 04:00), Max: 38 (09 Apr 2020 20:00)  T(F): 98.6 (10 Apr 2020 04:00), Max: 100.4 (09 Apr 2020 20:00)  HR: 75 (10 Apr 2020 06:00) (70 - 93)  BP: --  BP(mean): --  RR: 28 (10 Apr 2020 06:00) (26 - 41)  SpO2: 91% (10 Apr 2020 06:00) (87% - 96%)    ============================I/O===========================   I&O's Detail    09 Apr 2020 07:01  -  10 Apr 2020 07:00  --------------------------------------------------------  IN:    0.9% NaCl: 120 mL    Enteral Tube Flush: 90 mL    Glucerna 1.5: 1200 mL    ketamine Infusion.: 206.8 mL    midazolam Infusion: 2.3 mL    norepinephrine Infusion: 251.5 mL  Total IN: 1870.6 mL    OUT:    Indwelling Catheter - Urethral: 2150 mL  Total OUT: 2150 mL    Total NET: -279.4 mL        ============================ LABS =========================                        10.8   14.60 )-----------( 242      ( 10 Apr 2020 02:33 )             34.9     04-10    152<H>  |  108  |  42<H>  ----------------------------<  159<H>  3.8   |  33<H>  |  0.67    Ca    7.5<L>      10 Apr 2020 02:39  Phos  3.5     04-10  Mg     2.2     04-10    TPro  5.7<L>  /  Alb  2.3<L>  /  TBili  0.4  /  DBili  x   /  AST  15  /  ALT  23  /  AlkPhos  87  04-10    LIVER FUNCTIONS - ( 10 Apr 2020 02:39 )  Alb: 2.3 g/dL / Pro: 5.7 g/dL / ALK PHOS: 87 U/L / ALT: 23 U/L / AST: 15 U/L / GGT: x           PT/INR - ( 10 Apr 2020 02:32 )   PT: 14.7 sec;   INR: 1.28 ratio         PTT - ( 10 Apr 2020 02:32 )  PTT:31.7 sec  ABG - ( 10 Apr 2020 01:52 )  pH, Arterial: 7.46  pH, Blood: x     /  pCO2: 38    /  pO2: 112   / HCO3: 27    / Base Excess: 3.6   /  SaO2: 98                  ======================Micro/Rad/Cardio=================  Culture: Reviewed   CXR: Reviewed  ======================================================  PAST MEDICAL & SURGICAL HISTORY:  Hyperlipidemia  Hypothyroid  No significant past surgical history    ====================ASSESSMENT ==============  COVID 19 Positive   3/27 Intubated   Hypoxic respiratory failure   Septic Shock  ARDS  Fluid overload    Plan:  ====================== NEUROLOGY=====================  Ketamine increased for agitation    clonazePAM  Tablet 0.5 milliGRAM(s) Oral every 6 hours  clonazePAM  Tablet 0.5 milliGRAM(s) Oral every 8 hours  ketamine Infusion. 0.25 mG/kG/Hr (1.93 mL/Hr) IV Continuous <Continuous>  oxyCODONE    IR 10 milliGRAM(s) Oral every 6 hours  oxyCODONE    IR 10 milliGRAM(s) Oral every 8 hours    ==================== RESPIRATORY======================  Put on PRVC support overnight for hypoxia  On full vent support with high PEEP, no weaning, concern for dyssynchrony with vent     Mechanical Ventilation:  Mode: AC/ CMV (Assist Control/ Continuous Mandatory Ventilation)  RR (machine): 26  TV (machine): 360  FiO2: 50  PEEP: 14  ITime: 0.8  MAP: 22  PIP: 34      ====================CARDIOVASCULAR==================  On pressors support for hypotension     norepinephrine Infusion 0.12 MICROgram(s)/kG/Min (17.3 mL/Hr) IV Continuous <Continuous>    ===================HEMATOLOGIC/ONC ===================  aspirin  chewable 81 milliGRAM(s) Oral daily  VTE prophylaxis, enoxaparin Injectable 40 milliGRAM(s) SubCutaneous two times a day    ===================== RENAL =========================  Continue monitoring urine output, overall slightly negative   Diuresis with Lasix   Hypernatremia, will start free water 250 [id number], continue to monitor urine output and fluid balance     furosemide   Injectable 40 milliGRAM(s) IV Push every 12 hours  ==================== GASTROINTESTINAL===================  Tolerating bolus tube feeds, Glucerna 200 [id number]    GI prophylaxis, famotidine Injectable 20 milliGRAM(s) IV Push two times a day  polyethylene glycol 3350 17 Gram(s) Oral every 12 hours  senna Syrup 10 milliLiter(s) Oral at bedtime  sodium chloride 0.9% lock flush 10 milliLiter(s) IV Push every 1 hour PRN Pre/post blood products, medications, blood draw, and to maintain line patency    =======================    ENDOCRINE  =====================  Hyperglycemia, increased NPH to 11 [id number]    Glucose control,   insulin lispro (HumaLOG) corrective regimen sliding scale   SubCutaneous every 6 hours  insulin NPH human recombinant 11 Unit(s) SubCutaneous every 6 hours  levothyroxine Injectable 50 MICROGram(s) IV Push at bedtime    ========================INFECTIOUS DISEASE================  Continue to monitor WBC and fever   Completed COVID medication (Plaquenil, Vit C/Thiamine, Anakirna, Solumedrol)     **Discussed with family,     Patient requires continuous monitoring with bedside rhythm monitoring, pulse ox monitoring, and intermittent blood gas analysis. Care plan discussed with ICU care team. Patient remained critical and required more than usual ICU care.    By signing my name below, I, Pretty Wallace, attest that this documentation has been prepared under the direction and in the presence of GERRY Smith.  Electronically signed: Jonathan Lal, 04-10-20 @ 07:31    I, Jody Ambrosio, personally performed the services described in this documentation. all medical record entries made by the jonathan were at my direction and in my presence. I have reviewed the chart and agree that the record reflects my personal performance and is accurate and complete  Electronically signed: GERRY Smith CLARITA POWELL  MRN-[id number]  Patient is a 70y old  Male who presents with a chief complaint of respiratory distress, COVID 19 (09 Apr 2020 20:58)    HPI:  70M with PMH presents with T dm, hypothyroidism, hld, p/w cough, low grade fever x 2-3 days. Today patient had worsening dyspnea at rest as well as exertion which prompted to come to the ED. Patient otherwise denied chest pain, leg swelling. He denies prior lung disease. Denies any history of smoking.     In ED, patient was noted to be hypoxic on 6L NC to 89% and significant tachypnea. VS otherwise, afebrile, HR in 80s, SBP 150s. Labs notable for CBC wnl. CMP with mild elevated transaminases AST 65 and ALT 47. PAtient noted to be positive for COVID 19. Due to worsening respiratory distress patient was emergently intubated in the ER. (27 Mar 2020 21:38)    Surgery/Hospital course:  COVID 19 Positive  Intubated 3/27  4/9 Paralysis weaned off yesterday, unproned with maintained sats, tolerating diuresis      REVIEW OF SYSTEMS:  Not obtainable, vented     General: intubated  HEENT:  NC/AT  Neuro: on sedation not assessed   Respiratory: B/L BS CTA, no wheeze, no rhonchi, no crackles noted  Cardiovascular: RRR, normal S1S2, no murmur noted  GI: Abd soft, NT/ND, +BSx4Q   Peripheral Vascular:  B/L LE neg edema, 2+ peripheral pulses, no clubbing, cyanosis    Vital Signs Last 24 Hrs  T(C): 37 (10 Apr 2020 04:00), Max: 38 (09 Apr 2020 20:00)  T(F): 98.6 (10 Apr 2020 04:00), Max: 100.4 (09 Apr 2020 20:00)  HR: 75 (10 Apr 2020 06:00) (70 - 93)  BP: --  BP(mean): --  RR: 28 (10 Apr 2020 06:00) (26 - 41)  SpO2: 91% (10 Apr 2020 06:00) (87% - 96%)    ============================I/O===========================   I&O's Detail    09 Apr 2020 07:01  -  10 Apr 2020 07:00  --------------------------------------------------------  IN:    0.9% NaCl: 120 mL    Enteral Tube Flush: 90 mL    Glucerna 1.5: 1200 mL    ketamine Infusion.: 206.8 mL    midazolam Infusion: 2.3 mL    norepinephrine Infusion: 251.5 mL  Total IN: 1870.6 mL    OUT:    Indwelling Catheter - Urethral: 2150 mL  Total OUT: 2150 mL    Total NET: -279.4 mL        ============================ LABS =========================                        10.8   14.60 )-----------( 242      ( 10 Apr 2020 02:33 )             34.9     04-10    152<H>  |  108  |  42<H>  ----------------------------<  159<H>  3.8   |  33<H>  |  0.67    Ca    7.5<L>      10 Apr 2020 02:39  Phos  3.5     04-10  Mg     2.2     04-10    TPro  5.7<L>  /  Alb  2.3<L>  /  TBili  0.4  /  DBili  x   /  AST  15  /  ALT  23  /  AlkPhos  87  04-10    LIVER FUNCTIONS - ( 10 Apr 2020 02:39 )  Alb: 2.3 g/dL / Pro: 5.7 g/dL / ALK PHOS: 87 U/L / ALT: 23 U/L / AST: 15 U/L / GGT: x           PT/INR - ( 10 Apr 2020 02:32 )   PT: 14.7 sec;   INR: 1.28 ratio         PTT - ( 10 Apr 2020 02:32 )  PTT:31.7 sec  ABG - ( 10 Apr 2020 01:52 )  pH, Arterial: 7.46  pH, Blood: x     /  pCO2: 38    /  pO2: 112   / HCO3: 27    / Base Excess: 3.6   /  SaO2: 98                  ======================Micro/Rad/Cardio=================  Culture: Reviewed   CXR: Reviewed  ======================================================  PAST MEDICAL & SURGICAL HISTORY:  Hyperlipidemia  Hypothyroid  No significant past surgical history    ====================ASSESSMENT ==============  COVID 19 Positive   3/27 Intubated   Hypoxic respiratory failure   Septic Shock  ARDS  Fluid overload    Plan:  ====================== NEUROLOGY=====================  Ketamine increased for agitation    clonazePAM  Tablet 0.5 milliGRAM(s) Oral every 6 hours  clonazePAM  Tablet 0.5 milliGRAM(s) Oral every 8 hours  ketamine Infusion. 0.25 mG/kG/Hr (1.93 mL/Hr) IV Continuous <Continuous>  oxyCODONE    IR 10 milliGRAM(s) Oral every 6 hours  oxyCODONE    IR 10 milliGRAM(s) Oral every 8 hours    ==================== RESPIRATORY======================  Put on PRVC support overnight for hypoxia  On full vent support with high PEEP, no weaning, concern for dyssynchrony with vent     Mechanical Ventilation:  Mode: AC/ CMV (Assist Control/ Continuous Mandatory Ventilation)  RR (machine): 26  TV (machine): 360  FiO2: 50  PEEP: 14  ITime: 0.8  MAP: 22  PIP: 34      ====================CARDIOVASCULAR==================  On pressors support for hypotension     norepinephrine Infusion 0.12 MICROgram(s)/kG/Min (17.3 mL/Hr) IV Continuous <Continuous>    ===================HEMATOLOGIC/ONC ===================  aspirin  chewable 81 milliGRAM(s) Oral daily  VTE prophylaxis, enoxaparin Injectable 40 milliGRAM(s) SubCutaneous two times a day    ===================== RENAL =========================  Continue monitoring urine output, overall slightly negative   Diuresis with Lasix   Hypernatremia, will start free water 250 [id number], continue to monitor urine output and fluid balance     furosemide   Injectable 40 milliGRAM(s) IV Push every 12 hours  ==================== GASTROINTESTINAL===================  Tolerating bolus tube feeds, Glucerna 200 [id number]    GI prophylaxis, famotidine Injectable 20 milliGRAM(s) IV Push two times a day  polyethylene glycol 3350 17 Gram(s) Oral every 12 hours  senna Syrup 10 milliLiter(s) Oral at bedtime  sodium chloride 0.9% lock flush 10 milliLiter(s) IV Push every 1 hour PRN Pre/post blood products, medications, blood draw, and to maintain line patency    =======================    ENDOCRINE  =====================  Hyperglycemia, increased NPH to 11 [id number]    Glucose control,   insulin lispro (HumaLOG) corrective regimen sliding scale   SubCutaneous every 6 hours  insulin NPH human recombinant 11 Unit(s) SubCutaneous every 6 hours  levothyroxine Injectable 50 MICROGram(s) IV Push at bedtime    ========================INFECTIOUS DISEASE================  Continue to monitor WBC and fever   Completed COVID medication (Plaquenil, Vit C/Thiamine, Anakirna, Solumedrol)     **Discussed with family,     Patient requires continuous monitoring with bedside rhythm monitoring, pulse ox monitoring, and intermittent blood gas analysis. Care plan discussed with ICU care team. Patient remained critical and required more than usual ICU care.    By signing my name below, I, Pretty Wallace, attest that this documentation has been prepared under the direction and in the presence of GERRY Smith.  Electronically signed: Rohan Lal, 04-10-20 @ 07:31    I, Jody Ambrosio, personally performed the services described in this documentation. all medical record entries made by the kevinibtrae were at my direction and in my presence. I have reviewed the chart and agree that the record reflects my personal performance and is accurate and complete.  I spent 45min of critical care time spent with the patient.   Electronically signed: GERRY Smith

## 2020-04-11 NOTE — PROVIDER CONTACT NOTE (MEDICATION) - SITUATION
PT on citrate for HD in [facility].  Notified by pharmacy the hospital is out of citrate awaiting a delivery hopefully today.

## 2020-04-11 NOTE — PROGRESS NOTE ADULT - SUBJECTIVE AND OBJECTIVE BOX
CLARITA POWELL  MRN-[id number]  Patient is a 70y old  Male who presents with a chief complaint of respiratory distress, COVID 19 (11 Apr 2020 19:31)    HPI:  70M with PMH presents with T dm, hypothyroidism, hld, p/w cough, low grade fever x 2-3 days. Today patient had worsening dyspnea at rest as well as exertion which prompted to come to the ED. Patient otherwise denied chest pain, leg swelling. He denies prior lung disease. Denies any history of smoking.     In ED, patient was noted to be hypoxic on 6L NC to 89% and significant tachypnea. VS otherwise, afebrile, HR in 80s, SBP 150s. Labs notable for CBC wnl. CMP with mild elevated transaminases AST 65 and ALT 47. PAtient noted to be positive for COVID 19. Due to worsening respiratory distress patient was emergently intubated in the ER. (27 Mar 2020 21:38)      Surgery/Hospital course:    Today:          ============================I/O===========================   I&O's Detail    10 Apr 2020 07:01  -  11 Apr 2020 07:00  --------------------------------------------------------  IN:    0.9% NaCl: 90 mL    Enteral Tube Flush: 120 mL    Free Water: 750 mL    Glucerna 1.5: 1500 mL    IV PiggyBack: 1000 mL    ketamine Infusion.: 408.1 mL    midazolam Infusion: 37.7 mL    norepinephrine Infusion: 124.3 mL  Total IN: 4030.1 mL    OUT:    Indwelling Catheter - Urethral: 1975 mL  Total OUT: 1975 mL    Total NET: 2055.1 mL      11 Apr 2020 07:01  -  11 Apr 2020 23:56  --------------------------------------------------------  IN:    Enteral Tube Flush: 180 mL    Free Water: 750 mL    Glucerna 1.5: 600 mL    ketamine Infusion.: 246.4 mL    midazolam Infusion: 62.4 mL    norepinephrine Infusion: 63.8 mL  Total IN: 1902.6 mL    OUT:    Indwelling Catheter - Urethral: 575 mL  Total OUT: 575 mL    Total NET: 1327.6 mL        ============================ LABS =========================                        10.0   12.09 )-----------( 232      ( 11 Apr 2020 00:37 )             33.7     04-11    148<H>  |  101  |  46<H>  ----------------------------<  136<H>  3.9   |  39<H>  |  0.65    Ca    9.1      11 Apr 2020 00:47  Phos  3.0     04-11  Mg     2.6     04-11    TPro  6.4  /  Alb  2.6<L>  /  TBili  0.5  /  DBili  x   /  AST  16  /  ALT  29  /  AlkPhos  101  04-11    LIVER FUNCTIONS - ( 11 Apr 2020 00:47 )  Alb: 2.6 g/dL / Pro: 6.4 g/dL / ALK PHOS: 101 U/L / ALT: 29 U/L / AST: 16 U/L / GGT: x           PT/INR - ( 10 Apr 2020 02:32 )   PT: 14.7 sec;   INR: 1.28 ratio         PTT - ( 10 Apr 2020 02:32 )  PTT:31.7 sec  ABG - ( 11 Apr 2020 18:02 )  pH, Arterial: 7.49  pH, Blood: x     /  pCO2: 50    /  pO2: 98    / HCO3: 38    / Base Excess: 12.7  /  SaO2: 98                  ======================Micro/Rad/Cardio=================  Culture: Reviewed   CXR: Reviewed  Echo:Reviewed  ======================================================  PAST MEDICAL & SURGICAL HISTORY:  Hyperlipidemia  Hypothyroid  No significant past surgical history    ====================ASSESSMENT ==============        ====================== NEUROLOGY=====================  acetaminophen    Suspension .. 650 milliGRAM(s) Oral every 6 hours PRN Temp greater or equal to 38.5C (101.3F)  clonazePAM  Tablet 0.5 milliGRAM(s) Oral every 8 hours  ketamine Infusion. 0.25 mG/kG/Hr (1.93 mL/Hr) IV Continuous <Continuous>  midazolam Infusion 0.05 mG/kG/Hr (3.86 mL/Hr) IV Continuous <Continuous>  oxyCODONE    IR 10 milliGRAM(s) Oral every 4 hours      Continue to wean sedation/paralytics as tolerated  ==================== RESPIRATORY======================  Mechanical Ventilation:  Mode: AC/ CMV (Assist Control/ Continuous Mandatory Ventilation)  RR (machine): 30  TV (machine): 360  FiO2: 50  PEEP: 12  ITime: 1  MAP: 17  PIP: 41        Continue to wean FiO2 (goal to 30%, then wean PEEP as tolerated)  O2 sat goal >92%  ====================CARDIOVASCULAR==================  norepinephrine Infusion 0.12 MICROgram(s)/kG/Min (17.3 mL/Hr) IV Continuous <Continuous>      Wean vasopressors as tolerated, MAP goal 65-75  ===================HEMATOLOGIC/ONC ===================  aspirin  chewable 81 milliGRAM(s) Oral daily  enoxaparin Injectable 40 milliGRAM(s) SubCutaneous two times a day      Continue Lovenox/Argatroban for VTE prophylaxis  ===================== RENAL =========================  Continue monitoring urine output      Monitor BUN/SCr  Renally dose medications  Continue to diurese as tolerated with Lasix/Bumex  Continue CVVHD  Continue iHD as tolerated  Renal following, appreciate recommendations    ==================== GASTROINTESTINAL===================  famotidine Injectable 20 milliGRAM(s) IV Push two times a day  polyethylene glycol 3350 17 Gram(s) Oral every 12 hours  senna Syrup 10 milliLiter(s) Oral at bedtime  sodium chloride 0.9% lock flush 10 milliLiter(s) IV Push every 1 hour PRN Pre/post blood products, medications, blood draw, and to maintain line patency      Continue tube feeds: goal:   Continue pepcid for GI prophylaxis    =======================    ENDOCRINE  =====================  insulin lispro (HumaLOG) corrective regimen sliding scale   SubCutaneous every 6 hours  insulin NPH human recombinant 11 Unit(s) SubCutaneous every 6 hours  levothyroxine Injectable 50 MICROGram(s) IV Push at bedtime      Stress hyperglycemia, continue insulin drip, Lantus/ISS   Continue [id number] for 5 day course    ========================INFECTIOUS DISEASE================      +COVID  -Completed Plaquenil  -s/p Vit C/Thiamine  -s/p Anakirna    Continue antibiotics for       Patient requires continuous monitoring with bedside rhythm monitoring, arterial line, pulse oximetry, ventilator monitoring and intermittent blood gas analysis.  Care plan discussed with ICU care team.  Patient remained critical; required more than usual post op care; I have spent 35 minutes providing non-routine post op care, revaluated multiple times during the day.    By signing my name below, I, Krystin Archer, attest that this documentation has been prepared under the direction and in the presence of ____________.  Electronically signed: Jonathan Pritchett, 04-11-20 @ 23:56    I, ______, personally performed the services described in this documentation. all medical record entries made by the jonathan were at my direction and in my presence. I have reviewed the chart and agree that the record reflects my personal performance and is accurate and complete  Electronically signed: _____, 04-11-20 @ 23:56 CLARITA POWELL  MRN-[id number]  Patient is a 70y old  Male who presents with a chief complaint of respiratory distress, COVID 19 (11 Apr 2020 19:31)    HPI:  70M with PMH presents with T dm, hypothyroidism, hld, p/w cough, low grade fever x 2-3 days. Today patient had worsening dyspnea at rest as well as exertion which prompted to come to the ED. Patient otherwise denied chest pain, leg swelling. He denies prior lung disease. Denies any history of smoking.     In ED, patient was noted to be hypoxic on 6L NC to 89% and significant tachypnea. VS otherwise, afebrile, HR in 80s, SBP 150s. Labs notable for CBC wnl. CMP with mild elevated transaminases AST 65 and ALT 47. PAtient noted to be positive for COVID 19. Due to worsening respiratory distress patient was emergently intubated in the ER. (27 Mar 2020 21:38)      Surgery/Hospital course:    Today:          ============================I/O===========================   I&O's Detail    10 Apr 2020 07:01  -  11 Apr 2020 07:00  --------------------------------------------------------  IN:    0.9% NaCl: 90 mL    Enteral Tube Flush: 120 mL    Free Water: 750 mL    Glucerna 1.5: 1500 mL    IV PiggyBack: 1000 mL    ketamine Infusion.: 408.1 mL    midazolam Infusion: 37.7 mL    norepinephrine Infusion: 124.3 mL  Total IN: 4030.1 mL    OUT:    Indwelling Catheter - Urethral: 1975 mL  Total OUT: 1975 mL    Total NET: 2055.1 mL      11 Apr 2020 07:01  -  11 Apr 2020 23:56  --------------------------------------------------------  IN:    Enteral Tube Flush: 180 mL    Free Water: 750 mL    Glucerna 1.5: 600 mL    ketamine Infusion.: 246.4 mL    midazolam Infusion: 62.4 mL    norepinephrine Infusion: 63.8 mL  Total IN: 1902.6 mL    OUT:    Indwelling Catheter - Urethral: 575 mL  Total OUT: 575 mL    Total NET: 1327.6 mL        ============================ LABS =========================                        10.0   12.09 )-----------( 232      ( 11 Apr 2020 00:37 )             33.7     04-11    148<H>  |  101  |  46<H>  ----------------------------<  136<H>  3.9   |  39<H>  |  0.65    Ca    9.1      11 Apr 2020 00:47  Phos  3.0     04-11  Mg     2.6     04-11    TPro  6.4  /  Alb  2.6<L>  /  TBili  0.5  /  DBili  x   /  AST  16  /  ALT  29  /  AlkPhos  101  04-11    LIVER FUNCTIONS - ( 11 Apr 2020 00:47 )  Alb: 2.6 g/dL / Pro: 6.4 g/dL / ALK PHOS: 101 U/L / ALT: 29 U/L / AST: 16 U/L / GGT: x           PT/INR - ( 10 Apr 2020 02:32 )   PT: 14.7 sec;   INR: 1.28 ratio         PTT - ( 10 Apr 2020 02:32 )  PTT:31.7 sec  ABG - ( 11 Apr 2020 18:02 )  pH, Arterial: 7.49  pH, Blood: x     /  pCO2: 50    /  pO2: 98    / HCO3: 38    / Base Excess: 12.7  /  SaO2: 98                  ======================Micro/Rad/Cardio=================  Culture: Reviewed   CXR: Reviewed  Echo:Reviewed  ======================================================  PAST MEDICAL & SURGICAL HISTORY:  Hyperlipidemia  Hypothyroid  No significant past surgical history    ====================ASSESSMENT ==============        ====================== NEUROLOGY=====================  acetaminophen    Suspension .. 650 milliGRAM(s) Oral every 6 hours PRN Temp greater or equal to 38.5C (101.3F)  clonazePAM  Tablet 0.5 milliGRAM(s) Oral every 8 hours  ketamine Infusion. 0.25 mG/kG/Hr (1.93 mL/Hr) IV Continuous <Continuous>  midazolam Infusion 0.05 mG/kG/Hr (3.86 mL/Hr) IV Continuous <Continuous>  oxyCODONE    IR 10 milliGRAM(s) Oral every 4 hours      Continue to wean sedation/paralytics as tolerated  ==================== RESPIRATORY======================  Mechanical Ventilation:  Mode: AC/ CMV (Assist Control/ Continuous Mandatory Ventilation)  RR (machine): 30  TV (machine): 360  FiO2: 50  PEEP: 12  ITime: 1  MAP: 17  PIP: 41        Continue to wean FiO2 (goal to 30%, then wean PEEP as tolerated)  O2 sat goal >92%  ====================CARDIOVASCULAR==================  norepinephrine Infusion 0.12 MICROgram(s)/kG/Min (17.3 mL/Hr) IV Continuous <Continuous>      Wean vasopressors as tolerated, MAP goal 65-75  ===================HEMATOLOGIC/ONC ===================  aspirin  chewable 81 milliGRAM(s) Oral daily  enoxaparin Injectable 40 milliGRAM(s) SubCutaneous two times a day      Continue Lovenox/Argatroban for VTE prophylaxis  ===================== RENAL =========================  Continue monitoring urine output      Monitor BUN/SCr  Renally dose medications  Continue to diurese as tolerated with Lasix/Bumex  Continue CVVHD  Continue iHD as tolerated  Renal following, appreciate recommendations    ==================== GASTROINTESTINAL===================  famotidine Injectable 20 milliGRAM(s) IV Push two times a day  polyethylene glycol 3350 17 Gram(s) Oral every 12 hours  senna Syrup 10 milliLiter(s) Oral at bedtime  sodium chloride 0.9% lock flush 10 milliLiter(s) IV Push every 1 hour PRN Pre/post blood products, medications, blood draw, and to maintain line patency      Continue tube feeds: goal:   Continue pepcid for GI prophylaxis    =======================    ENDOCRINE  =====================  insulin lispro (HumaLOG) corrective regimen sliding scale   SubCutaneous every 6 hours  insulin NPH human recombinant 11 Unit(s) SubCutaneous every 6 hours  levothyroxine Injectable 50 MICROGram(s) IV Push at bedtime      Stress hyperglycemia, continue insulin drip, Lantus/ISS     ========================INFECTIOUS DISEASE================      +COVID  -Completed Plaquenil  -s/p Vit C/Thiamine  -s/p Anakirna    Continue antibiotics for       Patient requires continuous monitoring with bedside rhythm monitoring, arterial line, pulse oximetry, ventilator monitoring and intermittent blood gas analysis.  Care plan discussed with ICU care team.  Patient remained critical; required more than usual post op care; I have spent 35 minutes providing non-routine post op care, revaluated multiple times during the day.    By signing my name below, I, Krystin Archer, attest that this documentation has been prepared under the direction and in the presence of ____________.  Electronically signed: Jonathan Pritchett, 04-11-20 @ 23:56    I, ______, personally performed the services described in this documentation. all medical record entries made by the jonathan were at my direction and in my presence. I have reviewed the chart and agree that the record reflects my personal performance and is accurate and complete  Electronically signed: _____, 04-11-20 @ 23:56 CLARITA POWELL  MRN-[id number]  Patient is a 70y old  Male who presents with a chief complaint of respiratory distress, COVID 19 (11 Apr 2020 19:31)    HPI:  70M with PMH presents with T dm, hypothyroidism, hld, p/w cough, low grade fever x 2-3 days. Today patient had worsening dyspnea at rest as well as exertion which prompted to come to the ED. Patient otherwise denied chest pain, leg swelling. He denies prior lung disease. Denies any history of smoking.     In ED, patient was noted to be hypoxic on 6L NC to 89% and significant tachypnea. VS otherwise, afebrile, HR in 80s, SBP 150s. Labs notable for CBC wnl. CMP with mild elevated transaminases AST 65 and ALT 47. PAtient noted to be positive for COVID 19. Due to worsening respiratory distress patient was emergently intubated in the ER. (27 Mar 2020 21:38)      Surgery/Hospital course:    COVID 19 Positive  Intubated 3/27  4/9 Paralysis weaned off yesterday, unproned with maintained sats, tolerating diuresis    Today:    ============================I/O===========================   I&O's Detail    10 Apr 2020 07:01  -  11 Apr 2020 07:00  --------------------------------------------------------  IN:    0.9% NaCl: 90 mL    Enteral Tube Flush: 120 mL    Free Water: 750 mL    Glucerna 1.5: 1500 mL    IV PiggyBack: 1000 mL    ketamine Infusion.: 408.1 mL    midazolam Infusion: 37.7 mL    norepinephrine Infusion: 124.3 mL  Total IN: 4030.1 mL    OUT:    Indwelling Catheter - Urethral: 1975 mL  Total OUT: 1975 mL    Total NET: 2055.1 mL      11 Apr 2020 07:01  -  11 Apr 2020 23:56  --------------------------------------------------------  IN:    Enteral Tube Flush: 180 mL    Free Water: 750 mL    Glucerna 1.5: 600 mL    ketamine Infusion.: 246.4 mL    midazolam Infusion: 62.4 mL    norepinephrine Infusion: 63.8 mL  Total IN: 1902.6 mL    OUT:    Indwelling Catheter - Urethral: 575 mL  Total OUT: 575 mL    Total NET: 1327.6 mL        ============================ LABS =========================                        10.0   12.09 )-----------( 232      ( 11 Apr 2020 00:37 )             33.7     04-11    148<H>  |  101  |  46<H>  ----------------------------<  136<H>  3.9   |  39<H>  |  0.65    Ca    9.1      11 Apr 2020 00:47  Phos  3.0     04-11  Mg     2.6     04-11    TPro  6.4  /  Alb  2.6<L>  /  TBili  0.5  /  DBili  x   /  AST  16  /  ALT  29  /  AlkPhos  101  04-11    LIVER FUNCTIONS - ( 11 Apr 2020 00:47 )  Alb: 2.6 g/dL / Pro: 6.4 g/dL / ALK PHOS: 101 U/L / ALT: 29 U/L / AST: 16 U/L / GGT: x           PT/INR - ( 10 Apr 2020 02:32 )   PT: 14.7 sec;   INR: 1.28 ratio         PTT - ( 10 Apr 2020 02:32 )  PTT:31.7 sec  ABG - ( 11 Apr 2020 18:02 )  pH, Arterial: 7.49  pH, Blood: x     /  pCO2: 50    /  pO2: 98    / HCO3: 38    / Base Excess: 12.7  /  SaO2: 98                  ======================Micro/Rad/Cardio=================  Culture: Reviewed   CXR: Reviewed  Echo:Reviewed  ======================================================  PAST MEDICAL & SURGICAL HISTORY:  Hyperlipidemia  Hypothyroid  No significant past surgical history    ====================ASSESSMENT ==============  COVID 19 Positive   3/27 Intubated   Hypoxic respiratory failure   Septic Shock  ARDS  Fluid overload    ====================== NEUROLOGY=====================  acetaminophen    Suspension .. 650 milliGRAM(s) Oral every 6 hours PRN Temp greater or equal to 38.5C (101.3F)  clonazePAM  Tablet 0.5 milliGRAM(s) Oral every 8 hours  ketamine Infusion. 0.25 mG/kG/Hr (1.93 mL/Hr) IV Continuous <Continuous>  midazolam Infusion 0.05 mG/kG/Hr (3.86 mL/Hr) IV Continuous <Continuous>  oxyCODONE    IR 10 milliGRAM(s) Oral every 4 hours      Continue to wean sedation/paralytics as tolerated  ==================== RESPIRATORY======================  Mechanical Ventilation:  Mode: AC/ CMV (Assist Control/ Continuous Mandatory Ventilation)  RR (machine): 30  TV (machine): 360  FiO2: 50  PEEP: 12  ITime: 1  MAP: 17  PIP: 41        Continue to wean FiO2 (goal to 30%, then wean PEEP as tolerated)  O2 sat goal >92%  ====================CARDIOVASCULAR==================  norepinephrine Infusion 0.12 MICROgram(s)/kG/Min (17.3 mL/Hr) IV Continuous <Continuous>      Wean vasopressors as tolerated, MAP goal 65-75  ===================HEMATOLOGIC/ONC ===================  aspirin  chewable 81 milliGRAM(s) Oral daily  enoxaparin Injectable 40 milliGRAM(s) SubCutaneous two times a day      Continue Lovenox/Argatroban for VTE prophylaxis  ===================== RENAL =========================  Continue monitoring urine output      Monitor BUN/SCr  Renally dose medications  Continue to diurese as tolerated with Lasix/Bumex  Continue CVVHD  Continue iHD as tolerated  Renal following, appreciate recommendations    ==================== GASTROINTESTINAL===================  famotidine Injectable 20 milliGRAM(s) IV Push two times a day  polyethylene glycol 3350 17 Gram(s) Oral every 12 hours  senna Syrup 10 milliLiter(s) Oral at bedtime  sodium chloride 0.9% lock flush 10 milliLiter(s) IV Push every 1 hour PRN Pre/post blood products, medications, blood draw, and to maintain line patency      Continue tube feeds: goal:   Continue pepcid for GI prophylaxis    =======================    ENDOCRINE  =====================  insulin lispro (HumaLOG) corrective regimen sliding scale   SubCutaneous every 6 hours  insulin NPH human recombinant 11 Unit(s) SubCutaneous every 6 hours  levothyroxine Injectable 50 MICROGram(s) IV Push at bedtime      Stress hyperglycemia, continue insulin drip, Lantus/ISS     ========================INFECTIOUS DISEASE================      +COVID  -Completed Plaquenil  -s/p Vit C/Thiamine  -s/p Anakirna    Continue antibiotics for       Patient requires continuous monitoring with bedside rhythm monitoring, arterial line, pulse oximetry, ventilator monitoring and intermittent blood gas analysis.  Care plan discussed with ICU care team.  Patient remained critical; required more than usual post op care; I have spent 35 minutes providing non-routine Critical care, revaluated multiple times during the day.    By signing my name below, I, Krystin Archer, attest that this documentation has been prepared under the direction and in the presence of Mathew Briscoe_.  Electronically signed: Rohan Pritchett, 04-11-20 @ 23:56    I, Mathew Briscoe_, personally performed the services described in this documentation. all medical record entries made by the kevinibe were at my direction and in my presence. I have reviewed the chart and agree that the record reflects my personal performance and is accurate and complete  Electronically signed: _____, 04-11-20 @ 23:56

## 2020-04-12 NOTE — PROGRESS NOTE ADULT - SUBJECTIVE AND OBJECTIVE BOX
CLARITA POWELL  MRN-[id number]  Patient is a 70y old  Male who presents with a chief complaint of respiratory distress, COVID 19 (11 Apr 2020 23:56)    HPI:  70M with PMH presents with T dm, hypothyroidism, hld, p/w cough, low grade fever x 2-3 days. Today patient had worsening dyspnea at rest as well as exertion which prompted to come to the ED. Patient otherwise denied chest pain, leg swelling. He denies prior lung disease. Denies any history of smoking.     In ED, patient was noted to be hypoxic on 6L NC to 89% and significant tachypnea. VS otherwise, afebrile, HR in 80s, SBP 150s. Labs notable for CBC wnl. CMP with mild elevated transaminases AST 65 and ALT 47. PAtient noted to be positive for COVID 19. Due to worsening respiratory distress patient was emergently intubated in the ER. (27 Mar 2020 21:38)      Surgery/Hospital course: Admit on 3/27 and intubated for resp failure 2/2 COVID. Pt prone for hypoxia and hypercapnia.      No acute events overnight     REVIEW OF SYSTEMS: Deferred Intubated     Physical Exam:  Vital Signs Last 24 Hrs  T(C): 38.1 (12 Apr 2020 12:00), Max: 38.1 (12 Apr 2020 00:00)  T(F): 100.6 (12 Apr 2020 12:00), Max: 100.6 (12 Apr 2020 00:00)  HR: 69 (12 Apr 2020 14:36) (69 - 89)  BP: --  BP(mean): --  RR: 30 (12 Apr 2020 12:00) (30 - 39)  SpO2: 91% (12 Apr 2020 14:36) (91% - 94%)    Gen:  arousable     CNS: Moves UE to command   Neck: no JVD  RES : coarse breath sounds                    CVS: Regular  rhythm. Normal S1/S2  Abd: Soft, non-distended. Bowel sounds present.  Skin: No rash.  Ext:  no edema, A Line and central line     ============================I/O===========================   I&O's Detail    11 Apr 2020 07:01  -  12 Apr 2020 07:00  --------------------------------------------------------  IN:    Enteral Tube Flush: 180 mL    Free Water: 750 mL    Glucerna 1.5: 1200 mL    IV PiggyBack: 166.6 mL    ketamine Infusion.: 327.8 mL    midazolam Infusion: 89.6 mL    norepinephrine Infusion: 88.2 mL  Total IN: 2802.2 mL    OUT:    Indwelling Catheter - Urethral: 1550 mL  Total OUT: 1550 mL    Total NET: 1252.2 mL      12 Apr 2020 07:01  -  12 Apr 2020 16:44  --------------------------------------------------------  IN:    IV PiggyBack: 166.6 mL    ketamine Infusion.: 46.4 mL    midazolam Infusion: 12.4 mL  Total IN: 225.4 mL    OUT:    Indwelling Catheter - Urethral: 675 mL  Total OUT: 675 mL    Total NET: -449.6 mL        ============================ LABS =========================                        9.6    10.75 )-----------( 216      ( 12 Apr 2020 01:33 )             32.1     04-12    151<H>  |  106  |  36<H>  ----------------------------<  107<H>  4.1   |  32<H>  |  0.65    Ca    8.6      12 Apr 2020 01:34  Phos  2.1     04-12  Mg     2.4     04-12    TPro  5.9<L>  /  Alb  2.4<L>  /  TBili  0.3  /  DBili  x   /  AST  19  /  ALT  24  /  AlkPhos  98  04-12    LIVER FUNCTIONS - ( 12 Apr 2020 01:34 )  Alb: 2.4 g/dL / Pro: 5.9 g/dL / ALK PHOS: 98 U/L / ALT: 24 U/L / AST: 19 U/L / GGT: x             ABG - ( 12 Apr 2020 15:25 )  pH, Arterial: 7.48  pH, Blood: x     /  pCO2: 45    /  pO2: 71    / HCO3: 33    / Base Excess: 8.4   /  SaO2: 94                  ======================Micro/Rad/Cardio=================  Culture: Reviewed   CXR: Reviewed  Echo:Reviewed  ======================================================  PAST MEDICAL & SURGICAL HISTORY:  Hyperlipidemia  Hypothyroid  No significant past surgical history      A&P:    Neuro: weaned of versed. added precedex. PRN sedation meds   CV: Off pressors. no lactate. Maintain MAP>65  Resp: Wean vent as toelrated. CPAP failed today.   GI: c/w glucerna 200 q6.  : c/w free water. Monitor UOP and trend ltyes   Endo: NISS. NPH 11  Heme: c/w ASA and 40 BID Lovenox   ID: Monitor COVID inflammatory markers       ====================== NEUROLOGY=====================  acetaminophen    Suspension .. 650 milliGRAM(s) Oral every 6 hours PRN Temp greater or equal to 38.5C (101.3F)  clonazePAM  Tablet 0.5 milliGRAM(s) Oral every 8 hours  dexMEDEtomidine Infusion 0.2 MICROgram(s)/kG/Hr (3.86 mL/Hr) IV Continuous <Continuous>  HYDROmorphone  Injectable 0.5 milliGRAM(s) IV Push every 3 hours PRN Moderate Pain (4 - 6)  ketamine Infusion. 0.25 mG/kG/Hr (1.93 mL/Hr) IV Continuous <Continuous>  oxyCODONE    IR 10 milliGRAM(s) Oral every 8 hours    ==================== RESPIRATORY======================  Mechanical Ventilation:  Mode: AC/ CMV (Assist Control/ Continuous Mandatory Ventilation)  RR (machine): 30  TV (machine): 360  FiO2: 40  PEEP: 10  ITime: 1  MAP: 10  PIP: 37      ====================CARDIOVASCULAR==================    ===================HEMATOLOGIC/ONC ===================  aspirin  chewable 81 milliGRAM(s) Oral daily  enoxaparin Injectable 40 milliGRAM(s) SubCutaneous two times a day    ===================== RENAL =========================  Roque for monitoring urine output    ==================== GASTROINTESTINAL===================  famotidine Injectable 20 milliGRAM(s) IV Push two times a day  polyethylene glycol 3350 17 Gram(s) Oral every 12 hours  senna Syrup 10 milliLiter(s) Oral at bedtime  sodium chloride 0.9% lock flush 10 milliLiter(s) IV Push every 1 hour PRN Pre/post blood products, medications, blood draw, and to maintain line patency    =======================    ENDOCRINE  =====================  insulin lispro (HumaLOG) corrective regimen sliding scale   SubCutaneous every 6 hours  insulin NPH human recombinant 11 Unit(s) SubCutaneous every 6 hours  levothyroxine Injectable 50 MICROGram(s) IV Push at bedtime    ========================INFECTIOUS DISEASE================      I spent 45min of Critical Care time.

## 2020-04-12 NOTE — PROGRESS NOTE ADULT - SUBJECTIVE AND OBJECTIVE BOX
CLARITA POWELL  MRN-[id number]  Patient is a 70y old  Male who presents with a chief complaint of respiratory distress, COVID 19 (12 Apr 2020 16:44)    HPI:  70M with PMH presents with T dm, hypothyroidism, hld, p/w cough, low grade fever x 2-3 days. Today patient had worsening dyspnea at rest as well as exertion which prompted to come to the ED. Patient otherwise denied chest pain, leg swelling. He denies prior lung disease. Denies any history of smoking.     In ED, patient was noted to be hypoxic on 6L NC to 89% and significant tachypnea. VS otherwise, afebrile, HR in 80s, SBP 150s. Labs notable for CBC wnl. CMP with mild elevated transaminases AST 65 and ALT 47. PAtient noted to be positive for COVID 19. Due to worsening respiratory distress patient was emergently intubated in the ER. (27 Mar 2020 21:38)      Surgery/Hospital course:  COVID 19 Positive  Intubated 3/27  4/7 Patient proned  4/8 Unproned  4/9 Paralysis weaned off yesterday, unproned with maintained sats, tolerating diuresis    Today:  Patient had a fever of o100.6 F. Is on full vent support. Failed CPAP weaning today. Becomes unstable when weaning sedatives. Has been bradycardic and hypotensive throughout the day. During rounds had an episode of bradycardia, was given Epi and resolved after that. Follow up on ABGs and electrolytes All inflammatory markers are down.        ============================I/O===========================   I&O's Detail    11 Apr 2020 07:01  -  12 Apr 2020 07:00  --------------------------------------------------------  IN:    Enteral Tube Flush: 180 mL    Free Water: 750 mL    Glucerna 1.5: 1200 mL    IV PiggyBack: 166.6 mL    ketamine Infusion.: 327.8 mL    midazolam Infusion: 89.6 mL    norepinephrine Infusion: 88.2 mL  Total IN: 2802.2 mL    OUT:    Indwelling Catheter - Urethral: 1550 mL  Total OUT: 1550 mL    Total NET: 1252.2 mL      12 Apr 2020 07:01  -  12 Apr 2020 20:37  --------------------------------------------------------  IN:    dexmedetomidine Infusion: 30.8 mL    Enteral Tube Flush: 60 mL    Free Water: 500 mL    Glucerna 1.5: 600 mL    IV PiggyBack: 216.6 mL    ketamine Infusion.: 108 mL    Lactated Ringers IV Bolus: 500 mL    midazolam Infusion: 12.4 mL    midazolam Infusion: 6.2 mL  Total IN: 2034 mL    OUT:    Indwelling Catheter - Urethral: 875 mL  Total OUT: 875 mL    Total NET: 1159 mL        ============================ LABS =========================                        9.6    10.75 )-----------( 216      ( 12 Apr 2020 01:33 )             32.1     04-12    151<H>  |  106  |  36<H>  ----------------------------<  107<H>  4.1   |  32<H>  |  0.65    Ca    8.6      12 Apr 2020 01:34  Phos  2.1     04-12  Mg     2.4     04-12    TPro  5.9<L>  /  Alb  2.4<L>  /  TBili  0.3  /  DBili  x   /  AST  19  /  ALT  24  /  AlkPhos  98  04-12    LIVER FUNCTIONS - ( 12 Apr 2020 01:34 )  Alb: 2.4 g/dL / Pro: 5.9 g/dL / ALK PHOS: 98 U/L / ALT: 24 U/L / AST: 19 U/L / GGT: x             ABG - ( 12 Apr 2020 18:46 )  pH, Arterial: 7.45  pH, Blood: x     /  pCO2: 45    /  pO2: 70    / HCO3: 30    / Base Excess: 6.2   /  SaO2: 95                  ======================Micro/Rad/Cardio=================  Culture: Reviewed   CXR: Reviewed  ======================================================  PAST MEDICAL & SURGICAL HISTORY:  Hyperlipidemia  Hypothyroid  No significant past surgical history    ====================ASSESSMENT ==============  COVID 19 Positive   3/27 Intubated   Hypoxic respiratory failure   Septic Shock  ARDS  Fluid overload      Plan:  ====================== NEUROLOGY=====================  Unstable when weaning sedatives.  Ketamine was decreased.  Continue to wean sedation/paralytics as tolerated.    acetaminophen    Suspension .. 650 milliGRAM(s) Oral every 6 hours PRN Temp greater or equal to 38.5C (101.3F)  clonazePAM  Tablet 1 milliGRAM(s) Oral every 8 hours  HYDROmorphone  Injectable 0.5 milliGRAM(s) IV Push every 3 hours PRN Moderate Pain (4 - 6)  ketamine Infusion. 0.25 mG/kG/Hr (1.93 mL/Hr) IV Continuous <Continuous>  midazolam Infusion 0.02 mG/kG/Hr (1.54 mL/Hr) IV Continuous <Continuous>  oxyCODONE    IR 10 milliGRAM(s) Oral every 8 hours    ==================== RESPIRATORY======================  CPAP weaning failed today. Continue in the AM as tolerated.  Continue to wean FiO2 (goal to 30%, then wean PEEP as tolerated)  O2 sat goal >92%    Mechanical Ventilation:  Mode: AC/ CMV (Assist Control/ Continuous Mandatory Ventilation)  RR (machine): 30  TV (machine): 360  FiO2: 40  PEEP: 10  ITime: 1  MAP: 10  PIP: 37  ====================CARDIOVASCULAR==================  Episode of bradycardia, was given Epi and resolved after that. Follow up on ABGs and electrolytes.  MAP goal 65-75  ===================HEMATOLOGIC/ONC ===================  Continue Lovenox for VTE prophylaxis    aspirin  chewable 81 milliGRAM(s) Oral daily  enoxaparin Injectable 40 milliGRAM(s) SubCutaneous two times a day  ===================== RENAL =========================  Continue monitoring urine output  Monitor BUN/SCr  ==================== GASTROINTESTINAL===================  Continue tube feeds: goal: Glucerna 200 q6, FW 250q8  Continue pepcid for GI prophylaxis    famotidine Injectable 20 milliGRAM(s) IV Push two times a day  polyethylene glycol 3350 17 Gram(s) Oral every 12 hours  senna Syrup 10 milliLiter(s) Oral at bedtime  sodium chloride 0.9% lock flush 10 milliLiter(s) IV Push every 1 hour PRN Pre/post blood products, medications, blood draw, and to maintain line patency  =======================    ENDOCRINE  =====================  Completed solumedrol.   Glucose control.    insulin lispro (HumaLOG) corrective regimen sliding scale   SubCutaneous every 6 hours  insulin NPH human recombinant 11 Unit(s) SubCutaneous every 6 hours  levothyroxine Injectable 50 MICROGram(s) IV Push at bedtime  ========================INFECTIOUS DISEASE================  Fever of 100.6 today. WBC was down. All inflammatory markers are down. Keep monitoring.   +COVID  -Completed Plaquenil (3/27-4/1), Azithromycin (3/28-3/31)  -s/p Anakirna       Patient requires continuous monitoring with bedside rhythm monitoring, arterial line, pulse oximetry, ventilator monitoring and intermittent blood gas analysis.  Care plan discussed with ICU care team.  Patient remained critical; required more than usual critical care; I have spent 35 minutes providing non-routine critical care, revaluated multiple times during the day.    By signing my name below, I, Krystin Archer, attest that this documentation has been prepared under the direction and in the presence of Mathew Briscoe PA.  Electronically signed: Rohan Pritchett, 04-12-20 @ 20:37    I, Mathew Briscoe, personally performed the services described in this documentation. all medical record entries made by the scribe were at my direction and in my presence. I have reviewed the chart and agree that the record reflects my personal performance and is accurate and complete  Electronically signed: Mathew Briscoe PA. 04-12-20 @ 20:37

## 2020-04-13 NOTE — PROGRESS NOTE ADULT - SUBJECTIVE AND OBJECTIVE BOX
CLARITA POWELL  MRN-[id number]  Patient is a 70y old  Male who presents with a chief complaint of respiratory distress, COVID 19 (13 Apr 2020 06:40)    HPI:  70M with PMH presents with T dm, hypothyroidism, hld, p/w cough, low grade fever x 2-3 days. Today patient had worsening dyspnea at rest as well as exertion which prompted to come to the ED. Patient otherwise denied chest pain, leg swelling. He denies prior lung disease. Denies any history of smoking.     In ED, patient was noted to be hypoxic on 6L NC to 89% and significant tachypnea. VS otherwise, afebrile, HR in 80s, SBP 150s. Labs notable for CBC wnl. CMP with mild elevated transaminases AST 65 and ALT 47. PAtient noted to be positive for COVID 19. Due to worsening respiratory distress patient was emergently intubated in the ER. (27 Mar 2020 21:38)      Surgery/Hospital course:  COVID 19 Positive  Intubated 3/27  4/7 Patient proned  4/8 Unproned  4/9 Paralysis weaned off yesterday, unproned with maintained sats, tolerating diuresis    Today:        ============================I/O===========================   I&O's Detail    12 Apr 2020 07:01  -  13 Apr 2020 07:00  --------------------------------------------------------  IN:    dexmedetomidine Infusion: 30.8 mL    Enteral Tube Flush: 180 mL    Free Water: 750 mL    Glucerna 1.5: 1200 mL    IV PiggyBack: 260.1 mL    ketamine Infusion.: 279.9 mL    Lactated Ringers IV Bolus: 500 mL    midazolam Infusion: 137.7 mL    midazolam Infusion: 12.4 mL    norepinephrine Infusion: 46.4 mL  Total IN: 3397.3 mL    OUT:    Indwelling Catheter - Urethral: 1555 mL  Total OUT: 1555 mL    Total NET: 1842.3 mL      13 Apr 2020 07:01  -  13 Apr 2020 19:20  --------------------------------------------------------  IN:    Enteral Tube Flush: 120 mL    Free Water: 500 mL    Glucerna 1.5: 900 mL    ketamine Infusion.: 231.6 mL    midazolam Infusion: 104.1 mL    norepinephrine Infusion: 69.6 mL  Total IN: 1925.3 mL    OUT:    Indwelling Catheter - Urethral: 1430 mL  Total OUT: 1430 mL    Total NET: 495.3 mL        ============================ LABS =========================                        10.1   11.35 )-----------( 228      ( 13 Apr 2020 02:21 )             34.4     04-13    145  |  107  |  29<H>  ----------------------------<  192<H>  4.8   |  28  |  0.54    Ca    8.5      13 Apr 2020 02:21  Phos  2.7     04-13  Mg     2.3     04-13    TPro  6.1  /  Alb  2.2<L>  /  TBili  0.4  /  DBili  x   /  AST  25  /  ALT  29  /  AlkPhos  114  04-13    LIVER FUNCTIONS - ( 13 Apr 2020 02:21 )  Alb: 2.2 g/dL / Pro: 6.1 g/dL / ALK PHOS: 114 U/L / ALT: 29 U/L / AST: 25 U/L / GGT: x           PT/INR - ( 13 Apr 2020 02:21 )   PT: 13.6 sec;   INR: 1.18 ratio         PTT - ( 13 Apr 2020 02:21 )  PTT:34.4 sec  ABG - ( 13 Apr 2020 01:43 )  pH, Arterial: 7.42  pH, Blood: x     /  pCO2: 49    /  pO2: 80    / HCO3: 31    / Base Excess: 6.1   /  SaO2: 95                  ======================Micro/Rad/Cardio=================  Culture: Reviewed   CXR: Reviewed  Echo:Reviewed  ======================================================  PAST MEDICAL & SURGICAL HISTORY:  Hyperlipidemia  Hypothyroid  No significant past surgical history    ====================ASSESSMENT ==============  COVID 19 Positive   3/27 Intubated   Hypoxic respiratory failure   Septic Shock  ARDS  Fluid overload      Plan:  ====================== NEUROLOGY=====================  acetaminophen    Suspension .. 650 milliGRAM(s) Oral every 6 hours PRN Temp greater or equal to 38.5C (101.3F)  clonazePAM  Tablet 2 milliGRAM(s) Oral every 8 hours  HYDROmorphone  Injectable 0.5 milliGRAM(s) IV Push every 3 hours PRN Moderate Pain (4 - 6)  ketamine Infusion. 0.25 mG/kG/Hr (1.93 mL/Hr) IV Continuous <Continuous>  midazolam Infusion 0.02 mG/kG/Hr (1.54 mL/Hr) IV Continuous <Continuous>  oxyCODONE    IR 10 milliGRAM(s) Oral every 8 hours      Continue to wean sedation/paralytics as tolerated  ==================== RESPIRATORY======================  Mechanical Ventilation:  Mode: AC/ CMV (Assist Control/ Continuous Mandatory Ventilation)  RR (machine): 30  TV (machine): 360  FiO2: 50  PEEP: 8  ITime: 1  MAP: 17  PIP: 32        Continue to wean FiO2 (goal to 30%, then wean PEEP as tolerated)  O2 sat goal >92%  ====================CARDIOVASCULAR==================  norepinephrine Infusion 0.06 MICROgram(s)/kG/Min (8.67 mL/Hr) IV Continuous <Continuous>      Wean vasopressors as tolerated, MAP goal 65-75  ===================HEMATOLOGIC/ONC ===================  aspirin  chewable 81 milliGRAM(s) Oral daily  enoxaparin Injectable 40 milliGRAM(s) SubCutaneous two times a day      Continue Lovenox/Argatroban for VTE prophylaxis  ===================== RENAL =========================  Continue monitoring urine output      Monitor BUN/SCr  Renally dose medications  Continue to diurese as tolerated with Lasix/Bumex  Continue CVVHD  Continue iHD as tolerated  Renal following, appreciate recommendations    ==================== GASTROINTESTINAL===================  famotidine Injectable 20 milliGRAM(s) IV Push two times a day  polyethylene glycol 3350 17 Gram(s) Oral every 12 hours  senna Syrup 10 milliLiter(s) Oral at bedtime  sodium chloride 0.9% lock flush 10 milliLiter(s) IV Push every 1 hour PRN Pre/post blood products, medications, blood draw, and to maintain line patency      Continue tube feeds: goal:   Continue pepcid for GI prophylaxis    =======================    ENDOCRINE  =====================  insulin lispro (HumaLOG) corrective regimen sliding scale   SubCutaneous every 6 hours  insulin NPH human recombinant 11 Unit(s) SubCutaneous every 6 hours  levothyroxine Injectable 50 MICROGram(s) IV Push at bedtime      Stress hyperglycemia, continue insulin drip, Lantus/ISS   Continue [id number] for 5 day course    ========================INFECTIOUS DISEASE================      +COVID  -Completed Plaquenil  -s/p Vit C/Thiamine  -s/p Anakirna    Continue antibiotics for       Patient requires continuous monitoring with bedside rhythm monitoring, arterial line, pulse oximetry, ventilator monitoring and intermittent blood gas analysis.  Care plan discussed with ICU care team.  Patient remained critical; required more than usual critical care; I have spent 35 minutes providing non-routine critical care, revaluated multiple times during the day.    By signing my name below, I, Krystin Archer, attest that this documentation has been prepared under the direction and in the presence of Nasim Oliver PA.  Electronically signed: Rohan Pritchett, 04-13-20 @ 19:20    I, Nasim Oliver, personally performed the services described in this documentation. all medical record entries made by the kevinibe were at my direction and in my presence. I have reviewed the chart and agree that the record reflects my personal performance and is accurate and complete  Electronically signed: Nasim Oliver PA. 04-13-20 @ 19:20 CLARITA POWELL  MRN-[id number]  Patient is a 70y old  Male who presents with a chief complaint of respiratory distress, COVID 19 (13 Apr 2020 06:40)    HPI:  70M with PMH presents with T dm, hypothyroidism, hld, p/w cough, low grade fever x 2-3 days. Today patient had worsening dyspnea at rest as well as exertion which prompted to come to the ED. Patient otherwise denied chest pain, leg swelling. He denies prior lung disease. Denies any history of smoking.     In ED, patient was noted to be hypoxic on 6L NC to 89% and significant tachypnea. VS otherwise, afebrile, HR in 80s, SBP 150s. Labs notable for CBC wnl. CMP with mild elevated transaminases AST 65 and ALT 47. PAtient noted to be positive for COVID 19. Due to worsening respiratory distress patient was emergently intubated in the ER. (27 Mar 2020 21:38)    Surgery/Hospital course:  COVID 19 Positive  Intubated 3/27  4/7 Patient proned  4/8 Unproned  4/9 Paralysis weaned off yesterday, unproned with maintained sats, tolerating diuresis    Today: Weaned PEEP 9-->8, FiO2 50%-->40%    ============================I/O===========================   I&O's Detail    12 Apr 2020 07:01  -  13 Apr 2020 07:00  --------------------------------------------------------  IN:    dexmedetomidine Infusion: 30.8 mL    Enteral Tube Flush: 180 mL    Free Water: 750 mL    Glucerna 1.5: 1200 mL    IV PiggyBack: 260.1 mL    ketamine Infusion.: 279.9 mL    Lactated Ringers IV Bolus: 500 mL    midazolam Infusion: 137.7 mL    midazolam Infusion: 12.4 mL    norepinephrine Infusion: 46.4 mL  Total IN: 3397.3 mL    OUT:    Indwelling Catheter - Urethral: 1555 mL  Total OUT: 1555 mL    Total NET: 1842.3 mL      13 Apr 2020 07:01  -  13 Apr 2020 19:20  --------------------------------------------------------  IN:    Enteral Tube Flush: 120 mL    Free Water: 500 mL    Glucerna 1.5: 900 mL    ketamine Infusion.: 231.6 mL    midazolam Infusion: 104.1 mL    norepinephrine Infusion: 69.6 mL  Total IN: 1925.3 mL    OUT:    Indwelling Catheter - Urethral: 1430 mL  Total OUT: 1430 mL    Total NET: 495.3 mL    ============================ LABS =========================                        10.1   11.35 )-----------( 228      ( 13 Apr 2020 02:21 )             34.4     04-13    145  |  107  |  29<H>  ----------------------------<  192<H>  4.8   |  28  |  0.54    Ca    8.5      13 Apr 2020 02:21  Phos  2.7     04-13  Mg     2.3     04-13    TPro  6.1  /  Alb  2.2<L>  /  TBili  0.4  /  DBili  x   /  AST  25  /  ALT  29  /  AlkPhos  114  04-13    LIVER FUNCTIONS - ( 13 Apr 2020 02:21 )  Alb: 2.2 g/dL / Pro: 6.1 g/dL / ALK PHOS: 114 U/L / ALT: 29 U/L / AST: 25 U/L / GGT: x           PT/INR - ( 13 Apr 2020 02:21 )   PT: 13.6 sec;   INR: 1.18 ratio       PTT - ( 13 Apr 2020 02:21 )  PTT:34.4 sec  ABG - ( 13 Apr 2020 01:43 )  pH, Arterial: 7.42  pH, Blood: x     /  pCO2: 49    /  pO2: 80    / HCO3: 31    / Base Excess: 6.1   /  SaO2: 95        ======================Micro/Rad/Cardio=================  Culture: Reviewed   CXR: Reviewed    ======================================================  PAST MEDICAL & SURGICAL HISTORY:  Hyperlipidemia  Hypothyroid  No significant past surgical history    ====================ASSESSMENT ==============  COVID 19 Positive   3/27 Intubated   Hypoxic respiratory failure   Septic Shock  ARDS  Fluid overload    Plan:  ====================== NEUROLOGY=====================  Continue ketamine and versed infusions for sedation. Will attempt to wean versed infusion, on PO klonopin as well for sedation. Continue IV dilaudid, Oxy IR for analgesia.  acetaminophen    Suspension .. 650 milliGRAM(s) Oral every 6 hours PRN Temp greater or equal to 38.5C (101.3F)  clonazePAM  Tablet 2 milliGRAM(s) Oral every 8 hours  HYDROmorphone  Injectable 0.5 milliGRAM(s) IV Push every 3 hours PRN Moderate Pain (4 - 6)  ketamine Infusion. 0.25 mG/kG/Hr (1.93 mL/Hr) IV Continuous <Continuous>  midazolam Infusion 0.02 mG/kG/Hr (1.54 mL/Hr) IV Continuous <Continuous>  oxyCODONE    IR 10 milliGRAM(s) Oral every 8 hours    ==================== RESPIRATORY======================  Continue to wean PEEP as tolerated    Mechanical Ventilation:  Mode: AC/ CMV (Assist Control/ Continuous Mandatory Ventilation)  RR (machine): 30  TV (machine): 360  FiO2: 50  PEEP: 8  ITime: 1  MAP: 17  PIP: 32    ====================CARDIOVASCULAR==================  norepinephrine Infusion 0.06 MICROgram(s)/kG/Min (8.67 mL/Hr) IV Continuous <Continuous>      Continue levophed infusion for hypotension related to sepsis from COVID as well as sedative agents. Wean levophed as tolerated, MAP goal 65-75  ===================HEMATOLOGIC/ONC ===================  aspirin  chewable 81 milliGRAM(s) Oral daily  enoxaparin Injectable 40 milliGRAM(s) SubCutaneous two times a day    Continue Lovenox for VTE prophylaxis  ===================== RENAL =========================  Continue monitoring urine output. Continue lasix PRN. Monitor BUN/SCr    ==================== GASTROINTESTINAL===================  famotidine Injectable 20 milliGRAM(s) IV Push two times a day  polyethylene glycol 3350 17 Gram(s) Oral every 12 hours  senna Syrup 10 milliLiter(s) Oral at bedtime  sodium chloride 0.9% lock flush 10 milliLiter(s) IV Push every 1 hour PRN Pre/post blood products, medications, blood draw, and to maintain line patency    Continue tube feeds: Glucerna 300cc Q6  Continue pepcid for GI prophylaxis    =======================    ENDOCRINE  =====================  insulin lispro (HumaLOG) corrective regimen sliding scale   SubCutaneous every 6 hours  insulin NPH human recombinant 11 Unit(s) SubCutaneous every 6 hours  levothyroxine Injectable 50 MICROGram(s) IV Push at bedtime    Stress hyperglycemia, continue NPH, ISS.  Continue free water for hypernatremia.    ========================INFECTIOUS DISEASE================  +COVID  -Completed Plaquenil  -s/p Vit C/Thiamine  -s/p Anakirna  -s/p solumedrol    I have spent 40 minutes of critical care with this patient.    Patient requires continuous monitoring with bedside rhythm monitoring, arterial line, pulse oximetry, ventilator monitoring and intermittent blood gas analysis.  Care plan discussed with ICU care team.  Patient remained critical; required more than usual critical care; I have spent 35 minutes providing non-routine critical care, revaluated multiple times during the day.    By signing my name below, I, Krystin Archer, attest that this documentation has been prepared under the direction and in the presence of Nasim Oliver PA.  Electronically signed: Rohan Pritchett, 04-13-20 @ 19:20    I, Nasim Oliver, personally performed the services described in this documentation. all medical record entries made by the scribe were at my direction and in my presence. I have reviewed the chart and agree that the record reflects my personal performance and is accurate and complete  Electronically signed: Nasim Oliver PA. 04-13-20 @ 19:20

## 2020-04-13 NOTE — PROGRESS NOTE ADULT - SUBJECTIVE AND OBJECTIVE BOX
CLARITA POWELL  MRN-[id number]  Patient is a 70y old  Male who presents with a chief complaint of respiratory distress, COVID 19 (12 Apr 2020 20:37)    HPI:  70M with PMH presents with T dm, hypothyroidism, hld, p/w cough, low grade fever x 2-3 days. Today patient had worsening dyspnea at rest as well as exertion which prompted to come to the ED. Patient otherwise denied chest pain, leg swelling. He denies prior lung disease. Denies any history of smoking.     In ED, patient was noted to be hypoxic on 6L NC to 89% and significant tachypnea. VS otherwise, afebrile, HR in 80s, SBP 150s. Labs notable for CBC wnl. CMP with mild elevated transaminases AST 65 and ALT 47. PAtient noted to be positive for COVID 19. Due to worsening respiratory distress patient was emergently intubated in the ER. (27 Mar 2020 21:38)      Surgery/Hospital course:  COVID 19 Positive  Intubated 3/27  4/7 Patient proned  4/8 Unproned  4/9 Paralysis weaned off yesterday, unproned with maintained sats, tolerating diuresis    REVIEW OF SYSTEMS:  Not obtainable, vented     Vital Signs Last 24 Hrs  T(C): 37.6 (13 Apr 2020 04:00), Max: 38.1 (12 Apr 2020 12:00)  T(F): 99.7 (13 Apr 2020 04:00), Max: 100.6 (12 Apr 2020 12:00)  HR: 78 (13 Apr 2020 06:00) (65 - 100)  BP: 93/52 (13 Apr 2020 06:00) (73/41 - 229/117)  BP(mean): 67 (13 Apr 2020 06:00) (52 - 160)  RR: 31 (13 Apr 2020 06:00) (30 - 45)  SpO2: 94% (13 Apr 2020 06:00) (83% - 96%)    ============================I/O===========================   I&O's Detail    11 Apr 2020 07:01  -  12 Apr 2020 07:00  --------------------------------------------------------  IN:    Enteral Tube Flush: 180 mL    Free Water: 750 mL    Glucerna 1.5: 1200 mL    IV PiggyBack: 166.6 mL    ketamine Infusion.: 327.8 mL    midazolam Infusion: 89.6 mL    norepinephrine Infusion: 88.2 mL  Total IN: 2802.2 mL    OUT:    Indwelling Catheter - Urethral: 1550 mL  Total OUT: 1550 mL    Total NET: 1252.2 mL      12 Apr 2020 07:01  -  13 Apr 2020 06:40  --------------------------------------------------------  IN:    dexmedetomidine Infusion: 30.8 mL    Enteral Tube Flush: 180 mL    Free Water: 750 mL    Glucerna 1.5: 1200 mL    IV PiggyBack: 260.1 mL    ketamine Infusion.: 279.9 mL    Lactated Ringers IV Bolus: 500 mL    midazolam Infusion: 12.4 mL    midazolam Infusion: 53 mL    norepinephrine Infusion: 46.4 mL  Total IN: 3312.6 mL    OUT:    Indwelling Catheter - Urethral: 1455 mL  Total OUT: 1455 mL    Total NET: 1857.6 mL        ============================ LABS =========================                        10.1   11.35 )-----------( 228      ( 13 Apr 2020 02:21 )             34.4     04-13    145  |  107  |  29<H>  ----------------------------<  192<H>  4.8   |  28  |  0.54    Ca    8.5      13 Apr 2020 02:21  Phos  2.7     04-13  Mg     2.3     04-13    TPro  6.1  /  Alb  2.2<L>  /  TBili  0.4  /  DBili  x   /  AST  25  /  ALT  29  /  AlkPhos  114  04-13    LIVER FUNCTIONS - ( 13 Apr 2020 02:21 )  Alb: 2.2 g/dL / Pro: 6.1 g/dL / ALK PHOS: 114 U/L / ALT: 29 U/L / AST: 25 U/L / GGT: x           PT/INR - ( 13 Apr 2020 02:21 )   PT: 13.6 sec;   INR: 1.18 ratio         PTT - ( 13 Apr 2020 02:21 )  PTT:34.4 sec  ABG - ( 13 Apr 2020 01:43 )  pH, Arterial: 7.42  pH, Blood: x     /  pCO2: 49    /  pO2: 80    / HCO3: 31    / Base Excess: 6.1   /  SaO2: 95                  ======================Micro/Rad/Cardio=================  Culture: Reviewed   CXR: Reviewed  ======================================================  PAST MEDICAL & SURGICAL HISTORY:  Hyperlipidemia  Hypothyroid  No significant past surgical history    ====================ASSESSMENT ==============  COVID 19 Positive   3/27 Intubated   Hypoxic respiratory failure   Septic Shock  ARDS  Fluid overload    Plan:  ====================== NEUROLOGY=====================  Sedated with Midazolam, Ketamine  Klonopin and oxycodone to wean sedation as tolerated     acetaminophen    Suspension .. 650 milliGRAM(s) Oral every 6 hours PRN Temp greater or equal to 38.5C (101.3F)  clonazePAM  Tablet 1 milliGRAM(s) Oral every 8 hours  HYDROmorphone  Injectable 0.5 milliGRAM(s) IV Push every 3 hours PRN Moderate Pain (4 - 6)  ketamine Infusion. 0.25 mG/kG/Hr (1.93 mL/Hr) IV Continuous <Continuous>  midazolam Infusion 0.02 mG/kG/Hr (1.54 mL/Hr) IV Continuous <Continuous>  oxyCODONE    IR 10 milliGRAM(s) Oral every 8 hours    ==================== RESPIRATORY======================  On full vent support     Mechanical Ventilation:  Mode: AC/ CMV (Assist Control/ Continuous Mandatory Ventilation)  RR (machine): 30  TV (machine): 360  FiO2: 50  PEEP: 9  ITime: 1  MAP: 17  PIP: 36      ====================CARDIOVASCULAR==================  On pressure support for hypotension     norepinephrine Infusion 0.06 MICROgram(s)/kG/Min (8.67 mL/Hr) IV Continuous <Continuous>    ===================HEMATOLOGIC/ONC ===================  aspirin  chewable 81 milliGRAM(s) Oral daily  enoxaparin Injectable 40 milliGRAM(s) SubCutaneous two times a day for VTE prophylaxis     ===================== RENAL =========================  Continue monitoring urine output    ==================== GASTROINTESTINAL===================  Tolerating tube feeds, Glucerna 200 q6     famotidine Injectable 20 milliGRAM(s) IV Push two times a day for GI prophylaxis   polyethylene glycol 3350 17 Gram(s) Oral every 12 hours  senna Syrup 10 milliLiter(s) Oral at bedtime  sodium chloride 0.9% lock flush 10 milliLiter(s) IV Push every 1 hour PRN Pre/post blood products, medications, blood draw, and to maintain line patency    =======================    ENDOCRINE  =====================  Glucose control,   insulin lispro (HumaLOG) corrective regimen sliding scale   SubCutaneous every 6 hours  insulin NPH human recombinant 11 Unit(s) SubCutaneous every 6 hours  levothyroxine Injectable 50 MICROGram(s) IV Push at bedtime    ========================INFECTIOUS DISEASE================  Completed COVID medications (Plaquenil, Azithromycin, Anakinra)       Patient requires continuous monitoring with bedside rhythm monitoring, pulse ox monitoring, and intermittent blood gas analysis. Care plan discussed with ICU care team. Patient remained critical and required more than usual ICU care.    By signing my name below, I, Pretty Wallace, attest that this documentation has been prepared under the direction and in the presence of Palmira Saldana NP   Electronically signed: Rohan Lal, 04-13-20 @ 06:40    I, Palmira Saldana, personally performed the services described in this documentation. all medical record entries made by the scribe were at my direction and in my presence. I have reviewed the chart and agree that the record reflects my personal performance and is accurate and complete  Electronically signed: Palmira Saldana NP CLARITA POWELL  MRN-[id number]  Patient is a 70y old  Male who presents with a chief complaint of respiratory distress, COVID 19 (12 Apr 2020 20:37)    HPI:  70M with PMH presents with T dm, hypothyroidism, hld, p/w cough, low grade fever x 2-3 days. Today patient had worsening dyspnea at rest as well as exertion which prompted to come to the ED. Patient otherwise denied chest pain, leg swelling. He denies prior lung disease. Denies any history of smoking.     In ED, patient was noted to be hypoxic on 6L NC to 89% and significant tachypnea. VS otherwise, afebrile, HR in 80s, SBP 150s. Labs notable for CBC wnl. CMP with mild elevated transaminases AST 65 and ALT 47. PAtient noted to be positive for COVID 19. Due to worsening respiratory distress patient was emergently intubated in the ER. (27 Mar 2020 21:38)    Today/Overnight:   Episode of bradycardia with long asystolic pause of ~10 seconds, no CPR, given Epinephrine 1mg with resolution of heart rate   Precedex d/cd given episode of bradycardia, transitioned to versed   PEEP weaned down from 10 to 9 yesterday  Weaned 9 to 8 this AM, desat in FiO2 requirement from 40% --> 50%   Attempted CPAP, tachypneic to 40s, placed back on full support   Single dose Lasix this AM to help with diuresis     Surgery/Hospital course:  COVID 19 Positive  Intubated 3/27  4/7 Patient proned  4/8 Unproned  4/9 Paralysis weaned off yesterday, unproned with maintained sats, tolerating diuresis    REVIEW OF SYSTEMS:  Not obtainable, vented     Vital Signs Last 24 Hrs  T(C): 37.6 (13 Apr 2020 04:00), Max: 38.1 (12 Apr 2020 12:00)  T(F): 99.7 (13 Apr 2020 04:00), Max: 100.6 (12 Apr 2020 12:00)  HR: 78 (13 Apr 2020 06:00) (65 - 100)  BP: 93/52 (13 Apr 2020 06:00) (73/41 - 229/117)  BP(mean): 67 (13 Apr 2020 06:00) (52 - 160)  RR: 31 (13 Apr 2020 06:00) (30 - 45)  SpO2: 94% (13 Apr 2020 06:00) (83% - 96%)    ============================I/O===========================   I&O's Detail    11 Apr 2020 07:01  -  12 Apr 2020 07:00  --------------------------------------------------------  IN:    Enteral Tube Flush: 180 mL    Free Water: 750 mL    Glucerna 1.5: 1200 mL    IV PiggyBack: 166.6 mL    ketamine Infusion.: 327.8 mL    midazolam Infusion: 89.6 mL    norepinephrine Infusion: 88.2 mL  Total IN: 2802.2 mL    OUT:    Indwelling Catheter - Urethral: 1550 mL  Total OUT: 1550 mL    Total NET: 1252.2 mL      12 Apr 2020 07:01  -  13 Apr 2020 06:40  --------------------------------------------------------  IN:    dexmedetomidine Infusion: 30.8 mL    Enteral Tube Flush: 180 mL    Free Water: 750 mL    Glucerna 1.5: 1200 mL    IV PiggyBack: 260.1 mL    ketamine Infusion.: 279.9 mL    Lactated Ringers IV Bolus: 500 mL    midazolam Infusion: 12.4 mL    midazolam Infusion: 53 mL    norepinephrine Infusion: 46.4 mL  Total IN: 3312.6 mL    OUT:    Indwelling Catheter - Urethral: 1455 mL  Total OUT: 1455 mL    Total NET: 1857.6 mL        ============================ LABS =========================                        10.1   11.35 )-----------( 228      ( 13 Apr 2020 02:21 )             34.4     04-13    145  |  107  |  29<H>  ----------------------------<  192<H>  4.8   |  28  |  0.54    Ca    8.5      13 Apr 2020 02:21  Phos  2.7     04-13  Mg     2.3     04-13    TPro  6.1  /  Alb  2.2<L>  /  TBili  0.4  /  DBili  x   /  AST  25  /  ALT  29  /  AlkPhos  114  04-13    LIVER FUNCTIONS - ( 13 Apr 2020 02:21 )  Alb: 2.2 g/dL / Pro: 6.1 g/dL / ALK PHOS: 114 U/L / ALT: 29 U/L / AST: 25 U/L / GGT: x           PT/INR - ( 13 Apr 2020 02:21 )   PT: 13.6 sec;   INR: 1.18 ratio         PTT - ( 13 Apr 2020 02:21 )  PTT:34.4 sec  ABG - ( 13 Apr 2020 01:43 )  pH, Arterial: 7.42  pH, Blood: x     /  pCO2: 49    /  pO2: 80    / HCO3: 31    / Base Excess: 6.1   /  SaO2: 95                  ======================Micro/Rad/Cardio=================  Culture: Reviewed   CXR: Reviewed  ======================================================  PAST MEDICAL & SURGICAL HISTORY:  Hyperlipidemia  Hypothyroid  No significant past surgical history    ====================ASSESSMENT ==============  COVID 19 Positive   3/27 Intubated   Hypoxic respiratory failure   Septic Shock  ARDS  Fluid overload    Plan:  ====================== NEUROLOGY=====================  Reassess neuro status today, has been following commands prior to asystolic episode last night   Plan to wean versed down, potentially off   Will increase Klonopin to 2mg [id number] to help wean sedation   Continue Ketamine, prn Diluadid, Oxycodone     acetaminophen    Suspension .. 650 milliGRAM(s) Oral every 6 hours PRN Temp greater or equal to 38.5C (101.3F)  clonazePAM  Tablet 1 milliGRAM(s) Oral every 8 hours  HYDROmorphone  Injectable 0.5 milliGRAM(s) IV Push every 3 hours PRN Moderate Pain (4 - 6)  ketamine Infusion. 0.25 mG/kG/Hr (1.93 mL/Hr) IV Continuous <Continuous>  midazolam Infusion 0.02 mG/kG/Hr (1.54 mL/Hr) IV Continuous <Continuous>  oxyCODONE    IR 10 milliGRAM(s) Oral every 8 hours    ==================== RESPIRATORY======================  On full vent support, daily CPAP trials as tolerated   Will f/u on plateau pressure, elevated last night   Continue to slowly wean PEEP as sat goal>92%     Mechanical Ventilation:  Mode: AC/ CMV (Assist Control/ Continuous Mandatory Ventilation)  RR (machine): 30  TV (machine): 360  FiO2: 50  PEEP: 9  ITime: 1  MAP: 17  PIP: 36      ====================CARDIOVASCULAR==================  Will hold on AV nodule blockers given episode of sinus darnell / asystolic event overnight   Blood pressure stable   Slowly wean levo as tolerated, MAP goal>65     norepinephrine Infusion 0.06 MICROgram(s)/kG/Min (8.67 mL/Hr) IV Continuous <Continuous>    ===================HEMATOLOGIC/ONC ===================  aspirin  chewable 81 milliGRAM(s) Oral daily  enoxaparin Injectable 40 milliGRAM(s) SubCutaneous two times a day for VTE prophylaxis     ===================== RENAL =========================  Continue monitoring urine output  1 dose of lasix this AM, plan for even fluid goal   Continue free water for hypernatremia     ==================== GASTROINTESTINAL===================  Tolerating tube feeds, Glucerna 200 [id number]   Continue bowel regimen     famotidine Injectable 20 milliGRAM(s) IV Push two times a day for GI prophylaxis   polyethylene glycol 3350 17 Gram(s) Oral every 12 hours  senna Syrup 10 milliLiter(s) Oral at bedtime  sodium chloride 0.9% lock flush 10 milliLiter(s) IV Push every 1 hour PRN Pre/post blood products, medications, blood draw, and to maintain line patency    =======================    ENDOCRINE  =====================  Glucose control,   insulin lispro (HumaLOG) corrective regimen sliding scale   SubCutaneous every 6 hours  insulin NPH human recombinant 11 Unit(s) SubCutaneous every 6 hours  levothyroxine Injectable 50 MICROGram(s) IV Push at bedtime    ========================INFECTIOUS DISEASE================  Completed COVID medications (Plaquenil, Azithromycin, Anakinra)       Patient requires continuous monitoring with bedside rhythm monitoring, pulse ox monitoring, and intermittent blood gas analysis. Care plan discussed with ICU care team. Patient remained critical and required more than usual ICU care.    By signing my name below, I, Pretty Wallace, attest that this documentation has been prepared under the direction and in the presence of Palmira Saldana NP   Electronically signed: Rohan Lal, 04-13-20 @ 06:40    I, Palmira Saldana, personally performed the services described in this documentation. all medical record entries made by the kevinibtrae were at my direction and in my presence. I have reviewed the chart and agree that the record reflects my personal performance and is accurate and complete  Electronically signed: Palmira Saldana NP CLARITA POWELL  MRN-[id number]  Patient is a 70y old  Male who presents with a chief complaint of respiratory distress, COVID 19 (12 Apr 2020 20:37)    HPI:  70M with PMH presents with T dm, hypothyroidism, hld, p/w cough, low grade fever x 2-3 days. Today patient had worsening dyspnea at rest as well as exertion which prompted to come to the ED. Patient otherwise denied chest pain, leg swelling. He denies prior lung disease. Denies any history of smoking.     In ED, patient was noted to be hypoxic on 6L NC to 89% and significant tachypnea. VS otherwise, afebrile, HR in 80s, SBP 150s. Labs notable for CBC wnl. CMP with mild elevated transaminases AST 65 and ALT 47. PAtient noted to be positive for COVID 19. Due to worsening respiratory distress patient was emergently intubated in the ER. (27 Mar 2020 21:38)    Today/Overnight:   ·	Episode of bradycardia with asystolic pause of ~10 seconds, no CPR, given Epinephrine 1mg with resolution of heart rate   ·	Precedex d/cd given episode of bradycardia, transitioned to versed   ·	PEEP weaned down from 10 to 9 yesterday, desat in FiO2 requirement from 40% --> 50%. This morning weaned PEEP 9 to 8    ·	Attempted CPAP, tachypneic to 40s, placed back on full support   ·	Single dose Lasix this AM to help with diuresis     Surgery/Hospital course:  COVID 19 Positive  Intubated 3/27  4/7 Patient proned  4/8 Unproned  4/9 Paralysis weaned off yesterday, unproned with maintained sats, tolerating diuresis  4/12 Had neuro status, + squeezed hands.  ~6pm had ~10 sec pause/asystole rec'q Epi (no CPR) with resolution in HR, precedex d/c'd    REVIEW OF SYSTEMS:  Not obtainable, vented     Vital Signs Last 24 Hrs  T(C): 37.6 (13 Apr 2020 04:00), Max: 38.1 (12 Apr 2020 12:00)  T(F): 99.7 (13 Apr 2020 04:00), Max: 100.6 (12 Apr 2020 12:00)  HR: 78 (13 Apr 2020 06:00) (65 - 100)  BP: 93/52 (13 Apr 2020 06:00) (73/41 - 229/117)  BP(mean): 67 (13 Apr 2020 06:00) (52 - 160)  RR: 31 (13 Apr 2020 06:00) (30 - 45)  SpO2: 94% (13 Apr 2020 06:00) (83% - 96%)    ============================I/O===========================   I&O's Detail    11 Apr 2020 07:01  -  12 Apr 2020 07:00  --------------------------------------------------------  IN:    Enteral Tube Flush: 180 mL    Free Water: 750 mL    Glucerna 1.5: 1200 mL    IV PiggyBack: 166.6 mL    ketamine Infusion.: 327.8 mL    midazolam Infusion: 89.6 mL    norepinephrine Infusion: 88.2 mL  Total IN: 2802.2 mL    OUT:    Indwelling Catheter - Urethral: 1550 mL  Total OUT: 1550 mL    Total NET: 1252.2 mL      12 Apr 2020 07:01  -  13 Apr 2020 06:40  --------------------------------------------------------  IN:    dexmedetomidine Infusion: 30.8 mL    Enteral Tube Flush: 180 mL    Free Water: 750 mL    Glucerna 1.5: 1200 mL    IV PiggyBack: 260.1 mL    ketamine Infusion.: 279.9 mL    Lactated Ringers IV Bolus: 500 mL    midazolam Infusion: 12.4 mL    midazolam Infusion: 53 mL    norepinephrine Infusion: 46.4 mL  Total IN: 3312.6 mL    OUT:    Indwelling Catheter - Urethral: 1455 mL  Total OUT: 1455 mL    Total NET: 1857.6 mL        ============================ LABS =========================                        10.1   11.35 )-----------( 228      ( 13 Apr 2020 02:21 )             34.4     04-13    145  |  107  |  29<H>  ----------------------------<  192<H>  4.8   |  28  |  0.54    Ca    8.5      13 Apr 2020 02:21  Phos  2.7     04-13  Mg     2.3     04-13    TPro  6.1  /  Alb  2.2<L>  /  TBili  0.4  /  DBili  x   /  AST  25  /  ALT  29  /  AlkPhos  114  04-13    LIVER FUNCTIONS - ( 13 Apr 2020 02:21 )  Alb: 2.2 g/dL / Pro: 6.1 g/dL / ALK PHOS: 114 U/L / ALT: 29 U/L / AST: 25 U/L / GGT: x           PT/INR - ( 13 Apr 2020 02:21 )   PT: 13.6 sec;   INR: 1.18 ratio         PTT - ( 13 Apr 2020 02:21 )  PTT:34.4 sec  ABG - ( 13 Apr 2020 01:43 )  pH, Arterial: 7.42  pH, Blood: x     /  pCO2: 49    /  pO2: 80    / HCO3: 31    / Base Excess: 6.1   /  SaO2: 95                  ======================Micro/Rad/Cardio=================  Culture: Reviewed   CXR: Reviewed  ======================================================  PAST MEDICAL & SURGICAL HISTORY:  Hyperlipidemia  Hypothyroid  No significant past surgical history    ====================ASSESSMENT ==============  COVID 19 Positive   3/27 Intubated   Hypoxic respiratory failure   Septic Shock  ARDS  Fluid overload    Plan:  ====================== NEUROLOGY=====================  Reassess neuro status today, has been following commands prior to asystolic episode last night   Plan to wean versed down, potentially off   Will increase Klonopin to 2mg [id number] to help wean sedation   Continue Ketamine, prn Diluadid, Oxycodone     acetaminophen    Suspension .. 650 milliGRAM(s) Oral every 6 hours PRN Temp greater or equal to 38.5C (101.3F)  clonazePAM  Tablet 1 milliGRAM(s) Oral every 8 hours  HYDROmorphone  Injectable 0.5 milliGRAM(s) IV Push every 3 hours PRN Moderate Pain (4 - 6)  ketamine Infusion. 0.25 mG/kG/Hr (1.93 mL/Hr) IV Continuous <Continuous>  midazolam Infusion 0.02 mG/kG/Hr (1.54 mL/Hr) IV Continuous <Continuous>  oxyCODONE    IR 10 milliGRAM(s) Oral every 8 hours    ==================== RESPIRATORY======================  On full vent support, daily CPAP trials as tolerated   Will f/u on plateau pressure, elevated last night   Continue to slowly wean PEEP/FiO2 as sat goal>92%     Mechanical Ventilation:  Mode: AC/ CMV (Assist Control/ Continuous Mandatory Ventilation)  RR (machine): 30  TV (machine): 360  FiO2: 50  PEEP: 9  ITime: 1  MAP: 17  PIP: 36      ====================CARDIOVASCULAR==================  Will hold on AV nodule blockers given episode of sinus darnell / asystolic event overnight   Wean levophed as tolerated, MAP goal>65     norepinephrine Infusion 0.04 MICROgram(s)/kG/Min (8.67 mL/Hr) IV Continuous <Continuous>    ===================HEMATOLOGIC/ONC ===================  aspirin  chewable 81 milliGRAM(s) Oral daily  enoxaparin Injectable 40 milliGRAM(s) SubCutaneous two times a day for VTE prophylaxis     ===================== RENAL =========================  Continue monitoring urine output  1 dose of lasix this AM, plan for even fluid goal   Continue free water for hypernatremia     ==================== GASTROINTESTINAL===================  Tolerating tube feeds, Glucerna 300 [id number]   Continue bowel regimen     famotidine Injectable 20 milliGRAM(s) IV Push two times a day for GI prophylaxis   polyethylene glycol 3350 17 Gram(s) Oral every 12 hours  senna Syrup 10 milliLiter(s) Oral at bedtime      =======================    ENDOCRINE  =====================  Glucose control,   insulin lispro (HumaLOG) corrective regimen sliding scale   SubCutaneous every 6 hours  insulin NPH human recombinant 11 Unit(s) SubCutaneous every 6 hours  levothyroxine Injectable 50 MICROGram(s) IV Push at bedtime    ========================INFECTIOUS DISEASE================  Completed COVID medications (Plaquenil, Azithromycin, Anakinra)     ========================GOALS OF CARE===================  Spoke with Reanna (wife- [phone]) and MD Ileana Lee (daughter [phone]) via telephone, provided updates to family regarding current status and plan of care for the day. Family understands patient remains critically ill at this time. All questions and concerns addressed.          Patient requires continuous monitoring with bedside rhythm monitoring, pulse ox monitoring, and intermittent blood gas analysis. Care plan discussed with ICU care team. Patient remained critical and required more than usual ICU care.    By signing my name below, I, Pretty Wallace, attest that this documentation has been prepared under the direction and in the presence of Palmira Saldana NP   Electronically signed: Jonathan Lal, 04-13-20 @ 06:40    I, Palmira Saldana, personally performed the services described in this documentation. all medical record entries made by the jonathan were at my direction and in my presence. I have reviewed the chart and agree that the record reflects my personal performance and is accurate and complete  Electronically signed: Palmira Saldana NP

## 2020-04-14 NOTE — PROGRESS NOTE ADULT - SUBJECTIVE AND OBJECTIVE BOX
CLARITA POWELL  MRN-[id number]  Patient is a 70y old  Male who presents with a chief complaint of respiratory distress, COVID 19 (14 Apr 2020 06:51)    HPI:  70M with PMH presents with T dm, hypothyroidism, hld, p/w cough, low grade fever x 2-3 days. Today patient had worsening dyspnea at rest as well as exertion which prompted to come to the ED. Patient otherwise denied chest pain, leg swelling. He denies prior lung disease. Denies any history of smoking.     In ED, patient was noted to be hypoxic on 6L NC to 89% and significant tachypnea. VS otherwise, afebrile, HR in 80s, SBP 150s. Labs notable for CBC wnl. CMP with mild elevated transaminases AST 65 and ALT 47. PAtient noted to be positive for COVID 19. Due to worsening respiratory distress patient was emergently intubated in the ER. (27 Mar 2020 21:38)      Surgery/Hospital course:    Today:    REVIEW OF SYSTEMS:   Unable to assess 2/2 intubated and sedated    Physical Exam:  Vital Signs Last 24 Hrs  T(C): 36.8 (14 Apr 2020 16:00), Max: 38 (13 Apr 2020 19:00)  T(F): 98.2 (14 Apr 2020 16:00), Max: 100.4 (13 Apr 2020 19:00)  HR: 76 (14 Apr 2020 18:00) (71 - 82)  BP: 112/55 (14 Apr 2020 18:00) (98/55 - 143/65)  BP(mean): 79 (14 Apr 2020 18:00) (69 - 93)  RR: 38 (14 Apr 2020 18:00) (30 - 40)  SpO2: 91% (14 Apr 2020 18:00) (88% - 94%)  Gen:  Intubated and sedated- See prior physical exam 2/2 Covid protocol      ============================I/O===========================   I&O's Detail    13 Apr 2020 07:01  -  14 Apr 2020 07:00  --------------------------------------------------------  IN:    Enteral Tube Flush: 120 mL    Free Water: 900 mL    Glucerna 1.5: 1500 mL    IV PiggyBack: 250 mL    ketamine Infusion.: 463.2 mL    midazolam Infusion: 184 mL    norepinephrine Infusion: 171.7 mL  Total IN: 3588.9 mL    OUT:    Indwelling Catheter - Urethral: 3180 mL  Total OUT: 3180 mL    Total NET: 408.9 mL      14 Apr 2020 07:01  -  14 Apr 2020 18:25  --------------------------------------------------------  IN:    Enteral Tube Flush: 180 mL    Free Water: 200 mL    Glucerna 1.5: 600 mL    ketamine Infusion.: 193 mL    midazolam Infusion: 21.6 mL    norepinephrine Infusion: 95.2 mL  Total IN: 1289.8 mL    OUT:    Indwelling Catheter - Urethral: 900 mL  Total OUT: 900 mL    Total NET: 389.8 mL        ============================ LABS =========================                        9.8    11.80 )-----------( 226      ( 14 Apr 2020 01:35 )             31.6     04-14    142  |  103  |  34<H>  ----------------------------<  175<H>  4.3   |  31  |  0.57    Ca    8.6      14 Apr 2020 01:35  Phos  2.7     04-14  Mg     2.2     04-14    TPro  6.4  /  Alb  2.4<L>  /  TBili  0.3  /  DBili  x   /  AST  15  /  ALT  23  /  AlkPhos  108  04-14    LIVER FUNCTIONS - ( 14 Apr 2020 01:35 )  Alb: 2.4 g/dL / Pro: 6.4 g/dL / ALK PHOS: 108 U/L / ALT: 23 U/L / AST: 15 U/L / GGT: x           PT/INR - ( 13 Apr 2020 02:21 )   PT: 13.6 sec;   INR: 1.18 ratio         PTT - ( 13 Apr 2020 02:21 )  PTT:34.4 sec  ABG - ( 14 Apr 2020 16:31 )  pH, Arterial: 7.48  pH, Blood: x     /  pCO2: 43    /  pO2: 53    / HCO3: 31    / Base Excess: 7.3   /  SaO2: 88                  ======================Micro/Rad/Cardio=================  Culture: Reviewed   CXR: Reviewed  Echo:Reviewed  ======================================================  PAST MEDICAL & SURGICAL HISTORY:  Hyperlipidemia  Hypothyroid  No significant past surgical history    ====================ASSESSMENT ==============        ====================== NEUROLOGY=====================  acetaminophen    Suspension .. 650 milliGRAM(s) Oral every 6 hours PRN Temp greater or equal to 38.5C (101.3F)  clonazePAM  Tablet 2 milliGRAM(s) Oral every 8 hours  HYDROmorphone  Injectable 0.5 milliGRAM(s) IV Push every 3 hours PRN Moderate Pain (4 - 6)  ketamine Infusion. 0.25 mG/kG/Hr (1.93 mL/Hr) IV Continuous <Continuous>  oxyCODONE    IR 10 milliGRAM(s) Oral every 8 hours      Continue to wean sedation/paralytics as tolerated  ==================== RESPIRATORY======================  Mechanical Ventilation:  Mode: AC/ CMV (Assist Control/ Continuous Mandatory Ventilation)  RR (machine): 36  TV (machine): 350  FiO2: 40  PEEP: 6  ITime: 1  MAP: 17  PIP: 33        Continue to wean FiO2 (goal to 30%, then wean PEEP as tolerated)  O2 sat goal >92%  ====================CARDIOVASCULAR==================  norepinephrine Infusion 0.06 MICROgram(s)/kG/Min (8.67 mL/Hr) IV Continuous <Continuous>      Wean vasopressors as tolerated, MAP goal 65-75  ===================HEMATOLOGIC/ONC ===================  aspirin  chewable 81 milliGRAM(s) Oral daily  enoxaparin Injectable 40 milliGRAM(s) SubCutaneous two times a day      Continue Lovenox/Argatroban for VTE prophylaxis  ===================== RENAL =========================  Continue monitoring urine output      Monitor BUN/SCr  Renally dose medications  Continue to diurese as tolerated with Lasix/Bumex  Continue CVVHD  Continue iHD as tolerated  Renal following, appreciate recommendations    ==================== GASTROINTESTINAL===================  famotidine Injectable 20 milliGRAM(s) IV Push two times a day  polyethylene glycol 3350 17 Gram(s) Oral every 12 hours  senna Syrup 10 milliLiter(s) Oral at bedtime  sodium chloride 0.9% lock flush 10 milliLiter(s) IV Push every 1 hour PRN Pre/post blood products, medications, blood draw, and to maintain line patency      Continue tube feeds: goal:   Continue pepcid for GI prophylaxis    =======================    ENDOCRINE  =====================  insulin lispro (HumaLOG) corrective regimen sliding scale   SubCutaneous every 6 hours  insulin NPH human recombinant 11 Unit(s) SubCutaneous every 6 hours  levothyroxine Injectable 50 MICROGram(s) IV Push at bedtime      Stress hyperglycemia, continue insulin drip, Lantus/ISS   Continue [id number] for 5 day course    ========================INFECTIOUS DISEASE================      +COVID  -Completed Plaquenil  -s/p Vit C/Thiamine  -s/p Anakirna    Continue antibiotics for       Patient requires continuous monitoring with bedside rhythm monitoring, arterial line, pulse oximetry, ventilator monitoring and intermittent blood gas analysis.  Care plan discussed with ICU care team.  Patient remained critical; required more than usual critical care; I have spent 35 minutes providing non-routine critical care, revaluated multiple times during the day.    By signing my name below, I, Krystin Archer, attest that this documentation has been prepared under the direction and in the presence of Nasim Oliver PA.  Electronically signed: Rohan Pritchett, 04-14-20 @ 18:26    I, Nasim Oliver, personally performed the services described in this documentation. all medical record entries made by the kevinibe were at my direction and in my presence. I have reviewed the chart and agree that the record reflects my personal performance and is accurate and complete  Electronically signed: Nasim Oliver PA. 04-14-20 @ 18:26 CLARITA POWELL  MRN-[id number]  Patient is a 70y old  Male who presents with a chief complaint of respiratory distress, COVID 19 (14 Apr 2020 06:51)    HPI:  70M with PMH presents with T dm, hypothyroidism, hld, p/w cough, low grade fever x 2-3 days. Today patient had worsening dyspnea at rest as well as exertion which prompted to come to the ED. Patient otherwise denied chest pain, leg swelling. He denies prior lung disease. Denies any history of smoking.     In ED, patient was noted to be hypoxic on 6L NC to 89% and significant tachypnea. VS otherwise, afebrile, HR in 80s, SBP 150s. Labs notable for CBC wnl. CMP with mild elevated transaminases AST 65 and ALT 47. PAtient noted to be positive for COVID 19. Due to worsening respiratory distress patient was emergently intubated in the ER. (27 Mar 2020 21:38)      Surgery/Hospital course:  COVID 19 Positive  Intubated 3/27  4/7 Patient proned  4/8 Unproned  4/9 Paralysis weaned off yesterday, unproned with maintained sats, tolerating diuresis  4/12 10 sec asystole, back after epi x1    Today:        ============================I/O===========================   I&O's Detail    13 Apr 2020 07:01  -  14 Apr 2020 07:00  --------------------------------------------------------  IN:    Enteral Tube Flush: 120 mL    Free Water: 900 mL    Glucerna 1.5: 1500 mL    IV PiggyBack: 250 mL    ketamine Infusion.: 463.2 mL    midazolam Infusion: 184 mL    norepinephrine Infusion: 171.7 mL  Total IN: 3588.9 mL    OUT:    Indwelling Catheter - Urethral: 3180 mL  Total OUT: 3180 mL    Total NET: 408.9 mL      14 Apr 2020 07:01  -  14 Apr 2020 18:25  --------------------------------------------------------  IN:    Enteral Tube Flush: 180 mL    Free Water: 200 mL    Glucerna 1.5: 600 mL    ketamine Infusion.: 193 mL    midazolam Infusion: 21.6 mL    norepinephrine Infusion: 95.2 mL  Total IN: 1289.8 mL    OUT:    Indwelling Catheter - Urethral: 900 mL  Total OUT: 900 mL    Total NET: 389.8 mL        ============================ LABS =========================                        9.8    11.80 )-----------( 226      ( 14 Apr 2020 01:35 )             31.6     04-14    142  |  103  |  34<H>  ----------------------------<  175<H>  4.3   |  31  |  0.57    Ca    8.6      14 Apr 2020 01:35  Phos  2.7     04-14  Mg     2.2     04-14    TPro  6.4  /  Alb  2.4<L>  /  TBili  0.3  /  DBili  x   /  AST  15  /  ALT  23  /  AlkPhos  108  04-14    LIVER FUNCTIONS - ( 14 Apr 2020 01:35 )  Alb: 2.4 g/dL / Pro: 6.4 g/dL / ALK PHOS: 108 U/L / ALT: 23 U/L / AST: 15 U/L / GGT: x           PT/INR - ( 13 Apr 2020 02:21 )   PT: 13.6 sec;   INR: 1.18 ratio         PTT - ( 13 Apr 2020 02:21 )  PTT:34.4 sec  ABG - ( 14 Apr 2020 16:31 )  pH, Arterial: 7.48  pH, Blood: x     /  pCO2: 43    /  pO2: 53    / HCO3: 31    / Base Excess: 7.3   /  SaO2: 88                  ======================Micro/Rad/Cardio=================  Culture: Reviewed   CXR: Reviewed  ======================================================  PAST MEDICAL & SURGICAL HISTORY:  Hyperlipidemia  Hypothyroid  No significant past surgical history    ====================ASSESSMENT ==============  COVID 19 Positive   3/27 Intubated   Hypoxic respiratory failure   Septic Shock  ARDS  Fluid overload      Plan:  ====================== NEUROLOGY=====================  acetaminophen    Suspension .. 650 milliGRAM(s) Oral every 6 hours PRN Temp greater or equal to 38.5C (101.3F)  clonazePAM  Tablet 2 milliGRAM(s) Oral every 8 hours  HYDROmorphone  Injectable 0.5 milliGRAM(s) IV Push every 3 hours PRN Moderate Pain (4 - 6)  ketamine Infusion. 0.25 mG/kG/Hr (1.93 mL/Hr) IV Continuous <Continuous>  oxyCODONE    IR 10 milliGRAM(s) Oral every 8 hours      Continue to wean sedation/paralytics as tolerated  ==================== RESPIRATORY======================  Mechanical Ventilation:  Mode: AC/ CMV (Assist Control/ Continuous Mandatory Ventilation)  RR (machine): 36  TV (machine): 350  FiO2: 40  PEEP: 6  ITime: 1  MAP: 17  PIP: 33        Continue to wean FiO2 (goal to 30%, then wean PEEP as tolerated)  O2 sat goal >92%  ====================CARDIOVASCULAR==================  norepinephrine Infusion 0.06 MICROgram(s)/kG/Min (8.67 mL/Hr) IV Continuous <Continuous>      Wean vasopressors as tolerated, MAP goal 65-75  ===================HEMATOLOGIC/ONC ===================  aspirin  chewable 81 milliGRAM(s) Oral daily  enoxaparin Injectable 40 milliGRAM(s) SubCutaneous two times a day      Continue Lovenox/Argatroban for VTE prophylaxis  ===================== RENAL =========================  Continue monitoring urine output      Monitor BUN/SCr  Renally dose medications  Continue to diurese as tolerated with Lasix/Bumex  Continue CVVHD  Continue iHD as tolerated  Renal following, appreciate recommendations    ==================== GASTROINTESTINAL===================  famotidine Injectable 20 milliGRAM(s) IV Push two times a day  polyethylene glycol 3350 17 Gram(s) Oral every 12 hours  senna Syrup 10 milliLiter(s) Oral at bedtime  sodium chloride 0.9% lock flush 10 milliLiter(s) IV Push every 1 hour PRN Pre/post blood products, medications, blood draw, and to maintain line patency      Continue tube feeds: goal:   Continue pepcid for GI prophylaxis    =======================    ENDOCRINE  =====================  insulin lispro (HumaLOG) corrective regimen sliding scale   SubCutaneous every 6 hours  insulin NPH human recombinant 11 Unit(s) SubCutaneous every 6 hours  levothyroxine Injectable 50 MICROGram(s) IV Push at bedtime      Stress hyperglycemia, continue insulin drip, Lantus/ISS   Continue [id number] for 5 day course    ========================INFECTIOUS DISEASE================  +COVID  -Completed Plaquenil (3/27-4/1), Azithromycin (3/28-3/31)  -s/p Anakirna         Patient requires continuous monitoring with bedside rhythm monitoring, arterial line, pulse oximetry, ventilator monitoring and intermittent blood gas analysis.  Care plan discussed with ICU care team.  Patient remained critical; required more than usual critical care; I have spent 35 minutes providing non-routine critical care, revaluated multiple times during the day.    By signing my name below, I, Krystin Archer, attest that this documentation has been prepared under the direction and in the presence of Nasim Oliver PA.  Electronically signed: Jonathan Pritchett, 04-14-20 @ 18:26    I, Nasim Oliver, personally performed the services described in this documentation. all medical record entries made by the jonathan were at my direction and in my presence. I have reviewed the chart and agree that the record reflects my personal performance and is accurate and complete  Electronically signed: Nasim Oliver PA. 04-14-20 @ 18:26 CLARITA POWELL  MRN-[id number]  Patient is a 70y old  Male who presents with a chief complaint of respiratory distress, COVID 19 (14 Apr 2020 06:51)    HPI:  70M with PMH presents with T dm, hypothyroidism, hld, p/w cough, low grade fever x 2-3 days. Today patient had worsening dyspnea at rest as well as exertion which prompted to come to the ED. Patient otherwise denied chest pain, leg swelling. He denies prior lung disease. Denies any history of smoking.     In ED, patient was noted to be hypoxic on 6L NC to 89% and significant tachypnea. VS otherwise, afebrile, HR in 80s, SBP 150s. Labs notable for CBC wnl. CMP with mild elevated transaminases AST 65 and ALT 47. PAtient noted to be positive for COVID 19. Due to worsening respiratory distress patient was emergently intubated in the ER. (27 Mar 2020 21:38)    Surgery/Hospital course:  COVID 19 Positive  Intubated 3/27  4/7 Patient proned  4/8 Unproned  4/9 Paralysis weaned off yesterday, unproned with maintained sats, tolerating diuresis  4/12 10 sec asystole, back after epi x1    Today: No CPAP weaning tolerated today. Versed gtt weaned off.    ============================I/O===========================   I&O's Detail    13 Apr 2020 07:01  -  14 Apr 2020 07:00  --------------------------------------------------------  IN:    Enteral Tube Flush: 120 mL    Free Water: 900 mL    Glucerna 1.5: 1500 mL    IV PiggyBack: 250 mL    ketamine Infusion.: 463.2 mL    midazolam Infusion: 184 mL    norepinephrine Infusion: 171.7 mL  Total IN: 3588.9 mL    OUT:    Indwelling Catheter - Urethral: 3180 mL  Total OUT: 3180 mL    Total NET: 408.9 mL    14 Apr 2020 07:01  -  14 Apr 2020 18:25  --------------------------------------------------------  IN:    Enteral Tube Flush: 180 mL    Free Water: 200 mL    Glucerna 1.5: 600 mL    ketamine Infusion.: 193 mL    midazolam Infusion: 21.6 mL    norepinephrine Infusion: 95.2 mL  Total IN: 1289.8 mL    OUT:    Indwelling Catheter - Urethral: 900 mL  Total OUT: 900 mL    Total NET: 389.8 mL    ============================ LABS =========================                        9.8    11.80 )-----------( 226      ( 14 Apr 2020 01:35 )             31.6     04-14    142  |  103  |  34<H>  ----------------------------<  175<H>  4.3   |  31  |  0.57    Ca    8.6      14 Apr 2020 01:35  Phos  2.7     04-14  Mg     2.2     04-14    TPro  6.4  /  Alb  2.4<L>  /  TBili  0.3  /  DBili  x   /  AST  15  /  ALT  23  /  AlkPhos  108  04-14    LIVER FUNCTIONS - ( 14 Apr 2020 01:35 )  Alb: 2.4 g/dL / Pro: 6.4 g/dL / ALK PHOS: 108 U/L / ALT: 23 U/L / AST: 15 U/L / GGT: x           PT/INR - ( 13 Apr 2020 02:21 )   PT: 13.6 sec;   INR: 1.18 ratio      PTT - ( 13 Apr 2020 02:21 )  PTT:34.4 sec  ABG - ( 14 Apr 2020 16:31 )  pH, Arterial: 7.48  pH, Blood: x     /  pCO2: 43    /  pO2: 53    / HCO3: 31    / Base Excess: 7.3   /  SaO2: 88        ======================Micro/Rad/Cardio=================  Culture: Reviewed   CXR: Reviewed    ======================================================  PAST MEDICAL & SURGICAL HISTORY:  Hyperlipidemia  Hypothyroid  No significant past surgical history    ====================ASSESSMENT ==============  COVID 19 Positive   3/27 Intubated   Hypoxic respiratory failure   Septic Shock  ARDS  Fluid overload    Plan:  ====================== NEUROLOGY=====================  acetaminophen    Suspension .. 650 milliGRAM(s) Oral every 6 hours PRN Temp greater or equal to 38.5C (101.3F)  clonazePAM  Tablet 2 milliGRAM(s) Oral every 8 hours  HYDROmorphone  Injectable 0.5 milliGRAM(s) IV Push every 3 hours PRN Moderate Pain (4 - 6)  ketamine Infusion. 0.25 mG/kG/Hr (1.93 mL/Hr) IV Continuous <Continuous>  oxyCODONE    IR 10 milliGRAM(s) Oral every 8 hours    Continue ketamine gtt. Continue klonopin, oxycodone, dilaudid PRN. Continue to wean sedation/paralytics as tolerated    ==================== RESPIRATORY======================  Mechanical Ventilation:  Mode: AC/ CMV (Assist Control/ Continuous Mandatory Ventilation)  RR (machine): 36  TV (machine): 350  FiO2: 40  PEEP: 6  ITime: 1  MAP: 17  PIP: 33    Continue to wean FiO2 (goal to 30%, then wean PEEP as tolerated). O2 sat goal >92%    ====================CARDIOVASCULAR==================  norepinephrine Infusion 0.06 MICROgram(s)/kG/Min (8.67 mL/Hr) IV Continuous <Continuous>    Wean vasopressors as tolerated, MAP goal 65-75    ===================HEMATOLOGIC/ONC ===================  aspirin  chewable 81 milliGRAM(s) Oral daily  enoxaparin Injectable 40 milliGRAM(s) SubCutaneous two times a day    Continue LVX 40 BID for VTE prophylaxis    ===================== RENAL =========================  Continue monitoring urine output. Monitor BUN/SCr. Continue to diurese as tolerated with Lasix PRN.     ==================== GASTROINTESTINAL===================  famotidine Injectable 20 milliGRAM(s) IV Push two times a day  polyethylene glycol 3350 17 Gram(s) Oral every 12 hours  senna Syrup 10 milliLiter(s) Oral at bedtime  sodium chloride 0.9% lock flush 10 milliLiter(s) IV Push every 1 hour PRN Pre/post blood products, medications, blood draw, and to maintain line patency    Continue tube feeds: Glucerna 300 Q6   Continue pepcid for GI prophylaxis    =======================    ENDOCRINE  =====================  insulin lispro (HumaLOG) corrective regimen sliding scale   SubCutaneous every 6 hours  insulin NPH human recombinant 11 Unit(s) SubCutaneous every 6 hours  levothyroxine Injectable 50 MICROGram(s) IV Push at bedtime    Hyperglycemia, continue NPH/ISS     ========================INFECTIOUS DISEASE================  +COVID  -Completed Plaquenil (3/27-4/1), Azithromycin (3/28-3/31)  -s/p Anakirna     I have spent 40 minutes of critical care time with this patient.    Patient requires continuous monitoring with bedside rhythm monitoring, arterial line, pulse oximetry, ventilator monitoring and intermittent blood gas analysis.  Care plan discussed with ICU care team.  Patient remained critical; required more than usual critical care; I have spent 35 minutes providing non-routine critical care, revaluated multiple times during the day.    By signing my name below, I, Krystin Archer, attest that this documentation has been prepared under the direction and in the presence of Nasim Oliver PA.  Electronically signed: Jonathan Pritchett, 04-14-20 @ 18:26    I, Nasim Oliver, personally performed the services described in this documentation. all medical record entries made by the jonathan were at my direction and in my presence. I have reviewed the chart and agree that the record reflects my personal performance and is accurate and complete  Electronically signed: Nasim Oliver PA. 04-14-20 @ 18:26 CLARITA POWELL  MRN-[id number]  Patient is a 70y old  Male who presents with a chief complaint of respiratory distress, COVID 19 (14 Apr 2020 06:51)    HPI:  70M with PMH presents with T dm, hypothyroidism, hld, p/w cough, low grade fever x 2-3 days. Today patient had worsening dyspnea at rest as well as exertion which prompted to come to the ED. Patient otherwise denied chest pain, leg swelling. He denies prior lung disease. Denies any history of smoking.     In ED, patient was noted to be hypoxic on 6L NC to 89% and significant tachypnea. VS otherwise, afebrile, HR in 80s, SBP 150s. Labs notable for CBC wnl. CMP with mild elevated transaminases AST 65 and ALT 47. PAtient noted to be positive for COVID 19. Due to worsening respiratory distress patient was emergently intubated in the ER. (27 Mar 2020 21:38)    Surgery/Hospital course:  COVID 19 Positive  Intubated 3/27  4/7 Patient proned  4/8 Unproned  4/9 Paralysis weaned off yesterday, unproned with maintained sats, tolerating diuresis  4/12 10 sec asystole, back after epi x1  4/14 Versed dc'd  4/15 Didn't tolerate CPAP.    ============================I/O===========================   I&O's Detail    13 Apr 2020 07:01  -  14 Apr 2020 07:00  --------------------------------------------------------  IN:    Enteral Tube Flush: 120 mL    Free Water: 900 mL    Glucerna 1.5: 1500 mL    IV PiggyBack: 250 mL    ketamine Infusion.: 463.2 mL    midazolam Infusion: 184 mL    norepinephrine Infusion: 171.7 mL  Total IN: 3588.9 mL    OUT:    Indwelling Catheter - Urethral: 3180 mL  Total OUT: 3180 mL    Total NET: 408.9 mL    14 Apr 2020 07:01  -  14 Apr 2020 18:25  --------------------------------------------------------  IN:    Enteral Tube Flush: 180 mL    Free Water: 200 mL    Glucerna 1.5: 600 mL    ketamine Infusion.: 193 mL    midazolam Infusion: 21.6 mL    norepinephrine Infusion: 95.2 mL  Total IN: 1289.8 mL    OUT:    Indwelling Catheter - Urethral: 900 mL  Total OUT: 900 mL    Total NET: 389.8 mL    ============================ LABS =========================                        9.8    11.80 )-----------( 226      ( 14 Apr 2020 01:35 )             31.6     04-14    142  |  103  |  34<H>  ----------------------------<  175<H>  4.3   |  31  |  0.57    Ca    8.6      14 Apr 2020 01:35  Phos  2.7     04-14  Mg     2.2     04-14    TPro  6.4  /  Alb  2.4<L>  /  TBili  0.3  /  DBili  x   /  AST  15  /  ALT  23  /  AlkPhos  108  04-14    LIVER FUNCTIONS - ( 14 Apr 2020 01:35 )  Alb: 2.4 g/dL / Pro: 6.4 g/dL / ALK PHOS: 108 U/L / ALT: 23 U/L / AST: 15 U/L / GGT: x           PT/INR - ( 13 Apr 2020 02:21 )   PT: 13.6 sec;   INR: 1.18 ratio      PTT - ( 13 Apr 2020 02:21 )  PTT:34.4 sec  ABG - ( 14 Apr 2020 16:31 )  pH, Arterial: 7.48  pH, Blood: x     /  pCO2: 43    /  pO2: 53    / HCO3: 31    / Base Excess: 7.3   /  SaO2: 88        ======================Micro/Rad/Cardio=================  Culture: Reviewed   CXR: Reviewed    ======================================================  PAST MEDICAL & SURGICAL HISTORY:  Hyperlipidemia  Hypothyroid  No significant past surgical history    ====================ASSESSMENT ==============  COVID 19 Positive   3/27 Intubated   Hypoxic respiratory failure   Septic Shock  ARDS  Fluid overload    Plan:  ====================== NEUROLOGY=====================  Wean ketamine drip.  Continue klonopin and oxycodone to prevent withdrawal and help wean sedation.  Dilaudid PRN.    acetaminophen    Suspension .. 650 milliGRAM(s) Oral every 6 hours PRN Temp greater or equal to 38.5C (101.3F)  clonazePAM  Tablet 2 milliGRAM(s) Oral every 8 hours  HYDROmorphone  Injectable 0.5 milliGRAM(s) IV Push every 3 hours PRN Moderate Pain (4 - 6)  ketamine Infusion. 0.25 mG/kG/Hr (1.93 mL/Hr) IV Continuous <Continuous>  oxyCODONE    IR 10 milliGRAM(s) Oral every 8 hours    ==================== RESPIRATORY======================  Continue to wean FiO2 (goal to 30%, then wean PEEP as tolerated).    Mechanical Ventilation:  Mode: AC/ CMV (Assist Control/ Continuous Mandatory Ventilation)  RR (machine): 36  TV (machine): 350  FiO2: 40  PEEP: 6  ITime: 1  MAP: 17  PIP: 33    ====================CARDIOVASCULAR==================  norepinephrine Infusion 0.06 MICROgram(s)/kG/Min (8.67 mL/Hr) IV Continuous <Continuous>    Wean vasopressors as tolerated, MAP goal 65-75    ===================HEMATOLOGIC/ONC ===================  aspirin  chewable 81 milliGRAM(s) Oral daily  enoxaparin Injectable 40 milliGRAM(s) SubCutaneous two times a day    Continue LVX 40 BID for VTE prophylaxis    ===================== RENAL =========================  Continue monitoring urine output.   Lasix PRN for net even to negative fluid balance.  Monitor and replete electrolytes as needed.    ==================== GASTROINTESTINAL===================  Continue tube feeds: Glucerna 300 Q6 at goal  Continue pepcid for GI prophylaxis    famotidine Injectable 20 milliGRAM(s) IV Push two times a day  polyethylene glycol 3350 17 Gram(s) Oral every 12 hours  senna Syrup 10 milliLiter(s) Oral at bedtime  sodium chloride 0.9% lock flush 10 milliLiter(s) IV Push every 1 hour PRN Pre/post blood products, medications, blood draw, and to maintain line patency    =======================    ENDOCRINE  =====================  Glucose control.    insulin lispro (HumaLOG) corrective regimen sliding scale   SubCutaneous every 6 hours  insulin NPH human recombinant 11 Unit(s) SubCutaneous every 6 hours  levothyroxine Injectable 50 MICROGram(s) IV Push at bedtime    ========================INFECTIOUS DISEASE================  +COVID  -Completed Plaquenil (3/27-4/1), Azithromycin (3/28-3/31)  -s/p Anamanniera    I have spent 40 minutes of critical care time with this patient.    Patient requires continuous monitoring with bedside rhythm monitoring, arterial line, pulse oximetry, ventilator monitoring and intermittent blood gas analysis.  Care plan discussed with ICU care team.  Patient remained critical; required more than usual critical care; I have spent 35 minutes providing non-routine critical care, revaluated multiple times during the day.    By signing my name below, I, Krystin Archer, attest that this documentation has been prepared under the direction and in the presence of Nasim Oliver PA.  Electronically signed: Jonathan Pritchett, 04-14-20 @ 18:26    I, Nasim Oliver, personally performed the services described in this documentation. all medical record entries made by the jonathan were at my direction and in my presence. I have reviewed the chart and agree that the record reflects my personal performance and is accurate and complete  Electronically signed: Nasim Oliver PA. 04-14-20 @ 18:26    I spent 45 minutes of critical care time with this patient.

## 2020-04-14 NOTE — CHART NOTE - NSCHARTNOTEFT_GEN_A_CORE
Nutrition Follow Up Note   Patient seen for: nutrition follow up on COVID ICU     Source: medical record, communication with team. Unable to speak to pt due to current airborne isolation contact precautions related to COVID-19. Pt remains intubated.     Chart reviewed, events noted. 70 year old male with PMHx of DM, hypothyroidism, HLD. Presented with fever, cough and SOB. Found to be COVID19+, required emergent intubation in ED. Pt remains intubated, sedated.  Continues to require full vent support.     Diet Order: Diet, NPO with Tube Feed:   Tube Feeding Modality: Nasogastric  Glucerna 1.5 Chandrakant (GLUCERNA1.5RTH)  Total Volume for 24 Hours (mL): 1200  Bolus  Total Volume of Bolus (mL):  300  Total # of Feeds: 4  Tube Feed Frequency: Every 6 hours       CURRENT EN ORDER PROVIDES:1800kcals and 99gm protein (23.3kcals.kg and 1.28gm pro/kg based on admission Wt: 77.1kg)  Nutrition Events: No new events. Pt tolerating increase bolus rate well. Has good EN provision x5 days.   No recent EN residuals     GI: Last BM on 4/12  receives miralax, and senna     Anthrpometric Measurements:   Height (cm): 175.26 (03-27-20 @ 16:15)  Weight (kg): 77.1 (03-27-20 @ 16:15)  BMI (kg/m2): 25.1 (03-27-20 @ 16:15)    Medications: MEDICATIONS  (STANDING):  aspirin  chewable 81 milliGRAM(s) Oral daily  chlorhexidine 0.12% Liquid 15 milliLiter(s) Oral Mucosa every 12 hours  chlorhexidine 2% Cloths 1 Application(s) Topical <User Schedule>  clonazePAM  Tablet 2 milliGRAM(s) Oral every 8 hours  enoxaparin Injectable 40 milliGRAM(s) SubCutaneous two times a day  famotidine Injectable 20 milliGRAM(s) IV Push two times a day  furosemide   Injectable 40 milliGRAM(s) IV Push every 12 hours  insulin lispro (HumaLOG) corrective regimen sliding scale   SubCutaneous every 6 hours  insulin NPH human recombinant 11 Unit(s) SubCutaneous every 6 hours  ketamine Infusion. 0.25 mG/kG/Hr (1.93 mL/Hr) IV Continuous <Continuous>  levothyroxine Injectable 50 MICROGram(s) IV Push at bedtime  midazolam Infusion 0.02 mG/kG/Hr (1.54 mL/Hr) IV Continuous <Continuous>  norepinephrine Infusion 0.06 MICROgram(s)/kG/Min (8.67 mL/Hr) IV Continuous <Continuous>  oxyCODONE    IR 10 milliGRAM(s) Oral every 8 hours  petrolatum Ophthalmic Ointment 1 Application(s) Both EYES two times a day  polyethylene glycol 3350 17 Gram(s) Oral every 12 hours  senna Syrup 10 milliLiter(s) Oral at bedtime    MEDICATIONS  (PRN):  acetaminophen    Suspension .. 650 milliGRAM(s) Oral every 6 hours PRN Temp greater or equal to 38.5C (101.3F)  HYDROmorphone  Injectable 0.5 milliGRAM(s) IV Push every 3 hours PRN Moderate Pain (4 - 6)  sodium chloride 0.9% lock flush 10 milliLiter(s) IV Push every 1 hour PRN Pre/post blood products, medications, blood draw, and to maintain line patency    Labs: 04-14 @ 07:23: Sodium --, Potassium --, Calcium --, Magnesium --, Phosphorus --, BUN --, Creatinine --, Glucose --, Alk Phos --, ALT/SGPT --, AST/SGOT --, Albumin --, Prealbumin --, Total Bilirubin --, Hemoglobin --, Hematocrit --, Ferritin --, C-Reactive Protein 10.73<H>, Creatine Kinase <<27>  04-14 @ 01:35: Sodium 142, Potassium 4.3, Calcium 8.6, Magnesium 2.2, Phosphorus 2.7, BUN 34<H>, Creatinine 0.57, Glucose 175<H>, Alk Phos 108, ALT/SGPT 23, AST/SGOT 15, Albumin 2.4<L>, Prealbumin --, Total Bilirubin 0.3, Hemoglobin 9.8<L>, Hematocrit 31.6<L>, Ferritin --, C-Reactive Protein --, Creatine Kinase <<27>    Hemoglobin A1C, Whole Blood: 7.2 % (03-30-20 @ 08:24)    Triglycerides, Serum: 203 mg/dL (04-03-20 @ 11:19)  Triglycerides, Serum: 103 mg/dL (03-31-20 @ 14:18)  Triglycerides, Serum: 143 mg/dL (03-30-20 @ 09:42)  Triglycerides, Serum: 107 mg/dL (03-30-20 @ 01:10)      POCT Blood Glucose.: 185 mg/dL (04-14-20 @ 04:42)  POCT Blood Glucose.: 179 mg/dL (04-14-20 @ 00:15)  POCT Blood Glucose.: 154 mg/dL (04-13-20 @ 17:07)  POCT Blood Glucose.: 241 mg/dL (04-13-20 @ 11:02)    Skin: intact   Edema: +1 generalized     Estimated Needs:   [ X] no change since previous assessment based on admission wt: 77.1kg)    Estimated Energy Needs  · From (20 chandrakant/kg)	1542  · To (25 chandrakant/kg)	1927    Estimated Protein Needs  · From (1.2 g/kg)	92.52  · To (1.4 g/kg)	107.94    Previous Nutrition Diagnosis: None    New Nutrition Diagnosis:  None     Recommended Interventions:   1. Continue Glucerna 1.5 to 300ml bolus q6hrs. Provides 1800kcals and 99gm protein (23.3kcals.kg and 1.28gm pro/kg based on admission Wt: 77.1kg)  2. Continue bowel regimen.  3. Defer free water to team  4. Trend BG levels, renal indices, LFT's, electrolytes and triglycerides     Monitoring and Evaluation:   Continue to monitor nutrition provision and tolerance, weights, labs, skin integrity.   RD remains available upon request and will follow up per protocol.  Sarah Siegler RD, RDN, [facility] Pager #[phone]

## 2020-04-14 NOTE — PROGRESS NOTE ADULT - SUBJECTIVE AND OBJECTIVE BOX
CLARITA POWELL  MRN-[id number]  Patient is a 70y old  Male who presents with a chief complaint of respiratory distress, COVID 19 (13 Apr 2020 19:20)    HPI:  70M with PMH presents with T dm, hypothyroidism, hld, p/w cough, low grade fever x 2-3 days. Today patient had worsening dyspnea at rest as well as exertion which prompted to come to the ED. Patient otherwise denied chest pain, leg swelling. He denies prior lung disease. Denies any history of smoking.     In ED, patient was noted to be hypoxic on 6L NC to 89% and significant tachypnea. VS otherwise, afebrile, HR in 80s, SBP 150s. Labs notable for CBC wnl. CMP with mild elevated transaminases AST 65 and ALT 47. PAtient noted to be positive for COVID 19. Due to worsening respiratory distress patient was emergently intubated in the ER. (27 Mar 2020 21:38)      Surgery/Hospital course:  COVID 19 Positive  Intubated 3/27  4/7 Patient proned  4/8 Unproned  4/9 Paralysis weaned off yesterday, unproned with maintained sats, tolerating diuresis    REVIEW OF SYSTEMS:  Not obtainable, vented     Vital Signs Last 24 Hrs  T(C): 37.2 (14 Apr 2020 03:00), Max: 38 (13 Apr 2020 19:00)  T(F): 99 (14 Apr 2020 03:00), Max: 100.4 (13 Apr 2020 19:00)  HR: 73 (14 Apr 2020 03:32) (71 - 85)  BP: 105/52 (14 Apr 2020 03:00) (92/51 - 120/59)  BP(mean): 75 (14 Apr 2020 03:00) (66 - 81)  RR: 39 (14 Apr 2020 03:00) (30 - 46)  SpO2: 94% (14 Apr 2020 03:32) (92% - 96%)    ============================I/O===========================   I&O's Detail    12 Apr 2020 07:01  -  13 Apr 2020 07:00  --------------------------------------------------------  IN:    dexmedetomidine Infusion: 30.8 mL    Enteral Tube Flush: 180 mL    Free Water: 750 mL    Glucerna 1.5: 1200 mL    IV PiggyBack: 260.1 mL    ketamine Infusion.: 279.9 mL    Lactated Ringers IV Bolus: 500 mL    midazolam Infusion: 12.4 mL    midazolam Infusion: 137.7 mL    norepinephrine Infusion: 46.4 mL  Total IN: 3397.3 mL    OUT:    Indwelling Catheter - Urethral: 1555 mL  Total OUT: 1555 mL    Total NET: 1842.3 mL      13 Apr 2020 07:01  -  14 Apr 2020 06:52  --------------------------------------------------------  IN:    Enteral Tube Flush: 120 mL    Free Water: 900 mL    Glucerna 1.5: 1500 mL    IV PiggyBack: 250 mL    ketamine Infusion.: 463.2 mL    midazolam Infusion: 184 mL    norepinephrine Infusion: 171.7 mL  Total IN: 3588.9 mL    OUT:    Indwelling Catheter - Urethral: 3080 mL  Total OUT: 3080 mL    Total NET: 508.9 mL        ============================ LABS =========================                        9.8    11.80 )-----------( 226      ( 14 Apr 2020 01:35 )             31.6     04-14    142  |  103  |  34<H>  ----------------------------<  175<H>  4.3   |  31  |  0.57    Ca    8.6      14 Apr 2020 01:35  Phos  2.7     04-14  Mg     2.2     04-14    TPro  6.4  /  Alb  2.4<L>  /  TBili  0.3  /  DBili  x   /  AST  15  /  ALT  23  /  AlkPhos  108  04-14    LIVER FUNCTIONS - ( 14 Apr 2020 01:35 )  Alb: 2.4 g/dL / Pro: 6.4 g/dL / ALK PHOS: 108 U/L / ALT: 23 U/L / AST: 15 U/L / GGT: x           PT/INR - ( 13 Apr 2020 02:21 )   PT: 13.6 sec;   INR: 1.18 ratio         PTT - ( 13 Apr 2020 02:21 )  PTT:34.4 sec  ABG - ( 14 Apr 2020 01:19 )  pH, Arterial: 7.51  pH, Blood: x     /  pCO2: 41    /  pO2: 72    / HCO3: 32    / Base Excess: 8.4   /  SaO2: 96                  ======================Micro/Rad/Cardio=================  Culture: Reviewed   CXR: Reviewed  ======================================================  PAST MEDICAL & SURGICAL HISTORY:  Hyperlipidemia  Hypothyroid  No significant past surgical history    ====================ASSESSMENT ==============  COVID 19 Positive   3/27 Intubated   Hypoxic respiratory failure   Septic Shock  ARDS  Fluid overload    Plan:  ====================== NEUROLOGY=====================  Sedated with Ketamine, Klonopin, and midazolam   Oxycodone and Dilaudid for analgesia     acetaminophen    Suspension .. 650 milliGRAM(s) Oral every 6 hours PRN Temp greater or equal to 38.5C (101.3F)  clonazePAM  Tablet 2 milliGRAM(s) Oral every 8 hours  HYDROmorphone  Injectable 0.5 milliGRAM(s) IV Push every 3 hours PRN Moderate Pain (4 - 6)  ketamine Infusion. 0.25 mG/kG/Hr (1.93 mL/Hr) IV Continuous <Continuous>  midazolam Infusion 0.02 mG/kG/Hr (1.54 mL/Hr) IV Continuous <Continuous>  oxyCODONE    IR 10 milliGRAM(s) Oral every 8 hours    ==================== RESPIRATORY======================  On full vent support     Mechanical Ventilation:  Mode: AC/ CMV (Assist Control/ Continuous Mandatory Ventilation)  RR (machine): 30  TV (machine): 360  FiO2: 40  PEEP: 8  ITime: 1  MAP: 17  PIP: 32      ====================CARDIOVASCULAR==================  On pressor support for hypotension     norepinephrine Infusion 0.06 MICROgram(s)/kG/Min (8.67 mL/Hr) IV Continuous <Continuous>    ===================HEMATOLOGIC/ONC ===================  aspirin  chewable 81 milliGRAM(s) Oral daily  VTE prophylaxis, enoxaparin Injectable 40 milliGRAM(s) SubCutaneous two times a day    ===================== RENAL =========================  Continue monitoring urine output  Diuresis with lasix     furosemide   Injectable 40 milliGRAM(s) IV Push every 12 hours    ==================== GASTROINTESTINAL===================  Tolerating tube feeds, Glucerna 300cc [id number]     GI prophylaxis, famotidine Injectable 20 milliGRAM(s) IV Push two times a day  polyethylene glycol 3350 17 Gram(s) Oral every 12 hours  senna Syrup 10 milliLiter(s) Oral at bedtime  sodium chloride 0.9% lock flush 10 milliLiter(s) IV Push every 1 hour PRN Pre/post blood products, medications, blood draw, and to maintain line patency    =======================    ENDOCRINE  =====================  Glucose control,   insulin lispro (HumaLOG) corrective regimen sliding scale   SubCutaneous every 6 hours  insulin NPH human recombinant 11 Unit(s) SubCutaneous every 6 hours  levothyroxine Injectable 50 MICROGram(s) IV Push at bedtime    ========================INFECTIOUS DISEASE================  Completed COVID medications (Plaquenil, Vit C/Thiamine, Anakinra, Solumedrol)       Patient requires continuous monitoring with bedside rhythm monitoring, pulse ox monitoring, and intermittent blood gas analysis. Care plan discussed with ICU care team. Patient remained critical and required more than usual ICU care.    By signing my name below, I, Pretty Wallace, attest that this documentation has been prepared under the direction and in the presence of GERRY Dsouza.  Electronically signed: Rohan Lal, 04-14-20 @ 06:52    I, Nora Saini, personally performed the services described in this documentation. all medical record entries made by the scribe were at my direction and in my presence. I have reviewed the chart and agree that the record reflects my personal performance and is accurate and complete  Electronically signed: GERRY Dsouza CLARITA POWELL  MRN-[id number]  Patient is a 70y old  Male who presents with a chief complaint of respiratory distress, COVID 19 (13 Apr 2020 19:20)    HPI:  70M with PMH presents with T dm, hypothyroidism, hld, p/w cough, low grade fever x 2-3 days. Today patient had worsening dyspnea at rest as well as exertion which prompted to come to the ED. Patient otherwise denied chest pain, leg swelling. He denies prior lung disease. Denies any history of smoking.     In ED, patient was noted to be hypoxic on 6L NC to 89% and significant tachypnea. VS otherwise, afebrile, HR in 80s, SBP 150s. Labs notable for CBC wnl. CMP with mild elevated transaminases AST 65 and ALT 47. PAtient noted to be positive for COVID 19. Due to worsening respiratory distress patient was emergently intubated in the ER. (27 Mar 2020 21:38)      Surgery/Hospital course:  COVID 19 Positive  Intubated 3/27  4/7 Patient proned  4/8 Unproned  4/9 Paralysis weaned off yesterday, unproned with maintained sats, tolerating diuresis  4/12 10 sec asystole, back after epi x1    REVIEW OF SYSTEMS:  Not obtainable, vented     Vital Signs Last 24 Hrs  T(C): 37.2 (14 Apr 2020 03:00), Max: 38 (13 Apr 2020 19:00)  T(F): 99 (14 Apr 2020 03:00), Max: 100.4 (13 Apr 2020 19:00)  HR: 73 (14 Apr 2020 03:32) (71 - 85)  BP: 105/52 (14 Apr 2020 03:00) (92/51 - 120/59)  BP(mean): 75 (14 Apr 2020 03:00) (66 - 81)  RR: 39 (14 Apr 2020 03:00) (30 - 46)  SpO2: 94% (14 Apr 2020 03:32) (92% - 96%)    ============================I/O===========================   I&O's Detail    12 Apr 2020 07:01  -  13 Apr 2020 07:00  --------------------------------------------------------  IN:    dexmedetomidine Infusion: 30.8 mL    Enteral Tube Flush: 180 mL    Free Water: 750 mL    Glucerna 1.5: 1200 mL    IV PiggyBack: 260.1 mL    ketamine Infusion.: 279.9 mL    Lactated Ringers IV Bolus: 500 mL    midazolam Infusion: 12.4 mL    midazolam Infusion: 137.7 mL    norepinephrine Infusion: 46.4 mL  Total IN: 3397.3 mL    OUT:    Indwelling Catheter - Urethral: 1555 mL  Total OUT: 1555 mL    Total NET: 1842.3 mL      13 Apr 2020 07:01  -  14 Apr 2020 06:52  --------------------------------------------------------  IN:    Enteral Tube Flush: 120 mL    Free Water: 900 mL    Glucerna 1.5: 1500 mL    IV PiggyBack: 250 mL    ketamine Infusion.: 463.2 mL    midazolam Infusion: 184 mL    norepinephrine Infusion: 171.7 mL  Total IN: 3588.9 mL    OUT:    Indwelling Catheter - Urethral: 3080 mL  Total OUT: 3080 mL    Total NET: 508.9 mL        ============================ LABS =========================                        9.8    11.80 )-----------( 226      ( 14 Apr 2020 01:35 )             31.6     04-14    142  |  103  |  34<H>  ----------------------------<  175<H>  4.3   |  31  |  0.57    Ca    8.6      14 Apr 2020 01:35  Phos  2.7     04-14  Mg     2.2     04-14    TPro  6.4  /  Alb  2.4<L>  /  TBili  0.3  /  DBili  x   /  AST  15  /  ALT  23  /  AlkPhos  108  04-14    LIVER FUNCTIONS - ( 14 Apr 2020 01:35 )  Alb: 2.4 g/dL / Pro: 6.4 g/dL / ALK PHOS: 108 U/L / ALT: 23 U/L / AST: 15 U/L / GGT: x           PT/INR - ( 13 Apr 2020 02:21 )   PT: 13.6 sec;   INR: 1.18 ratio         PTT - ( 13 Apr 2020 02:21 )  PTT:34.4 sec  ABG - ( 14 Apr 2020 01:19 )  pH, Arterial: 7.51  pH, Blood: x     /  pCO2: 41    /  pO2: 72    / HCO3: 32    / Base Excess: 8.4   /  SaO2: 96        ======================Micro/Rad/Cardio=================  Culture: Reviewed   CXR: Reviewed  ======================================================  PAST MEDICAL & SURGICAL HISTORY:  Hyperlipidemia  Hypothyroid  No significant past surgical history    ====================ASSESSMENT ==============  COVID 19 Positive   3/27 Intubated   Hypoxic respiratory failure   Septic Shock  ARDS  Fluid overload    Plan:  ====================== NEUROLOGY=====================  Sedated with Ketamine, Klonopin, and midazolam. Continue to wean versed to off.  Oxycodone to prevent withdrawal from fentanyl.    acetaminophen    Suspension .. 650 milliGRAM(s) Oral every 6 hours PRN Temp greater or equal to 38.5C (101.3F)  clonazePAM  Tablet 2 milliGRAM(s) Oral every 8 hours  HYDROmorphone  Injectable 0.5 milliGRAM(s) IV Push every 3 hours PRN Moderate Pain (4 - 6)  ketamine Infusion. 0.25 mG/kG/Hr (1.93 mL/Hr) IV Continuous <Continuous>  midazolam Infusion 0.02 mG/kG/Hr (1.54 mL/Hr) IV Continuous <Continuous>  oxyCODONE    IR 10 milliGRAM(s) Oral every 8 hours    ==================== RESPIRATORY======================  On full vent support. Continue to wean PEEP as tolerated. CPAP trials as tolerated.    Mechanical Ventilation:  Mode: AC/ CMV (Assist Control/ Continuous Mandatory Ventilation)  RR (machine): 30  TV (machine): 360  FiO2: 40  PEEP: 7  ITime: 1  MAP: 17  PIP: 32    ====================CARDIOVASCULAR==================  On pressor support for hypotension.    norepinephrine Infusion 0.06 MICROgram(s)/kG/Min (8.67 mL/Hr) IV Continuous <Continuous>    ===================HEMATOLOGIC/ONC ===================  aspirin  chewable 81 milliGRAM(s) Oral daily  VTE prophylaxis, enoxaparin Injectable 40 milliGRAM(s) SubCutaneous two times a day    ===================== RENAL =========================  Continue monitoring urine output  Diuresis with lasix. Monitor and replete electrolytes as needed.    furosemide   Injectable 40 milliGRAM(s) IV Push every 12 hours    ==================== GASTROINTESTINAL===================  Tolerating tube feeds at goal.     GI prophylaxis, famotidine Injectable 20 milliGRAM(s) IV Push two times a day  polyethylene glycol 3350 17 Gram(s) Oral every 12 hours  senna Syrup 10 milliLiter(s) Oral at bedtime  sodium chloride 0.9% lock flush 10 milliLiter(s) IV Push every 1 hour PRN Pre/post blood products, medications, blood draw, and to maintain line patency    =======================    ENDOCRINE  =====================  Glucose control.  insulin lispro (HumaLOG) corrective regimen sliding scale   SubCutaneous every 6 hours  insulin NPH human recombinant 11 Unit(s) SubCutaneous every 6 hours  levothyroxine Injectable 50 MICROGram(s) IV Push at bedtime    ========================INFECTIOUS DISEASE================  Completed COVID medications (Plaquenil, Vit C/Thiamine, Anakinra, Solumedrol)     ========================GOALS OF CARE===================  4/14 I spoke with daughter and discussed continuing to wean sedation and ventilator.      Patient requires continuous monitoring with bedside rhythm monitoring, pulse ox monitoring, and intermittent blood gas analysis. Care plan discussed with ICU care team. Patient remained critical and required more than usual ICU care.    By signing my name below, I, Pretty Wallace, attest that this documentation has been prepared under the direction and in the presence of GERRY Dsouza.  Electronically signed: Rohan Lal, 04-14-20 @ 06:52    I, Nora Saini, personally performed the services described in this documentation. all medical record entries made by the scribe were at my direction and in my presence. I have reviewed the chart and agree that the record reflects my personal performance and is accurate and complete  Electronically signed: GERRY Dsouza     I spent 45 minutes of critical care time with this patient.

## 2020-04-15 NOTE — PROGRESS NOTE ADULT - SUBJECTIVE AND OBJECTIVE BOX
CLARITA POWELL  MRN-[id number]  Patient is a 70y old  Male who presents with a chief complaint of respiratory distress, COVID 19 (14 Apr 2020 18:23)    HPI:  70M with PMH presents with T dm, hypothyroidism, hld, p/w cough, low grade fever x 2-3 days. Today patient had worsening dyspnea at rest as well as exertion which prompted to come to the ED. Patient otherwise denied chest pain, leg swelling. He denies prior lung disease. Denies any history of smoking.     In ED, patient was noted to be hypoxic on 6L NC to 89% and significant tachypnea. VS otherwise, afebrile, HR in 80s, SBP 150s. Labs notable for CBC wnl. CMP with mild elevated transaminases AST 65 and ALT 47. PAtient noted to be positive for COVID 19. Due to worsening respiratory distress patient was emergently intubated in the ER. (27 Mar 2020 21:38)      Surgery/Hospital course:  COVID 19 Positive  Intubated 3/27  4/7 Patient proned  4/8 Unproned  4/9 Paralysis weaned off yesterday, unproned with maintained sats, tolerating diuresis  4/12 10 sec asystole, back after epi x1    REVIEW OF SYSTEMS:  Unable to obtain, vented     Vital Signs Last 24 Hrs  T(C): 38 (15 Apr 2020 03:00), Max: 38 (15 Apr 2020 03:00)  T(F): 100.4 (15 Apr 2020 03:00), Max: 100.4 (15 Apr 2020 03:00)  HR: 77 (15 Apr 2020 06:00) (71 - 89)  BP: 129/59 (15 Apr 2020 06:00) (100/51 - 143/65)  BP(mean): 85 (15 Apr 2020 06:00) (70 - 93)  RR: 35 (15 Apr 2020 06:00) (31 - 40)  SpO2: 95% (15 Apr 2020 06:00) (88% - 95%)    ============================I/O===========================   I&O's Detail    13 Apr 2020 07:01  -  14 Apr 2020 07:00  --------------------------------------------------------  IN:    Enteral Tube Flush: 120 mL    Free Water: 900 mL    Glucerna 1.5: 1500 mL    IV PiggyBack: 250 mL    ketamine Infusion.: 463.2 mL    midazolam Infusion: 184 mL    norepinephrine Infusion: 171.7 mL  Total IN: 3588.9 mL    OUT:    Indwelling Catheter - Urethral: 3180 mL  Total OUT: 3180 mL    Total NET: 408.9 mL      14 Apr 2020 07:01  -  15 Apr 2020 06:28  --------------------------------------------------------  IN:    Enteral Tube Flush: 180 mL    Free Water: 300 mL    Glucerna 1.5: 1200 mL    IV PiggyBack: 250 mL    ketamine Infusion.: 374.3 mL    ketamine Infusion.: 61.6 mL    midazolam Infusion: 21.6 mL    norepinephrine Infusion: 271.2 mL  Total IN: 2658.7 mL    OUT:    Indwelling Catheter - Urethral: 1910 mL  Total OUT: 1910 mL    Total NET: 748.7 mL        ============================ LABS =========================                        9.1    11.66 )-----------( 202      ( 15 Apr 2020 01:25 )             30.1     04-15    143  |  105  |  30<H>  ----------------------------<  158<H>  5.0   |  29  |  0.52    Ca    8.3<L>      15 Apr 2020 01:25  Phos  2.8     04-15  Mg     2.2     04-15    TPro  6.0  /  Alb  2.4<L>  /  TBili  0.3  /  DBili  x   /  AST  14  /  ALT  18  /  AlkPhos  101  04-15    LIVER FUNCTIONS - ( 15 Apr 2020 01:25 )  Alb: 2.4 g/dL / Pro: 6.0 g/dL / ALK PHOS: 101 U/L / ALT: 18 U/L / AST: 14 U/L / GGT: x             ABG - ( 15 Apr 2020 01:02 )  pH, Arterial: 7.44  pH, Blood: x     /  pCO2: 44    /  pO2: 99    / HCO3: 30    / Base Excess: 5.7   /  SaO2: 98                  ======================Micro/Rad/Cardio=================  Culture: Reviewed   CXR: Reviewed  ======================================================  PAST MEDICAL & SURGICAL HISTORY:  Hyperlipidemia  Hypothyroid  No significant past surgical history    ====================ASSESSMENT ==============  COVID 19 Positive   3/27 Intubated   Hypoxic respiratory failure   Septic Shock  ARDS    Plan:  ====================== NEUROLOGY=====================  Sedated with Ketamine, Klonopin   Oxycodone to prevent withdrawal from fentanyl  Dilaudid prn for analgesia     acetaminophen    Suspension .. 650 milliGRAM(s) Oral every 6 hours PRN Temp greater or equal to 38.5C (101.3F)  clonazePAM  Tablet 2 milliGRAM(s) Oral every 8 hours  HYDROmorphone  Injectable 0.5 milliGRAM(s) IV Push every 3 hours PRN Moderate Pain (4 - 6)  ketamine Infusion. 2 mG/kG/Hr (15.4 mL/Hr) IV Continuous <Continuous>  oxyCODONE    IR 10 milliGRAM(s) Oral every 8 hours    ==================== RESPIRATORY======================  On full vent support     Mechanical Ventilation:  Mode: AC/ CMV (Assist Control/ Continuous Mandatory Ventilation)  RR (machine): 30  TV (machine): 350  FiO2: 50  PEEP: 6  ITime: 1  MAP: 16  PIP: 33    ====================CARDIOVASCULAR==================  On pressor support for hypotension     norepinephrine Infusion 0.06 MICROgram(s)/kG/Min (8.67 mL/Hr) IV Continuous <Continuous>    ===================HEMATOLOGIC/ONC ===================  aspirin  chewable 81 milliGRAM(s) Oral daily  VTE prophylaxis, enoxaparin Injectable 40 milliGRAM(s) SubCutaneous two times a day    ===================== RENAL =========================  Continue monitoring urine output  Monitor and replete electrolytes as needed.    ==================== GASTROINTESTINAL===================  Tolerating tube feeds, Glucerna 300 Q6     GI prophylaxis, famotidine Injectable 20 milliGRAM(s) IV Push two times a day  polyethylene glycol 3350 17 Gram(s) Oral every 12 hours  senna Syrup 10 milliLiter(s) Oral at bedtime  sodium chloride 0.9% lock flush 10 milliLiter(s) IV Push every 1 hour PRN Pre/post blood products, medications, blood draw, and to maintain line patency    =======================    ENDOCRINE  =====================  Glucose control,   insulin lispro (HumaLOG) corrective regimen sliding scale   SubCutaneous every 6 hours  insulin NPH human recombinant 11 Unit(s) SubCutaneous every 6 hours  levothyroxine Injectable 50 MICROGram(s) IV Push at bedtime    ========================INFECTIOUS DISEASE================  Completed COVID medications (Plaquenil, Vit C/Thiamine, Anakinra, Solumedrol)       Patient requires continuous monitoring with bedside rhythm monitoring, pulse ox monitoring, and intermittent blood gas analysis. Care plan discussed with ICU care team. Patient remained critical and required more than usual care.    By signing my name below, I, Pretty Wallace, attest that this documentation has been prepared under the direction and in the presence of GERRY Dsouza.  Electronically signed: Rohna Lal, 04-15-20 @ 06:28    I, Nora Saini, personally performed the services described in this documentation. all medical record entries made by the scribe were at my direction and in my presence. I have reviewed the chart and agree that the record reflects my personal performance and is accurate and complete  Electronically signed: GERRY Dsouza

## 2020-04-15 NOTE — PROGRESS NOTE ADULT - SUBJECTIVE AND OBJECTIVE BOX
CLARITA POWELL  MRN-[id number]  Patient is a 70y old  Male who presents with a chief complaint of respiratory distress, COVID 19 (15 Apr 2020 06:28)    HPI:  70M with PMH presents with T dm, hypothyroidism, hld, p/w cough, low grade fever x 2-3 days. Today patient had worsening dyspnea at rest as well as exertion which prompted to come to the ED. Patient otherwise denied chest pain, leg swelling. He denies prior lung disease. Denies any history of smoking.     In ED, patient was noted to be hypoxic on 6L NC to 89% and significant tachypnea. VS otherwise, afebrile, HR in 80s, SBP 150s. Labs notable for CBC wnl. CMP with mild elevated transaminases AST 65 and ALT 47. PAtient noted to be positive for COVID 19. Due to worsening respiratory distress patient was emergently intubated in the ER. (27 Mar 2020 21:38)      Surgery/Hospital course:  COVID 19 Positive  Intubated 3/27  4/7 Patient proned  4/8 Unproned  4/9 Paralysis weaned off yesterday, unproned with maintained sats, tolerating diuresis  4/12 10 sec asystole, back after epi x1    Today:      ============================I/O===========================   I&O's Detail    14 Apr 2020 07:01  -  15 Apr 2020 07:00  --------------------------------------------------------  IN:    Enteral Tube Flush: 180 mL    Free Water: 300 mL    Glucerna 1.5: 1200 mL    IV PiggyBack: 250 mL    ketamine Infusion.: 374.3 mL    ketamine Infusion.: 61.6 mL    midazolam Infusion: 21.6 mL    norepinephrine Infusion: 271.2 mL  Total IN: 2658.7 mL    OUT:    Indwelling Catheter - Urethral: 2010 mL  Total OUT: 2010 mL    Total NET: 648.7 mL      15 Apr 2020 07:01  -  15 Apr 2020 18:25  --------------------------------------------------------  IN:    Enteral Tube Flush: 120 mL    Free Water: 100 mL    Glucerna 1.5: 600 mL    ketamine Infusion.: 54 mL    norepinephrine Infusion: 34 mL  Total IN: 908 mL    OUT:    Indwelling Catheter - Urethral: 935 mL  Total OUT: 935 mL    Total NET: -27 mL        ============================ LABS =========================                        9.1    11.66 )-----------( 202      ( 15 Apr 2020 01:25 )             30.1     04-15    143  |  105  |  30<H>  ----------------------------<  158<H>  5.0   |  29  |  0.52    Ca    8.3<L>      15 Apr 2020 01:25  Phos  2.8     04-15  Mg     2.2     04-15    TPro  6.0  /  Alb  2.4<L>  /  TBili  0.3  /  DBili  x   /  AST  14  /  ALT  18  /  AlkPhos  101  04-15    LIVER FUNCTIONS - ( 15 Apr 2020 01:25 )  Alb: 2.4 g/dL / Pro: 6.0 g/dL / ALK PHOS: 101 U/L / ALT: 18 U/L / AST: 14 U/L / GGT: x             ABG - ( 15 Apr 2020 16:11 )  pH, Arterial: 7.42  pH, Blood: x     /  pCO2: 48    /  pO2: 78    / HCO3: 30    / Base Excess: 5.6   /  SaO2: 96                  ======================Micro/Rad/Cardio=================  Culture: Reviewed   CXR: Reviewed  Echo:Reviewed  ======================================================  PAST MEDICAL & SURGICAL HISTORY:  Hyperlipidemia  Hypothyroid  No significant past surgical history    ====================ASSESSMENT ==============  COVID 19 Positive   3/27 Intubated   Hypoxic respiratory failure   Septic Shock  ARDS  Fluid overload    Plan:  ====================== NEUROLOGY=====================  Continue to wean sedation as tolerated    acetaminophen    Suspension .. 650 milliGRAM(s) Oral every 6 hours PRN Temp greater or equal to 38.5C (101.3F)  clonazePAM  Tablet 2 milliGRAM(s) Oral every 8 hours  HYDROmorphone  Injectable 0.5 milliGRAM(s) IV Push every 3 hours PRN Moderate Pain (4 - 6)  oxyCODONE    IR 10 milliGRAM(s) Oral every 8 hours    ==================== RESPIRATORY======================  Continue to wean FiO2 and  PEEP as tolerated  O2 sat goal >92%    Mechanical Ventilation:  Mode: AC/ CMV (Assist Control/ Continuous Mandatory Ventilation)  RR (machine): 30  TV (machine): 350  FiO2: 50  PEEP: 6  ITime: 1  MAP: 21  PIP: 36    ====================CARDIOVASCULAR==================  Wean vasopressors as tolerated, MAP goal 65-75    norepinephrine Infusion 0.06 MICROgram(s)/kG/Min (8.67 mL/Hr) IV Continuous <Continuous>    ===================HEMATOLOGIC/ONC ===================  aspirin  chewable 81 milliGRAM(s) Oral daily  enoxaparin Injectable 40 milliGRAM(s) SubCutaneous two times a day      Continue Lovenox/Argatroban for VTE prophylaxis  ===================== RENAL =========================  Continue monitoring urine output  Monitor BUN/SCr  Continue to diurese as tolerated with Lasix    ==================== GASTROINTESTINAL===================  Continue tube feeds: goal:   Continue pepcid for GI prophylaxis    famotidine    Tablet 20 milliGRAM(s) Oral two times a day  polyethylene glycol 3350 17 Gram(s) Oral every 12 hours  senna Syrup 10 milliLiter(s) Oral at bedtime  sodium chloride 0.9% lock flush 10 milliLiter(s) IV Push every 1 hour PRN Pre/post blood products, medications, blood draw, and to maintain line patency    =======================    ENDOCRINE  =====================  Glucose control.     insulin lispro (HumaLOG) corrective regimen sliding scale   SubCutaneous every 6 hours  insulin NPH human recombinant 11 Unit(s) SubCutaneous every 6 hours  levothyroxine Injectable 50 MICROGram(s) IV Push at bedtime    ========================INFECTIOUS DISEASE================  +COVID  -Completed Plaquenil (3/27-4/1), Azithromycin (3/28-3/31)  -s/p Anakinra            Patient requires continuous monitoring with bedside rhythm monitoring, arterial line, pulse oximetry, ventilator monitoring and intermittent blood gas analysis.  Care plan discussed with ICU care team.  Patient remained critical; required more than usual ICU care team; I have spent 35 minutes providing non-routine ICU care, revaluated multiple times during the day.    By signing my name below, I, Krystin Archer, attest that this documentation has been prepared under the direction and in the presence of Travon Parmar PA.  Electronically signed: Rohan Pritchett, 04-15-20 @ 18:25    Ghulam VANCE Evan, personally performed the services described in this documentation. all medical record entries made by the scribe were at my direction and in my presence. I have reviewed the chart and agree that the record reflects my personal performance and is accurate and complete  Electronically signed: Travon Parmar PA. 04-15-20 @ 18:25 CLARITA POWELL  MRN-[id number]  Patient is a 70y old  Male who presents with a chief complaint of respiratory distress, COVID 19 (15 Apr 2020 06:28)    HPI:  70M with PMH presents with T dm, hypothyroidism, hld, p/w cough, low grade fever x 2-3 days. Today patient had worsening dyspnea at rest as well as exertion which prompted to come to the ED. Patient otherwise denied chest pain, leg swelling. He denies prior lung disease. Denies any history of smoking.     In ED, patient was noted to be hypoxic on 6L NC to 89% and significant tachypnea. VS otherwise, afebrile, HR in 80s, SBP 150s. Labs notable for CBC wnl. CMP with mild elevated transaminases AST 65 and ALT 47. PAtient noted to be positive for COVID 19. Due to worsening respiratory distress patient was emergently intubated in the ER. (27 Mar 2020 21:38)      Surgery/Hospital course:  COVID 19 Positive  Intubated 3/27  4/7 Patient proned  4/8 Unproned  4/9 Paralysis weaned off yesterday, unproned with maintained sats, tolerating diuresis  4/12 10 sec asystole, back after epi x1  4/13 CPAP    Today: Ketamine turned off during day, work of breathing increased and vent dysynchrony with abdominal breathing, Ketamine put back on. Neuro status: No response to verbal or painful stimuli.      ============================I/O===========================   I&O's Detail    14 Apr 2020 07:01  -  15 Apr 2020 07:00  --------------------------------------------------------  IN:    Enteral Tube Flush: 180 mL    Free Water: 300 mL    Glucerna 1.5: 1200 mL    IV PiggyBack: 250 mL    ketamine Infusion.: 374.3 mL    ketamine Infusion.: 61.6 mL    midazolam Infusion: 21.6 mL    norepinephrine Infusion: 271.2 mL  Total IN: 2658.7 mL    OUT:    Indwelling Catheter - Urethral: 2010 mL  Total OUT: 2010 mL    Total NET: 648.7 mL      15 Apr 2020 07:01  -  15 Apr 2020 18:25  --------------------------------------------------------  IN:    Enteral Tube Flush: 120 mL    Free Water: 100 mL    Glucerna 1.5: 600 mL    ketamine Infusion.: 54 mL    norepinephrine Infusion: 34 mL  Total IN: 908 mL    OUT:    Indwelling Catheter - Urethral: 935 mL  Total OUT: 935 mL    Total NET: -27 mL        ============================ LABS =========================                        9.1    11.66 )-----------( 202      ( 15 Apr 2020 01:25 )             30.1     04-15    143  |  105  |  30<H>  ----------------------------<  158<H>  5.0   |  29  |  0.52    Ca    8.3<L>      15 Apr 2020 01:25  Phos  2.8     04-15  Mg     2.2     04-15    TPro  6.0  /  Alb  2.4<L>  /  TBili  0.3  /  DBili  x   /  AST  14  /  ALT  18  /  AlkPhos  101  04-15    LIVER FUNCTIONS - ( 15 Apr 2020 01:25 )  Alb: 2.4 g/dL / Pro: 6.0 g/dL / ALK PHOS: 101 U/L / ALT: 18 U/L / AST: 14 U/L / GGT: x             ABG - ( 15 Apr 2020 16:11 )  pH, Arterial: 7.42  pH, Blood: x     /  pCO2: 48    /  pO2: 78    / HCO3: 30    / Base Excess: 5.6   /  SaO2: 96                  ======================Micro/Rad/Cardio=================  Culture: Reviewed   CXR: Reviewed  Echo:Reviewed  ======================================================  PAST MEDICAL & SURGICAL HISTORY:  Hyperlipidemia  Hypothyroid  No significant past surgical history    ====================ASSESSMENT ==============  COVID 19 Positive   3/27 Intubated   Hypoxic respiratory failure   Septic Shock  ARDS  Fluid overload    Plan:  ====================== NEUROLOGY=====================  Continue to wean sedation as tolerated  Monitor and assess neuro status daily    acetaminophen    Suspension .. 650 milliGRAM(s) Oral every 6 hours PRN Temp greater or equal to 38.5C (101.3F)  clonazePAM  Tablet 2 milliGRAM(s) Oral every 8 hours  HYDROmorphone  Injectable 0.5 milliGRAM(s) IV Push every 3 hours PRN Moderate Pain (4 - 6)  oxyCODONE    IR 10 milliGRAM(s) Oral every 8 hours    ==================== RESPIRATORY======================  Continue to do CPAP trials as tolerated  O2 sat goal >92%    Mechanical Ventilation:  Mode: AC/ CMV (Assist Control/ Continuous Mandatory Ventilation)  RR (machine): 30  TV (machine): 350  FiO2: 50  PEEP: 6  ITime: 1  MAP: 21  PIP: 36    ====================CARDIOVASCULAR==================  MAP goal > 65      ===================HEMATOLOGIC/ONC ===================  aspirin  chewable 81 milliGRAM(s) Oral daily  enoxaparin Injectable 40 milliGRAM(s) SubCutaneous two times a day      Continue Lovenox for VTE prophylaxis  ===================== RENAL =========================  Continue monitoring urine output  Monitor BUN/SCr  Monitor and replete lytes as needed    ==================== GASTROINTESTINAL===================  Continue tube feeds: Glucerna 300 bolus Q6  Continue pepcid for GI prophylaxis    famotidine    Tablet 20 milliGRAM(s) Oral two times a day  polyethylene glycol 3350 17 Gram(s) Oral every 12 hours  senna Syrup 10 milliLiter(s) Oral at bedtime  sodium chloride 0.9% lock flush 10 milliLiter(s) IV Push every 1 hour PRN Pre/post blood products, medications, blood draw, and to maintain line patency    =======================    ENDOCRINE  =====================  Glucose control. Monitor CBGs, continue NPH and ISS as needed  Home synthroid    insulin lispro (HumaLOG) corrective regimen sliding scale   SubCutaneous every 6 hours  insulin NPH human recombinant 11 Unit(s) SubCutaneous every 6 hours  levothyroxine Injectable 50 MICROGram(s) IV Push at bedtime    ========================INFECTIOUS DISEASE================  +COVID  -Completed Plaquenil (3/27-4/1), Azithromycin (3/28-3/31)  -s/p Anakinra    Monitor fever curve        Patient requires continuous monitoring with bedside rhythm monitoring, arterial line, pulse oximetry, ventilator monitoring and intermittent blood gas analysis.  Care plan discussed with ICU care team.  Patient remained critical; required more than usual ICU care team; I have spent 45 minutes providing non-routine ICU care, revaluated multiple times during the day.    By signing my name below, I, Krsytin Archer, attest that this documentation has been prepared under the direction and in the presence of Travon Parmar PA.  Electronically signed: Rohan Pritchett, 04-15-20 @ 18:25    I, Travon Parmar, personally performed the services described in this documentation. all medical record entries made by the kevinibtrae were at my direction and in my presence. I have reviewed the chart and agree that the record reflects my personal performance and is accurate and complete.  Electronically signed: Travon Parmar PA. 04-15-20 @ 18:25

## 2020-04-16 NOTE — PROGRESS NOTE ADULT - REASON FOR ADMISSION
respiratory distress, COVID 19 respiratory distress, acute hypoxic respiratory failure, COVID-19 (+)

## 2020-04-16 NOTE — PROGRESS NOTE ADULT - SUBJECTIVE AND OBJECTIVE BOX
CLARITA POWELL  MRN-[id number]  Patient is a 70y old  Male who presents with a chief complaint of respiratory distress/failure, COVID 19 (+) (15 Apr 2020 18:24)    HPI:  70M with PMH presents with T dm, hypothyroidism, hld, p/w cough, low grade fever x 2-3 days. Today patient had worsening dyspnea at rest as well as exertion which prompted to come to the ED. Patient otherwise denied chest pain, leg swelling. He denies prior lung disease. Denies any history of smoking.     In ED, patient was noted to be hypoxic on 6L NC to 89% and significant tachypnea. VS otherwise, afebrile, HR in 80s, SBP 150s. Labs notable for CBC wnl. CMP with mild elevated transaminases AST 65 and ALT 47. PAtient noted to be positive for COVID 19. Due to worsening respiratory distress patient was emergently intubated in the ER. (27 Mar 2020 21:38)    Surgery/Hospital course:  COVID 19 Positive  Intubated 3/27  4/7 Patient proned  4/8 Unproned  4/9 Paralysis weaned off yesterday, unproned with maintained sats, tolerating diuresis  4/12 10 sec asystole, back after epi x1  4/13 CPAP    REVIEW OF SYSTEMS:  Unable to obtain, vented     Vital Signs Last 24 Hrs  T(C): 37.6 (16 Apr 2020 08:00), Max: 38.2 (16 Apr 2020 04:00)  T(F): 99.7 (16 Apr 2020 08:00), Max: 100.8 (16 Apr 2020 04:00)  HR: 62 (16 Apr 2020 08:00) (62 - 89)  BP: 102/70 (16 Apr 2020 08:00) (94/49 - 166/76)  BP(mean): 83 (16 Apr 2020 08:00) (69 - 109)  RR: 30 (16 Apr 2020 08:00) (30 - 55)  SpO2: 89% (16 Apr 2020 08:00) (89% - 96%)    ============================I/O===========================   I&O's Detail    15 Apr 2020 07:01  -  16 Apr 2020 07:00  --------------------------------------------------------  IN:    Enteral Tube Flush: 260 mL    Free Water: 100 mL    Glucerna 1.5: 1200 mL    ketamine Infusion.: 54 mL    ketamine Infusion.: 69.3 mL    norepinephrine Infusion: 34 mL  Total IN: 1717.3 mL    OUT:    Indwelling Catheter - Urethral: 1745 mL  Total OUT: 1745 mL    Total NET: -27.7 mL      16 Apr 2020 07:01  -  16 Apr 2020 08:32  --------------------------------------------------------  IN:    ketamine Infusion.: 15.4 mL  Total IN: 15.4 mL    OUT:    Indwelling Catheter - Urethral: 90 mL  Total OUT: 90 mL    Total NET: -74.6 mL        ============================ LABS =========================                        9.1    11.48 )-----------( 199      ( 16 Apr 2020 01:07 )             29.7     04-16    144  |  105  |  30<H>  ----------------------------<  139<H>  4.9   |  30  |  0.48<L>    Ca    8.9      16 Apr 2020 01:03  Phos  3.0     04-16  Mg     2.2     04-16    TPro  6.2  /  Alb  2.5<L>  /  TBili  0.3  /  DBili  x   /  AST  13  /  ALT  18  /  AlkPhos  101  04-16    LIVER FUNCTIONS - ( 16 Apr 2020 01:03 )  Alb: 2.5 g/dL / Pro: 6.2 g/dL / ALK PHOS: 101 U/L / ALT: 18 U/L / AST: 13 U/L / GGT: x             ABG - ( 16 Apr 2020 00:46 )  pH, Arterial: 7.43  pH, Blood: x     /  pCO2: 49    /  pO2: 80    / HCO3: 32    / Base Excess: 7.4   /  SaO2: 96                  ======================Micro/Rad/Cardio=================  Culture: Reviewed   CXR: Reviewed  ======================================================  PAST MEDICAL & SURGICAL HISTORY:  Hyperlipidemia  Hypothyroid  No significant past surgical history    ====================ASSESSMENT ==============  COVID 19 Positive   3/27 Intubated   Hypoxic respiratory failure   Septic Shock  ARDS  Fluid overload    Plan:  ====================== NEUROLOGY=====================  Sedated with Ketamine, Klonopin   Dilaudid, Oxycodone for analgesia     acetaminophen    Suspension .. 650 milliGRAM(s) Oral every 6 hours PRN Temp greater or equal to 38.5C (101.3F)  clonazePAM  Tablet 2 milliGRAM(s) Oral every 8 hours  HYDROmorphone  Injectable 0.5 milliGRAM(s) IV Push every 3 hours PRN Moderate Pain (4 - 6)  ketamine Infusion. 1 mG/kG/Hr (7.71 mL/Hr) IV Continuous <Continuous>  oxyCODONE    IR 20 milliGRAM(s) Oral every 8 hours    ==================== RESPIRATORY======================  On full vent support     Mechanical Ventilation:  Mode: AC/ CMV (Assist Control/ Continuous Mandatory Ventilation)  RR (machine): 30  TV (machine): 350  FiO2: 50  PEEP: 6  ITime: 1  MAP: 19  PIP: 36      ====================CARDIOVASCULAR==================  On pressor support for hypotension     norepinephrine Infusion 0.06 MICROgram(s)/kG/Min (8.67 mL/Hr) IV Continuous <Continuous>    ===================HEMATOLOGIC/ONC ===================  aspirin  chewable 81 milliGRAM(s) Oral daily  enoxaparin Injectable 40 milliGRAM(s) SubCutaneous two times a day for VTE prophyalxis    ===================== RENAL =========================  Continue monitoring urine output    ==================== GASTROINTESTINAL===================  Tolerating tube feeds, Glucerna 300 [id number]     GI prophylaxis, famotidine    Tablet 20 milliGRAM(s) Oral two times a day  polyethylene glycol 3350 17 Gram(s) Oral every 12 hours  senna Syrup 10 milliLiter(s) Oral at bedtime  sodium chloride 0.9% lock flush 10 milliLiter(s) IV Push every 1 hour PRN Pre/post blood products, medications, blood draw, and to maintain line patency    =======================    ENDOCRINE  =====================  Glucose control,   insulin lispro (HumaLOG) corrective regimen sliding scale   SubCutaneous every 6 hours  insulin NPH human recombinant 11 Unit(s) SubCutaneous every 6 hours  levothyroxine Injectable 50 MICROGram(s) IV Push at bedtime    ========================INFECTIOUS DISEASE================  Completed COVID medications (Plaquenil, Vit C/Thiamine, Anakinra, Solumedrol)       Patient requires continuous monitoring with bedside rhythm monitoring, pulse ox monitoring, and intermittent blood gas analysis. Care plan discussed with ICU care team. Patient remained critical and required more than usual care.    By signing my name below, I, Pretty Wallace, attest that this documentation has been prepared under the direction and in the presence of Palmira Saldana NP  Electronically signed: Rohan Lal, 04-16-20 @ 08:32    I, Palmira Saldana, personally performed the services described in this documentation. all medical record entries made by the scribe were at my direction and in my presence. I have reviewed the chart and agree that the record reflects my personal performance and is accurate and complete  Electronically signed: Palmira Saldana NP CLARITA POWELL  MRN-[id number]  Patient is a 70y old  Male who presents with a chief complaint of respiratory distress/failure, COVID 19 (+) (15 Apr 2020 18:24)    HPI:  70M with PMH presents with T dm, hypothyroidism, hld, p/w cough, low grade fever x 2-3 days. Today patient had worsening dyspnea at rest as well as exertion which prompted to come to the ED. Patient otherwise denied chest pain, leg swelling. He denies prior lung disease. Denies any history of smoking.     In ED, patient was noted to be hypoxic on 6L NC to 89% and significant tachypnea. VS otherwise, afebrile, HR in 80s, SBP 150s. Labs notable for CBC wnl. CMP with mild elevated transaminases AST 65 and ALT 47. PAtient noted to be positive for COVID 19. Due to worsening respiratory distress patient was emergently intubated in the ER. (27 Mar 2020 21:38)    Surgery/Hospital course:  COVID 19 Positive  Intubated 3/27  4/7 Patient proned  4/8 Unproned  4/9 Paralysis weaned off yesterday, unproned with maintained sats, tolerating diuresis  4/12 10 sec asystole, back after epi x1  4/13 CPAP    Today/Overnight:   ·	Weaned off ketamine yesterday   ·	Overnight became asynchronous with ventilator   ·	Increased plateau pressures as high as 30, resumed ketamine for vent compliance   ·	Required levophed for blood pressure support     REVIEW OF SYSTEMS:  Unable to obtain, vented     Vital Signs Last 24 Hrs  T(C): 37.6 (16 Apr 2020 08:00), Max: 38.2 (16 Apr 2020 04:00)  T(F): 99.7 (16 Apr 2020 08:00), Max: 100.8 (16 Apr 2020 04:00)  HR: 62 (16 Apr 2020 08:00) (62 - 89)  BP: 102/70 (16 Apr 2020 08:00) (94/49 - 166/76)  BP(mean): 83 (16 Apr 2020 08:00) (69 - 109)  RR: 30 (16 Apr 2020 08:00) (30 - 55)  SpO2: 89% (16 Apr 2020 08:00) (89% - 96%)    Physical Exam:  Gen: vented   CNS: sedated   Neck: no JVD   Res: clear, no wheezing  CVS: regular rhythm, normal S1/S2  Abd: soft, non-distended, bowel sounds present   Skin: no rash  Ext: no edema     ============================I/O===========================   I&O's Detail    15 Apr 2020 07:01  -  16 Apr 2020 07:00  --------------------------------------------------------  IN:    Enteral Tube Flush: 260 mL    Free Water: 100 mL    Glucerna 1.5: 1200 mL    ketamine Infusion.: 54 mL    ketamine Infusion.: 69.3 mL    norepinephrine Infusion: 34 mL  Total IN: 1717.3 mL    OUT:    Indwelling Catheter - Urethral: 1745 mL  Total OUT: 1745 mL    Total NET: -27.7 mL      16 Apr 2020 07:01  -  16 Apr 2020 08:32  --------------------------------------------------------  IN:    ketamine Infusion.: 15.4 mL  Total IN: 15.4 mL    OUT:    Indwelling Catheter - Urethral: 90 mL  Total OUT: 90 mL    Total NET: -74.6 mL        ============================ LABS =========================                        9.1    11.48 )-----------( 199      ( 16 Apr 2020 01:07 )             29.7     04-16    144  |  105  |  30<H>  ----------------------------<  139<H>  4.9   |  30  |  0.48<L>    Ca    8.9      16 Apr 2020 01:03  Phos  3.0     04-16  Mg     2.2     04-16    TPro  6.2  /  Alb  2.5<L>  /  TBili  0.3  /  DBili  x   /  AST  13  /  ALT  18  /  AlkPhos  101  04-16    LIVER FUNCTIONS - ( 16 Apr 2020 01:03 )  Alb: 2.5 g/dL / Pro: 6.2 g/dL / ALK PHOS: 101 U/L / ALT: 18 U/L / AST: 13 U/L / GGT: x             ABG - ( 16 Apr 2020 00:46 )  pH, Arterial: 7.43  pH, Blood: x     /  pCO2: 49    /  pO2: 80    / HCO3: 32    / Base Excess: 7.4   /  SaO2: 96                  ======================Micro/Rad/Cardio=================  Culture: Reviewed   CXR: Reviewed  ======================================================  PAST MEDICAL & SURGICAL HISTORY:  Hyperlipidemia  Hypothyroid  No significant past surgical history    ====================ASSESSMENT ==============  COVID 19 Positive   3/27 Intubated   Hypoxic respiratory failure   Septic Shock  ARDS  Fluid overload    Plan:  ====================== NEUROLOGY=====================  Sedated with Ketamine, attempt to wean as tolerated to assess neuro status when vent compliance improves   Klonopin and Oxycodone to help wean sedation  Dilaudid for analgesia     acetaminophen    Suspension .. 650 milliGRAM(s) Oral every 6 hours PRN Temp greater or equal to 38.5C (101.3F)  clonazePAM  Tablet 2 milliGRAM(s) Oral every 8 hours  HYDROmorphone  Injectable 0.5 milliGRAM(s) IV Push every 3 hours PRN Moderate Pain (4 - 6)  ketamine Infusion. 1 mG/kG/Hr (7.71 mL/Hr) IV Continuous <Continuous>  oxyCODONE    IR 20 milliGRAM(s) Oral every 8 hours    ==================== RESPIRATORY======================  On full vent support, has not tolerated CPAP  F/u plateau pressures     Mechanical Ventilation:  Mode: AC/ CMV (Assist Control/ Continuous Mandatory Ventilation)  RR (machine): 30  TV (machine): 350  FiO2: 50  PEEP: 6  ITime: 1  MAP: 19  PIP: 36      ====================CARDIOVASCULAR==================  On pressor support for hypotension, wean as tolerated for MAP goal 65-75    norepinephrine Infusion 0.06 MICROgram(s)/kG/Min (8.67 mL/Hr) IV Continuous <Continuous>    ===================HEMATOLOGIC/ONC ===================  aspirin  chewable 81 milliGRAM(s) Oral daily  enoxaparin Injectable 40 milliGRAM(s) SubCutaneous two times a day for VTE prophylaxis    ===================== RENAL =========================  Continue monitoring urine output  Targeting even to slightly negative fluid goal   Prn diuresis     ==================== GASTROINTESTINAL===================  Tolerating tube feeds, Glucerna 300 [id number]   Continue bowel regimen with miralax and senna, last BM yesterday     famotidine    Tablet 20 milliGRAM(s) Oral two times a day for GI prophylaxis   polyethylene glycol 3350 17 Gram(s) Oral every 12 hours  senna Syrup 10 milliLiter(s) Oral at bedtime  sodium chloride 0.9% lock flush 10 milliLiter(s) IV Push every 1 hour PRN Pre/post blood products, medications, blood draw, and to maintain line patency    =======================    ENDOCRINE  =====================  Glucose control,   insulin lispro (HumaLOG) corrective regimen sliding scale   SubCutaneous every 6 hours  insulin NPH human recombinant 11 Unit(s) SubCutaneous every 6 hours  levothyroxine Injectable 50 MICROGram(s) IV Push at bedtime    ========================INFECTIOUS DISEASE================  Completed COVID medications (Plaquenil, Vit C/Thiamine, Anakinra, Solumedrol)   Rising inflammatory markers, will trial decadron taper   Will start with 20mg IV qday x 5 days, followed by 10mg IV qday x 5 days       Patient requires continuous monitoring with bedside rhythm monitoring, pulse ox monitoring, and intermittent blood gas analysis. Care plan discussed with ICU care team. Patient remained critical and required more than usual care.    By signing my name below, I, Pretty Wallace, attest that this documentation has been prepared under the direction and in the presence of Palmira Saldana NP  Electronically signed: Jonathan Lal, 04-16-20 @ 08:32    I, Palmira Saldana, personally performed the services described in this documentation. all medical record entries made by the jonathan were at my direction and in my presence. I have reviewed the chart and agree that the record reflects my personal performance and is accurate and complete  Electronically signed: Palmira Saldana NP CLARITA POWELL  MRN-[id number]  Patient is a 70y old  Male who presents with a chief complaint of respiratory distress/failure, COVID 19 (+) (15 Apr 2020 18:24)    HPI:  70M with PMH presents with T dm, hypothyroidism, hld, p/w cough, low grade fever x 2-3 days. Today patient had worsening dyspnea at rest as well as exertion which prompted to come to the ED. Patient otherwise denied chest pain, leg swelling. He denies prior lung disease. Denies any history of smoking.     In ED, patient was noted to be hypoxic on 6L NC to 89% and significant tachypnea. VS otherwise, afebrile, HR in 80s, SBP 150s. Labs notable for CBC wnl. CMP with mild elevated transaminases AST 65 and ALT 47. PAtient noted to be positive for COVID 19. Due to worsening respiratory distress patient was emergently intubated in the ER. (27 Mar 2020 21:38)    Surgery/Hospital course:  COVID 19 Positive  Intubated 3/27  4/7 Patient proned  4/8 Unproned  4/9 Paralysis weaned off yesterday, unproned with maintained sats, tolerating diuresis  4/12 10 sec asystole, back after epi x1  4/13 CPAP  4/15 Levo restarted, attempted to dc ketamine infusion resumed ketamine for vent compliance    Today/Overnight:   ·	Weaned off ketamine yesterday   ·	Overnight became asynchronous with ventilator   ·	Increased plateau pressures as high as 30, resumed ketamine for vent compliance   ·	Required levophed for blood pressure support     REVIEW OF SYSTEMS:  Unable to obtain, vented     Vital Signs Last 24 Hrs  T(C): 37.6 (16 Apr 2020 08:00), Max: 38.2 (16 Apr 2020 04:00)  T(F): 99.7 (16 Apr 2020 08:00), Max: 100.8 (16 Apr 2020 04:00)  HR: 62 (16 Apr 2020 08:00) (62 - 89)  BP: 102/70 (16 Apr 2020 08:00) (94/49 - 166/76)  BP(mean): 83 (16 Apr 2020 08:00) (69 - 109)  RR: 30 (16 Apr 2020 08:00) (30 - 55)  SpO2: 89% (16 Apr 2020 08:00) (89% - 96%)    Physical Exam:  Gen: vented   CNS: sedated, pupils equal and reactive to light, grimaces to suction, no withdrawl to pain x4 extremities   Neck: no JVD   Res: diminished, no wheezing, minimal secreations via ETT, +oral clear secreations  CVS: regular rhythm, normal S1/S2  Abd: softly distended, bowel sounds present, last BM 4/15  Skin: no rash, intact  Ext: generalized +2 edema    ============================I/O===========================   I&O's Detail    15 Apr 2020 07:01  -  16 Apr 2020 07:00  --------------------------------------------------------  IN:    Enteral Tube Flush: 260 mL    Free Water: 100 mL    Glucerna 1.5: 1200 mL    ketamine Infusion.: 54 mL    ketamine Infusion.: 69.3 mL    norepinephrine Infusion: 34 mL  Total IN: 1717.3 mL    OUT:    Indwelling Catheter - Urethral: 1745 mL  Total OUT: 1745 mL    Total NET: -27.7 mL      16 Apr 2020 07:01  -  16 Apr 2020 08:32  --------------------------------------------------------  IN:    ketamine Infusion.: 15.4 mL  Total IN: 15.4 mL    OUT:    Indwelling Catheter - Urethral: 90 mL  Total OUT: 90 mL    Total NET: -74.6 mL        ============================ LABS =========================                        9.1    11.48 )-----------( 199      ( 16 Apr 2020 01:07 )             29.7     04-16    144  |  105  |  30<H>  ----------------------------<  139<H>  4.9   |  30  |  0.48<L>    Ca    8.9      16 Apr 2020 01:03  Phos  3.0     04-16  Mg     2.2     04-16    TPro  6.2  /  Alb  2.5<L>  /  TBili  0.3  /  DBili  x   /  AST  13  /  ALT  18  /  AlkPhos  101  04-16    LIVER FUNCTIONS - ( 16 Apr 2020 01:03 )  Alb: 2.5 g/dL / Pro: 6.2 g/dL / ALK PHOS: 101 U/L / ALT: 18 U/L / AST: 13 U/L / GGT: x             ABG - ( 16 Apr 2020 00:46 )  pH, Arterial: 7.43  pH, Blood: x     /  pCO2: 49    /  pO2: 80    / HCO3: 32    / Base Excess: 7.4   /  SaO2: 96                  ======================Micro/Rad/Cardio=================  Culture: Reviewed   CXR: Reviewed  ======================================================  PAST MEDICAL & SURGICAL HISTORY:  Hyperlipidemia  Hypothyroid  No significant past surgical history    ====================ASSESSMENT ==============  COVID 19 Positive   3/27 Intubated   Hypoxic respiratory failure   Septic Shock  ARDS  Fluid overload    Plan:  ====================== NEUROLOGY=====================  Sedated with Ketamine, attempt to wean as tolerated to assess neuro status when vent compliance improves   Klonopin and Oxycodone to help wean sedation  Dilaudid for analgesia     acetaminophen    Suspension .. 650 milliGRAM(s) Oral every 6 hours PRN Temp greater or equal to 38.5C (101.3F)  clonazePAM  Tablet 2 milliGRAM(s) Oral every 8 hours  HYDROmorphone  Injectable 0.5 milliGRAM(s) IV Push every 3 hours PRN Moderate Pain (4 - 6)  ketamine Infusion. 1 mG/kG/Hr (7.71 mL/Hr) IV Continuous <Continuous>  oxyCODONE    IR 20 milliGRAM(s) Oral every 8 hours    ==================== RESPIRATORY======================  On full vent support, has not tolerated CPAP  F/u plateau pressures     Mechanical Ventilation:  Mode: AC/ CMV (Assist Control/ Continuous Mandatory Ventilation)  RR (machine): 30  TV (machine): 350  FiO2: 50  PEEP: 6  ITime: 1  MAP: 19  PIP: 36    plateau pressure: 30    ====================CARDIOVASCULAR==================  On vasopressor support for hypotension, wean as tolerated for MAP goal 65-75    norepinephrine Infusion 0.04 MICROgram(s)/kG/Min (8.67 mL/Hr) IV Continuous <Continuous>    ===================HEMATOLOGIC/ONC ===================  aspirin  chewable 81 milliGRAM(s) Oral daily  enoxaparin Injectable 40 milliGRAM(s) SubCutaneous two times a day for VTE prophylaxis    ===================== RENAL =========================  Continue monitoring urine output  Targeting even to slightly negative fluid goal   Prn diuresis     ==================== GASTROINTESTINAL===================  Tolerating tube feeds, Glucerna 300 [id number]   Continue bowel regimen with miralax and senna, last BM yesterday     famotidine    Tablet 20 milliGRAM(s) Oral two times a day for GI prophylaxis   polyethylene glycol 3350 17 Gram(s) Oral every 12 hours  senna Syrup 10 milliLiter(s) Oral at bedtime      =======================    ENDOCRINE  =====================  Glucose control,   insulin lispro (HumaLOG) corrective regimen sliding scale   SubCutaneous every 6 hours  insulin NPH human recombinant 11 Unit(s) SubCutaneous every 6 hours  levothyroxine Injectable 50 MICROGram(s) IV Push at bedtime    ========================INFECTIOUS DISEASE================  Completed COVID medications (Plaquenil, Vit C/Thiamine, Anakinra, Solumedrol)   Rising inflammatory markers, will trial decadron taper   Will start with 20mg IV qday x 5 days, followed by 10mg IV qday x 5 days       Patient requires continuous monitoring with bedside rhythm monitoring, pulse ox monitoring, and intermittent blood gas analysis. Care plan discussed with ICU care team. Patient remained critical and required more than usual care.    By signing my name below, I, Pretty Wallace, attest that this documentation has been prepared under the direction and in the presence of Palmira Saldana NP  Electronically signed: Jonathan Lal, 04-16-20 @ 08:32    I, Palmira Saldana, personally performed the services described in this documentation. all medical record entries made by the jonathan were at my direction and in my presence. I have reviewed the chart and agree that the record reflects my personal performance and is accurate and complete  Electronically signed: Palmira Saldana NP

## 2020-04-16 NOTE — PROGRESS NOTE ADULT - SUBJECTIVE AND OBJECTIVE BOX
CLARITA POWELL  MRN-[id number]  Patient is a 70y old  Male who presents with a chief complaint of respiratory distress, COVID 19 (16 Apr 2020 08:31)    HPI:  70M with PMH presents with T dm, hypothyroidism, hld, p/w cough, low grade fever x 2-3 days. Today patient had worsening dyspnea at rest as well as exertion which prompted to come to the ED. Patient otherwise denied chest pain, leg swelling. He denies prior lung disease. Denies any history of smoking.     In ED, patient was noted to be hypoxic on 6L NC to 89% and significant tachypnea. VS otherwise, afebrile, HR in 80s, SBP 150s. Labs notable for CBC wnl. CMP with mild elevated transaminases AST 65 and ALT 47. PAtient noted to be positive for COVID 19. Due to worsening respiratory distress patient was emergently intubated in the ER. (27 Mar 2020 21:38)      Surgery/Hospital course:  COVID 19 Positive  Intubated __    REVIEW OF SYSTEMS:  Unable to obtain, vented     Vital Signs Last 24 Hrs  T(C): 37.6 (16 Apr 2020 08:00), Max: 38.2 (16 Apr 2020 04:00)  T(F): 99.7 (16 Apr 2020 08:00), Max: 100.8 (16 Apr 2020 04:00)  HR: 81 (16 Apr 2020 19:00) (62 - 87)  BP: 102/70 (16 Apr 2020 08:00) (94/49 - 136/61)  BP(mean): 83 (16 Apr 2020 08:00) (69 - 89)  RR: 32 (16 Apr 2020 19:00) (30 - 35)  SpO2: 91% (16 Apr 2020 19:00) (89% - 95%)    ============================I/O===========================   I&O's Detail    15 Apr 2020 07:01  -  16 Apr 2020 07:00  --------------------------------------------------------  IN:    Enteral Tube Flush: 260 mL    Free Water: 100 mL    Glucerna 1.5: 1200 mL    ketamine Infusion.: 54 mL    ketamine Infusion.: 69.3 mL    norepinephrine Infusion: 34 mL  Total IN: 1717.3 mL    OUT:    Indwelling Catheter - Urethral: 1745 mL  Total OUT: 1745 mL    Total NET: -27.7 mL      16 Apr 2020 07:01  -  16 Apr 2020 19:42  --------------------------------------------------------  IN:    Enteral Tube Flush: 80 mL    Glucerna 1.5: 600 mL    IV PiggyBack: 50 mL    ketamine Infusion.: 184.8 mL    norepinephrine Infusion: 66.7 mL  Total IN: 981.5 mL    OUT:    Indwelling Catheter - Urethral: 780 mL  Total OUT: 780 mL    Total NET: 201.5 mL        ============================ LABS =========================                        9.1    11.48 )-----------( 199      ( 16 Apr 2020 01:07 )             29.7     04-16    144  |  105  |  30<H>  ----------------------------<  139<H>  4.9   |  30  |  0.48<L>    Ca    8.9      16 Apr 2020 01:03  Phos  3.0     04-16  Mg     2.2     04-16    TPro  6.2  /  Alb  2.5<L>  /  TBili  0.3  /  DBili  x   /  AST  13  /  ALT  18  /  AlkPhos  101  04-16    LIVER FUNCTIONS - ( 16 Apr 2020 01:03 )  Alb: 2.5 g/dL / Pro: 6.2 g/dL / ALK PHOS: 101 U/L / ALT: 18 U/L / AST: 13 U/L / GGT: x             ABG - ( 16 Apr 2020 00:46 )  pH, Arterial: 7.43  pH, Blood: x     /  pCO2: 49    /  pO2: 80    / HCO3: 32    / Base Excess: 7.4   /  SaO2: 96                  ======================Micro/Rad/Cardio=================  Culture: Reviewed   CXR: Reviewed  ======================================================  PAST MEDICAL & SURGICAL HISTORY:  Hyperlipidemia  Hypothyroid  No significant past surgical history    ====================ASSESSMENT ==============  COVID 19 Positive   Intubated __   Hypoxic Respiratory failure     Plan:  ====================== NEUROLOGY=====================  acetaminophen    Suspension .. 650 milliGRAM(s) Oral every 6 hours PRN Temp greater or equal to 38.5C (101.3F)  clonazePAM  Tablet 2 milliGRAM(s) Oral every 8 hours  HYDROmorphone  Injectable 0.5 milliGRAM(s) IV Push every 3 hours PRN Moderate Pain (4 - 6)  ketamine Infusion. 1 mG/kG/Hr (7.71 mL/Hr) IV Continuous <Continuous>  oxyCODONE    IR 20 milliGRAM(s) Oral every 8 hours    ==================== RESPIRATORY======================  Mechanical Ventilation:  Mode: AC/ CMV (Assist Control/ Continuous Mandatory Ventilation)  RR (machine): 30  TV (machine): 350  FiO2: 50  PEEP: 6  ITime: 1  MAP: 17  PIP: 39      ====================CARDIOVASCULAR==================  norepinephrine Infusion 0.06 MICROgram(s)/kG/Min (8.67 mL/Hr) IV Continuous <Continuous>    ===================HEMATOLOGIC/ONC ===================  aspirin  chewable 81 milliGRAM(s) Oral daily  enoxaparin Injectable 40 milliGRAM(s) SubCutaneous two times a day    ===================== RENAL =========================  Continue monitoring urine output    ==================== GASTROINTESTINAL===================  Tolerating tube feeds    famotidine    Tablet 20 milliGRAM(s) Oral two times a day  polyethylene glycol 3350 17 Gram(s) Oral every 12 hours  senna Syrup 10 milliLiter(s) Oral at bedtime  sodium chloride 0.9% lock flush 10 milliLiter(s) IV Push every 1 hour PRN Pre/post blood products, medications, blood draw, and to maintain line patency    =======================    ENDOCRINE  =====================  Glucose control,   dexAMETHasone  IVPB 20 milliGRAM(s) IV Intermittent daily  insulin lispro (HumaLOG) corrective regimen sliding scale   SubCutaneous every 6 hours  insulin NPH human recombinant 11 Unit(s) SubCutaneous every 6 hours  levothyroxine Injectable 50 MICROGram(s) IV Push at bedtime    ========================INFECTIOUS DISEASE================        Patient requires continuous monitoring with bedside rhythm monitoring, pulse ox monitoring, and intermittent blood gas analysis. Care plan discussed with ICU care team. Patient remained critical and at risk for life threatning decompensation.    By signing my name below, Joan VANCE, attest that this documentation has been prepared under the direction and in the presence of GERRY Denney.  Electronically signed: Rohan Seals, 04-16-20 @ 19:42    I, Travon Parmar , personally performed the services described in this documentation. all medical record entries made by the scribe were at my direction and in my presence. I have reviewed the chart and agree that the record reflects my personal performance and is accurate and complete  Electronically signed: GERRY Denney CLARITA POWELL  MRN-[id number]  Patient is a 70y old  Male who presents with a chief complaint of respiratory distress, COVID 19 (16 Apr 2020 08:31)    HPI:  70M with PMH presents with T dm, hypothyroidism, hld, p/w cough, low grade fever x 2-3 days. Today patient had worsening dyspnea at rest as well as exertion which prompted to come to the ED. Patient otherwise denied chest pain, leg swelling. He denies prior lung disease. Denies any history of smoking.     In ED, patient was noted to be hypoxic on 6L NC to 89% and significant tachypnea. VS otherwise, afebrile, HR in 80s, SBP 150s. Labs notable for CBC wnl. CMP with mild elevated transaminases AST 65 and ALT 47. PAtient noted to be positive for COVID 19. Due to worsening respiratory distress patient was emergently intubated in the ER. (27 Mar 2020 21:38)      Surgery/Hospital course:  COVID 19 Positive  Intubated 3/27  4/7 Patient proned  4/8 Unproned  4/9 Paralysis weaned off yesterday, unproned with maintained sats, tolerating diuresis  4/12 10 sec asystole, back after epi x1  4/13 CPAP    Today: Continue Ketamine as work of breathing increased and vent dysynchrony with abdominal breathing. Neuro status: No response to verbal or painful stimuli.    REVIEW OF SYSTEMS:  Unable to obtain, vented     Vital Signs Last 24 Hrs  T(C): 37.6 (16 Apr 2020 08:00), Max: 38.2 (16 Apr 2020 04:00)  T(F): 99.7 (16 Apr 2020 08:00), Max: 100.8 (16 Apr 2020 04:00)  HR: 81 (16 Apr 2020 19:00) (62 - 87)  BP: 102/70 (16 Apr 2020 08:00) (94/49 - 136/61)  BP(mean): 83 (16 Apr 2020 08:00) (69 - 89)  RR: 32 (16 Apr 2020 19:00) (30 - 35)  SpO2: 91% (16 Apr 2020 19:00) (89% - 95%)    ============================I/O===========================   I&O's Detail    15 Apr 2020 07:01  -  16 Apr 2020 07:00  --------------------------------------------------------  IN:    Enteral Tube Flush: 260 mL    Free Water: 100 mL    Glucerna 1.5: 1200 mL    ketamine Infusion.: 54 mL    ketamine Infusion.: 69.3 mL    norepinephrine Infusion: 34 mL  Total IN: 1717.3 mL    OUT:    Indwelling Catheter - Urethral: 1745 mL  Total OUT: 1745 mL    Total NET: -27.7 mL      16 Apr 2020 07:01  -  16 Apr 2020 19:42  --------------------------------------------------------  IN:    Enteral Tube Flush: 80 mL    Glucerna 1.5: 600 mL    IV PiggyBack: 50 mL    ketamine Infusion.: 184.8 mL    norepinephrine Infusion: 66.7 mL  Total IN: 981.5 mL    OUT:    Indwelling Catheter - Urethral: 780 mL  Total OUT: 780 mL    Total NET: 201.5 mL        ============================ LABS =========================                        9.1    11.48 )-----------( 199      ( 16 Apr 2020 01:07 )             29.7     04-16    144  |  105  |  30<H>  ----------------------------<  139<H>  4.9   |  30  |  0.48<L>    Ca    8.9      16 Apr 2020 01:03  Phos  3.0     04-16  Mg     2.2     04-16    TPro  6.2  /  Alb  2.5<L>  /  TBili  0.3  /  DBili  x   /  AST  13  /  ALT  18  /  AlkPhos  101  04-16    LIVER FUNCTIONS - ( 16 Apr 2020 01:03 )  Alb: 2.5 g/dL / Pro: 6.2 g/dL / ALK PHOS: 101 U/L / ALT: 18 U/L / AST: 13 U/L / GGT: x             ABG - ( 16 Apr 2020 00:46 )  pH, Arterial: 7.43  pH, Blood: x     /  pCO2: 49    /  pO2: 80    / HCO3: 32    / Base Excess: 7.4   /  SaO2: 96                  ======================Micro/Rad/Cardio=================  Culture: Reviewed   CXR: Reviewed  ======================================================  PAST MEDICAL & SURGICAL HISTORY:  Hyperlipidemia  Hypothyroid  No significant past surgical history    ====================ASSESSMENT ==============  COVID 19 Positive   3/27 Intubated   Hypoxic respiratory failure   Septic Shock  ARDS  Fluid overload    Plan:  ====================== NEUROLOGY=====================  Continue to wean sedation as tolerated  Monitor and assess neuro status daily  oxycodonecand dilaudid for pain control.     acetaminophen    Suspension .. 650 milliGRAM(s) Oral every 6 hours PRN Temp greater or equal to 38.5C (101.3F)  clonazePAM  Tablet 2 milliGRAM(s) Oral every 8 hours  HYDROmorphone  Injectable 0.5 milliGRAM(s) IV Push every 3 hours PRN Moderate Pain (4 - 6)  ketamine Infusion. 1 mG/kG/Hr (7.71 mL/Hr) IV Continuous <Continuous>  oxyCODONE    IR 20 milliGRAM(s) Oral every 8 hours    ==================== RESPIRATORY======================  On full vent support.  Continue to do CPAP trials as tolerated  O2 sat goal >92%    Mechanical Ventilation:  Mode: AC/ CMV (Assist Control/ Continuous Mandatory Ventilation)  RR (machine): 30  TV (machine): 350  FiO2: 50  PEEP: 6  ITime: 1  MAP: 17  PIP: 39      ====================CARDIOVASCULAR==================  MAP goal > 65    norepinephrine Infusion 0.06 MICROgram(s)/kG/Min (8.67 mL/Hr) IV Continuous <Continuous>    ===================HEMATOLOGIC/ONC ===================  Continue Lovenox for VTE prophylaxis  Continue invasive hemodynamic monitoring.     aspirin  chewable 81 milliGRAM(s) Oral daily  enoxaparin Injectable 40 milliGRAM(s) SubCutaneous two times a day    ===================== RENAL =========================  Continue monitoring urine output  Monitor BUN/SCr  Monitor and replete lytes as needed    ==================== GASTROINTESTINAL===================  Tolerating tube feeds  Continue tube feeds: Glucerna 300 bolus Q6  Continue pepcid for GI prophylaxis  Senna syrup at bedtime for GI ppx.       famotidine    Tablet 20 milliGRAM(s) Oral two times a day  polyethylene glycol 3350 17 Gram(s) Oral every 12 hours  senna Syrup 10 milliLiter(s) Oral at bedtime  sodium chloride 0.9% lock flush 10 milliLiter(s) IV Push every 1 hour PRN Pre/post blood products, medications, blood draw, and to maintain line patency    =======================    ENDOCRINE  =====================  Glucose control, . Monitor CBGs, continue NPH and ISS as needed  Home synthroid    dexAMETHasone  IVPB 20 milliGRAM(s) IV Intermittent daily  insulin lispro (HumaLOG) corrective regimen sliding scale   SubCutaneous every 6 hours  insulin NPH human recombinant 11 Unit(s) SubCutaneous every 6 hours  levothyroxine Injectable 50 MICROGram(s) IV Push at bedtime    ========================INFECTIOUS DISEASE================  +COVID  -Completed Plaquenil (3/27-4/1), Azithromycin (3/28-3/31)  -s/p Anakinra    Monitor fever curve        Patient requires continuous monitoring with bedside rhythm monitoring, pulse ox monitoring, and intermittent blood gas analysis. Care plan discussed with ICU care team. Patient remained critical and at risk for life threatning decompensation.    By signing my name below, I, Joan Richards, attest that this documentation has been prepared under the direction and in the presence of GERRY Denney.  Electronically signed: Rohan Seals, 04-16-20 @ 19:42    I, Travon Parmar , personally performed the services described in this documentation. all medical record entries made by the kevinibtrae were at my direction and in my presence. I have reviewed the chart and agree that the record reflects my personal performance and is accurate and complete  Electronically signed: GERRY Denney CLARITA POWELL  MRN-[id number]  Patient is a 70y old  Male who presents with a chief complaint of respiratory distress, COVID 19 (16 Apr 2020 08:31)    HPI:  70M with PMH presents with T dm, hypothyroidism, hld, p/w cough, low grade fever x 2-3 days. Today patient had worsening dyspnea at rest as well as exertion which prompted to come to the ED. Patient otherwise denied chest pain, leg swelling. He denies prior lung disease. Denies any history of smoking.     In ED, patient was noted to be hypoxic on 6L NC to 89% and significant tachypnea. VS otherwise, afebrile, HR in 80s, SBP 150s. Labs notable for CBC wnl. CMP with mild elevated transaminases AST 65 and ALT 47. PAtient noted to be positive for COVID 19. Due to worsening respiratory distress patient was emergently intubated in the ER. (27 Mar 2020 21:38)      Surgery/Hospital course:  COVID 19 Positive  Intubated 3/27  4/7 Patient proned  4/8 Unproned  4/9 Paralysis weaned off yesterday, unproned with maintained sats, tolerating diuresis  4/12 10 sec asystole, back after epi x1  4/13 CPAP    Today: Neuro status: Questionable grimacing to suctioning, otherwise no neuro status. Failed CPAP trial O/N in less than 10 min due to low TVs.    REVIEW OF SYSTEMS:  Unable to obtain, vented and sedated    Vital Signs Last 24 Hrs  T(C): 37.6 (16 Apr 2020 08:00), Max: 38.2 (16 Apr 2020 04:00)  T(F): 99.7 (16 Apr 2020 08:00), Max: 100.8 (16 Apr 2020 04:00)  HR: 81 (16 Apr 2020 19:00) (62 - 87)  BP: 102/70 (16 Apr 2020 08:00) (94/49 - 136/61)  BP(mean): 83 (16 Apr 2020 08:00) (69 - 89)  RR: 32 (16 Apr 2020 19:00) (30 - 35)  SpO2: 91% (16 Apr 2020 19:00) (89% - 95%)    ============================I/O===========================   I&O's Detail    15 Apr 2020 07:01  -  16 Apr 2020 07:00  --------------------------------------------------------  IN:    Enteral Tube Flush: 260 mL    Free Water: 100 mL    Glucerna 1.5: 1200 mL    ketamine Infusion.: 54 mL    ketamine Infusion.: 69.3 mL    norepinephrine Infusion: 34 mL  Total IN: 1717.3 mL    OUT:    Indwelling Catheter - Urethral: 1745 mL  Total OUT: 1745 mL    Total NET: -27.7 mL      16 Apr 2020 07:01  -  16 Apr 2020 19:42  --------------------------------------------------------  IN:    Enteral Tube Flush: 80 mL    Glucerna 1.5: 600 mL    IV PiggyBack: 50 mL    ketamine Infusion.: 184.8 mL    norepinephrine Infusion: 66.7 mL  Total IN: 981.5 mL    OUT:    Indwelling Catheter - Urethral: 780 mL  Total OUT: 780 mL    Total NET: 201.5 mL        ============================ LABS =========================                        9.1    11.48 )-----------( 199      ( 16 Apr 2020 01:07 )             29.7     04-16    144  |  105  |  30<H>  ----------------------------<  139<H>  4.9   |  30  |  0.48<L>    Ca    8.9      16 Apr 2020 01:03  Phos  3.0     04-16  Mg     2.2     04-16    TPro  6.2  /  Alb  2.5<L>  /  TBili  0.3  /  DBili  x   /  AST  13  /  ALT  18  /  AlkPhos  101  04-16    LIVER FUNCTIONS - ( 16 Apr 2020 01:03 )  Alb: 2.5 g/dL / Pro: 6.2 g/dL / ALK PHOS: 101 U/L / ALT: 18 U/L / AST: 13 U/L / GGT: x             ABG - ( 16 Apr 2020 00:46 )  pH, Arterial: 7.43  pH, Blood: x     /  pCO2: 49    /  pO2: 80    / HCO3: 32    / Base Excess: 7.4   /  SaO2: 96                  ======================Micro/Rad/Cardio=================  Culture: Reviewed   CXR: Reviewed  ======================================================  PAST MEDICAL & SURGICAL HISTORY:  Hyperlipidemia  Hypothyroid  No significant past surgical history    ====================ASSESSMENT ==============  COVID 19 Positive   3/27 Intubated   Hypoxic respiratory failure   Septic Shock  ARDS  Fluid overload    Plan:  ====================== NEUROLOGY=====================  Continue to wean sedation as tolerated  Monitor and assess neuro status daily  oxycodonecand dilaudid for pain control.     acetaminophen    Suspension .. 650 milliGRAM(s) Oral every 6 hours PRN Temp greater or equal to 38.5C (101.3F)  clonazePAM  Tablet 2 milliGRAM(s) Oral every 8 hours  HYDROmorphone  Injectable 0.5 milliGRAM(s) IV Push every 3 hours PRN Moderate Pain (4 - 6)  ketamine Infusion. 1 mG/kG/Hr (7.71 mL/Hr) IV Continuous <Continuous>  oxyCODONE    IR 20 milliGRAM(s) Oral every 8 hours    ==================== RESPIRATORY======================  On full vent support.  Continue to do CPAP trials as tolerated  O2 sat goal >92%    Mechanical Ventilation:  Mode: AC/ CMV (Assist Control/ Continuous Mandatory Ventilation)  RR (machine): 30  TV (machine): 350  FiO2: 50  PEEP: 6  ITime: 1  MAP: 17  PIP: 39      ====================CARDIOVASCULAR==================  Wean Levo as tolerated  MAP goal > 65    norepinephrine Infusion 0.08 MICROgram(s)/kG/Min (8.67 mL/Hr) IV Continuous <Continuous>    ===================HEMATOLOGIC/ONC ===================  Continue Lovenox for VTE prophylaxis  Continue invasive hemodynamic monitoring.     aspirin  chewable 81 milliGRAM(s) Oral daily  enoxaparin Injectable 40 milliGRAM(s) SubCutaneous two times a day    ===================== RENAL =========================  Continue monitoring urine output  Monitor BUN/SCr  Monitor and replete lytes as needed    ==================== GASTROINTESTINAL===================  Tolerating tube feeds  Continue tube feeds: Glucerna 300 bolus Q6  Continue pepcid for GI prophylaxis  Senna syrup at bedtime for GI ppx.       famotidine    Tablet 20 milliGRAM(s) Oral two times a day  polyethylene glycol 3350 17 Gram(s) Oral every 12 hours  senna Syrup 10 milliLiter(s) Oral at bedtime  sodium chloride 0.9% lock flush 10 milliLiter(s) IV Push every 1 hour PRN Pre/post blood products, medications, blood draw, and to maintain line patency    =======================    ENDOCRINE  =====================  Glucose control, . Monitor CBGs, continue NPH and ISS as needed  Home synthroid    dexAMETHasone  IVPB 20 milliGRAM(s) IV Intermittent daily  insulin lispro (HumaLOG) corrective regimen sliding scale   SubCutaneous every 6 hours  insulin NPH human recombinant 11 Unit(s) SubCutaneous every 6 hours  levothyroxine Injectable 50 MICROGram(s) IV Push at bedtime    ========================INFECTIOUS DISEASE================  +COVID  -Completed Plaquenil (3/27-4/1), Azithromycin (3/28-3/31)  -s/p Anakinra    Monitor fever curve        Patient requires continuous monitoring with bedside rhythm monitoring, pulse ox monitoring, and intermittent blood gas analysis. Care plan discussed with ICU care team. Patient remained critical and at risk for life threatning decompensation.    By signing my name below, I, Joan Richards, attest that this documentation has been prepared under the direction and in the presence of GERRY Denney.  Electronically signed: Jonathan Seals, 04-16-20 @ 19:42    I, Travon Parmar , personally performed the services described in this documentation. all medical record entries made by the jonathan were at my direction and in my presence. I have reviewed the chart and agree that the record reflects my personal performance and is accurate and complete. I personally provided 45 minutes of total critical care time.  Electronically signed: GERRY Denney

## 2020-04-17 NOTE — CONSULT NOTE ADULT - ASSESSMENT
70M with PMH presents with T dm, hypothyroidism, hld, p/w cough, low grade fever x 2-3 days. Today patient had worsening dyspnea at rest as well as exertion which prompted to come to the ED. Pt found to be COVID + with acute respiratory failure, sedated and on pressors.  Palliative care called for GOC.

## 2020-04-17 NOTE — CONSULT NOTE ADULT - PROBLEM SELECTOR RECOMMENDATION 6
upated by team this am.  GOC note in chart.  plan for dtr and wife to discuss and get back to team.  Called and left message with wife to provide support.

## 2020-04-17 NOTE — CONSULT NOTE ADULT - SUBJECTIVE AND OBJECTIVE BOX
Due to COVID 19 and in order to decrease providers exposure and the usage or PPE  the H&P, ROS and/or PE was taken form the primary care teams note.      HPI:  70M with PMH presents with T dm, hypothyroidism, hld, p/w cough, low grade fever x 2-3 days. Today patient had worsening dyspnea at rest as well as exertion which prompted to come to the ED. Patient otherwise denied chest pain, leg swelling. He denies prior lung disease. Denies any history of smoking.     In ED, patient was noted to be hypoxic on 6L NC to 89% and significant tachypnea. VS otherwise, afebrile, HR in 80s, SBP 150s. Labs notable for CBC wnl. CMP with mild elevated transaminases AST 65 and ALT 47. PAtient noted to be positive for COVID 19. Due to worsening respiratory distress patient was emergently intubated in the ER. (27 Mar 2020 21:38)    PERTINENT PM/SXH:   Hyperlipidemia  Hypothyroid    No significant past surgical history    FAMILY HISTORY:    ITEMS NOT CHECKED ARE NOT PRESENT    SOCIAL HISTORY:   Significant other/partner  Holmen [x ]  Children  Ileana DTR  [ ]  Hoahaoism/Spirituality:  Substance hx:  [ ]   Tobacco hx:  [ ]   Alcohol hx: [ ]   Home Opioid hx:  [ ] I-Stop Reference No:  Living Situation: [x ]Home  [ ]Long term care  [ ]Rehab [ ]Other    ADVANCE DIRECTIVES:    DNR    MOLST  [ ]    Living Will  [ ]     DECISION MAKER(s):  [ ] Health Care Proxy(s)  [x ] Surrogate(s)  [ ] Guardian           Name(s): Phone Number(s): Reanna [phone]    BASELINE (I)ADL(s) (prior to admission):  [place]: [x ]Total  [ ] Moderate [ ]Dependent    Allergies    No Known Allergies    Intolerances    MEDICATIONS  (STANDING):  aspirin  chewable 81 milliGRAM(s) Oral daily  chlorhexidine 0.12% Liquid 15 milliLiter(s) Oral Mucosa every 12 hours  chlorhexidine 2% Cloths 1 Application(s) Topical <User Schedule>  clonazePAM  Tablet 2 milliGRAM(s) Oral every 8 hours  dexAMETHasone  IVPB 20 milliGRAM(s) IV Intermittent daily  enoxaparin Injectable 40 milliGRAM(s) SubCutaneous two times a day  famotidine    Tablet 20 milliGRAM(s) Oral two times a day  insulin lispro (HumaLOG) corrective regimen sliding scale   SubCutaneous every 6 hours  insulin NPH human recombinant 11 Unit(s) SubCutaneous every 6 hours  ketamine Infusion. 2 mG/kG/Hr (15.4 mL/Hr) IV Continuous <Continuous>  levothyroxine Injectable 50 MICROGram(s) IV Push at bedtime  norepinephrine Infusion 0.02 MICROgram(s)/kG/Min (2.89 mL/Hr) IV Continuous <Continuous>  oxyCODONE    IR 20 milliGRAM(s) Oral every 6 hours  petrolatum Ophthalmic Ointment 1 Application(s) Both EYES two times a day  polyethylene glycol 3350 17 Gram(s) Oral every 12 hours  senna Syrup 10 milliLiter(s) Oral at bedtime    MEDICATIONS  (PRN):  acetaminophen    Suspension .. 650 milliGRAM(s) Oral every 6 hours PRN Temp greater or equal to 38.5C (101.3F)  HYDROmorphone  Injectable 1 milliGRAM(s) IV Push every 4 hours PRN Moderate Pain (4 - 6)  sodium chloride 0.9% lock flush 10 milliLiter(s) IV Push every 1 hour PRN Pre/post blood products, medications, blood draw, and to maintain line patency    PRESENT SYMPTOMS: [ ]Unable to obtain due to poor mentation   Source if other than patient:  [ ]Family   [x ]Team     Pain: [ ]yes [ x]no  QOL impact -   Location -                    Aggravating factors -  Quality -  Radiation -  Timing-  Severity (0-10 scale):  Minimal acceptable level (0-10 scale):     PAIN AD Score: 0     http://geriatrictoolkit.[place].[email or web address]/cog/painad.pdf (press ctrl +  left click to view)    Dyspnea:                           [ ]Mild [ ]Moderate [ ]Severe  Anxiety:                             [ ]Mild [ ]Moderate [ ]Severe  Fatigue:                             [ ]Mild [ ]Moderate [ ]Severe  Nausea:                            [ ]Mild [ ]Moderate [ ]Severe  Loss of appetite:               [ ]Mild [ ]Moderate [ ]Severe  Constipation:                    [ ]Mild [ ]Moderate [ ]Severe    Other Symptoms:  [ ]All other review of systems negative     Palliative Performance Status Version 2:      10   %    http://npcrc.org/files/news/palliative_performance_scale_ppsv2.pdf    Due to COVID 19 and in order to decrease providers exposure and the usage or PPE  the H&P, ROS and/or PE was taken form the primary care teams note.      PHYSICAL EXAM:  Vital Signs Last 24 Hrs  T(C): 37.8 (17 Apr 2020 08:00), Max: 37.8 (17 Apr 2020 08:00)  T(F): 100 (17 Apr 2020 08:00), Max: 100 (17 Apr 2020 08:00)  HR: 75 (17 Apr 2020 12:00) (65 - 82)  BP: 126/60 (17 Apr 2020 04:00) (101/53 - 126/60)  BP(mean): 87 (17 Apr 2020 04:00) (73 - 87)  RR: 37 (17 Apr 2020 12:00) (30 - 37)  SpO2: 95% (17 Apr 2020 12:00) (91% - 100%) I&O's Summary    16 Apr 2020 07:01  -  17 Apr 2020 07:00  --------------------------------------------------------  IN: 2038.1 mL / OUT: 1415 mL / NET: 623.1 mL    17 Apr 2020 07:01  -  17 Apr 2020 13:00  --------------------------------------------------------  IN: 470.7 mL / OUT: 525 mL / NET: -54.3 mL      GENERAL:  [ ]Alert  [ ]Oriented x   [x ]Lethargic  [ ]Cachexia  [ ]Unarousable  [ ]Verbal  [x ]Non-Verbal    Behavioral:   [ ] Anxiety  [ ] Delirium [ ] Agitation [ ] Other    HEENT:  [ ]Normal   [x ]Dry mouth   [x ]ET Tube/Trach  [ ]Oral lesions    PULMONARY:   [x ]Clear [ ]Tachypnea  [ ]Audible excessive secretions   [ ]Rhonchi        [ ]Right [ ]Left [ ]Bilateral  [ ]Crackles        [ ]Right [ ]Left [ ]Bilateral  [ ]Wheezing     [ ]Right [ ]Left [ ]Bilateral  [ ]Diminished breath sounds [ ]right [ ]left [ ]bilateral    CARDIOVASCULAR:    [x ]Regular [ ]Irregular [ ]Tachy  [ ]Dario [ ]Murmur [ ]Other    GASTROINTESTINAL:  [x ]Soft  [ ]Distended   [x ]+BS  [ ]Non tender [ ]Tender  [ ]PEG [ ]OGT/ NGT  Last BM:       GENITOURINARY:  [ ]Normal [ ] Incontinent   [ ]Oliguria/Anuria   [x ]Roque    [ ]External cath    MUSCULOSKELETAL:   [ ]Normal   [ ]Weakness  [x ]Bed/Wheelchair bound [ ]Edema    NEUROLOGIC:   [ x]No focal deficits  [ ]Cognitive impairment  [ ]Dysphagia [ ]Dysarthria [ ]Paresis [ ]Other     SKIN:   [x ]Normal    [ ]Rash  [ ]Pressure ulcer(s)       Present on admission [ ]y [ ]n    CRITICAL CARE:  [x ]Shock Present  [ ]Septic [ ]Cardiogenic [ ]Neurologic [ ]Hypovolemic  [ ]  Vasopressors [ ]  Inotropes   x[ ]Respiratory failure present [ ]Mechanical ventilation [ ]Non-invasive ventilatory support [ ]High flow  [x ]Acute  [ ]Chronic [ ]Hypoxic  [ ]Hypercarbic [ ]Other  [ ]Other organ failure     LABS:                        8.5    11.20 )-----------( 210      ( 17 Apr 2020 01:42 )             28.6   04-17    142  |  105  |  35<H>  ----------------------------<  181<H>  5.0   |  28  |  0.47<L>    Ca    8.7      17 Apr 2020 01:42  Phos  2.8     04-17  Mg     2.4     04-17    TPro  6.0  /  Alb  2.1<L>  /  TBili  0.2  /  DBili  x   /  AST  13  /  ALT  17  /  AlkPhos  92  04-17        RADIOLOGY & ADDITIONAL STUDIES:    PROTEIN CALORIE MALNUTRITION PRESENT: [ ]mild [ ]moderate [ ]severe [ ]underweight [ ]morbid obesity  https://www.andeal.org/vault/2440/web/files/ONC/Table_Clinical%20Characteristics%20to%20Document%20Malnutrition-White%20JV%20et%20al%202012.pdf    Height (cm): 175.26 (03-27-20 @ 16:15)  Weight (kg): 77.1 (03-27-20 @ 16:15)  BMI (kg/m2): 25.1 (03-27-20 @ 16:15)    [ ]PPSV2 < or = to 30% [ ]significant weight loss  [ ]poor nutritional intake  [ ]anasarca     Albumin, Serum: 2.1 g/dL (04-17-20 @ 01:42)   [ ]Artificial Nutrition      REFERRALS:   [ ]Chaplaincy  [ ]Hospice  [ ]Child Life  [ ]Social Work  [ ]Case management [ ]Holistic Therapy     Goals of Care Document: RASHEL Saldana (04-16-20 @ 15:07)  Goals of Care Conversation:   Participants:  · Spouse  Reanna via phone [phone]  · Child(du)  Ileana Lee MD via phone [phone]    Advance Directives:  · Does patient have Advance Directive  No    Conversation Discussion:  · Conversation Details  I spoke with wife and daughter via phone numbers listed above.    I provided updates regarding current status and plan of care for the day. Per thei wife they had never had any discussion regarding end of life care and so she doesn't know what he would want. Family understands patient remains critically ill at this time and that he is not making progress on the ventilator and that there is a good chance we won't be able to wean him off at all. The option for palliative extubation was offered as well as a DNR but prior to making any advanced decisions the daughter would like to know if any additional therapy such as steroids or proning could make any improvement, MD Kemp spoke with her as well and decision was made to give another course of steroids.   At this time the family would like to continue full supportive care and they will discuss amongst their family and let us know of any changes regarding advanced planning. Emotional support provided, social work assistance offered and the wife would like to set up a video call, social work notified and will arrange. All questions and concerns addressed.      Electronic Signatures:  Palmira Saldana (ROMIE)  (Signed 16-Apr-2020 15:22)  	Authored: Goals of Care Conversation      Last Updated: 16-Apr-2020 15:22 by Palmira Saldana (NP)

## 2020-04-17 NOTE — CONSULT NOTE ADULT - NSREFPHYEXINPTDOCREFER_GEN_ALL_CORE
Telephone Encounter by Myranda Hall RN at 09/22/17 04:54 PM     Author:  Myranda Hall RN Service:  (none) Author Type:  Registered Nurse     Filed:  09/22/17 04:57 PM Encounter Date:  9/21/2017 Status:  Signed     :  Myranda Hall RN (Registered Nurse)            Pt rescheduled for 9/25/17 2 pm arrival and 4 pm procedure at [facility].  Updated date/time sent to [facility].   Reps notified.  Changed in IDX  Jacinda on VR notified and v.u  [employer] called and DOS changed to 9/25/17.[ES1.1M]      Revision History        User Key Date/Time User Provider Type Action    > ES1.1 09/22/17 04:57 PM Myranda Hall RN Registered Nurse Sign    M - Manual             4/17/20

## 2020-04-17 NOTE — PROGRESS NOTE ADULT - SUBJECTIVE AND OBJECTIVE BOX
CLARITA POWELL  MRN-[id number]  Patient is a 70y old  Male who presents with a chief complaint of respiratory distress, acute hypoxic respiratory failure, COVID-19 (+) (16 Apr 2020 19:41)    HPI:  70M with PMH presents with T dm, hypothyroidism, hld, p/w cough, low grade fever x 2-3 days. Today patient had worsening dyspnea at rest as well as exertion which prompted to come to the ED. Patient otherwise denied chest pain, leg swelling. He denies prior lung disease. Denies any history of smoking.     In ED, patient was noted to be hypoxic on 6L NC to 89% and significant tachypnea. VS otherwise, afebrile, HR in 80s, SBP 150s. Labs notable for CBC wnl. CMP with mild elevated transaminases AST 65 and ALT 47. PAtient noted to be positive for COVID 19. Due to worsening respiratory distress patient was emergently intubated in the ER. (27 Mar 2020 21:38)    Surgery/Hospital course:  COVID 19 Positive  Intubated 3/27  4/7 Patient proned  4/8 Unproned  4/9 Paralysis weaned off yesterday, unproned with maintained sats, tolerating diuresis  4/12 10 sec asystole, back after epi x1  4/13 CPAP    REVIEW OF SYSTEMS:  Unable to obtain, vented     Vital Signs Last 24 Hrs  T(C): 37.4 (17 Apr 2020 04:00), Max: 37.6 (16 Apr 2020 08:00)  T(F): 99.3 (17 Apr 2020 04:00), Max: 99.7 (16 Apr 2020 08:00)  HR: 71 (17 Apr 2020 07:00) (62 - 81)  BP: 126/60 (17 Apr 2020 04:00) (101/53 - 126/60)  BP(mean): 87 (17 Apr 2020 04:00) (73 - 87)  RR: 33 (17 Apr 2020 07:00) (30 - 35)  SpO2: 95% (17 Apr 2020 07:00) (89% - 95%)    Physical Exam:  Gen: vented   CNS: sedated, pupils equal and reactive to light, grimaces to suction, no withdrawl to pain x4 extremities   Neck: no JVD   Res: diminished, no wheezing, minimal secreations via ETT, +oral clear secreations  CVS: regular rhythm, normal S1/S2  Abd: softly distended, bowel sounds present, last BM 4/15  Skin: no rash, intact  Ext: generalized +2 edema    ============================I/O===========================   I&O's Detail    16 Apr 2020 07:01  -  17 Apr 2020 07:00  --------------------------------------------------------  IN:    Enteral Tube Flush: 230 mL    Glucerna 1.5: 1200 mL    IV PiggyBack: 50 mL    ketamine Infusion.: 215.6 mL    ketamine Infusion.: 154 mL    norepinephrine Infusion: 72.5 mL    norepinephrine Infusion: 116 mL  Total IN: 2038.1 mL    OUT:    Indwelling Catheter - Urethral: 1415 mL  Total OUT: 1415 mL    Total NET: 623.1 mL        ============================ LABS =========================                        8.5    11.20 )-----------( 210      ( 17 Apr 2020 01:42 )             28.6     04-17    142  |  105  |  35<H>  ----------------------------<  181<H>  5.0   |  28  |  0.47<L>    Ca    8.7      17 Apr 2020 01:42  Phos  2.8     04-17  Mg     2.4     04-17    TPro  6.0  /  Alb  2.1<L>  /  TBili  0.2  /  DBili  x   /  AST  13  /  ALT  17  /  AlkPhos  92  04-17    LIVER FUNCTIONS - ( 17 Apr 2020 01:42 )  Alb: 2.1 g/dL / Pro: 6.0 g/dL / ALK PHOS: 92 U/L / ALT: 17 U/L / AST: 13 U/L / GGT: x             ABG - ( 17 Apr 2020 01:19 )  pH, Arterial: 7.44  pH, Blood: x     /  pCO2: 48    /  pO2: 64    / HCO3: 32    / Base Excess: 7.4   /  SaO2: 93                  ======================Micro/Rad/Cardio=================  Culture: Reviewed   CXR: Reviewed  ======================================================  PAST MEDICAL & SURGICAL HISTORY:  Hyperlipidemia  Hypothyroid  No significant past surgical history    ====================ASSESSMENT ==============  COVID 19 Positive   3/27 Intubated   Hypoxic respiratory failure   Septic Shock  ARDS  Fluid overload    Plan:  ====================== NEUROLOGY=====================  Sedated with Ketamine  Klonopin and Oxycodone to help wean sedation   Dilaudid prn for analgesia     acetaminophen    Suspension .. 650 milliGRAM(s) Oral every 6 hours PRN Temp greater or equal to 38.5C (101.3F)  clonazePAM  Tablet 2 milliGRAM(s) Oral every 8 hours  HYDROmorphone  Injectable 1 milliGRAM(s) IV Push every 4 hours PRN Moderate Pain (4 - 6)  ketamine Infusion. 2 mG/kG/Hr (15.4 mL/Hr) IV Continuous <Continuous>  oxyCODONE    IR 20 milliGRAM(s) Oral every 6 hours    ==================== RESPIRATORY======================  On full vent support     Mechanical Ventilation:  Mode: AC/ CMV (Assist Control/ Continuous Mandatory Ventilation)  RR (machine): 30  TV (machine): 350  FiO2: 50  PEEP: 6  ITime: 1  MAP: 18  PIP: 38      ====================CARDIOVASCULAR==================  On pressor support for hypotension     norepinephrine Infusion 0.02 MICROgram(s)/kG/Min (2.89 mL/Hr) IV Continuous <Continuous>    ===================HEMATOLOGIC/ONC ===================  aspirin  chewable 81 milliGRAM(s) Oral daily  enoxaparin Injectable 40 milliGRAM(s) SubCutaneous two times a day for VTE prophylaxis     ===================== RENAL =========================  Continue monitoring urine output    ==================== GASTROINTESTINAL===================  Tolerating tube feeds, Glucerna 300 [id number]     famotidine    Tablet 20 milliGRAM(s) Oral two times a day for GI prophylaxis   polyethylene glycol 3350 17 Gram(s) Oral every 12 hours  senna Syrup 10 milliLiter(s) Oral at bedtime  sodium chloride 0.9% lock flush 10 milliLiter(s) IV Push every 1 hour PRN Pre/post blood products, medications, blood draw, and to maintain line patency    =======================    ENDOCRINE  =====================  Glucose control with insulin   Decadron for rising inflammatory markers     insulin lispro (HumaLOG) corrective regimen sliding scale   SubCutaneous every 6 hours  insulin NPH human recombinant 11 Unit(s) SubCutaneous every 6 hours  levothyroxine Injectable 50 MICROGram(s) IV Push at bedtime  dexAMETHasone  IVPB 20 milliGRAM(s) IV Intermittent daily    ========================INFECTIOUS DISEASE================  Completed COVID medications (Plaquenil, Vit C/Thiamine, Anakinra, Solumedrol)       Patient requires continuous monitoring with bedside rhythm monitoring, pulse ox monitoring, and intermittent blood gas analysis. Care plan discussed with ICU care team. Patient remained critical and at risk for life threatening decompensation.    By signing my name below, I, Pretty Wallace, attest that this documentation has been prepared under the direction and in the presence of Palmira Saldana NP  Electronically signed: Rohan Lal, 04-17-20 @ 07:24    I, Palmira Saldana, personally performed the services described in this documentation. all medical record entries made by the kevinibtrae were at my direction and in my presence. I have reviewed the chart and agree that the record reflects my personal performance and is accurate and complete  Electronically signed: Palmira Saldana NP CLARITA POWELL  MRN-[id number]  Patient is a 70y old  Male who presents with a chief complaint of respiratory distress, acute hypoxic respiratory failure, COVID-19 (+) (16 Apr 2020 19:41)    HPI:  70M with PMH presents with T dm, hypothyroidism, hld, p/w cough, low grade fever x 2-3 days. Today patient had worsening dyspnea at rest as well as exertion which prompted to come to the ED. Patient otherwise denied chest pain, leg swelling. He denies prior lung disease. Denies any history of smoking.     In ED, patient was noted to be hypoxic on 6L NC to 89% and significant tachypnea. VS otherwise, afebrile, HR in 80s, SBP 150s. Labs notable for CBC wnl. CMP with mild elevated transaminases AST 65 and ALT 47. PAtient noted to be positive for COVID 19. Due to worsening respiratory distress patient was emergently intubated in the ER. (27 Mar 2020 21:38)    Surgery/Hospital course:  COVID 19 Positive  Intubated 3/27  4/7 Patient proned  4/8 Unproned  4/9 Paralysis weaned off yesterday, unproned with maintained sats, tolerating diuresis  4/12 10 sec asystole, back after epi x1  4/13 CPAP    Today/Overnight:   ·	Restarted steroid trial yesterday   ·	Failed CPAP     REVIEW OF SYSTEMS:  Unable to obtain, vented     Vital Signs Last 24 Hrs  T(C): 37.4 (17 Apr 2020 04:00), Max: 37.6 (16 Apr 2020 08:00)  T(F): 99.3 (17 Apr 2020 04:00), Max: 99.7 (16 Apr 2020 08:00)  HR: 71 (17 Apr 2020 07:00) (62 - 81)  BP: 126/60 (17 Apr 2020 04:00) (101/53 - 126/60)  BP(mean): 87 (17 Apr 2020 04:00) (73 - 87)  RR: 33 (17 Apr 2020 07:00) (30 - 35)  SpO2: 95% (17 Apr 2020 07:00) (89% - 95%)    Physical Exam:  Gen: vented   CNS: sedated, pupils equal and reactive to light, grimaces to suction, no withdrawal to pain x4 extremities   Neck: no JVD   Res: diminished, no wheezing, minimal secretions via ETT, +oral clear secreations  CVS: regular rhythm, normal S1/S2  Abd: softly distended, bowel sounds present, last BM 4/15  Skin: no rash, intact  Ext: generalized +2 edema    ============================I/O===========================   I&O's Detail    16 Apr 2020 07:01  -  17 Apr 2020 07:00  --------------------------------------------------------  IN:    Enteral Tube Flush: 230 mL    Glucerna 1.5: 1200 mL    IV PiggyBack: 50 mL    ketamine Infusion.: 215.6 mL    ketamine Infusion.: 154 mL    norepinephrine Infusion: 72.5 mL    norepinephrine Infusion: 116 mL  Total IN: 2038.1 mL    OUT:    Indwelling Catheter - Urethral: 1415 mL  Total OUT: 1415 mL    Total NET: 623.1 mL        ============================ LABS =========================                        8.5    11.20 )-----------( 210      ( 17 Apr 2020 01:42 )             28.6     04-17    142  |  105  |  35<H>  ----------------------------<  181<H>  5.0   |  28  |  0.47<L>    Ca    8.7      17 Apr 2020 01:42  Phos  2.8     04-17  Mg     2.4     04-17    TPro  6.0  /  Alb  2.1<L>  /  TBili  0.2  /  DBili  x   /  AST  13  /  ALT  17  /  AlkPhos  92  04-17    LIVER FUNCTIONS - ( 17 Apr 2020 01:42 )  Alb: 2.1 g/dL / Pro: 6.0 g/dL / ALK PHOS: 92 U/L / ALT: 17 U/L / AST: 13 U/L / GGT: x             ABG - ( 17 Apr 2020 01:19 )  pH, Arterial: 7.44  pH, Blood: x     /  pCO2: 48    /  pO2: 64    / HCO3: 32    / Base Excess: 7.4   /  SaO2: 93                  ======================Micro/Rad/Cardio=================  Culture: Reviewed   CXR: Reviewed  ======================================================  PAST MEDICAL & SURGICAL HISTORY:  Hyperlipidemia  Hypothyroid  No significant past surgical history    ====================ASSESSMENT ==============  COVID 19 Positive   3/27 Intubated   Hypoxic respiratory failure   Septic Shock  ARDS  Fluid overload    Plan:  ====================== NEUROLOGY=====================  Sedated with Ketamine, attempt to wean as tolerated to assess neuro status when vent compliance improves   Klonopin and Oxycodone to help wean sedation   Dilaudid prn for analgesia     acetaminophen    Suspension .. 650 milliGRAM(s) Oral every 6 hours PRN Temp greater or equal to 38.5C (101.3F)  clonazePAM  Tablet 2 milliGRAM(s) Oral every 8 hours  HYDROmorphone  Injectable 1 milliGRAM(s) IV Push every 4 hours PRN Moderate Pain (4 - 6)  ketamine Infusion. 2 mG/kG/Hr (15.4 mL/Hr) IV Continuous <Continuous>  oxyCODONE    IR 20 milliGRAM(s) Oral every 6 hours    ==================== RESPIRATORY======================  On full vent support     Mechanical Ventilation:  Mode: AC/ CMV (Assist Control/ Continuous Mandatory Ventilation)  RR (machine): 30  TV (machine): 350  FiO2: 50  PEEP: 6  ITime: 1  MAP: 18  PIP: 38    Plateau Pressure:     ====================CARDIOVASCULAR==================  On pressor support for hypotension, wean as tolerated for MAP goal 65-75    norepinephrine Infusion 0.02 MICROgram(s)/kG/Min (2.89 mL/Hr) IV Continuous <Continuous>    ===================HEMATOLOGIC/ONC ===================  aspirin  chewable 81 milliGRAM(s) Oral daily  enoxaparin Injectable 40 milliGRAM(s) SubCutaneous two times a day for VTE prophylaxis     ===================== RENAL =========================  Continue monitoring urine output  Targeting even to slightly negative fluid goal   Prn diuresis     ==================== GASTROINTESTINAL===================  Tolerating tube feeds, Glucerna 300 [id number]   Continue bowel regimen with miralax and senna, last BM 2 days ago    famotidine    Tablet 20 milliGRAM(s) Oral two times a day for GI prophylaxis   polyethylene glycol 3350 17 Gram(s) Oral every 12 hours  senna Syrup 10 milliLiter(s) Oral at bedtime  sodium chloride 0.9% lock flush 10 milliLiter(s) IV Push every 1 hour PRN Pre/post blood products, medications, blood draw, and to maintain line patency    =======================    ENDOCRINE  =====================  Glucose control with insulin     insulin lispro (HumaLOG) corrective regimen sliding scale   SubCutaneous every 6 hours  insulin NPH human recombinant 11 Unit(s) SubCutaneous every 6 hours  levothyroxine Injectable 50 MICROGram(s) IV Push at bedtime    ========================INFECTIOUS DISEASE================  Completed COVID medications (Plaquenil, Vit C/Thiamine, Anakinra, Solumedrol)   Decadron for rising inflammatory markers, 20mg IV qday x 5 days, followed by 10mg IV qday x 5 days     dexAMETHasone  IVPB 20 milliGRAM(s) IV Intermittent daily    Patient requires continuous monitoring with bedside rhythm monitoring, pulse ox monitoring, and intermittent blood gas analysis. Care plan discussed with ICU care team. Patient remained critical and at risk for life threatening decompensation.    By signing my name below, I, Pretty Wallace, attest that this documentation has been prepared under the direction and in the presence of Palmira Saldana NP  Electronically signed: Rohan Lal, 04-17-20 @ 07:24    I, Palmira Saladna, personally performed the services described in this documentation. all medical record entries made by the kevinibe were at my direction and in my presence. I have reviewed the chart and agree that the record reflects my personal performance and is accurate and complete  Electronically signed: Palmira Saldana NP CLARITA POWELL  MRN-[id number]  Patient is a 70y old  Male who presents with a chief complaint of respiratory distress, acute hypoxic respiratory failure, COVID-19 (+) (16 Apr 2020 19:41)    HPI:  70M with PMH presents with T dm, hypothyroidism, hld, p/w cough, low grade fever x 2-3 days. Today patient had worsening dyspnea at rest as well as exertion which prompted to come to the ED. Patient otherwise denied chest pain, leg swelling. He denies prior lung disease. Denies any history of smoking.     In ED, patient was noted to be hypoxic on 6L NC to 89% and significant tachypnea. VS otherwise, afebrile, HR in 80s, SBP 150s. Labs notable for CBC wnl. CMP with mild elevated transaminases AST 65 and ALT 47. PAtient noted to be positive for COVID 19. Due to worsening respiratory distress patient was emergently intubated in the ER. (27 Mar 2020 21:38)    Surgery/Hospital course:  COVID 19 Positive  Intubated 3/27  4/7 Patient proned  4/8 Unproned  4/9 Paralysis weaned off yesterday, unproned with maintained sats, tolerating diuresis  4/12 10 sec asystole, back after epi x1  4/13 CPAP  4/14- 4/16 unable to tolerate CPAP   4/16 decadron course started    Today/Overnight:   ·	Restarted steroid trial yesterday   ·	Failed CPAP   ·	MD Kemp spoke with patients daughter on the phone (MD Ileana Lee) to update on plan and prognosis    REVIEW OF SYSTEMS:  Unable to obtain, vented     Vital Signs Last 24 Hrs  T(C): 37.4 (17 Apr 2020 04:00), Max: 37.6 (16 Apr 2020 08:00)  T(F): 99.3 (17 Apr 2020 04:00), Max: 99.7 (16 Apr 2020 08:00)  HR: 71 (17 Apr 2020 07:00) (62 - 81)  BP: 126/60 (17 Apr 2020 04:00) (101/53 - 126/60)  BP(mean): 87 (17 Apr 2020 04:00) (73 - 87)  RR: 33 (17 Apr 2020 07:00) (30 - 35)  SpO2: 95% (17 Apr 2020 07:00) (89% - 95%)    Physical Exam:  Gen: vented   CNS: sedated, pupils equal and reactive to light, grimaces to suction, no withdrawal to pain x4 extremities   Neck: no JVD   Res: diminished, no wheezing, minimal secretions via ETT, +oral clear secretions  CVS: regular rhythm, normal S1/S2  Abd: softly distended, bowel sounds present, last BM 4/15  Skin: no rash, intact  Ext: generalized +2 edema    ============================I/O===========================   I&O's Detail    16 Apr 2020 07:01  -  17 Apr 2020 07:00  --------------------------------------------------------  IN:    Enteral Tube Flush: 230 mL    Glucerna 1.5: 1200 mL    IV PiggyBack: 50 mL    ketamine Infusion.: 215.6 mL    ketamine Infusion.: 154 mL    norepinephrine Infusion: 72.5 mL    norepinephrine Infusion: 116 mL  Total IN: 2038.1 mL    OUT:    Indwelling Catheter - Urethral: 1415 mL  Total OUT: 1415 mL    Total NET: 623.1 mL        ============================ LABS =========================                        8.5    11.20 )-----------( 210      ( 17 Apr 2020 01:42 )             28.6     04-17    142  |  105  |  35<H>  ----------------------------<  181<H>  5.0   |  28  |  0.47<L>    Ca    8.7      17 Apr 2020 01:42  Phos  2.8     04-17  Mg     2.4     04-17    TPro  6.0  /  Alb  2.1<L>  /  TBili  0.2  /  DBili  x   /  AST  13  /  ALT  17  /  AlkPhos  92  04-17    LIVER FUNCTIONS - ( 17 Apr 2020 01:42 )  Alb: 2.1 g/dL / Pro: 6.0 g/dL / ALK PHOS: 92 U/L / ALT: 17 U/L / AST: 13 U/L / GGT: x             ABG - ( 17 Apr 2020 01:19 )  pH, Arterial: 7.44  pH, Blood: x     /  pCO2: 48    /  pO2: 64    / HCO3: 32    / Base Excess: 7.4   /  SaO2: 93                  ======================Micro/Rad/Cardio=================  Culture: Reviewed   CXR: Reviewed  ======================================================  PAST MEDICAL & SURGICAL HISTORY:  Hyperlipidemia  Hypothyroid  No significant past surgical history    ====================ASSESSMENT ==============  COVID 19 Positive   3/27 Intubated   Hypoxic respiratory failure   Septic Shock  ARDS  Fluid overload    Plan:  ====================== NEUROLOGY=====================  Sedated with Ketamine, attempt to wean as tolerated to assess neuro status when vent compliance improves   Klonopin and Oxycodone to help wean sedation   Dilaudid prn for analgesia     acetaminophen    Suspension .. 650 milliGRAM(s) Oral every 6 hours PRN Temp greater or equal to 38.5C (101.3F)  clonazePAM  Tablet 2 milliGRAM(s) Oral every 8 hours  HYDROmorphone  Injectable 1 milliGRAM(s) IV Push every 4 hours PRN Moderate Pain (4 - 6)  ketamine Infusion. 2 mG/kG/Hr (15.4 mL/Hr) IV Continuous <Continuous>  oxyCODONE    IR 20 milliGRAM(s) Oral every 6 hours    ==================== RESPIRATORY======================  On full vent support     Mechanical Ventilation:  Mode: AC/ CMV (Assist Control/ Continuous Mandatory Ventilation)  RR (machine): 30  TV (machine): 350  FiO2: 50  PEEP: 6  ITime: 1  MAP: 18  PIP: 38    Plateau Pressure:     ====================CARDIOVASCULAR==================  On pressor support for hypotension, wean as tolerated for MAP goal 65-75    norepinephrine Infusion 0.02 MICROgram(s)/kG/Min (2.89 mL/Hr) IV Continuous <Continuous>    ===================HEMATOLOGIC/ONC ===================  aspirin  chewable 81 milliGRAM(s) Oral daily  enoxaparin Injectable 40 milliGRAM(s) SubCutaneous two times a day for VTE prophylaxis     ===================== RENAL =========================  Continue monitoring urine output  Targeting even to slightly negative fluid goal   Prn diuresis     ==================== GASTROINTESTINAL===================  Tolerating tube feeds, Glucerna 300 [id number]   Continue bowel regimen with miralax and senna, last BM 2 days ago    famotidine    Tablet 20 milliGRAM(s) Oral two times a day for GI prophylaxis   polyethylene glycol 3350 17 Gram(s) Oral every 12 hours  senna Syrup 10 milliLiter(s) Oral at bedtime  sodium chloride 0.9% lock flush 10 milliLiter(s) IV Push every 1 hour PRN Pre/post blood products, medications, blood draw, and to maintain line patency    =======================    ENDOCRINE  =====================  Glucose control with insulin     insulin lispro (HumaLOG) corrective regimen sliding scale   SubCutaneous every 6 hours  insulin NPH human recombinant 11 Unit(s) SubCutaneous every 6 hours  levothyroxine Injectable 50 MICROGram(s) IV Push at bedtime    ========================INFECTIOUS DISEASE================  Completed COVID medications (Plaquenil, Vit C/Thiamine, Anakinra, Solumedrol)   Decadron for rising inflammatory markers, 20mg IV qday x 5 days, followed by 10mg IV qday x 5 days     dexAMETHasone  IVPB 20 milliGRAM(s) IV Intermittent daily    Patient requires continuous monitoring with bedside rhythm monitoring, pulse ox monitoring, and intermittent blood gas analysis. Care plan discussed with ICU care team. Patient remained critical and at risk for life threatening decompensation.    By signing my name below, I, Pretty Wallace, attest that this documentation has been prepared under the direction and in the presence of Palmira Saldana NP  Electronically signed: Jonathan Lal, 04-17-20 @ 07:24    I, Palmira Saldana, personally performed the services described in this documentation. all medical record entries made by the jonathan were at my direction and in my presence. I have reviewed the chart and agree that the record reflects my personal performance and is accurate and complete  Electronically signed: Palmira Saldana NP CLARITA POWELL  MRN-[id number]  Patient is a 70y old  Male who presents with a chief complaint of respiratory distress, acute hypoxic respiratory failure, COVID-19 (+) (16 Apr 2020 19:41)    HPI:  70M with PMH presents with T dm, hypothyroidism, hld, p/w cough, low grade fever x 2-3 days. Today patient had worsening dyspnea at rest as well as exertion which prompted to come to the ED. Patient otherwise denied chest pain, leg swelling. He denies prior lung disease. Denies any history of smoking.     In ED, patient was noted to be hypoxic on 6L NC to 89% and significant tachypnea. VS otherwise, afebrile, HR in 80s, SBP 150s. Labs notable for CBC wnl. CMP with mild elevated transaminases AST 65 and ALT 47. PAtient noted to be positive for COVID 19. Due to worsening respiratory distress patient was emergently intubated in the ER. (27 Mar 2020 21:38)    Surgery/Hospital course:  COVID 19 Positive  Intubated 3/27  4/7 Patient proned  4/8 Unproned  4/9 Paralysis weaned off yesterday, unproned with maintained sats, tolerating diuresis  4/12 10 sec asystole, back after epi x1  4/13 CPAP  4/14- 4/16 unable to tolerate CPAP   4/16 decadron course started    Today/Overnight:   ·	Restarted steroid trial yesterday   ·	Failed CPAP   ·	MD Kemp spoke with patients daughter on the phone (MD Ileana Lee) to update on plan and prognosis  ·	Palliative care now following to help with GOC discussions with family    REVIEW OF SYSTEMS:  Unable to obtain, vented     Vital Signs Last 24 Hrs  T(C): 37.4 (17 Apr 2020 04:00), Max: 37.6 (16 Apr 2020 08:00)  T(F): 99.3 (17 Apr 2020 04:00), Max: 99.7 (16 Apr 2020 08:00)  HR: 71 (17 Apr 2020 07:00) (62 - 81)  BP: 126/60 (17 Apr 2020 04:00) (101/53 - 126/60)  BP(mean): 87 (17 Apr 2020 04:00) (73 - 87)  RR: 33 (17 Apr 2020 07:00) (30 - 35)  SpO2: 95% (17 Apr 2020 07:00) (89% - 95%)    Physical Exam:  Gen: vented   CNS: sedated, pupils equal and reactive to light, grimaces to suction, no withdrawal to pain x4 extremities   Neck: no JVD   Res: diminished, no wheezing, minimal secretions via ETT, +oral clear secretions  CVS: regular rhythm, normal S1/S2  Abd: softly distended, bowel sounds present, last BM 4/15  Skin: no rash, intact  Ext: generalized +2 edema    ============================I/O===========================   I&O's Detail    16 Apr 2020 07:01  -  17 Apr 2020 07:00  --------------------------------------------------------  IN:    Enteral Tube Flush: 230 mL    Glucerna 1.5: 1200 mL    IV PiggyBack: 50 mL    ketamine Infusion.: 215.6 mL    ketamine Infusion.: 154 mL    norepinephrine Infusion: 72.5 mL    norepinephrine Infusion: 116 mL  Total IN: 2038.1 mL    OUT:    Indwelling Catheter - Urethral: 1415 mL  Total OUT: 1415 mL    Total NET: 623.1 mL        ============================ LABS =========================                        8.5    11.20 )-----------( 210      ( 17 Apr 2020 01:42 )             28.6     04-17    142  |  105  |  35<H>  ----------------------------<  181<H>  5.0   |  28  |  0.47<L>    Ca    8.7      17 Apr 2020 01:42  Phos  2.8     04-17  Mg     2.4     04-17    TPro  6.0  /  Alb  2.1<L>  /  TBili  0.2  /  DBili  x   /  AST  13  /  ALT  17  /  AlkPhos  92  04-17    LIVER FUNCTIONS - ( 17 Apr 2020 01:42 )  Alb: 2.1 g/dL / Pro: 6.0 g/dL / ALK PHOS: 92 U/L / ALT: 17 U/L / AST: 13 U/L / GGT: x             ABG - ( 17 Apr 2020 01:19 )  pH, Arterial: 7.44  pH, Blood: x     /  pCO2: 48    /  pO2: 64    / HCO3: 32    / Base Excess: 7.4   /  SaO2: 93                  ======================Micro/Rad/Cardio=================  Culture: Reviewed   CXR: Reviewed  ======================================================  PAST MEDICAL & SURGICAL HISTORY:  Hyperlipidemia  Hypothyroid  No significant past surgical history    ====================ASSESSMENT ==============  COVID 19 Positive   3/27 Intubated   Hypoxic respiratory failure   Septic Shock  ARDS  Fluid overload    Plan:  ====================== NEUROLOGY=====================  Sedated with Ketamine, attempt to wean as tolerated to assess neuro status when vent compliance improves   Klonopin and Oxycodone to help wean sedation   Dilaudid prn for analgesia     acetaminophen    Suspension .. 650 milliGRAM(s) Oral every 6 hours PRN Temp greater or equal to 38.5C (101.3F)  clonazePAM  Tablet 2 milliGRAM(s) Oral every 8 hours  HYDROmorphone  Injectable 1 milliGRAM(s) IV Push every 4 hours PRN Moderate Pain (4 - 6)  ketamine Infusion. 2 mG/kG/Hr (15.4 mL/Hr) IV Continuous <Continuous>  oxyCODONE    IR 20 milliGRAM(s) Oral every 6 hours    ==================== RESPIRATORY======================  On full vent support     Mechanical Ventilation:  Mode: AC/ CMV (Assist Control/ Continuous Mandatory Ventilation)  RR (machine): 30  TV (machine): 350  FiO2: 50  PEEP: 6  ITime: 1  MAP: 18  PIP: 38    Plateau Pressure: 30    ====================CARDIOVASCULAR==================  On vasopressor support for hypotension, wean as tolerated for MAP goal 65-75    norepinephrine Infusion 0.02 MICROgram(s)/kG/Min (2.89 mL/Hr) IV Continuous <Continuous>    ===================HEMATOLOGIC/ONC ===================  aspirin  chewable 81 milliGRAM(s) Oral daily  enoxaparin Injectable 40 milliGRAM(s) SubCutaneous two times a day for VTE prophylaxis     ===================== RENAL =========================  Continue monitoring urine output  Targeting even to slightly negative fluid goal   Prn diuresis     ==================== GASTROINTESTINAL===================  Tolerating tube feeds, Glucerna 300 [id number]   Continue bowel regimen with miralax and senna, last BM 4/16    famotidine    Tablet 20 milliGRAM(s) Oral two times a day for GI prophylaxis   polyethylene glycol 3350 17 Gram(s) Oral every 12 hours  senna Syrup 10 milliLiter(s) Oral at bedtime      =======================    ENDOCRINE  =====================  Glucose control with insulin     insulin lispro (HumaLOG) corrective regimen sliding scale   SubCutaneous every 6 hours  insulin NPH human recombinant 11 Unit(s) SubCutaneous every 6 hours  levothyroxine Injectable 50 MICROGram(s) IV Push at bedtime    ========================INFECTIOUS DISEASE================  Completed COVID medications (Plaquenil, Vit C/Thiamine, Anakinra, Solumedrol)   Decadron for rising inflammatory markers, 20mg IV qday x 5 days, followed by 10mg IV qday x 5 days     dexAMETHasone  IVPB 20 milliGRAM(s) IV Intermittent daily    Patient requires continuous monitoring with bedside rhythm monitoring, pulse ox monitoring, and intermittent blood gas analysis. Care plan discussed with ICU care team. Patient remained critical and at risk for life threatening decompensation.    By signing my name below, I, Pretty Wallace, attest that this documentation has been prepared under the direction and in the presence of Palmira Saldana NP  Electronically signed: Rohan Lal, 04-17-20 @ 07:24    I, Palmira Saldana, personally performed the services described in this documentation. all medical record entries made by the scribe were at my direction and in my presence. I have reviewed the chart and agree that the record reflects my personal performance and is accurate and complete  Electronically signed: Palmira Saldana NP

## 2020-04-17 NOTE — PROGRESS NOTE ADULT - SUBJECTIVE AND OBJECTIVE BOX
CLARITA POWELL  MRN-[id number]  Patient is a 70y old  Male who presents with a chief complaint of respiratory distress, COVID 19 (17 Apr 2020 12:58)    HPI:  70M with PMH presents with T dm, hypothyroidism, hld, p/w cough, low grade fever x 2-3 days. Today patient had worsening dyspnea at rest as well as exertion which prompted to come to the ED. Patient otherwise denied chest pain, leg swelling. He denies prior lung disease. Denies any history of smoking.     In ED, patient was noted to be hypoxic on 6L NC to 89% and significant tachypnea. VS otherwise, afebrile, HR in 80s, SBP 150s. Labs notable for CBC wnl. CMP with mild elevated transaminases AST 65 and ALT 47. PAtient noted to be positive for COVID 19. Due to worsening respiratory distress patient was emergently intubated in the ER. (27 Mar 2020 21:38)      Surgery/Hospital course:  COVID 19 Positive  Intubated 3/27  4/7 Patient proned  4/8 Unproned  4/9 Paralysis weaned off yesterday, unproned with maintained sats, tolerating diuresis  4/12 10 sec asystole, back after epi x1  4/13 CPAP  4/14- 4/16 unable to tolerate CPAP   4/16 decadron course started    Today:     REVIEW OF SYSTEMS:  Unable to obtain, vented     Vital Signs Last 24 Hrs  T(C): 37.8 (17 Apr 2020 08:00), Max: 37.8 (17 Apr 2020 08:00)  T(F): 100 (17 Apr 2020 08:00), Max: 100 (17 Apr 2020 08:00)  HR: 74 (17 Apr 2020 18:00) (65 - 82)  BP: 126/60 (17 Apr 2020 04:00) (101/53 - 126/60)  BP(mean): 87 (17 Apr 2020 04:00) (73 - 87)  RR: 34 (17 Apr 2020 18:00) (30 - 37)  SpO2: 94% (17 Apr 2020 18:00) (91% - 100%)    ============================I/O===========================   I&O's Detail    16 Apr 2020 07:01  -  17 Apr 2020 07:00  --------------------------------------------------------  IN:    Enteral Tube Flush: 230 mL    Glucerna 1.5: 1200 mL    IV PiggyBack: 50 mL    ketamine Infusion.: 215.6 mL    ketamine Infusion.: 154 mL    norepinephrine Infusion: 72.5 mL    norepinephrine Infusion: 116 mL  Total IN: 2038.1 mL    OUT:    Indwelling Catheter - Urethral: 1415 mL  Total OUT: 1415 mL    Total NET: 623.1 mL      17 Apr 2020 07:01  -  17 Apr 2020 18:58  --------------------------------------------------------  IN:    Enteral Tube Flush: 90 mL    Glucerna 1.5: 600 mL    ketamine Infusion.: 135.2 mL    norepinephrine Infusion: 104.4 mL  Total IN: 929.6 mL    OUT:    Indwelling Catheter - Urethral: 850 mL  Total OUT: 850 mL    Total NET: 79.6 mL        ============================ LABS =========================                        8.5    11.20 )-----------( 210      ( 17 Apr 2020 01:42 )             28.6     04-17    142  |  105  |  35<H>  ----------------------------<  181<H>  5.0   |  28  |  0.47<L>    Ca    8.7      17 Apr 2020 01:42  Phos  2.8     04-17  Mg     2.4     04-17    TPro  6.0  /  Alb  2.1<L>  /  TBili  0.2  /  DBili  x   /  AST  13  /  ALT  17  /  AlkPhos  92  04-17    LIVER FUNCTIONS - ( 17 Apr 2020 01:42 )  Alb: 2.1 g/dL / Pro: 6.0 g/dL / ALK PHOS: 92 U/L / ALT: 17 U/L / AST: 13 U/L / GGT: x             ABG - ( 17 Apr 2020 01:19 )  pH, Arterial: 7.44  pH, Blood: x     /  pCO2: 48    /  pO2: 64    / HCO3: 32    / Base Excess: 7.4   /  SaO2: 93                  ======================Micro/Rad/Cardio=================  Culture: Reviewed   CXR: Reviewed  ======================================================  PAST MEDICAL & SURGICAL HISTORY:  Hyperlipidemia  Hypothyroid  No significant past surgical history    ====================ASSESSMENT ==============  COVID 19 Positive   3/27 Intubated   Hypoxic respiratory failure   Septic Shock  ARDS  Fluid overload       Plan:  ====================== NEUROLOGY=====================  Sedated with Ketamine, attempt to wean as tolerated to assess neuro status when vent compliance improves   Klonopin and Oxycodone to help wean sedation   Dilaudid prn for analgesia     acetaminophen    Suspension .. 650 milliGRAM(s) Oral every 6 hours PRN Temp greater or equal to 38.5C (101.3F)  clonazePAM  Tablet 2 milliGRAM(s) Oral every 8 hours  HYDROmorphone  Injectable 1 milliGRAM(s) IV Push every 4 hours PRN Moderate Pain (4 - 6)  ketamine Infusion. 2 mG/kG/Hr (15.4 mL/Hr) IV Continuous <Continuous>  oxyCODONE    IR 20 milliGRAM(s) Oral every 6 hours    ==================== RESPIRATORY======================  On full vent support     Mechanical Ventilation:  Mode: AC/ CMV (Assist Control/ Continuous Mandatory Ventilation)  RR (machine): 30  TV (machine): 350  FiO2: 50  PEEP: 6  ITime: 1  MAP: 14  PIP: 32      ====================CARDIOVASCULAR==================  On vasopressor support for hypotension, wean as tolerated for MAP goal 65-75    norepinephrine Infusion 0.02 MICROgram(s)/kG/Min (2.89 mL/Hr) IV Continuous <Continuous>    ===================HEMATOLOGIC/ONC ===================  aspirin  chewable 81 milliGRAM(s) Oral daily  enoxaparin Injectable 40 milliGRAM(s) SubCutaneous two times a day    ===================== RENAL =========================  Continue monitoring urine output  Targeting even to slightly negative fluid goal   Prn diuresis     ==================== GASTROINTESTINAL===================  Tolerating tube feeds, Glucerna 300 [id number]   Continue bowel regimen with miralax and senna, last BM 4/16      famotidine    Tablet 20 milliGRAM(s) Oral two times a day  polyethylene glycol 3350 17 Gram(s) Oral every 12 hours  senna Syrup 10 milliLiter(s) Oral at bedtime  sodium chloride 0.9% lock flush 10 milliLiter(s) IV Push every 1 hour PRN Pre/post blood products, medications, blood draw, and to maintain line patency    =======================    ENDOCRINE  =====================  Glucose control with insulin     dexAMETHasone  IVPB 20 milliGRAM(s) IV Intermittent daily  insulin lispro (HumaLOG) corrective regimen sliding scale   SubCutaneous every 6 hours  insulin NPH human recombinant 11 Unit(s) SubCutaneous every 6 hours  levothyroxine Injectable 50 MICROGram(s) IV Push at bedtime    ========================INFECTIOUS DISEASE================  Completed COVID medications (Plaquenil, Vit C/Thiamine, Anakinra, Solumedrol)   Decadron for rising inflammatory markers, 20mg IV qday x 5 days, followed by 10mg IV qday x 5 days         Patient requires continuous monitoring with bedside rhythm monitoring, pulse ox monitoring, and intermittent blood gas analysis. Care plan discussed with ICU care team. Patient remained critical and at risk for life threatning decompensation.    By signing my name below, I, Joan Richards, attest that this documentation has been prepared under the direction and in the presence of Leona Mcclain NP.   Electronically signed: Rohan Seals, 04-17-20 @ 18:58    I, Leona Mcclain , personally performed the services described in this documentation. all medical record entries made by the kevinibe were at my direction and in my presence. I have reviewed the chart and agree that the record reflects my personal performance and is accurate and complete  Electronically signed:  Leona Mcclain NP. CLARITA POWELL  MRN-[id number]  Patient is a 70y old  Male who presents with a chief complaint of respiratory distress, COVID 19 (17 Apr 2020 12:58)    HPI:  70M with PMH presents with T dm, hypothyroidism, hld, p/w cough, low grade fever x 2-3 days. Today patient had worsening dyspnea at rest as well as exertion which prompted to come to the ED. Patient otherwise denied chest pain, leg swelling. He denies prior lung disease. Denies any history of smoking.     In ED, patient was noted to be hypoxic on 6L NC to 89% and significant tachypnea. VS otherwise, afebrile, HR in 80s, SBP 150s. Labs notable for CBC wnl. CMP with mild elevated transaminases AST 65 and ALT 47. PAtient noted to be positive for COVID 19. Due to worsening respiratory distress patient was emergently intubated in the ER. (27 Mar 2020 21:38)      Surgery/Hospital course:  COVID 19 Positive  Intubated 3/27  4/7 Patient proned  4/8 Unproned  4/9 Paralysis weaned off yesterday, unproned with maintained sats, tolerating diuresis  4/12 10 sec asystole, back after epi x1  4/13 CPAP  4/14- 4/16 unable to tolerate CPAP   4/16 decadron course started  4/17 Continue with steroid taper and supportive therapy     Today:     REVIEW OF SYSTEMS:  Unable to obtain, vented     Vital Signs Last 24 Hrs  T(C): 37.8 (17 Apr 2020 08:00), Max: 37.8 (17 Apr 2020 08:00)  T(F): 100 (17 Apr 2020 08:00), Max: 100 (17 Apr 2020 08:00)  HR: 74 (17 Apr 2020 18:00) (65 - 82)  BP: 126/60 (17 Apr 2020 04:00) (101/53 - 126/60)  BP(mean): 87 (17 Apr 2020 04:00) (73 - 87)  RR: 34 (17 Apr 2020 18:00) (30 - 37)  SpO2: 94% (17 Apr 2020 18:00) (91% - 100%)    ============================I/O===========================   I&O's Detail    16 Apr 2020 07:01  -  17 Apr 2020 07:00  --------------------------------------------------------  IN:    Enteral Tube Flush: 230 mL    Glucerna 1.5: 1200 mL    IV PiggyBack: 50 mL    ketamine Infusion.: 215.6 mL    ketamine Infusion.: 154 mL    norepinephrine Infusion: 72.5 mL    norepinephrine Infusion: 116 mL  Total IN: 2038.1 mL    OUT:    Indwelling Catheter - Urethral: 1415 mL  Total OUT: 1415 mL    Total NET: 623.1 mL      17 Apr 2020 07:01  -  17 Apr 2020 18:58  --------------------------------------------------------  IN:    Enteral Tube Flush: 90 mL    Glucerna 1.5: 600 mL    ketamine Infusion.: 135.2 mL    norepinephrine Infusion: 104.4 mL  Total IN: 929.6 mL    OUT:    Indwelling Catheter - Urethral: 850 mL  Total OUT: 850 mL    Total NET: 79.6 mL        ============================ LABS =========================                        8.5    11.20 )-----------( 210      ( 17 Apr 2020 01:42 )             28.6     04-17    142  |  105  |  35<H>  ----------------------------<  181<H>  5.0   |  28  |  0.47<L>    Ca    8.7      17 Apr 2020 01:42  Phos  2.8     04-17  Mg     2.4     04-17    TPro  6.0  /  Alb  2.1<L>  /  TBili  0.2  /  DBili  x   /  AST  13  /  ALT  17  /  AlkPhos  92  04-17    LIVER FUNCTIONS - ( 17 Apr 2020 01:42 )  Alb: 2.1 g/dL / Pro: 6.0 g/dL / ALK PHOS: 92 U/L / ALT: 17 U/L / AST: 13 U/L / GGT: x             ABG - ( 17 Apr 2020 01:19 )  pH, Arterial: 7.44  pH, Blood: x     /  pCO2: 48    /  pO2: 64    / HCO3: 32    / Base Excess: 7.4   /  SaO2: 93                  ======================Micro/Rad/Cardio=================  Culture: Reviewed   CXR: Reviewed  ======================================================  PAST MEDICAL & SURGICAL HISTORY:  Hyperlipidemia  Hypothyroid  No significant past surgical history    ====================ASSESSMENT ==============  COVID 19 Positive   3/27 Intubated   Hypoxic respiratory failure   Septic Shock  ARDS  Fluid overload       Plan:  ====================== NEUROLOGY=====================  Sedated with Ketamine, attempt to wean as tolerated to assess neuro status when vent compliance improves   Klonopin and Oxycodone to help wean sedation   Dilaudid prn for analgesia     acetaminophen    Suspension .. 650 milliGRAM(s) Oral every 6 hours PRN Temp greater or equal to 38.5C (101.3F)  clonazePAM  Tablet 2 milliGRAM(s) Oral every 8 hours  HYDROmorphone  Injectable 1 milliGRAM(s) IV Push every 4 hours PRN Moderate Pain (4 - 6)  ketamine Infusion. 2 mG/kG/Hr (15.4 mL/Hr) IV Continuous <Continuous>  oxyCODONE    IR 20 milliGRAM(s) Oral every 6 hours    ==================== RESPIRATORY======================  On full vent support     Mechanical Ventilation:  Mode: AC/ CMV (Assist Control/ Continuous Mandatory Ventilation)  RR (machine): 30  TV (machine): 350  FiO2: 50  PEEP: 6  ITime: 1  MAP: 14  PIP: 32      ====================CARDIOVASCULAR==================  On vasopressor support for hypotension, wean as tolerated for MAP goal 65-75    norepinephrine Infusion 0.02 MICROgram(s)/kG/Min (2.89 mL/Hr) IV Continuous <Continuous>    ===================HEMATOLOGIC/ONC ===================  aspirin  chewable 81 milliGRAM(s) Oral daily  enoxaparin Injectable 40 milliGRAM(s) SubCutaneous two times a day    ===================== RENAL =========================  Continue monitoring urine output  Targeting even to slightly negative fluid goal   Prn diuresis     ==================== GASTROINTESTINAL===================  Tolerating tube feeds, Glucerna 300 [id number]   Continue bowel regimen with miralax and senna, last BM 4/16      famotidine    Tablet 20 milliGRAM(s) Oral two times a day  polyethylene glycol 3350 17 Gram(s) Oral every 12 hours  senna Syrup 10 milliLiter(s) Oral at bedtime  sodium chloride 0.9% lock flush 10 milliLiter(s) IV Push every 1 hour PRN Pre/post blood products, medications, blood draw, and to maintain line patency    =======================    ENDOCRINE  =====================  Glucose control with insulin     dexAMETHasone  IVPB 20 milliGRAM(s) IV Intermittent daily  insulin lispro (HumaLOG) corrective regimen sliding scale   SubCutaneous every 6 hours  insulin NPH human recombinant 11 Unit(s) SubCutaneous every 6 hours  levothyroxine Injectable 50 MICROGram(s) IV Push at bedtime    ========================INFECTIOUS DISEASE================  Completed COVID medications (Plaquenil, Vit C/Thiamine, Anakinra, Solumedrol)   Decadron for rising inflammatory markers, 20mg IV qday x 5 days, followed by 10mg IV qday x 5 days         Patient requires continuous monitoring with bedside rhythm monitoring, pulse ox monitoring, and intermittent blood gas analysis. Care plan discussed with ICU care team. Patient remained critical and at risk for life threatening decompensation.    By signing my name below, I, Joan Richards, attest that this documentation has been prepared under the direction and in the presence of Leona Mcclain NP.   Electronically signed: Jonathan Seals, 04-17-20 @ 18:58    I, Leona Mcclain , personally performed the services described in this documentation. all medical record entries made by the jonathan were at my direction and in my presence. I have reviewed the chart and agree that the record reflects my personal performance and is accurate and complete  Electronically signed:  Leona Mcclain NP. CLARITA POWELL  MRN-[id number]  Patient is a 70y old  Male who presents with a chief complaint of respiratory distress, COVID 19 (17 Apr 2020 12:58)    HPI:  70M with PMH presents with T dm, hypothyroidism, hld, p/w cough, low grade fever x 2-3 days. Today patient had worsening dyspnea at rest as well as exertion which prompted to come to the ED. Patient otherwise denied chest pain, leg swelling. He denies prior lung disease. Denies any history of smoking.     In ED, patient was noted to be hypoxic on 6L NC to 89% and significant tachypnea. VS otherwise, afebrile, HR in 80s, SBP 150s. Labs notable for CBC wnl. CMP with mild elevated transaminases AST 65 and ALT 47. PAtient noted to be positive for COVID 19. Due to worsening respiratory distress patient was emergently intubated in the ER. (27 Mar 2020 21:38)      Surgery/Hospital course:  COVID 19 Positive  Intubated 3/27  4/7 Patient proned  4/8 Unproned  4/9 Paralysis weaned off yesterday, unproned with maintained sats, tolerating diuresis  4/12 10 sec asystole, back after epi x1  4/13 CPAP  4/14- 4/16 unable to tolerate CPAP   4/16 decadron course started  4/17 Failed CPAP. Continue with steroid taper and supportive therapy     Today:     REVIEW OF SYSTEMS:  Unable to obtain, vented     Vital Signs Last 24 Hrs  T(C): 37.8 (17 Apr 2020 08:00), Max: 37.8 (17 Apr 2020 08:00)  T(F): 100 (17 Apr 2020 08:00), Max: 100 (17 Apr 2020 08:00)  HR: 74 (17 Apr 2020 18:00) (65 - 82)  BP: 126/60 (17 Apr 2020 04:00) (101/53 - 126/60)  BP(mean): 87 (17 Apr 2020 04:00) (73 - 87)  RR: 34 (17 Apr 2020 18:00) (30 - 37)  SpO2: 94% (17 Apr 2020 18:00) (91% - 100%)    ============================I/O===========================   I&O's Detail    16 Apr 2020 07:01  -  17 Apr 2020 07:00  --------------------------------------------------------  IN:    Enteral Tube Flush: 230 mL    Glucerna 1.5: 1200 mL    IV PiggyBack: 50 mL    ketamine Infusion.: 215.6 mL    ketamine Infusion.: 154 mL    norepinephrine Infusion: 72.5 mL    norepinephrine Infusion: 116 mL  Total IN: 2038.1 mL    OUT:    Indwelling Catheter - Urethral: 1415 mL  Total OUT: 1415 mL    Total NET: 623.1 mL      17 Apr 2020 07:01  -  17 Apr 2020 18:58  --------------------------------------------------------  IN:    Enteral Tube Flush: 90 mL    Glucerna 1.5: 600 mL    ketamine Infusion.: 135.2 mL    norepinephrine Infusion: 104.4 mL  Total IN: 929.6 mL    OUT:    Indwelling Catheter - Urethral: 850 mL  Total OUT: 850 mL    Total NET: 79.6 mL        ============================ LABS =========================                        8.5    11.20 )-----------( 210      ( 17 Apr 2020 01:42 )             28.6     04-17    142  |  105  |  35<H>  ----------------------------<  181<H>  5.0   |  28  |  0.47<L>    Ca    8.7      17 Apr 2020 01:42  Phos  2.8     04-17  Mg     2.4     04-17    TPro  6.0  /  Alb  2.1<L>  /  TBili  0.2  /  DBili  x   /  AST  13  /  ALT  17  /  AlkPhos  92  04-17    LIVER FUNCTIONS - ( 17 Apr 2020 01:42 )  Alb: 2.1 g/dL / Pro: 6.0 g/dL / ALK PHOS: 92 U/L / ALT: 17 U/L / AST: 13 U/L / GGT: x             ABG - ( 17 Apr 2020 01:19 )  pH, Arterial: 7.44  pH, Blood: x     /  pCO2: 48    /  pO2: 64    / HCO3: 32    / Base Excess: 7.4   /  SaO2: 93                  ======================Micro/Rad/Cardio=================  Culture: Reviewed   CXR: Reviewed  ======================================================  PAST MEDICAL & SURGICAL HISTORY:  Hyperlipidemia  Hypothyroid  No significant past surgical history    ====================ASSESSMENT ==============  COVID 19 Positive   3/27 Intubated   Hypoxic respiratory failure   Septic Shock  ARDS  Fluid overload       Plan:  ====================== NEUROLOGY=====================  Sedated with Ketamine, attempt to wean as tolerated to assess neuro status when vent compliance improves   Klonopin and Oxycodone to help wean sedation   Dilaudid prn for analgesia     acetaminophen    Suspension .. 650 milliGRAM(s) Oral every 6 hours PRN Temp greater or equal to 38.5C (101.3F)  clonazePAM  Tablet 2 milliGRAM(s) Oral every 8 hours  HYDROmorphone  Injectable 1 milliGRAM(s) IV Push every 4 hours PRN Moderate Pain (4 - 6)  ketamine Infusion. 2 mG/kG/Hr (15.4 mL/Hr) IV Continuous <Continuous>  oxyCODONE    IR 20 milliGRAM(s) Oral every 6 hours    ==================== RESPIRATORY======================  On full vent support     Mechanical Ventilation:  Mode: AC/ CMV (Assist Control/ Continuous Mandatory Ventilation)  RR (machine): 30  TV (machine): 350  FiO2: 50  PEEP: 6  ITime: 1  MAP: 14  PIP: 32      ====================CARDIOVASCULAR==================  On vasopressor support for hypotension, wean as tolerated for MAP goal 65-75    norepinephrine Infusion 0.02 MICROgram(s)/kG/Min (2.89 mL/Hr) IV Continuous <Continuous>    ===================HEMATOLOGIC/ONC ===================  aspirin  chewable 81 milliGRAM(s) Oral daily  enoxaparin Injectable 40 milliGRAM(s) SubCutaneous two times a day    ===================== RENAL =========================  Continue monitoring urine output  Targeting even to slightly negative fluid goal   Prn diuresis     ==================== GASTROINTESTINAL===================  Tolerating tube feeds, Glucerna 300 [id number]   Continue bowel regimen with miralax and senna, last BM 4/16      famotidine    Tablet 20 milliGRAM(s) Oral two times a day  polyethylene glycol 3350 17 Gram(s) Oral every 12 hours  senna Syrup 10 milliLiter(s) Oral at bedtime  sodium chloride 0.9% lock flush 10 milliLiter(s) IV Push every 1 hour PRN Pre/post blood products, medications, blood draw, and to maintain line patency    =======================    ENDOCRINE  =====================  Glucose control with insulin     dexAMETHasone  IVPB 20 milliGRAM(s) IV Intermittent daily  insulin lispro (HumaLOG) corrective regimen sliding scale   SubCutaneous every 6 hours  insulin NPH human recombinant 11 Unit(s) SubCutaneous every 6 hours  levothyroxine Injectable 50 MICROGram(s) IV Push at bedtime    ========================INFECTIOUS DISEASE================  Completed COVID medications (Plaquenil, Vit C/Thiamine, Anakinra, Solumedrol)   Decadron for rising inflammatory markers, 20mg IV qday x 5 days, followed by 10mg IV qday x 5 days         Patient requires continuous monitoring with bedside rhythm monitoring, pulse ox monitoring, and intermittent blood gas analysis. Care plan discussed with ICU care team. Patient remained critical and at risk for life threatening decompensation.    By signing my name below, I, Joan Richards, attest that this documentation has been prepared under the direction and in the presence of Leona Mcclain NP.   Electronically signed: Jonathan Seals, 04-17-20 @ 18:58    I, Leona Mcclain , personally performed the services described in this documentation. all medical record entries made by the jonathan were at my direction and in my presence. I have reviewed the chart and agree that the record reflects my personal performance and is accurate and complete  Electronically signed:  Leona Mcclain NP. CLARITA POWELL  MRN-[id number]  Patient is a 70y old  Male who presents with a chief complaint of respiratory distress, COVID 19 (17 Apr 2020 12:58)    HPI:  70M with PMH presents with T dm, hypothyroidism, hld, p/w cough, low grade fever x 2-3 days. Today patient had worsening dyspnea at rest as well as exertion which prompted to come to the ED. Patient otherwise denied chest pain, leg swelling. He denies prior lung disease. Denies any history of smoking.     In ED, patient was noted to be hypoxic on 6L NC to 89% and significant tachypnea. VS otherwise, afebrile, HR in 80s, SBP 150s. Labs notable for CBC wnl. CMP with mild elevated transaminases AST 65 and ALT 47. PAtient noted to be positive for COVID 19. Due to worsening respiratory distress patient was emergently intubated in the ER. (27 Mar 2020 21:38)      Surgery/Hospital course:  COVID 19 Positive  Intubated 3/27  4/7 Patient proned  4/8 Unproned  4/9 Paralysis weaned off yesterday, unproned with maintained sats, tolerating diuresis  4/12 10 sec asystole, back after epi x1  4/13 CPAP  4/14- 4/16 unable to tolerate CPAP   4/16 decadron course started  4/17 Failed CPAP. Continue with steroid taper and supportive therapy. Palliative care consult called and appreciated. Family yet to decide on end of life.      Today:     REVIEW OF SYSTEMS:  Unable to obtain, vented     Vital Signs Last 24 Hrs  T(C): 37.8 (17 Apr 2020 08:00), Max: 37.8 (17 Apr 2020 08:00)  T(F): 100 (17 Apr 2020 08:00), Max: 100 (17 Apr 2020 08:00)  HR: 74 (17 Apr 2020 18:00) (65 - 82)  BP: 126/60 (17 Apr 2020 04:00) (101/53 - 126/60)  BP(mean): 87 (17 Apr 2020 04:00) (73 - 87)  RR: 34 (17 Apr 2020 18:00) (30 - 37)  SpO2: 94% (17 Apr 2020 18:00) (91% - 100%)    ============================I/O===========================   I&O's Detail    16 Apr 2020 07:01  -  17 Apr 2020 07:00  --------------------------------------------------------  IN:    Enteral Tube Flush: 230 mL    Glucerna 1.5: 1200 mL    IV PiggyBack: 50 mL    ketamine Infusion.: 215.6 mL    ketamine Infusion.: 154 mL    norepinephrine Infusion: 72.5 mL    norepinephrine Infusion: 116 mL  Total IN: 2038.1 mL    OUT:    Indwelling Catheter - Urethral: 1415 mL  Total OUT: 1415 mL    Total NET: 623.1 mL      17 Apr 2020 07:01  -  17 Apr 2020 18:58  --------------------------------------------------------  IN:    Enteral Tube Flush: 90 mL    Glucerna 1.5: 600 mL    ketamine Infusion.: 135.2 mL    norepinephrine Infusion: 104.4 mL  Total IN: 929.6 mL    OUT:    Indwelling Catheter - Urethral: 850 mL  Total OUT: 850 mL    Total NET: 79.6 mL        ============================ LABS =========================                        8.5    11.20 )-----------( 210      ( 17 Apr 2020 01:42 )             28.6     04-17    142  |  105  |  35<H>  ----------------------------<  181<H>  5.0   |  28  |  0.47<L>    Ca    8.7      17 Apr 2020 01:42  Phos  2.8     04-17  Mg     2.4     04-17    TPro  6.0  /  Alb  2.1<L>  /  TBili  0.2  /  DBili  x   /  AST  13  /  ALT  17  /  AlkPhos  92  04-17    LIVER FUNCTIONS - ( 17 Apr 2020 01:42 )  Alb: 2.1 g/dL / Pro: 6.0 g/dL / ALK PHOS: 92 U/L / ALT: 17 U/L / AST: 13 U/L / GGT: x             ABG - ( 17 Apr 2020 01:19 )  pH, Arterial: 7.44  pH, Blood: x     /  pCO2: 48    /  pO2: 64    / HCO3: 32    / Base Excess: 7.4   /  SaO2: 93                  ======================Micro/Rad/Cardio=================  Culture: Reviewed   CXR: Reviewed  ======================================================  PAST MEDICAL & SURGICAL HISTORY:  Hyperlipidemia  Hypothyroid  No significant past surgical history    ====================ASSESSMENT ==============  COVID 19 Positive   3/27 Intubated   Hypoxic respiratory failure   Septic Shock  ARDS  Fluid overload       Plan:  ====================== NEUROLOGY=====================  Sedated with Ketamine, attempt to wean as tolerated to assess neuro status when vent compliance improves   Klonopin and Oxycodone to help wean sedation   Dilaudid prn for analgesia     acetaminophen    Suspension .. 650 milliGRAM(s) Oral every 6 hours PRN Temp greater or equal to 38.5C (101.3F)  clonazePAM  Tablet 2 milliGRAM(s) Oral every 8 hours  HYDROmorphone  Injectable 1 milliGRAM(s) IV Push every 4 hours PRN Moderate Pain (4 - 6)  ketamine Infusion. 2 mG/kG/Hr (15.4 mL/Hr) IV Continuous <Continuous>  oxyCODONE    IR 20 milliGRAM(s) Oral every 6 hours    ==================== RESPIRATORY======================  On full vent support     Mechanical Ventilation:  Mode: AC/ CMV (Assist Control/ Continuous Mandatory Ventilation)  RR (machine): 30  TV (machine): 350  FiO2: 50  PEEP: 6  ITime: 1  MAP: 14  PIP: 32      ====================CARDIOVASCULAR==================  On vasopressor support for hypotension, wean as tolerated for MAP goal 65-75    norepinephrine Infusion 0.02 MICROgram(s)/kG/Min (2.89 mL/Hr) IV Continuous <Continuous>    ===================HEMATOLOGIC/ONC ===================  aspirin  chewable 81 milliGRAM(s) Oral daily  enoxaparin Injectable 40 milliGRAM(s) SubCutaneous two times a day    ===================== RENAL =========================  Continue monitoring urine output  Targeting even to slightly negative fluid goal   Prn diuresis     ==================== GASTROINTESTINAL===================  Tolerating tube feeds, Glucerna 300 [id number]   Continue bowel regimen with miralax and senna, last BM 4/16      famotidine    Tablet 20 milliGRAM(s) Oral two times a day  polyethylene glycol 3350 17 Gram(s) Oral every 12 hours  senna Syrup 10 milliLiter(s) Oral at bedtime  sodium chloride 0.9% lock flush 10 milliLiter(s) IV Push every 1 hour PRN Pre/post blood products, medications, blood draw, and to maintain line patency    =======================    ENDOCRINE  =====================  Glucose control with insulin     dexAMETHasone  IVPB 20 milliGRAM(s) IV Intermittent daily  insulin lispro (HumaLOG) corrective regimen sliding scale   SubCutaneous every 6 hours  insulin NPH human recombinant 11 Unit(s) SubCutaneous every 6 hours  levothyroxine Injectable 50 MICROGram(s) IV Push at bedtime    ========================INFECTIOUS DISEASE================  Completed COVID medications (Plaquenil, Vit C/Thiamine, Anakinra, Solumedrol)   Decadron for rising inflammatory markers, 20mg IV qday x 5 days, followed by 10mg IV qday x 5 days         Patient requires continuous monitoring with bedside rhythm monitoring, pulse ox monitoring, and intermittent blood gas analysis. Care plan discussed with ICU care team. Patient remained critical and at risk for life threatening decompensation.    By signing my name below, IJoan, attest that this documentation has been prepared under the direction and in the presence of Leona Mcclain NP.   Electronically signed: Jonathan Seals, 04-17-20 @ 18:58    I, Leona Mcclain , personally performed the services described in this documentation. all medical record entries made by the jonathan were at my direction and in my presence. I have reviewed the chart and agree that the record reflects my personal performance and is accurate and complete  Electronically signed:  Leona Mcclain NP. CLARITA POWELL  MRN-[id number]  Patient is a 70y old  Male who presents with a chief complaint of respiratory distress, COVID 19 (17 Apr 2020 12:58)    HPI:  70M with PMH presents with T dm, hypothyroidism, hld, p/w cough, low grade fever x 2-3 days. Today patient had worsening dyspnea at rest as well as exertion which prompted to come to the ED. Patient otherwise denied chest pain, leg swelling. He denies prior lung disease. Denies any history of smoking.     In ED, patient was noted to be hypoxic on 6L NC to 89% and significant tachypnea. VS otherwise, afebrile, HR in 80s, SBP 150s. Labs notable for CBC wnl. CMP with mild elevated transaminases AST 65 and ALT 47. PAtient noted to be positive for COVID 19. Due to worsening respiratory distress patient was emergently intubated in the ER. (27 Mar 2020 21:38)      Surgery/Hospital course:  COVID 19 Positive  Intubated 3/27  4/7 Patient proned  4/8 Unproned  4/9 Paralysis weaned off yesterday, unproned with maintained sats, tolerating diuresis  4/12 10 sec asystole, back after epi x1  4/13 CPAP  4/14- 4/16 unable to tolerate CPAP   4/16 decadron course started  4/17 Failed CPAP. Continue with steroid taper and supportive therapy. Palliative care consult called and appreciated. Family yet to decide on end of life and prior to making any advanced decisions would like to see if any improvement with steroid taper treatment therapy.      Today:     REVIEW OF SYSTEMS:  Unable to obtain, vented     Vital Signs Last 24 Hrs  T(C): 37.8 (17 Apr 2020 08:00), Max: 37.8 (17 Apr 2020 08:00)  T(F): 100 (17 Apr 2020 08:00), Max: 100 (17 Apr 2020 08:00)  HR: 74 (17 Apr 2020 18:00) (65 - 82)  BP: 126/60 (17 Apr 2020 04:00) (101/53 - 126/60)  BP(mean): 87 (17 Apr 2020 04:00) (73 - 87)  RR: 34 (17 Apr 2020 18:00) (30 - 37)  SpO2: 94% (17 Apr 2020 18:00) (91% - 100%)    ============================I/O===========================   I&O's Detail    16 Apr 2020 07:01  -  17 Apr 2020 07:00  --------------------------------------------------------  IN:    Enteral Tube Flush: 230 mL    Glucerna 1.5: 1200 mL    IV PiggyBack: 50 mL    ketamine Infusion.: 215.6 mL    ketamine Infusion.: 154 mL    norepinephrine Infusion: 72.5 mL    norepinephrine Infusion: 116 mL  Total IN: 2038.1 mL    OUT:    Indwelling Catheter - Urethral: 1415 mL  Total OUT: 1415 mL    Total NET: 623.1 mL      17 Apr 2020 07:01  -  17 Apr 2020 18:58  --------------------------------------------------------  IN:    Enteral Tube Flush: 90 mL    Glucerna 1.5: 600 mL    ketamine Infusion.: 135.2 mL    norepinephrine Infusion: 104.4 mL  Total IN: 929.6 mL    OUT:    Indwelling Catheter - Urethral: 850 mL  Total OUT: 850 mL    Total NET: 79.6 mL        ============================ LABS =========================                        8.5    11.20 )-----------( 210      ( 17 Apr 2020 01:42 )             28.6     04-17    142  |  105  |  35<H>  ----------------------------<  181<H>  5.0   |  28  |  0.47<L>    Ca    8.7      17 Apr 2020 01:42  Phos  2.8     04-17  Mg     2.4     04-17    TPro  6.0  /  Alb  2.1<L>  /  TBili  0.2  /  DBili  x   /  AST  13  /  ALT  17  /  AlkPhos  92  04-17    LIVER FUNCTIONS - ( 17 Apr 2020 01:42 )  Alb: 2.1 g/dL / Pro: 6.0 g/dL / ALK PHOS: 92 U/L / ALT: 17 U/L / AST: 13 U/L / GGT: x             ABG - ( 17 Apr 2020 01:19 )  pH, Arterial: 7.44  pH, Blood: x     /  pCO2: 48    /  pO2: 64    / HCO3: 32    / Base Excess: 7.4   /  SaO2: 93                  ======================Micro/Rad/Cardio=================  Culture: Reviewed   CXR: Reviewed  ======================================================  PAST MEDICAL & SURGICAL HISTORY:  Hyperlipidemia  Hypothyroid  No significant past surgical history    ====================ASSESSMENT ==============  COVID 19 Positive   3/27 Intubated   Hypoxic respiratory failure   Septic Shock  ARDS  Fluid overload       Plan:  ====================== NEUROLOGY=====================  Sedated with Ketamine, attempt to wean as tolerated to assess neuro status when vent compliance improves   Klonopin and Oxycodone to help wean sedation   Dilaudid prn for analgesia     acetaminophen    Suspension .. 650 milliGRAM(s) Oral every 6 hours PRN Temp greater or equal to 38.5C (101.3F)  clonazePAM  Tablet 2 milliGRAM(s) Oral every 8 hours  HYDROmorphone  Injectable 1 milliGRAM(s) IV Push every 4 hours PRN Moderate Pain (4 - 6)  ketamine Infusion. 2 mG/kG/Hr (15.4 mL/Hr) IV Continuous <Continuous>  oxyCODONE    IR 20 milliGRAM(s) Oral every 6 hours    ==================== RESPIRATORY======================  On full vent support     Mechanical Ventilation:  Mode: AC/ CMV (Assist Control/ Continuous Mandatory Ventilation)  RR (machine): 30  TV (machine): 350  FiO2: 50  PEEP: 6  ITime: 1  MAP: 14  PIP: 32      ====================CARDIOVASCULAR==================  On vasopressor support for hypotension, wean as tolerated for MAP goal 65-75    norepinephrine Infusion 0.02 MICROgram(s)/kG/Min (2.89 mL/Hr) IV Continuous <Continuous>    ===================HEMATOLOGIC/ONC ===================  aspirin  chewable 81 milliGRAM(s) Oral daily  enoxaparin Injectable 40 milliGRAM(s) SubCutaneous two times a day    ===================== RENAL =========================  Continue monitoring urine output  Targeting even to slightly negative fluid goal   Prn diuresis     ==================== GASTROINTESTINAL===================  Tolerating tube feeds, Glucerna 300 [id number]   Continue bowel regimen with miralax and senna, last BM 4/16      famotidine    Tablet 20 milliGRAM(s) Oral two times a day  polyethylene glycol 3350 17 Gram(s) Oral every 12 hours  senna Syrup 10 milliLiter(s) Oral at bedtime  sodium chloride 0.9% lock flush 10 milliLiter(s) IV Push every 1 hour PRN Pre/post blood products, medications, blood draw, and to maintain line patency    =======================    ENDOCRINE  =====================  Glucose control with insulin     dexAMETHasone  IVPB 20 milliGRAM(s) IV Intermittent daily  insulin lispro (HumaLOG) corrective regimen sliding scale   SubCutaneous every 6 hours  insulin NPH human recombinant 11 Unit(s) SubCutaneous every 6 hours  levothyroxine Injectable 50 MICROGram(s) IV Push at bedtime    ========================INFECTIOUS DISEASE================  Completed COVID medications (Plaquenil, Vit C/Thiamine, Anakinra, Solumedrol)   Decadron for rising inflammatory markers, 20mg IV qday x 5 days, followed by 10mg IV qday x 5 days         Patient requires continuous monitoring with bedside rhythm monitoring, pulse ox monitoring, and intermittent blood gas analysis. Care plan discussed with ICU care team. Patient remained critical and at risk for life threatening decompensation.    By signing my name below, I, Joan Richards, attest that this documentation has been prepared under the direction and in the presence of Leona Mcclain NP.   Electronically signed: Jonathan Seals, 04-17-20 @ 18:58    I, Leona Mcclain , personally performed the services described in this documentation. all medical record entries made by the jonathan were at my direction and in my presence. I have reviewed the chart and agree that the record reflects my personal performance and is accurate and complete  Electronically signed:  Leona Mcclain NP.

## 2020-04-18 NOTE — PROGRESS NOTE ADULT - SUBJECTIVE AND OBJECTIVE BOX
CLARITA POWELL  MRN-[id number]  Patient is a 70y old  Male who presents with a chief complaint of respiratory distress, COVID 19 (17 Apr 2020 18:57)    HPI:  70M with PMH presents with T dm, hypothyroidism, hld, p/w cough, low grade fever x 2-3 days. Today patient had worsening dyspnea at rest as well as exertion which prompted to come to the ED. Patient otherwise denied chest pain, leg swelling. He denies prior lung disease. Denies any history of smoking.     In ED, patient was noted to be hypoxic on 6L NC to 89% and significant tachypnea. VS otherwise, afebrile, HR in 80s, SBP 150s. Labs notable for CBC wnl. CMP with mild elevated transaminases AST 65 and ALT 47. PAtient noted to be positive for COVID 19. Due to worsening respiratory distress patient was emergently intubated in the ER. (27 Mar 2020 21:38)      Surgery/Hospital course:  COVID 19 Positive  Intubated 3/27  4/7 Patient proned  4/8 Unproned  4/9 Paralysis weaned off yesterday, unproned with maintained sats, tolerating diuresis  4/12 10 sec asystole, back after epi x1  4/13 CPAP  4/14- 4/16 unable to tolerate CPAP   4/16 decadron course started  4/17 Failed CPAP. Continue with steroid taper and supportive therapy. Palliative care consult called and appreciated. Family yet to decide on end of life and prior to making any advanced decisions would like to see if any improvement with steroid taper treatment therapy.      REVIEW OF SYSTEMS:  Unable to obtain, vented     Vital Signs Last 24 Hrs  T(C): 36.9 (18 Apr 2020 04:00), Max: 37.8 (17 Apr 2020 08:00)  T(F): 98.4 (18 Apr 2020 04:00), Max: 100 (17 Apr 2020 08:00)  HR: 80 (18 Apr 2020 07:00) (65 - 82)  BP: --  BP(mean): --  RR: 34 (18 Apr 2020 07:00) (32 - 37)  SpO2: 96% (18 Apr 2020 07:00) (93% - 100%)    ============================I/O===========================   I&O's Detail    17 Apr 2020 07:01  -  18 Apr 2020 07:00  --------------------------------------------------------  IN:    Enteral Tube Flush: 140 mL    Glucerna 1.5: 1200 mL    IV PiggyBack: 50 mL    ketamine Infusion.: 286 mL    norepinephrine Infusion: 179.8 mL  Total IN: 1855.8 mL    OUT:    Indwelling Catheter - Urethral: 1770 mL  Total OUT: 1770 mL    Total NET: 85.8 mL        ============================ LABS =========================                        8.6    11.88 )-----------( 232      ( 18 Apr 2020 00:21 )             28.1     04-18    144  |  106  |  38<H>  ----------------------------<  99  4.7   |  30  |  0.51    Ca    8.8      18 Apr 2020 00:21  Phos  3.2     04-18  Mg     2.3     04-18    TPro  6.1  /  Alb  2.4<L>  /  TBili  0.2  /  DBili  x   /  AST  16  /  ALT  18  /  AlkPhos  99  04-18    LIVER FUNCTIONS - ( 18 Apr 2020 00:21 )  Alb: 2.4 g/dL / Pro: 6.1 g/dL / ALK PHOS: 99 U/L / ALT: 18 U/L / AST: 16 U/L / GGT: x             ABG - ( 17 Apr 2020 23:51 )  pH, Arterial: 7.45  pH, Blood: x     /  pCO2: 50    /  pO2: 71    / HCO3: 34    / Base Excess: 9.0   /  SaO2: 94                  ======================Micro/Rad/Cardio=================  Culture: Reviewed   CXR: Reviewed  ======================================================  PAST MEDICAL & SURGICAL HISTORY:  Hyperlipidemia  Hypothyroid  No significant past surgical history    ====================ASSESSMENT ==============  69 y/o M with PMHx of HLD, hypothyroid, found to be COVID positive s/p intubation on 3/27, hospital course c/b hypoxic respiratory failure, septic shock, ARDS    Plan:  ====================== NEUROLOGY=====================  Sedated with   ketamine Infusion. 2 mG/kG/Hr (15.4 mL/Hr) IV Continuous <Continuous>    To help wean sedation   clonazePAM  Tablet 2 milliGRAM(s) Oral every 8 hours  oxyCODONE    IR 20 milliGRAM(s) Oral every 6 hours    Pain management   HYDROmorphone  Injectable 1 milliGRAM(s) IV Push every 4 hours PRN Moderate Pain (4 - 6)    For fevers prn   acetaminophen    Suspension .. 650 milliGRAM(s) Oral every 6 hours PRN Temp greater or equal to 38.5C (101.3F)    ==================== RESPIRATORY======================  On full vent support     Mechanical Ventilation:  Mode: AC/ CMV (Assist Control/ Continuous Mandatory Ventilation)  RR (machine): 30  TV (machine): 350  FiO2: 50  PEEP: 6  ITime: 1  MAP: 15  PIP: 32      ====================CARDIOVASCULAR==================  On pressor support for hypotension   norepinephrine Infusion 0.02 MICROgram(s)/kG/Min (2.89 mL/Hr) IV Continuous <Continuous>    ===================HEMATOLOGIC/ONC ===================  VTE prophylaxis   enoxaparin Injectable 40 milliGRAM(s) SubCutaneous two times a day  aspirin  chewable 81 milliGRAM(s) Oral daily    ===================== RENAL =========================  Continue monitoring urine output  Prn diuresis     ==================== GASTROINTESTINAL===================  Tolerating tube feeds, Glucerna 300 [id number]     GI prophylaxis   famotidine    Tablet 20 milliGRAM(s) Oral two times a day    Bowel Regimen   polyethylene glycol 3350 17 Gram(s) Oral every 12 hours  senna Syrup 10 milliLiter(s) Oral at bedtime    sodium chloride 0.9% lock flush 10 milliLiter(s) IV Push every 1 hour PRN Pre/post blood products, medications, blood draw, and to maintain line patency    =======================    ENDOCRINE  =====================  Glucose control,   insulin lispro (HumaLOG) corrective regimen sliding scale   SubCutaneous every 6 hours  insulin NPH human recombinant 11 Unit(s) SubCutaneous every 6 hours    For hypothyroid management   levothyroxine Injectable 50 MICROGram(s) IV Push at bedtime    ========================INFECTIOUS DISEASE================  Completed COVID medications (Plaquenil, Vit C/Thiamine, Anakinra, Solumedrol)     Decadron for rising inflammatory markers   dexAMETHasone  IVPB 20 milliGRAM(s) IV Intermittent daily      Patient requires continuous monitoring with bedside rhythm monitoring, pulse ox monitoring, and intermittent blood gas analysis. Care plan discussed with ICU care team. Patient remained critical and at risk for life threatening decompensation.    By signing my name below, I, Pretty Wallace, attest that this documentation has been prepared under the direction and in the presence of GERRY Richard.  Electronically signed: Rohan Lal, 04-18-20 @ 07:32    I, Patito Crowe, personally performed the services described in this documentation. all medical record entries made by the scribe were at my direction and in my presence. I have reviewed the chart and agree that the record reflects my personal performance and is accurate and complete  Electronically signed: GERRY Richard CLARITA POWELL  MRN-[id number]  Patient is a 70y old  Male who presents with a chief complaint of respiratory distress, COVID 19 (17 Apr 2020 18:57)    HPI:  70M with PMH presents with T dm, hypothyroidism, hld, p/w cough, low grade fever x 2-3 days. Today patient had worsening dyspnea at rest as well as exertion which prompted to come to the ED. Patient otherwise denied chest pain, leg swelling. He denies prior lung disease. Denies any history of smoking.     In ED, patient was noted to be hypoxic on 6L NC to 89% and significant tachypnea. VS otherwise, afebrile, HR in 80s, SBP 150s. Labs notable for CBC wnl. CMP with mild elevated transaminases AST 65 and ALT 47. PAtient noted to be positive for COVID 19. Due to worsening respiratory distress patient was emergently intubated in the ER. (27 Mar 2020 21:38)      Surgery/Hospital course:  COVID 19 Positive  Intubated 3/27  4/7 Patient proned  4/8 Unproned  4/9 Paralysis weaned off yesterday, unproned with maintained sats, tolerating diuresis  4/12 10 sec asystole, back after epi x1  4/13 CPAP  4/14- 4/16 unable to tolerate CPAP   4/16 decadron course started  4/17 Failed CPAP. Continue with steroid taper and supportive therapy. Palliative care consult called and appreciated. Family yet to decide on end of life and prior to making any advanced decisions would like to see if any improvement with steroid taper treatment therapy.    4/18 Continues to CPAP trial of 20/5.  Peep lowered to 5 from 6.  Unable to tolerate sedation wean due to increased agitation causing hypoxia.    REVIEW OF SYSTEMS:  Unable to obtain, vented     Vital Signs Last 24 Hrs  T(C): 36.9 (18 Apr 2020 04:00), Max: 37.8 (17 Apr 2020 08:00)  T(F): 98.4 (18 Apr 2020 04:00), Max: 100 (17 Apr 2020 08:00)  HR: 80 (18 Apr 2020 07:00) (65 - 82)  BP: --  BP(mean): --  RR: 34 (18 Apr 2020 07:00) (32 - 37)  SpO2: 96% (18 Apr 2020 07:00) (93% - 100%)    ============================I/O===========================   I&O's Detail    17 Apr 2020 07:01  -  18 Apr 2020 07:00  --------------------------------------------------------  IN:    Enteral Tube Flush: 140 mL    Glucerna 1.5: 1200 mL    IV PiggyBack: 50 mL    ketamine Infusion.: 286 mL    norepinephrine Infusion: 179.8 mL  Total IN: 1855.8 mL    OUT:    Indwelling Catheter - Urethral: 1770 mL  Total OUT: 1770 mL    Total NET: 85.8 mL        ============================ LABS =========================                        8.6    11.88 )-----------( 232      ( 18 Apr 2020 00:21 )             28.1     04-18    144  |  106  |  38<H>  ----------------------------<  99  4.7   |  30  |  0.51    Ca    8.8      18 Apr 2020 00:21  Phos  3.2     04-18  Mg     2.3     04-18    TPro  6.1  /  Alb  2.4<L>  /  TBili  0.2  /  DBili  x   /  AST  16  /  ALT  18  /  AlkPhos  99  04-18    LIVER FUNCTIONS - ( 18 Apr 2020 00:21 )  Alb: 2.4 g/dL / Pro: 6.1 g/dL / ALK PHOS: 99 U/L / ALT: 18 U/L / AST: 16 U/L / GGT: x             ABG - ( 17 Apr 2020 23:51 )  pH, Arterial: 7.45  pH, Blood: x     /  pCO2: 50    /  pO2: 71    / HCO3: 34    / Base Excess: 9.0   /  SaO2: 94        ======================Micro/Rad/Cardio=================  Culture: Reviewed   CXR: Reviewed  ======================================================  PAST MEDICAL & SURGICAL HISTORY:  Hyperlipidemia  Hypothyroid  No significant past surgical history    ====================ASSESSMENT ==============  69 y/o M with PMHx of HLD, hypothyroid, found to be COVID positive s/p intubation on 3/27, hospital course c/b hypoxic respiratory failure, septic shock, ARDS    Plan:  ====================== NEUROLOGY=====================  Sedated with   ketamine Infusion. 2 mG/kG/Hr (15.4 mL/Hr) IV Continuous <Continuous>    To help wean sedation   clonazePAM  Tablet 2 milliGRAM(s) Oral every 8 hours  oxyCODONE    IR 20 milliGRAM(s) Oral every 6 hours    Pain management   HYDROmorphone  Injectable 1 milliGRAM(s) IV Push every 4 hours PRN Moderate Pain (4 - 6)    For fevers prn   acetaminophen    Suspension .. 650 milliGRAM(s) Oral every 6 hours PRN Temp greater or equal to 38.5C (101.3F)    Continue to attempt to wean sedation as tolerated.    ==================== RESPIRATORY======================  On full vent support     Mechanical Ventilation:  Mode: AC/ CMV (Assist Control/ Continuous Mandatory Ventilation)  RR (machine): 30  TV (machine): 350  FiO2: 50  PEEP: 6  ITime: 1  MAP: 15  PIP: 32    Trial CPAP wean 20/5.  Unable to tolerate > 20 minutes due to tachypnea    ====================CARDIOVASCULAR==================  On pressor support for hypotension   norepinephrine Infusion 0.02 MICROgram(s)/kG/Min (2.89 mL/Hr) IV Continuous <Continuous>    Wean as tolerated.  ===================HEMATOLOGIC/ONC ===================  VTE prophylaxis   enoxaparin Injectable 40 milliGRAM(s) SubCutaneous two times a day  aspirin  chewable 81 milliGRAM(s) Oral daily    ===================== RENAL =========================  Continue monitoring urine output  Prn diuresis     ==================== GASTROINTESTINAL===================  Tolerating tube feeds, Glucerna 300 [id number]     GI prophylaxis   famotidine    Tablet 20 milliGRAM(s) Oral two times a day    Bowel Regimen   polyethylene glycol 3350 17 Gram(s) Oral every 12 hours  senna Syrup 10 milliLiter(s) Oral at bedtime    =======================    ENDOCRINE  =====================  Glucose control,   insulin lispro (HumaLOG) corrective regimen sliding scale   SubCutaneous every 6 hours  insulin NPH human recombinant 11 Unit(s) SubCutaneous every 6 hours    For hypothyroid management   levothyroxine Injectable 50 MICROGram(s) IV Push at bedtime    ========================INFECTIOUS DISEASE================  Completed COVID medications (Plaquenil, Vit C/Thiamine, Anakinra, Solumedrol)     Decadron for rising inflammatory markers   dexAMETHasone  IVPB 20 milliGRAM(s) IV Intermittent daily      I have personally spent 45 minutes of critical care time with this patient.    Patient requires continuous monitoring with bedside rhythm monitoring, pulse ox monitoring, and intermittent blood gas analysis. Care plan discussed with ICU care team. Patient remained critical and at risk for life threatening decompensation.    By signing my name below, I, Pretty Wallace, attest that this documentation has been prepared under the direction and in the presence of GERRY Richard.  Electronically signed: Rohan Lal, 04-18-20 @ 07:32    I, Patito Crowe, personally performed the services described in this documentation. all medical record entries made by the scribe were at my direction and in my presence. I have reviewed the chart and agree that the record reflects my personal performance and is accurate and complete  Electronically signed: GERRY Richard

## 2020-04-18 NOTE — PROGRESS NOTE ADULT - SUBJECTIVE AND OBJECTIVE BOX
CLARITA POWELL  MRN-[id number]  Patient is a 70y old  Male who presents with a chief complaint of respiratory distress, COVID 19 (18 Apr 2020 07:32)    HPI:  70M with PMH presents with T dm, hypothyroidism, hld, p/w cough, low grade fever x 2-3 days. Today patient had worsening dyspnea at rest as well as exertion which prompted to come to the ED. Patient otherwise denied chest pain, leg swelling. He denies prior lung disease. Denies any history of smoking.     In ED, patient was noted to be hypoxic on 6L NC to 89% and significant tachypnea. VS otherwise, afebrile, HR in 80s, SBP 150s. Labs notable for CBC wnl. CMP with mild elevated transaminases AST 65 and ALT 47. PAtient noted to be positive for COVID 19. Due to worsening respiratory distress patient was emergently intubated in the ER. (27 Mar 2020 21:38)      Surgery/Hospital course:  COVID 19 Positive  Intubated 3/27  4/7 Patient proned  4/8 Unproned  4/9 Paralysis weaned off yesterday, unproned with maintained sats, tolerating diuresis  4/12 10 sec asystole, back after epi x1  4/13 CPAP  4/14- 4/16 unable to tolerate CPAP   4/16 decadron course started  4/17 Failed CPAP. Continue with steroid taper and supportive therapy. Palliative care consult called and appreciated. Family yet to decide on end of life and prior to making any advanced decisions would like to see if any improvement with steroid taper treatment therapy.    4/18 Continues to CPAP trial of 20/5.  Peep lowered to 5 from 6.  Unable to tolerate sedation wean due to increased agitation causing hypoxia.    Today:        ============================I/O===========================   I&O's Detail    17 Apr 2020 07:01  -  18 Apr 2020 07:00  --------------------------------------------------------  IN:    Enteral Tube Flush: 140 mL    Glucerna 1.5: 1200 mL    IV PiggyBack: 50 mL    ketamine Infusion.: 286 mL    norepinephrine Infusion: 179.8 mL  Total IN: 1855.8 mL    OUT:    Indwelling Catheter - Urethral: 1770 mL  Total OUT: 1770 mL    Total NET: 85.8 mL      18 Apr 2020 07:01  -  18 Apr 2020 19:32  --------------------------------------------------------  IN:    Enteral Tube Flush: 120 mL    Glucerna 1.5: 600 mL    ketamine Infusion.: 133.6 mL    norepinephrine Infusion: 15.9 mL  Total IN: 869.5 mL    OUT:    Indwelling Catheter - Urethral: 1110 mL  Total OUT: 1110 mL    Total NET: -240.5 mL        ============================ LABS =========================                        8.6    11.88 )-----------( 232      ( 18 Apr 2020 00:21 )             28.1     04-18    144  |  106  |  38<H>  ----------------------------<  99  4.7   |  30  |  0.51    Ca    8.8      18 Apr 2020 00:21  Phos  3.2     04-18  Mg     2.3     04-18    TPro  6.1  /  Alb  2.4<L>  /  TBili  0.2  /  DBili  x   /  AST  16  /  ALT  18  /  AlkPhos  99  04-18    LIVER FUNCTIONS - ( 18 Apr 2020 00:21 )  Alb: 2.4 g/dL / Pro: 6.1 g/dL / ALK PHOS: 99 U/L / ALT: 18 U/L / AST: 16 U/L / GGT: x             ABG - ( 18 Apr 2020 12:07 )  pH, Arterial: 7.47  pH, Blood: x     /  pCO2: 43    /  pO2: 67    / HCO3: 31    / Base Excess: 6.9   /  SaO2: 94                  ======================Micro/Rad/Cardio=================  Culture: Reviewed   CXR: Reviewed  ======================================================  PAST MEDICAL & SURGICAL HISTORY:  Hyperlipidemia  Hypothyroid  No significant past surgical history    ====================ASSESSMENT ==============  69 y/o M with PMHx of HLD, hypothyroid, found to be COVID positive s/p intubation on 3/27, hospital course c/b hypoxic respiratory failure, septic shock, ARDS    Plan:  ====================== NEUROLOGY=====================  Continue to wean sedation as tolerated.    Sedated with,  ketamine Infusion. 2 mG/kG/Hr (15.4 mL/Hr) IV Continuous <Continuous>    To help wean sedation,  clonazePAM  Tablet 2 milliGRAM(s) Oral every 8 hours  oxyCODONE    IR 20 milliGRAM(s) Oral every 6 hours    Pain management,  HYDROmorphone  Injectable 1 milliGRAM(s) IV Push every 4 hours PRN Moderate Pain (4 - 6)    Prn for fevers,  acetaminophen    Suspension .. 650 milliGRAM(s) Oral every 6 hours PRN Temp greater or equal to 38.5C (101.3F)    ==================== RESPIRATORY======================  On full vent support.   Continue to wean FiO2 and PEEP as tolerated  O2 sat goal >92%  Mechanical Ventilation:  Mode: AC/ CMV (Assist Control/ Continuous Mandatory Ventilation)  RR (machine): 30  TV (machine): 350  FiO2: 50  PEEP: 5  ITime: 1  MAP: 12  PIP: 34    ====================CARDIOVASCULAR==================  On pressor support for hypotension   Wean vasopressors as tolerated, MAP goal 65-75    norepinephrine Infusion 0.02 MICROgram(s)/kG/Min (2.89 mL/Hr) IV Continuous <Continuous>    ===================HEMATOLOGIC/ONC ===================  Continue ASA  aspirin  chewable 81 milliGRAM(s) Oral daily    Continue Lovenox for VTE prophylaxis.  enoxaparin Injectable 40 milliGRAM(s) SubCutaneous two times a day    ===================== RENAL =========================  Continue monitoring urine output  Monitor BUN/SCr    ==================== GASTROINTESTINAL===================  Tolerating tube feeds, Glucerna 300 [id number]    Continue pepcid for GI prophylaxis  famotidine    Tablet 20 milliGRAM(s) Oral two times a day    Continue bowel regimen,  polyethylene glycol 3350 17 Gram(s) Oral every 12 hours  senna Syrup 10 milliLiter(s) Oral at bedtime  sodium chloride 0.9% lock flush 10 milliLiter(s) IV Push every 1 hour PRN Pre/post blood products, medications, blood draw, and to maintain line patency    =======================    ENDOCRINE  =====================  Continue glucose control,  dexAMETHasone  IVPB 20 milliGRAM(s) IV Intermittent daily  insulin lispro (HumaLOG) corrective regimen sliding scale   SubCutaneous every 6 hours  insulin NPH human recombinant 11 Unit(s) SubCutaneous every 6 hours  levothyroxine Injectable 50 MICROGram(s) IV Push at bedtime    ========================INFECTIOUS DISEASE================  +COVID  -Completed Plaquenil (3/27-4/1), Azithromycin (3/28-3/31)  -s/p Anakinra        Patient requires continuous monitoring with bedside rhythm monitoring, arterial line, pulse oximetry, ventilator monitoring and intermittent blood gas analysis.  Care plan discussed with ICU care team.  Patient remained critical; required more than usual ICU care team; I have spent 35 minutes providing non-routine ICU care, revaluated multiple times during the day.    By signing my name below, I, Krystin Archer, attest that this documentation has been prepared under the direction and in the presence of Sky Garcia PA.  Electronically signed: Rohan Pritchett, 04-18-20 @ 19:32    I, Sky Garcia, personally performed the services described in this documentation. all medical record entries made by the kevinibtrae were at my direction and in my presence. I have reviewed the chart and agree that the record reflects my personal performance and is accurate and complete  Electronically signed: Sky Garcia PA. 04-18-20 @ 19:32 CLARITA POWELL  MRN-[id number]  Patient is a 70y old  Male who presents with a chief complaint of respiratory distress, COVID 19 (18 Apr 2020 07:32)    HPI:  70M with PMH presents with T dm, hypothyroidism, hld, p/w cough, low grade fever x 2-3 days. Today patient had worsening dyspnea at rest as well as exertion which prompted to come to the ED. Patient otherwise denied chest pain, leg swelling. He denies prior lung disease. Denies any history of smoking.     In ED, patient was noted to be hypoxic on 6L NC to 89% and significant tachypnea. VS otherwise, afebrile, HR in 80s, SBP 150s. Labs notable for CBC wnl. CMP with mild elevated transaminases AST 65 and ALT 47. PAtient noted to be positive for COVID 19. Due to worsening respiratory distress patient was emergently intubated in the ER. (27 Mar 2020 21:38)      Surgery/Hospital course:  COVID 19 Positive  Admitted and Intubated 3/27  4/7 Patient proned  4/8 Unproned  4/9 Paralysis weaned off yesterday, unproned with maintained sats, tolerating diuresis  4/12 10 sec asystole, back after epi x1  4/13 CPAP  4/14- 4/16 unable to tolerate CPAP   4/16 decadron course started  4/17 Failed CPAP. Continue with steroid taper and supportive therapy. Palliative care consult called and appreciated. Family yet to decide on end of life and prior to making any advanced decisions would like to see if any improvement with steroid taper treatment therapy.    4/18 Continues to CPAP trial of 20/5.  Peep lowered to 5 from 6.  Unable to tolerate sedation wean due to increased agitation causing hypoxia.    Today:  Patient is currently afebrile. Remains on full vent support after attempting x2 CPAP trials today. The longest trail was for 20 minutes. Right now FiO2 is 50% and PEEP is 5. Currently sedated with ketamine. Is in sinus rhythm, MAPS have been in the mid 60s to 70s. Is on/off levophed for pressor support, wean as tolerated for the night. No current antibiotic regimen. Decadron trial started on 4/16, taper and wean as appropriate.       ============================I/O===========================   I&O's Detail    17 Apr 2020 07:01  -  18 Apr 2020 07:00  --------------------------------------------------------  IN:    Enteral Tube Flush: 140 mL    Glucerna 1.5: 1200 mL    IV PiggyBack: 50 mL    ketamine Infusion.: 286 mL    norepinephrine Infusion: 179.8 mL  Total IN: 1855.8 mL    OUT:    Indwelling Catheter - Urethral: 1770 mL  Total OUT: 1770 mL    Total NET: 85.8 mL      18 Apr 2020 07:01  -  18 Apr 2020 19:32  --------------------------------------------------------  IN:    Enteral Tube Flush: 120 mL    Glucerna 1.5: 600 mL    ketamine Infusion.: 133.6 mL    norepinephrine Infusion: 15.9 mL  Total IN: 869.5 mL    OUT:    Indwelling Catheter - Urethral: 1110 mL  Total OUT: 1110 mL    Total NET: -240.5 mL        ============================ LABS =========================                        8.6    11.88 )-----------( 232      ( 18 Apr 2020 00:21 )             28.1     04-18    144  |  106  |  38<H>  ----------------------------<  99  4.7   |  30  |  0.51    Ca    8.8      18 Apr 2020 00:21  Phos  3.2     04-18  Mg     2.3     04-18    TPro  6.1  /  Alb  2.4<L>  /  TBili  0.2  /  DBili  x   /  AST  16  /  ALT  18  /  AlkPhos  99  04-18    LIVER FUNCTIONS - ( 18 Apr 2020 00:21 )  Alb: 2.4 g/dL / Pro: 6.1 g/dL / ALK PHOS: 99 U/L / ALT: 18 U/L / AST: 16 U/L / GGT: x             ABG - ( 18 Apr 2020 12:07 )  pH, Arterial: 7.47  pH, Blood: x     /  pCO2: 43    /  pO2: 67    / HCO3: 31    / Base Excess: 6.9   /  SaO2: 94                  ======================Micro/Rad/Cardio=================  Culture: Reviewed   CXR: Reviewed  ======================================================  PAST MEDICAL & SURGICAL HISTORY:  Hyperlipidemia  Hypothyroid  No significant past surgical history    ====================ASSESSMENT ==============  69 y/o M with PMHx of HLD, hypothyroid, found to be COVID positive s/p intubation on 3/27, hospital course c/b hypoxic respiratory failure, septic shock, ARDS    Plan:  ====================== NEUROLOGY=====================  Continue to wean sedation as tolerated.    Sedated with,  ketamine Infusion. 2 mG/kG/Hr (15.4 mL/Hr) IV Continuous <Continuous>    To help wean sedation,  clonazePAM  Tablet 2 milliGRAM(s) Oral every 8 hours  oxyCODONE    IR 20 milliGRAM(s) Oral every 6 hours    Pain management,  HYDROmorphone  Injectable 1 milliGRAM(s) IV Push every 4 hours PRN Moderate Pain (4 - 6)    Prn for fevers,  acetaminophen    Suspension .. 650 milliGRAM(s) Oral every 6 hours PRN Temp greater or equal to 38.5C (101.3F)    ==================== RESPIRATORY======================  CPAP trials today failed. Continue tomorrow as tolerated.  Remains on full vent support.   Continue to wean FiO2 and PEEP as tolerated  O2 sat goal >92%    Mechanical Ventilation:  Mode: AC/ CMV (Assist Control/ Continuous Mandatory Ventilation)  RR (machine): 30  TV (machine): 350  FiO2: 50  PEEP: 5  ITime: 1  MAP: 12  PIP: 34    ====================CARDIOVASCULAR==================  Sinus rhythm MAPS in the mid 60s to 70s today.  On pressor support for hypotension.   Wean vasopressors as tolerated overnight, MAP goal 65-75    norepinephrine Infusion 0.02 MICROgram(s)/kG/Min (2.89 mL/Hr) IV Continuous <Continuous>    ===================HEMATOLOGIC/ONC ===================  Continue ASA  aspirin  chewable 81 milliGRAM(s) Oral daily    Continue Lovenox for VTE prophylaxis.  enoxaparin Injectable 40 milliGRAM(s) SubCutaneous two times a day    ===================== RENAL =========================  Making urine on his own.  Continue monitoring urine output  BUN/SCr is normal today.      ==================== GASTROINTESTINAL===================  Tolerating tube feeds, Glucerna 300 [id number] at goal     Continue pepcid for GI prophylaxis  famotidine    Tablet 20 milliGRAM(s) Oral two times a day    Continue bowel regimen,  polyethylene glycol 3350 17 Gram(s) Oral every 12 hours  senna Syrup 10 milliLiter(s) Oral at bedtime  sodium chloride 0.9% lock flush 10 milliLiter(s) IV Push every 1 hour PRN Pre/post blood products, medications, blood draw, and to maintain line patency    =======================    ENDOCRINE  =====================  Continue glucose control,  dexAMETHasone  IVPB 20 milliGRAM(s) IV Intermittent daily  insulin lispro (HumaLOG) corrective regimen sliding scale   SubCutaneous every 6 hours  insulin NPH human recombinant 11 Unit(s) SubCutaneous every 6 hours  levothyroxine Injectable 50 MICROGram(s) IV Push at bedtime    ========================INFECTIOUS DISEASE================  Afebriele  WBC is 11  No abx regimen currently.  +COVID  -Completed Plaquenil (3/27-4/1), Azithromycin (3/28-3/31)  -s/p Anakinra    4/16 decadron trial started       Patient requires continuous monitoring with bedside rhythm monitoring, arterial line, pulse oximetry, ventilator monitoring and intermittent blood gas analysis.  Care plan discussed with ICU care team.  Patient remained critical; required more than usual ICU care team; I have spent 35 minutes providing non-routine ICU care, revaluated multiple times during the day.    By signing my name below, I, Krystin Archer, attest that this documentation has been prepared under the direction and in the presence of Sky Garcia PA.  Electronically signed: Rohan Pritchett, 04-18-20 @ 19:32    I, Sky Garcia, personally performed the services described in this documentation. all medical record entries made by the kevinibtrae were at my direction and in my presence. I have reviewed the chart and agree that the record reflects my personal performance and is accurate and complete  Electronically signed: Sky Garcia PA. 04-18-20 @ 19:32

## 2020-04-19 NOTE — PROGRESS NOTE ADULT - SUBJECTIVE AND OBJECTIVE BOX
CLARITA POWELL  MRN-[id number]  Patient is a 70y old  Male who presents with a chief complaint of respiratory distress, COVID 19 (19 Apr 2020 06:22)    HPI:  70M with PMH presents with T dm, hypothyroidism, hld, p/w cough, low grade fever x 2-3 days. Today patient had worsening dyspnea at rest as well as exertion which prompted to come to the ED. Patient otherwise denied chest pain, leg swelling. He denies prior lung disease. Denies any history of smoking.     In ED, patient was noted to be hypoxic on 6L NC to 89% and significant tachypnea. VS otherwise, afebrile, HR in 80s, SBP 150s. Labs notable for CBC wnl. CMP with mild elevated transaminases AST 65 and ALT 47. PAtient noted to be positive for COVID 19. Due to worsening respiratory distress patient was emergently intubated in the ER. (27 Mar 2020 21:38)      Surgery/Hospital course:  COVID 19 Positive  Admitted and Intubated 3/27  4/7 Patient proned  4/8 Unproned  4/9 Paralysis weaned off yesterday, unproned with maintained sats, tolerating diuresis  4/12 10 sec asystole, back after epi x1  4/13 CPAP  4/14- 4/16 unable to tolerate CPAP   4/16 decadron course started  4/17 Failed CPAP. Continue with steroid taper and supportive therapy. Palliative care consult called and appreciated. Family yet to decide on end of life and prior to making any advanced decisions would like to see if any improvement with steroid taper treatment therapy.    4/18 Continues to CPAP trial of 20/5.  Peep lowered to 5 from 6.  Unable to tolerate sedation wean due to increased agitation causing hypoxia.    Today:      ============================I/O===========================   I&O's Detail    18 Apr 2020 07:01  -  19 Apr 2020 07:00  --------------------------------------------------------  IN:    Enteral Tube Flush: 220 mL    Glucerna 1.5: 1200 mL    ketamine Infusion.: 310.8 mL    norepinephrine Infusion: 57.9 mL  Total IN: 1788.7 mL    OUT:    Indwelling Catheter - Urethral: 1910 mL  Total OUT: 1910 mL    Total NET: -121.3 mL      19 Apr 2020 07:01  -  19 Apr 2020 19:21  --------------------------------------------------------  IN:    ketamine Infusion.: 107.8 mL    norepinephrine Infusion: 42 mL  Total IN: 149.8 mL    OUT:    Indwelling Catheter - Urethral: 550 mL  Total OUT: 550 mL    Total NET: -400.2 mL        ============================ LABS =========================                        8.1    10.07 )-----------( 232      ( 19 Apr 2020 01:18 )             27.0     04-19    143  |  103  |  33<H>  ----------------------------<  113<H>  4.5   |  33<H>  |  0.45<L>    Ca    8.7      19 Apr 2020 01:18  Phos  3.0     04-19  Mg     2.2     04-19    TPro  5.7<L>  /  Alb  2.2<L>  /  TBili  0.2  /  DBili  x   /  AST  19  /  ALT  25  /  AlkPhos  98  04-19    LIVER FUNCTIONS - ( 19 Apr 2020 01:18 )  Alb: 2.2 g/dL / Pro: 5.7 g/dL / ALK PHOS: 98 U/L / ALT: 25 U/L / AST: 19 U/L / GGT: x             ABG - ( 19 Apr 2020 01:12 )  pH, Arterial: 7.44  pH, Blood: x     /  pCO2: 50    /  pO2: 89    / HCO3: 33    / Base Excess: 8.1   /  SaO2: 97                  ======================Micro/Rad/Cardio=================  Culture: Reviewed   CXR: Reviewed  ======================================================  PAST MEDICAL & SURGICAL HISTORY:  Hyperlipidemia  Hypothyroid  No significant past surgical history    ====================ASSESSMENT ==============  71 y/o M with PMHx of HLD, hypothyroid, found to be COVID positive s/p intubation on 3/27, hospital course c/b hypoxic respiratory failure, septic shock, ARDS    Plan:  ====================== NEUROLOGY=====================  Continue to wean sedation as tolerated.    Sedated with,  ketamine Infusion. 2 mG/kG/Hr (15.4 mL/Hr) IV Continuous <Continuous>  dexMEDEtomidine Infusion 1 MICROgram(s)/kG/Hr (19.3 mL/Hr) IV Continuous <Continuous>    To help wean sedation,  clonazePAM  Tablet 2 milliGRAM(s) Oral every 8 hours  oxyCODONE    IR 20 milliGRAM(s) Oral every 6 hours    Pain management,  HYDROmorphone  Injectable 1 milliGRAM(s) IV Push every 4 hours PRN Moderate Pain (4 - 6)    Prn for fevers,  acetaminophen    Suspension .. 650 milliGRAM(s) Oral every 6 hours PRN Temp greater or equal to 38.5C (101.3F)    ==================== RESPIRATORY======================  Remains on full vent support.   Continue to wean FiO2 and PEEP as tolerated  O2 sat goal >92%    Mechanical Ventilation:  Mode: AC/ CMV (Assist Control/ Continuous Mandatory Ventilation)  RR (machine): 30  TV (machine): 350  FiO2: 100  PEEP: 7  ITime: 1  MAP: 10  PIP: 32    ====================CARDIOVASCULAR==================  On pressor support for hypotension.   Wean vasopressors as tolerated, MAP goal 65-75    norepinephrine Infusion 0.02 MICROgram(s)/kG/Min (2.89 mL/Hr) IV Continuous <Continuous>    ===================HEMATOLOGIC/ONC ===================  Continue ASA for anticoagulation,  aspirin  chewable 81 milliGRAM(s) Oral daily    Continue Lovenox for VTE prophylaxis,  enoxaparin Injectable 40 milliGRAM(s) SubCutaneous two times a day    ===================== RENAL =========================  Making urine on his own.  Continue monitoring urine output  BUN/SCr is normal today.    ==================== GASTROINTESTINAL===================  Continue pepcid for GI prophylaxis  famotidine    Tablet 20 milliGRAM(s) Oral two times a day    Continue bowel regimen,  polyethylene glycol 3350 17 Gram(s) Oral every 12 hours  senna Syrup 10 milliLiter(s) Oral at bedtime  sodium chloride 0.9% lock flush 10 milliLiter(s) IV Push every 1 hour PRN Pre/post blood products, medications, blood draw, and to maintain line patency      Continue tube feeds: goal:       =======================    ENDOCRINE  =====================  Continue glucose control,    dexAMETHasone  IVPB 20 milliGRAM(s) IV Intermittent daily  insulin lispro (HumaLOG) corrective regimen sliding scale   SubCutaneous every 6 hours  insulin NPH human recombinant 11 Unit(s) SubCutaneous every 6 hours  levothyroxine Injectable 50 MICROGram(s) IV Push at bedtime    ========================INFECTIOUS DISEASE================   No abx regimen currently.  +COVID  -Completed Plaquenil (3/27-4/1), Azithromycin (3/28-3/31)  -s/p Anakinra    4/16 decadron trial started      Patient requires continuous monitoring with bedside rhythm monitoring, arterial line, pulse oximetry, ventilator monitoring and intermittent blood gas analysis.  Care plan discussed with ICU care team.  Patient remained critical; required more than usual ICU care team; I have spent 35 minutes providing non-routine ICU care, revaluated multiple times during the day.    By signing my name below, I, Krystin Archer, attest that this documentation has been prepared under the direction and in the presence of Sky Garcia PA.  Electronically signed: Rohan Pritchett, 04-19-20 @ 19:21    I, Sky Garcia, personally performed the services described in this documentation. all medical record entries made by the kevinibe were at my direction and in my presence. I have reviewed the chart and agree that the record reflects my personal performance and is accurate and complete  Electronically signed: Sky Garcia PA. 04-19-20 @ 19:21 CLARITA POWELL  MRN-[id number]  Patient is a 70y old  Male who presents with a chief complaint of respiratory distress, COVID 19 (19 Apr 2020 06:22)    HPI:  70M with PMH presents with T dm, hypothyroidism, hld, p/w cough, low grade fever x 2-3 days. Today patient had worsening dyspnea at rest as well as exertion which prompted to come to the ED. Patient otherwise denied chest pain, leg swelling. He denies prior lung disease. Denies any history of smoking.     In ED, patient was noted to be hypoxic on 6L NC to 89% and significant tachypnea. VS otherwise, afebrile, HR in 80s, SBP 150s. Labs notable for CBC wnl. CMP with mild elevated transaminases AST 65 and ALT 47. PAtient noted to be positive for COVID 19. Due to worsening respiratory distress patient was emergently intubated in the ER. (27 Mar 2020 21:38)      Surgery/Hospital course:  COVID 19 Positive  Admitted and Intubated 3/27  4/7 Patient proned  4/8 Unproned  4/9 Paralysis weaned off yesterday, unproned with maintained sats, tolerating diuresis  4/12 10 sec asystole, back after epi x1  4/13 CPAP  4/14- 4/16 unable to tolerate CPAP   4/16 decadron course started  4/17 Failed CPAP. Continue with steroid taper and supportive therapy. Palliative care consult called and appreciated. Family yet to decide on end of life and prior to making any advanced decisions would like to see if any improvement with steroid taper treatment therapy.    4/18 Continues to CPAP trial of 20/5.  Peep lowered to 5 from 6.  Unable to tolerate sedation wean due to increased agitation causing hypoxia.    Today: Failed CPAP trials today, unable to wean sedation, continued pressor requirement      ============================I/O===========================   I&O's Detail    18 Apr 2020 07:01  -  19 Apr 2020 07:00  --------------------------------------------------------  IN:    Enteral Tube Flush: 220 mL    Glucerna 1.5: 1200 mL    ketamine Infusion.: 310.8 mL    norepinephrine Infusion: 57.9 mL  Total IN: 1788.7 mL    OUT:    Indwelling Catheter - Urethral: 1910 mL  Total OUT: 1910 mL    Total NET: -121.3 mL      19 Apr 2020 07:01  -  19 Apr 2020 19:21  --------------------------------------------------------  IN:    ketamine Infusion.: 107.8 mL    norepinephrine Infusion: 42 mL  Total IN: 149.8 mL    OUT:    Indwelling Catheter - Urethral: 550 mL  Total OUT: 550 mL    Total NET: -400.2 mL        ============================ LABS =========================                        8.1    10.07 )-----------( 232      ( 19 Apr 2020 01:18 )             27.0     04-19    143  |  103  |  33<H>  ----------------------------<  113<H>  4.5   |  33<H>  |  0.45<L>    Ca    8.7      19 Apr 2020 01:18  Phos  3.0     04-19  Mg     2.2     04-19    TPro  5.7<L>  /  Alb  2.2<L>  /  TBili  0.2  /  DBili  x   /  AST  19  /  ALT  25  /  AlkPhos  98  04-19    LIVER FUNCTIONS - ( 19 Apr 2020 01:18 )  Alb: 2.2 g/dL / Pro: 5.7 g/dL / ALK PHOS: 98 U/L / ALT: 25 U/L / AST: 19 U/L / GGT: x             ABG - ( 19 Apr 2020 01:12 )  pH, Arterial: 7.44  pH, Blood: x     /  pCO2: 50    /  pO2: 89    / HCO3: 33    / Base Excess: 8.1   /  SaO2: 97                  ======================Micro/Rad/Cardio=================  Culture: Reviewed   CXR: Reviewed  ======================================================  PAST MEDICAL & SURGICAL HISTORY:  Hyperlipidemia  Hypothyroid  No significant past surgical history    ====================ASSESSMENT ==============  71 y/o M with PMHx of HLD, hypothyroid, found to be COVID positive s/p intubation on 3/27, hospital course c/b hypoxic respiratory failure, septic shock, ARDS    Plan:  ====================== NEUROLOGY=====================  Continue to wean sedation as tolerated.    Sedated with,  ketamine Infusion. 2 mG/kG/Hr (15.4 mL/Hr) IV Continuous <Continuous>  dexMEDEtomidine Infusion 1 MICROgram(s)/kG/Hr (19.3 mL/Hr) IV Continuous <Continuous>    To help wean sedation,  clonazePAM  Tablet 2 milliGRAM(s) Oral every 8 hours  oxyCODONE    IR 20 milliGRAM(s) Oral every 6 hours    Pain management,  HYDROmorphone  Injectable 1 milliGRAM(s) IV Push every 4 hours PRN Moderate Pain (4 - 6)    Prn for fevers,  acetaminophen    Suspension .. 650 milliGRAM(s) Oral every 6 hours PRN Temp greater or equal to 38.5C (101.3F)    ==================== RESPIRATORY======================  Remains on full vent support.   Continue to wean FiO2 and PEEP as tolerated  O2 sat goal >92%    Mechanical Ventilation:  Mode: AC/ CMV (Assist Control/ Continuous Mandatory Ventilation)  RR (machine): 30  TV (machine): 350  FiO2: 90%  PEEP: 9  ITime: 1  MAP: 10  PIP: 32    ====================CARDIOVASCULAR==================  On pressor support for hypotension.   Wean vasopressors as tolerated, MAP goal 65-75    norepinephrine Infusion 0.02 MICROgram(s)/kG/Min (2.89 mL/Hr) IV Continuous <Continuous>    ===================HEMATOLOGIC/ONC ===================  Continue ASA for anticoagulation,  aspirin  chewable 81 milliGRAM(s) Oral daily    Continue Lovenox for VTE prophylaxis,  enoxaparin Injectable 40 milliGRAM(s) SubCutaneous two times a day    ===================== RENAL =========================  Making urine on his own.  Continue monitoring urine output  BUN/SCr is normal today.    ==================== GASTROINTESTINAL===================  Continue pepcid for GI prophylaxis  famotidine    Tablet 20 milliGRAM(s) Oral two times a day    Continue bowel regimen,  polyethylene glycol 3350 17 Gram(s) Oral every 12 hours  senna Syrup 10 milliLiter(s) Oral at bedtime  sodium chloride 0.9% lock flush 10 milliLiter(s) IV Push every 1 hour PRN Pre/post blood products, medications, blood draw, and to maintain line patency      Continue tube feeds: goal: Glucerna bolus 300 Q6      =======================    ENDOCRINE  =====================  Continue glucose control,    dexAMETHasone  IVPB 20 milliGRAM(s) IV Intermittent daily  insulin lispro (HumaLOG) corrective regimen sliding scale   SubCutaneous every 6 hours  insulin NPH human recombinant 11 Unit(s) SubCutaneous every 6 hours  levothyroxine Injectable 50 MICROGram(s) IV Push at bedtime    ========================INFECTIOUS DISEASE================   No abx regimen currently.  +COVID  -Completed Plaquenil (3/27-4/1), Azithromycin (3/28-3/31)  -s/p Anakinra    4/16 decadron trial started      Patient requires continuous monitoring with bedside rhythm monitoring, arterial line, pulse oximetry, ventilator monitoring and intermittent blood gas analysis.  Care plan discussed with ICU care team.  Patient remained critical; required more than usual ICU care team; I have spent 35 minutes providing non-routine ICU care, revaluated multiple times during the day.    By signing my name below, I, Krystin Archer, attest that this documentation has been prepared under the direction and in the presence of Sky Garcia PA.  Electronically signed: Rohan Pritchett, 04-19-20 @ 19:21    I, Sky Garcia, personally performed the services described in this documentation. all medical record entries made by the scribe were at my direction and in my presence. I have reviewed the chart and agree that the record reflects my personal performance and is accurate and complete  Electronically signed: Sky Garcia PA. 04-19-20 @ 19:21

## 2020-04-19 NOTE — PROGRESS NOTE ADULT - SUBJECTIVE AND OBJECTIVE BOX
CLARITA POWELL  MRN-[id number]  Patient is a 70y old  Male who presents with a chief complaint of respiratory distress, COVID 19 (18 Apr 2020 19:31)    HPI:  70M with PMH presents with T dm, hypothyroidism, hld, p/w cough, low grade fever x 2-3 days. Today patient had worsening dyspnea at rest as well as exertion which prompted to come to the ED. Patient otherwise denied chest pain, leg swelling. He denies prior lung disease. Denies any history of smoking.     In ED, patient was noted to be hypoxic on 6L NC to 89% and significant tachypnea. VS otherwise, afebrile, HR in 80s, SBP 150s. Labs notable for CBC wnl. CMP with mild elevated transaminases AST 65 and ALT 47. PAtient noted to be positive for COVID 19. Due to worsening respiratory distress patient was emergently intubated in the ER. (27 Mar 2020 21:38)      Surgery/Hospital course:  COVID 19 Positive  Intubated 3/27  4/7 Patient proned  4/8 Unproned  4/9 Paralysis weaned off yesterday, unproned with maintained sats, tolerating diuresis  4/12 10 sec asystole, back after epi x1  4/14 Versed dc'd  4/15 Didn't tolerate CPAP.  4/16 decadron course started  4/17 Failed CPAP. Continue with steroid taper and supportive therapy. Palliative care consult called and appreciated. Family yet to decide on end of life and prior to making any advanced decisions would like to see if any improvement with steroid taper treatment therapy.    4/18 Continues to CPAP trial of 20/5.  Peep lowered to 5 from 6.  Unable to tolerate sedation wean due to increased agitation causing hypoxia.    Today;     REVIEW OF SYSTEMS:  Unable to obtain, vented     Vital Signs Last 24 Hrs  T(C): 37.4 (19 Apr 2020 04:00), Max: 37.4 (19 Apr 2020 04:00)  T(F): 99.3 (19 Apr 2020 04:00), Max: 99.3 (19 Apr 2020 04:00)  HR: 77 (19 Apr 2020 06:00) (59 - 80)  BP: --  BP(mean): --  RR: 33 (19 Apr 2020 06:00) (30 - 38)  SpO2: 90% (19 Apr 2020 06:00) (87% - 100%)    ============================I/O===========================   I&O's Detail    17 Apr 2020 07:01  -  18 Apr 2020 07:00  --------------------------------------------------------  IN:    Enteral Tube Flush: 140 mL    Glucerna 1.5: 1200 mL    IV PiggyBack: 50 mL    ketamine Infusion.: 286 mL    norepinephrine Infusion: 179.8 mL  Total IN: 1855.8 mL    OUT:    Indwelling Catheter - Urethral: 1770 mL  Total OUT: 1770 mL    Total NET: 85.8 mL      18 Apr 2020 07:01  -  19 Apr 2020 06:22  --------------------------------------------------------  IN:    Enteral Tube Flush: 220 mL    Glucerna 1.5: 1200 mL    ketamine Infusion.: 310.8 mL    norepinephrine Infusion: 51.9 mL  Total IN: 1782.7 mL    OUT:    Indwelling Catheter - Urethral: 1910 mL  Total OUT: 1910 mL    Total NET: -127.3 mL        ============================ LABS =========================                        8.1    10.07 )-----------( 232      ( 19 Apr 2020 01:18 )             27.0     04-19    143  |  103  |  33<H>  ----------------------------<  113<H>  4.5   |  33<H>  |  0.45<L>    Ca    8.7      19 Apr 2020 01:18  Phos  3.0     04-19  Mg     2.2     04-19    TPro  5.7<L>  /  Alb  2.2<L>  /  TBili  0.2  /  DBili  x   /  AST  19  /  ALT  25  /  AlkPhos  98  04-19    LIVER FUNCTIONS - ( 19 Apr 2020 01:18 )  Alb: 2.2 g/dL / Pro: 5.7 g/dL / ALK PHOS: 98 U/L / ALT: 25 U/L / AST: 19 U/L / GGT: x             ABG - ( 19 Apr 2020 01:12 )  pH, Arterial: 7.44  pH, Blood: x     /  pCO2: 50    /  pO2: 89    / HCO3: 33    / Base Excess: 8.1   /  SaO2: 97                  ======================Micro/Rad/Cardio=================  Culture: Reviewed   CXR: Reviewed  ======================================================  PAST MEDICAL & SURGICAL HISTORY:  Hyperlipidemia  Hypothyroid  No significant past surgical history    ====================ASSESSMENT ==============  COVID 19 Positive   3/27 Intubated   Hypoxic respiratory failure   Septic Shock  ARDS  Fluid overload      Plan:  ====================== NEUROLOGY=====================  Continue klonopin and oxycodone to prevent withdrawal and help wean sedation.  Dilaudid PRN.    acetaminophen    Suspension .. 650 milliGRAM(s) Oral every 6 hours PRN Temp greater or equal to 38.5C (101.3F)  clonazePAM  Tablet 2 milliGRAM(s) Oral every 8 hours  HYDROmorphone  Injectable 1 milliGRAM(s) IV Push every 4 hours PRN Moderate Pain (4 - 6)  ketamine Infusion. 2 mG/kG/Hr (15.4 mL/Hr) IV Continuous <Continuous>  oxyCODONE    IR 20 milliGRAM(s) Oral every 6 hours    ==================== RESPIRATORY======================  On full vent support.   Continue to wean FiO2 (goal to 30%, then wean PEEP as tolerated).    Mechanical Ventilation:  Mode: AC/ CMV (Assist Control/ Continuous Mandatory Ventilation)  RR (machine): 30  TV (machine): 350  FiO2: 50  PEEP: 5  ITime: 1  MAP: 13  PIP: 32      ====================CARDIOVASCULAR==================  Wean vasopressors as tolerated, MAP goal 65-75    norepinephrine Infusion 0.02 MICROgram(s)/kG/Min (2.89 mL/Hr) IV Continuous <Continuous>    ===================HEMATOLOGIC/ONC ===================  Continue LVX 40 BID for VTE prophylaxis    aspirin  chewable 81 milliGRAM(s) Oral daily  enoxaparin Injectable 40 milliGRAM(s) SubCutaneous two times a day    ===================== RENAL =========================  Continue monitoring urine output  Lasix PRN for net even to negative fluid balance.  Monitor and replete electrolytes as needed.    ==================== GASTROINTESTINAL===================  Continue tube feeds: Glucerna 300 Q6 at goal  Continue pepcid for GI prophylaxis    famotidine    Tablet 20 milliGRAM(s) Oral two times a day  polyethylene glycol 3350 17 Gram(s) Oral every 12 hours  senna Syrup 10 milliLiter(s) Oral at bedtime  sodium chloride 0.9% lock flush 10 milliLiter(s) IV Push every 1 hour PRN Pre/post blood products, medications, blood draw, and to maintain line patency    =======================    ENDOCRINE  =====================  Glucose control,   dexAMETHasone  IVPB 20 milliGRAM(s) IV Intermittent daily  insulin lispro (HumaLOG) corrective regimen sliding scale   SubCutaneous every 6 hours  insulin NPH human recombinant 11 Unit(s) SubCutaneous every 6 hours  levothyroxine Injectable 50 MICROGram(s) IV Push at bedtime    ========================INFECTIOUS DISEASE================  +COVID  -Completed Plaquenil (3/27-4/1), Azithromycin (3/28-3/31)  -s/p Anakinra        Patient requires continuous monitoring with bedside rhythm monitoring, pulse ox monitoring, and intermittent blood gas analysis. Care plan discussed with ICU care team. Patient remained critical and at risk for life threatning decompensation.    By signing my name below, I, Joan Richards, attest that this documentation has been prepared under the direction and in the presence of GERRY Escalante.  Electronically signed: Rohan Seals, 04-19-20 @ 06:22    I, Nasim Oliver , personally performed the services described in this documentation. all medical record entries made by the kevinibe were at my direction and in my presence. I have reviewed the chart and agree that the record reflects my personal performance and is accurate and complete  Electronically signed: GERRY Escalante CLARITA POWELL  MRN-[id number]  Patient is a 70y old  Male who presents with a chief complaint of respiratory distress, COVID 19 (18 Apr 2020 19:31)    HPI:  70M with PMH presents with T dm, hypothyroidism, hld, p/w cough, low grade fever x 2-3 days. Today patient had worsening dyspnea at rest as well as exertion which prompted to come to the ED. Patient otherwise denied chest pain, leg swelling. He denies prior lung disease. Denies any history of smoking.     In ED, patient was noted to be hypoxic on 6L NC to 89% and significant tachypnea. VS otherwise, afebrile, HR in 80s, SBP 150s. Labs notable for CBC wnl. CMP with mild elevated transaminases AST 65 and ALT 47. PAtient noted to be positive for COVID 19. Due to worsening respiratory distress patient was emergently intubated in the ER. (27 Mar 2020 21:38)    Surgery/Hospital course:  COVID 19 Positive  Intubated 3/27  4/7 Patient proned  4/8 Unproned  4/9 Paralysis weaned off yesterday, unproned with maintained sats, tolerating diuresis  4/12 10 sec asystole, back after epi x1  4/14 Versed dc'd  4/15 Didn't tolerate CPAP.  4/16 decadron course started  4/17 Failed CPAP. Continue with steroid taper and supportive therapy. Palliative care consult called and appreciated. Family yet to decide on end of life and prior to making any advanced decisions would like to see if any improvement with steroid taper treatment therapy.    4/18 Continues to CPAP trial of 20/5.  Peep lowered to 5 from 6.  Unable to tolerate sedation wean due to increased agitation causing hypoxia.    Today: Increased peep to 7, FiO2 to 50% this AM 2/2 desaturation to 88%    REVIEW OF SYSTEMS:  Unable to obtain, vented     Vital Signs Last 24 Hrs  T(C): 37.4 (19 Apr 2020 04:00), Max: 37.4 (19 Apr 2020 04:00)  T(F): 99.3 (19 Apr 2020 04:00), Max: 99.3 (19 Apr 2020 04:00)  HR: 77 (19 Apr 2020 06:00) (59 - 80)  BP: MAP 70s  RR: 33 (19 Apr 2020 06:00) (30 - 38)  SpO2: 90% (19 Apr 2020 06:00) (87% - 100%)    ============================I/O===========================   I&O's Detail    17 Apr 2020 07:01  -  18 Apr 2020 07:00  --------------------------------------------------------  IN:    Enteral Tube Flush: 140 mL    Glucerna 1.5: 1200 mL    IV PiggyBack: 50 mL    ketamine Infusion.: 286 mL    norepinephrine Infusion: 179.8 mL  Total IN: 1855.8 mL    OUT:    Indwelling Catheter - Urethral: 1770 mL  Total OUT: 1770 mL    Total NET: 85.8 mL    18 Apr 2020 07:01  -  19 Apr 2020 06:22  --------------------------------------------------------  IN:    Enteral Tube Flush: 220 mL    Glucerna 1.5: 1200 mL    ketamine Infusion.: 310.8 mL    norepinephrine Infusion: 51.9 mL  Total IN: 1782.7 mL    OUT:    Indwelling Catheter - Urethral: 1910 mL  Total OUT: 1910 mL    Total NET: -127.3 mL    ============================ LABS =========================                        8.1    10.07 )-----------( 232      ( 19 Apr 2020 01:18 )             27.0     04-19    143  |  103  |  33<H>  ----------------------------<  113<H>  4.5   |  33<H>  |  0.45<L>    Ca    8.7      19 Apr 2020 01:18  Phos  3.0     04-19  Mg     2.2     04-19    TPro  5.7<L>  /  Alb  2.2<L>  /  TBili  0.2  /  DBili  x   /  AST  19  /  ALT  25  /  AlkPhos  98  04-19    LIVER FUNCTIONS - ( 19 Apr 2020 01:18 )  Alb: 2.2 g/dL / Pro: 5.7 g/dL / ALK PHOS: 98 U/L / ALT: 25 U/L / AST: 19 U/L / GGT: x           ABG - ( 19 Apr 2020 01:12 )  pH, Arterial: 7.44  pH, Blood: x     /  pCO2: 50    /  pO2: 89    / HCO3: 33    / Base Excess: 8.1   /  SaO2: 97        ======================Micro/Rad/Cardio=================  Culture: Reviewed   CXR: Reviewed    ======================================================  PAST MEDICAL & SURGICAL HISTORY:  Hyperlipidemia  Hypothyroid  No significant past surgical history    ====================ASSESSMENT ==============  COVID 19 Positive   3/27 Intubated   Hypoxic respiratory failure   Septic Shock  ARDS  Fluid overload    Plan:  ====================== NEUROLOGY=====================  Continue klonopin and oxycodone to prevent withdrawal and help wean sedation.  Dilaudid PRN.  Continue ketamine for sedation, which was weaned yesterday then increased again. Will add precedex in order to try and wean ketamine.    acetaminophen    Suspension .. 650 milliGRAM(s) Oral every 6 hours PRN Temp greater or equal to 38.5C (101.3F)  clonazePAM  Tablet 2 milliGRAM(s) Oral every 8 hours  HYDROmorphone  Injectable 1 milliGRAM(s) IV Push every 4 hours PRN Moderate Pain (4 - 6)  ketamine Infusion. 2 mG/kG/Hr (15.4 mL/Hr) IV Continuous <Continuous>  oxyCODONE    IR 20 milliGRAM(s) Oral every 6 hours    ==================== RESPIRATORY======================  On full vent support.   Continue to wean FiO2 (goal to 30%, then wean PEEP as tolerated).    Mechanical Ventilation:  Mode: AC/ CMV (Assist Control/ Continuous Mandatory Ventilation)  RR (machine): 30  TV (machine): 350  FiO2: 50  PEEP: 5  ITime: 1  MAP: 13  PIP: 32    ====================CARDIOVASCULAR==================  Wean vasopressors as tolerated, MAP goal 65-75    norepinephrine Infusion 0.02 MICROgram(s)/kG/Min (2.89 mL/Hr) IV Continuous <Continuous>    ===================HEMATOLOGIC/ONC ===================  Continue LVX 40 BID for VTE prophylaxis    aspirin  chewable 81 milliGRAM(s) Oral daily  enoxaparin Injectable 40 milliGRAM(s) SubCutaneous two times a day    ===================== RENAL =========================  Continue monitoring urine output  Lasix PRN for net even to negative fluid balance.  Monitor and replete electrolytes as needed.    ==================== GASTROINTESTINAL===================  Continue tube feeds: Glucerna 300 Q6 at goal  Continue pepcid for GI prophylaxis    famotidine    Tablet 20 milliGRAM(s) Oral two times a day  polyethylene glycol 3350 17 Gram(s) Oral every 12 hours  senna Syrup 10 milliLiter(s) Oral at bedtime  sodium chloride 0.9% lock flush 10 milliLiter(s) IV Push every 1 hour PRN Pre/post blood products, medications, blood draw, and to maintain line patency    =======================    ENDOCRINE  =====================  Glucose control,   dexAMETHasone  IVPB 20 milliGRAM(s) IV Intermittent daily  insulin lispro (HumaLOG) corrective regimen sliding scale   SubCutaneous every 6 hours  insulin NPH human recombinant 11 Unit(s) SubCutaneous every 6 hours  levothyroxine Injectable 50 MICROGram(s) IV Push at bedtime    ========================INFECTIOUS DISEASE================  +COVID  -Completed Plaquenil (3/27-4/1), Azithromycin (3/28-3/31)  -s/p Anakinra    Continue 10 day course of Decadron    I have spent 45 minutes of critical care time with this patient.    Patient requires continuous monitoring with bedside rhythm monitoring, pulse ox monitoring, and intermittent blood gas analysis. Care plan discussed with ICU care team. Patient remained critical and at risk for life threatning decompensation.    By signing my name below, I, Joan Richards, attest that this documentation has been prepared under the direction and in the presence of GERRY Escalante.  Electronically signed: Rohan Seals, 04-19-20 @ 06:22    I, Nasim Oliver , personally performed the services described in this documentation. all medical record entries made by the kevinibtrae were at my direction and in my presence. I have reviewed the chart and agree that the record reflects my personal performance and is accurate and complete  Electronically signed: GERRY Escalante

## 2020-04-20 NOTE — CHART NOTE - NSCHARTNOTEFT_GEN_A_CORE
Nutrition Follow Up Note   Patient seen for: nutrition follow up on COVID ICU     Source: medical record, communication with team. Unable to speak to pt due to current airborne isolation contact precautions related to COVID-19. Pt remains intubated.     Chart reviewed, events noted.  70 year old male with PMHx of DM, hypothyroidism, HLD. Presented with fever, cough and SOB. Found to be COVID19+, required emergent intubation in ED. Pt remains intubated, sedated.  Continues to require full vent support. team discussing GOC with family.     Diet Order: Diet, NPO with Tube Feed:   Tube Feeding Modality: Nasogastric  Glucerna 1.5 Chandrakant (GLUCERNA1.5RTH)  Total Volume for 24 Hours (mL): 1200  Bolus  Total Volume of Bolus (mL):  300  Total # of Feeds: 4  Tube Feed Frequency: Every 6 hours   Tube Feed Start Time: 11:40  Bolus Feed Rate (mL per Hour): 300   Bolus Feed Duration (in Hours): 1 (04-08-20 @ 11:36)    CURRENT EN ORDER PROVIDES: 1800kcals and 99gm protein (23.3kcals.kg and 1.28gm pro/kg based on admission Wt: 77.1kg)  Nutrition Events: good EN provision.   Noted low BM on 4/19 overnight. Unclear source as NPH order didn't change and Pt on regular bolus schedule. Would continue to trend.     GI: Last BM on 4/16. Receives Miralax and senna.       Anthrpometric Measurements:   Height (cm): 175.26 (03-27-20 @ 16:15)  Weight (kg): 77.1 (03-27-20 @ 16:15)  BMI (kg/m2): 25.1 (03-27-20 @ 16:15)  IBW:   %IBW:    Medications: MEDICATIONS  (STANDING):  aspirin  chewable 81 milliGRAM(s) Oral daily  chlorhexidine 0.12% Liquid 15 milliLiter(s) Oral Mucosa every 12 hours  chlorhexidine 2% Cloths 1 Application(s) Topical <User Schedule>  clonazePAM  Tablet 2 milliGRAM(s) Oral every 8 hours  dexAMETHasone  IVPB 20 milliGRAM(s) IV Intermittent daily  dexMEDEtomidine Infusion 1 MICROgram(s)/kG/Hr (19.3 mL/Hr) IV Continuous <Continuous>  enoxaparin Injectable 40 milliGRAM(s) SubCutaneous two times a day  famotidine    Tablet 20 milliGRAM(s) Oral two times a day  insulin lispro (HumaLOG) corrective regimen sliding scale   SubCutaneous every 6 hours  insulin NPH human recombinant 11 Unit(s) SubCutaneous every 6 hours  ketamine Infusion. 2 mG/kG/Hr (15.4 mL/Hr) IV Continuous <Continuous>  levothyroxine Injectable 50 MICROGram(s) IV Push at bedtime  norepinephrine Infusion 0.02 MICROgram(s)/kG/Min (2.89 mL/Hr) IV Continuous <Continuous>  oxyCODONE    IR 20 milliGRAM(s) Oral every 6 hours  petrolatum Ophthalmic Ointment 1 Application(s) Both EYES two times a day  polyethylene glycol 3350 17 Gram(s) Oral every 12 hours  senna Syrup 10 milliLiter(s) Oral at bedtime    MEDICATIONS  (PRN):  acetaminophen    Suspension .. 650 milliGRAM(s) Oral every 6 hours PRN Temp greater or equal to 38.5C (101.3F)  HYDROmorphone  Injectable 1 milliGRAM(s) IV Push every 4 hours PRN Moderate Pain (4 - 6)  sodium chloride 0.9% lock flush 10 milliLiter(s) IV Push every 1 hour PRN Pre/post blood products, medications, blood draw, and to maintain line patency    Labs: 04-20 @ 04:19: Sodium --, Potassium --, Calcium --, Magnesium --, Phosphorus --, BUN --, Creatinine --, Glucose --, Alk Phos --, ALT/SGPT --, AST/SGOT --, Albumin --, Prealbumin --, Total Bilirubin --, Hemoglobin --, Hematocrit --, Ferritin 432<H>, C-Reactive Protein 8.64<H>, Creatine Kinase <<27>  04-20 @ 01:35: Sodium 143, Potassium 4.3, Calcium 8.6, Magnesium 2.0, Phosphorus 2.8, BUN 29<H>, Creatinine 0.43<L>, Glucose 140<H>, Alk Phos 111, ALT/SGPT 32, AST/SGOT 22, Albumin 2.1<L>, Prealbumin --, Total Bilirubin 0.2, Hemoglobin 8.4<L>, Hematocrit 28.4<L>, Ferritin --, C-Reactive Protein --, Creatine Kinase <<27>    Hemoglobin A1C, Whole Blood: 7.2 % (03-30-20 @ 08:24)    Triglycerides, Serum: 203 mg/dL (04-03-20 @ 11:19)  Triglycerides, Serum: 103 mg/dL (03-31-20 @ 14:18)  Triglycerides, Serum: 143 mg/dL (03-30-20 @ 09:42)  Triglycerides, Serum: 107 mg/dL (03-30-20 @ 01:10)    POCT Blood Glucose.: 230 mg/dL (04-20-20 @ 11:46)  POCT Blood Glucose.: 129 mg/dL (04-20-20 @ 06:01)  POCT Blood Glucose.: 131 mg/dL (04-19-20 @ 23:39)  POCT Blood Glucose.: 61 mg/dL (04-19-20 @ 23:18)  POCT Blood Glucose.: 53 mg/dL (04-19-20 @ 23:17)  POCT Blood Glucose.: 100 mg/dL (04-19-20 @ 17:25)    Skin: intact  Edema:  +1 generalized and +2 aline wrist.     Estimated Needs:   [ X] no change since previous assessment based on admission wt: 77.1kg)    Estimated Energy Needs  · From (20 chandrakant/kg)	1542  · To (25 chandrakant/kg)	1927    Estimated Protein Needs  · From (1.2 g/kg)	92.52  · To (1.4 g/kg)	107.94    Previous Nutrition Diagnosis: None   Nutrition Diagnosis is:  None     New Nutrition Diagnosis:  None     Recommended Interventions:   1. Continue Glucerna 1.5 to 300ml bolus q6hrs. Provides 1800kcals and 99gm protein (23.3kcals.kg and 1.28gm pro/kg based on admission Wt: 77.1kg)  2. Continue bowel regimen; consider additional regimen for no BM x4 days   3. Defer free water to team  4. Trend BG levels, renal indices, LFT's, electrolytes and triglycerides      Monitoring and Evaluation:   Continue to monitor nutrition provision and tolerance, weights, labs, skin integrity.   RD remains available upon request and will follow up per protocol.  Sarah Siegler RD, RDN, [facility] Pager #[phone]

## 2020-04-20 NOTE — PROGRESS NOTE ADULT - SUBJECTIVE AND OBJECTIVE BOX
CLARITA POWELL  MRN-[id number]  Patient is a 70y old  Male who presents with a chief complaint of respiratory distress, COVID 19 (20 Apr 2020 07:10)    HPI:  70M with PMH presents with T dm, hypothyroidism, hld, p/w cough, low grade fever x 2-3 days. Today patient had worsening dyspnea at rest as well as exertion which prompted to come to the ED. Patient otherwise denied chest pain, leg swelling. He denies prior lung disease. Denies any history of smoking.     In ED, patient was noted to be hypoxic on 6L NC to 89% and significant tachypnea. VS otherwise, afebrile, HR in 80s, SBP 150s. Labs notable for CBC wnl. CMP with mild elevated transaminases AST 65 and ALT 47. PAtient noted to be positive for COVID 19. Due to worsening respiratory distress patient was emergently intubated in the ER. (27 Mar 2020 21:38)      Surgery/Hospital course:  COVID 19 Positive  Admitted and Intubated 3/27  4/7 Patient proned  4/8 Unproned  4/9 Paralysis weaned off yesterday, unproned with maintained sats, tolerating diuresis  4/12 10 sec asystole, back after epi x1  4/13 CPAP  4/14- 4/16 unable to tolerate CPAP   4/16 decadron course started  4/17 Failed CPAP. Continue with steroid taper and supportive therapy. Palliative care consult called and appreciated. Family yet to decide on end of life and prior to making any advanced decisions would like to see if any improvement with steroid taper treatment therapy.    4/18 Continues to CPAP trial of 20/5.  Peep lowered to 5 from 6.  Unable to tolerate sedation wean due to increased agitation causing hypoxia.    Today:          ============================I/O===========================   I&O's Detail    19 Apr 2020 07:01  -  20 Apr 2020 07:00  --------------------------------------------------------  IN:    dexmedetomidine Infusion: 4 mL    Glucerna 1.5: 1200 mL    ketamine Infusion.: 327.8 mL    norepinephrine Infusion: 219.7 mL  Total IN: 1751.5 mL    OUT:    Indwelling Catheter - Urethral: 1570 mL  Total OUT: 1570 mL    Total NET: 181.5 mL      20 Apr 2020 07:01  -  20 Apr 2020 18:43  --------------------------------------------------------  IN:    ketamine Infusion.: 92.4 mL    norepinephrine Infusion: 112.2 mL  Total IN: 204.6 mL    OUT:    Indwelling Catheter - Urethral: 300 mL  Total OUT: 300 mL    Total NET: -95.4 mL        ============================ LABS =========================                        8.4    10.16 )-----------( 286      ( 20 Apr 2020 01:35 )             28.4     04-20    143  |  105  |  29<H>  ----------------------------<  140<H>  4.3   |  28  |  0.43<L>    Ca    8.6      20 Apr 2020 01:35  Phos  2.8     04-20  Mg     2.0     04-20    TPro  5.7<L>  /  Alb  2.1<L>  /  TBili  0.2  /  DBili  x   /  AST  22  /  ALT  32  /  AlkPhos  111  04-20    LIVER FUNCTIONS - ( 20 Apr 2020 01:35 )  Alb: 2.1 g/dL / Pro: 5.7 g/dL / ALK PHOS: 111 U/L / ALT: 32 U/L / AST: 22 U/L / GGT: x           PT/INR - ( 20 Apr 2020 01:35 )   PT: 14.3 sec;   INR: 1.25 ratio         PTT - ( 20 Apr 2020 01:35 )  PTT:32.1 sec  ABG - ( 20 Apr 2020 17:53 )  pH, Arterial: 7.39  pH, Blood: x     /  pCO2: 55    /  pO2: 72    / HCO3: 33    / Base Excess: 7.2   /  SaO2: 95                  ======================Micro/Rad/Cardio=================  Culture: Reviewed   CXR: Reviewed  ======================================================  PAST MEDICAL & SURGICAL HISTORY:  Hyperlipidemia  Hypothyroid  No significant past surgical history    ====================ASSESSMENT ==============  71 y/o M with PMHx of HLD, hypothyroid, found to be COVID positive s/p intubation on 3/27, hospital course c/b hypoxic respiratory failure, septic shock, ARDS    Plan:  ====================== NEUROLOGY=====================  Continue to wean sedation as tolerated.    Sedated with,  ketamine Infusion. 2 mG/kG/Hr (15.4 mL/Hr) IV Continuous <Continuous>  dexMEDEtomidine Infusion 1 MICROgram(s)/kG/Hr (19.3 mL/Hr) IV Continuous <Continuous>    To help wean sedation,  clonazePAM  Tablet 2 milliGRAM(s) Oral every 8 hours  oxyCODONE    IR 20 milliGRAM(s) Oral every 6 hours    Pain management,  HYDROmorphone  Injectable 1 milliGRAM(s) IV Push every 4 hours PRN Moderate Pain (4 - 6)    Prn for fevers,  acetaminophen    Suspension .. 650 milliGRAM(s) Oral every 6 hours PRN Temp greater or equal to 38.5C (101.3F)    ==================== RESPIRATORY======================  Remains on full vent support.   Continue to wean FiO2 and PEEP as tolerated  O2 sat goal >92%    Mechanical Ventilation:  Mode: AC/ CMV (Assist Control/ Continuous Mandatory Ventilation)  RR (machine): 30  TV (machine): 350  FiO2: 80  PEEP: 7  ITime: 1  MAP: 18  PIP: 37    ====================CARDIOVASCULAR==================  On pressor support for hypotension.   Wean vasopressors as tolerated, MAP goal 65-75    norepinephrine Infusion 0.02 MICROgram(s)/kG/Min (2.89 mL/Hr) IV Continuous <Continuous>    ===================HEMATOLOGIC/ONC ===================  Continue ASA for anticoagulation,  aspirin  chewable 81 milliGRAM(s) Oral daily    Continue Lovenox for VTE prophylaxis,  enoxaparin Injectable 40 milliGRAM(s) SubCutaneous two times a day    ===================== RENAL =========================  Making urine on his own.  Continue monitoring urine output  Monitor BUN/SCr     ==================== GASTROINTESTINAL===================  Continue pepcid for GI prophylaxis  famotidine    Tablet 20 milliGRAM(s) Oral two times a day    Continue bowel regimen  polyethylene glycol 3350 17 Gram(s) Oral every 12 hours  senna Syrup 10 milliLiter(s) Oral at bedtime  sodium chloride 0.9% lock flush 10 milliLiter(s) IV Push every 1 hour PRN Pre/post blood products, medications, blood draw, and to maintain line patency      Continue tube feeds: goal: Glucerna bolus 300 Q6    =======================    ENDOCRINE  =====================  Continue glucose control,  dexAMETHasone  IVPB 20 milliGRAM(s) IV Intermittent daily  insulin lispro (HumaLOG) corrective regimen sliding scale   SubCutaneous every 6 hours  insulin NPH human recombinant 11 Unit(s) SubCutaneous every 6 hours    Continue hypothyroidism medication,  levothyroxine Injectable 50 MICROGram(s) IV Push at bedtime      ========================INFECTIOUS DISEASE================  +COVID  -Completed Plaquenil (3/27-4/1), Azithromycin (3/28-3/31)  -s/p Anakinra    4/16 decadron trial started.  No abx regimen currently.      Patient requires continuous monitoring with bedside rhythm monitoring, arterial line, pulse oximetry, ventilator monitoring and intermittent blood gas analysis.  Care plan discussed with ICU care team.  Patient remained critical; required more than usual ICU care team; I have spent 35 minutes providing non-routine ICU care, revaluated multiple times during the day.    By signing my name below, I, Krystin Archer, attest that this documentation has been prepared under the direction and in the presence of Sky Garcia PA.  Electronically signed: Jonathan Pritchett, 04-20-20 @ 18:43    I, Sky Garcia, personally performed the services described in this documentation. all medical record entries made by the jonathan were at my direction and in my presence. I have reviewed the chart and agree that the record reflects my personal performance and is accurate and complete  Electronically signed: Sky Garcia PA. 04-20-20 @ 18:43 CLARITA POWELL  MRN-[id number]  Patient is a 70y old  Male who presents with a chief complaint of respiratory distress, COVID 19 (20 Apr 2020 07:10)    HPI:  70M with PMH presents with T dm, hypothyroidism, hld, p/w cough, low grade fever x 2-3 days. Today patient had worsening dyspnea at rest as well as exertion which prompted to come to the ED. Patient otherwise denied chest pain, leg swelling. He denies prior lung disease. Denies any history of smoking.     In ED, patient was noted to be hypoxic on 6L NC to 89% and significant tachypnea. VS otherwise, afebrile, HR in 80s, SBP 150s. Labs notable for CBC wnl. CMP with mild elevated transaminases AST 65 and ALT 47. PAtient noted to be positive for COVID 19. Due to worsening respiratory distress patient was emergently intubated in the ER. (27 Mar 2020 21:38)      Surgery/Hospital course:  COVID 19 Positive  Admitted and Intubated 3/27  4/7 Patient proned  4/8 Unproned  4/9 Paralysis weaned off yesterday, unproned with maintained sats, tolerating diuresis  4/12 10 sec asystole, back after epi x1  4/13 CPAP  4/14- 4/16 unable to tolerate CPAP   4/16 decadron course started  4/17 Failed CPAP. Continue with steroid taper and supportive therapy. Palliative care consult called and appreciated. Family yet to decide on end of life and prior to making any advanced decisions would like to see if any improvement with steroid taper treatment therapy.    4/18 Continues to CPAP trial of 20/5.  Peep lowered to 5 from 6.  Unable to tolerate sedation wean due to increased agitation causing hypoxia.    Today: Increasing sedation requirements 2/2 to tachypnea and desaturation, CPAP trial failed, Consideration for convalescent plasma trial- f/u tomorrow          ============================I/O===========================   I&O's Detail    19 Apr 2020 07:01  -  20 Apr 2020 07:00  --------------------------------------------------------  IN:    dexmedetomidine Infusion: 4 mL    Glucerna 1.5: 1200 mL    ketamine Infusion.: 327.8 mL    norepinephrine Infusion: 219.7 mL  Total IN: 1751.5 mL    OUT:    Indwelling Catheter - Urethral: 1570 mL  Total OUT: 1570 mL    Total NET: 181.5 mL      20 Apr 2020 07:01  -  20 Apr 2020 18:43  --------------------------------------------------------  IN:    ketamine Infusion.: 92.4 mL    norepinephrine Infusion: 112.2 mL  Total IN: 204.6 mL    OUT:    Indwelling Catheter - Urethral: 300 mL  Total OUT: 300 mL    Total NET: -95.4 mL        ============================ LABS =========================                        8.4    10.16 )-----------( 286      ( 20 Apr 2020 01:35 )             28.4     04-20    143  |  105  |  29<H>  ----------------------------<  140<H>  4.3   |  28  |  0.43<L>    Ca    8.6      20 Apr 2020 01:35  Phos  2.8     04-20  Mg     2.0     04-20    TPro  5.7<L>  /  Alb  2.1<L>  /  TBili  0.2  /  DBili  x   /  AST  22  /  ALT  32  /  AlkPhos  111  04-20    LIVER FUNCTIONS - ( 20 Apr 2020 01:35 )  Alb: 2.1 g/dL / Pro: 5.7 g/dL / ALK PHOS: 111 U/L / ALT: 32 U/L / AST: 22 U/L / GGT: x           PT/INR - ( 20 Apr 2020 01:35 )   PT: 14.3 sec;   INR: 1.25 ratio         PTT - ( 20 Apr 2020 01:35 )  PTT:32.1 sec  ABG - ( 20 Apr 2020 17:53 )  pH, Arterial: 7.39  pH, Blood: x     /  pCO2: 55    /  pO2: 72    / HCO3: 33    / Base Excess: 7.2   /  SaO2: 95                  ======================Micro/Rad/Cardio=================  Culture: Reviewed   CXR: Reviewed  ======================================================  PAST MEDICAL & SURGICAL HISTORY:  Hyperlipidemia  Hypothyroid  No significant past surgical history    ====================ASSESSMENT ==============  69 y/o M with PMHx of HLD, hypothyroid, found to be COVID positive s/p intubation on 3/27, hospital course c/b hypoxic respiratory failure, septic shock, ARDS    Plan:  ====================== NEUROLOGY=====================  Continue to wean sedation as tolerated.    Sedated with,  ketamine Infusion. 2 mG/kG/Hr (15.4 mL/Hr) IV Continuous <Continuous>  dexMEDEtomidine Infusion 1 MICROgram(s)/kG/Hr (19.3 mL/Hr) IV Continuous <Continuous>    To help wean sedation,  clonazePAM  Tablet 2 milliGRAM(s) Oral every 8 hours  oxyCODONE    IR 20 milliGRAM(s) Oral every 6 hours    Pain management,  HYDROmorphone  Injectable 1 milliGRAM(s) IV Push every 4 hours PRN Moderate Pain (4 - 6)    Prn for fevers,  acetaminophen    Suspension .. 650 milliGRAM(s) Oral every 6 hours PRN Temp greater or equal to 38.5C (101.3F)    ==================== RESPIRATORY======================  Remains on full vent support.   Continue to wean FiO2 and PEEP as tolerated  O2 sat goal >92%    Mechanical Ventilation:  Mode: AC/ CMV (Assist Control/ Continuous Mandatory Ventilation)  RR (machine): 30  TV (machine): 350  FiO2: 80  PEEP: 7  ITime: 1  MAP: 18  PIP: 37    ====================CARDIOVASCULAR==================  On pressor support for hypotension.   Wean vasopressors as tolerated, MAP goal 65-75    norepinephrine Infusion 0.02 MICROgram(s)/kG/Min (2.89 mL/Hr) IV Continuous <Continuous>    ===================HEMATOLOGIC/ONC ===================  Continue ASA for anticoagulation,  aspirin  chewable 81 milliGRAM(s) Oral daily    Continue Lovenox for VTE prophylaxis,  enoxaparin Injectable 40 milliGRAM(s) SubCutaneous two times a day    ===================== RENAL =========================  Making urine on his own.  Continue monitoring urine output  Monitor BUN/SCr     ==================== GASTROINTESTINAL===================  Continue pepcid for GI prophylaxis  famotidine    Tablet 20 milliGRAM(s) Oral two times a day    Continue bowel regimen  polyethylene glycol 3350 17 Gram(s) Oral every 12 hours  senna Syrup 10 milliLiter(s) Oral at bedtime  sodium chloride 0.9% lock flush 10 milliLiter(s) IV Push every 1 hour PRN Pre/post blood products, medications, blood draw, and to maintain line patency      Continue tube feeds: goal: Glucerna bolus 300 Q6    =======================    ENDOCRINE  =====================  Continue glucose control,  dexAMETHasone  IVPB 20 milliGRAM(s) IV Intermittent daily  insulin lispro (HumaLOG) corrective regimen sliding scale   SubCutaneous every 6 hours  insulin NPH human recombinant 11 Unit(s) SubCutaneous every 6 hours    Continue hypothyroidism medication,  levothyroxine Injectable 50 MICROGram(s) IV Push at bedtime      ========================INFECTIOUS DISEASE================  +COVID  -Completed Plaquenil (3/27-4/1), Azithromycin (3/28-3/31)  -s/p Anakinra    4/16 decadron trial started.  No abx regimen currently.      Patient requires continuous monitoring with bedside rhythm monitoring, arterial line, pulse oximetry, ventilator monitoring and intermittent blood gas analysis.  Care plan discussed with ICU care team.  Patient remained critical; required more than usual ICU care team; I have spent 35 minutes providing non-routine ICU care, revaluated multiple times during the day.    By signing my name below, I, Krystin Archer, attest that this documentation has been prepared under the direction and in the presence of Sky Garcia PA.  Electronically signed: Rohan Pritchett, 04-20-20 @ 18:43    I, Sky Garcia, personally performed the services described in this documentation. all medical record entries made by the kevinibtrae were at my direction and in my presence. I have reviewed the chart and agree that the record reflects my personal performance and is accurate and complete  Electronically signed: Sky Garcia PA. 04-20-20 @ 18:43

## 2020-04-20 NOTE — PROGRESS NOTE ADULT - SUBJECTIVE AND OBJECTIVE BOX
CLARITA POWELL  MRN-[id number]  Patient is a 70y old  Male who presents with a chief complaint of respiratory distress, COVID 19 (19 Apr 2020 19:19)    HPI:  70M with PMH presents with T dm, hypothyroidism, hld, p/w cough, low grade fever x 2-3 days. Today patient had worsening dyspnea at rest as well as exertion which prompted to come to the ED. Patient otherwise denied chest pain, leg swelling. He denies prior lung disease. Denies any history of smoking.     In ED, patient was noted to be hypoxic on 6L NC to 89% and significant tachypnea. VS otherwise, afebrile, HR in 80s, SBP 150s. Labs notable for CBC wnl. CMP with mild elevated transaminases AST 65 and ALT 47. PAtient noted to be positive for COVID 19. Due to worsening respiratory distress patient was emergently intubated in the ER. (27 Mar 2020 21:38)      Surgery/Hospital course:  COVID 19 Positive  Admitted and Intubated 3/27  4/7 Patient proned  4/8 Unproned  4/9 Paralysis weaned off yesterday, unproned with maintained sats, tolerating diuresis  4/12 10 sec asystole, back after epi x1  4/13 CPAP  4/14- 4/16 unable to tolerate CPAP   4/16 decadron course started  4/17 Failed CPAP. Continue with steroid taper and supportive therapy. Palliative care consult called and appreciated. Family yet to decide on end of life and prior to making any advanced decisions would like to see if any improvement with steroid taper treatment therapy.    4/18 Continues to CPAP trial of 20/5.  Peep lowered to 5 from 6.  Unable to tolerate sedation wean due to increased agitation causing hypoxia.    REVIEW OF SYSTEMS:  Unable to obtain, vented     Vital Signs Last 24 Hrs  T(C): 37.8 (20 Apr 2020 04:00), Max: 37.8 (20 Apr 2020 04:00)  T(F): 100 (20 Apr 2020 04:00), Max: 100 (20 Apr 2020 04:00)  HR: 72 (20 Apr 2020 04:33) (59 - 89)  BP: --  BP(mean): --  RR: 30 (20 Apr 2020 04:00) (29 - 35)  SpO2: 97% (20 Apr 2020 04:33) (88% - 100%)    ============================I/O===========================   I&O's Detail    19 Apr 2020 07:01  -  20 Apr 2020 07:00  --------------------------------------------------------  IN:    dexmedetomidine Infusion: 4 mL    Glucerna 1.5: 1200 mL    ketamine Infusion.: 327.8 mL    norepinephrine Infusion: 219.7 mL  Total IN: 1751.5 mL    OUT:    Indwelling Catheter - Urethral: 1570 mL  Total OUT: 1570 mL    Total NET: 181.5 mL        ============================ LABS =========================                        8.4    10.16 )-----------( 286      ( 20 Apr 2020 01:35 )             28.4     04-20    143  |  105  |  29<H>  ----------------------------<  140<H>  4.3   |  28  |  0.43<L>    Ca    8.6      20 Apr 2020 01:35  Phos  2.8     04-20  Mg     2.0     04-20    TPro  5.7<L>  /  Alb  2.1<L>  /  TBili  0.2  /  DBili  x   /  AST  22  /  ALT  32  /  AlkPhos  111  04-20    LIVER FUNCTIONS - ( 20 Apr 2020 01:35 )  Alb: 2.1 g/dL / Pro: 5.7 g/dL / ALK PHOS: 111 U/L / ALT: 32 U/L / AST: 22 U/L / GGT: x           PT/INR - ( 20 Apr 2020 01:35 )   PT: 14.3 sec;   INR: 1.25 ratio         PTT - ( 20 Apr 2020 01:35 )  PTT:32.1 sec  ABG - ( 20 Apr 2020 01:13 )  pH, Arterial: 7.35  pH, Blood: x     /  pCO2: 59    /  pO2: 138   / HCO3: 32    / Base Excess: 5.7   /  SaO2: 98                  ======================Micro/Rad/Cardio=================  Culture: Reviewed   CXR: Reviewed  ======================================================  PAST MEDICAL & SURGICAL HISTORY:  Hyperlipidemia  Hypothyroid  No significant past surgical history    ====================ASSESSMENT ==============  COVID 19 Positive   3/27 Intubated   Hypoxic respiratory failure   Septic Shock  ARDS  Fluid overload    Plan:  ====================== NEUROLOGY=====================  Sedated with Klonopin, Precedex, Ketamine  Dilaudid and oxycodone for analgesia     acetaminophen    Suspension .. 650 milliGRAM(s) Oral every 6 hours PRN Temp greater or equal to 38.5C (101.3F)  clonazePAM  Tablet 2 milliGRAM(s) Oral every 8 hours  dexMEDEtomidine Infusion 1 MICROgram(s)/kG/Hr (19.3 mL/Hr) IV Continuous <Continuous>  HYDROmorphone  Injectable 1 milliGRAM(s) IV Push every 4 hours PRN Moderate Pain (4 - 6)  ketamine Infusion. 2 mG/kG/Hr (15.4 mL/Hr) IV Continuous <Continuous>  oxyCODONE    IR 20 milliGRAM(s) Oral every 6 hours    ==================== RESPIRATORY======================  On full vent support     Mechanical Ventilation:  Mode: AC/ CMV (Assist Control/ Continuous Mandatory Ventilation)  RR (machine): 30  TV (machine): 350  FiO2: 90  PEEP: 9  ITime: 1  MAP: 20  PIP: 39      ====================CARDIOVASCULAR==================  On pressor support for hypotension     norepinephrine Infusion 0.02 MICROgram(s)/kG/Min (2.89 mL/Hr) IV Continuous <Continuous>    ===================HEMATOLOGIC/ONC ===================  aspirin  chewable 81 milliGRAM(s) Oral daily  VTE prophylaxis, enoxaparin Injectable 40 milliGRAM(s) SubCutaneous two times a day    ===================== RENAL =========================  Continue monitoring urine output    ==================== GASTROINTESTINAL===================  Tolerating tube feeds, Glucerna bolus 300 [id number]     GI prophylaxis, famotidine    Tablet 20 milliGRAM(s) Oral two times a day  polyethylene glycol 3350 17 Gram(s) Oral every 12 hours  senna Syrup 10 milliLiter(s) Oral at bedtime  sodium chloride 0.9% lock flush 10 milliLiter(s) IV Push every 1 hour PRN Pre/post blood products, medications, blood draw, and to maintain line patency    =======================    ENDOCRINE  =====================  Glucose control,   insulin lispro (HumaLOG) corrective regimen sliding scale   SubCutaneous every 6 hours  insulin NPH human recombinant 11 Unit(s) SubCutaneous every 6 hours  levothyroxine Injectable 50 MICROGram(s) IV Push at bedtime    ========================INFECTIOUS DISEASE================  Completed COVID medications (Plaquenil, Vit C/Thiamine, Anakinra, Solumedrol)   C/w decadron for rising inflammatory markers \    dexAMETHasone  IVPB 20 milliGRAM(s) IV Intermittent daily      Patient requires continuous monitoring with bedside rhythm monitoring, pulse ox monitoring, and intermittent blood gas analysis. Care plan discussed with ICU care team. Patient remained critical and at risk for life threatening decompensation.    By signing my name below, I, Pretty Wallace, attest that this documentation has been prepared under the direction and in the presence of GERRY Colindres.  Electronically signed: Jonathan Lal, 04-20-20 @ 07:10    I, Rodríguez Leonardo, personally performed the services described in this documentation. all medical record entries made by the jonathan were at my direction and in my presence. I have reviewed the chart and agree that the record reflects my personal performance and is accurate and complete  Electronically signed: GERRY Colindres CLARITA POWELL  MRN-[id number]  Patient is a 70y old  Male who presents with a chief complaint of respiratory distress, COVID 19 (19 Apr 2020 19:19)    HPI:  70M with PMH presents with T dm, hypothyroidism, hld, p/w cough, low grade fever x 2-3 days. Today patient had worsening dyspnea at rest as well as exertion which prompted to come to the ED. Patient otherwise denied chest pain, leg swelling. He denies prior lung disease. Denies any history of smoking.     In ED, patient was noted to be hypoxic on 6L NC to 89% and significant tachypnea. VS otherwise, afebrile, HR in 80s, SBP 150s. Labs notable for CBC wnl. CMP with mild elevated transaminases AST 65 and ALT 47. PAtient noted to be positive for COVID 19. Due to worsening respiratory distress patient was emergently intubated in the ER. (27 Mar 2020 21:38)      Surgery/Hospital course:  COVID 19 Positive  Admitted and Intubated 3/27  4/7 Patient proned  4/8 Unproned  4/9 Paralysis weaned off yesterday, unproned with maintained sats, tolerating diuresis  4/12 10 sec asystole, back after epi x1  4/13 CPAP  4/14- 4/16 unable to tolerate CPAP   4/16 decadron course started  4/17 Failed CPAP. Continue with steroid taper and supportive therapy. Palliative care consult called and appreciated. Family yet to decide on end of life and prior to making any advanced decisions would like to see if any improvement with steroid taper treatment therapy.    4/18 Continues to CPAP trial of 20/5.  Peep lowered to 5 from 6.  Unable to tolerate sedation wean due to increased agitation causing hypoxia.    REVIEW OF SYSTEMS:  Unable to obtain, vented     Vital Signs Last 24 Hrs  T(C): 37.8 (20 Apr 2020 04:00), Max: 37.8 (20 Apr 2020 04:00)  T(F): 100 (20 Apr 2020 04:00), Max: 100 (20 Apr 2020 04:00)  HR: 72 (20 Apr 2020 04:33) (59 - 89)  BP: --  BP(mean): --  RR: 30 (20 Apr 2020 04:00) (29 - 35)  SpO2: 97% (20 Apr 2020 04:33) (88% - 100%)    ============================I/O===========================   I&O's Detail    19 Apr 2020 07:01  -  20 Apr 2020 07:00  --------------------------------------------------------  IN:    dexmedetomidine Infusion: 4 mL    Glucerna 1.5: 1200 mL    ketamine Infusion.: 327.8 mL    norepinephrine Infusion: 219.7 mL  Total IN: 1751.5 mL    OUT:    Indwelling Catheter - Urethral: 1570 mL  Total OUT: 1570 mL    Total NET: 181.5 mL        ============================ LABS =========================                        8.4    10.16 )-----------( 286      ( 20 Apr 2020 01:35 )             28.4     04-20    143  |  105  |  29<H>  ----------------------------<  140<H>  4.3   |  28  |  0.43<L>    Ca    8.6      20 Apr 2020 01:35  Phos  2.8     04-20  Mg     2.0     04-20    TPro  5.7<L>  /  Alb  2.1<L>  /  TBili  0.2  /  DBili  x   /  AST  22  /  ALT  32  /  AlkPhos  111  04-20    LIVER FUNCTIONS - ( 20 Apr 2020 01:35 )  Alb: 2.1 g/dL / Pro: 5.7 g/dL / ALK PHOS: 111 U/L / ALT: 32 U/L / AST: 22 U/L / GGT: x           PT/INR - ( 20 Apr 2020 01:35 )   PT: 14.3 sec;   INR: 1.25 ratio         PTT - ( 20 Apr 2020 01:35 )  PTT:32.1 sec  ABG - ( 20 Apr 2020 01:13 )  pH, Arterial: 7.35  pH, Blood: x     /  pCO2: 59    /  pO2: 138   / HCO3: 32    / Base Excess: 5.7   /  SaO2: 98                  ======================Micro/Rad/Cardio=================  Culture: Reviewed   CXR: Reviewed  ======================================================  PAST MEDICAL & SURGICAL HISTORY:  Hyperlipidemia  Hypothyroid  No significant past surgical history    ====================ASSESSMENT ==============  COVID 19 Positive   3/27 Intubated   Hypoxic respiratory failure   Septic Shock  ARDS  Fluid overload    Plan:  ====================== NEUROLOGY=====================  Sedated with Klonopin, Ketamine  Dilaudid and oxycodone for analgesia     acetaminophen    Suspension .. 650 milliGRAM(s) Oral every 6 hours PRN Temp greater or equal to 38.5C (101.3F)  clonazePAM  Tablet 2 milliGRAM(s) Oral every 8 hours  dexMEDEtomidine Infusion 1 MICROgram(s)/kG/Hr (19.3 mL/Hr) IV Continuous <Continuous>  HYDROmorphone  Injectable 1 milliGRAM(s) IV Push every 4 hours PRN Moderate Pain (4 - 6)  ketamine Infusion. 2 mG/kG/Hr (15.4 mL/Hr) IV Continuous <Continuous>  oxyCODONE    IR 20 milliGRAM(s) Oral every 6 hours    ==================== RESPIRATORY======================  On full vent support   High plateau pressures, decrease tidal volume/PEEP if tolerates    Mechanical Ventilation:  Mode: AC/ CMV (Assist Control/ Continuous Mandatory Ventilation)  RR (machine): 30  TV (machine): 350  FiO2: 90  PEEP: 9  ITime: 1  MAP: 20  PIP: 39      ====================CARDIOVASCULAR==================  On pressor support for hypotension     norepinephrine Infusion 0.02 MICROgram(s)/kG/Min (2.89 mL/Hr) IV Continuous <Continuous>    ===================HEMATOLOGIC/ONC ===================  aspirin  chewable 81 milliGRAM(s) Oral daily  VTE prophylaxis, enoxaparin Injectable 40 milliGRAM(s) SubCutaneous two times a day    ===================== RENAL =========================  Continue monitoring urine output    ==================== GASTROINTESTINAL===================  Tolerating tube feeds, Glucerna bolus 300 [id number]     GI prophylaxis, famotidine    Tablet 20 milliGRAM(s) Oral two times a day  polyethylene glycol 3350 17 Gram(s) Oral every 12 hours  senna Syrup 10 milliLiter(s) Oral at bedtime  sodium chloride 0.9% lock flush 10 milliLiter(s) IV Push every 1 hour PRN Pre/post blood products, medications, blood draw, and to maintain line patency    =======================    ENDOCRINE  =====================  Glucose control,   insulin lispro (HumaLOG) corrective regimen sliding scale   SubCutaneous every 6 hours  insulin NPH human recombinant 11 Unit(s) SubCutaneous every 6 hours  levothyroxine Injectable 50 MICROGram(s) IV Push at bedtime    ========================INFECTIOUS DISEASE================  Completed COVID medications (Plaquenil, Vit C/Thiamine, Anakinra, Solumedrol)   C/w decadron for rising inflammatory markers   undergoing eval for convalescent plasma tx.    dexAMETHasone  IVPB 20 milliGRAM(s) IV Intermittent daily      Patient requires continuous monitoring with bedside rhythm monitoring, pulse ox monitoring, and intermittent blood gas analysis. Care plan discussed with ICU care team. Patient remained critical and at risk for life threatening decompensation.    By signing my name below, I, Pretty Wallace, attest that this documentation has been prepared under the direction and in the presence of GERRY Colindres.  Electronically signed: Jonathan Lal, 04-20-20 @ 07:10    I, Rodríguez Leonardo, personally performed the services described in this documentation. all medical record entries made by the jonathan were at my direction and in my presence. I have reviewed the chart and agree that the record reflects my personal performance and is accurate and complete  Electronically signed: GERRY Colindres

## 2020-04-21 NOTE — CONSULT NOTE ADULT - SUBJECTIVE AND OBJECTIVE BOX
CC: trach     HPI: 70y with hLD and hypothyroid admitted with Covid requiring intubation upon admission to ED 3/27.  Fio2 80 and Peep of 7 and currently requiring pressor support. Pt is on lovenox 40 bid.  ENt called for trach consult. Pt unable to tolerate CPAP trials x3. Pt has been afebrile and does not have pacemaker.     PAST MEDICAL & SURGICAL HISTORY:  Hyperlipidemia  Hypothyroid  No significant past surgical history    Allergies    No Known Allergies    Intolerances      MEDICATIONS  (STANDING):  aspirin  chewable 81 milliGRAM(s) Oral daily  chlorhexidine 0.12% Liquid 15 milliLiter(s) Oral Mucosa every 12 hours  chlorhexidine 2% Cloths 1 Application(s) Topical <User Schedule>  clonazePAM  Tablet 2 milliGRAM(s) Oral every 8 hours  dexMEDEtomidine Infusion 1 MICROgram(s)/kG/Hr (19.3 mL/Hr) IV Continuous <Continuous>  enoxaparin Injectable 40 milliGRAM(s) SubCutaneous two times a day  famotidine    Tablet 20 milliGRAM(s) Oral two times a day  insulin lispro (HumaLOG) corrective regimen sliding scale   SubCutaneous every 6 hours  insulin NPH human recombinant 11 Unit(s) SubCutaneous every 6 hours  ketamine Infusion. 2 mG/kG/Hr (15.4 mL/Hr) IV Continuous <Continuous>  levothyroxine Injectable 50 MICROGram(s) IV Push at bedtime  norepinephrine Infusion 0.02 MICROgram(s)/kG/Min (2.89 mL/Hr) IV Continuous <Continuous>  oxyCODONE    IR 20 milliGRAM(s) Oral every 6 hours  petrolatum Ophthalmic Ointment 1 Application(s) Both EYES two times a day  polyethylene glycol 3350 17 Gram(s) Oral every 12 hours  senna Syrup 10 milliLiter(s) Oral at bedtime    MEDICATIONS  (PRN):  acetaminophen    Suspension .. 650 milliGRAM(s) Oral every 6 hours PRN Temp greater or equal to 38.5C (101.3F)  HYDROmorphone  Injectable 1 milliGRAM(s) IV Push every 4 hours PRN Moderate Pain (4 - 6)  sodium chloride 0.9% lock flush 10 milliLiter(s) IV Push every 1 hour PRN Pre/post blood products, medications, blood draw, and to maintain line patency      Social History: no tobacco, no etoh     Family history: Pt denies any sign FHx    ROS:   ENT: all negative except as noted in HPI   CV: denies palpitations  Pulm: denies SOB, cough, hemoptysis  GI: denies change in apetite, indigestion, n/v  : denies pertinent urinary symptoms, urgency  Neuro: denies numbness/tingling, loss of sensation  Psych: denies anxiety  MS: denies muscle weakness, instability  Heme: denies easy bruising or bleeding  Endo: denies heat/cold intolerance, excessive sweating  Vascular: denies LE edema    Vital Signs Last 24 Hrs  T(C): 37.4 (21 Apr 2020 16:00), Max: 37.7 (21 Apr 2020 08:00)  T(F): 99.3 (21 Apr 2020 16:00), Max: 99.9 (21 Apr 2020 08:00)  HR: 78 (21 Apr 2020 16:00) (66 - 88)  BP: --  BP(mean): --  RR: 41 (21 Apr 2020 16:00) (33 - 43)  SpO2: 92% (21 Apr 2020 16:00) (90% - 95%)                          8.4    9.41  )-----------( 291      ( 21 Apr 2020 00:50 )             27.6    04-21    139  |  102  |  26<H>  ----------------------------<  225<H>  4.5   |  30  |  0.37<L>    Ca    8.5      21 Apr 2020 00:50  Phos  2.5     04-21  Mg     2.0     04-21    TPro  5.7<L>  /  Alb  2.3<L>  /  TBili  0.2  /  DBili  x   /  AST  17  /  ALT  32  /  AlkPhos  128<H>  04-21   PT/INR - ( 20 Apr 2020 01:35 )   PT: 14.3 sec;   INR: 1.25 ratio         PTT - ( 20 Apr 2020 01:35 )  PTT:32.1 sec    PHYSICAL EXAM:  Gen: NAD  Skin: No rashes, bruises, or lesions  Head: Normocephalic, Atraumatic  Face: no edema, erythema, or fluctuance. Parotid glands soft without mass  Eyes: no scleral injection  Nose: Nares bilaterally patent, no discharge  Mouth: No Stridor / Drooling / Trismus.  Mucosa moist, tongue/uvula midline, oropharynx ETT in place   Neck: Flat, supple, no lymphadenopathy, trachea midline, no masses  Lymphatic: No lymphadenopathy  Resp: breathing easily, no stridor  CV: no peripheral edema/cyanosis  GI: nondistended   Peripheral vascular: no JVD or edema  Neuro: facial nerve intact, no facial droop              IMAGING/ADDITIONAL STUDIES:

## 2020-04-21 NOTE — CONSULT NOTE ADULT - ASSESSMENT
70y with ARDS requiring intubation upon admission 2/2 COVID, unable to tolerate CPAP trials. ENT called for trach consult, high Fio2 80 and peep 7, pt pending to be seen and booked for surgery.

## 2020-04-21 NOTE — PROGRESS NOTE ADULT - SUBJECTIVE AND OBJECTIVE BOX
CLARITA POWELL  MRN-[id number]  Patient is a 70y old  Male who presents with a chief complaint of respiratory distress, COVID 19 (20 Apr 2020 18:43)    HPI:  70M with PMH presents with T dm, hypothyroidism, hld, p/w cough, low grade fever x 2-3 days. Today patient had worsening dyspnea at rest as well as exertion which prompted to come to the ED. Patient otherwise denied chest pain, leg swelling. He denies prior lung disease. Denies any history of smoking.     In ED, patient was noted to be hypoxic on 6L NC to 89% and significant tachypnea. VS otherwise, afebrile, HR in 80s, SBP 150s. Labs notable for CBC wnl. CMP with mild elevated transaminases AST 65 and ALT 47. PAtient noted to be positive for COVID 19. Due to worsening respiratory distress patient was emergently intubated in the ER. (27 Mar 2020 21:38)      Surgery/Hospital course:  COVID 19 Positive  Admitted and Intubated 3/27  4/7 Patient proned  4/8 Unproned  4/9 Paralysis weaned off yesterday, unproned with maintained sats, tolerating diuresis  4/12 10 sec asystole, back after epi x1  4/13 CPAP  4/14- 4/16 unable to tolerate CPAP   4/16 decadron course started  4/17 Failed CPAP. Continue with steroid taper and supportive therapy. Palliative care consult called and appreciated. Family yet to decide on end of life and prior to making any advanced decisions would like to see if any improvement with steroid taper treatment therapy.    4/18 Continues to CPAP trial of 20/5.  Peep lowered to 5 from 6.  Unable to tolerate sedation wean due to increased agitation causing hypoxia.    REVIEW OF SYSTEMS:  Unable to obtain, vented     Vital Signs Last 24 Hrs  T(C): 36.3 (21 Apr 2020 04:00), Max: 36.8 (20 Apr 2020 08:00)  T(F): 97.3 (21 Apr 2020 04:00), Max: 98.2 (20 Apr 2020 08:00)  HR: 72 (21 Apr 2020 04:44) (63 - 80)  BP: --  BP(mean): --  RR: 42 (21 Apr 2020 04:00) (30 - 42)  SpO2: 95% (21 Apr 2020 04:44) (86% - 100%)    ============================I/O===========================   I&O's Detail    19 Apr 2020 07:01  -  20 Apr 2020 07:00  --------------------------------------------------------  IN:    dexmedetomidine Infusion: 4 mL    Glucerna 1.5: 1200 mL    ketamine Infusion.: 327.8 mL    norepinephrine Infusion: 219.7 mL  Total IN: 1751.5 mL    OUT:    Indwelling Catheter - Urethral: 1570 mL  Total OUT: 1570 mL    Total NET: 181.5 mL      20 Apr 2020 07:01  -  21 Apr 2020 06:35  --------------------------------------------------------  IN:    dexmedetomidine Infusion: 4 mL    Glucerna 1.5: 900 mL    ketamine Infusion.: 415.8 mL    norepinephrine Infusion: 317.9 mL  Total IN: 1637.7 mL    OUT:    Indwelling Catheter - Urethral: 1365 mL  Total OUT: 1365 mL    Total NET: 272.7 mL        ============================ LABS =========================                        8.4    9.41  )-----------( 291      ( 21 Apr 2020 00:50 )             27.6     04-21    139  |  102  |  26<H>  ----------------------------<  225<H>  4.5   |  30  |  0.37<L>    Ca    8.5      21 Apr 2020 00:50  Phos  2.5     04-21  Mg     2.0     04-21    TPro  5.7<L>  /  Alb  2.3<L>  /  TBili  0.2  /  DBili  x   /  AST  17  /  ALT  32  /  AlkPhos  128<H>  04-21    LIVER FUNCTIONS - ( 21 Apr 2020 00:50 )  Alb: 2.3 g/dL / Pro: 5.7 g/dL / ALK PHOS: 128 U/L / ALT: 32 U/L / AST: 17 U/L / GGT: x           PT/INR - ( 20 Apr 2020 01:35 )   PT: 14.3 sec;   INR: 1.25 ratio         PTT - ( 20 Apr 2020 01:35 )  PTT:32.1 sec  ABG - ( 21 Apr 2020 01:18 )  pH, Arterial: 7.38  pH, Blood: x     /  pCO2: 56    /  pO2: 88    / HCO3: 32    / Base Excess: 6.9   /  SaO2: 96                  ======================Micro/Rad/Cardio=================  Culture: Reviewed   CXR: Reviewed  ======================================================  PAST MEDICAL & SURGICAL HISTORY:  Hyperlipidemia  Hypothyroid  No significant past surgical history    ====================ASSESSMENT ==============  COVID 19 Positive   3/27 Intubated   Hypoxic respiratory failure   Septic Shock  ARDS  Fluid overload    Plan:  ====================== NEUROLOGY=====================  Sedated with Klonopin, Precedex, Ketamine   Dilaudid and oxycodone for analgesia     acetaminophen    Suspension .. 650 milliGRAM(s) Oral every 6 hours PRN Temp greater or equal to 38.5C (101.3F)  clonazePAM  Tablet 2 milliGRAM(s) Oral every 8 hours  dexMEDEtomidine Infusion 1 MICROgram(s)/kG/Hr (19.3 mL/Hr) IV Continuous <Continuous>  HYDROmorphone  Injectable 1 milliGRAM(s) IV Push every 4 hours PRN Moderate Pain (4 - 6)  ketamine Infusion. 2 mG/kG/Hr (15.4 mL/Hr) IV Continuous <Continuous>  oxyCODONE    IR 20 milliGRAM(s) Oral every 6 hours    ==================== RESPIRATORY======================  On full vent support,     Mechanical Ventilation:  Mode: AC/ CMV (Assist Control/ Continuous Mandatory Ventilation)  RR (machine): 30  TV (machine): 350  FiO2: 80  PEEP: 7  ITime: 1  MAP: 17  PIP: 36      ====================CARDIOVASCULAR==================  On pressor support for hypotension     norepinephrine Infusion 0.02 MICROgram(s)/kG/Min (2.89 mL/Hr) IV Continuous <Continuous>    ===================HEMATOLOGIC/ONC ===================  aspirin  chewable 81 milliGRAM(s) Oral daily  VTE prophylaxis, enoxaparin Injectable 40 milliGRAM(s) SubCutaneous two times a day    ===================== RENAL =========================  Continue monitoring urine output    ==================== GASTROINTESTINAL===================  Tolerating tube feeds, Glucerna bolus 300 [id number]     GI prophylaxis, famotidine    Tablet 20 milliGRAM(s) Oral two times a day  polyethylene glycol 3350 17 Gram(s) Oral every 12 hours  senna Syrup 10 milliLiter(s) Oral at bedtime  sodium chloride 0.9% lock flush 10 milliLiter(s) IV Push every 1 hour PRN Pre/post blood products, medications, blood draw, and to maintain line patency    =======================    ENDOCRINE  =====================  Glucose control,   insulin lispro (HumaLOG) corrective regimen sliding scale   SubCutaneous every 6 hours  insulin NPH human recombinant 11 Unit(s) SubCutaneous every 6 hours  levothyroxine Injectable 50 MICROGram(s) IV Push at bedtime    ========================INFECTIOUS DISEASE================  Completed COVID medications (Plaquenil, Vit C/Thiamine, Anakinra, Solumedrol)       Patient requires continuous monitoring with bedside rhythm monitoring, pulse ox monitoring, and intermittent blood gas analysis. Care plan discussed with ICU care team. Patient remained critical and at risk for life threatening decompensation.    By signing my name below, I, Pretty Wallace, attest that this documentation has been prepared under the direction and in the presence of GERRY Escalante.  Electronically signed: Jonathan Lal, 04-21-20 @ 06:35    I, Nasim Oliver, personally performed the services described in this documentation. all medical record entries made by the jonathan were at my direction and in my presence. I have reviewed the chart and agree that the record reflects my personal performance and is accurate and complete  Electronically signed: GERRY Escalante CLARITA POWELL  MRN-[id number]  Patient is a 70y old  Male who presents with a chief complaint of respiratory distress, COVID 19 (20 Apr 2020 18:43)    HPI:  70M with PMH presents with T dm, hypothyroidism, hld, p/w cough, low grade fever x 2-3 days. Today patient had worsening dyspnea at rest as well as exertion which prompted to come to the ED. Patient otherwise denied chest pain, leg swelling. He denies prior lung disease. Denies any history of smoking.     In ED, patient was noted to be hypoxic on 6L NC to 89% and significant tachypnea. VS otherwise, afebrile, HR in 80s, SBP 150s. Labs notable for CBC wnl. CMP with mild elevated transaminases AST 65 and ALT 47. PAtient noted to be positive for COVID 19. Due to worsening respiratory distress patient was emergently intubated in the ER. (27 Mar 2020 21:38)    Surgery/Hospital course:  COVID 19 Positive  Admitted and Intubated 3/27  4/7 Patient proned  4/8 Unproned  4/9 Paralysis weaned off yesterday, unproned with maintained sats, tolerating diuresis  4/12 10 sec asystole, back after epi x1  4/13 CPAP  4/14- 4/16 unable to tolerate CPAP   4/16 decadron course started  4/17 Failed CPAP. Continue with steroid taper and supportive therapy. Palliative care consult called and appreciated. Family yet to decide on end of life and prior to making any advanced decisions would like to see if any improvement with steroid taper treatment therapy.    4/18 Continues to CPAP trial of 20/5.  Peep lowered to 5 from 6.  Unable to tolerate sedation wean due to increased agitation causing hypoxia.    REVIEW OF SYSTEMS:  Unable to obtain, vented     Vital Signs Last 24 Hrs  T(C): 36.3 (21 Apr 2020 04:00), Max: 36.8 (20 Apr 2020 08:00)  T(F): 97.3 (21 Apr 2020 04:00), Max: 98.2 (20 Apr 2020 08:00)  HR: 72 (21 Apr 2020 04:44) (63 - 80)  BP: --  BP(mean): --  RR: 42 (21 Apr 2020 04:00) (30 - 42)  SpO2: 95% (21 Apr 2020 04:44) (86% - 100%)    ============================I/O===========================   I&O's Detail    19 Apr 2020 07:01  -  20 Apr 2020 07:00  --------------------------------------------------------  IN:    dexmedetomidine Infusion: 4 mL    Glucerna 1.5: 1200 mL    ketamine Infusion.: 327.8 mL    norepinephrine Infusion: 219.7 mL  Total IN: 1751.5 mL    OUT:    Indwelling Catheter - Urethral: 1570 mL  Total OUT: 1570 mL    Total NET: 181.5 mL      20 Apr 2020 07:01  -  21 Apr 2020 06:35  --------------------------------------------------------  IN:    dexmedetomidine Infusion: 4 mL    Glucerna 1.5: 900 mL    ketamine Infusion.: 415.8 mL    norepinephrine Infusion: 317.9 mL  Total IN: 1637.7 mL    OUT:    Indwelling Catheter - Urethral: 1365 mL  Total OUT: 1365 mL    Total NET: 272.7 mL    ============================ LABS =========================                        8.4    9.41  )-----------( 291      ( 21 Apr 2020 00:50 )             27.6     04-21    139  |  102  |  26<H>  ----------------------------<  225<H>  4.5   |  30  |  0.37<L>    Ca    8.5      21 Apr 2020 00:50  Phos  2.5     04-21  Mg     2.0     04-21    TPro  5.7<L>  /  Alb  2.3<L>  /  TBili  0.2  /  DBili  x   /  AST  17  /  ALT  32  /  AlkPhos  128<H>  04-21    LIVER FUNCTIONS - ( 21 Apr 2020 00:50 )  Alb: 2.3 g/dL / Pro: 5.7 g/dL / ALK PHOS: 128 U/L / ALT: 32 U/L / AST: 17 U/L / GGT: x           PT/INR - ( 20 Apr 2020 01:35 )   PT: 14.3 sec;   INR: 1.25 ratio         PTT - ( 20 Apr 2020 01:35 )  PTT:32.1 sec  ABG - ( 21 Apr 2020 01:18 )  pH, Arterial: 7.38  pH, Blood: x     /  pCO2: 56    /  pO2: 88    / HCO3: 32    / Base Excess: 6.9   /  SaO2: 96        ======================Micro/Rad/Cardio=================  Culture: Reviewed   CXR: Reviewed    ======================================================  PAST MEDICAL & SURGICAL HISTORY:  Hyperlipidemia  Hypothyroid  No significant past surgical history    ====================ASSESSMENT ==============  COVID 19 Positive   3/27 Intubated   Hypoxic respiratory failure   Septic Shock  ARDS  Fluid overload    Plan:  ====================== NEUROLOGY=====================  Sedated with Klonopin, Precedex, Ketamine   Dilaudid and oxycodone for analgesia     acetaminophen    Suspension .. 650 milliGRAM(s) Oral every 6 hours PRN Temp greater or equal to 38.5C (101.3F)  clonazePAM  Tablet 2 milliGRAM(s) Oral every 8 hours  dexMEDEtomidine Infusion 1 MICROgram(s)/kG/Hr (19.3 mL/Hr) IV Continuous <Continuous>  HYDROmorphone  Injectable 1 milliGRAM(s) IV Push every 4 hours PRN Moderate Pain (4 - 6)  ketamine Infusion. 2 mG/kG/Hr (15.4 mL/Hr) IV Continuous <Continuous>  oxyCODONE    IR 20 milliGRAM(s) Oral every 6 hours    ==================== RESPIRATORY======================  On full vent support, wean FiO2 as tolerated. CPAP weaning as tolerated.    Mechanical Ventilation:  Mode: AC/ CMV (Assist Control/ Continuous Mandatory Ventilation)  RR (machine): 30  TV (machine): 350  FiO2: 80  PEEP: 7  ITime: 1  MAP: 17  PIP: 36    ====================CARDIOVASCULAR==================  On pressor support for hypotension     norepinephrine Infusion 0.02 MICROgram(s)/kG/Min (2.89 mL/Hr) IV Continuous <Continuous>    ===================HEMATOLOGIC/ONC ===================  aspirin  chewable 81 milliGRAM(s) Oral daily  VTE prophylaxis, enoxaparin Injectable 40 milliGRAM(s) SubCutaneous two times a day    ===================== RENAL =========================  Continue monitoring urine output    ==================== GASTROINTESTINAL===================  Tolerating tube feeds, Glucerna bolus 300 [id number]     GI prophylaxis, famotidine    Tablet 20 milliGRAM(s) Oral two times a day  polyethylene glycol 3350 17 Gram(s) Oral every 12 hours  senna Syrup 10 milliLiter(s) Oral at bedtime  sodium chloride 0.9% lock flush 10 milliLiter(s) IV Push every 1 hour PRN Pre/post blood products, medications, blood draw, and to maintain line patency    =======================    ENDOCRINE  =====================  Glucose control, continue synthroid    insulin lispro (HumaLOG) corrective regimen sliding scale   SubCutaneous every 6 hours  insulin NPH human recombinant 11 Unit(s) SubCutaneous every 6 hours  levothyroxine Injectable 50 MICROGram(s) IV Push at bedtime    ========================INFECTIOUS DISEASE================  Completed COVID medications (Plaquenil, Vit C/Thiamine, Anakinra, Solumedrol)     I have spent 45 minutes of critical care time with this patient.    Patient requires continuous monitoring with bedside rhythm monitoring, pulse ox monitoring, and intermittent blood gas analysis. Care plan discussed with ICU care team. Patient remained critical and at risk for life threatening decompensation.    By signing my name below, I, Pretty Wallace, attest that this documentation has been prepared under the direction and in the presence of GERRY Escalante.  Electronically signed: Rohan Lal, 04-21-20 @ 06:35    I, Nasim Oliver, personally performed the services described in this documentation. all medical record entries made by the ekvin[employer] were at my direction and in my presence. I have reviewed the chart and agree that the record reflects my personal performance and is accurate and complete  Electronically signed: GERRY Escalante

## 2020-04-21 NOTE — PROGRESS NOTE ADULT - SUBJECTIVE AND OBJECTIVE BOX
CLARITA POWELL  MRN-[id number]  Patient is a 70y old  Male who presents with a chief complaint of respiratory distress, COVID 19 (21 Apr 2020 06:34)    HPI:  70M with PMH presents with T dm, hypothyroidism, hld, p/w cough, low grade fever x 2-3 days. Today patient had worsening dyspnea at rest as well as exertion which prompted to come to the ED. Patient otherwise denied chest pain, leg swelling. He denies prior lung disease. Denies any history of smoking.     In ED, patient was noted to be hypoxic on 6L NC to 89% and significant tachypnea. VS otherwise, afebrile, HR in 80s, SBP 150s. Labs notable for CBC wnl. CMP with mild elevated transaminases AST 65 and ALT 47. PAtient noted to be positive for COVID 19. Due to worsening respiratory distress patient was emergently intubated in the ER. (27 Mar 2020 21:38)      REASON FOR ADMISSION TO ICU:     INTERVAL HPI/ OVERNIGHT EVENTS:  COVID 19 Positive  Intubated 3/27  4/7 Patient proned  4/8 Unproned  4/9 Paralysis weaned off yesterday, unproned with maintained sats, tolerating diuresis  4/12 10 sec asystole, back after epi x1  4/13 CPAP    Today:      REVIEW OF SYSTEMS:  Unable to obtain, vented     Vital Signs Last 24 Hrs  T(C): 37.4 (21 Apr 2020 16:00), Max: 37.7 (21 Apr 2020 08:00)  T(F): 99.3 (21 Apr 2020 16:00), Max: 99.9 (21 Apr 2020 08:00)  HR: 78 (21 Apr 2020 16:00) (66 - 88)  BP: --  BP(mean): --  RR: 41 (21 Apr 2020 16:00) (33 - 43)  SpO2: 92% (21 Apr 2020 16:00) (90% - 95%)    ============================I/O===========================   I&O's Detail    20 Apr 2020 07:01  -  21 Apr 2020 07:00  --------------------------------------------------------  IN:    dexmedetomidine Infusion: 4 mL    Glucerna 1.5: 900 mL    ketamine Infusion.: 446.6 mL    norepinephrine Infusion: 336.6 mL  Total IN: 1687.2 mL    OUT:    Indwelling Catheter - Urethral: 1365 mL  Total OUT: 1365 mL    Total NET: 322.2 mL      21 Apr 2020 07:01  -  21 Apr 2020 18:35  --------------------------------------------------------  IN:    Glucerna 1.5: 300 mL    norepinephrine Infusion: 164.1 mL  Total IN: 464.1 mL    OUT:    Indwelling Catheter - Urethral: 775 mL  Total OUT: 775 mL    Total NET: -310.9 mL        ============================ LABS =========================                        8.4    9.41  )-----------( 291      ( 21 Apr 2020 00:50 )             27.6     04-21    139  |  102  |  26<H>  ----------------------------<  225<H>  4.5   |  30  |  0.37<L>    Ca    8.5      21 Apr 2020 00:50  Phos  2.5     04-21  Mg     2.0     04-21    TPro  5.7<L>  /  Alb  2.3<L>  /  TBili  0.2  /  DBili  x   /  AST  17  /  ALT  32  /  AlkPhos  128<H>  04-21    LIVER FUNCTIONS - ( 21 Apr 2020 00:50 )  Alb: 2.3 g/dL / Pro: 5.7 g/dL / ALK PHOS: 128 U/L / ALT: 32 U/L / AST: 17 U/L / GGT: x           PT/INR - ( 20 Apr 2020 01:35 )   PT: 14.3 sec;   INR: 1.25 ratio         PTT - ( 20 Apr 2020 01:35 )  PTT:32.1 sec  ABG - ( 21 Apr 2020 01:18 )  pH, Arterial: 7.38  pH, Blood: x     /  pCO2: 56    /  pO2: 88    / HCO3: 32    / Base Excess: 6.9   /  SaO2: 96                  ======================Micro/Rad/Cardio=================  Culture: Reviewed   CXR: Reviewed  ======================================================  PAST MEDICAL & SURGICAL HISTORY:  Hyperlipidemia  Hypothyroid  No significant past surgical history    ====================ASSESSMENT ==============  COVID 19 Positive   3/27 Intubated   Hypoxic respiratory failure   Septic Shock  ARDS  Fluid overload    Plan:  ====================== NEUROLOGY=====================  Continue to wean sedation as tolerated  Monitor and assess neuro status daily  oxycodone and dilaudid for pain control.       acetaminophen    Suspension .. 650 milliGRAM(s) Oral every 6 hours PRN Temp greater or equal to 38.5C (101.3F)  clonazePAM  Tablet 2 milliGRAM(s) Oral every 8 hours  dexMEDEtomidine Infusion 1 MICROgram(s)/kG/Hr (19.3 mL/Hr) IV Continuous <Continuous>  HYDROmorphone  Injectable 1 milliGRAM(s) IV Push every 4 hours PRN Moderate Pain (4 - 6)  ketamine Infusion. 2 mG/kG/Hr (15.4 mL/Hr) IV Continuous <Continuous>  oxyCODONE    IR 20 milliGRAM(s) Oral every 6 hours    ==================== RESPIRATORY======================  On full vent support.  Continue to do CPAP trials as tolerated  O2 sat goal >92%    Mechanical Ventilation:  Mode: AC/ CMV (Assist Control/ Continuous Mandatory Ventilation)  RR (machine): 30  TV (machine): 320  FiO2: 60  PEEP: 7  ITime: 1  MAP: 14  PIP: 35      ====================CARDIOVASCULAR==================  Wean Levo as tolerated  MAP goal > 65    norepinephrine Infusion 0.02 MICROgram(s)/kG/Min (2.89 mL/Hr) IV Continuous <Continuous>    ===================HEMATOLOGIC/ONC ===================  Continue Lovenox for VTE prophylaxis  Continue invasive hemodynamic monitoring.     aspirin  chewable 81 milliGRAM(s) Oral daily  enoxaparin Injectable 40 milliGRAM(s) SubCutaneous two times a day    ===================== RENAL =========================  Continue monitoring urine output  Monitor BUN/SCr  Monitor and replete lytes as needed    ==================== GASTROINTESTINAL===================  Tolerating tube feeds  Continue tube feeds: Glucerna 300 bolus Q6  Continue pepcid for GI prophylaxis  Senna syrup at bedtime for GI ppx.     famotidine    Tablet 20 milliGRAM(s) Oral two times a day  polyethylene glycol 3350 17 Gram(s) Oral every 12 hours  senna Syrup 10 milliLiter(s) Oral at bedtime  sodium chloride 0.9% lock flush 10 milliLiter(s) IV Push every 1 hour PRN Pre/post blood products, medications, blood draw, and to maintain line patency    =======================    ENDOCRINE  =====================  Glucose control, . Monitor CBGs, continue NPH and ISS as needed  Home synthroid    insulin lispro (HumaLOG) corrective regimen sliding scale   SubCutaneous every 6 hours  insulin NPH human recombinant 11 Unit(s) SubCutaneous every 6 hours  levothyroxine Injectable 50 MICROGram(s) IV Push at bedtime    ========================INFECTIOUS DISEASE================  +COVID  -Completed Plaquenil (3/27-4/1), Azithromycin (3/28-3/31)  -s/p Anakinra    Monitor fever curve        Patient requires continuous monitoring with bedside rhythm monitoring, pulse ox monitoring, and intermittent blood gas analysis. Care plan discussed with ICU care team. Patient remained critical and at risk for life threatning decompensation.    By signing my name below, I, Joan Richards , attest that this documentation has been prepared under the direction and in the presence of GERRY Denney.  Electronically signed: Rohan Seals, 04-21-20 @ 18:35    I, Travon Parmar, personally performed the services described in this documentation. all medical record entries made by the scribe were at my direction and in my presence. I have reviewed the chart and agree that the record reflects my personal performance and is accurate and complete  Electronically signed: GERRY Denney CLARITA POWELL  MRN-[id number]  Patient is a 70y old  Male who presents with a chief complaint of respiratory distress, COVID 19 (21 Apr 2020 06:34)    HPI:  70M with PMH presents with T dm, hypothyroidism, hld, p/w cough, low grade fever x 2-3 days. Today patient had worsening dyspnea at rest as well as exertion which prompted to come to the ED. Patient otherwise denied chest pain, leg swelling. He denies prior lung disease. Denies any history of smoking.     In ED, patient was noted to be hypoxic on 6L NC to 89% and significant tachypnea. VS otherwise, afebrile, HR in 80s, SBP 150s. Labs notable for CBC wnl. CMP with mild elevated transaminases AST 65 and ALT 47. PAtient noted to be positive for COVID 19. Due to worsening respiratory distress patient was emergently intubated in the ER. (27 Mar 2020 21:38)      REASON FOR ADMISSION TO ICU:     INTERVAL HPI/ OVERNIGHT EVENTS:  COVID 19 Positive  Intubated 3/27  4/7 Patient proned  4/8 Unproned  4/9 Paralysis weaned off yesterday, unproned with maintained sats, tolerating diuresis  4/12 Sedation weaned, followed commands, 10 sec asystole, back after epi x1  4/13 CPAP  4/15 Levo restarted  4/16 Decadron trial    O/N:  - Neuro status: grimaces occasionally to suction, relatively unchanged  - Ketamine weaned off in AM  - Levo weaned off this evening  - CPAP attempted during day, failed after 1 hour due to desaturation and increasing tachypnea (patient has repeatedly failed CPAP trials)  - On full vent support  - ENT consulted, plan for Trach potentially 4/23      REVIEW OF SYSTEMS:  Unable to obtain, vented     Vital Signs Last 24 Hrs  T(C): 37.4 (21 Apr 2020 16:00), Max: 37.7 (21 Apr 2020 08:00)  T(F): 99.3 (21 Apr 2020 16:00), Max: 99.9 (21 Apr 2020 08:00)  HR: 78 (21 Apr 2020 16:00) (66 - 88)  BP: --  BP(mean): --  RR: 41 (21 Apr 2020 16:00) (33 - 43)  SpO2: 92% (21 Apr 2020 16:00) (90% - 95%)    ============================I/O===========================   I&O's Detail    20 Apr 2020 07:01  -  21 Apr 2020 07:00  --------------------------------------------------------  IN:    dexmedetomidine Infusion: 4 mL    Glucerna 1.5: 900 mL    ketamine Infusion.: 446.6 mL    norepinephrine Infusion: 336.6 mL  Total IN: 1687.2 mL    OUT:    Indwelling Catheter - Urethral: 1365 mL  Total OUT: 1365 mL    Total NET: 322.2 mL      21 Apr 2020 07:01  -  21 Apr 2020 18:35  --------------------------------------------------------  IN:    Glucerna 1.5: 300 mL    norepinephrine Infusion: 164.1 mL  Total IN: 464.1 mL    OUT:    Indwelling Catheter - Urethral: 775 mL  Total OUT: 775 mL    Total NET: -310.9 mL        ============================ LABS =========================                        8.4    9.41  )-----------( 291      ( 21 Apr 2020 00:50 )             27.6     04-21    139  |  102  |  26<H>  ----------------------------<  225<H>  4.5   |  30  |  0.37<L>    Ca    8.5      21 Apr 2020 00:50  Phos  2.5     04-21  Mg     2.0     04-21    TPro  5.7<L>  /  Alb  2.3<L>  /  TBili  0.2  /  DBili  x   /  AST  17  /  ALT  32  /  AlkPhos  128<H>  04-21    LIVER FUNCTIONS - ( 21 Apr 2020 00:50 )  Alb: 2.3 g/dL / Pro: 5.7 g/dL / ALK PHOS: 128 U/L / ALT: 32 U/L / AST: 17 U/L / GGT: x           PT/INR - ( 20 Apr 2020 01:35 )   PT: 14.3 sec;   INR: 1.25 ratio         PTT - ( 20 Apr 2020 01:35 )  PTT:32.1 sec  ABG - ( 21 Apr 2020 01:18 )  pH, Arterial: 7.38  pH, Blood: x     /  pCO2: 56    /  pO2: 88    / HCO3: 32    / Base Excess: 6.9   /  SaO2: 96                  ======================Micro/Rad/Cardio=================  Culture: Reviewed   CXR: Reviewed  ======================================================  PAST MEDICAL & SURGICAL HISTORY:  Hyperlipidemia  Hypothyroid  No significant past surgical history    ====================ASSESSMENT ==============  COVID 19 Positive   3/27 Intubated   Hypoxic respiratory failure   Septic Shock  ARDS  Fluid overload    Plan:  ====================== NEUROLOGY=====================  Off sedation  Monitor and assess neuro status daily  oxycodone and dilaudid for pain control.       acetaminophen    Suspension .. 650 milliGRAM(s) Oral every 6 hours PRN Temp greater or equal to 38.5C (101.3F)  clonazePAM  Tablet 2 milliGRAM(s) Oral every 8 hours  HYDROmorphone  Injectable 1 milliGRAM(s) IV Push every 4 hours PRN Moderate Pain (4 - 6)  oxyCODONE    IR 20 milliGRAM(s) Oral every 6 hours    ==================== RESPIRATORY======================  On full vent support.  Continue to do CPAP trials as tolerated  O2 sat goal >92%  ENT consulted, plan for Trach potentially on 4/23    Mechanical Ventilation:  Mode: AC/ CMV (Assist Control/ Continuous Mandatory Ventilation)  RR (machine): 30  TV (machine): 320  FiO2: 50  PEEP: 5  ITime: 1  MAP: 14  PIP: 35      ====================CARDIOVASCULAR==================  Levo off  MAP goal > 65    ===================HEMATOLOGIC/ONC ===================  Continue Lovenox for VTE prophylaxis  Continue invasive hemodynamic monitoring.     aspirin  chewable 81 milliGRAM(s) Oral daily  enoxaparin Injectable 40 milliGRAM(s) SubCutaneous two times a day    ===================== RENAL =========================  Strict I/O monitoring  Monitor BUN/SCr  Monitor and replete lytes as needed    ==================== GASTROINTESTINAL===================  Continue tube feeds: Glucerna 300 bolus Q6, check residuals  Continue pepcid for GI prophylaxis  Senna syrup at bedtime for GI ppx.     famotidine    Tablet 20 milliGRAM(s) Oral two times a day  polyethylene glycol 3350 17 Gram(s) Oral every 12 hours  senna Syrup 10 milliLiter(s) Oral at bedtime  sodium chloride 0.9% lock flush 10 milliLiter(s) IV Push every 1 hour PRN Pre/post blood products, medications, blood draw, and to maintain line patency    =======================    ENDOCRINE  =====================  Glucose control, . Monitor CBGs, continue NPH and ISS as needed  Home synthroid    insulin lispro (HumaLOG) corrective regimen sliding scale   SubCutaneous every 6 hours  insulin NPH human recombinant 11 Unit(s) SubCutaneous every 6 hours  levothyroxine Injectable 50 MICROGram(s) IV Push at bedtime    ========================INFECTIOUS DISEASE================  +COVID  -Completed Plaquenil (3/27-4/1), Azithromycin (3/28-3/31)  -s/p Anakinra  -s/p trial of solumedrol    Afebrile  Trial of Decadron (4/22-  ) x 5 days  Monitor fever curve        Patient requires continuous monitoring with bedside rhythm monitoring, pulse ox monitoring, and intermittent blood gas analysis. Care plan discussed with ICU care team. Patient remained critical and at risk for life threatning decompensation.    By signing my name below, I, Joan Richards , attest that this documentation has been prepared under the direction and in the presence of GERRY Denney.  Electronically signed: Rohan Seals, 04-21-20 @ 18:35    I, Travon Parmar, personally performed the services described in this documentation. all medical record entries made by the kevinibe were at my direction and in my presence. I have reviewed the chart and agree that the record reflects my personal performance and is accurate and complete. I personally performed 45 minutes of total critical care time.  Electronically signed: GERRY Denney

## 2020-04-22 NOTE — CHART NOTE - NSCHARTNOTEFT_GEN_A_CORE
The trial, [id number]: Expanded Access to Convalescent Plasma for the Treatment of Patients with COVID-19, was discussed with LILIANA Mcintyre. Consent was witnessed by Parker Hanson NP.         During the consent process, the following was discussed:    •                    The fact that the study involves research  •                    The study schedule and procedures involved  •                    The main risks of the study, and the fact that all risks may not be known at this time  •                    New information that may affect the subject’s willingness to continue on the study will be presented as soon as it is available  •                    Benefits of participating  •                    Alternatives to participating  •                    Confidentiality  •                    Compensation for research-related injury  •                    Contacts for questions about the study or their rights while on the study  •                    The fact that the subject’s participation is voluntary – they can refuse or withdraw at any time without penalty or loss of benefits.      The LAR was given ample time to ask questions. All questions were answered to their satisfaction.       I spoke with the daughter over phone and the coordinator sent consent via email to LAR, daughter on 4/22/2020. The daughter responded to confirm receipt and confirmed the consent for her father. The signed consent was sent back to the coordinator. I signed as the consenting professional and a fully executed consent was sent back to the LAR via email.      Informed consent was obtained prior to any study procedures being performed. A copy of the signed consent and supplemental documentation was given to the subject.       Eligibility Criteria:  Patient has laboratory confirmed diagnosis of infection with SARS-CoV-2 on 3/ 27/ 2020 and admitted to an acute care facility for treatment of COVID-19 complications.       Patient has [ life threatening] COVID-19 with the following symptoms:  - Respiratory failure  - Septic shock       ABO was performed on  4/ 22/ 2020 and patient blood type confirmed as O +.       I have confirmed all eligibility are met for this patient to participate.       Convalescent plasma order has been completed, pending plasma from blood bank.

## 2020-04-22 NOTE — PROGRESS NOTE ADULT - SUBJECTIVE AND OBJECTIVE BOX
CLARITA POWELL  MRN-[id number]  Patient is a 70y old  Male who presents with a chief complaint of Acute hypoxic respiratory failure, COVID-19 (+) (21 Apr 2020 18:35)    HPI:  70M with PMH presents with T dm, hypothyroidism, hld, p/w cough, low grade fever x 2-3 days. Today patient had worsening dyspnea at rest as well as exertion which prompted to come to the ED. Patient otherwise denied chest pain, leg swelling. He denies prior lung disease. Denies any history of smoking.     In ED, patient was noted to be hypoxic on 6L NC to 89% and significant tachypnea. VS otherwise, afebrile, HR in 80s, SBP 150s. Labs notable for CBC wnl. CMP with mild elevated transaminases AST 65 and ALT 47. PAtient noted to be positive for COVID 19. Due to worsening respiratory distress patient was emergently intubated in the ER. (27 Mar 2020 21:38)      Surgery/Hospital course:  COVID 19 Positive  Admitted and Intubated 3/27  4/7 Patient proned  4/8 Unproned  4/9 Paralysis weaned off yesterday, unproned with maintained sats, tolerating diuresis  4/12 10 sec asystole, back after epi x1  4/13 CPAP  4/14- 4/16 unable to tolerate CPAP   4/16 decadron course started  4/17 Failed CPAP. Continue with steroid taper and supportive therapy. Palliative care consult called and appreciated. Family yet to decide on end of life and prior to making any advanced decisions would like to see if any improvement with steroid taper treatment therapy.    4/18 Continues to CPAP trial of 20/5.  Peep lowered to 5 from 6.  Unable to tolerate sedation wean due to increased agitation causing hypoxia.    REVIEW OF SYSTEMS:  Unable to obtain, vented     Vital Signs Last 24 Hrs  T(C): 37.3 (22 Apr 2020 04:00), Max: 37.9 (22 Apr 2020 00:00)  T(F): 99.1 (22 Apr 2020 04:00), Max: 100.2 (22 Apr 2020 00:00)  HR: 68 (22 Apr 2020 06:00) (68 - 98)  BP: --  BP(mean): --  RR: 33 (22 Apr 2020 06:00) (32 - 49)  SpO2: 96% (22 Apr 2020 06:00) (86% - 99%)    ============================I/O===========================   I&O's Detail    20 Apr 2020 07:01  -  21 Apr 2020 07:00  --------------------------------------------------------  IN:    dexmedetomidine Infusion: 4 mL    Glucerna 1.5: 900 mL    ketamine Infusion.: 446.6 mL    norepinephrine Infusion: 336.6 mL  Total IN: 1687.2 mL    OUT:    Indwelling Catheter - Urethral: 1365 mL  Total OUT: 1365 mL    Total NET: 322.2 mL      21 Apr 2020 07:01  -  22 Apr 2020 06:41  --------------------------------------------------------  IN:    Enteral Tube Flush: 100 mL    Glucerna 1.5: 900 mL    norepinephrine Infusion: 4.3 mL    norepinephrine Infusion: 178.6 mL  Total IN: 1182.9 mL    OUT:    Indwelling Catheter - Urethral: 1750 mL  Total OUT: 1750 mL    Total NET: -567.1 mL        ============================ LABS =========================                        8.2    7.56  )-----------( 317      ( 22 Apr 2020 00:53 )             26.8     04-22    139  |  99  |  25<H>  ----------------------------<  122<H>  4.6   |  32<H>  |  0.40<L>    Ca    8.3<L>      22 Apr 2020 00:53  Phos  2.3     04-22  Mg     2.0     04-22    TPro  5.9<L>  /  Alb  2.2<L>  /  TBili  0.2  /  DBili  x   /  AST  22  /  ALT  28  /  AlkPhos  139<H>  04-22    LIVER FUNCTIONS - ( 22 Apr 2020 00:53 )  Alb: 2.2 g/dL / Pro: 5.9 g/dL / ALK PHOS: 139 U/L / ALT: 28 U/L / AST: 22 U/L / GGT: x             ABG - ( 22 Apr 2020 00:41 )  pH, Arterial: 7.46  pH, Blood: x     /  pCO2: 49    /  pO2: 71    / HCO3: 34    / Base Excess: 9.6   /  SaO2: 95                  ======================Micro/Rad/Cardio=================  Culture: Reviewed   CXR: Reviewed  ======================================================  PAST MEDICAL & SURGICAL HISTORY:  Hyperlipidemia  Hypothyroid  No significant past surgical history    ====================ASSESSMENT ==============  COVID 19 Positive   3/27 Intubated   Hypoxic respiratory failure   Septic Shock  ARDS    Plan:  ====================== NEUROLOGY=====================  Sedated with Klonopin, Precedex, Ketamine   Dilaudid and oxycodone for analgesia     acetaminophen    Suspension .. 650 milliGRAM(s) Oral every 6 hours PRN Temp greater or equal to 38.5C (101.3F)  clonazePAM  Tablet 2 milliGRAM(s) Oral every 8 hours  dexMEDEtomidine Infusion 1 MICROgram(s)/kG/Hr (19.3 mL/Hr) IV Continuous <Continuous>  HYDROmorphone  Injectable 1 milliGRAM(s) IV Push every 4 hours PRN Moderate Pain (4 - 6)  ketamine Infusion. 2 mG/kG/Hr (15.4 mL/Hr) IV Continuous <Continuous>  oxyCODONE    IR 20 milliGRAM(s) Oral every 6 hours    ==================== RESPIRATORY======================  On full vent support     Mechanical Ventilation:  Mode: AC/ CMV (Assist Control/ Continuous Mandatory Ventilation)  RR (machine): 30  TV (machine): 320  FiO2: 60  PEEP: 5  ITime: 1  MAP: 15  PIP: 39      ====================CARDIOVASCULAR==================  On pressor support for hypotension     norepinephrine Infusion 0.04 MICROgram(s)/kG/Min (5.78 mL/Hr) IV Continuous <Continuous>    ===================HEMATOLOGIC/ONC ===================  aspirin  chewable 81 milliGRAM(s) Oral daily  VTE prophylaxis, enoxaparin Injectable 40 milliGRAM(s) SubCutaneous two times a day    ===================== RENAL =========================  Continue monitoring urine output    ==================== GASTROINTESTINAL===================  Tolerating tube feeds, Glucerna bolus 300 [id number]     GI prophylaxis, famotidine    Tablet 20 milliGRAM(s) Oral two times a day  polyethylene glycol 3350 17 Gram(s) Oral every 12 hours  senna Syrup 10 milliLiter(s) Oral at bedtime  sodium chloride 0.9% lock flush 10 milliLiter(s) IV Push every 1 hour PRN Pre/post blood products, medications, blood draw, and to maintain line patency    =======================    ENDOCRINE  =====================  Glucose control,   insulin lispro (HumaLOG) corrective regimen sliding scale   SubCutaneous every 6 hours  insulin NPH human recombinant 11 Unit(s) SubCutaneous every 6 hours  levothyroxine Injectable 50 MICROGram(s) IV Push at bedtime    ========================INFECTIOUS DISEASE================  Completed COVID medications (Plaquenil, Vit C/Thiamine, Anakinra, Solumedrol)   Trial of Decadron  x 5 days     dexAMETHasone  IVPB 10 milliGRAM(s) IV Intermittent daily        Patient requires continuous monitoring with bedside rhythm monitoring, pulse ox monitoring, and intermittent blood gas analysis. Care plan discussed with ICU care team. Patient remained critical and at risk for life threatening decompensation.    By signing my name below, IPretty, attest that this documentation has been prepared under the direction and in the presence of GERRY Escalante.  Electronically signed: Jonathan Lal, 04-22-20 @ 06:41    I, Nasim Oliver, personally performed the services described in this documentation. all medical record entries made by the jonathan were at my direction and in my presence. I have reviewed the chart and agree that the record reflects my personal performance and is accurate and complete  Electronically signed: GERRY Escalante CLARITA POWELL  MRN-[id number]  Patient is a 70y old  Male who presents with a chief complaint of Acute hypoxic respiratory failure, COVID-19 (+) (21 Apr 2020 18:35)    HPI:  70M with PMH presents with T dm, hypothyroidism, hld, p/w cough, low grade fever x 2-3 days. Today patient had worsening dyspnea at rest as well as exertion which prompted to come to the ED. Patient otherwise denied chest pain, leg swelling. He denies prior lung disease. Denies any history of smoking.     In ED, patient was noted to be hypoxic on 6L NC to 89% and significant tachypnea. VS otherwise, afebrile, HR in 80s, SBP 150s. Labs notable for CBC wnl. CMP with mild elevated transaminases AST 65 and ALT 47. PAtient noted to be positive for COVID 19. Due to worsening respiratory distress patient was emergently intubated in the ER. (27 Mar 2020 21:38)    Surgery/Hospital course:  COVID 19 Positive  Admitted and Intubated 3/27  4/7 Patient proned  4/8 Unproned  4/9 Paralysis weaned off yesterday, unproned with maintained sats, tolerating diuresis  4/12 10 sec asystole, back after epi x1  4/13 CPAP  4/14- 4/16 unable to tolerate CPAP   4/16 decadron course started  4/17 Failed CPAP. Continue with steroid taper and supportive therapy. Palliative care consult called and appreciated. Family yet to decide on end of life and prior to making any advanced decisions would like to see if any improvement with steroid taper treatment therapy.    4/18 Continues to CPAP trial of 20/5.  Peep lowered to 5 from 6.  Unable to tolerate sedation wean due to increased agitation causing hypoxia.  4/21 ketamine weaned off  4/22 COVID retested, 1U PRBC given for Hct 26 and pressor requirement    REVIEW OF SYSTEMS:  Unable to obtain, vented     Vital Signs Last 24 Hrs  T(C): 37.3 (22 Apr 2020 04:00), Max: 37.9 (22 Apr 2020 00:00)  T(F): 99.1 (22 Apr 2020 04:00), Max: 100.2 (22 Apr 2020 00:00)  HR: 68 (22 Apr 2020 06:00) (68 - 98)  BP: --  BP(mean): --  RR: 33 (22 Apr 2020 06:00) (32 - 49)  SpO2: 96% (22 Apr 2020 06:00) (86% - 99%)    ============================I/O===========================   I&O's Detail    20 Apr 2020 07:01  -  21 Apr 2020 07:00  --------------------------------------------------------  IN:    dexmedetomidine Infusion: 4 mL    Glucerna 1.5: 900 mL    ketamine Infusion.: 446.6 mL    norepinephrine Infusion: 336.6 mL  Total IN: 1687.2 mL    OUT:    Indwelling Catheter - Urethral: 1365 mL  Total OUT: 1365 mL    Total NET: 322.2 mL    21 Apr 2020 07:01  -  22 Apr 2020 06:41  --------------------------------------------------------  IN:    Enteral Tube Flush: 100 mL    Glucerna 1.5: 900 mL    norepinephrine Infusion: 4.3 mL    norepinephrine Infusion: 178.6 mL  Total IN: 1182.9 mL    OUT:    Indwelling Catheter - Urethral: 1750 mL  Total OUT: 1750 mL    Total NET: -567.1 mL    ============================ LABS =========================                        8.2    7.56  )-----------( 317      ( 22 Apr 2020 00:53 )             26.8     139  |  99  |  25<H>  ----------------------------<  122<H>  4.6   |  32<H>  |  0.40<L>    Ca    8.3<L>      22 Apr 2020 00:53  Phos  2.3     04-22  Mg     2.0     04-22    TPro  5.9<L>  /  Alb  2.2<L>  /  TBili  0.2  /  DBili  x   /  AST  22  /  ALT  28  /  AlkPhos  139<H>  04-22    LIVER FUNCTIONS - ( 22 Apr 2020 00:53 )  Alb: 2.2 g/dL / Pro: 5.9 g/dL / ALK PHOS: 139 U/L / ALT: 28 U/L / AST: 22 U/L / GGT: x           ABG - ( 22 Apr 2020 00:41 )  pH, Arterial: 7.46  pH, Blood: x     /  pCO2: 49    /  pO2: 71    / HCO3: 34    / Base Excess: 9.6   /  SaO2: 95        ======================Micro/Rad/Cardio=================  Culture: Reviewed   CXR: Reviewed    ======================================================  PAST MEDICAL & SURGICAL HISTORY:  Hyperlipidemia  Hypothyroid  No significant past surgical history    ====================ASSESSMENT ==============  COVID 19 Positive   3/27 Intubated   Hypoxic respiratory failure   Septic Shock  ARDS    Plan:  ====================== NEUROLOGY=====================  Sedated with Klonopin. Precedex started but d/c'ed 2/2 bradycardia. Restart ketamine if has vent desynchrony   Dilaudid and oxycodone for analgesia     acetaminophen    Suspension .. 650 milliGRAM(s) Oral every 6 hours PRN Temp greater or equal to 38.5C (101.3F)  clonazePAM  Tablet 2 milliGRAM(s) Oral every 8 hours  dexMEDEtomidine Infusion 1 MICROgram(s)/kG/Hr (19.3 mL/Hr) IV Continuous <Continuous>  HYDROmorphone  Injectable 1 milliGRAM(s) IV Push every 4 hours PRN Moderate Pain (4 - 6)  ketamine Infusion. 2 mG/kG/Hr (15.4 mL/Hr) IV Continuous <Continuous>  oxyCODONE    IR 20 milliGRAM(s) Oral every 6 hours    ==================== RESPIRATORY======================  On full vent support. CPAP on high pressure support as tolerated.    Mechanical Ventilation:  Mode: AC/ CMV (Assist Control/ Continuous Mandatory Ventilation)  RR (machine): 30  TV (machine): 320  FiO2: 60  PEEP: 5  ITime: 1  MAP: 15  PIP: 39    ====================CARDIOVASCULAR==================  On pressor support for hypotension     norepinephrine Infusion 0.04 MICROgram(s)/kG/Min (5.78 mL/Hr) IV Continuous <Continuous>    ===================HEMATOLOGIC/ONC ===================  aspirin  chewable 81 milliGRAM(s) Oral daily  VTE prophylaxis, enoxaparin Injectable 40 milliGRAM(s) SubCutaneous two times a day    ===================== RENAL =========================  Continue monitoring urine output    ==================== GASTROINTESTINAL===================  Tolerating tube feeds, Glucerna bolus 300 [id number]     GI prophylaxis, famotidine    Tablet 20 milliGRAM(s) Oral two times a day  polyethylene glycol 3350 17 Gram(s) Oral every 12 hours  senna Syrup 10 milliLiter(s) Oral at bedtime  sodium chloride 0.9% lock flush 10 milliLiter(s) IV Push every 1 hour PRN Pre/post blood products, medications, blood draw, and to maintain line patency    =======================    ENDOCRINE  =====================  Glucose control, continue synthroid  insulin lispro (HumaLOG) corrective regimen sliding scale   SubCutaneous every 6 hours  insulin NPH human recombinant 11 Unit(s) SubCutaneous every 6 hours  levothyroxine Injectable 50 MICROGram(s) IV Push at bedtime    ========================INFECTIOUS DISEASE================  Completed COVID medications (Plaquenil, Vit C/Thiamine, Anakinra, Solumedrol)   Trial of Decadron  x 5 days     dexAMETHasone  IVPB 10 milliGRAM(s) IV Intermittent daily    I have spent 45 minutes of critical care time with this patient.    Patient requires continuous monitoring with bedside rhythm monitoring, pulse ox monitoring, and intermittent blood gas analysis. Care plan discussed with ICU care team. Patient remained critical and at risk for life threatening decompensation.    By signing my name below, I, Pretty Wallace, attest that this documentation has been prepared under the direction and in the presence of GERRY Escalante.  Electronically signed: Rohan Lal, 04-22-20 @ 06:41    I, Nasim Oliver, personally performed the services described in this documentation. all medical record entries made by the kevinibtrae were at my direction and in my presence. I have reviewed the chart and agree that the record reflects my personal performance and is accurate and complete  Electronically signed: GERRY Escalante CLARITA POWELL  MRN-[id number]  Patient is a 70y old  Male who presents with a chief complaint of Acute hypoxic respiratory failure, COVID-19 (+) (21 Apr 2020 18:35)    HPI:  70M with PMH presents with T dm, hypothyroidism, hld, p/w cough, low grade fever x 2-3 days. Today patient had worsening dyspnea at rest as well as exertion which prompted to come to the ED. Patient otherwise denied chest pain, leg swelling. He denies prior lung disease. Denies any history of smoking.     In ED, patient was noted to be hypoxic on 6L NC to 89% and significant tachypnea. VS otherwise, afebrile, HR in 80s, SBP 150s. Labs notable for CBC wnl. CMP with mild elevated transaminases AST 65 and ALT 47. PAtient noted to be positive for COVID 19. Due to worsening respiratory distress patient was emergently intubated in the ER. (27 Mar 2020 21:38)    Surgery/Hospital course:  COVID 19 Positive  Admitted and Intubated 3/27  4/7 Patient proned  4/8 Unproned  4/9 Paralysis weaned off yesterday, unproned with maintained sats, tolerating diuresis  4/12 10 sec asystole, back after epi x1  4/13 CPAP  4/14- 4/16 unable to tolerate CPAP   4/16 decadron course started  4/17 Failed CPAP. Continue with steroid taper and supportive therapy. Palliative care consult called and appreciated. Family yet to decide on end of life and prior to making any advanced decisions would like to see if any improvement with steroid taper treatment therapy.    4/18 Continues to CPAP trial of 20/5.  Peep lowered to 5 from 6.  Unable to tolerate sedation wean due to increased agitation causing hypoxia.  4/21 ketamine weaned off  4/22 COVID retested, 1U PRBC given for Hct 26 and pressor requirement    REVIEW OF SYSTEMS:  Unable to obtain, vented     Vital Signs Last 24 Hrs  T(C): 37.3 (22 Apr 2020 04:00), Max: 37.9 (22 Apr 2020 00:00)  T(F): 99.1 (22 Apr 2020 04:00), Max: 100.2 (22 Apr 2020 00:00)  HR: 68 (22 Apr 2020 06:00) (68 - 98)  BP: --  BP(mean): --  RR: 33 (22 Apr 2020 06:00) (32 - 49)  SpO2: 96% (22 Apr 2020 06:00) (86% - 99%)    ============================I/O===========================   I&O's Detail    20 Apr 2020 07:01  -  21 Apr 2020 07:00  --------------------------------------------------------  IN:    dexmedetomidine Infusion: 4 mL    Glucerna 1.5: 900 mL    ketamine Infusion.: 446.6 mL    norepinephrine Infusion: 336.6 mL  Total IN: 1687.2 mL    OUT:    Indwelling Catheter - Urethral: 1365 mL  Total OUT: 1365 mL    Total NET: 322.2 mL    21 Apr 2020 07:01  -  22 Apr 2020 06:41  --------------------------------------------------------  IN:    Enteral Tube Flush: 100 mL    Glucerna 1.5: 900 mL    norepinephrine Infusion: 4.3 mL    norepinephrine Infusion: 178.6 mL  Total IN: 1182.9 mL    OUT:    Indwelling Catheter - Urethral: 1750 mL  Total OUT: 1750 mL    Total NET: -567.1 mL    ============================ LABS =========================                        8.2    7.56  )-----------( 317      ( 22 Apr 2020 00:53 )             26.8     139  |  99  |  25<H>  ----------------------------<  122<H>  4.6   |  32<H>  |  0.40<L>    Ca    8.3<L>      22 Apr 2020 00:53  Phos  2.3     04-22  Mg     2.0     04-22    TPro  5.9<L>  /  Alb  2.2<L>  /  TBili  0.2  /  DBili  x   /  AST  22  /  ALT  28  /  AlkPhos  139<H>  04-22    LIVER FUNCTIONS - ( 22 Apr 2020 00:53 )  Alb: 2.2 g/dL / Pro: 5.9 g/dL / ALK PHOS: 139 U/L / ALT: 28 U/L / AST: 22 U/L / GGT: x           ABG - ( 22 Apr 2020 00:41 )  pH, Arterial: 7.46  pH, Blood: x     /  pCO2: 49    /  pO2: 71    / HCO3: 34    / Base Excess: 9.6   /  SaO2: 95        ======================Micro/Rad/Cardio=================  Culture: Reviewed   CXR: Reviewed    ======================================================  PAST MEDICAL & SURGICAL HISTORY:  Hyperlipidemia  Hypothyroid  No significant past surgical history    ====================ASSESSMENT ==============  COVID 19 Positive   3/27 Intubated   Hypoxic respiratory failure   Septic Shock  ARDS    Plan:  ====================== NEUROLOGY=====================  Sedated with Klonopin. Precedex started but d/c'ed 2/2 bradycardia. Restart ketamine if has vent desynchrony. Continue to monitor for neuro status.  Dilaudid and oxycodone for analgesia     acetaminophen    Suspension .. 650 milliGRAM(s) Oral every 6 hours PRN Temp greater or equal to 38.5C (101.3F)  clonazePAM  Tablet 2 milliGRAM(s) Oral every 8 hours  dexMEDEtomidine Infusion 1 MICROgram(s)/kG/Hr (19.3 mL/Hr) IV Continuous <Continuous>  HYDROmorphone  Injectable 1 milliGRAM(s) IV Push every 4 hours PRN Moderate Pain (4 - 6)  ketamine Infusion. 2 mG/kG/Hr (15.4 mL/Hr) IV Continuous <Continuous>  oxyCODONE    IR 20 milliGRAM(s) Oral every 6 hours    ==================== RESPIRATORY======================  On full vent support. CPAP on high pressure support as tolerated. COVID test will be resent in anticipation for trach with ENT Friday, 4/24 @ 13:30.    Mechanical Ventilation:  Mode: AC/ CMV (Assist Control/ Continuous Mandatory Ventilation)  RR (machine): 30  TV (machine): 320  FiO2: 60  PEEP: 5  ITime: 1  MAP: 15  PIP: 39    ====================CARDIOVASCULAR==================  On pressor support for hypotension     norepinephrine Infusion 0.04 MICROgram(s)/kG/Min (5.78 mL/Hr) IV Continuous <Continuous>    ===================HEMATOLOGIC/ONC ===================  aspirin  chewable 81 milliGRAM(s) Oral daily  VTE prophylaxis, enoxaparin Injectable 40 milliGRAM(s) SubCutaneous two times a day    ===================== RENAL =========================  Continue monitoring urine output    ==================== GASTROINTESTINAL===================  Tolerating tube feeds, Glucerna bolus 300 [id number]     GI prophylaxis, famotidine    Tablet 20 milliGRAM(s) Oral two times a day  polyethylene glycol 3350 17 Gram(s) Oral every 12 hours  senna Syrup 10 milliLiter(s) Oral at bedtime  sodium chloride 0.9% lock flush 10 milliLiter(s) IV Push every 1 hour PRN Pre/post blood products, medications, blood draw, and to maintain line patency    =======================    ENDOCRINE  =====================  Glucose control, continue synthroid  insulin lispro (HumaLOG) corrective regimen sliding scale   SubCutaneous every 6 hours  insulin NPH human recombinant 11 Unit(s) SubCutaneous every 6 hours  levothyroxine Injectable 50 MICROGram(s) IV Push at bedtime    ========================INFECTIOUS DISEASE================  Completed COVID medications (Plaquenil, Vit C/Thiamine, Anakinra, Solumedrol)   Trial of Decadron  x 5 days     dexAMETHasone  IVPB 10 milliGRAM(s) IV Intermittent daily    I have spent 45 minutes of critical care time with this patient.    Patient requires continuous monitoring with bedside rhythm monitoring, pulse ox monitoring, and intermittent blood gas analysis. Care plan discussed with ICU care team. Patient remained critical and at risk for life threatening decompensation.    By signing my name below, I, Pretty Wallace, attest that this documentation has been prepared under the direction and in the presence of GERRY Escalante.  Electronically signed: Rohan Lal, 04-22-20 @ 06:41    I, Nasim Oliver, personally performed the services described in this documentation. all medical record entries made by the kevinibe were at my direction and in my presence. I have reviewed the chart and agree that the record reflects my personal performance and is accurate and complete  Electronically signed: GERRY Escalante

## 2020-04-22 NOTE — CHART NOTE - NSCHARTNOTEFT_GEN_A_CORE
Pt booked for bedside tracheostomy this Friday 4/24 at 1:30 pm    Please obtain repeat COVID19 PCR today     -Pt to be NPO tomorrow night after midnight  -Please obtain updated CXR, T&S, Labs, EKG  -Will obtain consent from next of kin

## 2020-04-22 NOTE — PROGRESS NOTE ADULT - SUBJECTIVE AND OBJECTIVE BOX
CLARITA POWELL  MRN-[id number]  Patient is a 70y old  Male who presents with a chief complaint of respiratory distress, COVID 19 (22 Apr 2020 06:41)    HPI:  70M with PMH presents with T dm, hypothyroidism, hld, p/w cough, low grade fever x 2-3 days. Today patient had worsening dyspnea at rest as well as exertion which prompted to come to the ED. Patient otherwise denied chest pain, leg swelling. He denies prior lung disease. Denies any history of smoking.     In ED, patient was noted to be hypoxic on 6L NC to 89% and significant tachypnea. VS otherwise, afebrile, HR in 80s, SBP 150s. Labs notable for CBC wnl. CMP with mild elevated transaminases AST 65 and ALT 47. PAtient noted to be positive for COVID 19. Due to worsening respiratory distress patient was emergently intubated in the ER. (27 Mar 2020 21:38)      Surgery/Hospital course:  COVID 19 Positive  Admitted and Intubated 3/27  4/7 Patient proned  4/8 Unproned  4/9 Paralysis weaned off yesterday, unproned with maintained sats, tolerating diuresis  4/12 10 sec asystole, back after epi x1  4/13 CPAP  4/14- 4/16 unable to tolerate CPAP   4/16 decadron course started  4/17 Failed CPAP. Continue with steroid taper and supportive therapy. Palliative care consult called and appreciated. Family yet to decide on end of life and prior to making any advanced decisions would like to see if any improvement with steroid taper treatment therapy.    4/18 Continues to CPAP trial of 20/5.  Peep lowered to 5 from 6.  Unable to tolerate sedation wean due to increased agitation causing hypoxia.  4/21 ketamine weaned off  4/22 COVID retested, 1U PRBC given for Hct 26 and pressor requirement    Today:      ============================I/O===========================   I&O's Detail    21 Apr 2020 07:01  -  22 Apr 2020 07:00  --------------------------------------------------------  IN:    Enteral Tube Flush: 100 mL    Glucerna 1.5: 900 mL    norepinephrine Infusion: 8.6 mL    norepinephrine Infusion: 178.6 mL  Total IN: 1187.2 mL    OUT:    Indwelling Catheter - Urethral: 1750 mL  Total OUT: 1750 mL    Total NET: -562.8 mL      22 Apr 2020 07:01  -  22 Apr 2020 18:48  --------------------------------------------------------  IN:    dexmedetomidine Infusion: 27 mL    Glucerna 1.5: 350 mL    norepinephrine Infusion: 21.5 mL    Packed Red Blood Cells: 350 mL  Total IN: 748.5 mL    OUT:    Indwelling Catheter - Urethral: 400 mL  Total OUT: 400 mL    Total NET: 348.5 mL        ============================ LABS =========================                        8.2    7.56  )-----------( 317      ( 22 Apr 2020 00:53 )             26.8     04-22    139  |  99  |  25<H>  ----------------------------<  122<H>  4.6   |  32<H>  |  0.40<L>    Ca    8.3<L>      22 Apr 2020 00:53  Phos  2.3     04-22  Mg     2.0     04-22    TPro  5.9<L>  /  Alb  2.2<L>  /  TBili  0.2  /  DBili  x   /  AST  22  /  ALT  28  /  AlkPhos  139<H>  04-22    LIVER FUNCTIONS - ( 22 Apr 2020 00:53 )  Alb: 2.2 g/dL / Pro: 5.9 g/dL / ALK PHOS: 139 U/L / ALT: 28 U/L / AST: 22 U/L / GGT: x             ABG - ( 22 Apr 2020 18:17 )  pH, Arterial: 7.41  pH, Blood: x     /  pCO2: 56    /  pO2: 76    / HCO3: 35    / Base Excess: 9.3   /  SaO2: 95                  ======================Micro/Rad/Cardio=================  Culture: Reviewed   CXR: Reviewed  ======================================================  PAST MEDICAL & SURGICAL HISTORY:  Hyperlipidemia  Hypothyroid  No significant past surgical history    ====================ASSESSMENT ==============  COVID 19 Positive   3/27 Intubated   Hypoxic respiratory failure   Septic Shock  ARDS      Plan:  ====================== NEUROLOGY=====================  Sedated with Klonopin. Precedex started but d/c'ed 2/2 bradycardia. Restart ketamine for vent desynchrony. Continue to monitor for neuro status.  Dilaudid and oxycodone for analgesia     acetaminophen    Suspension .. 650 milliGRAM(s) Oral every 6 hours PRN Temp greater or equal to 38.5C (101.3F)  clonazePAM  Tablet 2 milliGRAM(s) Oral every 8 hours  dexMEDEtomidine Infusion 1 MICROgram(s)/kG/Hr (19.3 mL/Hr) IV Continuous <Continuous>  HYDROmorphone  Injectable 1 milliGRAM(s) IV Push every 4 hours PRN Moderate Pain (4 - 6)  ketamine Infusion. 2 mG/kG/Hr (15.4 mL/Hr) IV Continuous <Continuous>  oxyCODONE    IR 20 milliGRAM(s) Oral every 6 hours      Continue to wean sedation/paralytics as tolerated  ==================== RESPIRATORY======================  On full vent support. CPAP weaning as tolerated  Continue to wean FiO2 (goal to 30%, then wean PEEP as tolerated)  O2 sat goal >92%    COVID test will be resent in anticipation for trach with ENT Friday, 4/24 @ 13:30.    Mechanical Ventilation:  Mode: AC/ CMV (Assist Control/ Continuous Mandatory Ventilation)  RR (machine): 30  TV (machine): 320  FiO2: 60  PEEP: 5  ITime: 1  MAP: 11  PIP: 33    ====================CARDIOVASCULAR==================  On pressor support for hypotension  Wean vasopressors as tolerated, MAP goal 65-75    DOBUTamine Infusion 2.5 MICROgram(s)/kG/Min (5.78 mL/Hr) IV Continuous <Continuous>  norepinephrine Infusion 0.04 MICROgram(s)/kG/Min (5.78 mL/Hr) IV Continuous <Continuous>    ===================HEMATOLOGIC/ONC ===================  Continue Lovenox for VTE prophylaxis    aspirin  chewable 81 milliGRAM(s) Oral daily  enoxaparin Injectable 40 milliGRAM(s) SubCutaneous two times a day    ===================== RENAL =========================  Continue monitoring urine output  Monitor BUN/SCr    ==================== GASTROINTESTINAL===================  Tolerating tube feeds, Glucerna bolus 300 [id number]   Continue pepcid for GI prophylaxis    famotidine    Tablet 20 milliGRAM(s) Oral two times a day  polyethylene glycol 3350 17 Gram(s) Oral every 12 hours  senna Syrup 10 milliLiter(s) Oral at bedtime  sodium chloride 0.9% lock flush 10 milliLiter(s) IV Push every 1 hour PRN Pre/post blood products, medications, blood draw, and to maintain line patency    =======================    ENDOCRINE  =====================  Glucose control, continue synthroid    dexAMETHasone  IVPB 10 milliGRAM(s) IV Intermittent daily  insulin lispro (HumaLOG) corrective regimen sliding scale   SubCutaneous every 6 hours  insulin NPH human recombinant 11 Unit(s) SubCutaneous every 6 hours  levothyroxine Injectable 50 MICROGram(s) IV Push at bedtime    ========================INFECTIOUS DISEASE================  Completed COVID medications (Plaquenil, Vit C/Thiamine, Anakinra, Solumedrol)   Trial of Decadron  x 5 days          Patient requires continuous monitoring with bedside rhythm monitoring, arterial line, pulse oximetry, ventilator monitoring and intermittent blood gas analysis.  Care plan discussed with ICU care team.  Patient remained critical; required more than usual ICU care team; I have spent 35 minutes providing non-routine ICU care, revaluated multiple times during the day.    By signing my name below, I, Krystin Archer, attest that this documentation has been prepared under the direction and in the presence of Nora Saini PA.  Electronically signed: Jonathan Pritchett, 04-22-20 @ 18:48    I, Nora Saini, personally performed the services described in this documentation. all medical record entries made by the jonathan were at my direction and in my presence. I have reviewed the chart and agree that the record reflects my personal performance and is accurate and complete  Electronically signed: Nora Saini PA. 04-22-20 @ 18:48 CLARITA POWELL  MRN-[id number]  Patient is a 70y old  Male who presents with a chief complaint of respiratory distress, COVID 19 (22 Apr 2020 06:41)    HPI:  70M with PMH presents with T dm, hypothyroidism, hld, p/w cough, low grade fever x 2-3 days. Today patient had worsening dyspnea at rest as well as exertion which prompted to come to the ED. Patient otherwise denied chest pain, leg swelling. He denies prior lung disease. Denies any history of smoking.     In ED, patient was noted to be hypoxic on 6L NC to 89% and significant tachypnea. VS otherwise, afebrile, HR in 80s, SBP 150s. Labs notable for CBC wnl. CMP with mild elevated transaminases AST 65 and ALT 47. PAtient noted to be positive for COVID 19. Due to worsening respiratory distress patient was emergently intubated in the ER. (27 Mar 2020 21:38)      Surgery/Hospital course:  COVID 19 Positive  Admitted and Intubated 3/27  4/7 Patient proned  4/8 Unproned  4/9 Paralysis weaned off yesterday, unproned with maintained sats, tolerating diuresis  4/12 10 sec asystole, back after epi x1  4/13 CPAP  4/14- 4/16 unable to tolerate CPAP   4/16 decadron course started  4/17 Failed CPAP. Continue with steroid taper and supportive therapy. Palliative care consult called and appreciated. Family yet to decide on end of life and prior to making any advanced decisions would like to see if any improvement with steroid taper treatment therapy.    4/18 Continues to CPAP trial of 20/5.  Peep lowered to 5 from 6.  Unable to tolerate sedation wean due to increased agitation causing hypoxia.  4/21 ketamine weaned off  4/22 COVID retested, 1U PRBC given for Hct 26 and pressor requirement; Precedex started, but dc'd for bradycardia (pt had asystolic pause on precedex previously)    Today: Approved for convalescent plasma.      ============================I/O===========================   I&O's Detail    21 Apr 2020 07:01  -  22 Apr 2020 07:00  --------------------------------------------------------  IN:    Enteral Tube Flush: 100 mL    Glucerna 1.5: 900 mL    norepinephrine Infusion: 8.6 mL    norepinephrine Infusion: 178.6 mL  Total IN: 1187.2 mL    OUT:    Indwelling Catheter - Urethral: 1750 mL  Total OUT: 1750 mL    Total NET: -562.8 mL      22 Apr 2020 07:01  -  22 Apr 2020 18:48  --------------------------------------------------------  IN:    dexmedetomidine Infusion: 27 mL    Glucerna 1.5: 350 mL    norepinephrine Infusion: 21.5 mL    Packed Red Blood Cells: 350 mL  Total IN: 748.5 mL    OUT:    Indwelling Catheter - Urethral: 400 mL  Total OUT: 400 mL    Total NET: 348.5 mL        ============================ LABS =========================                        8.2    7.56  )-----------( 317      ( 22 Apr 2020 00:53 )             26.8     04-22    139  |  99  |  25<H>  ----------------------------<  122<H>  4.6   |  32<H>  |  0.40<L>    Ca    8.3<L>      22 Apr 2020 00:53  Phos  2.3     04-22  Mg     2.0     04-22    TPro  5.9<L>  /  Alb  2.2<L>  /  TBili  0.2  /  DBili  x   /  AST  22  /  ALT  28  /  AlkPhos  139<H>  04-22    LIVER FUNCTIONS - ( 22 Apr 2020 00:53 )  Alb: 2.2 g/dL / Pro: 5.9 g/dL / ALK PHOS: 139 U/L / ALT: 28 U/L / AST: 22 U/L / GGT: x             ABG - ( 22 Apr 2020 18:17 )  pH, Arterial: 7.41  pH, Blood: x     /  pCO2: 56    /  pO2: 76    / HCO3: 35    / Base Excess: 9.3   /  SaO2: 95          ======================Micro/Rad/Cardio=================  Culture: Reviewed   CXR: Reviewed  ======================================================  PAST MEDICAL & SURGICAL HISTORY:  Hyperlipidemia  Hypothyroid  No significant past surgical history    ====================ASSESSMENT ==============  COVID 19 Positive   3/27 Intubated   Hypoxic respiratory failure   Septic Shock  ARDS      Plan:  ====================== NEUROLOGY=====================  Sedated with Klonopin. Precedex started but d/c'ed 2/2 bradycardia. Restart ketamine for vent desynchrony. Continue to monitor for neuro status.  Dilaudid and oxycodone for analgesia     acetaminophen    Suspension .. 650 milliGRAM(s) Oral every 6 hours PRN Temp greater or equal to 38.5C (101.3F)  clonazePAM  Tablet 2 milliGRAM(s) Oral every 8 hours  dexMEDEtomidine Infusion 1 MICROgram(s)/kG/Hr (19.3 mL/Hr) IV Continuous <Continuous>  HYDROmorphone  Injectable 1 milliGRAM(s) IV Push every 4 hours PRN Moderate Pain (4 - 6)  ketamine Infusion. 2 mG/kG/Hr (15.4 mL/Hr) IV Continuous <Continuous>  oxyCODONE    IR 20 milliGRAM(s) Oral every 6 hours      Continue to wean sedation/paralytics as tolerated  ==================== RESPIRATORY======================  On full vent support.  For trach tomorrow. CPAP weaning as tolerated  Continue to wean FiO2 (goal to 30%, then wean PEEP as tolerated)  O2 sat goal >92%    COVID test resent.    Mechanical Ventilation:  Mode: AC/ CMV (Assist Control/ Continuous Mandatory Ventilation)  RR (machine): 30  TV (machine): 320  FiO2: 60  PEEP: 5  ITime: 1  MAP: 11  PIP: 33    ====================CARDIOVASCULAR==================  On pressor support for hypotension.  Wean vasopressors as tolerated, MAP goal 65-75    DOBUTamine Infusion 2.5 MICROgram(s)/kG/Min (5.78 mL/Hr) IV Continuous <Continuous>  norepinephrine Infusion 0.04 MICROgram(s)/kG/Min (5.78 mL/Hr) IV Continuous <Continuous>    ===================HEMATOLOGIC/ONC ===================  Continue Lovenox for VTE prophylaxis.    aspirin  chewable 81 milliGRAM(s) Oral daily  enoxaparin Injectable 40 milliGRAM(s) SubCutaneous two times a day    ===================== RENAL =========================  Continue monitoring urine output.  Monitor and replete electrolytes as tolerated.    ==================== GASTROINTESTINAL===================  Tolerating tube feeds, Glucerna bolus 300 [id number]   Continue pepcid for GI prophylaxis.    famotidine    Tablet 20 milliGRAM(s) Oral two times a day  polyethylene glycol 3350 17 Gram(s) Oral every 12 hours  senna Syrup 10 milliLiter(s) Oral at bedtime  sodium chloride 0.9% lock flush 10 milliLiter(s) IV Push every 1 hour PRN Pre/post blood products, medications, blood draw, and to maintain line patency    =======================    ENDOCRINE  =====================  Glucose control, continue synthroid.    dexAMETHasone  IVPB 10 milliGRAM(s) IV Intermittent daily  insulin lispro (HumaLOG) corrective regimen sliding scale   SubCutaneous every 6 hours  insulin NPH human recombinant 11 Unit(s) SubCutaneous every 6 hours  levothyroxine Injectable 50 MICROGram(s) IV Push at bedtime    ========================INFECTIOUS DISEASE================  Completed COVID medications (Plaquenil, Vit C/Thiamine, Anakinra, Solumedrol)   Trial of Decadron  x 5 days.         Patient requires continuous monitoring with bedside rhythm monitoring, arterial line, pulse oximetry, ventilator monitoring and intermittent blood gas analysis.  Care plan discussed with ICU care team.  Patient remained critical; required more than usual ICU care team; I have spent 35 minutes providing non-routine ICU care, revaluated multiple times during the day.    By signing my name below, I, Krystin Archer, attest that this documentation has been prepared under the direction and in the presence of Nora Saini PA.  Electronically signed: Rohan Pritchett, 04-22-20 @ 18:48    I, Nora Saini, personally performed the services described in this documentation. all medical record entries made by the scribe were at my direction and in my presence. I have reviewed the chart and agree that the record reflects my personal performance and is accurate and complete  Electronically signed: Nora Saini PA. 04-22-20 @ 18:48

## 2020-04-22 NOTE — CHART NOTE - NSCHARTNOTEFT_GEN_A_CORE
Spoke with Nasim GARRETT.  Plan for trach on Friday if Covid -.  Palliative care will sign off as goals are established.  Please reconsult if necessary.

## 2020-04-23 NOTE — PROGRESS NOTE ADULT - SUBJECTIVE AND OBJECTIVE BOX
CLARITA POWELL  MRN-[id number]  Patient is a 70y old  Male who presents with a chief complaint of respiratory distress, COVID 19 (23 Apr 2020 00:50)    HPI:  70M with PMH presents with T dm, hypothyroidism, hld, p/w cough, low grade fever x 2-3 days. Today patient had worsening dyspnea at rest as well as exertion which prompted to come to the ED. Patient otherwise denied chest pain, leg swelling. He denies prior lung disease. Denies any history of smoking.     In ED, patient was noted to be hypoxic on 6L NC to 89% and significant tachypnea. VS otherwise, afebrile, HR in 80s, SBP 150s. Labs notable for CBC wnl. CMP with mild elevated transaminases AST 65 and ALT 47. PAtient noted to be positive for COVID 19. Due to worsening respiratory distress patient was emergently intubated in the ER. (27 Mar 2020 21:38)      Surgery/Hospital course:  COVID 19 Positive  Admitted and Intubated 3/27  4/7 Patient proned  4/8 Unproned  4/9 Paralysis weaned off yesterday, unproned with maintained sats, tolerating diuresis  4/12 10 sec asystole, back after epi x1  4/13 CPAP  4/14- 4/16 unable to tolerate CPAP   4/16 decadron course started  4/17 Failed CPAP. Continue with steroid taper and supportive therapy. Palliative care consult called and appreciated. Family yet to decide on end of life and prior to making any advanced decisions would like to see if any improvement with steroid taper treatment therapy.    4/18 Continues to CPAP trial of 20/5.  Peep lowered to 5 from 6.  Unable to tolerate sedation wean due to increased agitation causing hypoxia.  4/21 ketamine weaned off  4/22 COVID retested, 1U PRBC given for Hct 26 and pressor requirement; Precedex started, but dc'd for bradycardia (pt had asystolic pause on precedex previously)    REVIEW OF SYSTEMS:  Unable to obtain, vented     Vital Signs Last 24 Hrs  T(C): 36.4 (23 Apr 2020 04:00), Max: 36.9 (23 Apr 2020 00:00)  T(F): 97.5 (23 Apr 2020 04:00), Max: 98.4 (23 Apr 2020 00:00)  HR: 73 (23 Apr 2020 06:00) (51 - 94)  BP: --  BP(mean): --  RR: 32 (23 Apr 2020 06:00) (30 - 38)  SpO2: 92% (23 Apr 2020 06:00) (88% - 97%)    ============================I/O===========================   I&O's Detail    21 Apr 2020 07:01  -  22 Apr 2020 07:00  --------------------------------------------------------  IN:    Enteral Tube Flush: 100 mL    Glucerna 1.5: 900 mL    norepinephrine Infusion: 178.6 mL    norepinephrine Infusion: 8.6 mL  Total IN: 1187.2 mL    OUT:    Indwelling Catheter - Urethral: 1750 mL  Total OUT: 1750 mL    Total NET: -562.8 mL      22 Apr 2020 07:01  -  23 Apr 2020 06:49  --------------------------------------------------------  IN:    dexmedetomidine Infusion: 27 mL    DOBUTamine Infusion: 87 mL    Enteral Tube Flush: 220 mL    Glucerna 1.5: 650 mL    IV PiggyBack: 50 mL    ketamine Infusion.: 138.6 mL    norepinephrine Infusion: 191.7 mL    Packed Red Blood Cells: 350 mL  Total IN: 1714.3 mL    OUT:    Indwelling Catheter - Urethral: 1450 mL  Total OUT: 1450 mL    Total NET: 264.3 mL        ============================ LABS =========================                        8.9    8.18  )-----------( 331      ( 23 Apr 2020 00:47 )             29.4     04-23    136  |  99  |  30<H>  ----------------------------<  156<H>  4.7   |  29  |  0.37<L>    Ca    8.4      23 Apr 2020 00:47  Phos  3.2     04-23  Mg     2.1     04-23    TPro  5.9<L>  /  Alb  1.9<L>  /  TBili  0.2  /  DBili  x   /  AST  30  /  ALT  34  /  AlkPhos  158<H>  04-23    LIVER FUNCTIONS - ( 23 Apr 2020 00:47 )  Alb: 1.9 g/dL / Pro: 5.9 g/dL / ALK PHOS: 158 U/L / ALT: 34 U/L / AST: 30 U/L / GGT: x           PT/INR - ( 23 Apr 2020 00:47 )   PT: 13.7 sec;   INR: 1.19 ratio         PTT - ( 23 Apr 2020 00:47 )  PTT:31.3 sec  ABG - ( 23 Apr 2020 00:40 )  pH, Arterial: 7.40  pH, Blood: x     /  pCO2: 57    /  pO2: 72    / HCO3: 34    / Base Excess: 8.9   /  SaO2: 94                  ======================Micro/Rad/Cardio=================  Culture: Reviewed   CXR: Reviewed  ======================================================  PAST MEDICAL & SURGICAL HISTORY:  Hyperlipidemia  Hypothyroid  No significant past surgical history    ====================ASSESSMENT ==============  COVID 19 Positive   3/27 Intubated   Hypoxic respiratory failure   Septic Shock  ARDS    Plan:  ====================== NEUROLOGY=====================  Sedated with Klonopin, Ketamine   Dilaudid and oxycodone for analgesia     acetaminophen    Suspension .. 650 milliGRAM(s) Oral every 6 hours PRN Temp greater or equal to 38.5C (101.3F)  clonazePAM  Tablet 2 milliGRAM(s) Oral every 8 hours  HYDROmorphone  Injectable 1 milliGRAM(s) IV Push every 4 hours PRN Moderate Pain (4 - 6)  ketamine Infusion. 2 mG/kG/Hr (15.4 mL/Hr) IV Continuous <Continuous>  oxyCODONE    IR 20 milliGRAM(s) Oral every 6 hours    ==================== RESPIRATORY======================  On full vent support     Mechanical Ventilation:  Mode: AC/ CMV (Assist Control/ Continuous Mandatory Ventilation)  RR (machine): 30  TV (machine): 320  FiO2: 50  PEEP: 5  ITime: 1  MAP: 12  PIP: 41      ====================CARDIOVASCULAR==================  Inotropic support   On pressor support for hypotension    DOBUTamine Infusion 2.5 MICROgram(s)/kG/Min (5.78 mL/Hr) IV Continuous <Continuous>  norepinephrine Infusion 0.04 MICROgram(s)/kG/Min (5.78 mL/Hr) IV Continuous <Continuous>    ===================HEMATOLOGIC/ONC ===================  aspirin  chewable 81 milliGRAM(s) Oral daily  VTE prophylaxis, enoxaparin Injectable 40 milliGRAM(s) SubCutaneous two times a day    ===================== RENAL =========================  Continue monitoring urine output    ==================== GASTROINTESTINAL===================  Tolerating tube feeds, Glucerna bolus 300 [id number]     GI prophylaxis, famotidine    Tablet 20 milliGRAM(s) Oral two times a day  polyethylene glycol 3350 17 Gram(s) Oral every 12 hours  senna Syrup 10 milliLiter(s) Oral at bedtime  sodium chloride 0.9% lock flush 10 milliLiter(s) IV Push every 1 hour PRN Pre/post blood products, medications, blood draw, and to maintain line patency    =======================    ENDOCRINE  =====================  Glucose control,   insulin lispro (HumaLOG) corrective regimen sliding scale   SubCutaneous every 6 hours  insulin NPH human recombinant 11 Unit(s) SubCutaneous every 6 hours  levothyroxine Injectable 50 MICROGram(s) IV Push at bedtime    ========================INFECTIOUS DISEASE================  Completed COVID medications (Plaquenil, Vit C/Thiamine, Anakinra, Solumedrol)   Trial of Decadron  x 5 days.    dexAMETHasone  IVPB 10 milliGRAM(s) IV Intermittent daily      Patient requires continuous monitoring with bedside rhythm monitoring, pulse ox monitoring, and intermittent blood gas analysis. Care plan discussed with ICU care team. Patient remained critical and at risk for life threatening decompensation.    By signing my name below, I, Pretty Wallace, attest that this documentation has been prepared under the direction and in the presence of GERRY Champagne.  Electronically signed: Jonathan Lal, 04-23-20 @ 06:49    I, Sky Garcia, personally performed the services described in this documentation. all medical record entries made by the jonathan were at my direction and in my presence. I have reviewed the chart and agree that the record reflects my personal performance and is accurate and complete  Electronically signed: GERRY Champagne CLARITA POWELL  MRN-[id number]  Patient is a 70y old  Male who presents with a chief complaint of respiratory distress, COVID 19 (23 Apr 2020 00:50)    HPI:  70M with PMH presents with T dm, hypothyroidism, hld, p/w cough, low grade fever x 2-3 days. Today patient had worsening dyspnea at rest as well as exertion which prompted to come to the ED. Patient otherwise denied chest pain, leg swelling. He denies prior lung disease. Denies any history of smoking.     In ED, patient was noted to be hypoxic on 6L NC to 89% and significant tachypnea. VS otherwise, afebrile, HR in 80s, SBP 150s. Labs notable for CBC wnl. CMP with mild elevated transaminases AST 65 and ALT 47. PAtient noted to be positive for COVID 19. Due to worsening respiratory distress patient was emergently intubated in the ER. (27 Mar 2020 21:38)      Surgery/Hospital course:  COVID 19 Positive  Admitted and Intubated 3/27  4/7 Patient proned  4/8 Unproned  4/9 Paralysis weaned off yesterday, unproned with maintained sats, tolerating diuresis  4/12 10 sec asystole, back after epi x1  4/13 CPAP  4/14- 4/16 unable to tolerate CPAP   4/16 decadron course started  4/17 Failed CPAP. Continue with steroid taper and supportive therapy. Palliative care consult called and appreciated. Family yet to decide on end of life and prior to making any advanced decisions would like to see if any improvement with steroid taper treatment therapy.    4/18 Continues to CPAP trial of 20/5.  Peep lowered to 5 from 6.  Unable to tolerate sedation wean due to increased agitation causing hypoxia.  4/21 ketamine weaned off  4/22 COVID retested, 1U PRBC given for Hct 26 and pressor requirement; Precedex started, but dc'd for bradycardia (pt had asystolic pause on precedex previously)    REVIEW OF SYSTEMS:  Unable to obtain, vented     Vital Signs Last 24 Hrs  T(C): 36.4 (23 Apr 2020 04:00), Max: 36.9 (23 Apr 2020 00:00)  T(F): 97.5 (23 Apr 2020 04:00), Max: 98.4 (23 Apr 2020 00:00)  HR: 73 (23 Apr 2020 06:00) (51 - 94)  BP: --  BP(mean): --  RR: 32 (23 Apr 2020 06:00) (30 - 38)  SpO2: 92% (23 Apr 2020 06:00) (88% - 97%)    ============================I/O===========================   I&O's Detail    21 Apr 2020 07:01  -  22 Apr 2020 07:00  --------------------------------------------------------  IN:    Enteral Tube Flush: 100 mL    Glucerna 1.5: 900 mL    norepinephrine Infusion: 178.6 mL    norepinephrine Infusion: 8.6 mL  Total IN: 1187.2 mL    OUT:    Indwelling Catheter - Urethral: 1750 mL  Total OUT: 1750 mL    Total NET: -562.8 mL      22 Apr 2020 07:01  -  23 Apr 2020 06:49  --------------------------------------------------------  IN:    dexmedetomidine Infusion: 27 mL    DOBUTamine Infusion: 87 mL    Enteral Tube Flush: 220 mL    Glucerna 1.5: 650 mL    IV PiggyBack: 50 mL    ketamine Infusion.: 138.6 mL    norepinephrine Infusion: 191.7 mL    Packed Red Blood Cells: 350 mL  Total IN: 1714.3 mL    OUT:    Indwelling Catheter - Urethral: 1450 mL  Total OUT: 1450 mL    Total NET: 264.3 mL        ============================ LABS =========================                        8.9    8.18  )-----------( 331      ( 23 Apr 2020 00:47 )             29.4     04-23    136  |  99  |  30<H>  ----------------------------<  156<H>  4.7   |  29  |  0.37<L>    Ca    8.4      23 Apr 2020 00:47  Phos  3.2     04-23  Mg     2.1     04-23    TPro  5.9<L>  /  Alb  1.9<L>  /  TBili  0.2  /  DBili  x   /  AST  30  /  ALT  34  /  AlkPhos  158<H>  04-23    LIVER FUNCTIONS - ( 23 Apr 2020 00:47 )  Alb: 1.9 g/dL / Pro: 5.9 g/dL / ALK PHOS: 158 U/L / ALT: 34 U/L / AST: 30 U/L / GGT: x           PT/INR - ( 23 Apr 2020 00:47 )   PT: 13.7 sec;   INR: 1.19 ratio         PTT - ( 23 Apr 2020 00:47 )  PTT:31.3 sec  ABG - ( 23 Apr 2020 00:40 )  pH, Arterial: 7.40  pH, Blood: x     /  pCO2: 57    /  pO2: 72    / HCO3: 34    / Base Excess: 8.9   /  SaO2: 94                  ======================Micro/Rad/Cardio=================  Culture: Reviewed   CXR: Reviewed  ======================================================  PAST MEDICAL & SURGICAL HISTORY:  Hyperlipidemia  Hypothyroid  No significant past surgical history    ====================ASSESSMENT ==============  COVID 19 Positive   3/27 Intubated   Hypoxic respiratory failure   Septic Shock  ARDS    Plan:  ====================== NEUROLOGY=====================  Sedated with Klonopin, Ketamine   Dilaudid and oxycodone for analgesia     acetaminophen    Suspension .. 650 milliGRAM(s) Oral every 6 hours PRN Temp greater or equal to 38.5C (101.3F)  clonazePAM  Tablet 2 milliGRAM(s) Oral every 8 hours  HYDROmorphone  Injectable 1 milliGRAM(s) IV Push every 4 hours PRN Moderate Pain (4 - 6)  ketamine Infusion. 2 mG/kG/Hr (15.4 mL/Hr) IV Continuous <Continuous>  oxyCODONE    IR 20 milliGRAM(s) Oral every 6 hours    ==================== RESPIRATORY======================  On full vent support   Plan for trach today     Mechanical Ventilation:  Mode: AC/ CMV (Assist Control/ Continuous Mandatory Ventilation)  RR (machine): 30  TV (machine): 320  FiO2: 50  PEEP: 5  ITime: 1  MAP: 12  PIP: 41      ====================CARDIOVASCULAR==================  SR 60-70s, systolic -110  Inotropic support for chronotropy   On pressor support for hypotension    DOBUTamine Infusion 2.5 MICROgram(s)/kG/Min (5.78 mL/Hr) IV Continuous <Continuous>  norepinephrine Infusion 0.04 MICROgram(s)/kG/Min (5.78 mL/Hr) IV Continuous <Continuous>    ===================HEMATOLOGIC/ONC ===================  aspirin  chewable 81 milliGRAM(s) Oral daily  VTE prophylaxis, enoxaparin Injectable 40 milliGRAM(s) SubCutaneous two times a day    ===================== RENAL =========================  Continue monitoring urine output, antidiuresing 75-100cc/hr   Bun/Cr normal, 30/0.37     ==================== GASTROINTESTINAL===================  Tolerating tube feeds, Glucerna bolus 300 [id number]     GI prophylaxis, famotidine    Tablet 20 milliGRAM(s) Oral two times a day  polyethylene glycol 3350 17 Gram(s) Oral every 12 hours  senna Syrup 10 milliLiter(s) Oral at bedtime  sodium chloride 0.9% lock flush 10 milliLiter(s) IV Push every 1 hour PRN Pre/post blood products, medications, blood draw, and to maintain line patency    =======================    ENDOCRINE  =====================  Glucose control,   insulin lispro (HumaLOG) corrective regimen sliding scale   SubCutaneous every 6 hours  insulin NPH human recombinant 11 Unit(s) SubCutaneous every 6 hours  levothyroxine Injectable 50 MICROGram(s) IV Push at bedtime    ========================INFECTIOUS DISEASE================  Completed COVID medications (Plaquenil, Vit C/Thiamine, Anakinra, Solumedrol)   Eligible for convalescent plasma will f/u blood bank   Trial of Decadron  x 5 days.    dexAMETHasone  IVPB 10 milliGRAM(s) IV Intermittent daily      Patient requires continuous monitoring with bedside rhythm monitoring, pulse ox monitoring, and intermittent blood gas analysis. Care plan discussed with ICU care team. Patient remained critical and at risk for life threatening decompensation.    By signing my name below, I, Pretty Wallace, attest that this documentation has been prepared under the direction and in the presence of GERRY Champagne.  Electronically signed: Rohan Lal, 04-23-20 @ 06:49    I, Sky Garcia, personally performed the services described in this documentation. all medical record entries made by the kevinibtrae were at my direction and in my presence. I have reviewed the chart and agree that the record reflects my personal performance and is accurate and complete  Electronically signed: GERRY Champagne

## 2020-04-23 NOTE — PROGRESS NOTE ADULT - SUBJECTIVE AND OBJECTIVE BOX
POD/STATUS POST/ENT ISSUE: POD #0 for trach     INTERVAL HPI: 70y  POD #0 for tracheostomy #8 cuffed Shiley in place 2/2 respiratory failure. Pt is doing well on the vent. no complaints or issues.       PAST MEDICAL & SURGICAL HISTORY:  Hyperlipidemia  Hypothyroid  No significant past surgical history    Allergies    No Known Allergies    Intolerances      MEDICATIONS  (STANDING):  aspirin  chewable 81 milliGRAM(s) Oral daily  chlorhexidine 0.12% Liquid 15 milliLiter(s) Oral Mucosa every 12 hours  chlorhexidine 2% Cloths 1 Application(s) Topical <User Schedule>  clonazePAM  Tablet 2 milliGRAM(s) Oral every 8 hours  dexAMETHasone  IVPB 10 milliGRAM(s) IV Intermittent daily  enoxaparin Injectable 40 milliGRAM(s) SubCutaneous two times a day  famotidine    Tablet 20 milliGRAM(s) Oral two times a day  insulin lispro (HumaLOG) corrective regimen sliding scale   SubCutaneous every 6 hours  insulin NPH human recombinant 11 Unit(s) SubCutaneous every 6 hours  ketamine Infusion. 2 mG/kG/Hr (15.4 mL/Hr) IV Continuous <Continuous>  levothyroxine Injectable 50 MICROGram(s) IV Push at bedtime  norepinephrine Infusion 0.04 MICROgram(s)/kG/Min (5.78 mL/Hr) IV Continuous <Continuous>  oxyCODONE    IR 20 milliGRAM(s) Oral every 6 hours  petrolatum Ophthalmic Ointment 1 Application(s) Both EYES two times a day  polyethylene glycol 3350 17 Gram(s) Oral every 12 hours  senna Syrup 10 milliLiter(s) Oral at bedtime    MEDICATIONS  (PRN):  acetaminophen    Suspension .. 650 milliGRAM(s) Oral every 6 hours PRN Temp greater or equal to 38.5C (101.3F)  HYDROmorphone  Injectable 1 milliGRAM(s) IV Push every 4 hours PRN Moderate Pain (4 - 6)  sodium chloride 0.9% lock flush 10 milliLiter(s) IV Push every 1 hour PRN Pre/post blood products, medications, blood draw, and to maintain line patency    social history: see consult     family history: see consult     ROS:   ENT: all negative except as noted in HPI   Pulm: denies SOB, cough, hemoptysis  Neuro: denies numbness/tingling, loss of sensation  Endo: denies heat/cold intolerance, excessive sweating      Vital Signs Last 24 Hrs  T(C): 36.1 (23 Apr 2020 16:55), Max: 36.9 (23 Apr 2020 00:00)  T(F): 97 (23 Apr 2020 16:55), Max: 98.4 (23 Apr 2020 00:00)  HR: 83 (23 Apr 2020 19:00) (63 - 102)  BP: --  BP(mean): --  RR: 30 (23 Apr 2020 19:00) (13 - 86)  SpO2: 94% (23 Apr 2020 19:00) (85% - 97%)                          8.9    8.18  )-----------( 331      ( 23 Apr 2020 00:47 )             29.4    04-23    136  |  99  |  30<H>  ----------------------------<  156<H>  4.7   |  29  |  0.37<L>    Ca    8.4      23 Apr 2020 00:47  Phos  3.2     04-23  Mg     2.1     04-23    TPro  5.9<L>  /  Alb  1.9<L>  /  TBili  0.2  /  DBili  x   /  AST  30  /  ALT  34  /  AlkPhos  158<H>  04-23   PT/INR - ( 23 Apr 2020 00:47 )   PT: 13.7 sec;   INR: 1.19 ratio         PTT - ( 23 Apr 2020 00:47 )  PTT:31.3 sec    PHYSICAL EXAM:  Gen: NAD, well-developed  Head: Normocephalic, Atraumatic  Face: no edema/erythema/fluctuance  Eyes:  no scleral injection  Nose: Nares bilaterally patent, no discharge  Mouth: No Stridor / Drooling / Trismus.  Mucosa moist, tongue/uvula midline, oropharynx clear  Neck: # 8 Shiley with inflated cuff in place, with minimal serosanguinous oozing from stoma, no active bleeding, sutures x4 and umbilical tie in place.  No lymphadenopathy, trachea midline, no masses  Resp: ventilating well, satting 100%  CV: no peripheral edema/cyanosis

## 2020-04-23 NOTE — PROGRESS NOTE ADULT - SUBJECTIVE AND OBJECTIVE BOX
Preop Dx: Respiratory failure  Surgeon: Dr. Duarte  Procedure: Bedside Tracheostomy     Vital Signs Last 24 Hrs  T(C): 36.9 (23 Apr 2020 00:00), Max: 37.3 (22 Apr 2020 04:00)  T(F): 98.4 (23 Apr 2020 00:00), Max: 99.1 (22 Apr 2020 04:00)  HR: 75 (23 Apr 2020 00:00) (51 - 98)  BP: --  BP(mean): --  RR: 35 (23 Apr 2020 00:00) (30 - 49)  SpO2: 97% (23 Apr 2020 00:00) (88% - 97%)  CBC Full  -  ( 22 Apr 2020 00:53 )  WBC Count : 7.56 K/uL  RBC Count : 2.85 M/uL  Hemoglobin : 8.2 g/dL  Hematocrit : 26.8 %  Platelet Count - Automated : 317 K/uL  Mean Cell Volume : 94.0 fl  Mean Cell Hemoglobin : 28.8 pg  Mean Cell Hemoglobin Concentration : 30.6 gm/dL  Auto Neutrophil # : x  Auto Lymphocyte # : x  Auto Monocyte # : x  Auto Eosinophil # : x  Auto Basophil # : x  Auto Neutrophil % : x  Auto Lymphocyte % : x  Auto Monocyte % : x  Auto Eosinophil % : x  Auto Basophil % : x    04-22    139  |  99  |  25<H>  ----------------------------<  122<H>  4.6   |  32<H>  |  0.40<L>    Ca    8.3<L>      22 Apr 2020 00:53  Phos  2.3     04-22  Mg     2.0     04-22    TPro  5.9<L>  /  Alb  2.2<L>  /  TBili  0.2  /  DBili  x   /  AST  22  /  ALT  28  /  AlkPhos  139<H>  04-22      Daily     Daily       EKG:    Ventricular Rate 72 BPM    Atrial Rate 72 BPM    P-R Interval 152 ms    QRS Duration 78 ms    Q-T Interval 394 ms    QTC Calculation(Bezet) 431 ms    P Axis 57 degrees    R Axis 22 degrees    T Axis 49 degrees    Diagnosis Line SINUS RHYTHM WITH PREMATURE ATRIAL COMPLEXES  OTHERWISE NORMAL ECG    Confirmed by JOSE JUAN GOULD MD ([id number]) on 4/15/2020 1:31:03 PM      CXR:   INTERPRETATION:  CLINICAL INDICATION: Evaluate lung fields, Covid.    Frontal view of the chest is obtained.    Comparison is made with the chest x-ray of April 10, 2020.    IMPRESSION: ET tube has its tip about 2 cm above the vijaya. Enteric tube has its tip in the stomach. Right IJ line with the tip in the superior vena cava.    Widespread bilateral airspace disease with overall interval progression. No pneumothorax.      Type and Screen: O+      Plan:  - OR on 4/23/20 for bedside tracheostomy with Dr. Duarte  - NPO after midnight except meds  - IVF while NPO  - Consent done  - Medical clearance for OR

## 2020-04-23 NOTE — CHART NOTE - NSCHARTNOTEFT_GEN_A_CORE
Nutrition Follow Up Note   Patient seen for: nutrition follow up on COVID ICU     Source: medical record, communication with team. Unable to speak to pt due to current airborne isolation contact precautions related to COVID-19. Pt remains intubated.     Chart reviewed, events noted. Bedside trach 4/23.    Diet Order: Diet, NPO with Tube Feed:   Tube Feeding Modality: Nasogastric  Glucerna 1.5 Chandrakant (GLUCERNA1.5RTH)  Total Volume for 24 Hours (mL): 1200  Bolus  Total Volume of Bolus (mL):  300  Total # of Feeds: 4  Tube Feed Frequency: Every 6 hours   Tube Feed Start Time: 11:40  Bolus Feed Rate (mL per Hour): 300   Bolus Feed Duration (in Hours): 1 (04-08-20 @ 11:36)  Diet, NPO after Midnight:      NPO Start Date: 22-Apr-2020,   NPO Start Time: 23:59 (04-22-20 @ 20:01)    CURRENT EN ORDER PROVIDES: 1800 calories (23.3 chandrakant/Kg) and 99 grams protein (1,28 Gm/Kg) based on admission Wt: 77.1Kg; meets nutrition needs    Nutrition Events:   - Enteral Nutrition: Pt has been receiving bolus EN at goal, Glucerna1.5 @ 300ml q6 hrs  - Pt currently NPO for bedside trach 4/23  - Propofol: none  - Hyperglycemia being addressed with NPH 11 units q6 hrs and Humalog sliding scale     GI: Last BM 4/16. Bowel regimen: Miralax, senna    Anthropometric Measurements:   Height (cm): 175.26 (03-27-20 @ 16:15)  Weight (kg): 77.1 (03-27-20 @ 16:15)  BMI (kg/m2): 25.1 (03-27-20 @ 16:15)  IBW: 72.7 Kg    Medications: MEDICATIONS  (STANDING):  aspirin  chewable 81 milliGRAM(s) Oral daily  chlorhexidine 0.12% Liquid 15 milliLiter(s) Oral Mucosa every 12 hours  chlorhexidine 2% Cloths 1 Application(s) Topical <User Schedule>  clonazePAM  Tablet 2 milliGRAM(s) Oral every 8 hours  dexAMETHasone  IVPB 10 milliGRAM(s) IV Intermittent daily  DOBUTamine Infusion 2.5 MICROgram(s)/kG/Min (5.78 mL/Hr) IV Continuous <Continuous>  enoxaparin Injectable 40 milliGRAM(s) SubCutaneous two times a day  famotidine    Tablet 20 milliGRAM(s) Oral two times a day  insulin lispro (HumaLOG) corrective regimen sliding scale   SubCutaneous every 6 hours  insulin NPH human recombinant 11 Unit(s) SubCutaneous every 6 hours  ketamine Infusion. 2 mG/kG/Hr (15.4 mL/Hr) IV Continuous <Continuous>  levothyroxine Injectable 50 MICROGram(s) IV Push at bedtime  norepinephrine Infusion 0.04 MICROgram(s)/kG/Min (5.78 mL/Hr) IV Continuous <Continuous>  oxyCODONE    IR 20 milliGRAM(s) Oral every 6 hours  petrolatum Ophthalmic Ointment 1 Application(s) Both EYES two times a day  polyethylene glycol 3350 17 Gram(s) Oral every 12 hours  senna Syrup 10 milliLiter(s) Oral at bedtime    MEDICATIONS  (PRN):  acetaminophen    Suspension .. 650 milliGRAM(s) Oral every 6 hours PRN Temp greater or equal to 38.5C (101.3F)  HYDROmorphone  Injectable 1 milliGRAM(s) IV Push every 4 hours PRN Moderate Pain (4 - 6)  sodium chloride 0.9% lock flush 10 milliLiter(s) IV Push every 1 hour PRN Pre/post blood products, medications, blood draw, and to maintain line patency    Labs: 04-23 @ 00:47: Sodium 136, Potassium 4.7, Calcium 8.4, Magnesium 2.1, Phosphorus 3.2, BUN 30<H>, Creatinine 0.37<L>, Glucose 156<H>, Alk Phos 158<H>, ALT/SGPT 34, AST/SGOT 30, Albumin 1.9<L>, Total Bilirubin 0.2, Hemoglobin 8.9<L>, Hematocrit 29.4<L>, Creatine Kinase <<27>    Hemoglobin A1C, Whole Blood: 7.2 % (03-30-20 @ 08:24)    Triglycerides, Serum: 203 mg/dL (04-03-20 @ 11:19)  Triglycerides, Serum: 103 mg/dL (03-31-20 @ 14:18)    POCT Blood Glucose.: 138 mg/dL (04-23-20 @ 11:03)  POCT Blood Glucose.: 116 mg/dL (04-23-20 @ 04:51)  POCT Blood Glucose.: 160 mg/dL (04-22-20 @ 22:47)  POCT Blood Glucose.: 120 mg/dL (04-22-20 @ 17:05)    Skin: no pressure injuries documented  Edema: 1+ generalized    Estimated Needs: based on dosing wt 77.1Kg, with consideration for intubation  Estimated Energy Needs  · From (20 chandrakant/kg)	1542  · To (25 chandrakant/kg)	1927  [employer] (REE): 1741 calories (23cal/Kg)    Estimated Protein Needs  · From (1.2 g/kg)	92.52  · To (1.4 g/kg)	107.94    Previous Nutrition Diagnosis: None   Nutrition Diagnosis is:  None     New Nutrition Diagnosis:  None     Recommended Interventions:   1. Continue Glucerna 1.5 @ 300ml bolus q6hrs; provides 1800 calories (23.3 chandrakant/Kg) and 99 grams protein (1,28 Gm/Kg) based on admission wt 77.1Kg  2. Continue bowel regimen; consider additional regimen for no BM    3. Defer free water to team  4. Trend BG levels, renal indices, LFT's, electrolytes and triglycerides        Monitoring and Evaluation:   Continue to monitor nutrition provision and tolerance, weights, labs, skin integrity.   RD remains available upon request and will follow up per protocol.    Ade Packer, MS RD CDN [facility]; Pager # [phone]

## 2020-04-23 NOTE — PROGRESS NOTE ADULT - ASSESSMENT
70y POD #0 for tracheostomy 2/2 respiratory failure. Pt is doing well, #8 cuffed Shiley in place, on the vent, with minimal serosanguinous oozing from stoma, no active bleed. sutures x4 and umbilical tie in place.

## 2020-04-23 NOTE — PROGRESS NOTE ADULT - SUBJECTIVE AND OBJECTIVE BOX
CLARITA POWELL  MRN-[id number]  Patient is a 70y old  Male who presents with a chief complaint of respiratory distress, COVID 19 (23 Apr 2020 06:48)    HPI:  70M with PMH presents with T dm, hypothyroidism, hld, p/w cough, low grade fever x 2-3 days. Today patient had worsening dyspnea at rest as well as exertion which prompted to come to the ED. Patient otherwise denied chest pain, leg swelling. He denies prior lung disease. Denies any history of smoking.     In ED, patient was noted to be hypoxic on 6L NC to 89% and significant tachypnea. VS otherwise, afebrile, HR in 80s, SBP 150s. Labs notable for CBC wnl. CMP with mild elevated transaminases AST 65 and ALT 47. PAtient noted to be positive for COVID 19. Due to worsening respiratory distress patient was emergently intubated in the ER. (27 Mar 2020 21:38)      Surgery/Hospital course:  COVID 19 Positive  Intubated 3/27  4/9 Paralysis weaned off yesterday, unproned with maintained sats, tolerating diuresis  4/12 10 sec asystole, back after epi x1  4/13 CPAP  4/14- 4/16 unable to tolerate CPAP   4/16 decadron course started  4/17 Failed CPAP. Continue with steroid taper and supportive therapy. Palliative care consult called and appreciated. Family yet to decide on end of life and prior to making any advanced decisions would like to see if any improvement with steroid taper treatment therapy.    4/18 Continues to CPAP trial of 20/5.  Peep lowered to 5 from 6.  Unable to tolerate sedation wean due to increased agitation causing hypoxia.  4/21 ketamine weaned off  4/22 COVID retested, 1U PRBC given for Hct 26 and pressor requirement; Precedex started, but dc'd for bradycardia (pt had asystolic pause on precedex previously)    Today:     REVIEW OF SYSTEMS:  Unable to obtain, vented     Vital Signs Last 24 Hrs  T(C): 36.1 (23 Apr 2020 16:55), Max: 36.9 (23 Apr 2020 00:00)  T(F): 97 (23 Apr 2020 16:55), Max: 98.4 (23 Apr 2020 00:00)  HR: 94 (23 Apr 2020 16:55) (63 - 102)  BP: --  BP(mean): --  RR: 31 (23 Apr 2020 16:55) (13 - 37)  SpO2: 94% (23 Apr 2020 16:55) (85% - 97%)    ============================I/O===========================   I&O's Detail    22 Apr 2020 07:01  -  23 Apr 2020 07:00  --------------------------------------------------------  IN:    dexmedetomidine Infusion: 27 mL    DOBUTamine Infusion: 87 mL    Enteral Tube Flush: 220 mL    Glucerna 1.5: 650 mL    IV PiggyBack: 50 mL    ketamine Infusion.: 138.6 mL    norepinephrine Infusion: 191.7 mL    Packed Red Blood Cells: 350 mL  Total IN: 1714.3 mL    OUT:    Indwelling Catheter - Urethral: 1540 mL  Total OUT: 1540 mL    Total NET: 174.3 mL      23 Apr 2020 07:01  -  23 Apr 2020 18:26  --------------------------------------------------------  IN:    DOBUTamine Infusion: 31.9 mL    ketamine Infusion.: 34.1 mL    norepinephrine Infusion: 195.3 mL  Total IN: 261.3 mL    OUT:    Indwelling Catheter - Urethral: 875 mL  Total OUT: 875 mL    Total NET: -613.7 mL        ============================ LABS =========================                        8.9    8.18  )-----------( 331      ( 23 Apr 2020 00:47 )             29.4     04-23    136  |  99  |  30<H>  ----------------------------<  156<H>  4.7   |  29  |  0.37<L>    Ca    8.4      23 Apr 2020 00:47  Phos  3.2     04-23  Mg     2.1     04-23    TPro  5.9<L>  /  Alb  1.9<L>  /  TBili  0.2  /  DBili  x   /  AST  30  /  ALT  34  /  AlkPhos  158<H>  04-23    LIVER FUNCTIONS - ( 23 Apr 2020 00:47 )  Alb: 1.9 g/dL / Pro: 5.9 g/dL / ALK PHOS: 158 U/L / ALT: 34 U/L / AST: 30 U/L / GGT: x           PT/INR - ( 23 Apr 2020 00:47 )   PT: 13.7 sec;   INR: 1.19 ratio         PTT - ( 23 Apr 2020 00:47 )  PTT:31.3 sec  ABG - ( 23 Apr 2020 00:40 )  pH, Arterial: 7.40  pH, Blood: x     /  pCO2: 57    /  pO2: 72    / HCO3: 34    / Base Excess: 8.9   /  SaO2: 94                  ======================Micro/Rad/Cardio=================  Culture: Reviewed   CXR: Reviewed  ======================================================  PAST MEDICAL & SURGICAL HISTORY:  Hyperlipidemia  Hypothyroid  No significant past surgical history    ====================ASSESSMENT ==============  COVID 19 Positive   3/27 Intubated   Hypoxic respiratory failure   Septic Shock  ARDS  Fluid overload      Plan:  ====================== NEUROLOGY=====================  Continue with Klonopon and Ketamine for sedation  Dilaudid and oxycodone for analgesia.     acetaminophen    Suspension .. 650 milliGRAM(s) Oral every 6 hours PRN Temp greater or equal to 38.5C (101.3F)  clonazePAM  Tablet 2 milliGRAM(s) Oral every 8 hours  HYDROmorphone  Injectable 1 milliGRAM(s) IV Push every 4 hours PRN Moderate Pain (4 - 6)  ketamine Infusion. 2 mG/kG/Hr (15.4 mL/Hr) IV Continuous <Continuous>  oxyCODONE    IR 20 milliGRAM(s) Oral every 6 hours    ==================== RESPIRATORY======================  On full vent support.     Mechanical Ventilation:  Mode: AC/ CMV (Assist Control/ Continuous Mandatory Ventilation)  RR (machine): 30  TV (machine): 320  FiO2: 70  PEEP: 5  ITime: 1  MAP: 17  PIP: 46      ====================CARDIOVASCULAR==================  On pressors support for hypotension   Continue inotropic support for chronotropy     DOBUTamine Infusion 2.5 MICROgram(s)/kG/Min (5.78 mL/Hr) IV Continuous <Continuous>  norepinephrine Infusion 0.04 MICROgram(s)/kG/Min (5.78 mL/Hr) IV Continuous <Continuous>    ===================HEMATOLOGIC/ONC ===================  VTE ppx with Lovenox     aspirin  chewable 81 milliGRAM(s) Oral daily  enoxaparin Injectable 40 milliGRAM(s) SubCutaneous two times a day    ===================== RENAL =========================  Continue monitoring urine output, Autodiuresing   Bun/Cr normal, 30/0.37  ==================== GASTROINTESTINAL===================  Tolerating tube feeds  Pepcid for GI prophylaxis.     famotidine    Tablet 20 milliGRAM(s) Oral two times a day  polyethylene glycol 3350 17 Gram(s) Oral every 12 hours  senna Syrup 10 milliLiter(s) Oral at bedtime  sodium chloride 0.9% lock flush 10 milliLiter(s) IV Push every 1 hour PRN Pre/post blood products, medications, blood draw, and to maintain line patency    =======================    ENDOCRINE  =====================  Glucose control, NPH for Hyperglycemia   dexAMETHasone  IVPB 10 milliGRAM(s) IV Intermittent daily  insulin lispro (HumaLOG) corrective regimen sliding scale   SubCutaneous every 6 hours  insulin NPH human recombinant 11 Unit(s) SubCutaneous every 6 hours  levothyroxine Injectable 50 MICROGram(s) IV Push at bedtime    ========================INFECTIOUS DISEASE================  Continue to monitor WBC and fever   Completed COVID medication (Plaquenil, Vit C/Thiamine, Anakirna, Solumedrol)         Patient requires continuous monitoring with bedside rhythm monitoring, pulse ox monitoring, and intermittent blood gas analysis. Care plan discussed with ICU care team. Patient remained critical and at risk for life threatning decompensation.    By signing my name below, I, Joan Richards, attest that this documentation has been prepared under the direction and in the presence of GERRY Smith.  Electronically signed: Rohan Seals, 04-23-20 @ 18:26    I, Jody Ambrosio, personally performed the services described in this documentation. all medical record entries made by the kevinibtrae were at my direction and in my presence. I have reviewed the chart and agree that the record reflects my personal performance and is accurate and complete  Electronically signed: GERRY Smith CLARITA POWELL  MRN-[id number]  Patient is a 70y old  Male who presents with a chief complaint of respiratory distress, COVID 19 (23 Apr 2020 06:48)    HPI:  70M with PMH presents with T dm, hypothyroidism, hld, p/w cough, low grade fever x 2-3 days. Today patient had worsening dyspnea at rest as well as exertion which prompted to come to the ED. Patient otherwise denied chest pain, leg swelling. He denies prior lung disease. Denies any history of smoking.     In ED, patient was noted to be hypoxic on 6L NC to 89% and significant tachypnea. VS otherwise, afebrile, HR in 80s, SBP 150s. Labs notable for CBC wnl. CMP with mild elevated transaminases AST 65 and ALT 47. PAtient noted to be positive for COVID 19. Due to worsening respiratory distress patient was emergently intubated in the ER. (27 Mar 2020 21:38)      Surgery/Hospital course:  COVID 19 Positive  Intubated 3/27  4/9 Paralysis weaned off yesterday, unproned with maintained sats, tolerating diuresis  4/12 10 sec asystole, back after epi x1  4/13 CPAP  4/14- 4/16 unable to tolerate CPAP   4/16 decadron course started  4/17 Failed CPAP. Continue with steroid taper and supportive therapy. Palliative care consult called and appreciated. Family yet to decide on end of life and prior to making any advanced decisions would like to see if any improvement with steroid taper treatment therapy.    4/18 Continues to CPAP trial of 20/5.  Peep lowered to 5 from 6.  Unable to tolerate sedation wean due to increased agitation causing hypoxia.  4/21 ketamine weaned off  4/22 COVID retested, 1U PRBC given for Hct 26 and pressor requirement; Precedex started, but dc'd for bradycardia (pt had asystolic pause on precedex previously)  4/23 Trached, Received convalescent plasma, PEEP up to 7.    Today:   - Trached today  -Received convalescent plasma  -After Trach, vent settings increased to a PEEP 7 and FiO2 70% due to respiratory acidosis.     REVIEW OF SYSTEMS:  Unable to obtain, vented     Vital Signs Last 24 Hrs  T(C): 36.1 (23 Apr 2020 16:55), Max: 36.9 (23 Apr 2020 00:00)  T(F): 97 (23 Apr 2020 16:55), Max: 98.4 (23 Apr 2020 00:00)  HR: 94 (23 Apr 2020 16:55) (63 - 102)  BP: --  BP(mean): --  RR: 31 (23 Apr 2020 16:55) (13 - 37)  SpO2: 94% (23 Apr 2020 16:55) (85% - 97%)    ============================I/O===========================   I&O's Detail    22 Apr 2020 07:01  -  23 Apr 2020 07:00  --------------------------------------------------------  IN:    dexmedetomidine Infusion: 27 mL    DOBUTamine Infusion: 87 mL    Enteral Tube Flush: 220 mL    Glucerna 1.5: 650 mL    IV PiggyBack: 50 mL    ketamine Infusion.: 138.6 mL    norepinephrine Infusion: 191.7 mL    Packed Red Blood Cells: 350 mL  Total IN: 1714.3 mL    OUT:    Indwelling Catheter - Urethral: 1540 mL  Total OUT: 1540 mL    Total NET: 174.3 mL      23 Apr 2020 07:01  -  23 Apr 2020 18:26  --------------------------------------------------------  IN:    DOBUTamine Infusion: 31.9 mL    ketamine Infusion.: 34.1 mL    norepinephrine Infusion: 195.3 mL  Total IN: 261.3 mL    OUT:    Indwelling Catheter - Urethral: 875 mL  Total OUT: 875 mL    Total NET: -613.7 mL        ============================ LABS =========================                        8.9    8.18  )-----------( 331      ( 23 Apr 2020 00:47 )             29.4     04-23    136  |  99  |  30<H>  ----------------------------<  156<H>  4.7   |  29  |  0.37<L>    Ca    8.4      23 Apr 2020 00:47  Phos  3.2     04-23  Mg     2.1     04-23    TPro  5.9<L>  /  Alb  1.9<L>  /  TBili  0.2  /  DBili  x   /  AST  30  /  ALT  34  /  AlkPhos  158<H>  04-23    LIVER FUNCTIONS - ( 23 Apr 2020 00:47 )  Alb: 1.9 g/dL / Pro: 5.9 g/dL / ALK PHOS: 158 U/L / ALT: 34 U/L / AST: 30 U/L / GGT: x           PT/INR - ( 23 Apr 2020 00:47 )   PT: 13.7 sec;   INR: 1.19 ratio         PTT - ( 23 Apr 2020 00:47 )  PTT:31.3 sec  ABG - ( 23 Apr 2020 00:40 )  pH, Arterial: 7.40  pH, Blood: x     /  pCO2: 57    /  pO2: 72    / HCO3: 34    / Base Excess: 8.9   /  SaO2: 94                  ======================Micro/Rad/Cardio=================  Culture: Reviewed   CXR: Reviewed  ======================================================  PAST MEDICAL & SURGICAL HISTORY:  Hyperlipidemia  Hypothyroid  No significant past surgical history    ====================ASSESSMENT ==============  COVID 19 Positive   3/27 Intubated   Hypoxic respiratory failure   Septic Shock  ARDS  Fluid overload      Plan:  ====================== NEUROLOGY=====================  Dilaudid and oxycodone for analgesia.   No neuro function  On ketamine,  will wean as tolerated   Dilaudid and oxycodone for analgesia.     acetaminophen    Suspension .. 650 milliGRAM(s) Oral every 6 hours PRN Temp greater or equal to 38.5C (101.3F)  clonazePAM  Tablet 2 milliGRAM(s) Oral every 8 hours  HYDROmorphone  Injectable 1 milliGRAM(s) IV Push every 4 hours PRN Moderate Pain (4 - 6)  ketamine Infusion. 2 mG/kG/Hr (15.4 mL/Hr) IV Continuous <Continuous>  oxyCODONE    IR 20 milliGRAM(s) Oral every 6 hours    ==================== RESPIRATORY======================  On full vent support.   continue to treat respiratory acidosis and titrate Fio2.     Mechanical Ventilation:  Mode: AC/ CMV (Assist Control/ Continuous Mandatory Ventilation)  RR (machine): 30  TV (machine): 320  FiO2: 70  PEEP: 5  ITime: 1  MAP: 17  PIP: 46      ====================CARDIOVASCULAR==================  On pressors support with Levo for hypotension, continue to wean levo as tolerated.   Continue inotropic support for chronotropy     DOBUTamine Infusion 2.5 MICROgram(s)/kG/Min (5.78 mL/Hr) IV Continuous <Continuous>  norepinephrine Infusion 0.04 MICROgram(s)/kG/Min (5.78 mL/Hr) IV Continuous <Continuous>    ===================HEMATOLOGIC/ONC ===================  VTE ppx with Lovenox     aspirin  chewable 81 milliGRAM(s) Oral daily  enoxaparin Injectable 40 milliGRAM(s) SubCutaneous two times a day    ===================== RENAL =========================  Continue monitoring urine output, Autodiuresing   Bun/Cr normal, 30/0.37   ccs urine per hr without diuretics, overall -800  monitor lytes and replete as needed.  ==================== GASTROINTESTINAL===================  Tolerating tube feeds at goal, held for trach but resumed.  Pepcid for GI prophylaxis.     famotidine    Tablet 20 milliGRAM(s) Oral two times a day  polyethylene glycol 3350 17 Gram(s) Oral every 12 hours  senna Syrup 10 milliLiter(s) Oral at bedtime  sodium chloride 0.9% lock flush 10 milliLiter(s) IV Push every 1 hour PRN Pre/post blood products, medications, blood draw, and to maintain line patency    =======================    ENDOCRINE  =====================  Glucose control, NPH sliding scale for Hyperglycemia   dexAMETHasone  IVPB 10 milliGRAM(s) IV Intermittent daily  insulin lispro (HumaLOG) corrective regimen sliding scale   SubCutaneous every 6 hours  insulin NPH human recombinant 11 Unit(s) SubCutaneous every 6 hours  levothyroxine Injectable 50 MICROGram(s) IV Push at bedtime    ========================INFECTIOUS DISEASE================  Completed COVID medication (Plaquenil, Vit C/Thiamine, Anakirna, Solumedrol). Continue to monitor WBC and fever    Decadron weaned down  Received convalescent plasma today   no recent cultures, afebrile.   off abx    FULL CODE  family wants to continue all care at this time.     Patient requires continuous monitoring with bedside rhythm monitoring, pulse ox monitoring, and intermittent blood gas analysis. Care plan discussed with ICU care team. Patient remained critical and at risk for life threatning decompensation.    By signing my name below, IJoan, attest that this documentation has been prepared under the direction and in the presence of GERRY Smith.  Electronically signed: Rohan Seals, 04-23-20 @ 18:26    Jody VANCE, personally performed the services described in this documentation. all medical record entries made by the scribe were at my direction and in my presence. I have reviewed the chart and agree that the record reflects my personal performance and is accurate and complete  Electronically signed: GERRY Smith CLARITA POWELL  MRN-[id number]  Patient is a 70y old  Male who presents with a chief complaint of respiratory distress, COVID 19 (23 Apr 2020 06:48)    HPI:  70M with PMH presents with T dm, hypothyroidism, hld, p/w cough, low grade fever x 2-3 days. Today patient had worsening dyspnea at rest as well as exertion which prompted to come to the ED. Patient otherwise denied chest pain, leg swelling. He denies prior lung disease. Denies any history of smoking.     In ED, patient was noted to be hypoxic on 6L NC to 89% and significant tachypnea. VS otherwise, afebrile, HR in 80s, SBP 150s. Labs notable for CBC wnl. CMP with mild elevated transaminases AST 65 and ALT 47. PAtient noted to be positive for COVID 19. Due to worsening respiratory distress patient was emergently intubated in the ER. (27 Mar 2020 21:38)      Surgery/Hospital course:  COVID 19 Positive  Intubated 3/27  4/9 Paralysis weaned off yesterday, unproned with maintained sats, tolerating diuresis  4/12 10 sec asystole, back after epi x1  4/13 CPAP  4/14- 4/16 unable to tolerate CPAP   4/16 decadron course started  4/17 Failed CPAP. Continue with steroid taper and supportive therapy. Palliative care consult called and appreciated. Family yet to decide on end of life and prior to making any advanced decisions would like to see if any improvement with steroid taper treatment therapy.    4/18 Continues to CPAP trial of 20/5.  Peep lowered to 5 from 6.  Unable to tolerate sedation wean due to increased agitation causing hypoxia.  4/21 ketamine weaned off  4/22 COVID retested, 1U PRBC given for Hct 26 and pressor requirement; Precedex started, but dc'd for bradycardia (pt had asystolic pause on precedex previously)  4/23 Trached, Received convalescent plasma    Today:   - Trached today  -Received convalescent plasma  -After Trach, vent settings increased to a PEEP 7 and FiO2 70% with respiratory acidosis.     REVIEW OF SYSTEMS:  Unable to obtain, vented     Vital Signs Last 24 Hrs  T(C): 36.1 (23 Apr 2020 16:55), Max: 36.9 (23 Apr 2020 00:00)  T(F): 97 (23 Apr 2020 16:55), Max: 98.4 (23 Apr 2020 00:00)  HR: 94 (23 Apr 2020 16:55) (63 - 102)  BP: --  BP(mean): --  RR: 31 (23 Apr 2020 16:55) (13 - 37)  SpO2: 94% (23 Apr 2020 16:55) (85% - 97%)    ============================I/O===========================   I&O's Detail    22 Apr 2020 07:01  -  23 Apr 2020 07:00  --------------------------------------------------------  IN:    dexmedetomidine Infusion: 27 mL    DOBUTamine Infusion: 87 mL    Enteral Tube Flush: 220 mL    Glucerna 1.5: 650 mL    IV PiggyBack: 50 mL    ketamine Infusion.: 138.6 mL    norepinephrine Infusion: 191.7 mL    Packed Red Blood Cells: 350 mL  Total IN: 1714.3 mL    OUT:    Indwelling Catheter - Urethral: 1540 mL  Total OUT: 1540 mL    Total NET: 174.3 mL      23 Apr 2020 07:01  -  23 Apr 2020 18:26  --------------------------------------------------------  IN:    DOBUTamine Infusion: 31.9 mL    ketamine Infusion.: 34.1 mL    norepinephrine Infusion: 195.3 mL  Total IN: 261.3 mL    OUT:    Indwelling Catheter - Urethral: 875 mL  Total OUT: 875 mL    Total NET: -613.7 mL    ============================ LABS =========================                        8.9    8.18  )-----------( 331      ( 23 Apr 2020 00:47 )             29.4     04-23    136  |  99  |  30<H>  ----------------------------<  156<H>  4.7   |  29  |  0.37<L>    Ca    8.4      23 Apr 2020 00:47  Phos  3.2     04-23  Mg     2.1     04-23    TPro  5.9<L>  /  Alb  1.9<L>  /  TBili  0.2  /  DBili  x   /  AST  30  /  ALT  34  /  AlkPhos  158<H>  04-23    LIVER FUNCTIONS - ( 23 Apr 2020 00:47 )  Alb: 1.9 g/dL / Pro: 5.9 g/dL / ALK PHOS: 158 U/L / ALT: 34 U/L / AST: 30 U/L / GGT: x           PT/INR - ( 23 Apr 2020 00:47 )   PT: 13.7 sec;   INR: 1.19 ratio         PTT - ( 23 Apr 2020 00:47 )  PTT:31.3 sec  ABG - ( 23 Apr 2020 00:40 )  pH, Arterial: 7.40  pH, Blood: x     /  pCO2: 57    /  pO2: 72    / HCO3: 34    / Base Excess: 8.9   /  SaO2: 94          ======================Micro/Rad/Cardio=================  Culture: Reviewed   CXR: Reviewed  ======================================================  PAST MEDICAL & SURGICAL HISTORY:  Hyperlipidemia  Hypothyroid  No significant past surgical history    ====================ASSESSMENT ==============  COVID 19 Positive   3/27 Intubated   Hypoxic respiratory failure   Septic Shock  ARDS  Fluid overload      Plan:  ====================== NEUROLOGY=====================  Dilaudid and oxycodone for analgesia.   No neuro function  On ketamine,  will wean as tolerated   Dilaudid and oxycodone for analgesia.     acetaminophen    Suspension .. 650 milliGRAM(s) Oral every 6 hours PRN Temp greater or equal to 38.5C (101.3F)  clonazePAM  Tablet 2 milliGRAM(s) Oral every 8 hours  HYDROmorphone  Injectable 1 milliGRAM(s) IV Push every 4 hours PRN Moderate Pain (4 - 6)  ketamine Infusion. 2 mG/kG/Hr (15.4 mL/Hr) IV Continuous <Continuous>  oxyCODONE    IR 20 milliGRAM(s) Oral every 6 hours    ==================== RESPIRATORY======================  On full vent support.   continue to treat respiratory acidosis and titrate Fio2.     Mechanical Ventilation:  Mode: AC/ CMV (Assist Control/ Continuous Mandatory Ventilation)  RR (machine): 30  TV (machine): 320  FiO2: 70  PEEP: 5  ITime: 1  MAP: 17  PIP: 46    ====================CARDIOVASCULAR==================  On pressors support with Levo for hypotension, continue to wean levo as tolerated.   Continue inotropic support for chronotropy     DOBUTamine Infusion 2.5 MICROgram(s)/kG/Min (5.78 mL/Hr) IV Continuous <Continuous>  norepinephrine Infusion 0.04 MICROgram(s)/kG/Min (5.78 mL/Hr) IV Continuous <Continuous>    ===================HEMATOLOGIC/ONC ===================  VTE ppx with Lovenox     aspirin  chewable 81 milliGRAM(s) Oral daily  enoxaparin Injectable 40 milliGRAM(s) SubCutaneous two times a day    ===================== RENAL =========================  Continue monitoring urine output, Autodiuresing   Bun/Cr normal, 30/0.37   ccs urine per hr without diuretics, overall -800  monitor lytes and replete as needed.  ==================== GASTROINTESTINAL===================  Tolerating tube feeds at goal, held for trach but resumed.  Pepcid for GI prophylaxis.     famotidine    Tablet 20 milliGRAM(s) Oral two times a day  polyethylene glycol 3350 17 Gram(s) Oral every 12 hours  senna Syrup 10 milliLiter(s) Oral at bedtime  sodium chloride 0.9% lock flush 10 milliLiter(s) IV Push every 1 hour PRN Pre/post blood products, medications, blood draw, and to maintain line patency    =======================    ENDOCRINE  =====================  Glucose control, NPH sliding scale for Hyperglycemia   dexAMETHasone  IVPB 10 milliGRAM(s) IV Intermittent daily  insulin lispro (HumaLOG) corrective regimen sliding scale   SubCutaneous every 6 hours  insulin NPH human recombinant 11 Unit(s) SubCutaneous every 6 hours  levothyroxine Injectable 50 MICROGram(s) IV Push at bedtime    ========================INFECTIOUS DISEASE================  Completed COVID medication (Plaquenil, Vit C/Thiamine, Anakirna, Solumedrol). Continue to monitor WBC and fever    Decadron weaned down  Received convalescent plasma today   no recent cultures, afebrile.   off abx    FULL CODE  family wants to continue all care at this time.     Patient requires continuous monitoring with bedside rhythm monitoring, pulse ox monitoring, and intermittent blood gas analysis. Care plan discussed with ICU care team. Patient remained critical and at risk for life threatning decompensation.    By signing my name below, Joan VANCE, attest that this documentation has been prepared under the direction and in the presence of GERRY Smith.  Electronically signed: Rohan Seals, 04-23-20 @ 18:26    Jody VANCE, personally performed the services described in this documentation. all medical record entries made by the scribe were at my direction and in my presence. I have reviewed the chart and agree that the record reflects my personal performance and is accurate and complete  Electronically signed: GERRY Smith

## 2020-04-23 NOTE — PRE-ANESTHESIA EVALUATION ADULT - NSANTHPMHFT_GEN_ALL_CORE
COVID+  HTN/HLD, ? DM, Hypothyroid.  On levo and dobut gtt.  Intubated on Fio2 70%  Ketamine sedation.  ENT and MICU aware, requesting tracheostomy, optimized per primary team.

## 2020-04-23 NOTE — PRE-ANESTHESIA EVALUATION ADULT - NSANTHADDINFOFT_GEN_ALL_CORE
-Patient w/ multiple comorbidities  -Risks of anesthesia were explained to the patient daughter.  -Risks including but not limited to MI, CVA, Heart failure, Malignant arrythmia, Respiratory failure requiring prolonged intubation, PE/Clot, Aspiration, and death were explained to the patient daughter.

## 2020-04-24 NOTE — PROGRESS NOTE ADULT - SUBJECTIVE AND OBJECTIVE BOX
ENT ISSUE/POD: POD #1 s/p trach    HPI: 69 y/o male POD #1 s/p tracheostomy. #8 LPC in place 2/2 respiratory failure. Pt is seen and examined at bedside. Pt is doing well on vent. No issues or bleeding overnight per team.    PAST MEDICAL & SURGICAL HISTORY:  Hyperlipidemia  Hypothyroid  No significant past surgical history    Allergies    No Known Allergies    Intolerances      MEDICATIONS  (STANDING):  aspirin  chewable 81 milliGRAM(s) Oral daily  chlorhexidine 0.12% Liquid 15 milliLiter(s) Oral Mucosa every 12 hours  chlorhexidine 2% Cloths 1 Application(s) Topical <User Schedule>  clonazePAM  Tablet 2 milliGRAM(s) Oral every 8 hours  dexAMETHasone  IVPB 10 milliGRAM(s) IV Intermittent daily  enoxaparin Injectable 40 milliGRAM(s) SubCutaneous two times a day  famotidine    Tablet 20 milliGRAM(s) Oral two times a day  insulin lispro (HumaLOG) corrective regimen sliding scale   SubCutaneous every 6 hours  insulin NPH human recombinant 11 Unit(s) SubCutaneous every 6 hours  ketamine Infusion. 0.25 mG/kG/Hr (1.93 mL/Hr) IV Continuous <Continuous>  levothyroxine Injectable 50 MICROGram(s) IV Push at bedtime  norepinephrine Infusion 0.04 MICROgram(s)/kG/Min (5.78 mL/Hr) IV Continuous <Continuous>  oxyCODONE    IR 10 milliGRAM(s) Oral every 6 hours  petrolatum Ophthalmic Ointment 1 Application(s) Both EYES two times a day  polyethylene glycol 3350 17 Gram(s) Oral every 12 hours  senna Syrup 10 milliLiter(s) Oral at bedtime  vasopressin Infusion 0.04 Unit(s)/Min (2.4 mL/Hr) IV Continuous <Continuous>    MEDICATIONS  (PRN):  acetaminophen    Suspension .. 650 milliGRAM(s) Oral every 6 hours PRN Temp greater or equal to 38.5C (101.3F)  HYDROmorphone  Injectable 1 milliGRAM(s) IV Push every 4 hours PRN Moderate Pain (4 - 6)  sodium chloride 0.9% lock flush 10 milliLiter(s) IV Push every 1 hour PRN Pre/post blood products, medications, blood draw, and to maintain line patency      Social History: see consult note    Family history: see consult note    ROS:   ENT: all negative except as noted in HPI   Pulm: denies SOB, cough, hemoptysis  Neuro: denies numbness/tingling, loss of sensation  Endo: denies heat/cold intolerance, excessive sweating      Vital Signs Last 24 Hrs  T(C): 36 (24 Apr 2020 12:00), Max: 36.2 (24 Apr 2020 00:00)  T(F): 96.8 (24 Apr 2020 12:00), Max: 97.2 (24 Apr 2020 00:00)  HR: 62 (24 Apr 2020 12:00) (60 - 102)  BP: --  BP(mean): --  RR: 40 (24 Apr 2020 12:00) (13 - 86)  SpO2: 94% (24 Apr 2020 12:00) (93% - 100%)                          9.2    9.96  )-----------( 366      ( 24 Apr 2020 01:02 )             30.3    04-24    136  |  97  |  29<H>  ----------------------------<  199<H>  5.1   |  28  |  0.47<L>    Ca    9.0      24 Apr 2020 01:02  Phos  5.0     04-24  Mg     2.2     04-24    TPro  6.5  /  Alb  2.4<L>  /  TBili  0.3  /  DBili  x   /  AST  21  /  ALT  36  /  AlkPhos  157<H>  04-24   PT/INR - ( 23 Apr 2020 00:47 )   PT: 13.7 sec;   INR: 1.19 ratio         PTT - ( 23 Apr 2020 00:47 )  PTT:31.3 sec    PHYSICAL EXAM:  Gen: NAD  Skin: No rashes, bruises, or lesions  Head: Normocephalic, Atraumatic  Face: no edema, erythema, or fluctuance. Parotid glands soft without mass  Eyes: no scleral injection  Ears: Right - ear canal clear, TM intact without effusion or erythema. No evidence of any fluid drainage. No mastoid tenderness, erythema, or ear bulging            Left - ear canal clear, TM intact without effusion or erythema. No evidence of any fluid drainage. No mastoid tenderness, erythema, or ear bulging  Nose: Nares bilaterally patent, no discharge  Mouth: No Stridor / Drooling / Trismus.  Mucosa moist, tongue/uvula midline, oropharynx clear  Neck: Flat, supple, no lymphadenopathy, trachea midline, no masses  Lymphatic: No lymphadenopathy  Resp: breathing easily, no stridor  Neuro: facial nerve intact, no facial droop ENT ISSUE/POD: POD #1 s/p trach    HPI: 71 y/o male POD #1 s/p tracheostomy. #8 LPC in place 2/2 respiratory failure. Pt is seen and examined at bedside. Pt is doing well on vent. No issues or bleeding overnight per team.    PAST MEDICAL & SURGICAL HISTORY:  Hyperlipidemia  Hypothyroid  No significant past surgical history    Allergies    No Known Allergies    Intolerances      MEDICATIONS  (STANDING):  aspirin  chewable 81 milliGRAM(s) Oral daily  chlorhexidine 0.12% Liquid 15 milliLiter(s) Oral Mucosa every 12 hours  chlorhexidine 2% Cloths 1 Application(s) Topical <User Schedule>  clonazePAM  Tablet 2 milliGRAM(s) Oral every 8 hours  dexAMETHasone  IVPB 10 milliGRAM(s) IV Intermittent daily  enoxaparin Injectable 40 milliGRAM(s) SubCutaneous two times a day  famotidine    Tablet 20 milliGRAM(s) Oral two times a day  insulin lispro (HumaLOG) corrective regimen sliding scale   SubCutaneous every 6 hours  insulin NPH human recombinant 11 Unit(s) SubCutaneous every 6 hours  ketamine Infusion. 0.25 mG/kG/Hr (1.93 mL/Hr) IV Continuous <Continuous>  levothyroxine Injectable 50 MICROGram(s) IV Push at bedtime  norepinephrine Infusion 0.04 MICROgram(s)/kG/Min (5.78 mL/Hr) IV Continuous <Continuous>  oxyCODONE    IR 10 milliGRAM(s) Oral every 6 hours  petrolatum Ophthalmic Ointment 1 Application(s) Both EYES two times a day  polyethylene glycol 3350 17 Gram(s) Oral every 12 hours  senna Syrup 10 milliLiter(s) Oral at bedtime  vasopressin Infusion 0.04 Unit(s)/Min (2.4 mL/Hr) IV Continuous <Continuous>    MEDICATIONS  (PRN):  acetaminophen    Suspension .. 650 milliGRAM(s) Oral every 6 hours PRN Temp greater or equal to 38.5C (101.3F)  HYDROmorphone  Injectable 1 milliGRAM(s) IV Push every 4 hours PRN Moderate Pain (4 - 6)  sodium chloride 0.9% lock flush 10 milliLiter(s) IV Push every 1 hour PRN Pre/post blood products, medications, blood draw, and to maintain line patency      Social History: see consult note    Family history: see consult note    ROS:   Unable to obtain due to patient's condition    Vital Signs Last 24 Hrs  T(C): 36 (24 Apr 2020 12:00), Max: 36.2 (24 Apr 2020 00:00)  T(F): 96.8 (24 Apr 2020 12:00), Max: 97.2 (24 Apr 2020 00:00)  HR: 62 (24 Apr 2020 12:00) (60 - 102)  BP: --  BP(mean): --  RR: 40 (24 Apr 2020 12:00) (13 - 86)  SpO2: 94% (24 Apr 2020 12:00) (93% - 100%)                          9.2    9.96  )-----------( 366      ( 24 Apr 2020 01:02 )             30.3    04-24    136  |  97  |  29<H>  ----------------------------<  199<H>  5.1   |  28  |  0.47<L>    Ca    9.0      24 Apr 2020 01:02  Phos  5.0     04-24  Mg     2.2     04-24    TPro  6.5  /  Alb  2.4<L>  /  TBili  0.3  /  DBili  x   /  AST  21  /  ALT  36  /  AlkPhos  157<H>  04-24   PT/INR - ( 23 Apr 2020 00:47 )   PT: 13.7 sec;   INR: 1.19 ratio         PTT - ( 23 Apr 2020 00:47 )  PTT:31.3 sec    PHYSICAL EXAM:  Gen: NAD  Skin: No rashes, bruises, or lesions  Head: Normocephalic, Atraumatic  Face: no edema, erythema, or fluctuance. Parotid glands soft without mass  Eyes: no scleral injection  Nose: Nares bilaterally patent, no discharge  Mouth: No Stridor / Drooling / Trismus.  Mucosa moist, tongue/uvula midline, oropharynx clear  Neck: #8 LPC in place, minimal serosanguinous secretions around stoma, sutures x4 and umbilical tie in place. Flat, supple, no lymphadenopathy, trachea midline, no masses  Lymphatic: No lymphadenopathy  Resp: breathing easily, no stridor  Neuro: unable to assess due to patient's condition ENT ISSUE/POD: POD #1 s/p trach    HPI: 69 y/o male POD #1 s/p tracheostomy. #8 LPC in place 2/2 respiratory failure. Pt is seen and examined at bedside. Pt is doing well on vent. No issues or bleeding overnight per team.    PAST MEDICAL & SURGICAL HISTORY:  Hyperlipidemia  Hypothyroid  No significant past surgical history    Allergies    No Known Allergies    Intolerances      MEDICATIONS  (STANDING):  aspirin  chewable 81 milliGRAM(s) Oral daily  chlorhexidine 0.12% Liquid 15 milliLiter(s) Oral Mucosa every 12 hours  chlorhexidine 2% Cloths 1 Application(s) Topical <User Schedule>  clonazePAM  Tablet 2 milliGRAM(s) Oral every 8 hours  dexAMETHasone  IVPB 10 milliGRAM(s) IV Intermittent daily  enoxaparin Injectable 40 milliGRAM(s) SubCutaneous two times a day  famotidine    Tablet 20 milliGRAM(s) Oral two times a day  insulin lispro (HumaLOG) corrective regimen sliding scale   SubCutaneous every 6 hours  insulin NPH human recombinant 11 Unit(s) SubCutaneous every 6 hours  ketamine Infusion. 0.25 mG/kG/Hr (1.93 mL/Hr) IV Continuous <Continuous>  levothyroxine Injectable 50 MICROGram(s) IV Push at bedtime  norepinephrine Infusion 0.04 MICROgram(s)/kG/Min (5.78 mL/Hr) IV Continuous <Continuous>  oxyCODONE    IR 10 milliGRAM(s) Oral every 6 hours  petrolatum Ophthalmic Ointment 1 Application(s) Both EYES two times a day  polyethylene glycol 3350 17 Gram(s) Oral every 12 hours  senna Syrup 10 milliLiter(s) Oral at bedtime  vasopressin Infusion 0.04 Unit(s)/Min (2.4 mL/Hr) IV Continuous <Continuous>    MEDICATIONS  (PRN):  acetaminophen    Suspension .. 650 milliGRAM(s) Oral every 6 hours PRN Temp greater or equal to 38.5C (101.3F)  HYDROmorphone  Injectable 1 milliGRAM(s) IV Push every 4 hours PRN Moderate Pain (4 - 6)  sodium chloride 0.9% lock flush 10 milliLiter(s) IV Push every 1 hour PRN Pre/post blood products, medications, blood draw, and to maintain line patency      Social History: see consult note    Family history: see consult note    ROS:   Unable to obtain due to patient's condition    Vital Signs Last 24 Hrs  T(C): 36 (24 Apr 2020 12:00), Max: 36.2 (24 Apr 2020 00:00)  T(F): 96.8 (24 Apr 2020 12:00), Max: 97.2 (24 Apr 2020 00:00)  HR: 62 (24 Apr 2020 12:00) (60 - 102)  BP: --  BP(mean): --  RR: 40 (24 Apr 2020 12:00) (13 - 86)  SpO2: 94% (24 Apr 2020 12:00) (93% - 100%)                          9.2    9.96  )-----------( 366      ( 24 Apr 2020 01:02 )             30.3    04-24    136  |  97  |  29<H>  ----------------------------<  199<H>  5.1   |  28  |  0.47<L>    Ca    9.0      24 Apr 2020 01:02  Phos  5.0     04-24  Mg     2.2     04-24    TPro  6.5  /  Alb  2.4<L>  /  TBili  0.3  /  DBili  x   /  AST  21  /  ALT  36  /  AlkPhos  157<H>  04-24   PT/INR - ( 23 Apr 2020 00:47 )   PT: 13.7 sec;   INR: 1.19 ratio         PTT - ( 23 Apr 2020 00:47 )  PTT:31.3 sec    PHYSICAL EXAM:  Gen: NAD  Skin: No rashes, bruises, or lesions  Head: Normocephalic, Atraumatic  Face: no edema, erythema, or fluctuance. Parotid glands soft without mass  Eyes: no scleral injection  Nose: Nares bilaterally patent, no discharge  Mouth: No Stridor / Drooling / Trismus.  Mucosa moist, tongue/uvula midline, oropharynx clear  Neck: #8 LPC in place, minimal serosanguinous secretions around stoma, sutures x4 and umbilical tie in place. Flat, supple, no lymphadenopathy, trachea midline, no masses  Lymphatic: No lymphadenopathy  Resp: on vent  Neuro: unable to assess due to patient's condition

## 2020-04-24 NOTE — PROGRESS NOTE ADULT - PROBLEM SELECTOR PLAN 1
- Plan to remove sutures POD #7  - Do not remove umbilical trach tie  - HOB elevation  - Suction PRN  - Continue trach care  - ENT will continue to follow, call with questions x [phone].

## 2020-04-24 NOTE — PROGRESS NOTE ADULT - SUBJECTIVE AND OBJECTIVE BOX
CLARITA POWELL  MRN-[id number]  Patient is a 70y old  Male who presents with a chief complaint of respiratory distress, COVID 19 (24 Apr 2020 13:00)    HPI:  70M with PMH presents with T dm, hypothyroidism, hld, p/w cough, low grade fever x 2-3 days. Today patient had worsening dyspnea at rest as well as exertion which prompted to come to the ED. Patient otherwise denied chest pain, leg swelling. He denies prior lung disease. Denies any history of smoking.     In ED, patient was noted to be hypoxic on 6L NC to 89% and significant tachypnea. VS otherwise, afebrile, HR in 80s, SBP 150s. Labs notable for CBC wnl. CMP with mild elevated transaminases AST 65 and ALT 47. PAtient noted to be positive for COVID 19. Due to worsening respiratory distress patient was emergently intubated in the ER. (27 Mar 2020 21:38)      Surgery/Hospital course:  COVID 19 Positive  Intubated 3/27  4/9 Paralysis weaned off yesterday, unproned with maintained sats, tolerating diuresis  4/12 10 sec asystole, back after epi x1  4/13 CPAP  4/14- 4/16 unable to tolerate CPAP   4/16 decadron course started  4/17 Failed CPAP. Continue with steroid taper and supportive therapy. Palliative care consult called and appreciated. Family yet to decide on end of life and prior to making any advanced decisions would like to see if any improvement with steroid taper treatment therapy.    4/18 Continues to CPAP trial of 20/5.  Peep lowered to 5 from 6.  Unable to tolerate sedation wean due to increased agitation causing hypoxia.  4/21 ketamine weaned off  4/22 COVID retested, 1U PRBC given for Hct 26 and pressor requirement; Precedex started, but dc'd for bradycardia (pt had asystolic pause on precedex previously)  4/23 Trached, Received convalescent plasma    Today:      REVIEW OF SYSTEMS:  Unable to obtain, vented     Vital Signs Last 24 Hrs  T(C): 36.9 (24 Apr 2020 16:00), Max: 36.9 (24 Apr 2020 16:00)  T(F): 98.4 (24 Apr 2020 16:00), Max: 98.4 (24 Apr 2020 16:00)  HR: 65 (24 Apr 2020 16:21) (60 - 83)  BP: --  BP(mean): --  RR: 43 (24 Apr 2020 16:00) (30 - 43)  SpO2: 91% (24 Apr 2020 16:21) (91% - 100%)    ============================I/O===========================   I&O's Detail    23 Apr 2020 07:01  -  24 Apr 2020 07:00  --------------------------------------------------------  IN:    DOBUTamine Infusion: 40.6 mL    Enteral Tube Flush: 150 mL    Glucerna 1.5: 600 mL    IV PiggyBack: 50 mL    ketamine Infusion.: 73.1 mL    ketamine Infusion.: 28.4 mL    norepinephrine Infusion: 450.8 mL  Total IN: 1392.9 mL    OUT:    Indwelling Catheter - Urethral: 1910 mL  Total OUT: 1910 mL    Total NET: -517.1 mL      24 Apr 2020 07:01  -  24 Apr 2020 18:17  --------------------------------------------------------  IN:    Enteral Tube Flush: 120 mL    Glucerna 1.5: 600 mL    ketamine Infusion.: 85.2 mL    norepinephrine Infusion: 182.4 mL    vasopressin Infusion: 27 mL  Total IN: 1014.6 mL    OUT:    Indwelling Catheter - Urethral: 635 mL  Total OUT: 635 mL    Total NET: 379.6 mL        ============================ LABS =========================                        9.2    9.96  )-----------( 366      ( 24 Apr 2020 01:02 )             30.3     04-24    136  |  97  |  29<H>  ----------------------------<  199<H>  5.1   |  28  |  0.47<L>    Ca    9.0      24 Apr 2020 01:02  Phos  5.0     04-24  Mg     2.2     04-24    TPro  6.5  /  Alb  2.4<L>  /  TBili  0.3  /  DBili  x   /  AST  21  /  ALT  36  /  AlkPhos  157<H>  04-24    LIVER FUNCTIONS - ( 24 Apr 2020 01:02 )  Alb: 2.4 g/dL / Pro: 6.5 g/dL / ALK PHOS: 157 U/L / ALT: 36 U/L / AST: 21 U/L / GGT: x           PT/INR - ( 23 Apr 2020 00:47 )   PT: 13.7 sec;   INR: 1.19 ratio         PTT - ( 23 Apr 2020 00:47 )  PTT:31.3 sec  ABG - ( 24 Apr 2020 15:52 )  pH, Arterial: 7.34  pH, Blood: x     /  pCO2: 65    /  pO2: 67    / HCO3: 34    / Base Excess: 7.5   /  SaO2: 93                  ======================Micro/Rad/Cardio=================  Culture: Reviewed   CXR: Reviewed  ======================================================  PAST MEDICAL & SURGICAL HISTORY:  Hyperlipidemia  Hypothyroid  Schizophrenia  No significant past surgical history  No significant past surgical history    ====================ASSESSMENT ==============  COVID 19 Positive   3/27 Intubated   Hypoxic respiratory failure   Septic Shock  ARDS  Fluid overload      Plan:  ====================== NEUROLOGY=====================  Dilaudid and oxycodone for analgesia.   No neuro function  On ketamine,  will wean as tolerated   Dilaudid and oxycodone for analgesia.     acetaminophen    Suspension .. 650 milliGRAM(s) Oral every 6 hours PRN Temp greater or equal to 38.5C (101.3F)  clonazePAM  Tablet 2 milliGRAM(s) Oral every 8 hours  HYDROmorphone  Injectable 1 milliGRAM(s) IV Push every 4 hours PRN Moderate Pain (4 - 6)  ketamine Infusion. 0.25 mG/kG/Hr (1.93 mL/Hr) IV Continuous <Continuous>  oxyCODONE    IR 10 milliGRAM(s) Oral every 6 hours    ==================== RESPIRATORY======================  On full vent support.   continue to treat respiratory acidosis and titrate Fio2.     Mechanical Ventilation:  Mode: AC/ CMV (Assist Control/ Continuous Mandatory Ventilation)  RR (machine): 35  TV (machine): 350  FiO2: 40  PEEP: 5  ITime: 1  MAP: 18  PIP: 45      ====================CARDIOVASCULAR==================  On pressors support with Levo for hypotension, continue to wean levo as tolerated.   Continue inotropic support for chronotropy       norepinephrine Infusion 0.04 MICROgram(s)/kG/Min (5.78 mL/Hr) IV Continuous <Continuous>    ===================HEMATOLOGIC/ONC ===================  VTE ppx with Lovenox     aspirin  chewable 81 milliGRAM(s) Oral daily  enoxaparin Injectable 40 milliGRAM(s) SubCutaneous two times a day    ===================== RENAL =========================  Continue monitoring urine output, Autodiuresing   Bun/Cr normal, 30/0.37   ccs urine per hr without diuretics, overall -800  monitor lytes and replete as needed.    ==================== GASTROINTESTINAL===================  Tolerating tube feeds,   Pepcid for GI prophylaxis.     famotidine    Tablet 20 milliGRAM(s) Oral two times a day  polyethylene glycol 3350 17 Gram(s) Oral every 12 hours  senna Syrup 10 milliLiter(s) Oral at bedtime  sodium chloride 0.9% lock flush 10 milliLiter(s) IV Push every 1 hour PRN Pre/post blood products, medications, blood draw, and to maintain line patency    =======================    ENDOCRINE  =====================  Glucose control,  NPH sliding scale for Hyperglycemia     dexAMETHasone  IVPB 10 milliGRAM(s) IV Intermittent daily  insulin lispro (HumaLOG) corrective regimen sliding scale   SubCutaneous every 6 hours  insulin NPH human recombinant 11 Unit(s) SubCutaneous every 6 hours  levothyroxine Injectable 75 MICROGram(s) IV Push at bedtime  vasopressin Infusion 0.04 Unit(s)/Min (2.4 mL/Hr) IV Continuous <Continuous>    ========================INFECTIOUS DISEASE================  Completed COVID medication (Plaquenil, Vit C/Thiamine, Anakirna, Solumedrol). Continue to monitor WBC and fever    Decadron weaned down  Received convalescent plasma today   no recent cultures, afebrile.   off abx    FULL CODE  family wants to continue all care at this time.         Patient requires continuous monitoring with bedside rhythm monitoring, pulse ox monitoring, and intermittent blood gas analysis. Care plan discussed with ICU care team. Patient remained critical and at risk for life threatning decompensation.    By signing my name below, I, Joan Richards , attest that this documentation has been prepared under the direction and in the presence of GERRY Smith.  Electronically signed: Rohan Seals, 04-24-20 @ 18:17    I, Jody Ambrosio , personally performed the services described in this documentation. all medical record entries made by the scribe were at my direction and in my presence. I have reviewed the chart and agree that the record reflects my personal performance and is accurate and complete  Electronically signed: GERRY Smith CLARITA POWELL  MRN-[id number]  Patient is a 70y old  Male who presents with a chief complaint of respiratory distress, COVID 19 (24 Apr 2020 13:00)    HPI:  70M with PMH presents with T dm, hypothyroidism, hld, p/w cough, low grade fever x 2-3 days. Today patient had worsening dyspnea at rest as well as exertion which prompted to come to the ED. Patient otherwise denied chest pain, leg swelling. He denies prior lung disease. Denies any history of smoking.     In ED, patient was noted to be hypoxic on 6L NC to 89% and significant tachypnea. VS otherwise, afebrile, HR in 80s, SBP 150s. Labs notable for CBC wnl. CMP with mild elevated transaminases AST 65 and ALT 47. PAtient noted to be positive for COVID 19. Due to worsening respiratory distress patient was emergently intubated in the ER. (27 Mar 2020 21:38)      Surgery/Hospital course:  COVID 19 Positive  Intubated 3/27  4/9 Paralysis weaned off yesterday, unproned with maintained sats, tolerating diuresis  4/12 10 sec asystole, back after epi x1  4/13 CPAP  4/14- 4/16 unable to tolerate CPAP   4/16 decadron course started  4/17 Failed CPAP. Continue with steroid taper and supportive therapy. Palliative care consult called and appreciated. Family yet to decide on end of life and prior to making any advanced decisions would like to see if any improvement with steroid taper treatment therapy.    4/18 Continues to CPAP trial of 20/5.  Peep lowered to 5 from 6.  Unable to tolerate sedation wean due to increased agitation causing hypoxia.  4/21 ketamine weaned off  4/22 COVID retested, 1U PRBC given for Hct 26 and pressor requirement; Precedex started, but dc'd for bradycardia (pt had asystolic pause on precedex previously)  4/23 Trached, Received convalescent plasma  4/24 OG tube removed     Today:  - Oxycodone decreased, still agitated  - Increased ketamine to 1.5 mg for agitation   - OG tube removed, feeding tube replaced   - Does not tolerate CPAP at this time, agitated and tachypnic.  - Afebrile with no Neuo status despite decreased sedation   - Nrml BUN/Cr, Antidiuresing    REVIEW OF SYSTEMS:  Unable to obtain, vented     Vital Signs Last 24 Hrs  T(C): 36.9 (24 Apr 2020 16:00), Max: 36.9 (24 Apr 2020 16:00)  T(F): 98.4 (24 Apr 2020 16:00), Max: 98.4 (24 Apr 2020 16:00)  HR: 65 (24 Apr 2020 16:21) (60 - 83)  BP: --  BP(mean): --  RR: 43 (24 Apr 2020 16:00) (30 - 43)  SpO2: 91% (24 Apr 2020 16:21) (91% - 100%)    ============================I/O===========================   I&O's Detail    23 Apr 2020 07:01  -  24 Apr 2020 07:00  --------------------------------------------------------  IN:    DOBUTamine Infusion: 40.6 mL    Enteral Tube Flush: 150 mL    Glucerna 1.5: 600 mL    IV PiggyBack: 50 mL    ketamine Infusion.: 73.1 mL    ketamine Infusion.: 28.4 mL    norepinephrine Infusion: 450.8 mL  Total IN: 1392.9 mL    OUT:    Indwelling Catheter - Urethral: 1910 mL  Total OUT: 1910 mL    Total NET: -517.1 mL      24 Apr 2020 07:01  -  24 Apr 2020 18:17  --------------------------------------------------------  IN:    Enteral Tube Flush: 120 mL    Glucerna 1.5: 600 mL    ketamine Infusion.: 85.2 mL    norepinephrine Infusion: 182.4 mL    vasopressin Infusion: 27 mL  Total IN: 1014.6 mL    OUT:    Indwelling Catheter - Urethral: 635 mL  Total OUT: 635 mL    Total NET: 379.6 mL        ============================ LABS =========================                        9.2    9.96  )-----------( 366      ( 24 Apr 2020 01:02 )             30.3     04-24    136  |  97  |  29<H>  ----------------------------<  199<H>  5.1   |  28  |  0.47<L>    Ca    9.0      24 Apr 2020 01:02  Phos  5.0     04-24  Mg     2.2     04-24    TPro  6.5  /  Alb  2.4<L>  /  TBili  0.3  /  DBili  x   /  AST  21  /  ALT  36  /  AlkPhos  157<H>  04-24    LIVER FUNCTIONS - ( 24 Apr 2020 01:02 )  Alb: 2.4 g/dL / Pro: 6.5 g/dL / ALK PHOS: 157 U/L / ALT: 36 U/L / AST: 21 U/L / GGT: x           PT/INR - ( 23 Apr 2020 00:47 )   PT: 13.7 sec;   INR: 1.19 ratio         PTT - ( 23 Apr 2020 00:47 )  PTT:31.3 sec  ABG - ( 24 Apr 2020 15:52 )  pH, Arterial: 7.34  pH, Blood: x     /  pCO2: 65    /  pO2: 67    / HCO3: 34    / Base Excess: 7.5   /  SaO2: 93                  ======================Micro/Rad/Cardio=================  Culture: Reviewed   CXR: Reviewed  ======================================================  PAST MEDICAL & SURGICAL HISTORY:  Hyperlipidemia  Hypothyroid  Schizophrenia  No significant past surgical history  No significant past surgical history    ====================ASSESSMENT ==============  COVID 19 Positive   3/27 Intubated   Hypoxic respiratory failure   Septic Shock  ARDS  Fluid overload                Plan:  ====================== NEUROLOGY=====================  oxycodone for analgesia decreased, although patient appears agitated   No neuro function despite lower sedation  Increased ketamine to 1.5 mg for increased agitation   Continue sedatives and analgesics for agitation.     acetaminophen    Suspension .. 650 milliGRAM(s) Oral every 6 hours PRN Temp greater or equal to 38.5C (101.3F)  clonazePAM  Tablet 2 milliGRAM(s) Oral every 8 hours  HYDROmorphone  Injectable 1 milliGRAM(s) IV Push every 4 hours PRN Moderate Pain (4 - 6)  ketamine Infusion. 0.25 mG/kG/Hr (1.93 mL/Hr) IV Continuous <Continuous>  oxyCODONE    IR 10 milliGRAM(s) Oral every 6 hours    ==================== RESPIRATORY======================  On full vent support. Does not tolerate CPAP at this time, appears agitated and tachypnic.   continue to treat respiratory acidosis and attempt to wean Fio2.     Mechanical Ventilation:  Mode: AC/ CMV (Assist Control/ Continuous Mandatory Ventilation)  RR (machine): 35  TV (machine): 350  FiO2: 40  PEEP: 5  ITime: 1  MAP: 18  PIP: 45      ====================CARDIOVASCULAR==================  On pressors support with Levo for hypotension, continue to wean as tolerated.     norepinephrine Infusion 0.04 MICROgram(s)/kG/Min (5.78 mL/Hr) IV Continuous <Continuous>    ===================HEMATOLOGIC/ONC ===================  VTE ppx with Lovenox     aspirin  chewable 81 milliGRAM(s) Oral daily  enoxaparin Injectable 40 milliGRAM(s) SubCutaneous two times a day    ===================== RENAL =========================  Continue monitoring urine output, making adequate urine, overall slightly negative  Autodiuresing   Bun/Cr normal, 29/0.47  monitor lytes and replete as needed.    ==================== GASTROINTESTINAL===================  Tolerating tube feeds, OG tube removed and feeding tube replaced.   Pepcid for GI prophylaxis.     famotidine    Tablet 20 milliGRAM(s) Oral two times a day  polyethylene glycol 3350 17 Gram(s) Oral every 12 hours  senna Syrup 10 milliLiter(s) Oral at bedtime  sodium chloride 0.9% lock flush 10 milliLiter(s) IV Push every 1 hour PRN Pre/post blood products, medications, blood draw, and to maintain line patency    =======================    ENDOCRINE  =====================  Glucose control,  NPH sliding scale for Hyperglycemia, monitor blood sugars    dexAMETHasone  IVPB 10 milliGRAM(s) IV Intermittent daily  insulin lispro (HumaLOG) corrective regimen sliding scale   SubCutaneous every 6 hours  insulin NPH human recombinant 11 Unit(s) SubCutaneous every 6 hours  levothyroxine Injectable 75 MICROGram(s) IV Push at bedtime  vasopressin Infusion 0.04 Unit(s)/Min (2.4 mL/Hr) IV Continuous <Continuous>    ========================INFECTIOUS DISEASE================  Completed COVID medication (Plaquenil, Vit C/Thiamine, Anakirna, Solumedrol). Continue to monitor WBC and fever    Decadron weaned down  S/P plasma 4/23 no signs of improvement   no recent cultures, afebrile.   off abx    FULL CODE  Daughter called and was made aware by nursing staff of no real changes in status, will continue to F/U.         Patient requires continuous monitoring with bedside rhythm monitoring, pulse ox monitoring, and intermittent blood gas analysis. Care plan discussed with ICU care team. Patient remained critical and at risk for life threatning decompensation.    By signing my name below, I, Joan Richards , attest that this documentation has been prepared under the direction and in the presence of GERRY Smith.  Electronically signed: Rohan Seals, 04-24-20 @ 18:17    I, Jody Ambrosio , personally performed the services described in this documentation. all medical record entries made by the kevinibe were at my direction and in my presence. I have reviewed the chart and agree that the record reflects my personal performance and is accurate and complete  Electronically signed: GERRY Smith CLARITA POWELL  MRN-[id number]  Patient is a 70y old  Male who presents with a chief complaint of respiratory distress, COVID 19 (24 Apr 2020 13:00)    HPI:  70M with PMH presents with T dm, hypothyroidism, hld, p/w cough, low grade fever x 2-3 days. Today patient had worsening dyspnea at rest as well as exertion which prompted to come to the ED. Patient otherwise denied chest pain, leg swelling. He denies prior lung disease. Denies any history of smoking.     In ED, patient was noted to be hypoxic on 6L NC to 89% and significant tachypnea. VS otherwise, afebrile, HR in 80s, SBP 150s. Labs notable for CBC wnl. CMP with mild elevated transaminases AST 65 and ALT 47. PAtient noted to be positive for COVID 19. Due to worsening respiratory distress patient was emergently intubated in the ER. (27 Mar 2020 21:38)      Surgery/Hospital course:  COVID 19 Positive  Intubated 3/27  4/9 Paralysis weaned off yesterday, unproned with maintained sats, tolerating diuresis  4/12 10 sec asystole, back after epi x1  4/13 CPAP  4/14- 4/16 unable to tolerate CPAP   4/16 decadron course started  4/17 Failed CPAP. Continue with steroid taper and supportive therapy. Palliative care consult called and appreciated. Family yet to decide on end of life and prior to making any advanced decisions would like to see if any improvement with steroid taper treatment therapy.    4/18 Continues to CPAP trial of 20/5.  Peep lowered to 5 from 6.  Unable to tolerate sedation wean due to increased agitation causing hypoxia.  4/21 ketamine weaned off  4/22 COVID retested, 1U PRBC given for Hct 26 and pressor requirement; Precedex started, but dc'd for bradycardia (pt had asystolic pause on precedex previously)  4/23 Trached, Received convalescent plasma  4/24 OG tube removed     Today:  - Oxycodone decreased, still agitated  - Increased ketamine to 1.5 mg for agitation   - OG tube removed, feeding tube replaced   - Does not tolerate CPAP at this time, agitated and tachypnic.  - Afebrile with no Neuo status despite decreased sedation   - Nrml BUN/Cr, Antodiuresing    REVIEW OF SYSTEMS:  Unable to obtain, vented     Vital Signs Last 24 Hrs  T(C): 36.9 (24 Apr 2020 16:00), Max: 36.9 (24 Apr 2020 16:00)  T(F): 98.4 (24 Apr 2020 16:00), Max: 98.4 (24 Apr 2020 16:00)  HR: 65 (24 Apr 2020 16:21) (60 - 83)  BP: --  BP(mean): --  RR: 43 (24 Apr 2020 16:00) (30 - 43)  SpO2: 91% (24 Apr 2020 16:21) (91% - 100%)    ============================I/O===========================   I&O's Detail    23 Apr 2020 07:01  -  24 Apr 2020 07:00  --------------------------------------------------------  IN:    DOBUTamine Infusion: 40.6 mL    Enteral Tube Flush: 150 mL    Glucerna 1.5: 600 mL    IV PiggyBack: 50 mL    ketamine Infusion.: 73.1 mL    ketamine Infusion.: 28.4 mL    norepinephrine Infusion: 450.8 mL  Total IN: 1392.9 mL    OUT:    Indwelling Catheter - Urethral: 1910 mL  Total OUT: 1910 mL    Total NET: -517.1 mL      24 Apr 2020 07:01  -  24 Apr 2020 18:17  --------------------------------------------------------  IN:    Enteral Tube Flush: 120 mL    Glucerna 1.5: 600 mL    ketamine Infusion.: 85.2 mL    norepinephrine Infusion: 182.4 mL    vasopressin Infusion: 27 mL  Total IN: 1014.6 mL    OUT:    Indwelling Catheter - Urethral: 635 mL  Total OUT: 635 mL    Total NET: 379.6 mL    ============================ LABS =========================                        9.2    9.96  )-----------( 366      ( 24 Apr 2020 01:02 )             30.3     04-24    136  |  97  |  29<H>  ----------------------------<  199<H>  5.1   |  28  |  0.47<L>    Ca    9.0      24 Apr 2020 01:02  Phos  5.0     04-24  Mg     2.2     04-24    TPro  6.5  /  Alb  2.4<L>  /  TBili  0.3  /  DBili  x   /  AST  21  /  ALT  36  /  AlkPhos  157<H>  04-24    LIVER FUNCTIONS - ( 24 Apr 2020 01:02 )  Alb: 2.4 g/dL / Pro: 6.5 g/dL / ALK PHOS: 157 U/L / ALT: 36 U/L / AST: 21 U/L / GGT: x           PT/INR - ( 23 Apr 2020 00:47 )   PT: 13.7 sec;   INR: 1.19 ratio         PTT - ( 23 Apr 2020 00:47 )  PTT:31.3 sec  ABG - ( 24 Apr 2020 15:52 )  pH, Arterial: 7.34  pH, Blood: x     /  pCO2: 65    /  pO2: 67    / HCO3: 34    / Base Excess: 7.5   /  SaO2: 93        ======================Micro/Rad/Cardio=================  Culture: Reviewed   CXR: Reviewed  ======================================================  PAST MEDICAL & SURGICAL HISTORY:  Hyperlipidemia  Hypothyroid  Schizophrenia  No significant past surgical history  No significant past surgical history    ====================ASSESSMENT ==============  COVID 19 Positive   3/27 Intubated   Hypoxic respiratory failure   Septic Shock  ARDS  Fluid overload        Plan:  ====================== NEUROLOGY=====================  oxycodone for analgesia decreased, although patient appears agitated   No neuro function despite lower sedation  Increased ketamine to 1.5 mg for increased agitation   Continue sedatives and analgesics for agitation.     acetaminophen    Suspension .. 650 milliGRAM(s) Oral every 6 hours PRN Temp greater or equal to 38.5C (101.3F)  clonazePAM  Tablet 2 milliGRAM(s) Oral every 8 hours  HYDROmorphone  Injectable 1 milliGRAM(s) IV Push every 4 hours PRN Moderate Pain (4 - 6)  ketamine Infusion. 0.25 mG/kG/Hr (1.93 mL/Hr) IV Continuous <Continuous>  oxyCODONE    IR 10 milliGRAM(s) Oral every 6 hours    ==================== RESPIRATORY======================  On full vent support. Does not tolerate CPAP at this time, appears agitated and tachypnic.   continue to treat respiratory acidosis and attempt to wean Fio2.     Mechanical Ventilation:  Mode: AC/ CMV (Assist Control/ Continuous Mandatory Ventilation)  RR (machine): 35  TV (machine): 350  FiO2: 40  PEEP: 5  ITime: 1  MAP: 18  PIP: 45    ====================CARDIOVASCULAR==================  On pressors support with Levo for hypotension, continue to wean as tolerated.     norepinephrine Infusion 0.04 MICROgram(s)/kG/Min (5.78 mL/Hr) IV Continuous <Continuous>    ===================HEMATOLOGIC/ONC ===================  VTE ppx with Lovenox     aspirin  chewable 81 milliGRAM(s) Oral daily  enoxaparin Injectable 40 milliGRAM(s) SubCutaneous two times a day    ===================== RENAL =========================  Continue monitoring urine output, making adequate urine, overall slightly negative  Autodiuresing   Bun/Cr normal, 29/0.47  monitor lytes and replete as needed.    ==================== GASTROINTESTINAL===================  Tolerating tube feeds, OG tube removed and feeding tube replaced.   Pepcid for GI prophylaxis.     famotidine    Tablet 20 milliGRAM(s) Oral two times a day  polyethylene glycol 3350 17 Gram(s) Oral every 12 hours  senna Syrup 10 milliLiter(s) Oral at bedtime  sodium chloride 0.9% lock flush 10 milliLiter(s) IV Push every 1 hour PRN Pre/post blood products, medications, blood draw, and to maintain line patency    =======================    ENDOCRINE  =====================  Glucose control,  NPH sliding scale for Hyperglycemia, monitor blood sugars    dexAMETHasone  IVPB 10 milliGRAM(s) IV Intermittent daily  insulin lispro (HumaLOG) corrective regimen sliding scale   SubCutaneous every 6 hours  insulin NPH human recombinant 11 Unit(s) SubCutaneous every 6 hours  levothyroxine Injectable 75 MICROGram(s) IV Push at bedtime  vasopressin Infusion 0.04 Unit(s)/Min (2.4 mL/Hr) IV Continuous <Continuous>    ========================INFECTIOUS DISEASE================  Completed COVID medication (Plaquenil, Vit C/Thiamine, Anakirna, Solumedrol). Continue to monitor WBC and fever    Decadron weaned down  S/P plasma 4/23 no signs of improvement   no recent cultures, afebrile.   off abx    FULL CODE  Daughter called and was made aware by nursing staff of no real changes in status, will continue to F/U.       Patient requires continuous monitoring with bedside rhythm monitoring, pulse ox monitoring, and intermittent blood gas analysis. Care plan discussed with ICU care team. Patient remained critical and at risk for life threatning decompensation.    By signing my name below, I, Joan Richards , attest that this documentation has been prepared under the direction and in the presence of GERRY Smith.  Electronically signed: Rohan Seals, 04-24-20 @ 18:17    I, Jody Ambrosio , personally performed the services described in this documentation. all medical record entries made by the kevinibtrae were at my direction and in my presence. I have reviewed the chart and agree that the record reflects my personal performance and is accurate and complete  Electronically signed: GERRY Smith

## 2020-04-24 NOTE — PROGRESS NOTE ADULT - SUBJECTIVE AND OBJECTIVE BOX
CLARITA POWELL  MRN-[id number]  Patient is a 70y old  Male who presents with a chief complaint of respiratory distress, COVID 19 (23 Apr 2020 19:34)    HPI:  70M with PMH presents with T dm, hypothyroidism, hld, p/w cough, low grade fever x 2-3 days. Today patient had worsening dyspnea at rest as well as exertion which prompted to come to the ED. Patient otherwise denied chest pain, leg swelling. He denies prior lung disease. Denies any history of smoking.     In ED, patient was noted to be hypoxic on 6L NC to 89% and significant tachypnea. VS otherwise, afebrile, HR in 80s, SBP 150s. Labs notable for CBC wnl. CMP with mild elevated transaminases AST 65 and ALT 47. PAtient noted to be positive for COVID 19. Due to worsening respiratory distress patient was emergently intubated in the ER. (27 Mar 2020 21:38)      Surgery/Hospital course:  COVID 19 Positive  Intubated 3/27  4/9 Paralysis weaned off yesterday, unproned with maintained sats, tolerating diuresis  4/12 10 sec asystole, back after epi x1  4/13 CPAP  4/14- 4/16 unable to tolerate CPAP   4/16 decadron course started  4/17 Failed CPAP. Continue with steroid taper and supportive therapy. Palliative care consult called and appreciated. Family yet to decide on end of life and prior to making any advanced decisions would like to see if any improvement with steroid taper treatment therapy.    4/18 Continues to CPAP trial of 20/5.  Peep lowered to 5 from 6.  Unable to tolerate sedation wean due to increased agitation causing hypoxia.  4/21 ketamine weaned off  4/22 COVID retested, 1U PRBC given for Hct 26 and pressor requirement; Precedex started, but dc'd for bradycardia (pt had asystolic pause on precedex previously)  4/23 Trached, Received convalescent plasma    REVIEW OF SYSTEMS:  Unable to obtain, vented     Vital Signs Last 24 Hrs  T(C): 36.1 (24 Apr 2020 04:00), Max: 36.8 (23 Apr 2020 08:00)  T(F): 97 (24 Apr 2020 04:00), Max: 98.2 (23 Apr 2020 08:00)  HR: 71 (24 Apr 2020 05:00) (60 - 102)  BP: --  BP(mean): --  RR: 40 (24 Apr 2020 05:00) (13 - 86)  SpO2: 93% (24 Apr 2020 05:00) (85% - 100%)    ============================I/O===========================   I&O's Detail    23 Apr 2020 07:01  -  24 Apr 2020 07:00  --------------------------------------------------------  IN:    DOBUTamine Infusion: 40.6 mL    Enteral Tube Flush: 150 mL    Glucerna 1.5: 600 mL    IV PiggyBack: 50 mL    ketamine Infusion.: 21.3 mL    ketamine Infusion.: 73.1 mL    norepinephrine Infusion: 433.5 mL  Total IN: 1368.5 mL    OUT:    Indwelling Catheter - Urethral: 1835 mL  Total OUT: 1835 mL    Total NET: -466.5 mL        ============================ LABS =========================                        9.2    9.96  )-----------( 366      ( 24 Apr 2020 01:02 )             30.3     04-24    136  |  97  |  29<H>  ----------------------------<  199<H>  5.1   |  28  |  0.47<L>    Ca    9.0      24 Apr 2020 01:02  Phos  5.0     04-24  Mg     2.2     04-24    TPro  6.5  /  Alb  2.4<L>  /  TBili  0.3  /  DBili  x   /  AST  21  /  ALT  36  /  AlkPhos  157<H>  04-24    LIVER FUNCTIONS - ( 24 Apr 2020 01:02 )  Alb: 2.4 g/dL / Pro: 6.5 g/dL / ALK PHOS: 157 U/L / ALT: 36 U/L / AST: 21 U/L / GGT: x           PT/INR - ( 23 Apr 2020 00:47 )   PT: 13.7 sec;   INR: 1.19 ratio         PTT - ( 23 Apr 2020 00:47 )  PTT:31.3 sec  ABG - ( 24 Apr 2020 01:29 )  pH, Arterial: 7.30  pH, Blood: x     /  pCO2: 68    /  pO2: 82    / HCO3: 32    / Base Excess: 4.7   /  SaO2: 96                  ======================Micro/Rad/Cardio=================  Culture: Reviewed   CXR: Reviewed  ======================================================  PAST MEDICAL & SURGICAL HISTORY:  Hyperlipidemia  Hypothyroid  No significant past surgical history    ====================ASSESSMENT ==============  COVID 19 Positive   3/27 Intubated   Hypoxic respiratory failure   Septic Shock  ARDS  Fluid overload    Plan:  ====================== NEUROLOGY=====================  Sedated with Klonopin, Ketamine   Dilaudid and oxycodone for analgesia     acetaminophen    Suspension .. 650 milliGRAM(s) Oral every 6 hours PRN Temp greater or equal to 38.5C (101.3F)  clonazePAM  Tablet 2 milliGRAM(s) Oral every 8 hours  HYDROmorphone  Injectable 1 milliGRAM(s) IV Push every 4 hours PRN Moderate Pain (4 - 6)  ketamine Infusion. 0.25 mG/kG/Hr (1.93 mL/Hr) IV Continuous <Continuous>  oxyCODONE    IR 20 milliGRAM(s) Oral every 6 hours    ==================== RESPIRATORY======================  S/p trach   On full vent support     Mechanical Ventilation:  Mode: AC/ CMV (Assist Control/ Continuous Mandatory Ventilation)  RR (machine): 35  TV (machine): 360  FiO2: 40  PEEP: 5  ITime: 1  MAP: 15  PIP: 38      ====================CARDIOVASCULAR==================  On pressor support for hypotension    norepinephrine Infusion 0.04 MICROgram(s)/kG/Min (5.78 mL/Hr) IV Continuous <Continuous>    ===================HEMATOLOGIC/ONC ===================  aspirin  chewable 81 milliGRAM(s) Oral daily  VTE prophylaxis, enoxaparin Injectable 40 milliGRAM(s) SubCutaneous two times a day    ===================== RENAL =========================  Continue monitoring urine output    ==================== GASTROINTESTINAL===================  Tolerating tube feeds, Glucerna 300 [id number]     GI prophylaxis, famotidine    Tablet 20 milliGRAM(s) Oral two times a day  polyethylene glycol 3350 17 Gram(s) Oral every 12 hours  senna Syrup 10 milliLiter(s) Oral at bedtime  sodium chloride 0.9% lock flush 10 milliLiter(s) IV Push every 1 hour PRN Pre/post blood products, medications, blood draw, and to maintain line patency    =======================    ENDOCRINE  =====================  Glucose control,   insulin lispro (HumaLOG) corrective regimen sliding scale   SubCutaneous every 6 hours  insulin NPH human recombinant 11 Unit(s) SubCutaneous every 6 hours  levothyroxine Injectable 50 MICROGram(s) IV Push at bedtime    ========================INFECTIOUS DISEASE================  Completed COVID medication (Plaquenil, Vit C/Thiamine, Anakirna, Solumedrol)  S/p convalescent plasma yesterday   C/w decadron trial  dexAMETHasone  IVPB 10 milliGRAM(s) IV Intermittent daily      Patient requires continuous monitoring with bedside rhythm monitoring, pulse ox monitoring, and intermittent blood gas analysis. Care plan discussed with ICU care team. Patient remained critical and at risk for life threatening decompensation.    By signing my name below, I, Pretty Wallace, attest that this documentation has been prepared under the direction and in the presence of Yg Vital MD   Electronically signed: Rohan Lal, 04-24-20 @ 07:20    I, Yg Vital, personally performed the services described in this documentation. all medical record entries made by the scribe were at my direction and in my presence. I have reviewed the chart and agree that the record reflects my personal performance and is accurate and complete  Electronically signed: Yg Vital MD CLARITA POWELL  MRN-[id number]  Patient is a 70y old  Male who presents with a chief complaint of respiratory distress, COVID 19 (23 Apr 2020 19:34)    HPI:  70M with PMH presents with T dm, hypothyroidism, hld, p/w cough, low grade fever x 2-3 days. Today patient had worsening dyspnea at rest as well as exertion which prompted to come to the ED. Patient otherwise denied chest pain, leg swelling. He denies prior lung disease. Denies any history of smoking.     In ED, patient was noted to be hypoxic on 6L NC to 89% and significant tachypnea. VS otherwise, afebrile, HR in 80s, SBP 150s. Labs notable for CBC wnl. CMP with mild elevated transaminases AST 65 and ALT 47. PAtient noted to be positive for COVID 19. Due to worsening respiratory distress patient was emergently intubated in the ER. (27 Mar 2020 21:38)      Surgery/Hospital course:  COVID 19 Positive  Intubated 3/27  4/9 Paralysis weaned off yesterday, unproned with maintained sats, tolerating diuresis  4/12 10 sec asystole, back after epi x1  4/13 CPAP  4/14- 4/16 unable to tolerate CPAP   4/16 decadron course started  4/17 Failed CPAP. Continue with steroid taper and supportive therapy. Palliative care consult called and appreciated. Family yet to decide on end of life and prior to making any advanced decisions would like to see if any improvement with steroid taper treatment therapy.    4/18 Continues to CPAP trial of 20/5.  Peep lowered to 5 from 6.  Unable to tolerate sedation wean due to increased agitation causing hypoxia.  4/21 ketamine weaned off  4/22 COVID retested, 1U PRBC given for Hct 26 and pressor requirement; Precedex started, but dc'd for bradycardia (pt had asystolic pause on precedex previously)  4/23 Trached, Received convalescent plasma    REVIEW OF SYSTEMS:  Unable to obtain, vented     Vital Signs Last 24 Hrs  T(C): 36.1 (24 Apr 2020 04:00), Max: 36.8 (23 Apr 2020 08:00)  T(F): 97 (24 Apr 2020 04:00), Max: 98.2 (23 Apr 2020 08:00)  HR: 71 (24 Apr 2020 05:00) (60 - 102)  BP: --  BP(mean): --  RR: 40 (24 Apr 2020 05:00) (13 - 86)  SpO2: 93% (24 Apr 2020 05:00) (85% - 100%)    ============================I/O===========================   I&O's Detail    23 Apr 2020 07:01  -  24 Apr 2020 07:00  --------------------------------------------------------  IN:    DOBUTamine Infusion: 40.6 mL    Enteral Tube Flush: 150 mL    Glucerna 1.5: 600 mL    IV PiggyBack: 50 mL    ketamine Infusion.: 21.3 mL    ketamine Infusion.: 73.1 mL    norepinephrine Infusion: 433.5 mL  Total IN: 1368.5 mL    OUT:    Indwelling Catheter - Urethral: 1835 mL  Total OUT: 1835 mL    Total NET: -466.5 mL        ============================ LABS =========================                        9.2    9.96  )-----------( 366      ( 24 Apr 2020 01:02 )             30.3     04-24    136  |  97  |  29<H>  ----------------------------<  199<H>  5.1   |  28  |  0.47<L>    Ca    9.0      24 Apr 2020 01:02  Phos  5.0     04-24  Mg     2.2     04-24    TPro  6.5  /  Alb  2.4<L>  /  TBili  0.3  /  DBili  x   /  AST  21  /  ALT  36  /  AlkPhos  157<H>  04-24    LIVER FUNCTIONS - ( 24 Apr 2020 01:02 )  Alb: 2.4 g/dL / Pro: 6.5 g/dL / ALK PHOS: 157 U/L / ALT: 36 U/L / AST: 21 U/L / GGT: x           PT/INR - ( 23 Apr 2020 00:47 )   PT: 13.7 sec;   INR: 1.19 ratio         PTT - ( 23 Apr 2020 00:47 )  PTT:31.3 sec  ABG - ( 24 Apr 2020 01:29 )  pH, Arterial: 7.30  pH, Blood: x     /  pCO2: 68    /  pO2: 82    / HCO3: 32    / Base Excess: 4.7   /  SaO2: 96                  ======================Micro/Rad/Cardio=================  Culture: Reviewed   CXR: Reviewed  ======================================================  PAST MEDICAL & SURGICAL HISTORY:  Hyperlipidemia  Hypothyroid  No significant past surgical history    ====================ASSESSMENT ==============  COVID 19 Positive   3/27 Intubated   Hypoxic respiratory failure   Septic Shock  ARDS  Fluid overload    Plan:  ====================== NEUROLOGY=====================  Sedated with Klonopin, Ketamine goal RASS -1 to 0  Dilaudid and oxycodone for analgesia, will decrease oxycodone dosage to 10mg po q6hrs    acetaminophen    Suspension .. 650 milliGRAM(s) Oral every 6 hours PRN Temp greater or equal to 38.5C (101.3F)  clonazePAM  Tablet 2 milliGRAM(s) Oral every 8 hours  HYDROmorphone  Injectable 1 milliGRAM(s) IV Push every 4 hours PRN Moderate Pain (4 - 6)  ketamine Infusion. 0.25 mG/kG/Hr (1.93 mL/Hr) IV Continuous <Continuous>  oxyCODONE    IR 20 milliGRAM(s) Oral every 6 hours    ==================== RESPIRATORY======================  S/p trach   On full vent support     Mechanical Ventilation:  Mode: AC/ CMV (Assist Control/ Continuous Mandatory Ventilation)  RR (machine): 35  TV (machine): 360  FiO2: 40  PEEP: 5  ITime: 1  MAP: 15  PIP: 38      ====================CARDIOVASCULAR==================  On pressor support for hypotension  Will add vasopressin to help wean NE dosage  norepinephrine Infusion 0.04 MICROgram(s)/kG/Min (5.78 mL/Hr) IV Continuous <Continuous>    ===================HEMATOLOGIC/ONC ===================  aspirin  chewable 81 milliGRAM(s) Oral daily  VTE prophylaxis, enoxaparin Injectable 40 milliGRAM(s) SubCutaneous two times a day    ===================== RENAL =========================  Continue monitoring urine output    ==================== GASTROINTESTINAL===================  Tolerating tube feeds, Glucerna 300 [id number]   -Will d/c OGT and place NGT as pt now trached and no good way to secure OGT    GI prophylaxis, famotidine    Tablet 20 milliGRAM(s) Oral two times a day  polyethylene glycol 3350 17 Gram(s) Oral every 12 hours  senna Syrup 10 milliLiter(s) Oral at bedtime  sodium chloride 0.9% lock flush 10 milliLiter(s) IV Push every 1 hour PRN Pre/post blood products, medications, blood draw, and to maintain line patency    =======================    ENDOCRINE  =====================  Glucose control,   insulin lispro (HumaLOG) corrective regimen sliding scale   SubCutaneous every 6 hours  insulin NPH human recombinant 11 Unit(s) SubCutaneous every 6 hours  levothyroxine Injectable 50 MICROGram(s) IV Push at bedtime    ========================INFECTIOUS DISEASE================  Completed COVID medication (Plaquenil, Vit C/Thiamine, Anakirna, Solumedrol)  S/p convalescent plasma yesterday   C/w decadron trial  dexAMETHasone  IVPB 10 milliGRAM(s) IV Intermittent daily      Patient requires continuous monitoring with bedside rhythm monitoring, pulse ox monitoring, and intermittent blood gas analysis. Care plan discussed with ICU care team. Patient remained critical and at risk for life threatening decompensation.    By signing my name below, I, Pretty Wallace, attest that this documentation has been prepared under the direction and in the presence of Yg Vital MD   Electronically signed: Rohan Lal, 04-24-20 @ 07:20    I, Yg Vital, personally performed the services described in this documentation. all medical record entries made by the kevinibtrae were at my direction and in my presence. I have reviewed the chart and agree that the record reflects my personal performance and is accurate and complete  Electronically signed: Yg Vital MD

## 2020-04-25 NOTE — PROGRESS NOTE ADULT - SUBJECTIVE AND OBJECTIVE BOX
CLARITA POWELL  MRN-[id number]  Patient is a 70y old  Male who presents with a chief complaint of respiratory distress, COVID 19 (2020 18:16)    HPI:  70M with PMH presents with T dm, hypothyroidism, hld, p/w cough, low grade fever x 2-3 days. Today patient had worsening dyspnea at rest as well as exertion which prompted to come to the ED. Patient otherwise denied chest pain, leg swelling. He denies prior lung disease. Denies any history of smoking.     In ED, patient was noted to be hypoxic on 6L NC to 89% and significant tachypnea. VS otherwise, afebrile, HR in 80s, SBP 150s. Labs notable for CBC wnl. CMP with mild elevated transaminases AST 65 and ALT 47. PAtient noted to be positive for COVID 19. Due to worsening respiratory distress patient was emergently intubated in the ER. (27 Mar 2020 21:38)      Surgery/Hospital course:  COVID 19 Positive  Intubated 3/27  4/9 Paralysis weaned off yesterday, unproned with maintained sats, tolerating diuresis   10 sec asystole, back after epi x1   CPAP  -  unable to tolerate CPAP    decadron course started   Failed CPAP. Continue with steroid taper and supportive therapy. Palliative care consult called and appreciated. Family yet to decide on end of life and prior to making any advanced decisions would like to see if any improvement with steroid taper treatment therapy.     Continues to CPAP trial of 20/5.  Peep lowered to 5 from 6.  Unable to tolerate sedation wean due to increased agitation causing hypoxia.   ketamine weaned off   COVID retested, 1U PRBC given for Hct 26 and pressor requirement; Precedex started, but dc'd for bradycardia (pt had asystolic pause on precedex previously)   Trached, Received convalescent plasma   OG tube removed     REVIEW OF SYSTEMS:  Unable to obtain, vented     Vital Signs Last 24 Hrs  T(C): 38.2 (2020 04:00), Max: 38.5 (2020 00:00)  T(F): 100.7 (2020 04:00), Max: 101.3 (2020 00:00)  HR: 64 (2020 06:00) (62 - 81)  BP: --  BP(mean): --  RR: 35 (2020 06:00) (7 - 44)  SpO2: 90% (2020 06:00) (90% - 95%)    ============================I/O===========================   I&O's Detail    2020 07:01  -  2020 07:00  --------------------------------------------------------  IN:    DOBUTamine Infusion: 17.4 mL    Enteral Tube Flush: 150 mL    Glucerna 1.5: 1200 mL    ketamine Infusion.: 210.9 mL    norepinephrine Infusion: 427.9 mL    vasopressin Infusion: 76 mL  Total IN: 2082.2 mL    OUT:    Indwelling Catheter - Urethral: 1505 mL  Total OUT: 1505 mL    Total NET: 577.2 mL        ============================ LABS =========================                        8.7    8.34  )-----------( 342      ( 2020 01:36 )             29.8     04-25    140  |  98  |  24<H>  ----------------------------<  108<H>  4.6   |  32<H>  |  0.41<L>    Ca    9.2      2020 01:30  Phos  2.8     0425  Mg     2.2     25    TPro  6.4  /  Alb  2.5<L>  /  TBili  0.2  /  DBili  x   /  AST  24  /  ALT  36  /  AlkPhos  205<H>  04    LIVER FUNCTIONS - ( 2020 01:30 )  Alb: 2.5 g/dL / Pro: 6.4 g/dL / ALK PHOS: 205 U/L / ALT: 36 U/L / AST: 24 U/L / GGT: x           PT/INR - ( 2020 01:36 )   PT: 15.3 sec;   INR: 1.32 ratio         PTT - ( 2020 01:36 )  PTT:37.2 sec  ABG - ( 2020 04:21 )  pH, Arterial: 7.34  pH, Blood: x     /  pCO2: 69    /  pO2: 76    / HCO3: 36    / Base Excess: 8.8   /  SaO2: 95                Urinalysis Basic - ( 2020 02:04 )    Color: Yellow / Appearance: Slightly Turbid / S.024 / pH: x  Gluc: x / Ketone: Negative  / Bili: Negative / Urobili: 2 mg/dL   Blood: x / Protein: 100 mg/dL / Nitrite: Negative   Leuk Esterase: Negative / RBC: 3-5 /hpf / WBC 0-2   Sq Epi: x / Non Sq Epi: Few / Bacteria: Few      ======================Micro/Rad/Cardio=================  Culture: Reviewed   CXR: Reviewed  ======================================================  PAST MEDICAL & SURGICAL HISTORY:  Hyperlipidemia  Hypothyroid  Schizophrenia  No significant past surgical history  No significant past surgical history    ====================ASSESSMENT ==============  COVID 19 Positive   3/27 Intubated   Hypoxic respiratory failure   Septic Shock  ARDS  Fluid overload    Plan:  ====================== NEUROLOGY=====================  Sedated with Klonopin, Ketamine   Dilaudid and oxycodone for analgesia   Continue to monitor neuro status     acetaminophen    Suspension .. 650 milliGRAM(s) Oral every 6 hours PRN Temp greater or equal to 38.5C (101.3F)  clonazePAM  Tablet 2 milliGRAM(s) Oral every 8 hours  HYDROmorphone  Injectable 1 milliGRAM(s) IV Push every 4 hours PRN Moderate Pain (4 - 6)  ketamine Infusion. 0.25 mG/kG/Hr (1.93 mL/Hr) IV Continuous <Continuous>  oxyCODONE    IR 10 milliGRAM(s) Oral every 6 hours    ==================== RESPIRATORY======================  On full vent support     Mechanical Ventilation:  Mode: AC/ CMV (Assist Control/ Continuous Mandatory Ventilation)  RR (machine): 35  TV (machine): 350  FiO2: 60  PEEP: 5  ITime: 1  MAP: 15  PIP: 45      ====================CARDIOVASCULAR==================  Inotropic support   On pressor support for hypotension     DOBUTamine Infusion 2.5 MICROgram(s)/kG/Min (5.78 mL/Hr) IV Continuous <Continuous>  norepinephrine Infusion 0.04 MICROgram(s)/kG/Min (5.78 mL/Hr) IV Continuous <Continuous>  vasopressin Infusion 0.04 Unit(s)/Min (2.4 mL/Hr) IV Continuous <Continuous>    ===================HEMATOLOGIC/ONC ===================  aspirin  chewable 81 milliGRAM(s) Oral daily  VTE prophylaxis, enoxaparin Injectable 40 milliGRAM(s) SubCutaneous two times a day    ===================== RENAL =========================  Continue monitoring urine output  Monitor and replete electrolytes as needed     ==================== GASTROINTESTINAL===================  Tolerating tube feeds, Glucerna 300 [id number]     GI prophylaxis, famotidine    Tablet 20 milliGRAM(s) Oral two times a day  polyethylene glycol 3350 17 Gram(s) Oral every 12 hours  senna Syrup 10 milliLiter(s) Oral at bedtime  sodium chloride 0.9% lock flush 10 milliLiter(s) IV Push every 1 hour PRN Pre/post blood products, medications, blood draw, and to maintain line patency    =======================    ENDOCRINE  =====================  Glucose control,   insulin lispro (HumaLOG) corrective regimen sliding scale   SubCutaneous every 6 hours  insulin NPH human recombinant 11 Unit(s) SubCutaneous every 6 hours  levothyroxine Injectable 75 MICROGram(s) IV Push at bedtime    ========================INFECTIOUS DISEASE================  Completed COVID medication (Plaquenil, Vit C/Thiamine, Anakirna, Solumedrol)  S/p convalescent plasma on    C/w Decadron trial    dexAMETHasone  IVPB 10 milliGRAM(s) IV Intermittent daily        Patient requires continuous monitoring with bedside rhythm monitoring, pulse ox monitoring, and intermittent blood gas analysis. Care plan discussed with ICU care team. Patient remained critical and at risk for life threatening decompensation.    By signing my name below, I, Pretty Wallace, attest that this documentation has been prepared under the direction and in the presence of GERRY Dsouza.  Electronically signed: Rohan Lal, 20 @ 07:19    I, Nora Saini, personally performed the services described in this documentation. all medical record entries made by the kevinibtrae were at my direction and in my presence. I have reviewed the chart and agree that the record reflects my personal performance and is accurate and complete  Electronically signed: GERRY Dsouza CLARITA POWELL  MRN-[id number]  Patient is a 70y old  Male who presents with a chief complaint of respiratory distress, COVID 19 (2020 18:16)    HPI:  70M with PMH presents with T dm, hypothyroidism, hld, p/w cough, low grade fever x 2-3 days. Today patient had worsening dyspnea at rest as well as exertion which prompted to come to the ED. Patient otherwise denied chest pain, leg swelling. He denies prior lung disease. Denies any history of smoking.     In ED, patient was noted to be hypoxic on 6L NC to 89% and significant tachypnea. VS otherwise, afebrile, HR in 80s, SBP 150s. Labs notable for CBC wnl. CMP with mild elevated transaminases AST 65 and ALT 47. PAtient noted to be positive for COVID 19. Due to worsening respiratory distress patient was emergently intubated in the ER. (27 Mar 2020 21:38)      Surgery/Hospital course:  COVID 19 Positive  Intubated 3/27  4/9 Paralysis weaned off yesterday, unproned with maintained sats, tolerating diuresis   10 sec asystole, back after epi x1   CPAP  -  unable to tolerate CPAP    decadron course started   Failed CPAP. Continue with steroid taper and supportive therapy. Palliative care consult called and appreciated. Family yet to decide on end of life and prior to making any advanced decisions would like to see if any improvement with steroid taper treatment therapy.     Continues to CPAP trial of 20/5.  Peep lowered to 5 from 6.  Unable to tolerate sedation wean due to increased agitation causing hypoxia.   ketamine weaned off   COVID retested, 1U PRBC given for Hct 26 and pressor requirement; Precedex started, but dc'd for bradycardia (pt had asystolic pause on precedex previously)   Trached, Received convalescent plasma   OG tube removed      REVIEW OF SYSTEMS:  Unable to obtain, vented     Vital Signs Last 24 Hrs  T(C): 38.2 (2020 04:00), Max: 38.5 (2020 00:00)  T(F): 100.7 (2020 04:00), Max: 101.3 (2020 00:00)  HR: 64 (2020 06:00) (62 - 81)  BP: --  BP(mean): --  RR: 35 (2020 06:00) (7 - 44)  SpO2: 90% (2020 06:00) (90% - 95%)    ============================I/O===========================   I&O's Detail    2020 07:01  -  2020 07:00  --------------------------------------------------------  IN:    DOBUTamine Infusion: 17.4 mL    Enteral Tube Flush: 150 mL    Glucerna 1.5: 1200 mL    ketamine Infusion.: 210.9 mL    norepinephrine Infusion: 427.9 mL    vasopressin Infusion: 76 mL  Total IN: 2082.2 mL    OUT:    Indwelling Catheter - Urethral: 1505 mL  Total OUT: 1505 mL    Total NET: 577.2 mL      ============================ LABS =========================                        8.7    8.34  )-----------( 342      ( 2020 01:36 )             29.8     04-25    140  |  98  |  24<H>  ----------------------------<  108<H>  4.6   |  32<H>  |  0.41<L>    Ca    9.2      2020 01:30  Phos  2.8     0425  Mg     2.2     25    TPro  6.4  /  Alb  2.5<L>  /  TBili  0.2  /  DBili  x   /  AST  24  /  ALT  36  /  AlkPhos  205<H>  04    LIVER FUNCTIONS - ( 2020 01:30 )  Alb: 2.5 g/dL / Pro: 6.4 g/dL / ALK PHOS: 205 U/L / ALT: 36 U/L / AST: 24 U/L / GGT: x           PT/INR - ( 2020 01:36 )   PT: 15.3 sec;   INR: 1.32 ratio         PTT - ( 2020 01:36 )  PTT:37.2 sec  ABG - ( 2020 04:21 )  pH, Arterial: 7.34  pH, Blood: x     /  pCO2: 69    /  pO2: 76    / HCO3: 36    / Base Excess: 8.8   /  SaO2: 95          Urinalysis Basic - ( 2020 02:04 )    Color: Yellow / Appearance: Slightly Turbid / S.024 / pH: x  Gluc: x / Ketone: Negative  / Bili: Negative / Urobili: 2 mg/dL   Blood: x / Protein: 100 mg/dL / Nitrite: Negative   Leuk Esterase: Negative / RBC: 3-5 /hpf / WBC 0-2   Sq Epi: x / Non Sq Epi: Few / Bacteria: Few      ======================Micro/Rad/Cardio=================  Culture: Reviewed   CXR: Reviewed  ======================================================  PAST MEDICAL & SURGICAL HISTORY:  Hyperlipidemia  Hypothyroid  Schizophrenia  No significant past surgical history  No significant past surgical history    ====================ASSESSMENT ==============  COVID 19 Positive   3/27 Intubated   Hypoxic respiratory failure s/p tracheotomy  Septic Shock  ARDS  Fluid overload    Plan:  ====================== NEUROLOGY=====================  Ketamine increased overnight for agitation and vent dyssynchrony.  Will add haldol to help inability to wean sedation.  Dilaudid and oxycodone for analgesia.  Continue to monitor neuro status.    acetaminophen    Suspension .. 650 milliGRAM(s) Oral every 6 hours PRN Temp greater or equal to 38.5C (101.3F)  clonazePAM  Tablet 2 milliGRAM(s) Oral every 8 hours  HYDROmorphone  Injectable 1 milliGRAM(s) IV Push every 4 hours PRN Moderate Pain (4 - 6)  ketamine Infusion. 0.25 mG/kG/Hr (1.93 mL/Hr) IV Continuous <Continuous>  oxyCODONE    IR 10 milliGRAM(s) Oral every 6 hours    ==================== RESPIRATORY======================  S/p trach on full vent support. Continue to wean FiO2. CPAP trials as tolerated.    Mechanical Ventilation:  Mode: AC/ CMV (Assist Control/ Continuous Mandatory Ventilation)  RR (machine): 35  TV (machine): 350  FiO2: 60  PEEP: 5  ITime: 1  MAP: 15  PIP: 45    ====================CARDIOVASCULAR==================  Pt had episode of bradycardia with hypotension overnight. Continue dobutamine for chronotropy.  Continue pressor support for hypotension.    DOBUTamine Infusion 2.5 MICROgram(s)/kG/Min (5.78 mL/Hr) IV Continuous <Continuous>  norepinephrine Infusion 0.04 MICROgram(s)/kG/Min (5.78 mL/Hr) IV Continuous <Continuous>  vasopressin Infusion 0.04 Unit(s)/Min (2.4 mL/Hr) IV Continuous <Continuous>    ===================HEMATOLOGIC/ONC ===================  aspirin  chewable 81 milliGRAM(s) Oral daily  VTE prophylaxis, enoxaparin Injectable 40 milliGRAM(s) SubCutaneous two times a day    ===================== RENAL =========================  Continue monitoring urine output.  Monitor and replete electrolytes as needed.    ==================== GASTROINTESTINAL===================  Tolerating tube feeds, Glucerna 300 [id number]     GI prophylaxis, famotidine    Tablet 20 milliGRAM(s) Oral two times a day  polyethylene glycol 3350 17 Gram(s) Oral every 12 hours  senna Syrup 10 milliLiter(s) Oral at bedtime  sodium chloride 0.9% lock flush 10 milliLiter(s) IV Push every 1 hour PRN Pre/post blood products, medications, blood draw, and to maintain line patency    =======================    ENDOCRINE  =====================  Glucose control.  insulin lispro (HumaLOG) corrective regimen sliding scale   SubCutaneous every 6 hours  insulin NPH human recombinant 11 Unit(s) SubCutaneous every 6 hours  levothyroxine Injectable 75 MICROGram(s) IV Push at bedtime    ========================INFECTIOUS DISEASE=================  Completed COVID medication (Plaquenil, Vit C/Thiamine, Anakirna, Solumedrol)  S/p convalescent plasma on    C/w Decadron trial.    dexAMETHasone  IVPB 10 milliGRAM(s) IV Intermittent daily    ========================GOALS OF CARE====================          Patient requires continuous monitoring with bedside rhythm monitoring, pulse ox monitoring, and intermittent blood gas analysis. Care plan discussed with ICU care team. Patient remained critical and at risk for life threatening decompensation.    By signing my name below, I, Pretty Wallace, attest that this documentation has been prepared under the direction and in the presence of GERRY Dsouza.  Electronically signed: Rohan Lal, 20 @ 07:19    I, Nora Saini, personally performed the services described in this documentation. all medical record entries made by the scribe were at my direction and in my presence. I have reviewed the chart and agree that the record reflects my personal performance and is accurate and complete  Electronically signed: GERRY Dsouza CLARITA POWELL  MRN-[id number]  Patient is a 70y old  Male who presents with a chief complaint of respiratory distress, COVID 19 (2020 18:16)    HPI:  70M with PMH presents with T dm, hypothyroidism, hld, p/w cough, low grade fever x 2-3 days. Today patient had worsening dyspnea at rest as well as exertion which prompted to come to the ED. Patient otherwise denied chest pain, leg swelling. He denies prior lung disease. Denies any history of smoking.     In ED, patient was noted to be hypoxic on 6L NC to 89% and significant tachypnea. VS otherwise, afebrile, HR in 80s, SBP 150s. Labs notable for CBC wnl. CMP with mild elevated transaminases AST 65 and ALT 47. PAtient noted to be positive for COVID 19. Due to worsening respiratory distress patient was emergently intubated in the ER. (27 Mar 2020 21:38)      Surgery/Hospital course:  COVID 19 Positive  Intubated 3/27  4/9 Paralysis weaned off yesterday, unproned with maintained sats, tolerating diuresis   10 sec asystole, back after epi x1   CPAP  -  unable to tolerate CPAP    decadron course started   Failed CPAP. Continue with steroid taper and supportive therapy. Palliative care consult called and appreciated. Family yet to decide on end of life and prior to making any advanced decisions would like to see if any improvement with steroid taper treatment therapy.     Continues to CPAP trial of 20/5.  Peep lowered to 5 from 6.  Unable to tolerate sedation wean due to increased agitation causing hypoxia.   ketamine weaned off   COVID retested, 1U PRBC given for Hct 26 and pressor requirement; Precedex started, but dc'd for bradycardia (pt had asystolic pause on precedex previously)   Trached, Received convalescent plasma   OG tube removed      REVIEW OF SYSTEMS:  Unable to obtain, vented     Vital Signs Last 24 Hrs  T(C): 38.2 (2020 04:00), Max: 38.5 (2020 00:00)  T(F): 100.7 (2020 04:00), Max: 101.3 (2020 00:00)  HR: 64 (2020 06:00) (62 - 81)  BP: --  BP(mean): --  RR: 35 (2020 06:00) (7 - 44)  SpO2: 90% (2020 06:00) (90% - 95%)    ============================I/O===========================   I&O's Detail    2020 07:01  -  2020 07:00  --------------------------------------------------------  IN:    DOBUTamine Infusion: 17.4 mL    Enteral Tube Flush: 150 mL    Glucerna 1.5: 1200 mL    ketamine Infusion.: 210.9 mL    norepinephrine Infusion: 427.9 mL    vasopressin Infusion: 76 mL  Total IN: 2082.2 mL    OUT:    Indwelling Catheter - Urethral: 1505 mL  Total OUT: 1505 mL    Total NET: 577.2 mL      ============================ LABS =========================                        8.7    8.34  )-----------( 342      ( 2020 01:36 )             29.8     04-25    140  |  98  |  24<H>  ----------------------------<  108<H>  4.6   |  32<H>  |  0.41<L>    Ca    9.2      2020 01:30  Phos  2.8     0425  Mg     2.2     25    TPro  6.4  /  Alb  2.5<L>  /  TBili  0.2  /  DBili  x   /  AST  24  /  ALT  36  /  AlkPhos  205<H>  04    LIVER FUNCTIONS - ( 2020 01:30 )  Alb: 2.5 g/dL / Pro: 6.4 g/dL / ALK PHOS: 205 U/L / ALT: 36 U/L / AST: 24 U/L / GGT: x           PT/INR - ( 2020 01:36 )   PT: 15.3 sec;   INR: 1.32 ratio         PTT - ( 2020 01:36 )  PTT:37.2 sec  ABG - ( 2020 04:21 )  pH, Arterial: 7.34  pH, Blood: x     /  pCO2: 69    /  pO2: 76    / HCO3: 36    / Base Excess: 8.8   /  SaO2: 95          Urinalysis Basic - ( 2020 02:04 )    Color: Yellow / Appearance: Slightly Turbid / S.024 / pH: x  Gluc: x / Ketone: Negative  / Bili: Negative / Urobili: 2 mg/dL   Blood: x / Protein: 100 mg/dL / Nitrite: Negative   Leuk Esterase: Negative / RBC: 3-5 /hpf / WBC 0-2   Sq Epi: x / Non Sq Epi: Few / Bacteria: Few      ======================Micro/Rad/Cardio=================  Culture: Reviewed   CXR: Reviewed  ======================================================  PAST MEDICAL & SURGICAL HISTORY:  Hyperlipidemia  Hypothyroid  Schizophrenia  No significant past surgical history  No significant past surgical history    ====================ASSESSMENT ==============  COVID 19 Positive   3/27 Intubated   Hypoxic respiratory failure s/p tracheotomy  Septic Shock  ARDS  Fluid overload    Plan:  ====================== NEUROLOGY=====================  Ketamine increased overnight for agitation and vent dyssynchrony.  Will add haldol to help inability to wean sedation. Monitor EKG.  Dilaudid and oxycodone for analgesia.  Continue to monitor neuro status.    acetaminophen    Suspension .. 650 milliGRAM(s) Oral every 6 hours PRN Temp greater or equal to 38.5C (101.3F)  clonazePAM  Tablet 2 milliGRAM(s) Oral every 8 hours  HYDROmorphone  Injectable 1 milliGRAM(s) IV Push every 4 hours PRN Moderate Pain (4 - 6)  ketamine Infusion. 0.25 mG/kG/Hr (1.93 mL/Hr) IV Continuous <Continuous>  oxyCODONE    IR 10 milliGRAM(s) Oral every 6 hours    ==================== RESPIRATORY======================  S/p trach on full vent support. Continue to wean FiO2. CPAP trials as tolerated.    Mechanical Ventilation:  Mode: AC/ CMV (Assist Control/ Continuous Mandatory Ventilation)  RR (machine): 35  TV (machine): 350  FiO2: 60  PEEP: 5  ITime: 1  MAP: 15  PIP: 45    ====================CARDIOVASCULAR==================  Pt had episode of bradycardia with hypotension overnight. Continue dobutamine for chronotropy.  Continue pressor support for hypotension.    DOBUTamine Infusion 2.5 MICROgram(s)/kG/Min (5.78 mL/Hr) IV Continuous <Continuous>  norepinephrine Infusion 0.04 MICROgram(s)/kG/Min (5.78 mL/Hr) IV Continuous <Continuous>  vasopressin Infusion 0.04 Unit(s)/Min (2.4 mL/Hr) IV Continuous <Continuous>    ===================HEMATOLOGIC/ONC ===================  aspirin  chewable 81 milliGRAM(s) Oral daily  VTE prophylaxis, enoxaparin Injectable 40 milliGRAM(s) SubCutaneous two times a day    ===================== RENAL =========================  Continue monitoring urine output.  Monitor and replete electrolytes as needed.    ==================== GASTROINTESTINAL===================  Tolerating tube feeds, Glucerna 300 [id number]     GI prophylaxis, famotidine    Tablet 20 milliGRAM(s) Oral two times a day  polyethylene glycol 3350 17 Gram(s) Oral every 12 hours  senna Syrup 10 milliLiter(s) Oral at bedtime  sodium chloride 0.9% lock flush 10 milliLiter(s) IV Push every 1 hour PRN Pre/post blood products, medications, blood draw, and to maintain line patency    =======================    ENDOCRINE  =====================  Glucose control.  insulin lispro (HumaLOG) corrective regimen sliding scale   SubCutaneous every 6 hours  insulin NPH human recombinant 11 Unit(s) SubCutaneous every 6 hours  levothyroxine Injectable 75 MICROGram(s) IV Push at bedtime    ========================INFECTIOUS DISEASE=================  Completed COVID medication (Plaquenil, Vit C/Thiamine, Anakirna, Solumedrol)  S/p convalescent plasma on    C/w Decadron trial.    dexAMETHasone  IVPB 10 milliGRAM(s) IV Intermittent daily    ========================GOALS OF CARE====================   I spoke with daughter and discussed severity of illness with increased pressor requirement. They want to make pt DNR. Daughter will call back with mother to consent to DNR.        Patient requires continuous monitoring with bedside rhythm monitoring, pulse ox monitoring, and intermittent blood gas analysis. Care plan discussed with ICU care team. Patient remained critical and at risk for life threatening decompensation.    By signing my name below, I, Pretty Wallace, attest that this documentation has been prepared under the direction and in the presence of GERRY Dsouza.  Electronically signed: Rohan Lal, 20 @ 07:19    I, Nora Yeny, personally performed the services described in this documentation. all medical record entries made by the scribe were at my direction and in my presence. I have reviewed the chart and agree that the record reflects my personal performance and is accurate and complete  Electronically signed: GERRY Dsouza

## 2020-04-25 NOTE — CHART NOTE - NSCHARTNOTEFT_GEN_A_CORE
I spoke with Wife, Reanna Reyes, and daughter, Ileana Lee, at length. They have decided to place a Do Not Resuscitate order on [place]o Reyes. ROMIE Saldana witnessed consent via telephone.

## 2020-04-25 NOTE — PROGRESS NOTE ADULT - PROBLEM SELECTOR PLAN 1
- Plan to remove sutures POD #7  - Do not remove umbilical trach tie  - HOB elevation  - Suction PRN  - Continue trach care  - ENT will continue to follow, call with questions x [phone].

## 2020-04-25 NOTE — PROGRESS NOTE ADULT - SUBJECTIVE AND OBJECTIVE BOX
ENT ISSUE/POD: s/p trach POD 2    HPI: 71 y/o male s/p tracheostomy POD 2. #8 LPC in place 2/2 respiratory failure. Pt is seen and examined at bedside. Pt is doing well on vent. No issues or bleeding overnight per team.        PAST MEDICAL & SURGICAL HISTORY:  Hyperlipidemia  Hypothyroid  Schizophrenia  No significant past surgical history  No significant past surgical history    Allergies    mushrooms (Vomiting)  No Known Drug Allergies    Intolerances      MEDICATIONS  (STANDING):  aspirin  chewable 81 milliGRAM(s) Oral daily  chlorhexidine 0.12% Liquid 15 milliLiter(s) Oral Mucosa every 12 hours  chlorhexidine 2% Cloths 1 Application(s) Topical <User Schedule>  clonazePAM  Tablet 2 milliGRAM(s) Oral every 8 hours  dexAMETHasone  IVPB 10 milliGRAM(s) IV Intermittent daily  DOBUTamine Infusion 2.5 MICROgram(s)/kG/Min (5.78 mL/Hr) IV Continuous <Continuous>  enoxaparin Injectable 40 milliGRAM(s) SubCutaneous two times a day  famotidine    Tablet 20 milliGRAM(s) Oral two times a day  insulin lispro (HumaLOG) corrective regimen sliding scale   SubCutaneous every 6 hours  insulin NPH human recombinant 11 Unit(s) SubCutaneous every 6 hours  ketamine Infusion. 0.25 mG/kG/Hr (1.93 mL/Hr) IV Continuous <Continuous>  levothyroxine Injectable 75 MICROGram(s) IV Push at bedtime  norepinephrine Infusion 0.04 MICROgram(s)/kG/Min (5.78 mL/Hr) IV Continuous <Continuous>  oxyCODONE    IR 10 milliGRAM(s) Oral every 6 hours  petrolatum Ophthalmic Ointment 1 Application(s) Both EYES two times a day  polyethylene glycol 3350 17 Gram(s) Oral every 12 hours  senna Syrup 10 milliLiter(s) Oral at bedtime  vasopressin Infusion 0.04 Unit(s)/Min (2.4 mL/Hr) IV Continuous <Continuous>    MEDICATIONS  (PRN):  acetaminophen    Suspension .. 650 milliGRAM(s) Oral every 6 hours PRN Temp greater or equal to 38.5C (101.3F)  HYDROmorphone  Injectable 1 milliGRAM(s) IV Push every 4 hours PRN Moderate Pain (4 - 6)  sodium chloride 0.9% lock flush 10 milliLiter(s) IV Push every 1 hour PRN Pre/post blood products, medications, blood draw, and to maintain line patency      Social History: see consult note    Family history: see consult note    ROS:   unable to obtain due to pt's condition       Vital Signs Last 24 Hrs  T(C): 37.1 (25 Apr 2020 08:00), Max: 38.5 (25 Apr 2020 00:00)  T(F): 98.8 (25 Apr 2020 08:00), Max: 101.3 (25 Apr 2020 00:00)  HR: 65 (25 Apr 2020 08:00) (62 - 81)  BP: --  BP(mean): --  RR: 40 (25 Apr 2020 08:00) (7 - 44)  SpO2: 94% (25 Apr 2020 08:00) (90% - 95%)                          8.7    8.34  )-----------( 342      ( 25 Apr 2020 01:36 )             29.8    04-25    140  |  98  |  24<H>  ----------------------------<  108<H>  4.6   |  32<H>  |  0.41<L>    Ca    9.2      25 Apr 2020 01:30  Phos  2.8     04-25  Mg     2.2     04-25    TPro  6.4  /  Alb  2.5<L>  /  TBili  0.2  /  DBili  x   /  AST  24  /  ALT  36  /  AlkPhos  205<H>  04-25   PT/INR - ( 25 Apr 2020 01:36 )   PT: 15.3 sec;   INR: 1.32 ratio         PTT - ( 25 Apr 2020 01:36 )  PTT:37.2 sec    PHYSICAL EXAM:  Gen: NAD  Skin: No rashes, bruises, or lesions  Head: Normocephalic, Atraumatic  Face: no edema, erythema, or fluctuance.  Eyes: no scleral injection  Nose: Nares bilaterally patent, no discharge  Mouth: No Stridor / Drooling / Trismus.  Mucosa moist, tongue/uvula midline, oropharynx clear  Neck: #8 LPC in place, minimal serosanguinous secretions around stoma, sutures x4 and umbilical tie in place. Flat, supple, no lymphadenopathy, trachea midline, no masses  Lymphatic: No lymphadenopathy  Resp: on vent, ventilating well  Neuro: unable to assess due to patient's condition

## 2020-04-25 NOTE — PROGRESS NOTE ADULT - SUBJECTIVE AND OBJECTIVE BOX
CLARITA POWELL  MRN-[id number]  Patient is a 70y old  Male who presents with a chief complaint of respiratory distress, COVID 19 (2020 11:27)    HPI:  70M with PMH presents with T dm, hypothyroidism, hld, p/w cough, low grade fever x 2-3 days. Today patient had worsening dyspnea at rest as well as exertion which prompted to come to the ED. Patient otherwise denied chest pain, leg swelling. He denies prior lung disease. Denies any history of smoking.     In ED, patient was noted to be hypoxic on 6L NC to 89% and significant tachypnea. VS otherwise, afebrile, HR in 80s, SBP 150s. Labs notable for CBC wnl. CMP with mild elevated transaminases AST 65 and ALT 47. PAtient noted to be positive for COVID 19. Due to worsening respiratory distress patient was emergently intubated in the ER. (27 Mar 2020 21:38)      Surgery/Hospital course:  COVID 19 Positive  Intubated 3/27  4/9 Paralysis weaned off yesterday, unproned with maintained sats, tolerating diuresis   10 sec asystole, back after epi x1   CPAP  -  unable to tolerate CPAP    decadron course started   Failed CPAP. Continue with steroid taper and supportive therapy. Palliative care consult called and appreciated. Family yet to decide on end of life and prior to making any advanced decisions would like to see if any improvement with steroid taper treatment therapy.     Continues to CPAP trial of 20/5.  Peep lowered to 5 from 6.  Unable to tolerate sedation wean due to increased agitation causing hypoxia.   ketamine weaned off   COVID retested, 1U PRBC given for Hct 26 and pressor requirement; Precedex started, but dc'd for bradycardia (pt had asystolic pause on precedex previously)   Trached, Received convalescent plasma   OG tube removed    Today:    ============================I/O===========================   I&O's Detail    2020 07:01  -  2020 07:00  --------------------------------------------------------  IN:    DOBUTamine Infusion: 17.4 mL    Enteral Tube Flush: 150 mL    Glucerna 1.5: 1200 mL    ketamine Infusion.: 210.9 mL    norepinephrine Infusion: 427.9 mL    vasopressin Infusion: 76 mL  Total IN: 2082.2 mL    OUT:    Indwelling Catheter - Urethral: 1505 mL  Total OUT: 1505 mL    Total NET: 577.2 mL      2020 07:01  -  2020 18:53  --------------------------------------------------------  IN:    DOBUTamine Infusion: 69.6 mL    Enteral Tube Flush: 120 mL    Glucerna 1.5: 600 mL    ketamine Infusion.: 139.2 mL    norepinephrine Infusion: 348 mL    vasopressin Infusion: 62 mL  Total IN: 1338.8 mL    OUT:    Indwelling Catheter - Urethral: 630 mL  Total OUT: 630 mL    Total NET: 708.8 mL        ============================ LABS =========================                        8.7    8.34  )-----------( 342      ( 2020 01:36 )             29.8     04-25    140  |  98  |  24<H>  ----------------------------<  108<H>  4.6   |  32<H>  |  0.41<L>    Ca    9.2      2020 01:30  Phos  2.8       Mg     2.2         TPro  6.4  /  Alb  2.5<L>  /  TBili  0.2  /  DBili  x   /  AST  24  /  ALT  36  /  AlkPhos  205<H>      LIVER FUNCTIONS - ( 2020 01:30 )  Alb: 2.5 g/dL / Pro: 6.4 g/dL / ALK PHOS: 205 U/L / ALT: 36 U/L / AST: 24 U/L / GGT: x           PT/INR - ( 2020 01:36 )   PT: 15.3 sec;   INR: 1.32 ratio         PTT - ( 2020 01:36 )  PTT:37.2 sec  ABG - ( 2020 04:21 )  pH, Arterial: 7.34  pH, Blood: x     /  pCO2: 69    /  pO2: 76    / HCO3: 36    / Base Excess: 8.8   /  SaO2: 95                Urinalysis Basic - ( 2020 02:04 )    Color: Yellow / Appearance: Slightly Turbid / S.024 / pH: x  Gluc: x / Ketone: Negative  / Bili: Negative / Urobili: 2 mg/dL   Blood: x / Protein: 100 mg/dL / Nitrite: Negative   Leuk Esterase: Negative / RBC: 3-5 /hpf / WBC 0-2   Sq Epi: x / Non Sq Epi: Few / Bacteria: Few      ======================Micro/Rad/Cardio=================  Culture: Reviewed   CXR: Reviewed  Echo:Reviewed  ======================================================  PAST MEDICAL & SURGICAL HISTORY:  Hyperlipidemia  Hypothyroid  Schizophrenia  No significant past surgical history  No significant past surgical history    ====================ASSESSMENT ==============  COVID 19 Positive   3/27 Intubated   Hypoxic respiratory failure s/p tracheotomy  Septic Shock  ARDS  Fluid overload      Plan:  ====================== NEUROLOGY=====================  Ketamine increased overnight for agitation and vent dyssynchrony.  Haldol added this AM to help with inability to wean sedation. Monitor EKG.  Dilaudid and oxycodone for analgesia.  Continue to monitor neuro status.  Continue to wean sedation as tolerated    acetaminophen    Suspension .. 650 milliGRAM(s) Oral every 6 hours PRN Temp greater or equal to 38.5C (101.3F)  clonazePAM  Tablet 2 milliGRAM(s) Oral every 8 hours  haloperidol     Tablet 0.5 milliGRAM(s) Oral every 12 hours  HYDROmorphone  Injectable 1 milliGRAM(s) IV Push every 4 hours PRN Moderate Pain (4 - 6)  ketamine Infusion. 0.25 mG/kG/Hr (1.93 mL/Hr) IV Continuous <Continuous>  oxyCODONE    IR 10 milliGRAM(s) Oral every 6 hours    ==================== RESPIRATORY======================  S/p trach on full vent support. CPAP trials as tolerated.  Continue to wean FiO2 (goal to 30%, then wean PEEP as tolerated)  O2 sat goal >92%    Mechanical Ventilation:  Mode: AC/ CMV (Assist Control/ Continuous Mandatory Ventilation)  RR (machine): 35  TV (machine): 350  FiO2: 50  PEEP: 5  ITime: 1  MAP: 16  PIP: 42    ====================CARDIOVASCULAR==================  Pt had episode of bradycardia with hypotension overnight. Continue dobutamine for chronotropy.  Continue pressor support for hypotension.  Wean vasopressors as tolerated, MAP goal 65-75    DOBUTamine Infusion 2.5 MICROgram(s)/kG/Min (5.78 mL/Hr) IV Continuous <Continuous>  norepinephrine Infusion 0.04 MICROgram(s)/kG/Min (5.78 mL/Hr) IV Continuous <Continuous>  vasopressin Infusion 0.04 Unit(s)/Min (2.4 mL/Hr) IV Continuous <Continuous>    ===================HEMATOLOGIC/ONC ===================  Continue Lovenox for VTE prophylaxis    aspirin  chewable 81 milliGRAM(s) Oral daily  enoxaparin Injectable 40 milliGRAM(s) SubCutaneous two times a day    ===================== RENAL =========================  Continue monitoring urine output  Monitor BUN/SCr    ==================== GASTROINTESTINAL===================  Tolerating tube feeds, Glucerna 300 [id number]   Continue pepcid for GI prophylaxis    famotidine    Tablet 20 milliGRAM(s) Oral two times a day  polyethylene glycol 3350 17 Gram(s) Oral every 12 hours  senna Syrup 10 milliLiter(s) Oral at bedtime  sodium chloride 0.9% lock flush 10 milliLiter(s) IV Push every 1 hour PRN Pre/post blood products, medications, blood draw, and to maintain line patency    =======================    ENDOCRINE  =====================  Glucose control.    dexAMETHasone  IVPB 10 milliGRAM(s) IV Intermittent daily  insulin lispro (HumaLOG) corrective regimen sliding scale   SubCutaneous every 6 hours  insulin NPH human recombinant 11 Unit(s) SubCutaneous every 6 hours  levothyroxine Injectable 75 MICROGram(s) IV Push at bedtime    ========================INFECTIOUS DISEASE================  Completed COVID medication (Plaquenil, Vit C/Thiamine, Anakirna, Solumedrol)  S/p convalescent plasma on    C/w Decadron trial.        Patient requires continuous monitoring with bedside rhythm monitoring, arterial line, pulse oximetry, ventilator monitoring and intermittent blood gas analysis.  Care plan discussed with ICU care team.  Patient remained critical; required more than usual ICU care team; I have spent 35 minutes providing non-routine ICU care, revaluated multiple times during the day.    By signing my name below, I, Krystin Archer, attest that this documentation has been prepared under the direction and in the presence of Shantanu Ricks PA.  Electronically signed: Rohan Pritchett, 20 @ 18:53    I, Shantanu Ricks, personally performed the services described in this documentation. all medical record entries made by the scribe were at my direction and in my presence. I have reviewed the chart and agree that the record reflects my personal performance and is accurate and complete  Electronically signed: Shantanu Ricks PA. 20 @ 18:53 CLARITA POWELL  MRN-[id number]  Patient is a 70y old  Male who presents with a chief complaint of respiratory distress, COVID 19 (2020 11:27)    HPI:  70M with PMH presents with T dm, hypothyroidism, hld, p/w cough, low grade fever x 2-3 days. Today patient had worsening dyspnea at rest as well as exertion which prompted to come to the ED. Patient otherwise denied chest pain, leg swelling. He denies prior lung disease. Denies any history of smoking.     In ED, patient was noted to be hypoxic on 6L NC to 89% and significant tachypnea. VS otherwise, afebrile, HR in 80s, SBP 150s. Labs notable for CBC wnl. CMP with mild elevated transaminases AST 65 and ALT 47. PAtient noted to be positive for COVID 19. Due to worsening respiratory distress patient was emergently intubated in the ER. (27 Mar 2020 21:38)      Surgery/Hospital course:  COVID 19 Positive  Intubated 3/27  4/9 Paralysis weaned off yesterday, unproned with maintained sats, tolerating diuresis   10 sec asystole, back after epi x1   CPAP  -  unable to tolerate CPAP    decadron course started   Failed CPAP. Continue with steroid taper and supportive therapy. Palliative care consult called and appreciated. Family yet to decide on end of life and prior to making any advanced decisions would like to see if any improvement with steroid taper treatment therapy.     Continues to CPAP trial of 20/5.  Peep lowered to 5 from 6.  Unable to tolerate sedation wean due to increased agitation causing hypoxia.   ketamine weaned off   COVID retested, 1U PRBC given for Hct 26 and pressor requirement; Precedex started, but dc'd for bradycardia (pt had asystolic pause on precedex previously)   Trached, Received convalescent plasma   OG tube removed    Today:  Patient is s/p trach collar from two days ago. Is currently sedated with ketamine, haldol, Klonopin and oxycodone, unable to wean. Has been bradycardic and hypoxic. Currently no mental status, continue monitoring.    ============================I/O===========================   I&O's Detail    2020 07:  -  2020 07:00  --------------------------------------------------------  IN:    DOBUTamine Infusion: 17.4 mL    Enteral Tube Flush: 150 mL    Glucerna 1.5: 1200 mL    ketamine Infusion.: 210.9 mL    norepinephrine Infusion: 427.9 mL    vasopressin Infusion: 76 mL  Total IN: 2082.2 mL    OUT:    Indwelling Catheter - Urethral: 1505 mL  Total OUT: 1505 mL    Total NET: 577.2 mL      2020 07:  -  2020 18:53  --------------------------------------------------------  IN:    DOBUTamine Infusion: 69.6 mL    Enteral Tube Flush: 120 mL    Glucerna 1.5: 600 mL    ketamine Infusion.: 139.2 mL    norepinephrine Infusion: 348 mL    vasopressin Infusion: 62 mL  Total IN: 1338.8 mL    OUT:    Indwelling Catheter - Urethral: 630 mL  Total OUT: 630 mL    Total NET: 708.8 mL        ============================ LABS =========================                        8.7    8.34  )-----------( 342      ( 2020 01:36 )             29.8     04-25    140  |  98  |  24<H>  ----------------------------<  108<H>  4.6   |  32<H>  |  0.41<L>    Ca    9.2      2020 01:30  Phos  2.8     25  Mg     2.2         TPro  6.4  /  Alb  2.5<L>  /  TBili  0.2  /  DBili  x   /  AST  24  /  ALT  36  /  AlkPhos  205<H>  25    LIVER FUNCTIONS - ( 2020 01:30 )  Alb: 2.5 g/dL / Pro: 6.4 g/dL / ALK PHOS: 205 U/L / ALT: 36 U/L / AST: 24 U/L / GGT: x           PT/INR - ( 2020 01:36 )   PT: 15.3 sec;   INR: 1.32 ratio         PTT - ( 2020 01:36 )  PTT:37.2 sec  ABG - ( 2020 04:21 )  pH, Arterial: 7.34  pH, Blood: x     /  pCO2: 69    /  pO2: 76    / HCO3: 36    / Base Excess: 8.8   /  SaO2: 95                Urinalysis Basic - ( 2020 02:04 )    Color: Yellow / Appearance: Slightly Turbid / S.024 / pH: x  Gluc: x / Ketone: Negative  / Bili: Negative / Urobili: 2 mg/dL   Blood: x / Protein: 100 mg/dL / Nitrite: Negative   Leuk Esterase: Negative / RBC: 3-5 /hpf / WBC 0-2   Sq Epi: x / Non Sq Epi: Few / Bacteria: Few      ======================Micro/Rad/Cardio=================  Culture: Reviewed   CXR: Reviewed  Echo:Reviewed  ======================================================  PAST MEDICAL & SURGICAL HISTORY:  Hyperlipidemia  Hypothyroid  Schizophrenia  No significant past surgical history  No significant past surgical history    ====================ASSESSMENT ==============  COVID 19 Positive   3/27 Intubated   Hypoxic respiratory failure s/p tracheotomy  Septic Shock  ARDS  Fluid overload    DNR     Plan:  ====================== NEUROLOGY=====================  Ketamine increased overnight for agitation and vent dyssynchrony.  Haldol added this AM to help with inability to wean sedation.   Dilaudid and oxycodone for analgesia.  Continue to monitor neuro status.  Continue to wean sedation as tolerated    acetaminophen    Suspension .. 650 milliGRAM(s) Oral every 6 hours PRN Temp greater or equal to 38.5C (101.3F)  clonazePAM  Tablet 2 milliGRAM(s) Oral every 8 hours  haloperidol     Tablet 0.5 milliGRAM(s) Oral every 12 hours  HYDROmorphone  Injectable 1 milliGRAM(s) IV Push every 4 hours PRN Moderate Pain (4 - 6)  ketamine Infusion. 0.25 mG/kG/Hr (1.93 mL/Hr) IV Continuous <Continuous>  oxyCODONE    IR 10 milliGRAM(s) Oral every 6 hours    ==================== RESPIRATORY======================  S/p trach on full vent support. CPAP trials as tolerated.  Continue to wean FiO2 (goal to 30%, then wean PEEP as tolerated)  O2 sat goal >92%    Mechanical Ventilation:  Mode: AC/ CMV (Assist Control/ Continuous Mandatory Ventilation)  RR (machine): 35  TV (machine): 350  FiO2: 50  PEEP: 5  ITime: 1  MAP: 16  PIP: 42    ====================CARDIOVASCULAR==================  Pt had episode of bradycardia with hypotension overnight. Continue dobutamine for chronotropy.  Continue pressor support for hypotension.  Sinus rhythm in the 70s, drops to 40s-50s.   Wean vasopressors as tolerated, MAP goal 65-75    DOBUTamine Infusion 2.5 MICROgram(s)/kG/Min (5.78 mL/Hr) IV Continuous <Continuous>  norepinephrine Infusion 0.04 MICROgram(s)/kG/Min (5.78 mL/Hr) IV Continuous <Continuous>  vasopressin Infusion 0.04 Unit(s)/Min (2.4 mL/Hr) IV Continuous <Continuous>    ===================HEMATOLOGIC/ONC ===================  Continue Lovenox for VTE prophylaxis    aspirin  chewable 81 milliGRAM(s) Oral daily  enoxaparin Injectable 40 milliGRAM(s) SubCutaneous two times a day    ===================== RENAL =========================  Continue monitoring urine output, + 700cc  BUN/SCr is normal.     ==================== GASTROINTESTINAL===================  Tolerating tube feeds, Glucerna 300 [id number]   Continue pepcid for GI prophylaxis    famotidine    Tablet 20 milliGRAM(s) Oral two times a day  polyethylene glycol 3350 17 Gram(s) Oral every 12 hours  senna Syrup 10 milliLiter(s) Oral at bedtime  sodium chloride 0.9% lock flush 10 milliLiter(s) IV Push every 1 hour PRN Pre/post blood products, medications, blood draw, and to maintain line patency    =======================    ENDOCRINE  =====================  Glucose control.    dexAMETHasone  IVPB 10 milliGRAM(s) IV Intermittent daily  insulin lispro (HumaLOG) corrective regimen sliding scale   SubCutaneous every 6 hours  insulin NPH human recombinant 11 Unit(s) SubCutaneous every 6 hours  levothyroxine Injectable 75 MICROGram(s) IV Push at bedtime    ========================INFECTIOUS DISEASE================  Febrile 101.3 during the day, WBC 8.3.  No antibiotics regimen.  Completed COVID medication (Plaquenil, Vit C/Thiamine, Anakirna, Solumedrol)  S/p convalescent plasma on    C/w Decadron trial.        Patient requires continuous monitoring with bedside rhythm monitoring, arterial line, pulse oximetry, ventilator monitoring and intermittent blood gas analysis.  Care plan discussed with ICU care team.  Patient remained critical; required more than usual ICU care team; I have spent 35 minutes providing non-routine ICU care, revaluated multiple times during the day.    By signing my name below, I, Krystin Archer, attest that this documentation has been prepared under the direction and in the presence of Shantanu Ricsk PA.  Electronically signed: Rohan Pritchett, 20 @ 18:53    I, Shantanu Ricks, personally performed the services described in this documentation. all medical record entries made by the kevinibtrae were at my direction and in my presence. I have reviewed the chart and agree that the record reflects my personal performance and is accurate and complete  Electronically signed: Shantanu Ricks PA. 20 @ 18:53 CLARITA POWELL  MRN-[id number]  Patient is a 70y old  Male who presents with a chief complaint of respiratory distress, COVID 19 (2020 11:27)    HPI:  70M with PMH presents with T dm, hypothyroidism, hld, p/w cough, low grade fever x 2-3 days. Today patient had worsening dyspnea at rest as well as exertion which prompted to come to the ED. Patient otherwise denied chest pain, leg swelling. He denies prior lung disease. Denies any history of smoking.     In ED, patient was noted to be hypoxic on 6L NC to 89% and significant tachypnea. VS otherwise, afebrile, HR in 80s, SBP 150s. Labs notable for CBC wnl. CMP with mild elevated transaminases AST 65 and ALT 47. PAtient noted to be positive for COVID 19. Due to worsening respiratory distress patient was emergently intubated in the ER. (27 Mar 2020 21:38)      Surgery/Hospital course:  COVID 19 Positive  Intubated 3/27  4/9 Paralysis weaned off yesterday, unproned with maintained sats, tolerating diuresis   10 sec asystole, back after epi x1   CPAP  -  unable to tolerate CPAP    decadron course started   Failed CPAP. Continue with steroid taper and supportive therapy. Palliative care consult called and appreciated. Family yet to decide on end of life and prior to making any advanced decisions would like to see if any improvement with steroid taper treatment therapy.     Continues to CPAP trial of 20/5.  Peep lowered to 5 from 6.  Unable to tolerate sedation wean due to increased agitation causing hypoxia.   ketamine weaned off   COVID retested, 1U PRBC given for Hct 26 and pressor requirement; Precedex started, but dc'd for bradycardia (pt had asystolic pause on precedex previously)   Trached, Received convalescent plasma   OG tube removed    Today:  Patient is s/p trach collar from two days ago. Is currently sedated with ketamine, haldol, Klonopin and oxycodone, unable to wean. Has been bradycardic and hypoxic. Currently no mental status, continue monitoring.    ============================I/O===========================   I&O's Detail    2020 07:  -  2020 07:00  --------------------------------------------------------  IN:    DOBUTamine Infusion: 17.4 mL    Enteral Tube Flush: 150 mL    Glucerna 1.5: 1200 mL    ketamine Infusion.: 210.9 mL    norepinephrine Infusion: 427.9 mL    vasopressin Infusion: 76 mL  Total IN: 2082.2 mL    OUT:    Indwelling Catheter - Urethral: 1505 mL  Total OUT: 1505 mL    Total NET: 577.2 mL      2020 07:  -  2020 18:53  --------------------------------------------------------  IN:    DOBUTamine Infusion: 69.6 mL    Enteral Tube Flush: 120 mL    Glucerna 1.5: 600 mL    ketamine Infusion.: 139.2 mL    norepinephrine Infusion: 348 mL    vasopressin Infusion: 62 mL  Total IN: 1338.8 mL    OUT:    Indwelling Catheter - Urethral: 630 mL  Total OUT: 630 mL    Total NET: 708.8 mL        ============================ LABS =========================                        8.7    8.34  )-----------( 342      ( 2020 01:36 )             29.8     04-25    140  |  98  |  24<H>  ----------------------------<  108<H>  4.6   |  32<H>  |  0.41<L>    Ca    9.2      2020 01:30  Phos  2.8     25  Mg     2.2         TPro  6.4  /  Alb  2.5<L>  /  TBili  0.2  /  DBili  x   /  AST  24  /  ALT  36  /  AlkPhos  205<H>  25    LIVER FUNCTIONS - ( 2020 01:30 )  Alb: 2.5 g/dL / Pro: 6.4 g/dL / ALK PHOS: 205 U/L / ALT: 36 U/L / AST: 24 U/L / GGT: x           PT/INR - ( 2020 01:36 )   PT: 15.3 sec;   INR: 1.32 ratio         PTT - ( 2020 01:36 )  PTT:37.2 sec  ABG - ( 2020 04:21 )  pH, Arterial: 7.34  pH, Blood: x     /  pCO2: 69    /  pO2: 76    / HCO3: 36    / Base Excess: 8.8   /  SaO2: 95                Urinalysis Basic - ( 2020 02:04 )    Color: Yellow / Appearance: Slightly Turbid / S.024 / pH: x  Gluc: x / Ketone: Negative  / Bili: Negative / Urobili: 2 mg/dL   Blood: x / Protein: 100 mg/dL / Nitrite: Negative   Leuk Esterase: Negative / RBC: 3-5 /hpf / WBC 0-2   Sq Epi: x / Non Sq Epi: Few / Bacteria: Few      ======================Micro/Rad/Cardio=================  Culture: Reviewed   CXR: Reviewed  Echo:Reviewed  ======================================================  PAST MEDICAL & SURGICAL HISTORY:  Hyperlipidemia  Hypothyroid  Schizophrenia  No significant past surgical history  No significant past surgical history    ====================ASSESSMENT ==============  COVID 19 Positive   3/27 Intubated   Hypoxic respiratory failure s/p tracheotomy  Septic Shock  ARDS  Fluid overload    DNR     Plan:  ====================== NEUROLOGY=====================  Ketamine increased overnight for agitation and vent dyssynchrony.  Haldol added this AM to help with inability to wean sedation.   Dilaudid and oxycodone for analgesia.  Continue to monitor neuro status.  Continue to wean sedation as tolerated    acetaminophen    Suspension .. 650 milliGRAM(s) Oral every 6 hours PRN Temp greater or equal to 38.5C (101.3F)  clonazePAM  Tablet 2 milliGRAM(s) Oral every 8 hours  haloperidol     Tablet 0.5 milliGRAM(s) Oral every 12 hours  HYDROmorphone  Injectable 1 milliGRAM(s) IV Push every 4 hours PRN Moderate Pain (4 - 6)  ketamine Infusion. 0.25 mG/kG/Hr (1.93 mL/Hr) IV Continuous <Continuous>  oxyCODONE    IR 10 milliGRAM(s) Oral every 6 hours    ==================== RESPIRATORY======================  S/p trach on full vent support. CPAP trials as tolerated.  Continue to wean FiO2 (goal to 30%, then wean PEEP as tolerated)  O2 sat goal >92%    Mechanical Ventilation:  Mode: AC/ CMV (Assist Control/ Continuous Mandatory Ventilation)  RR (machine): 35  TV (machine): 350  FiO2: 50  PEEP: 5  ITime: 1  MAP: 16  PIP: 42    ====================CARDIOVASCULAR==================  Pt had episode of bradycardia with hypotension overnight. Continue dobutamine for chronotropy.  Continue pressor support for hypotension.  Sinus rhythm in the 70s, drops to 40s-50s.   Wean vasopressors as tolerated, MAP goal 65-75    DOBUTamine Infusion 2.5 MICROgram(s)/kG/Min (5.78 mL/Hr) IV Continuous <Continuous>  norepinephrine Infusion 0.04 MICROgram(s)/kG/Min (5.78 mL/Hr) IV Continuous <Continuous>  vasopressin Infusion 0.04 Unit(s)/Min (2.4 mL/Hr) IV Continuous <Continuous>    ===================HEMATOLOGIC/ONC ===================  Continue Lovenox for VTE prophylaxis    aspirin  chewable 81 milliGRAM(s) Oral daily  enoxaparin Injectable 40 milliGRAM(s) SubCutaneous two times a day    ===================== RENAL =========================  Continue monitoring urine output, + 700cc  BUN/SCr is normal.     ==================== GASTROINTESTINAL===================  Tolerating tube feeds, Glucerna 300 [id number]   Continue pepcid for GI prophylaxis    famotidine    Tablet 20 milliGRAM(s) Oral two times a day  polyethylene glycol 3350 17 Gram(s) Oral every 12 hours  senna Syrup 10 milliLiter(s) Oral at bedtime  sodium chloride 0.9% lock flush 10 milliLiter(s) IV Push every 1 hour PRN Pre/post blood products, medications, blood draw, and to maintain line patency    =======================    ENDOCRINE  =====================  Hypothyroid  Glucose control.    dexAMETHasone  IVPB 10 milliGRAM(s) IV Intermittent daily  insulin lispro (HumaLOG) corrective regimen sliding scale   SubCutaneous every 6 hours  insulin NPH human recombinant 11 Unit(s) SubCutaneous every 6 hours  levothyroxine Injectable 75 MICROGram(s) IV Push at bedtime    ========================INFECTIOUS DISEASE================  Febrile 101.3 during the day, WBC 8.3.  No antibiotics regimen.  Completed COVID medication (Plaquenil, Vit C/Thiamine, Anakirna, Solumedrol)  S/p convalescent plasma on    C/w Decadron trial.        Patient requires continuous monitoring with bedside rhythm monitoring, arterial line, pulse oximetry, ventilator monitoring and intermittent blood gas analysis.  Care plan discussed with ICU care team.  Patient remained critical; required more than usual ICU care team; I have spent 35 minutes providing non-routine ICU care, revaluated multiple times during the day.    By signing my name below, I, Krystin Archer, attest that this documentation has been prepared under the direction and in the presence of Shantanu Ricks PA.  Electronically signed: Rohan Pritchett, 20 @ 18:53    I, Shantanu Ricks, personally performed the services described in this documentation. all medical record entries made by the kevinibtrae were at my direction and in my presence. I have reviewed the chart and agree that the record reflects my personal performance and is accurate and complete  Electronically signed: Shantanu Ricks PA. 20 @ 18:53

## 2020-04-26 NOTE — PROGRESS NOTE ADULT - SUBJECTIVE AND OBJECTIVE BOX
CLARITA POWELL  MRN-[id number]  Patient is a 70y old  Male who presents with a chief complaint of respiratory distress, COVID 19 (2020 08:53)    HPI:  70M with PMH presents with T dm, hypothyroidism, hld, p/w cough, low grade fever x 2-3 days. Today patient had worsening dyspnea at rest as well as exertion which prompted to come to the ED. Patient otherwise denied chest pain, leg swelling. He denies prior lung disease. Denies any history of smoking.     In ED, patient was noted to be hypoxic on 6L NC to 89% and significant tachypnea. VS otherwise, afebrile, HR in 80s, SBP 150s. Labs notable for CBC wnl. CMP with mild elevated transaminases AST 65 and ALT 47. PAtient noted to be positive for COVID 19. Due to worsening respiratory distress patient was emergently intubated in the ER. (27 Mar 2020 21:38)      Surgery/Hospital course:  COVID 19 Positive  Intubated 3/27  4/9 Paralysis weaned off yesterday, unproned with maintained sats, tolerating diuresis   10 sec asystole, back after epi x1   CPAP  -  unable to tolerate CPAP    decadron course started   Failed CPAP. Continue with steroid taper and supportive therapy. Palliative care consult called and appreciated. Family yet to decide on end of life and prior to making any advanced decisions would like to see if any improvement with steroid taper treatment therapy.     Continues to CPAP trial of 20/5.  Peep lowered to 5 from 6.  Unable to tolerate sedation wean due to increased agitation causing hypoxia.   ketamine weaned off   COVID retested, 1U PRBC given for Hct 26 and pressor requirement; Precedex started, but dc'd for bradycardia (pt had asystolic pause on precedex previously)   Trached, Received convalescent plasma   OG tube removed    Today:        ============================I/O===========================   I&O's Detail    2020 07:01  -  2020 07:00  --------------------------------------------------------  IN:    DOBUTamine Infusion: 121.8 mL    Enteral Tube Flush: 120 mL    Glucerna 1.5: 1200 mL    IV PiggyBack: 300 mL    ketamine Infusion.: 228 mL    norepinephrine Infusion: 593.7 mL    vasopressin Infusion: 116 mL  Total IN: 2679.5 mL    OUT:    Indwelling Catheter - Urethral: 1165 mL  Total OUT: 1165 mL    Total NET: 1514.5 mL      2020 07:01  -  2020 18:36  --------------------------------------------------------  IN:    DOBUTamine Infusion: 58 mL    Enteral Tube Flush: 120 mL    Glucerna 1.5: 600 mL    ketamine Infusion.: 77 mL    norepinephrine Infusion: 93.8 mL    vasopressin Infusion: 44 mL  Total IN: 992.8 mL    OUT:    Indwelling Catheter - Urethral: 600 mL  Total OUT: 600 mL    Total NET: 392.8 mL        ============================ LABS =========================                        8.2    10.57 )-----------( 328      ( 2020 00:43 )             28.4         139  |  98  |  24<H>  ----------------------------<  99  4.0   |  35<H>  |  0.36<L>    Ca    9.5      2020 00:43  Phos  2.5       Mg     2.2         TPro  6.9  /  Alb  2.5<L>  /  TBili  0.3  /  DBili  x   /  AST  14  /  ALT  30  /  AlkPhos  167<H>      LIVER FUNCTIONS - ( 2020 00:43 )  Alb: 2.5 g/dL / Pro: 6.9 g/dL / ALK PHOS: 167 U/L / ALT: 30 U/L / AST: 14 U/L / GGT: x           PT/INR - ( 2020 00:43 )   PT: 15.0 sec;   INR: 1.31 ratio         PTT - ( 2020 00:43 )  PTT:36.4 sec  ABG - ( 2020 00:46 )  pH, Arterial: 7.34  pH, Blood: x     /  pCO2: 70    /  pO2: 79    / HCO3: 36    / Base Excess: 9.4   /  SaO2: 96                Urinalysis Basic - ( 2020 02:04 )    Color: Yellow / Appearance: Slightly Turbid / S.024 / pH: x  Gluc: x / Ketone: Negative  / Bili: Negative / Urobili: 2 mg/dL   Blood: x / Protein: 100 mg/dL / Nitrite: Negative   Leuk Esterase: Negative / RBC: 3-5 /hpf / WBC 0-2   Sq Epi: x / Non Sq Epi: Few / Bacteria: Few      ======================Micro/Rad/Cardio=================  Culture: Reviewed   CXR: Reviewed  ======================================================  PAST MEDICAL & SURGICAL HISTORY:  Hyperlipidemia  Hypothyroid  Schizophrenia  No significant past surgical history  No significant past surgical history    ====================ASSESSMENT ==============  COVID 19 Positive   3/27 Intubated   Hypoxic respiratory failure s/p tracheotomy  Septic Shock  ARDS  Fluid overload    DNR       Plan:  ====================== NEUROLOGY=====================  Continue ketamine for agitation and vent dyssynchrony   Continue Haldol to aid inability to wean sedation. Continue to wean sedation as tolerated  Dilaudid and oxycodone for analgesia  Continue Tylenol for fevers.  Continue to monitor neuro status.    acetaminophen    Suspension .. 650 milliGRAM(s) Oral every 6 hours PRN Temp greater or equal to 38.5C (101.3F)  clonazePAM  Tablet 2 milliGRAM(s) Oral every 8 hours  haloperidol     Tablet 0.5 milliGRAM(s) Oral every 12 hours  HYDROmorphone  Injectable 1 milliGRAM(s) IV Push every 4 hours PRN Moderate Pain (4 - 6)  ketamine Infusion. 0.25 mG/kG/Hr (1.93 mL/Hr) IV Continuous <Continuous>  oxyCODONE    IR 10 milliGRAM(s) Oral every 6 hours    ==================== RESPIRATORY======================  S/p trach on full vent support. CPAP trials as tolerated.  Continue to wean FiO2 (goal to 30%, then wean PEEP as tolerated)  O2 sat goal >92%, patient satisfactorily satting at 95%    Mechanical Ventilation:  Mode: AC/ CMV (Assist Control/ Continuous Mandatory Ventilation)  RR (machine): 35  TV (machine): 350  FiO2: 50  PEEP: 5  ITime: 1  MAP: 16  PIP: 51    ====================CARDIOVASCULAR==================  Continue inotropic support for chronotropy   Continue pressor support for hypotension.  Wean vasopressors as tolerated, MAP goal 65-75    DOBUTamine Infusion 2.5 MICROgram(s)/kG/Min (5.78 mL/Hr) IV Continuous <Continuous>  norepinephrine Infusion 0.04 MICROgram(s)/kG/Min (5.78 mL/Hr) IV Continuous <Continuous>  vasopressin Infusion 0.04 Unit(s)/Min (2.4 mL/Hr) IV Continuous <Continuous>    ===================HEMATOLOGIC/ONC ===================  Continue Lovenox for VTE prophylaxis    aspirin  chewable 81 milliGRAM(s) Oral daily  enoxaparin Injectable 40 milliGRAM(s) SubCutaneous two times a day    ===================== RENAL =========================  Continue monitoring urine output  Monitor BUN/SCr    ==================== GASTROINTESTINAL===================  Tolerating tube feeds, Glucerna 300 [id number]  Continue pepcid for GI prophylaxis    famotidine    Tablet 20 milliGRAM(s) Oral two times a day  polyethylene glycol 3350 17 Gram(s) Oral every 12 hours  senna Syrup 10 milliLiter(s) Oral at bedtime  sodium chloride 0.9% lock flush 10 milliLiter(s) IV Push every 1 hour PRN Pre/post blood products, medications, blood draw, and to maintain line patency    =======================    ENDOCRINE  =====================  Glucose control,  insulin lispro (HumaLOG) corrective regimen sliding scale   SubCutaneous every 6 hours  insulin NPH human recombinant 11 Unit(s) SubCutaneous every 6 hours    Hypothyroidism treatment,  levothyroxine Injectable 75 MICROGram(s) IV Push at bedtime    ========================INFECTIOUS DISEASE================  Afebrile overnight.  Completed COVID medication (Plaquenil, Vit C/Thiamine, Anakirna, Solumedrol)  S/p convalescent plasma on    C/w Decadron trial.  Off abx.         Patient requires continuous monitoring with bedside rhythm monitoring, arterial line, pulse oximetry, ventilator monitoring and intermittent blood gas analysis.  Care plan discussed with ICU care team.  Patient remained critical; required more than usual ICU care team; I have spent 35 minutes providing non-routine ICU care, revaluated multiple times during the day.    By signing my name below, I, Krystin Archer, attest that this documentation has been prepared under the direction and in the presence of Shantanu Ricks PA.  Electronically signed: Rohan Pritchett, 20 @ 18:36    I, Shantanu Ricks, personally performed the services described in this documentation. all medical record entries made by the scribe were at my direction and in my presence. I have reviewed the chart and agree that the record reflects my personal performance and is accurate and complete  Electronically signed: Shantanu Ricks PA. 20 @ 18:36 CLARITA POWELL  MRN-[id number]  Patient is a 70y old  Male who presents with a chief complaint of respiratory distress, COVID 19 (2020 08:53)    HPI:  70M with PMH presents with T dm, hypothyroidism, hld, p/w cough, low grade fever x 2-3 days. Today patient had worsening dyspnea at rest as well as exertion which prompted to come to the ED. Patient otherwise denied chest pain, leg swelling. He denies prior lung disease. Denies any history of smoking.     In ED, patient was noted to be hypoxic on 6L NC to 89% and significant tachypnea. VS otherwise, afebrile, HR in 80s, SBP 150s. Labs notable for CBC wnl. CMP with mild elevated transaminases AST 65 and ALT 47. PAtient noted to be positive for COVID 19. Due to worsening respiratory distress patient was emergently intubated in the ER. (27 Mar 2020 21:38)      Surgery/Hospital course:  COVID 19 Positive  Intubated 3/27  4/9 Paralysis weaned off yesterday, unproned with maintained sats, tolerating diuresis   10 sec asystole, back after epi x1   CPAP  -  unable to tolerate CPAP    decadron course started   Failed CPAP. Continue with steroid taper and supportive therapy. Palliative care consult called and appreciated. Family yet to decide on end of life and prior to making any advanced decisions would like to see if any improvement with steroid taper treatment therapy.     Continues to CPAP trial of 20/5.  Peep lowered to 5 from 6.  Unable to tolerate sedation wean due to increased agitation causing hypoxia.   ketamine weaned off   COVID retested, 1U PRBC given for Hct 26 and pressor requirement; Precedex started, but dc'd for bradycardia (pt had asystolic pause on precedex previously)   Trached, Received convalescent plasma   OG tube removed    Today:  Patient still no has purposeful movements or responses. Was on Ketamine, was weaned down and he became agitated. Received haldol. Was on Dobutamine for heart rate, now off.       ============================I/O===========================   I&O's Detail    2020 07:  -  2020 07:00  --------------------------------------------------------  IN:    DOBUTamine Infusion: 121.8 mL    Enteral Tube Flush: 120 mL    Glucerna 1.5: 1200 mL    IV PiggyBack: 300 mL    ketamine Infusion.: 228 mL    norepinephrine Infusion: 593.7 mL    vasopressin Infusion: 116 mL  Total IN: 2679.5 mL    OUT:    Indwelling Catheter - Urethral: 1165 mL  Total OUT: 1165 mL    Total NET: 1514.5 mL      2020 07:  -  2020 18:36  --------------------------------------------------------  IN:    DOBUTamine Infusion: 58 mL    Enteral Tube Flush: 120 mL    Glucerna 1.5: 600 mL    ketamine Infusion.: 77 mL    norepinephrine Infusion: 93.8 mL    vasopressin Infusion: 44 mL  Total IN: 992.8 mL    OUT:    Indwelling Catheter - Urethral: 600 mL  Total OUT: 600 mL    Total NET: 392.8 mL        ============================ LABS =========================                        8.2    10.57 )-----------( 328      ( 2020 00:43 )             28.4     04-26    139  |  98  |  24<H>  ----------------------------<  99  4.0   |  35<H>  |  0.36<L>    Ca    9.5      2020 00:43  Phos  2.5       Mg     2.2         TPro  6.9  /  Alb  2.5<L>  /  TBili  0.3  /  DBili  x   /  AST  14  /  ALT  30  /  AlkPhos  167<H>      LIVER FUNCTIONS - ( 2020 00:43 )  Alb: 2.5 g/dL / Pro: 6.9 g/dL / ALK PHOS: 167 U/L / ALT: 30 U/L / AST: 14 U/L / GGT: x           PT/INR - ( 2020 00:43 )   PT: 15.0 sec;   INR: 1.31 ratio         PTT - ( 2020 00:43 )  PTT:36.4 sec  ABG - ( 2020 00:46 )  pH, Arterial: 7.34  pH, Blood: x     /  pCO2: 70    /  pO2: 79    / HCO3: 36    / Base Excess: 9.4   /  SaO2: 96                Urinalysis Basic - ( 2020 02:04 )    Color: Yellow / Appearance: Slightly Turbid / S.024 / pH: x  Gluc: x / Ketone: Negative  / Bili: Negative / Urobili: 2 mg/dL   Blood: x / Protein: 100 mg/dL / Nitrite: Negative   Leuk Esterase: Negative / RBC: 3-5 /hpf / WBC 0-2   Sq Epi: x / Non Sq Epi: Few / Bacteria: Few      ======================Micro/Rad/Cardio=================  Culture: Reviewed   CXR: Reviewed  ======================================================  PAST MEDICAL & SURGICAL HISTORY:  Hyperlipidemia  Hypothyroid  Schizophrenia  No significant past surgical history  No significant past surgical history    ====================ASSESSMENT ==============  COVID 19 Positive   3/27 Intubated   Hypoxic respiratory failure s/p tracheotomy  Septic Shock  ARDS  Fluid overload    DNR       Plan:  ====================== NEUROLOGY=====================  Became agitated when ketamine was weaned off.  Continue Haldol to aid inability to wean sedation. Continue to wean sedation as tolerated  Dilaudid and oxycodone for analgesia  Continue Tylenol for fevers.  Continue to monitor neuro status.    acetaminophen    Suspension .. 650 milliGRAM(s) Oral every 6 hours PRN Temp greater or equal to 38.5C (101.3F)  clonazePAM  Tablet 2 milliGRAM(s) Oral every 8 hours  haloperidol     Tablet 0.5 milliGRAM(s) Oral every 12 hours  HYDROmorphone  Injectable 1 milliGRAM(s) IV Push every 4 hours PRN Moderate Pain (4 - 6)  ketamine Infusion. 0.25 mG/kG/Hr (1.93 mL/Hr) IV Continuous <Continuous>  oxyCODONE    IR 10 milliGRAM(s) Oral every 6 hours    ==================== RESPIRATORY======================  S/p trach on full vent support. CPAP trials as tolerated.  Continue to wean FiO2 (goal to 30%, then wean PEEP as tolerated)  O2 sat goal >92%, patient satisfactorily satting at 95%    Mechanical Ventilation:  Mode: AC/ CMV (Assist Control/ Continuous Mandatory Ventilation)  RR (machine): 35  TV (machine): 350  FiO2: 50  PEEP: 5  ITime: 1  MAP: 16  PIP: 51    ====================CARDIOVASCULAR==================  Off of Dobutamine.   Continue pressor support for hypotension.  Wean vasopressors as tolerated, MAP goal 65-75    DOBUTamine Infusion 2.5 MICROgram(s)/kG/Min (5.78 mL/Hr) IV Continuous <Continuous>  norepinephrine Infusion 0.04 MICROgram(s)/kG/Min (5.78 mL/Hr) IV Continuous <Continuous>  vasopressin Infusion 0.04 Unit(s)/Min (2.4 mL/Hr) IV Continuous <Continuous>    ===================HEMATOLOGIC/ONC ===================  Continue Lovenox for VTE prophylaxis    aspirin  chewable 81 milliGRAM(s) Oral daily  enoxaparin Injectable 40 milliGRAM(s) SubCutaneous two times a day    ===================== RENAL =========================  Continue monitoring urine output via ca, +400  Normal BUN/SCr    ==================== GASTROINTESTINAL===================  Tolerating tube feeds, Bolus 300 [id number]  Continue pepcid for GI prophylaxis    famotidine    Tablet 20 milliGRAM(s) Oral two times a day  polyethylene glycol 3350 17 Gram(s) Oral every 12 hours  senna Syrup 10 milliLiter(s) Oral at bedtime  sodium chloride 0.9% lock flush 10 milliLiter(s) IV Push every 1 hour PRN Pre/post blood products, medications, blood draw, and to maintain line patency    =======================    ENDOCRINE  =====================  Glucose control, NPH and sliding scale  insulin lispro (HumaLOG) corrective regimen sliding scale   SubCutaneous every 6 hours  insulin NPH human recombinant 11 Unit(s) SubCutaneous every 6 hours    Hypothyroidism treatment,  levothyroxine Injectable 75 MICROGram(s) IV Push at bedtime    ========================INFECTIOUS DISEASE================  Afebrile.  Completed COVID medication (Plaquenil, Vit C/Thiamine, Anakirna, Solumedrol)  S/p convalescent plasma on    C/w Decadron trial.  Off abx.         Patient requires continuous monitoring with bedside rhythm monitoring, arterial line, pulse oximetry, ventilator monitoring and intermittent blood gas analysis.  Care plan discussed with ICU care team.  Patient remained critical; required more than usual ICU care team; I have spent 35 minutes providing non-routine ICU care, revaluated multiple times during the day.    By signing my name below, I, Krystin Archer, attest that this documentation has been prepared under the direction and in the presence of Shantanu Ricks PA.  Electronically signed: Rohan Pritchett, 20 @ 18:36    I, Shantanu Ricks, personally performed the services described in this documentation. all medical record entries made by the kevinibtrae were at my direction and in my presence. I have reviewed the chart and agree that the record reflects my personal performance and is accurate and complete  Electronically signed: Shantanu iRcks PA. 20 @ 18:36 CLARITA POWELL  MRN-[id number]  Patient is a 70y old  Male who presents with a chief complaint of respiratory distress, COVID 19 (2020 08:53)    HPI:  70M with PMH presents with T dm, hypothyroidism, hld, p/w cough, low grade fever x 2-3 days. Today patient had worsening dyspnea at rest as well as exertion which prompted to come to the ED. Patient otherwise denied chest pain, leg swelling. He denies prior lung disease. Denies any history of smoking.     In ED, patient was noted to be hypoxic on 6L NC to 89% and significant tachypnea. VS otherwise, afebrile, HR in 80s, SBP 150s. Labs notable for CBC wnl. CMP with mild elevated transaminases AST 65 and ALT 47. PAtient noted to be positive for COVID 19. Due to worsening respiratory distress patient was emergently intubated in the ER. (27 Mar 2020 21:38)      Surgery/Hospital course:  COVID 19 Positive  Intubated 3/27  4/9 Paralysis weaned off yesterday, unproned with maintained sats, tolerating diuresis   10 sec asystole, back after epi x1   CPAP  -  unable to tolerate CPAP    decadron course started   Failed CPAP. Continue with steroid taper and supportive therapy. Palliative care consult called and appreciated. Family yet to decide on end of life and prior to making any advanced decisions would like to see if any improvement with steroid taper treatment therapy.     Continues to CPAP trial of 20/5.  Peep lowered to 5 from 6.  Unable to tolerate sedation wean due to increased agitation causing hypoxia.   ketamine weaned off   COVID retested, 1U PRBC given for Hct 26 and pressor requirement; Precedex started, but dc'd for bradycardia (pt had asystolic pause on precedex previously)   Trached, Received convalescent plasma   OG tube removed    Today:  Patient still no has purposeful movements or responses. Was on Ketamine, was weaned down and he became agitated. Received haldol. Was on Dobutamine for heart rate, now off.       ============================I/O===========================   I&O's Detail    2020 07:  -  2020 07:00  --------------------------------------------------------  IN:    DOBUTamine Infusion: 121.8 mL    Enteral Tube Flush: 120 mL    Glucerna 1.5: 1200 mL    IV PiggyBack: 300 mL    ketamine Infusion.: 228 mL    norepinephrine Infusion: 593.7 mL    vasopressin Infusion: 116 mL  Total IN: 2679.5 mL    OUT:    Indwelling Catheter - Urethral: 1165 mL  Total OUT: 1165 mL    Total NET: 1514.5 mL      2020 07:  -  2020 18:36  --------------------------------------------------------  IN:    DOBUTamine Infusion: 58 mL    Enteral Tube Flush: 120 mL    Glucerna 1.5: 600 mL    ketamine Infusion.: 77 mL    norepinephrine Infusion: 93.8 mL    vasopressin Infusion: 44 mL  Total IN: 992.8 mL    OUT:    Indwelling Catheter - Urethral: 600 mL  Total OUT: 600 mL    Total NET: 392.8 mL        ============================ LABS =========================                        8.2    10.57 )-----------( 328      ( 2020 00:43 )             28.4     04-26    139  |  98  |  24<H>  ----------------------------<  99  4.0   |  35<H>  |  0.36<L>    Ca    9.5      2020 00:43  Phos  2.5       Mg     2.2         TPro  6.9  /  Alb  2.5<L>  /  TBili  0.3  /  DBili  x   /  AST  14  /  ALT  30  /  AlkPhos  167<H>      LIVER FUNCTIONS - ( 2020 00:43 )  Alb: 2.5 g/dL / Pro: 6.9 g/dL / ALK PHOS: 167 U/L / ALT: 30 U/L / AST: 14 U/L / GGT: x           PT/INR - ( 2020 00:43 )   PT: 15.0 sec;   INR: 1.31 ratio         PTT - ( 2020 00:43 )  PTT:36.4 sec  ABG - ( 2020 00:46 )  pH, Arterial: 7.34  pH, Blood: x     /  pCO2: 70    /  pO2: 79    / HCO3: 36    / Base Excess: 9.4   /  SaO2: 96                Urinalysis Basic - ( 2020 02:04 )    Color: Yellow / Appearance: Slightly Turbid / S.024 / pH: x  Gluc: x / Ketone: Negative  / Bili: Negative / Urobili: 2 mg/dL   Blood: x / Protein: 100 mg/dL / Nitrite: Negative   Leuk Esterase: Negative / RBC: 3-5 /hpf / WBC 0-2   Sq Epi: x / Non Sq Epi: Few / Bacteria: Few      ======================Micro/Rad/Cardio=================  Culture: Reviewed   CXR: Reviewed  ======================================================  PAST MEDICAL & SURGICAL HISTORY:  Hyperlipidemia  Hypothyroid  Schizophrenia  No significant past surgical history  No significant past surgical history    ====================ASSESSMENT ==============  COVID 19 Positive   3/27 Intubated   Hypoxic respiratory failure s/p tracheotomy  Septic Shock  ARDS  Fluid overload    DNR       Plan:  ====================== NEUROLOGY=====================  Became agitated when ketamine was weaned off.  Continue Haldol to aid inability to wean sedation. Continue to wean sedation as tolerated  Dilaudid and oxycodone for analgesia  Continue Tylenol for fevers.  Continue to monitor neuro status.    acetaminophen    Suspension .. 650 milliGRAM(s) Oral every 6 hours PRN Temp greater or equal to 38.5C (101.3F)  clonazePAM  Tablet 2 milliGRAM(s) Oral every 8 hours  haloperidol     Tablet 0.5 milliGRAM(s) Oral every 12 hours  HYDROmorphone  Injectable 1 milliGRAM(s) IV Push every 4 hours PRN Moderate Pain (4 - 6)  ketamine Infusion. 0.25 mG/kG/Hr (1.93 mL/Hr) IV Continuous <Continuous>  oxyCODONE    IR 10 milliGRAM(s) Oral every 6 hours    ==================== RESPIRATORY======================  S/p trach on full vent support. CPAP trials as tolerated.  Continue to wean FiO2 (goal to 30%, then wean PEEP as tolerated)  O2 sat goal >92%, patient satisfactorily satting at 95%    Mechanical Ventilation:  Mode: AC/ CMV (Assist Control/ Continuous Mandatory Ventilation)  RR (machine): 35  TV (machine): 350  FiO2: 50  PEEP: 5  ITime: 1  MAP: 16  PIP: 51    ====================CARDIOVASCULAR==================  Off of Dobutamine. HR 70-80's Sinus rhythm with PACs  Continue pressor support for hypotension.   Wean vasopressors as tolerated, MAP goal 65-75    DOBUTamine Infusion 2.5 MICROgram(s)/kG/Min (5.78 mL/Hr) IV Continuous <Continuous>  norepinephrine Infusion 0.04 MICROgram(s)/kG/Min (5.78 mL/Hr) IV Continuous <Continuous>  vasopressin Infusion 0.04 Unit(s)/Min (2.4 mL/Hr) IV Continuous <Continuous>    ===================HEMATOLOGIC/ONC ===================  Continue Lovenox for VTE prophylaxis    aspirin  chewable 81 milliGRAM(s) Oral daily  enoxaparin Injectable 40 milliGRAM(s) SubCutaneous two times a day    ===================== RENAL =========================  Continue monitoring urine output via ac, +400  Normal BUN/SCr    ==================== GASTROINTESTINAL===================  Tolerating tube feeds, Bolus 300 [id number]  Continue pepcid for GI prophylaxis    famotidine    Tablet 20 milliGRAM(s) Oral two times a day  polyethylene glycol 3350 17 Gram(s) Oral every 12 hours  senna Syrup 10 milliLiter(s) Oral at bedtime  sodium chloride 0.9% lock flush 10 milliLiter(s) IV Push every 1 hour PRN Pre/post blood products, medications, blood draw, and to maintain line patency    =======================    ENDOCRINE  =====================  Glucose control, NPH and sliding scale  insulin lispro (HumaLOG) corrective regimen sliding scale   SubCutaneous every 6 hours  insulin NPH human recombinant 11 Unit(s) SubCutaneous every 6 hours    Hypothyroidism treatment,  levothyroxine Injectable 75 MICROGram(s) IV Push at bedtime    ========================INFECTIOUS DISEASE================  Afebrile.  Completed COVID medication (Plaquenil, Vit C/Thiamine, Anakirna, Solumedrol)  S/p convalescent plasma on    C/w Decadron trial.  Off abx.         Patient requires continuous monitoring with bedside rhythm monitoring, arterial line, pulse oximetry, ventilator monitoring and intermittent blood gas analysis.  Care plan discussed with ICU care team.  Patient remained critical; required more than usual ICU care team; I have spent 35 minutes providing non-routine ICU care, revaluated multiple times during the day.    By signing my name below, I, Krystin Archer, attest that this documentation has been prepared under the direction and in the presence of Shantanu Ricks PA.  Electronically signed: Rohan Pritchett, 20 @ 18:36    I, Shantanu Ricks, personally performed the services described in this documentation. all medical record entries made by the scribe were at my direction and in my presence. I have reviewed the chart and agree that the record reflects my personal performance and is accurate and complete  Electronically signed: Shantanu Ricks PA. 20 @ 18:36

## 2020-04-26 NOTE — PROGRESS NOTE ADULT - SUBJECTIVE AND OBJECTIVE BOX
CLARITA POWELL  MRN-[id number]  Patient is a 70y old  Male who presents with a chief complaint of respiratory distress, COVID 19 (2020 18:52)    HPI:  70M with PMH presents with T dm, hypothyroidism, hld, p/w cough, low grade fever x 2-3 days. Today patient had worsening dyspnea at rest as well as exertion which prompted to come to the ED. Patient otherwise denied chest pain, leg swelling. He denies prior lung disease. Denies any history of smoking.     In ED, patient was noted to be hypoxic on 6L NC to 89% and significant tachypnea. VS otherwise, afebrile, HR in 80s, SBP 150s. Labs notable for CBC wnl. CMP with mild elevated transaminases AST 65 and ALT 47. PAtient noted to be positive for COVID 19. Due to worsening respiratory distress patient was emergently intubated in the ER. (27 Mar 2020 21:38)      Surgery/Hospital course:  COVID 19 Positive  Intubated 3/27  4/9 Paralysis weaned off yesterday, unproned with maintained sats, tolerating diuresis   10 sec asystole, back after epi x1   CPAP  -  unable to tolerate CPAP    decadron course started   Failed CPAP. Continue with steroid taper and supportive therapy. Palliative care consult called and appreciated. Family yet to decide on end of life and prior to making any advanced decisions would like to see if any improvement with steroid taper treatment therapy.     Continues to CPAP trial of 20/5.  Peep lowered to 5 from 6.  Unable to tolerate sedation wean due to increased agitation causing hypoxia.   ketamine weaned off   COVID retested, 1U PRBC given for Hct 26 and pressor requirement; Precedex started, but dc'd for bradycardia (pt had asystolic pause on precedex previously)   Trached, Received convalescent plasma   OG tube removed    Today:        REVIEW OF SYSTEMS:  Unable to obtain, vented     Vital Signs Last 24 Hrs  T(C): 37.3 (2020 04:00), Max: 37.3 (2020 16:00)  T(F): 99.1 (2020 04:00), Max: 99.2 (2020 16:00)  HR: 64 (2020 06:00) (63 - 75)  BP: --  BP(mean): --  RR: 40 (2020 06:00) (35 - 44)  SpO2: 95% (2020 06:00) (91% - 95%)    ============================I/O===========================   I&O's Detail    2020 07:01  -  2020 07:00  --------------------------------------------------------  IN:    DOBUTamine Infusion: 116 mL    Enteral Tube Flush: 120 mL    Glucerna 1.5: 1200 mL    IV PiggyBack: 300 mL    ketamine Infusion.: 228 mL    norepinephrine Infusion: 593.7 mL    vasopressin Infusion: 116 mL  Total IN: 2673.7 mL    OUT:    Indwelling Catheter - Urethral: 1125 mL  Total OUT: 1125 mL    Total NET: 1548.7 mL        ============================ LABS =========================                        8.2    10.57 )-----------( 328      ( 2020 00:43 )             28.4         139  |  98  |  24<H>  ----------------------------<  99  4.0   |  35<H>  |  0.36<L>    Ca    9.5      2020 00:43  Phos  2.5       Mg     2.2         TPro  6.9  /  Alb  2.5<L>  /  TBili  0.3  /  DBili  x   /  AST  14  /  ALT  30  /  AlkPhos  167<H>      LIVER FUNCTIONS - ( 2020 00:43 )  Alb: 2.5 g/dL / Pro: 6.9 g/dL / ALK PHOS: 167 U/L / ALT: 30 U/L / AST: 14 U/L / GGT: x           PT/INR - ( 2020 00:43 )   PT: 15.0 sec;   INR: 1.31 ratio         PTT - ( 2020 00:43 )  PTT:36.4 sec  ABG - ( 2020 00:46 )  pH, Arterial: 7.34  pH, Blood: x     /  pCO2: 70    /  pO2: 79    / HCO3: 36    / Base Excess: 9.4   /  SaO2: 96                Urinalysis Basic - ( 2020 02:04 )    Color: Yellow / Appearance: Slightly Turbid / S.024 / pH: x  Gluc: x / Ketone: Negative  / Bili: Negative / Urobili: 2 mg/dL   Blood: x / Protein: 100 mg/dL / Nitrite: Negative   Leuk Esterase: Negative / RBC: 3-5 /hpf / WBC 0-2   Sq Epi: x / Non Sq Epi: Few / Bacteria: Few      ======================Micro/Rad/Cardio=================  Culture: Reviewed   CXR: Reviewed  ======================================================  PAST MEDICAL & SURGICAL HISTORY:  Hyperlipidemia  Hypothyroid  Schizophrenia  No significant past surgical history  No significant past surgical history    ====================ASSESSMENT ==============  COVID 19 Positive   3/27 Intubated   Hypoxic respiratory failure s/p tracheotomy  Septic Shock  ARDS  Fluid overload    DNR       Plan:  ====================== NEUROLOGY=====================  Continue ketamine for agitation and vent dyssynchrony   Continue Haldol to aid inability to wean sedation. Continue to wean sedation as tolerated  Dilaudid and oxycodone for analgesia  Continue to monitor neuro status.    acetaminophen    Suspension .. 650 milliGRAM(s) Oral every 6 hours PRN Temp greater or equal to 38.5C (101.3F)  clonazePAM  Tablet 2 milliGRAM(s) Oral every 8 hours  haloperidol     Tablet 0.5 milliGRAM(s) Oral every 12 hours  HYDROmorphone  Injectable 1 milliGRAM(s) IV Push every 4 hours PRN Moderate Pain (4 - 6)  ketamine Infusion. 0.25 mG/kG/Hr (1.93 mL/Hr) IV Continuous <Continuous>  oxyCODONE    IR 10 milliGRAM(s) Oral every 6 hours    ==================== RESPIRATORY======================  S/p trach on full vent support. CPAP trials as tolerated.  Continue to wean FiO2 (goal to 30%, then wean PEEP as tolerated)  O2 sat goal >92%, patient satisfactorily satting at 95%    Mechanical Ventilation:  Mode: AC/ CMV (Assist Control/ Continuous Mandatory Ventilation)  RR (machine): 35  TV (machine): 350  FiO2: 50  PEEP: 5  ITime: 1  MAP: 17  PIP: 46      ====================CARDIOVASCULAR==================  Continue inotropic support for chronotropy   Continue pressor support for hypotension.  Wean vasopressors as tolerated, MAP goal 65-75    DOBUTamine Infusion 2.5 MICROgram(s)/kG/Min (5.78 mL/Hr) IV Continuous <Continuous>  norepinephrine Infusion 0.04 MICROgram(s)/kG/Min (5.78 mL/Hr) IV Continuous <Continuous>    ===================HEMATOLOGIC/ONC ===================  VTE prophylaxis with Lovenox     aspirin  chewable 81 milliGRAM(s) Oral daily  enoxaparin Injectable 40 milliGRAM(s) SubCutaneous two times a day    ===================== RENAL =========================  Continue monitoring urine output  BUN/SCr is normal.     ==================== GASTROINTESTINAL===================  Tolerating tube feeds, Glucerna 300 [id number]   Continue pepcid for GI prophylaxis    famotidine    Tablet 20 milliGRAM(s) Oral two times a day  polyethylene glycol 3350 17 Gram(s) Oral every 12 hours  senna Syrup 10 milliLiter(s) Oral at bedtime  sodium chloride 0.9% lock flush 10 milliLiter(s) IV Push every 1 hour PRN Pre/post blood products, medications, blood draw, and to maintain line patency    =======================    ENDOCRINE  =====================  Glucose control,     insulin lispro (HumaLOG) corrective regimen sliding scale   SubCutaneous every 6 hours  insulin NPH human recombinant 11 Unit(s) SubCutaneous every 6 hours  levothyroxine Injectable 75 MICROGram(s) IV Push at bedtime  vasopressin Infusion 0.04 Unit(s)/Min (2.4 mL/Hr) IV Continuous <Continuous>    ========================INFECTIOUS DISEASE================  Afebrile overnight. Currently 99.1F. WBC 10.5  Completed COVID medication (Plaquenil, Vit C/Thiamine, Anakirna, Solumedrol)  S/p convalescent plasma on    C/w Decadron trial.  Off abx.     Patient requires continuous monitoring with bedside rhythm monitoring, pulse ox monitoring, and intermittent blood gas analysis. Care plan discussed with ICU care team. Patient remained critical and at risk for life threatning decompensation.    By signing my name below, IJoan, attest that this documentation has been prepared under the direction and in the presence of GERRY Echevarria.  Electronically signed: Rohan Seals, 20 @ 07:17    I, Cynthia Tim, personally performed the services described in this documentation. all medical record entries made by the kevinosei were at my direction and in my presence. I have reviewed the chart and agree that the record reflects my personal performance and is accurate and complete  Electronically signed: GERRY Echevarria CLARITA POWELL  MRN-[id number]  Patient is a 70y old  Male who presents with a chief complaint of respiratory distress, COVID 19 (2020 18:52)    HPI:  70M with PMH presents with T dm, hypothyroidism, hld, p/w cough, low grade fever x 2-3 days. Today patient had worsening dyspnea at rest as well as exertion which prompted to come to the ED. Patient otherwise denied chest pain, leg swelling. He denies prior lung disease. Denies any history of smoking.     In ED, patient was noted to be hypoxic on 6L NC to 89% and significant tachypnea. VS otherwise, afebrile, HR in 80s, SBP 150s. Labs notable for CBC wnl. CMP with mild elevated transaminases AST 65 and ALT 47. PAtient noted to be positive for COVID 19. Due to worsening respiratory distress patient was emergently intubated in the ER. (27 Mar 2020 21:38)      Surgery/Hospital course:  COVID 19 Positive  Intubated 3/27  4/9 Paralysis weaned off yesterday, unproned with maintained sats, tolerating diuresis   10 sec asystole, back after epi x1   CPAP  -  unable to tolerate CPAP    decadron course started   Failed CPAP. Continue with steroid taper and supportive therapy. Palliative care consult called and appreciated. Family yet to decide on end of life and prior to making any advanced decisions would like to see if any improvement with steroid taper treatment therapy.     Continues to CPAP trial of 20/5.  Peep lowered to 5 from 6.  Unable to tolerate sedation wean due to increased agitation causing hypoxia.   ketamine weaned off   COVID retested, 1U PRBC given for Hct 26 and pressor requirement; Precedex started, but dc'd for bradycardia (pt had asystolic pause on precedex previously)   Trached, Received convalescent plasma   OG tube removed    REVIEW OF SYSTEMS:  Unable to obtain, vented     Vital Signs Last 24 Hrs  T(C): 37.3 (2020 04:00), Max: 37.3 (2020 16:00)  T(F): 99.1 (2020 04:00), Max: 99.2 (2020 16:00)  HR: 64 (2020 06:00) (63 - 75)  BP: --  BP(mean): --  RR: 40 (2020 06:00) (35 - 44)  SpO2: 95% (2020 06:00) (91% - 95%)    ============================I/O===========================   I&O's Detail    2020 07:01  -  2020 07:00  --------------------------------------------------------  IN:    DOBUTamine Infusion: 116 mL    Enteral Tube Flush: 120 mL    Glucerna 1.5: 1200 mL    IV PiggyBack: 300 mL    ketamine Infusion.: 228 mL    norepinephrine Infusion: 593.7 mL    vasopressin Infusion: 116 mL  Total IN: 2673.7 mL    OUT:    Indwelling Catheter - Urethral: 1125 mL  Total OUT: 1125 mL    Total NET: 1548.7 mL        ============================ LABS =========================                        8.2    10.57 )-----------( 328      ( 2020 00:43 )             28.4         139  |  98  |  24<H>  ----------------------------<  99  4.0   |  35<H>  |  0.36<L>    Ca    9.5      2020 00:43  Phos  2.5       Mg     2.2         TPro  6.9  /  Alb  2.5<L>  /  TBili  0.3  /  DBili  x   /  AST  14  /  ALT  30  /  AlkPhos  167<H>      LIVER FUNCTIONS - ( 2020 00:43 )  Alb: 2.5 g/dL / Pro: 6.9 g/dL / ALK PHOS: 167 U/L / ALT: 30 U/L / AST: 14 U/L / GGT: x           PT/INR - ( 2020 00:43 )   PT: 15.0 sec;   INR: 1.31 ratio         PTT - ( 2020 00:43 )  PTT:36.4 sec  ABG - ( 2020 00:46 )  pH, Arterial: 7.34  pH, Blood: x     /  pCO2: 70    /  pO2: 79    / HCO3: 36    / Base Excess: 9.4   /  SaO2: 96                Urinalysis Basic - ( 2020 02:04 )    Color: Yellow / Appearance: Slightly Turbid / S.024 / pH: x  Gluc: x / Ketone: Negative  / Bili: Negative / Urobili: 2 mg/dL   Blood: x / Protein: 100 mg/dL / Nitrite: Negative   Leuk Esterase: Negative / RBC: 3-5 /hpf / WBC 0-2   Sq Epi: x / Non Sq Epi: Few / Bacteria: Few      ======================Micro/Rad/Cardio=================  Culture: Reviewed   CXR: Reviewed  ======================================================  PAST MEDICAL & SURGICAL HISTORY:  Hyperlipidemia  Hypothyroid  Schizophrenia  No significant past surgical history  No significant past surgical history    ====================ASSESSMENT ==============  COVID 19 Positive   3/27 Intubated   Hypoxic respiratory failure s/p tracheotomy  Septic Shock  ARDS  Fluid overload    DNR       Plan:  ====================== NEUROLOGY=====================  Continue ketamine for agitation and vent dyssynchrony   Continue Haldol to aid inability to wean sedation. Continue to wean sedation as tolerated  Dilaudid and oxycodone for analgesia  Continue to monitor neuro status.    acetaminophen    Suspension .. 650 milliGRAM(s) Oral every 6 hours PRN Temp greater or equal to 38.5C (101.3F)  clonazePAM  Tablet 2 milliGRAM(s) Oral every 8 hours  haloperidol     Tablet 0.5 milliGRAM(s) Oral every 12 hours  HYDROmorphone  Injectable 1 milliGRAM(s) IV Push every 4 hours PRN Moderate Pain (4 - 6)  ketamine Infusion. 0.25 mG/kG/Hr (1.93 mL/Hr) IV Continuous <Continuous>  oxyCODONE    IR 10 milliGRAM(s) Oral every 6 hours    ==================== RESPIRATORY======================  S/p trach on full vent support. CPAP trials as tolerated.  Continue to wean FiO2 (goal to 30%, then wean PEEP as tolerated)  O2 sat goal >92%, patient satisfactorily satting at 95%    Mechanical Ventilation:  Mode: AC/ CMV (Assist Control/ Continuous Mandatory Ventilation)  RR (machine): 35  TV (machine): 350  FiO2: 50  PEEP: 5  ITime: 1  MAP: 17  PIP: 46      ====================CARDIOVASCULAR==================  Continue inotropic support for chronotropy   Continue pressor support for hypotension.  Wean vasopressors as tolerated, MAP goal 65-75    DOBUTamine Infusion 2.5 MICROgram(s)/kG/Min (5.78 mL/Hr) IV Continuous <Continuous>  norepinephrine Infusion 0.04 MICROgram(s)/kG/Min (5.78 mL/Hr) IV Continuous <Continuous>    ===================HEMATOLOGIC/ONC ===================  VTE prophylaxis with Lovenox     aspirin  chewable 81 milliGRAM(s) Oral daily  enoxaparin Injectable 40 milliGRAM(s) SubCutaneous two times a day    ===================== RENAL =========================  Continue monitoring urine output  BUN/SCr is normal.     ==================== GASTROINTESTINAL===================  Tolerating tube feeds, Glucerna 300 [id number]   Continue pepcid for GI prophylaxis    famotidine    Tablet 20 milliGRAM(s) Oral two times a day  polyethylene glycol 3350 17 Gram(s) Oral every 12 hours  senna Syrup 10 milliLiter(s) Oral at bedtime  sodium chloride 0.9% lock flush 10 milliLiter(s) IV Push every 1 hour PRN Pre/post blood products, medications, blood draw, and to maintain line patency    =======================    ENDOCRINE  =====================  Glucose control,     insulin lispro (HumaLOG) corrective regimen sliding scale   SubCutaneous every 6 hours  insulin NPH human recombinant 11 Unit(s) SubCutaneous every 6 hours  levothyroxine Injectable 75 MICROGram(s) IV Push at bedtime  vasopressin Infusion 0.04 Unit(s)/Min (2.4 mL/Hr) IV Continuous <Continuous>    ========================INFECTIOUS DISEASE================  Afebrile overnight. Currently 99.1F. WBC 10.5  Completed COVID medication (Plaquenil, Vit C/Thiamine, Anakirna, Solumedrol)  S/p convalescent plasma on    C/w Decadron trial.  Off abx.     Patient requires continuous monitoring with bedside rhythm monitoring, pulse ox monitoring, and intermittent blood gas analysis. Care plan discussed with ICU care team. Patient remained critical and at risk for life threatning decompensation.    By signing my name below, IJoan, attest that this documentation has been prepared under the direction and in the presence of GERRY Echevarria.  Electronically signed: Jonathan Seals, 20 @ 07:17    I, Cynthia Tim, personally performed the services described in this documentation. all medical record entries made by the jonathan were at my direction and in my presence. I have reviewed the chart and agree that the record reflects my personal performance and is accurate and complete  Electronically signed: GERRY Echevarria

## 2020-04-26 NOTE — PROGRESS NOTE ADULT - SUBJECTIVE AND OBJECTIVE BOX
ENT ISSUE/POD: s/p trach POD 3    HPI: 69 y/o male s/p tracheostomy POD 3. #8 LPC trach in place 2/2 respiratory failure. Pt is seen and examined at bedside. Pt is doing well on vent. No issues or bleeding overnight per team.      PAST MEDICAL & SURGICAL HISTORY:  Hyperlipidemia  Hypothyroid  Schizophrenia  No significant past surgical history  No significant past surgical history    Allergies    mushrooms (Vomiting)  No Known Drug Allergies    Intolerances      MEDICATIONS  (STANDING):  aspirin  chewable 81 milliGRAM(s) Oral daily  chlorhexidine 0.12% Liquid 15 milliLiter(s) Oral Mucosa every 12 hours  chlorhexidine 2% Cloths 1 Application(s) Topical <User Schedule>  clonazePAM  Tablet 2 milliGRAM(s) Oral every 8 hours  DOBUTamine Infusion 2.5 MICROgram(s)/kG/Min (5.78 mL/Hr) IV Continuous <Continuous>  enoxaparin Injectable 40 milliGRAM(s) SubCutaneous two times a day  famotidine    Tablet 20 milliGRAM(s) Oral two times a day  haloperidol     Tablet 0.5 milliGRAM(s) Oral every 12 hours  insulin lispro (HumaLOG) corrective regimen sliding scale   SubCutaneous every 6 hours  insulin NPH human recombinant 11 Unit(s) SubCutaneous every 6 hours  ketamine Infusion. 0.25 mG/kG/Hr (1.93 mL/Hr) IV Continuous <Continuous>  levothyroxine Injectable 75 MICROGram(s) IV Push at bedtime  norepinephrine Infusion 0.04 MICROgram(s)/kG/Min (5.78 mL/Hr) IV Continuous <Continuous>  oxyCODONE    IR 10 milliGRAM(s) Oral every 6 hours  petrolatum Ophthalmic Ointment 1 Application(s) Both EYES two times a day  polyethylene glycol 3350 17 Gram(s) Oral every 12 hours  senna Syrup 10 milliLiter(s) Oral at bedtime  vasopressin Infusion 0.04 Unit(s)/Min (2.4 mL/Hr) IV Continuous <Continuous>    MEDICATIONS  (PRN):  acetaminophen    Suspension .. 650 milliGRAM(s) Oral every 6 hours PRN Temp greater or equal to 38.5C (101.3F)  HYDROmorphone  Injectable 1 milliGRAM(s) IV Push every 4 hours PRN Moderate Pain (4 - 6)  sodium chloride 0.9% lock flush 10 milliLiter(s) IV Push every 1 hour PRN Pre/post blood products, medications, blood draw, and to maintain line patency    social history: see consult     family history: see consult   ROS:   ENT: all negative except as noted in HPI   Pulm: denies SOB, cough, hemoptysis  Neuro: denies numbness/tingling, loss of sensation  Endo: denies heat/cold intolerance, excessive sweating      Vital Signs Last 24 Hrs  T(C): 37.3 (26 Apr 2020 04:00), Max: 37.3 (25 Apr 2020 16:00)  T(F): 99.1 (26 Apr 2020 04:00), Max: 99.2 (25 Apr 2020 16:00)  HR: 67 (26 Apr 2020 08:00) (63 - 75)  BP: --  BP(mean): --  RR: 43 (26 Apr 2020 08:00) (35 - 44)  SpO2: 96% (26 Apr 2020 08:00) (91% - 96%)                          8.2    10.57 )-----------( 328      ( 26 Apr 2020 00:43 )             28.4    04-26    139  |  98  |  24<H>  ----------------------------<  99  4.0   |  35<H>  |  0.36<L>    Ca    9.5      26 Apr 2020 00:43  Phos  2.5     04-26  Mg     2.2     04-26    TPro  6.9  /  Alb  2.5<L>  /  TBili  0.3  /  DBili  x   /  AST  14  /  ALT  30  /  AlkPhos  167<H>  04-26   PT/INR - ( 26 Apr 2020 00:43 )   PT: 15.0 sec;   INR: 1.31 ratio         PTT - ( 26 Apr 2020 00:43 )  PTT:36.4 sec    PHYSICAL EXAM:  Gen: NAD  Skin: No rashes, bruises, or lesions  Head: Normocephalic, Atraumatic  Face: no edema, erythema, or fluctuance.  Eyes: no scleral injection  Nose: Nares bilaterally patent, no discharge  Mouth: No Stridor / Drooling / Trismus.  Mucosa moist, tongue/uvula midline, oropharynx clear  Neck: #8 LPC in place, minimal serosanguinous secretions around stoma, sutures x4 and umbilical tie in place. Flat, supple, no lymphadenopathy, trachea midline, no masses  Lymphatic: No lymphadenopathy  Resp: on vent, ventilating well  Neuro: unable to assess due to patient's condition

## 2020-04-26 NOTE — PROGRESS NOTE ADULT - PROBLEM SELECTOR PLAN 1
- Plan to remove sutures POD #7  - Do not remove umbilical trach tie  - HOB elevation  - Suction PRN  - Continue trach care  - ENT will continue to follow, call with questions x [phone].

## 2020-04-27 NOTE — PROGRESS NOTE ADULT - SUBJECTIVE AND OBJECTIVE BOX
CLARITA POWELL  MRN-[id number]  Patient is a 70y old  Male who presents with a chief complaint of respiratory distress, COVID 19 (26 Apr 2020 18:35)    HPI:  70M with PMH presents with T dm, hypothyroidism, hld, p/w cough, low grade fever x 2-3 days. Today patient had worsening dyspnea at rest as well as exertion which prompted to come to the ED. Patient otherwise denied chest pain, leg swelling. He denies prior lung disease. Denies any history of smoking.     In ED, patient was noted to be hypoxic on 6L NC to 89% and significant tachypnea. VS otherwise, afebrile, HR in 80s, SBP 150s. Labs notable for CBC wnl. CMP with mild elevated transaminases AST 65 and ALT 47. PAtient noted to be positive for COVID 19. Due to worsening respiratory distress patient was emergently intubated in the ER. (27 Mar 2020 21:38)      Surgery/Hospital course:  COVID 19 Positive  Intubated 3/27  4/9 Paralysis weaned off yesterday, unproned with maintained sats, tolerating diuresis  4/12 10 sec asystole, back after epi x1  4/13 CPAP  4/14- 4/16 unable to tolerate CPAP   4/16 decadron course started  4/17 Failed CPAP. Continue with steroid taper and supportive therapy. Palliative care consult called and appreciated. Family yet to decide on end of life and prior to making any advanced decisions would like to see if any improvement with steroid taper treatment therapy.    4/18 Continues to CPAP trial of 20/5.  Peep lowered to 5 from 6.  Unable to tolerate sedation wean due to increased agitation causing hypoxia.  4/21 ketamine weaned off  4/22 COVID retested, 1U PRBC given for Hct 26 and pressor requirement; Precedex started, but dc'd for bradycardia (pt had asystolic pause on precedex previously)  4/23 Trached, Received convalescent plasma  4/24 OG tube removed    REVIEW OF SYSTEMS:  Unable to obtain, vented     Vital Signs Last 24 Hrs  T(C): 36.5 (27 Apr 2020 04:00), Max: 38.4 (26 Apr 2020 20:00)  T(F): 97.7 (27 Apr 2020 04:00), Max: 101.1 (26 Apr 2020 20:00)  HR: 77 (27 Apr 2020 06:00) (54 - 87)  BP: --  BP(mean): --  RR: 35 (27 Apr 2020 06:00) (35 - 43)  SpO2: 94% (27 Apr 2020 06:00) (85% - 97%)    ============================I/O===========================   I&O's Detail    25 Apr 2020 07:01  -  26 Apr 2020 07:00  --------------------------------------------------------  IN:    DOBUTamine Infusion: 121.8 mL    Enteral Tube Flush: 120 mL    Glucerna 1.5: 1200 mL    IV PiggyBack: 300 mL    ketamine Infusion.: 228 mL    norepinephrine Infusion: 593.7 mL    vasopressin Infusion: 116 mL  Total IN: 2679.5 mL    OUT:    Indwelling Catheter - Urethral: 1165 mL  Total OUT: 1165 mL    Total NET: 1514.5 mL      26 Apr 2020 07:01  -  27 Apr 2020 06:51  --------------------------------------------------------  IN:    dexmedetomidine Infusion: 27.2 mL    DOBUTamine Infusion: 63.8 mL    Enteral Tube Flush: 320 mL    Glucerna 1.5: 1200 mL    ketamine Infusion.: 176.8 mL    norepinephrine Infusion: 198.5 mL    vasopressin Infusion: 100 mL  Total IN: 2086.3 mL    OUT:    Indwelling Catheter - Urethral: 1955 mL  Total OUT: 1955 mL    Total NET: 131.3 mL        ============================ LABS =========================                        8.1    11.05 )-----------( 304      ( 27 Apr 2020 00:31 )             27.4     04-27    140  |  99  |  24<H>  ----------------------------<  195<H>  3.9   |  32<H>  |  0.37<L>    Ca    9.0      27 Apr 2020 00:35  Phos  1.8     04-27  Mg     1.9     04-27    TPro  6.4  /  Alb  2.4<L>  /  TBili  0.4  /  DBili  x   /  AST  14  /  ALT  25  /  AlkPhos  170<H>  04-27    LIVER FUNCTIONS - ( 27 Apr 2020 00:35 )  Alb: 2.4 g/dL / Pro: 6.4 g/dL / ALK PHOS: 170 U/L / ALT: 25 U/L / AST: 14 U/L / GGT: x           PT/INR - ( 27 Apr 2020 00:35 )   PT: 18.6 sec;   INR: 1.59 ratio         PTT - ( 27 Apr 2020 00:35 )  PTT:35.4 sec  ABG - ( 27 Apr 2020 00:18 )  pH, Arterial: 7.43  pH, Blood: x     /  pCO2: 55    /  pO2: 130   / HCO3: 36    / Base Excess: 10.6  /  SaO2: 99                  ======================Micro/Rad/Cardio=================  Culture: Reviewed   CXR: Reviewed  ======================================================  PAST MEDICAL & SURGICAL HISTORY:  Hyperlipidemia  Hypothyroid  Schizophrenia  No significant past surgical history  No significant past surgical history    ====================ASSESSMENT ==============  COVID 19 Positive   3/27 Intubated   Hypoxic respiratory failure s/p tracheotomy  Septic Shock  ARDS  Fluid overload    DNR   Plan:  ====================== NEUROLOGY=====================  Sedated with Klonopin, Precedex, Ketamine   Haldol to aid in weaning sedation   Dilaudid and oxycodone for analgesia     acetaminophen    Suspension .. 650 milliGRAM(s) Oral every 6 hours PRN Temp greater or equal to 38.5C (101.3F)  clonazePAM  Tablet 2 milliGRAM(s) Oral every 8 hours  dexMEDEtomidine Infusion 0.5 MICROgram(s)/kG/Hr (9.64 mL/Hr) IV Continuous <Continuous>  haloperidol     Tablet 0.5 milliGRAM(s) Oral every 12 hours  HYDROmorphone  Injectable 1 milliGRAM(s) IV Push every 4 hours PRN Moderate Pain (4 - 6)  ketamine Infusion. 0.25 mG/kG/Hr (1.93 mL/Hr) IV Continuous <Continuous>  oxyCODONE    IR 10 milliGRAM(s) Oral every 6 hours    ==================== RESPIRATORY======================  On full vent support     Mechanical Ventilation:  Mode: AC/ CMV (Assist Control/ Continuous Mandatory Ventilation)  RR (machine): 35  TV (machine): 350  FiO2: 100  PEEP: 8  ITime: 1  MAP: 22  PIP:       ====================CARDIOVASCULAR==================  On pressor support for hypotension     norepinephrine Infusion 0.04 MICROgram(s)/kG/Min (5.78 mL/Hr) IV Continuous <Continuous>  vasopressin Infusion 0.04 Unit(s)/Min (2.4 mL/Hr) IV Continuous <Continuous>    ===================HEMATOLOGIC/ONC ===================  aspirin  chewable 81 milliGRAM(s) Oral daily  VTE prophylaxis, enoxaparin Injectable 40 milliGRAM(s) SubCutaneous two times a day    ===================== RENAL =========================  Continue monitoring urine output  Diuresis with Lasix     furosemide   Injectable 40 milliGRAM(s) IV Push every 8 hours  ==================== GASTROINTESTINAL===================  Tolerating tube feeds, Glucerna 300 [id number]     GI prophylaxis, famotidine    Tablet 20 milliGRAM(s) Oral two times a day  polyethylene glycol 3350 17 Gram(s) Oral every 12 hours  senna Syrup 10 milliLiter(s) Oral at bedtime  sodium chloride 0.9% lock flush 10 milliLiter(s) IV Push every 1 hour PRN Pre/post blood products, medications, blood draw, and to maintain line patency    =======================    ENDOCRINE  =====================  Glucose control,   insulin lispro (HumaLOG) corrective regimen sliding scale   SubCutaneous every 6 hours  insulin NPH human recombinant 11 Unit(s) SubCutaneous every 6 hours  levothyroxine Injectable 75 MICROGram(s) IV Push at bedtime    ========================INFECTIOUS DISEASE================  S/p convalescent plasma on 4/23   Completed COVID medication (Plaquenil, Vit C/Thiamine, Anakirna, Solumedrol)  C/w cefepime for pseudomonas aeruginosa in sputum on 4/24     cefepime   IVPB      cefepime   IVPB 1000 milliGRAM(s) IV Intermittent every 8 hours        Patient requires continuous monitoring with bedside rhythm monitoring, pulse ox monitoring, and intermittent blood gas analysis. Care plan discussed with ICU care team. Patient remained critical and at risk for life threatening decompensation.    By signing my name below, I, Pretty Wallace, attest that this documentation has been prepared under the direction and in the presence of GERRY Echevarria.  Electronically signed: Rohan Lal, 04-27-20 @ 06:51    I, Cynthia Tim, personally performed the services described in this documentation. all medical record entries made by the kevinibtrae were at my direction and in my presence. I have reviewed the chart and agree that the record reflects my personal performance and is accurate and complete  Electronically signed: GERRY Echevarria CLARITA POWELL  MRN-[id number]  Patient is a 70y old  Male who presents with a chief complaint of respiratory distress, COVID 19 (26 Apr 2020 18:35)    HPI:  70M with PMH presents with T dm, hypothyroidism, hld, p/w cough, low grade fever x 2-3 days. Today patient had worsening dyspnea at rest as well as exertion which prompted to come to the ED. Patient otherwise denied chest pain, leg swelling. He denies prior lung disease. Denies any history of smoking.     In ED, patient was noted to be hypoxic on 6L NC to 89% and significant tachypnea. VS otherwise, afebrile, HR in 80s, SBP 150s. Labs notable for CBC wnl. CMP with mild elevated transaminases AST 65 and ALT 47. PAtient noted to be positive for COVID 19. Due to worsening respiratory distress patient was emergently intubated in the ER. (27 Mar 2020 21:38)      Surgery/Hospital course:  COVID 19 Positive  Intubated 3/27  4/9 Paralysis weaned off yesterday, unproned with maintained sats, tolerating diuresis  4/12 10 sec asystole, back after epi x1  4/13 CPAP  4/14- 4/16 unable to tolerate CPAP   4/16 decadron course started  4/17 Failed CPAP. Continue with steroid taper and supportive therapy. Palliative care consult called and appreciated. Family yet to decide on end of life and prior to making any advanced decisions would like to see if any improvement with steroid taper treatment therapy.    4/18 Continues to CPAP trial of 20/5.  Peep lowered to 5 from 6.  Unable to tolerate sedation wean due to increased agitation causing hypoxia.  4/21 ketamine weaned off  4/22 COVID retested, 1U PRBC given for Hct 26 and pressor requirement; Precedex started, but dc'd for bradycardia (pt had asystolic pause on precedex previously)  4/23 Trached, Received convalescent plasma  4/24 OG tube removed    REVIEW OF SYSTEMS:  Unable to obtain, vented     Vital Signs Last 24 Hrs  T(C): 36.5 (27 Apr 2020 04:00), Max: 38.4 (26 Apr 2020 20:00)  T(F): 97.7 (27 Apr 2020 04:00), Max: 101.1 (26 Apr 2020 20:00)  HR: 77 (27 Apr 2020 06:00) (54 - 87)  BP: --  BP(mean): --  RR: 35 (27 Apr 2020 06:00) (35 - 43)  SpO2: 94% (27 Apr 2020 06:00) (85% - 97%)    ============================I/O===========================   I&O's Detail    25 Apr 2020 07:01  -  26 Apr 2020 07:00  --------------------------------------------------------  IN:    DOBUTamine Infusion: 121.8 mL    Enteral Tube Flush: 120 mL    Glucerna 1.5: 1200 mL    IV PiggyBack: 300 mL    ketamine Infusion.: 228 mL    norepinephrine Infusion: 593.7 mL    vasopressin Infusion: 116 mL  Total IN: 2679.5 mL    OUT:    Indwelling Catheter - Urethral: 1165 mL  Total OUT: 1165 mL    Total NET: 1514.5 mL      26 Apr 2020 07:01  -  27 Apr 2020 06:51  --------------------------------------------------------  IN:    dexmedetomidine Infusion: 27.2 mL    DOBUTamine Infusion: 63.8 mL    Enteral Tube Flush: 320 mL    Glucerna 1.5: 1200 mL    ketamine Infusion.: 176.8 mL    norepinephrine Infusion: 198.5 mL    vasopressin Infusion: 100 mL  Total IN: 2086.3 mL    OUT:    Indwelling Catheter - Urethral: 1955 mL  Total OUT: 1955 mL    Total NET: 131.3 mL        ============================ LABS =========================                        8.1    11.05 )-----------( 304      ( 27 Apr 2020 00:31 )             27.4     04-27    140  |  99  |  24<H>  ----------------------------<  195<H>  3.9   |  32<H>  |  0.37<L>    Ca    9.0      27 Apr 2020 00:35  Phos  1.8     04-27  Mg     1.9     04-27    TPro  6.4  /  Alb  2.4<L>  /  TBili  0.4  /  DBili  x   /  AST  14  /  ALT  25  /  AlkPhos  170<H>  04-27    LIVER FUNCTIONS - ( 27 Apr 2020 00:35 )  Alb: 2.4 g/dL / Pro: 6.4 g/dL / ALK PHOS: 170 U/L / ALT: 25 U/L / AST: 14 U/L / GGT: x           PT/INR - ( 27 Apr 2020 00:35 )   PT: 18.6 sec;   INR: 1.59 ratio         PTT - ( 27 Apr 2020 00:35 )  PTT:35.4 sec  ABG - ( 27 Apr 2020 00:18 )  pH, Arterial: 7.43  pH, Blood: x     /  pCO2: 55    /  pO2: 130   / HCO3: 36    / Base Excess: 10.6  /  SaO2: 99                  ======================Micro/Rad/Cardio=================  Culture: Reviewed   CXR: Reviewed  ======================================================  PAST MEDICAL & SURGICAL HISTORY:  Hyperlipidemia  Hypothyroid  Schizophrenia  No significant past surgical history  No significant past surgical history    ====================ASSESSMENT ==============  COVID 19 Positive   3/27 Intubated   Hypoxic respiratory failure s/p tracheotomy  Septic Shock  ARDS  Fluid overload    DNR   Plan:  ====================== NEUROLOGY=====================  Sedated with Klonopin, Precedex, Ketamine   Haldol to aid in weaning sedation   Dilaudid and oxycodone for analgesia     acetaminophen    Suspension .. 650 milliGRAM(s) Oral every 6 hours PRN Temp greater or equal to 38.5C (101.3F)  clonazePAM  Tablet 2 milliGRAM(s) Oral every 8 hours  dexMEDEtomidine Infusion 0.5 MICROgram(s)/kG/Hr (9.64 mL/Hr) IV Continuous <Continuous>  haloperidol     Tablet 0.5 milliGRAM(s) Oral every 12 hours  HYDROmorphone  Injectable 1 milliGRAM(s) IV Push every 4 hours PRN Moderate Pain (4 - 6)  ketamine Infusion. 0.25 mG/kG/Hr (1.93 mL/Hr) IV Continuous <Continuous>  oxyCODONE    IR 10 milliGRAM(s) Oral every 6 hours    ==================== RESPIRATORY======================  On full vent support. Unable to wean vent air way pressures are high.     Mechanical Ventilation:  Mode: AC/ CMV (Assist Control/ Continuous Mandatory Ventilation)  RR (machine): 35  TV (machine): 350  FiO2: 100  PEEP: 8  ITime: 1  MAP: 22  PIP:       ====================CARDIOVASCULAR==================  On pressor support for hypotension     norepinephrine Infusion 0.04 MICROgram(s)/kG/Min (5.78 mL/Hr) IV Continuous <Continuous>  vasopressin Infusion 0.04 Unit(s)/Min (2.4 mL/Hr) IV Continuous <Continuous>    ===================HEMATOLOGIC/ONC ===================  aspirin  chewable 81 milliGRAM(s) Oral daily  VTE prophylaxis, enoxaparin Injectable 40 milliGRAM(s) SubCutaneous two times a day    ===================== RENAL =========================  Continue monitoring urine output  Diuresis with Lasix     furosemide   Injectable 40 milliGRAM(s) IV Push every 8 hours  ==================== GASTROINTESTINAL===================  Tolerating tube feeds, Glucerna 300 [id number]     GI prophylaxis, famotidine    Tablet 20 milliGRAM(s) Oral two times a day  polyethylene glycol 3350 17 Gram(s) Oral every 12 hours  senna Syrup 10 milliLiter(s) Oral at bedtime  sodium chloride 0.9% lock flush 10 milliLiter(s) IV Push every 1 hour PRN Pre/post blood products, medications, blood draw, and to maintain line patency    =======================    ENDOCRINE  =====================  Glucose control,   insulin lispro (HumaLOG) corrective regimen sliding scale   SubCutaneous every 6 hours  insulin NPH human recombinant 11 Unit(s) SubCutaneous every 6 hours  levothyroxine Injectable 75 MICROGram(s) IV Push at bedtime    ========================INFECTIOUS DISEASE================  S/p convalescent plasma on 4/23   Completed COVID medication (Plaquenil, Vit C/Thiamine, Anakirna, Solumedrol)  C/w cefepime for pseudomonas aeruginosa in sputum on 4/24     cefepime   IVPB      cefepime   IVPB 1000 milliGRAM(s) IV Intermittent every 8 hours        Patient requires continuous monitoring with bedside rhythm monitoring, pulse ox monitoring, and intermittent blood gas analysis. Care plan discussed with ICU care team. Patient remained critical and at risk for life threatening decompensation.    By signing my name below, Pretty VANCE, attest that this documentation has been prepared under the direction and in the presence of GERRY Echevarria.  Electronically signed: Rohan Lal, 04-27-20 @ 06:51    Cynthia VANCE, personally performed the services described in this documentation. all medical record entries made by the scribe were at my direction and in my presence. I have reviewed the chart and agree that the record reflects my personal performance and is accurate and complete  Electronically signed: GERRY Echevarria

## 2020-04-27 NOTE — PROGRESS NOTE ADULT - SUBJECTIVE AND OBJECTIVE BOX
ENT ISSUE/POD: S/p tracheostomy POD 4    HPI:  71 y/o male s/p tracheostomy POD 4. #8 LPC trach in place 2/2 respiratory failure. Pt is doing well on vent. No issues or bleeding overnight per team.      PAST MEDICAL & SURGICAL HISTORY:  Hyperlipidemia  Hypothyroid  Schizophrenia  No significant past surgical history  No significant past surgical history    Allergies    mushrooms (Vomiting)  No Known Drug Allergies    Intolerances      MEDICATIONS  (STANDING):  aspirin  chewable 81 milliGRAM(s) Oral daily  cefepime   IVPB      cefepime   IVPB 1000 milliGRAM(s) IV Intermittent every 8 hours  chlorhexidine 0.12% Liquid 15 milliLiter(s) Oral Mucosa every 12 hours  chlorhexidine 2% Cloths 1 Application(s) Topical <User Schedule>  clonazePAM  Tablet 2 milliGRAM(s) Oral every 8 hours  dexMEDEtomidine Infusion 0.5 MICROgram(s)/kG/Hr (9.64 mL/Hr) IV Continuous <Continuous>  enoxaparin Injectable 40 milliGRAM(s) SubCutaneous two times a day  famotidine    Tablet 20 milliGRAM(s) Oral two times a day  furosemide   Injectable 40 milliGRAM(s) IV Push every 8 hours  haloperidol     Tablet 0.5 milliGRAM(s) Oral every 12 hours  insulin lispro (HumaLOG) corrective regimen sliding scale   SubCutaneous every 6 hours  insulin NPH human recombinant 11 Unit(s) SubCutaneous every 6 hours  ketamine Infusion. 0.25 mG/kG/Hr (1.93 mL/Hr) IV Continuous <Continuous>  levothyroxine Injectable 75 MICROGram(s) IV Push at bedtime  norepinephrine Infusion 0.04 MICROgram(s)/kG/Min (5.78 mL/Hr) IV Continuous <Continuous>  oxyCODONE    IR 10 milliGRAM(s) Oral every 6 hours  petrolatum Ophthalmic Ointment 1 Application(s) Both EYES two times a day  polyethylene glycol 3350 17 Gram(s) Oral every 12 hours  senna Syrup 10 milliLiter(s) Oral at bedtime  vasopressin Infusion 0.04 Unit(s)/Min (2.4 mL/Hr) IV Continuous <Continuous>    MEDICATIONS  (PRN):  acetaminophen    Suspension .. 650 milliGRAM(s) Oral every 6 hours PRN Temp greater or equal to 38.5C (101.3F)  HYDROmorphone  Injectable 1 milliGRAM(s) IV Push every 4 hours PRN Moderate Pain (4 - 6)  sodium chloride 0.9% lock flush 10 milliLiter(s) IV Push every 1 hour PRN Pre/post blood products, medications, blood draw, and to maintain line patency        ROS:   Unable to obtain due to pts clinical condition      Vital Signs Last 24 Hrs  T(C): 35.8 (27 Apr 2020 08:00), Max: 38.4 (26 Apr 2020 20:00)  T(F): 96.4 (27 Apr 2020 08:00), Max: 101.1 (26 Apr 2020 20:00)  HR: 60 (27 Apr 2020 08:00) (54 - 87)  BP: --  BP(mean): --  RR: 41 (27 Apr 2020 08:00) (35 - 41)  SpO2: 91% (27 Apr 2020 08:00) (85% - 97%)                          8.1    11.05 )-----------( 304      ( 27 Apr 2020 00:31 )             27.4    04-27    140  |  99  |  24<H>  ----------------------------<  195<H>  3.9   |  32<H>  |  0.37<L>    Ca    9.0      27 Apr 2020 00:35  Phos  1.8     04-27  Mg     1.9     04-27    TPro  6.4  /  Alb  2.4<L>  /  TBili  0.4  /  DBili  x   /  AST  14  /  ALT  25  /  AlkPhos  170<H>  04-27   PT/INR - ( 27 Apr 2020 00:35 )   PT: 18.6 sec;   INR: 1.59 ratio         PTT - ( 27 Apr 2020 00:35 )  PTT:35.4 sec    PHYSICAL EXAM:  Gen: NAD  Skin: No rashes, bruises, or lesions  Head: Normocephalic, Atraumatic  Face: no edema, erythema, or fluctuance.  Eyes: no scleral injection  Nose: Nares bilaterally patent, no discharge  Mouth: No Stridor / Drooling / Trismus.  Mucosa moist, tongue/uvula midline, oropharynx clear  Neck: #8 LPC in place, minimal serosanguinous secretions around stoma, sutures x4 and umbilical tie in place. Flat, supple, no lymphadenopathy, trachea midline, no masses  Lymphatic: No lymphadenopathy  Resp: on vent, ventilating well  Neuro: unable to assess due to patient's condition

## 2020-04-27 NOTE — PROGRESS NOTE ADULT - PROBLEM SELECTOR PLAN 1
- Plan to remove sutures POD #7  - Do not remove umbilical trach tie  - HOB elevation  - Suction PRN  - Continue trach care  - ENT will continue to follow, call with questions x [phone].

## 2020-04-27 NOTE — PROGRESS NOTE ADULT - SUBJECTIVE AND OBJECTIVE BOX
CLARITA POWELL  MRN-[id number]  Patient is a 70y old  Male who presents with a chief complaint of respiratory distress, COVID 19 (27 Apr 2020 09:35)    HPI:  70M with PMH presents with T dm, hypothyroidism, hld, p/w cough, low grade fever x 2-3 days. Today patient had worsening dyspnea at rest as well as exertion which prompted to come to the ED. Patient otherwise denied chest pain, leg swelling. He denies prior lung disease. Denies any history of smoking.     In ED, patient was noted to be hypoxic on 6L NC to 89% and significant tachypnea. VS otherwise, afebrile, HR in 80s, SBP 150s. Labs notable for CBC wnl. CMP with mild elevated transaminases AST 65 and ALT 47. PAtient noted to be positive for COVID 19. Due to worsening respiratory distress patient was emergently intubated in the ER. (27 Mar 2020 21:38)      Surgery/Hospital course:  COVID 19 Positive  Intubated 3/27  4/9 Paralysis weaned off yesterday, unproned with maintained sats, tolerating diuresis  4/12 10 sec asystole, back after epi x1  4/13 CPAP  4/14- 4/16 unable to tolerate CPAP   4/16 decadron course started  4/17 Failed CPAP. Continue with steroid taper and supportive therapy. Palliative care consult called and appreciated. Family yet to decide on end of life and prior to making any advanced decisions would like to see if any improvement with steroid taper treatment therapy.    4/18 Continues to CPAP trial of 20/5.  Peep lowered to 5 from 6.  Unable to tolerate sedation wean due to increased agitation causing hypoxia.  4/21 ketamine weaned off  4/22 COVID retested, 1U PRBC given for Hct 26 and pressor requirement; Precedex started, but dc'd for bradycardia (pt had asystolic pause on precedex previously)  4/23 Trached, Received convalescent plasma  4/24 OG tube removed    Today:      ============================I/O===========================   I&O's Detail    26 Apr 2020 07:01  -  27 Apr 2020 07:00  --------------------------------------------------------  IN:    dexmedetomidine Infusion: 31.1 mL    DOBUTamine Infusion: 63.8 mL    Enteral Tube Flush: 320 mL    Glucerna 1.5: 1200 mL    IV PiggyBack: 400 mL    ketamine Infusion.: 184.5 mL    norepinephrine Infusion: 213 mL    vasopressin Infusion: 103 mL  Total IN: 2515.4 mL    OUT:    Indwelling Catheter - Urethral: 2305 mL  Total OUT: 2305 mL    Total NET: 210.4 mL      27 Apr 2020 07:01  -  27 Apr 2020 20:10  --------------------------------------------------------  IN:    dexmedetomidine Infusion: 11.7 mL    Enteral Tube Flush: 80 mL    Glucerna 1.5: 300 mL    IV PiggyBack: 50 mL    ketamine Infusion.: 92.4 mL    norepinephrine Infusion: 186.8 mL    vasopressin Infusion: 10 mL  Total IN: 730.9 mL    OUT:    Indwelling Catheter - Urethral: 1775 mL  Total OUT: 1775 mL    Total NET: -1044.1 mL        ============================ LABS =========================                        8.1    11.05 )-----------( 304      ( 27 Apr 2020 00:31 )             27.4     04-27    140  |  99  |  24<H>  ----------------------------<  195<H>  3.9   |  32<H>  |  0.37<L>    Ca    9.0      27 Apr 2020 00:35  Phos  1.8     04-27  Mg     1.9     04-27    TPro  6.4  /  Alb  2.4<L>  /  TBili  0.4  /  DBili  x   /  AST  14  /  ALT  25  /  AlkPhos  170<H>  04-27    LIVER FUNCTIONS - ( 27 Apr 2020 00:35 )  Alb: 2.4 g/dL / Pro: 6.4 g/dL / ALK PHOS: 170 U/L / ALT: 25 U/L / AST: 14 U/L / GGT: x           PT/INR - ( 27 Apr 2020 00:35 )   PT: 18.6 sec;   INR: 1.59 ratio         PTT - ( 27 Apr 2020 00:35 )  PTT:35.4 sec  ABG - ( 27 Apr 2020 18:18 )  pH, Arterial: 7.40  pH, Blood: x     /  pCO2: 68    /  pO2: 103   / HCO3: 42    / Base Excess: 15.1  /  SaO2: 98                  ======================Micro/Rad/Cardio=================  Culture: Reviewed   CXR: Reviewed  ======================================================  PAST MEDICAL & SURGICAL HISTORY:  Hyperlipidemia  Hypothyroid  Schizophrenia  No significant past surgical history  No significant past surgical history    ====================ASSESSMENT ==============  COVID 19 Positive   3/27 Intubated   Hypoxic respiratory failure s/p tracheotomy  Septic Shock  ARDS  Fluid overload    DNR       Plan:  ====================== NEUROLOGY=====================  Sedated with Klonopin, Ketamine   Haldol to aid in weaning sedation   Dilaudid and oxycodone for analgesia    acetaminophen    Suspension .. 650 milliGRAM(s) Oral every 6 hours PRN Temp greater or equal to 38.5C (101.3F)  clonazePAM  Tablet 2 milliGRAM(s) Oral every 8 hours  haloperidol     Tablet 0.5 milliGRAM(s) Oral every 12 hours  HYDROmorphone  Injectable 1 milliGRAM(s) IV Push every 4 hours PRN Moderate Pain (4 - 6)  ketamine Infusion. 0.25 mG/kG/Hr (1.93 mL/Hr) IV Continuous <Continuous>  oxyCODONE    IR 10 milliGRAM(s) Oral every 6 hours      Continue to wean sedation as tolerated  ==================== RESPIRATORY======================  On full vent support. Unable to wean vent air way pressures are high.  Continue to wean FiO2 (goal to 30%, then wean PEEP as tolerated)  O2 sat goal >92%    Mechanical Ventilation:  Mode: AC/ CMV (Assist Control/ Continuous Mandatory Ventilation)  RR (machine): 35  TV (machine): 300  FiO2: 85  PEEP: 8  ITime: 1  MAP: 19  PIP: 48    ====================CARDIOVASCULAR==================  On pressor support for hypotension   Wean vasopressors as tolerated, MAP goal 65-75    norepinephrine Infusion 0.04 MICROgram(s)/kG/Min (5.78 mL/Hr) IV Continuous <Continuous>  vasopressin Infusion 0.04 Unit(s)/Min (2.4 mL/Hr) IV Continuous <Continuous>    ===================HEMATOLOGIC/ONC ===================  Continue Lovenox for VTE prophylaxis    aspirin  chewable 81 milliGRAM(s) Oral daily  enoxaparin Injectable 40 milliGRAM(s) SubCutaneous two times a day    ===================== RENAL =========================  Continue monitoring urine output  Monitor BUN/SCr    Continue to diurese as tolerated with Lasix  furosemide   Injectable 40 milliGRAM(s) IV Push every 8 hours    ==================== GASTROINTESTINAL===================  Tolerating tube feeds, Glucerna 300 [id number]   Continue pepcid for GI prophylaxis    famotidine    Tablet 20 milliGRAM(s) Oral two times a day  polyethylene glycol 3350 17 Gram(s) Oral every 12 hours  senna Syrup 10 milliLiter(s) Oral at bedtime  sodium chloride 0.9% lock flush 10 milliLiter(s) IV Push every 1 hour PRN Pre/post blood products, medications, blood draw, and to maintain line patency    =======================    ENDOCRINE  =====================  Glucose control,  insulin lispro (HumaLOG) corrective regimen sliding scale   SubCutaneous every 6 hours  insulin NPH human recombinant 11 Unit(s) SubCutaneous every 6 hours    Hypothyroidism control,  levothyroxine Injectable 75 MICROGram(s) IV Push at bedtime    ========================INFECTIOUS DISEASE================  S/p convalescent plasma on 4/23   Completed COVID medication (Plaquenil, Vit C/Thiamine, Anakirna, Solumedrol)  C/w cefepime for pseudomonas aeruginosa in sputum on 4/24     cefepime   IVPB      cefepime   IVPB 1000 milliGRAM(s) IV Intermittent every 8 hours          Patient requires continuous monitoring with bedside rhythm monitoring, arterial line, pulse oximetry, ventilator monitoring and intermittent blood gas analysis.  Care plan discussed with ICU care team.  Patient remained critical; required more than usual ICU care team; I have spent 35 minutes providing non-routine ICU care, revaluated multiple times during the day.    By signing my name below, I, Krystin Archer, attest that this documentation has been prepared under the direction and in the presence of Patito Crowe PA.  Electronically signed: Rohan Pritchett, 04-27-20 @ 20:10    I, Patito Crowe, personally performed the services described in this documentation. all medical record entries made by the kevinibtrae were at my direction and in my presence. I have reviewed the chart and agree that the record reflects my personal performance and is accurate and complete  Electronically signed: Patito Crowe PA. 04-27-20 @ 20:10 CLARITA POWELL  MRN-[id number]  Patient is a 70y old  Male who presents with a chief complaint of respiratory distress, COVID 19 (27 Apr 2020 09:35)    HPI:  70M with PMH presents with T dm, hypothyroidism, hld, p/w cough, low grade fever x 2-3 days. Today patient had worsening dyspnea at rest as well as exertion which prompted to come to the ED. Patient otherwise denied chest pain, leg swelling. He denies prior lung disease. Denies any history of smoking.     In ED, patient was noted to be hypoxic on 6L NC to 89% and significant tachypnea. VS otherwise, afebrile, HR in 80s, SBP 150s. Labs notable for CBC wnl. CMP with mild elevated transaminases AST 65 and ALT 47. PAtient noted to be positive for COVID 19. Due to worsening respiratory distress patient was emergently intubated in the ER. (27 Mar 2020 21:38)      Surgery/Hospital course:  COVID 19 Positive  Intubated 3/27  4/9 Paralysis weaned off yesterday, unproned with maintained sats, tolerating diuresis  4/12 10 sec asystole, back after epi x1  4/13 CPAP  4/14- 4/16 unable to tolerate CPAP   4/16 decadron course started  4/17 Failed CPAP. Continue with steroid taper and supportive therapy. Palliative care consult called and appreciated. Family yet to decide on end of life and prior to making any advanced decisions would like to see if any improvement with steroid taper treatment therapy.    4/18 Continues to CPAP trial of 20/5.  Peep lowered to 5 from 6.  Unable to tolerate sedation wean due to increased agitation causing hypoxia.  4/21 ketamine weaned off  4/22 COVID retested, 1U PRBC given for Hct 26 and pressor requirement; Precedex started, but dc'd for bradycardia (pt had asystolic pause on precedex previously)  4/23 Trached, Received convalescent plasma  4/24 OG tube removed  4/25 Febrile, bradycardic, hypotensive, dobutamine started but now off. Pt made DNR  4/26 High airway pressures and hypoxia, paralyzed      Today:  4/27 Paralytics off, still hypoxic with increased airway pressures.    ============================I/O===========================   I&O's Detail    26 Apr 2020 07:01  -  27 Apr 2020 07:00  --------------------------------------------------------  IN:    dexmedetomidine Infusion: 31.1 mL    DOBUTamine Infusion: 63.8 mL    Enteral Tube Flush: 320 mL    Glucerna 1.5: 1200 mL    IV PiggyBack: 400 mL    ketamine Infusion.: 184.5 mL    norepinephrine Infusion: 213 mL    vasopressin Infusion: 103 mL  Total IN: 2515.4 mL    OUT:    Indwelling Catheter - Urethral: 2305 mL  Total OUT: 2305 mL    Total NET: 210.4 mL      27 Apr 2020 07:01  -  27 Apr 2020 20:10  --------------------------------------------------------  IN:    dexmedetomidine Infusion: 11.7 mL    Enteral Tube Flush: 80 mL    Glucerna 1.5: 300 mL    IV PiggyBack: 50 mL    ketamine Infusion.: 92.4 mL    norepinephrine Infusion: 186.8 mL    vasopressin Infusion: 10 mL  Total IN: 730.9 mL    OUT:    Indwelling Catheter - Urethral: 1775 mL  Total OUT: 1775 mL    Total NET: -1044.1 mL        ============================ LABS =========================                        8.1    11.05 )-----------( 304      ( 27 Apr 2020 00:31 )             27.4     04-27    140  |  99  |  24<H>  ----------------------------<  195<H>  3.9   |  32<H>  |  0.37<L>    Ca    9.0      27 Apr 2020 00:35  Phos  1.8     04-27  Mg     1.9     04-27    TPro  6.4  /  Alb  2.4<L>  /  TBili  0.4  /  DBili  x   /  AST  14  /  ALT  25  /  AlkPhos  170<H>  04-27    LIVER FUNCTIONS - ( 27 Apr 2020 00:35 )  Alb: 2.4 g/dL / Pro: 6.4 g/dL / ALK PHOS: 170 U/L / ALT: 25 U/L / AST: 14 U/L / GGT: x           PT/INR - ( 27 Apr 2020 00:35 )   PT: 18.6 sec;   INR: 1.59 ratio         PTT - ( 27 Apr 2020 00:35 )  PTT:35.4 sec  ABG - ( 27 Apr 2020 18:18 )  pH, Arterial: 7.40  pH, Blood: x     /  pCO2: 68    /  pO2: 103   / HCO3: 42    / Base Excess: 15.1  /  SaO2: 98                  ======================Micro/Rad/Cardio=================  Culture: Reviewed   CXR: Reviewed  ======================================================  PAST MEDICAL & SURGICAL HISTORY:  Hyperlipidemia  Hypothyroid  Schizophrenia  No significant past surgical history  No significant past surgical history    ====================ASSESSMENT ==============  COVID 19 Positive   3/27 Intubated   Hypoxic respiratory failure s/p tracheotomy  Septic Shock  ARDS  Fluid overload    DNR       Plan:  ====================== NEUROLOGY=====================  Sedated with Klonopin, Ketamine   Haldol to aid in weaning sedation   Dilaudid and oxycodone for analgesia    acetaminophen    Suspension .. 650 milliGRAM(s) Oral every 6 hours PRN Temp greater or equal to 38.5C (101.3F)  clonazePAM  Tablet 2 milliGRAM(s) Oral every 8 hours  haloperidol     Tablet 0.5 milliGRAM(s) Oral every 12 hours  HYDROmorphone  Injectable 1 milliGRAM(s) IV Push every 4 hours PRN Moderate Pain (4 - 6)  ketamine Infusion. 0.25 mG/kG/Hr (1.93 mL/Hr) IV Continuous <Continuous>  oxyCODONE    IR 10 milliGRAM(s) Oral every 6 hours      Continue to wean sedation as tolerated  ==================== RESPIRATORY======================  On full vent support. Unable to wean vent air way pressures are high.  Continue to wean FiO2 (goal to 30%, then wean PEEP as tolerated)  O2 sat goal >92%    Mechanical Ventilation:  Mode: AC/ CMV (Assist Control/ Continuous Mandatory Ventilation)  RR (machine): 35  TV (machine): 300  FiO2: 85  PEEP: 8  ITime: 1  MAP: 19  PIP: 48    FiO2 down to 70%.    ====================CARDIOVASCULAR==================  On pressor support for hypotension   Wean vasopressors as tolerated, MAP goal 65-75    norepinephrine Infusion 0.04 MICROgram(s)/kG/Min (5.78 mL/Hr) IV Continuous <Continuous>  vasopressin Infusion 0.04 Unit(s)/Min (2.4 mL/Hr) IV Continuous <Continuous>    ===================HEMATOLOGIC/ONC ===================  Continue Lovenox for VTE prophylaxis    aspirin  chewable 81 milliGRAM(s) Oral daily  enoxaparin Injectable 40 milliGRAM(s) SubCutaneous two times a day    ===================== RENAL =========================  Continue monitoring urine output  Monitor BUN/SCr    Continue to diurese as tolerated with Lasix  furosemide   Injectable 40 milliGRAM(s) IV Push every 8 hours    ==================== GASTROINTESTINAL===================  Tolerating tube feeds, Glucerna 300 [id number]   Continue pepcid for GI prophylaxis    famotidine    Tablet 20 milliGRAM(s) Oral two times a day  polyethylene glycol 3350 17 Gram(s) Oral every 12 hours  senna Syrup 10 milliLiter(s) Oral at bedtime  sodium chloride 0.9% lock flush 10 milliLiter(s) IV Push every 1 hour PRN Pre/post blood products, medications, blood draw, and to maintain line patency    =======================    ENDOCRINE  =====================  Glucose control,  insulin lispro (HumaLOG) corrective regimen sliding scale   SubCutaneous every 6 hours  insulin NPH human recombinant 11 Unit(s) SubCutaneous every 6 hours    Hypothyroidism control,  levothyroxine Injectable 75 MICROGram(s) IV Push at bedtime    ========================INFECTIOUS DISEASE================  S/p convalescent plasma on 4/23   Completed COVID medication (Plaquenil, Vit C/Thiamine, Anakirna, Solumedrol)  C/w cefepime for pseudomonas aeruginosa in sputum on 4/24     cefepime   IVPB      cefepime   IVPB 1000 milliGRAM(s) IV Intermittent every 8 hours          Patient requires continuous monitoring with bedside rhythm monitoring, arterial line, pulse oximetry, ventilator monitoring and intermittent blood gas analysis.  Care plan discussed with ICU care team.  Patient remained critical; required more than usual ICU care team; I have spent 45 minutes providing non-routine ICU care, revaluated multiple times during the day.    By signing my name below, I, Krystin Archer, attest that this documentation has been prepared under the direction and in the presence of Patito Crowe PA.  Electronically signed: Rohan Pritchett, 04-27-20 @ 20:10    I, Patito Crowe, personally performed the services described in this documentation. all medical record entries made by the kevinibe were at my direction and in my presence. I have reviewed the chart and agree that the record reflects my personal performance and is accurate and complete  Electronically signed: Patito Crowe PA. 04-27-20 @ 20:10

## 2020-04-28 NOTE — PROGRESS NOTE ADULT - PROBLEM SELECTOR PLAN 1
- Plan to remove sutures POD #7  - Do not remove umbilical trach tie  - HOB elevation  - Suction PRN  - Continue trach care  - ENT will continue to follow, call with questions x [phone].

## 2020-04-28 NOTE — PROGRESS NOTE ADULT - SUBJECTIVE AND OBJECTIVE BOX
CLARITA POWELL  MRN-[id number]  Patient is a 70y old  Male who presents with a chief complaint of respiratory distress, COVID 19 (28 Apr 2020 09:19)    HPI:  70M with PMH presents with T dm, hypothyroidism, hld, p/w cough, low grade fever x 2-3 days. Today patient had worsening dyspnea at rest as well as exertion which prompted to come to the ED. Patient otherwise denied chest pain, leg swelling. He denies prior lung disease. Denies any history of smoking.     In ED, patient was noted to be hypoxic on 6L NC to 89% and significant tachypnea. VS otherwise, afebrile, HR in 80s, SBP 150s. Labs notable for CBC wnl. CMP with mild elevated transaminases AST 65 and ALT 47. PAtient noted to be positive for COVID 19. Due to worsening respiratory distress patient was emergently intubated in the ER. (27 Mar 2020 21:38)      Surgery/Hospital course:  COVID 19 Positive  Intubated 3/27  4/9 Paralysis weaned off yesterday, unproned with maintained sats, tolerating diuresis  4/12 10 sec asystole, back after epi x1  4/13 CPAP  4/14- 4/16 unable to tolerate CPAP   4/16 decadron course started  4/17 Failed CPAP. Continue with steroid taper and supportive therapy. Palliative care consult called and appreciated. Family yet to decide on end of life and prior to making any advanced decisions would like to see if any improvement with steroid taper treatment therapy.    4/18 Continues to CPAP trial of 20/5.  Peep lowered to 5 from 6.  Unable to tolerate sedation wean due to increased agitation causing hypoxia.  4/21 ketamine weaned off  4/22 COVID retested, 1U PRBC given for Hct 26 and pressor requirement; Precedex started, but dc'd for bradycardia (pt had asystolic pause on precedex previously)  4/23 Trached, Received convalescent plasma  4/24 OG tube removed  4/25 Febrile, bradycardic, hypotensive, dobutamine started but now off. Pt made DNR  4/26 High airway pressures and hypoxia, paralyzed    Today:    ============================I/O===========================   I&O's Detail    27 Apr 2020 07:01  -  28 Apr 2020 07:00  --------------------------------------------------------  IN:    dexmedetomidine Infusion: 11.7 mL    Enteral Tube Flush: 80 mL    Glucerna 1.5: 600 mL    IV PiggyBack: 100 mL    ketamine Infusion.: 184.8 mL    norepinephrine Infusion: 374.4 mL    vasopressin Infusion: 10 mL  Total IN: 1360.9 mL    OUT:    Indwelling Catheter - Urethral: 3960 mL  Total OUT: 3960 mL    Total NET: -2599.1 mL      28 Apr 2020 07:01  -  28 Apr 2020 18:52  --------------------------------------------------------  IN:    dextrose 5% + sodium chloride 0.45%.: 300 mL    Enteral Tube Flush: 120 mL    Glucerna 1.5: 300 mL    IV PiggyBack: 250 mL    ketamine Infusion.: 92.4 mL    norepinephrine Infusion: 134.7 mL  Total IN: 1197.1 mL    OUT:    Indwelling Catheter - Urethral: 1230 mL  Total OUT: 1230 mL    Total NET: -32.9 mL        ============================ LABS =========================                        8.4    12.95 )-----------( 308      ( 28 Apr 2020 15:07 )             29.2     04-28    146<H>  |  98  |  20  ----------------------------<  116<H>  4.3   |  38<H>  |  0.38<L>    Ca    9.0      28 Apr 2020 15:08  Phos  2.7     04-28  Mg     2.0     04-28    TPro  6.8  /  Alb  2.4<L>  /  TBili  0.3  /  DBili  x   /  AST  12  /  ALT  20  /  AlkPhos  168<H>  04-28    LIVER FUNCTIONS - ( 28 Apr 2020 15:08 )  Alb: 2.4 g/dL / Pro: 6.8 g/dL / ALK PHOS: 168 U/L / ALT: 20 U/L / AST: 12 U/L / GGT: x           PT/INR - ( 28 Apr 2020 15:07 )   PT: 17.4 sec;   INR: 1.49 ratio         PTT - ( 28 Apr 2020 15:07 )  PTT:34.3 sec  ABG - ( 28 Apr 2020 15:06 )  pH, Arterial: 7.45  pH, Blood: x     /  pCO2: 64    /  pO2: 96    / HCO3: 44    / Base Excess: 17.3  /  SaO2: 97                  ======================Micro/Rad/Cardio=================  Culture: Reviewed   CXR: Reviewed  ======================================================  PAST MEDICAL & SURGICAL HISTORY:  Hyperlipidemia  Hypothyroid  Schizophrenia  No significant past surgical history  No significant past surgical history    ====================ASSESSMENT ==============  71 y/o M with PMHx of hyperlipidemia, hypothyroid and schizophrenia, found to be COVID positive, hospital course c/b intubated on 3/27, hypoxic respiratory failure s/p trachostomy, septic shock, ARDS, fluid overload.     DNR    Plan:  ====================== NEUROLOGY=====================  PRN for feves  acetaminophen    Suspension .. 650 milliGRAM(s) Oral every 6 hours PRN Temp greater or equal to 38.5C (101.3F)    Sedated with Klonopin, Ketamine   clonazePAM  Tablet 2 milliGRAM(s) Oral every 8 hours  ketamine Infusion. 0.25 mG/kG/Hr (1.93 mL/Hr) IV Continuous <Continuous>    Haldol to help wean sedation   haloperidol     Tablet 0.5 milliGRAM(s) Oral every 12 hours    Pain management   HYDROmorphone  Injectable 1 milliGRAM(s) IV Push every 4 hours PRN Moderate Pain (4 - 6)  oxyCODONE    IR 10 milliGRAM(s) Oral every 6 hours      Continue to wean sedation as tolerated  ==================== RESPIRATORY======================  On full vent support   Continue to wean FiO2 (goal to 30%, then wean PEEP as tolerated)  O2 sat goal >92%    Mechanical Ventilation:  Mode: AC/ CMV (Assist Control/ Continuous Mandatory Ventilation)  RR (machine): 35  TV (machine): 300  FiO2: 70  PEEP: 8  ITime: 1  MAP: 18  PIP: 45    ====================CARDIOVASCULAR==================  Continue levo,  norepinephrine Infusion 0.04 MICROgram(s)/kG/Min (5.78 mL/Hr) IV Continuous <Continuous>  75  ===================HEMATOLOGIC/ONC ===================  aspirin  chewable 81 milliGRAM(s) Oral daily    Continue Lovenox for VTE prophylaxis  enoxaparin Injectable 40 milliGRAM(s) SubCutaneous two times a day    ===================== RENAL =========================  Continue monitoring urine output  Monitor BUN/SCr    Continue to diurese as tolerated with Lasix  furosemide   Injectable 20 milliGRAM(s) IV Push every 12 hours    ==================== GASTROINTESTINAL===================  Tolerating tube feeds, Glucerna 300 [id number]    dextrose 5% + sodium chloride 0.45%. 1000 milliLiter(s) (75 mL/Hr) IV Continuous <Continuous>  polyethylene glycol 3350 17 Gram(s) Oral every 12 hours  senna Syrup 10 milliLiter(s) Oral at bedtime  sodium chloride 0.9% lock flush 10 milliLiter(s) IV Push every 1 hour PRN Pre/post blood products, medications, blood draw, and to maintain line patency    Continue pepcid for GI prophylaxis,  famotidine    Tablet 20 milliGRAM(s) Oral two times a day    =======================    ENDOCRINE  =====================  Glucose control,   insulin lispro (HumaLOG) corrective regimen sliding scale   SubCutaneous every 6 hours    Hypothyroidism control,  levothyroxine Injectable 75 MICROGram(s) IV Push at bedtime    ========================INFECTIOUS DISEASE================  S/p convalescent plasma on 4/23   Completed COVID medication (Plaquenil, Vit C/Thiamine, Anakirna, Solumedrol)    C/w cefepime for pseudomonas aeruginosa in sputum on 4/24   cefepime   IVPB      cefepime   IVPB 1000 milliGRAM(s) IV Intermittent every 8 hours          Patient requires continuous monitoring with bedside rhythm monitoring, arterial line, pulse oximetry, ventilator monitoring and intermittent blood gas analysis.  Care plan discussed with ICU care team.  Patient remained critical; required more than usual ICU care team; I have spent 35 minutes providing non-routine ICU care, revaluated multiple times during the day.    By signing my name below, I, Krystin Archer, attest that this documentation has been prepared under the direction and in the presence of Patito Crowe PA.  Electronically signed: Rohan Prithcett, 04-28-20 @ 18:52    I, Patito Crowe, personally performed the services described in this documentation. all medical record entries made by the kevinibtrae were at my direction and in my presence. I have reviewed the chart and agree that the record reflects my personal performance and is accurate and complete  Electronically signed: Patito Crowe PA. 04-28-20 @ 18:52 CLARITA POWELL  MRN-[id number]  Patient is a 70y old  Male who presents with a chief complaint of respiratory distress, COVID 19 (28 Apr 2020 09:19)    HPI:  70M with PMH presents with T dm, hypothyroidism, hld, p/w cough, low grade fever x 2-3 days. Today patient had worsening dyspnea at rest as well as exertion which prompted to come to the ED. Patient otherwise denied chest pain, leg swelling. He denies prior lung disease. Denies any history of smoking.     In ED, patient was noted to be hypoxic on 6L NC to 89% and significant tachypnea. VS otherwise, afebrile, HR in 80s, SBP 150s. Labs notable for CBC wnl. CMP with mild elevated transaminases AST 65 and ALT 47. PAtient noted to be positive for COVID 19. Due to worsening respiratory distress patient was emergently intubated in the ER. (27 Mar 2020 21:38)      Surgery/Hospital course:  COVID 19 Positive  Intubated 3/27  4/9 Paralysis weaned off yesterday, unproned with maintained sats, tolerating diuresis  4/12 10 sec asystole, back after epi x1  4/13 CPAP  4/14- 4/16 unable to tolerate CPAP   4/16 decadron course started  4/17 Failed CPAP. Continue with steroid taper and supportive therapy. Palliative care consult called and appreciated. Family yet to decide on end of life and prior to making any advanced decisions would like to see if any improvement with steroid taper treatment therapy.    4/18 Continues to CPAP trial of 20/5.  Peep lowered to 5 from 6.  Unable to tolerate sedation wean due to increased agitation causing hypoxia.  4/21 ketamine weaned off  4/22 COVID retested, 1U PRBC given for Hct 26 and pressor requirement; Precedex started, but dc'd for bradycardia (pt had asystolic pause on precedex previously)  4/23 Trached, Received convalescent plasma  4/24 OG tube removed  4/25 Febrile, bradycardic, hypotensive, dobutamine started but now off. Pt made DNR  4/26 High airway pressures and hypoxia, paralyzed    Today:  4/28 started on [employer] sun trail secondary to hypermetabolic state.    ============================I/O===========================   I&O's Detail    27 Apr 2020 07:01  -  28 Apr 2020 07:00  --------------------------------------------------------  IN:    dexmedetomidine Infusion: 11.7 mL    Enteral Tube Flush: 80 mL    Glucerna 1.5: 600 mL    IV PiggyBack: 100 mL    ketamine Infusion.: 184.8 mL    norepinephrine Infusion: 374.4 mL    vasopressin Infusion: 10 mL  Total IN: 1360.9 mL    OUT:    Indwelling Catheter - Urethral: 3960 mL  Total OUT: 3960 mL    Total NET: -2599.1 mL      28 Apr 2020 07:01  -  28 Apr 2020 18:52  --------------------------------------------------------  IN:    dextrose 5% + sodium chloride 0.45%.: 300 mL    Enteral Tube Flush: 120 mL    Glucerna 1.5: 300 mL    IV PiggyBack: 250 mL    ketamine Infusion.: 92.4 mL    norepinephrine Infusion: 134.7 mL  Total IN: 1197.1 mL    OUT:    Indwelling Catheter - Urethral: 1230 mL  Total OUT: 1230 mL    Total NET: -32.9 mL        ============================ LABS =========================                        8.4    12.95 )-----------( 308      ( 28 Apr 2020 15:07 )             29.2     04-28    146<H>  |  98  |  20  ----------------------------<  116<H>  4.3   |  38<H>  |  0.38<L>    Ca    9.0      28 Apr 2020 15:08  Phos  2.7     04-28  Mg     2.0     04-28    TPro  6.8  /  Alb  2.4<L>  /  TBili  0.3  /  DBili  x   /  AST  12  /  ALT  20  /  AlkPhos  168<H>  04-28    LIVER FUNCTIONS - ( 28 Apr 2020 15:08 )  Alb: 2.4 g/dL / Pro: 6.8 g/dL / ALK PHOS: 168 U/L / ALT: 20 U/L / AST: 12 U/L / GGT: x           PT/INR - ( 28 Apr 2020 15:07 )   PT: 17.4 sec;   INR: 1.49 ratio         PTT - ( 28 Apr 2020 15:07 )  PTT:34.3 sec  ABG - ( 28 Apr 2020 15:06 )  pH, Arterial: 7.45  pH, Blood: x     /  pCO2: 64    /  pO2: 96    / HCO3: 44    / Base Excess: 17.3  /  SaO2: 97                  ======================Micro/Rad/Cardio=================  Culture: Reviewed   CXR: Reviewed  ======================================================  PAST MEDICAL & SURGICAL HISTORY:  Hyperlipidemia  Hypothyroid  Schizophrenia  No significant past surgical history  No significant past surgical history    ====================ASSESSMENT ==============  71 y/o M with PMHx of hyperlipidemia, hypothyroid and schizophrenia, found to be COVID positive, hospital course c/b intubated on 3/27, hypoxic respiratory failure s/p trachostomy, septic shock, ARDS, fluid overload.     DNR    Plan:  ====================== NEUROLOGY=====================  PRN for feves  acetaminophen    Suspension .. 650 milliGRAM(s) Oral every 6 hours PRN Temp greater or equal to 38.5C (101.3F)    Sedated with Klonopin, Ketamine   clonazePAM  Tablet 2 milliGRAM(s) Oral every 8 hours  ketamine Infusion. 0.25 mG/kG/Hr (1.93 mL/Hr) IV Continuous <Continuous>    Haldol to help wean sedation   haloperidol     Tablet 0.5 milliGRAM(s) Oral every 12 hours    Pain management   HYDROmorphone  Injectable 1 milliGRAM(s) IV Push every 4 hours PRN Moderate Pain (4 - 6)  oxyCODONE    IR 10 milliGRAM(s) Oral every 6 hours      Continue to wean sedation as tolerated  ==================== RESPIRATORY======================  On full vent support   Continue to wean FiO2 (goal to 30%, then wean PEEP as tolerated)  O2 sat goal >92%    Mechanical Ventilation:  Mode: AC/ CMV (Assist Control/ Continuous Mandatory Ventilation)  RR (machine): 35  TV (machine): 300  FiO2: 60  PEEP: 5  ITime: 1  MAP: 18  PIP: 45    ====================CARDIOVASCULAR==================  Continue levo,  norepinephrine Infusion 0.04 MICROgram(s)/kG/Min (5.78 mL/Hr) IV Continuous <Continuous>  75  ===================HEMATOLOGIC/ONC ===================  aspirin  chewable 81 milliGRAM(s) Oral daily    Continue Lovenox for VTE prophylaxis  enoxaparin Injectable 40 milliGRAM(s) SubCutaneous two times a day    ===================== RENAL =========================  Continue monitoring urine output  Monitor BUN/SCr    Continue to diurese as tolerated with Lasix  furosemide   Injectable 20 milliGRAM(s) IV Push every 12 hours    ==================== GASTROINTESTINAL===================  Tolerating tube feeds, Glucerna 300 [id number] (hold while on arctic sun)    IV fluids while NPO  Dextrose 5% + sodium chloride 0.45%. 1000 milliLiter(s) (75 mL/Hr) IV Continuous <Continuous>    Bowel Regimen  polyethylene glycol 3350 17 Gram(s) Oral every 12 hours  senna Syrup 10 milliLiter(s) Oral at bedtime    Continue pepcid for GI prophylaxis,  famotidine    Tablet 20 milliGRAM(s) Oral two times a day    =======================    ENDOCRINE  =====================  Glucose control,   insulin lispro (HumaLOG) corrective regimen sliding scale   SubCutaneous every 6 hours    Hypothyroidism control,  levothyroxine Injectable 75 MICROGram(s) IV Push at bedtime    ========================INFECTIOUS DISEASE================  S/p convalescent plasma on 4/23   Completed COVID medication (Plaquenil, Vit C/Thiamine, Anakirna, Solumedrol)    C/w cefepime for pseudomonas aeruginosa in sputum on 4/24   cefepime   IVPB 1000 milliGRAM(s) IV Intermittent every 8 hours          Patient requires continuous monitoring with bedside rhythm monitoring, arterial line, pulse oximetry, ventilator monitoring and intermittent blood gas analysis.  Care plan discussed with ICU care team.  Patient remained critical; required more than usual ICU care team; I have spent 35 minutes providing non-routine ICU care, revaluated multiple times during the day.    By signing my name below, I, Krystin Archer, attest that this documentation has been prepared under the direction and in the presence of Patito Crowe PA.  Electronically signed: Rohan Pritchett, 04-28-20 @ 18:52    I, Patito Crowe, personally performed the services described in this documentation. all medical record entries made by the kevinibtrae were at my direction and in my presence. I have reviewed the chart and agree that the record reflects my personal performance and is accurate and complete  Electronically signed: Patito Crowe PA. 04-28-20 @ 18:52

## 2020-04-28 NOTE — PROGRESS NOTE ADULT - SUBJECTIVE AND OBJECTIVE BOX
CLARITA POWELL  MRN-[id number]  Patient is a 70y old  Male who presents with a chief complaint of respiratory distress, COVID 19 (27 Apr 2020 20:10)    HPI:  70M with PMH presents with T dm, hypothyroidism, hld, p/w cough, low grade fever x 2-3 days. Today patient had worsening dyspnea at rest as well as exertion which prompted to come to the ED. Patient otherwise denied chest pain, leg swelling. He denies prior lung disease. Denies any history of smoking.     In ED, patient was noted to be hypoxic on 6L NC to 89% and significant tachypnea. VS otherwise, afebrile, HR in 80s, SBP 150s. Labs notable for CBC wnl. CMP with mild elevated transaminases AST 65 and ALT 47. PAtient noted to be positive for COVID 19. Due to worsening respiratory distress patient was emergently intubated in the ER. (27 Mar 2020 21:38)      Surgery/Hospital course:  COVID 19 Positive  Intubated 3/27  4/9 Paralysis weaned off yesterday, unproned with maintained sats, tolerating diuresis  4/12 10 sec asystole, back after epi x1  4/13 CPAP  4/14- 4/16 unable to tolerate CPAP   4/16 decadron course started  4/17 Failed CPAP. Continue with steroid taper and supportive therapy. Palliative care consult called and appreciated. Family yet to decide on end of life and prior to making any advanced decisions would like to see if any improvement with steroid taper treatment therapy.    4/18 Continues to CPAP trial of 20/5.  Peep lowered to 5 from 6.  Unable to tolerate sedation wean due to increased agitation causing hypoxia.  4/21 ketamine weaned off  4/22 COVID retested, 1U PRBC given for Hct 26 and pressor requirement; Precedex started, but dc'd for bradycardia (pt had asystolic pause on precedex previously)  4/23 Trached, Received convalescent plasma  4/24 OG tube removed  4/25 Febrile, bradycardic, hypotensive, dobutamine started but now off. Pt made DNR  4/26 High airway pressures and hypoxia, paralyzed    REVIEW OF SYSTEMS:  Unable to obtain, vented     Vital Signs Last 24 Hrs  T(C): 36.3 (28 Apr 2020 04:00), Max: 36.4 (27 Apr 2020 16:00)  T(F): 97.3 (28 Apr 2020 04:00), Max: 97.6 (27 Apr 2020 16:00)  HR: 91 (28 Apr 2020 04:52) (60 - 91)  BP: --  BP(mean): --  RR: 35 (28 Apr 2020 04:00) (35 - 41)  SpO2: 99% (28 Apr 2020 04:52) (91% - 99%)    ============================I/O===========================   I&O's Detail    27 Apr 2020 07:01  -  28 Apr 2020 07:00  --------------------------------------------------------  IN:    dexmedetomidine Infusion: 11.7 mL    Enteral Tube Flush: 80 mL    Glucerna 1.5: 600 mL    IV PiggyBack: 100 mL    ketamine Infusion.: 184.8 mL    norepinephrine Infusion: 374.4 mL    vasopressin Infusion: 10 mL  Total IN: 1360.9 mL    OUT:    Indwelling Catheter - Urethral: 3960 mL  Total OUT: 3960 mL    Total NET: -2599.1 mL        ============================ LABS =========================                        8.8    14.98 )-----------( 325      ( 28 Apr 2020 00:03 )             30.1     04-28    144  |  96  |  19  ----------------------------<  149<H>  3.6   |  37<H>  |  0.35<L>    Ca    9.3      28 Apr 2020 00:03  Phos  2.7     04-28  Mg     2.0     04-28    TPro  7.2  /  Alb  2.6<L>  /  TBili  0.4  /  DBili  x   /  AST  11  /  ALT  24  /  AlkPhos  166<H>  04-28    LIVER FUNCTIONS - ( 28 Apr 2020 00:03 )  Alb: 2.6 g/dL / Pro: 7.2 g/dL / ALK PHOS: 166 U/L / ALT: 24 U/L / AST: 11 U/L / GGT: x           PT/INR - ( 27 Apr 2020 00:35 )   PT: 18.6 sec;   INR: 1.59 ratio         PTT - ( 27 Apr 2020 00:35 )  PTT:35.4 sec  ABG - ( 28 Apr 2020 00:03 )  pH, Arterial: 7.42  pH, Blood: x     /  pCO2: 69    /  pO2: 91    / HCO3: 44    / Base Excess: 17.1  /  SaO2: 97                  ======================Micro/Rad/Cardio=================  Culture: Reviewed   CXR: Reviewed  ======================================================  PAST MEDICAL & SURGICAL HISTORY:  Hyperlipidemia  Hypothyroid  Schizophrenia  No significant past surgical history  No significant past surgical history    ====================ASSESSMENT ==============  COVID 19 Positive   3/27 Intubated   Hypoxic respiratory failure s/p tracheotomy  Septic Shock  ARDS  Fluid overload    DNR  Plan:  ====================== NEUROLOGY=====================  Sedated with Klonopin, Ketamine   Haldol to help wean sedation   Dilaudid and oxycodone for analgesia     acetaminophen    Suspension .. 650 milliGRAM(s) Oral every 6 hours PRN Temp greater or equal to 38.5C (101.3F)  clonazePAM  Tablet 2 milliGRAM(s) Oral every 8 hours  haloperidol     Tablet 0.5 milliGRAM(s) Oral every 12 hours  HYDROmorphone  Injectable 1 milliGRAM(s) IV Push every 4 hours PRN Moderate Pain (4 - 6)  ketamine Infusion. 0.25 mG/kG/Hr (1.93 mL/Hr) IV Continuous <Continuous>  oxyCODONE    IR 10 milliGRAM(s) Oral every 6 hours    ==================== RESPIRATORY======================  On full vent support     Mechanical Ventilation:  Mode: AC/ CMV (Assist Control/ Continuous Mandatory Ventilation)  RR (machine): 35  TV (machine): 300  FiO2: 60  PEEP: 8  ITime: 1  MAP: 17  PIP: 37      ====================CARDIOVASCULAR==================  On pressor support for hypotension     norepinephrine Infusion 0.04 MICROgram(s)/kG/Min (5.78 mL/Hr) IV Continuous <Continuous>  vasopressin Infusion 0.04 Unit(s)/Min (2.4 mL/Hr) IV Continuous <Continuous>    ===================HEMATOLOGIC/ONC ===================  aspirin  chewable 81 milliGRAM(s) Oral daily  VTE prophylaxis, enoxaparin Injectable 40 milliGRAM(s) SubCutaneous two times a day    ===================== RENAL =========================  Continue monitoring urine output  Diuresis with Lasix     furosemide   Injectable 40 milliGRAM(s) IV Push every 8 hours  ==================== GASTROINTESTINAL===================  Tolerating tube feeds, Glucerna 300 l0afwic     GI prophylaxis, famotidine    Tablet 20 milliGRAM(s) Oral two times a day  polyethylene glycol 3350 17 Gram(s) Oral every 12 hours  senna Syrup 10 milliLiter(s) Oral at bedtime  sodium chloride 0.9% lock flush 10 milliLiter(s) IV Push every 1 hour PRN Pre/post blood products, medications, blood draw, and to maintain line patency    =======================    ENDOCRINE  =====================  Glucose control,   insulin lispro (HumaLOG) corrective regimen sliding scale   SubCutaneous every 6 hours  insulin NPH human recombinant 11 Unit(s) SubCutaneous every 6 hours    levothyroxine Injectable 75 MICROGram(s) IV Push at bedtime    ========================INFECTIOUS DISEASE================  S/p convalescent plasma on 4/23   Completed COVID medication (Plaquenil, Vit C/Thiamine, Anakirna, Solumedrol)  C/w cefepime for pseudomonas aeruginosa in sputum on 4/24     cefepime   IVPB      cefepime   IVPB 1000 milliGRAM(s) IV Intermittent every 8 hours      Patient requires continuous monitoring with bedside rhythm monitoring, pulse ox monitoring, and intermittent blood gas analysis. Care plan discussed with ICU care team. Patient remained critical and at risk for life threatening decompensation.    By signing my name below, I, Pretty Wallace, attest that this documentation has been prepared under the direction and in the presence of GERRY Viramontes.  Electronically signed: Jonathan Lal, 04-28-20 @ 07:10    I, Iram Love, personally performed the services described in this documentation. all medical record entries made by the jonathan were at my direction and in my presence. I have reviewed the chart and agree that the record reflects my personal performance and is accurate and complete  Electronically signed: GERRY Viramontes CLARITA POWELL  MRN-[id number]  Patient is a 70y old  Male who presents with a chief complaint of respiratory distress, COVID 19 (2020 20:10)    HPI:  70M with PMH presents with T dm, hypothyroidism, hld, p/w cough, low grade fever x 2-3 days. Today patient had worsening dyspnea at rest as well as exertion which prompted to come to the ED. Patient otherwise denied chest pain, leg swelling. He denies prior lung disease. Denies any history of smoking.     In ED, patient was noted to be hypoxic on 6L NC to 89% and significant tachypnea. VS otherwise, afebrile, HR in 80s, SBP 150s. Labs notable for CBC wnl. CMP with mild elevated transaminases AST 65 and ALT 47. PAtient noted to be positive for COVID 19. Due to worsening respiratory distress patient was emergently intubated in the ER. (27 Mar 2020 21:38)      Surgery/Hospital course:  COVID 19 Positive  Intubated 3/27  4/9 Paralysis weaned off yesterday, unproned with maintained sats, tolerating diuresis   10 sec asystole, back after epi x1   CPAP  -  unable to tolerate CPAP    decadron course started   Failed CPAP. Continue with steroid taper and supportive therapy. Palliative care consult called and appreciated. Family yet to decide on end of life and prior to making any advanced decisions would like to see if any improvement with steroid taper treatment therapy.     Continues to CPAP trial of 20/5.  Peep lowered to 5 from 6.  Unable to tolerate sedation wean due to increased agitation causing hypoxia.   ketamine weaned off   COVID retested, 1U PRBC given for Hct 26 and pressor requirement; Precedex started, but dc'd for bradycardia (pt had asystolic pause on precedex previously)   Trached, Received convalescent plasma   OG tube removed   Febrile, bradycardic, hypotensive, dobutamine started but now off. Pt made DNR   High airway pressures and hypoxia, paralyzed with vec [id number]   : Started on arctic sun Protocol to 35 C (study metabolic demand)      REVIEW OF SYSTEMS:  Unable to obtain, vented     Vital Signs Last 24 Hrs  T(C): 36.3 (2020 04:00), Max: 36.4 (2020 16:00)  T(F): 97.3 (2020 04:00), Max: 97.6 (2020 16:00)  HR: 91 (2020 04:52) (60 - 91)  BP: --  BP(mean): --  RR: 35 (2020 04:00) (35 - 41)  SpO2: 99% (2020 04:52) (91% - 99%)    ============================I/O===========================   I&O's Detail    2020 07:01  -  2020 07:00  --------------------------------------------------------  IN:    dexmedetomidine Infusion: 11.7 mL    Enteral Tube Flush: 80 mL    Glucerna 1.5: 600 mL    IV PiggyBack: 100 mL    ketamine Infusion.: 184.8 mL    norepinephrine Infusion: 374.4 mL    vasopressin Infusion: 10 mL  Total IN: 1360.9 mL    OUT:    Indwelling Catheter - Urethral: 3960 mL  Total OUT: 3960 mL    Total NET: -2599.1 mL        ============================ LABS =========================                        8.8    14.98 )-----------( 325      ( 2020 00:03 )             30.1         144  |  96  |  19  ----------------------------<  149<H>  3.6   |  37<H>  |  0.35<L>    Ca    9.3      2020 00:03  Phos  2.7       Mg     2.0         TPro  7.2  /  Alb  2.6<L>  /  TBili  0.4  /  DBili  x   /  AST  11  /  ALT  24  /  AlkPhos  166<H>      LIVER FUNCTIONS - ( 2020 00:03 )  Alb: 2.6 g/dL / Pro: 7.2 g/dL / ALK PHOS: 166 U/L / ALT: 24 U/L / AST: 11 U/L / GGT: x           PT/INR - ( 2020 00:35 )   PT: 18.6 sec;   INR: 1.59 ratio         PTT - ( 2020 00:35 )  PTT:35.4 sec  ABG - ( 2020 00:03 )  pH, Arterial: 7.42  pH, Blood: x     /  pCO2: 69    /  pO2: 91    / HCO3: 44    / Base Excess: 17.1  /  SaO2: 97                  ======================Micro/Rad/Cardio=================  Culture: Reviewed   CXR: Reviewed  ======================================================  PAST MEDICAL & SURGICAL HISTORY:  Hyperlipidemia  Hypothyroid  Schizophrenia  No significant past surgical history  No significant past surgical history    ====================ASSESSMENT ==============  COVID 19 Positive   3/27 Intubated   Hypoxic respiratory failure s/p tracheotomy  Septic Shock  ARDS  Fluid overload    DNR  Plan:  ====================== NEUROLOGY=====================  Sedated with Klonopin standing and Ketamine gtt. wean ketamine gtt as tolerated   Haldol to help wean sedation   Dilaudid and oxycodone for analgesia     acetaminophen    Suspension .. 650 milliGRAM(s) Oral every 6 hours PRN Temp greater or equal to 38.5C (101.3F)  clonazePAM  Tablet 2 milliGRAM(s) Oral every 8 hours  haloperidol     Tablet 0.5 milliGRAM(s) Oral every 12 hours  HYDROmorphone  Injectable 1 milliGRAM(s) IV Push every 4 hours PRN Moderate Pain (4 - 6)  ketamine Infusion. 0.25 mG/kG/Hr (1.93 mL/Hr) IV Continuous <Continuous>  oxyCODONE    IR 10 milliGRAM(s) Oral every 6 hours    ==================== RESPIRATORY======================  On full vent support   Wean fio2 and peep as tolerated    Mechanical Ventilation:  Mode: AC/ CMV (Assist Control/ Continuous Mandatory Ventilation)  RR (machine): 35  TV (machine): 300  FiO2: 60  PEEP: 8  ITime: 1  MAP: 17  PIP: 37      ====================CARDIOVASCULAR==================  On pressor support for hypotension   off      norepinephrine Infusion 0.04 MICROgram(s)/kG/Min (5.78 mL/Hr) IV Continuous <Continuous>  vasopressin Infusion 0.04 Unit(s)/Min (2.4 mL/Hr) IV Continuous <Continuous>    ===================HEMATOLOGIC/ONC ===================  aspirin  chewable 81 milliGRAM(s) Oral daily  VTE prophylaxis, enoxaparin Injectable 40 milliGRAM(s) SubCutaneous two times a day    ===================== RENAL =========================  Continue monitoring urine output  Trend Bun/Cr   Diuresis with Lasix (decreased to 20 BID today)     furosemide   Injectable 40 milliGRAM(s) IV Push every 8 hours  ==================== GASTROINTESTINAL===================  TF d/cd. Started on d51/2NS for maintainance therapy as pt being cold.     GI prophylaxis, famotidine    Tablet 20 milliGRAM(s) Oral two times a day  polyethylene glycol 3350 17 Gram(s) Oral every 12 hours  senna Syrup 10 milliLiter(s) Oral at bedtime  sodium chloride 0.9% lock flush 10 milliLiter(s) IV Push every 1 hour PRN Pre/post blood products, medications, blood draw, and to maintain line patency    =======================    ENDOCRINE  =====================  Glucose control,   insulin lispro (HumaLOG) corrective regimen sliding scale   SubCutaneous every 6 hours  insulin NPH human recombinant 11 Unit(s) SubCutaneous every 6 hours    levothyroxine Injectable 75 MICROGram(s) IV Push at bedtime    ========================INFECTIOUS DISEASE================  S/p convalescent plasma on    Completed COVID medication (Plaquenil, Vit C/Thiamine, Anakirna, Solumedrol)  C/w cefepime for pseudomonas aeruginosa in sputum on      cefepime   IVPB      cefepime   IVPB 1000 milliGRAM(s) IV Intermittent every 8 hours    -ENT following d/p Trach. d/cd sutures       Patient requires continuous monitoring with bedside rhythm monitoring, pulse ox monitoring, and intermittent blood gas analysis. Care plan discussed with ICU care team. Patient remained critical and at risk for life threatening decompensation.    By signing my name below, I, Pretty Wallace, attest that this documentation has been prepared under the direction and in the presence of GERRY Viramontes.  Electronically signed: Jonathan Lal, 20 @ 07:10    I, Iram Love, personally performed the services described in this documentation. all medical record entries made by the jonathan were at my direction and in my presence. I have reviewed the chart and agree that the record reflects my personal performance and is accurate and complete  Electronically signed: GERRY Viramontes

## 2020-04-28 NOTE — PROGRESS NOTE ADULT - SUBJECTIVE AND OBJECTIVE BOX
ENT ISSUE/POD: S/p tracheostomy POD 5    HPI:  71 y/o male s/p tracheostomy POD 5. #8 LPC trach in place 2/2 respiratory failure. Pt is doing well on vent. No issues or bleeding overnight per team.        PAST MEDICAL & SURGICAL HISTORY:  Hyperlipidemia  Hypothyroid  Schizophrenia  No significant past surgical history  No significant past surgical history    Allergies    mushrooms (Vomiting)  No Known Drug Allergies    Intolerances      MEDICATIONS  (STANDING):  aspirin  chewable 81 milliGRAM(s) Oral daily  cefepime   IVPB      cefepime   IVPB 1000 milliGRAM(s) IV Intermittent every 8 hours  chlorhexidine 0.12% Liquid 15 milliLiter(s) Oral Mucosa every 12 hours  chlorhexidine 2% Cloths 1 Application(s) Topical <User Schedule>  clonazePAM  Tablet 2 milliGRAM(s) Oral every 8 hours  enoxaparin Injectable 40 milliGRAM(s) SubCutaneous two times a day  famotidine    Tablet 20 milliGRAM(s) Oral two times a day  furosemide   Injectable 20 milliGRAM(s) IV Push every 12 hours  haloperidol     Tablet 0.5 milliGRAM(s) Oral every 12 hours  insulin lispro (HumaLOG) corrective regimen sliding scale   SubCutaneous every 6 hours  insulin NPH human recombinant 11 Unit(s) SubCutaneous every 6 hours  ketamine Infusion. 0.25 mG/kG/Hr (1.93 mL/Hr) IV Continuous <Continuous>  levothyroxine Injectable 75 MICROGram(s) IV Push at bedtime  norepinephrine Infusion 0.04 MICROgram(s)/kG/Min (5.78 mL/Hr) IV Continuous <Continuous>  oxyCODONE    IR 10 milliGRAM(s) Oral every 6 hours  petrolatum Ophthalmic Ointment 1 Application(s) Both EYES two times a day  polyethylene glycol 3350 17 Gram(s) Oral every 12 hours  potassium chloride  10 mEq/50 mL IVPB 10 milliEquivalent(s) IV Intermittent every 1 hour  senna Syrup 10 milliLiter(s) Oral at bedtime  vasopressin Infusion 0.04 Unit(s)/Min (2.4 mL/Hr) IV Continuous <Continuous>    MEDICATIONS  (PRN):  acetaminophen    Suspension .. 650 milliGRAM(s) Oral every 6 hours PRN Temp greater or equal to 38.5C (101.3F)  HYDROmorphone  Injectable 1 milliGRAM(s) IV Push every 4 hours PRN Moderate Pain (4 - 6)  sodium chloride 0.9% lock flush 10 milliLiter(s) IV Push every 1 hour PRN Pre/post blood products, medications, blood draw, and to maintain line patency    Vital Signs Last 24 Hrs  T(C): 36.7 (28 Apr 2020 08:00), Max: 36.7 (28 Apr 2020 08:00)  T(F): 98.1 (28 Apr 2020 08:00), Max: 98.1 (28 Apr 2020 08:00)  HR: 79 (28 Apr 2020 08:00) (64 - 91)  BP: --  BP(mean): --  RR: 35 (28 Apr 2020 08:00) (35 - 41)  SpO2: 96% (28 Apr 2020 08:00) (91% - 99%)                          8.8    14.98 )-----------( 325      ( 28 Apr 2020 00:03 )             30.1    04-28    144  |  96  |  19  ----------------------------<  149<H>  3.6   |  37<H>  |  0.35<L>    Ca    9.3      28 Apr 2020 00:03  Phos  2.7     04-28  Mg     2.0     04-28    TPro  7.2  /  Alb  2.6<L>  /  TBili  0.4  /  DBili  x   /  AST  11  /  ALT  24  /  AlkPhos  166<H>  04-28   PT/INR - ( 27 Apr 2020 00:35 )   PT: 18.6 sec;   INR: 1.59 ratio         PTT - ( 27 Apr 2020 00:35 )  PTT:35.4 sec    PHYSICAL EXAM:  Gen: NAD  Skin: No rashes, bruises, or lesions  Head: Normocephalic, Atraumatic  Face: no edema, erythema, or fluctuance.  Eyes: no scleral injection  Nose: Nares bilaterally patent, no discharge  Mouth: No Stridor / Drooling / Trismus.  Mucosa moist, tongue/uvula midline, oropharynx clear  Neck: #8 LPC in place, minimal serosanguinous secretions around stoma, sutures x4 and umbilical tie in place. Flat, supple, no lymphadenopathy, trachea midline, no masses  Lymphatic: No lymphadenopathy  Resp: on vent, ventilating well  Neuro: unable to assess due to patient's condition

## 2020-04-28 NOTE — PROGRESS NOTE ADULT - ASSESSMENT
[facility] AND [facility]  Cardiology Progress Note    Ronak Bustamante Patient Status:  Inpatient    1925 MRN [id number]   Location [facility] 2W/SW Attending Reinier Pack., Sabra Be, *   Hosp Day # 1 PCP Shilpi Medina MD       Subjective: Nausea only  Tramadol                Confusion  Alc-Benzyl Alc-Sulf*    Nausea only  Rofecoxib               Nausea only  Levaquin                Nausea only    Medications:    Current Facility-Administered Medications:  0.9%  NaCl infusion  Intravenous Co 71 y/o male s/p tracheostomy POD 5. #8 LPC trach in place. Pt is doing well on vent

## 2020-04-28 NOTE — PROGRESS NOTE ADULT - ATTENDING COMMENTS
Pt seen and examined with team at time of service, I was physically present for the key portions for evaluation and management (E/M) service provided, and preformed key portions of the procedure. Agree with above. Plan discussed with primary team.  S/p tracheostomy; surgical site in neck soft and flat. No crepitous or excessive oozing.  Trach secured. will follow

## 2020-04-29 NOTE — PROGRESS NOTE ADULT - SUBJECTIVE AND OBJECTIVE BOX
ENT ISSUE/POD: S/p tracheostomy POD 6    HPI:  71 y/o male s/p tracheostomy POD 6. #8 LPC trach in place 2/2 respiratory failure. Pt is doing well on vent. No issues or bleeding overnight per team.      PAST MEDICAL & SURGICAL HISTORY:  Hyperlipidemia  Hypothyroid  Schizophrenia  No significant past surgical history  No significant past surgical history    Allergies    mushrooms (Vomiting)  No Known Drug Allergies    Intolerances      MEDICATIONS  (STANDING):  aspirin  chewable 81 milliGRAM(s) Oral daily  cefepime   IVPB      cefepime   IVPB 1000 milliGRAM(s) IV Intermittent every 8 hours  chlorhexidine 0.12% Liquid 15 milliLiter(s) Oral Mucosa every 12 hours  chlorhexidine 2% Cloths 1 Application(s) Topical <User Schedule>  clonazePAM  Tablet 2 milliGRAM(s) Oral every 8 hours  dextrose 5% + sodium chloride 0.45%. 1000 milliLiter(s) (75 mL/Hr) IV Continuous <Continuous>  enoxaparin Injectable 40 milliGRAM(s) SubCutaneous two times a day  famotidine    Tablet 20 milliGRAM(s) Oral two times a day  furosemide   Injectable 20 milliGRAM(s) IV Push every 12 hours  haloperidol     Tablet 0.5 milliGRAM(s) Oral every 12 hours  insulin lispro (HumaLOG) corrective regimen sliding scale   SubCutaneous every 6 hours  ketamine Infusion. 0.25 mG/kG/Hr (1.93 mL/Hr) IV Continuous <Continuous>  levothyroxine Injectable 75 MICROGram(s) IV Push at bedtime  norepinephrine Infusion 0.04 MICROgram(s)/kG/Min (5.78 mL/Hr) IV Continuous <Continuous>  oxyCODONE    IR 10 milliGRAM(s) Oral every 6 hours  petrolatum Ophthalmic Ointment 1 Application(s) Both EYES two times a day  polyethylene glycol 3350 17 Gram(s) Oral every 12 hours  senna Syrup 10 milliLiter(s) Oral at bedtime    MEDICATIONS  (PRN):  acetaminophen    Suspension .. 650 milliGRAM(s) Oral every 6 hours PRN Temp greater or equal to 38.5C (101.3F)  HYDROmorphone  Injectable 1 milliGRAM(s) IV Push every 4 hours PRN Moderate Pain (4 - 6)  sodium chloride 0.9% lock flush 10 milliLiter(s) IV Push every 1 hour PRN Pre/post blood products, medications, blood draw, and to maintain line patency      Social History: see consult    Family history: see consult    ROS:   ENT: all negative except as noted in HPI   Pulm: denies SOB, cough, hemoptysis  Neuro: denies numbness/tingling, loss of sensation  Endo: denies heat/cold intolerance, excessive sweating      Vital Signs Last 24 Hrs  T(C): 34.5 (29 Apr 2020 08:00), Max: 37.2 (28 Apr 2020 16:00)  T(F): 94.1 (29 Apr 2020 08:00), Max: 99 (28 Apr 2020 16:00)  HR: 61 (29 Apr 2020 09:30) (61 - 84)  BP: --  BP(mean): --  RR: 35 (29 Apr 2020 08:00) (35 - 40)  SpO2: 100% (29 Apr 2020 09:30) (86% - 100%)                          8.2    11.79 )-----------( 272      ( 29 Apr 2020 01:04 )             28.6    04-29    143  |  97  |  17  ----------------------------<  134<H>  3.4<L>   |  41<H>  |  0.30<L>    Ca    8.8      29 Apr 2020 01:04  Phos  2.4     04-29  Mg     1.8     04-29    TPro  6.5  /  Alb  2.2<L>  /  TBili  0.3  /  DBili  x   /  AST  15  /  ALT  19  /  AlkPhos  144<H>  04-29   PT/INR - ( 29 Apr 2020 01:04 )   PT: 16.8 sec;   INR: 1.44 ratio         PTT - ( 29 Apr 2020 01:04 )  PTT:37.4 sec    PHYSICAL EXAM:  Gen: NAD  Skin: No rashes, bruises, or lesions  Head: Normocephalic, Atraumatic  Face: no edema, erythema, or fluctuance.  Eyes: no scleral injection  Nose: Nares bilaterally patent, no discharge  Mouth: No Stridor / Drooling / Trismus.  Mucosa moist, tongue/uvula midline, oropharynx clear  Neck: #8 LPC in place, minimal serosanguinous secretions around stoma, sutures x4 and umbilical tie removed. Velcro tie placed. Flat, supple, no lymphadenopathy, trachea midline, no masses  Lymphatic: No lymphadenopathy  Resp: on vent, ventilating well  Neuro: unable to assess due to patient's condition

## 2020-04-29 NOTE — PROGRESS NOTE ADULT - ASSESSMENT
71 y/o male s/p tracheostomy POD 6. #8 LPC trach in place. Sutures x4 and umbilical tie removed this morning 2/2 soiled umbilical tie. Velcro tie placed. Pt is doing well on vent

## 2020-04-29 NOTE — PROGRESS NOTE ADULT - SUBJECTIVE AND OBJECTIVE BOX
CLARITA POWELL  MRN-[id number]  Patient is a 70y old  Male who presents with a chief complaint of respiratory distress, COVID 19 (29 Apr 2020 10:26)    HPI:  70M with PMH presents with T dm, hypothyroidism, hld, p/w cough, low grade fever x 2-3 days. Today patient had worsening dyspnea at rest as well as exertion which prompted to come to the ED. Patient otherwise denied chest pain, leg swelling. He denies prior lung disease. Denies any history of smoking.     In ED, patient was noted to be hypoxic on 6L NC to 89% and significant tachypnea. VS otherwise, afebrile, HR in 80s, SBP 150s. Labs notable for CBC wnl. CMP with mild elevated transaminases AST 65 and ALT 47. PAtient noted to be positive for COVID 19. Due to worsening respiratory distress patient was emergently intubated in the ER. (27 Mar 2020 21:38)      Surgery/Hospital course:  COVID 19 Positive  Intubated 3/27  4/9 Paralysis weaned off yesterday, unproned with maintained sats, tolerating diuresis  4/12 10 sec asystole, back after epi x1  4/13 CPAP  4/14- 4/16 unable to tolerate CPAP   4/16 decadron course started  4/17 Failed CPAP. Continue with steroid taper and supportive therapy. Palliative care consult called and appreciated. Family yet to decide on end of life and prior to making any advanced decisions would like to see if any improvement with steroid taper treatment therapy.    4/18 Continues to CPAP trial of 20/5.  Peep lowered to 5 from 6.  Unable to tolerate sedation wean due to increased agitation causing hypoxia.  4/21 ketamine weaned off  4/22 COVID retested, 1U PRBC given for Hct 26 and pressor requirement; Precedex started, but dc'd for bradycardia (pt had asystolic pause on precedex previously)  4/23 Trached, Received convalescent plasma  4/24 OG tube removed  4/25 Febrile, bradycardic, hypotensive, dobutamine started but now off. Pt made DNR  4/26 High airway pressures and hypoxia, paralyzed    REVIEW OF SYSTEMS:  Unable to obtain, vented     Vital Signs Last 24 Hrs  T(C): 34.5 (29 Apr 2020 10:00), Max: 37.2 (28 Apr 2020 16:00)  T(F): 94.1 (29 Apr 2020 10:00), Max: 99 (28 Apr 2020 16:00)  HR: 62 (29 Apr 2020 10:00) (61 - 84)  BP: --  BP(mean): --  RR: 35 (29 Apr 2020 10:00) (35 - 40)  SpO2: 100% (29 Apr 2020 10:00) (86% - 100%)    ============================I/O===========================   I&O's Detail    28 Apr 2020 07:01  -  29 Apr 2020 07:00  --------------------------------------------------------  IN:    dextrose 5% + sodium chloride 0.45%.: 1125 mL    Enteral Tube Flush: 120 mL    Glucerna 1.5: 300 mL    IV PiggyBack: 600 mL    ketamine Infusion.: 193.6 mL    norepinephrine Infusion: 180.1 mL    Solution: 312.5 mL  Total IN: 2831.2 mL    OUT:    Indwelling Catheter - Urethral: 3015 mL  Total OUT: 3015 mL    Total NET: -183.8 mL      29 Apr 2020 07:01  -  29 Apr 2020 10:43  --------------------------------------------------------  IN:    dextrose 5% + sodium chloride 0.45%.: 300 mL    ketamine Infusion.: 36.8 mL    norepinephrine Infusion: 22.8 mL  Total IN: 359.6 mL    OUT:    Indwelling Catheter - Urethral: 1200 mL  Total OUT: 1200 mL    Total NET: -840.4 mL        ============================ LABS =========================                        8.2    11.79 )-----------( 272      ( 29 Apr 2020 01:04 )             28.6     04-29    143  |  97  |  17  ----------------------------<  134<H>  3.4<L>   |  41<H>  |  0.30<L>    Ca    8.8      29 Apr 2020 01:04  Phos  2.4     04-29  Mg     1.8     04-29    TPro  6.5  /  Alb  2.2<L>  /  TBili  0.3  /  DBili  x   /  AST  15  /  ALT  19  /  AlkPhos  144<H>  04-29    LIVER FUNCTIONS - ( 29 Apr 2020 01:04 )  Alb: 2.2 g/dL / Pro: 6.5 g/dL / ALK PHOS: 144 U/L / ALT: 19 U/L / AST: 15 U/L / GGT: x           PT/INR - ( 29 Apr 2020 01:04 )   PT: 16.8 sec;   INR: 1.44 ratio         PTT - ( 29 Apr 2020 01:04 )  PTT:37.4 sec  ABG - ( 29 Apr 2020 06:09 )  pH, Arterial: 7.47  pH, Blood: x     /  pCO2: 57    /  pO2: 60    / HCO3: 40    / Base Excess: 14.9  /  SaO2: 92                  ======================Microbiology/Radadiology=================  Culture: Reviewed   CXR: Reviewed  ======================================================  PAST MEDICAL & SURGICAL HISTORY:  Hyperlipidemia  Hypothyroid  Schizophrenia  No significant past surgical history  No significant past surgical history    ====================ASSESSMENT ==============  COVID 19 Positive   3/27 Intubated   Hypoxic respiratory failure s/p tracheotomy  Septic Shock  ARDS  Fluid overload    DNR  Plan:  ====================== NEUROLOGY=====================  Sedate with Klonopin, Ketamine   Haldol to help wean off sedation   Dilaudid and oxycodone for analgesia     acetaminophen    Suspension .. 650 milliGRAM(s) Oral every 6 hours PRN Temp greater or equal to 38.5C (101.3F)  clonazePAM  Tablet 2 milliGRAM(s) Oral every 8 hours  haloperidol     Tablet 0.5 milliGRAM(s) Oral every 12 hours  HYDROmorphone  Injectable 1 milliGRAM(s) IV Push every 4 hours PRN Moderate Pain (4 - 6)  ketamine Infusion. 0.25 mG/kG/Hr (1.93 mL/Hr) IV Continuous <Continuous>  oxyCODONE    IR 10 milliGRAM(s) Oral every 6 hours    ==================== RESPIRATORY======================  On full vent support     Mechanical Ventilation:  Mode: AC/ CMV (Assist Control/ Continuous Mandatory Ventilation)  RR (machine): 35  TV (machine): 300  FiO2: 60  PEEP: 5  ITime: 1  MAP: 17  PIP: 41      ====================CARDIOVASCULAR==================  On pressor support for hypotension     norepinephrine Infusion 0.04 MICROgram(s)/kG/Min (5.78 mL/Hr) IV Continuous <Continuous>    ===================HEMATOLOGIC/ONC ===================  aspirin  chewable 81 milliGRAM(s) Oral daily  VTE prophylaxis, enoxaparin Injectable 40 milliGRAM(s) SubCutaneous two times a day    ===================== RENAL =========================  Continue monitoring urine output  Diuresis with Lasix     furosemide   Injectable 20 milliGRAM(s) IV Push every 12 hours    ==================== GASTROINTESTINAL===================  Tolerating tube feeds, Glucerna 300 [id number]     dextrose 5% + sodium chloride 0.45%. 1000 milliLiter(s) (75 mL/Hr) IV Continuous <Continuous>  GI prophylaxis, famotidine    Tablet 20 milliGRAM(s) Oral two times a day  polyethylene glycol 3350 17 Gram(s) Oral every 12 hours  senna Syrup 10 milliLiter(s) Oral at bedtime  sodium chloride 0.9% lock flush 10 milliLiter(s) IV Push every 1 hour PRN Pre/post blood products, medications, blood draw, and to maintain line patency    =======================    ENDOCRINE  =====================  Glucose control,   insulin lispro (HumaLOG) corrective regimen sliding scale   SubCutaneous every 6 hours    levothyroxine Injectable 75 MICROGram(s) IV Push at bedtime    ========================INFECTIOUS DISEASE================  Completed COVID medication (Plaquenil, Vit C/Thiamine, Anakirna, Solumedrol)  S/p convalescent plasma on 4/23   C/w cefepime for pseudomonas aeruginosa in sputum on 4/24     cefepime   IVPB      cefepime   IVPB 1000 milliGRAM(s) IV Intermittent every 8 hours        Patient requires continuous monitoring with bedside rhythm monitoring, pulse ox monitoring, and intermittent blood gas analysis. Care plan discussed with ICU care team. Patient remained critical and at risk for life threatening decompensation.    By signing my name below, I, Pretty Wallace, attest that this documentation has been prepared under the direction and in the presence of GERRY Hassan   Electronically signed: Rohan Lal, 04-29-20 @ 10:43    I, Shantanu Ricks, personally performed the services described in this documentation. all medical record entries made by the kevinibtrae were at my direction and in my presence. I have reviewed the chart and agree that the record reflects my personal performance and is accurate and complete  Electronically signed: GERRY Hassan CLARITA POWELL  MRN-[id number]  Patient is a 70y old  Male who presents with a chief complaint of respiratory distress, COVID 19 (29 Apr 2020 10:26)    HPI:  70M with PMH presents with T dm, hypothyroidism, hld, p/w cough, low grade fever x 2-3 days. Today patient had worsening dyspnea at rest as well as exertion which prompted to come to the ED. Patient otherwise denied chest pain, leg swelling. He denies prior lung disease. Denies any history of smoking.     In ED, patient was noted to be hypoxic on 6L NC to 89% and significant tachypnea. VS otherwise, afebrile, HR in 80s, SBP 150s. Labs notable for CBC wnl. CMP with mild elevated transaminases AST 65 and ALT 47. PAtient noted to be positive for COVID 19. Due to worsening respiratory distress patient was emergently intubated in the ER. (27 Mar 2020 21:38)      Surgery/Hospital course:  COVID 19 Positive  Intubated 3/27  4/9 Paralysis weaned off yesterday, unproned with maintained sats, tolerating diuresis  4/12 10 sec asystole, back after epi x1  4/13 CPAP  4/14- 4/16 unable to tolerate CPAP   4/16 decadron course started  4/17 Failed CPAP. Continue with steroid taper and supportive therapy. Palliative care consult called and appreciated. Family yet to decide on end of life and prior to making any advanced decisions would like to see if any improvement with steroid taper treatment therapy.    4/18 Continues to CPAP trial of 20/5.  Peep lowered to 5 from 6.  Unable to tolerate sedation wean due to increased agitation causing hypoxia.  4/21 ketamine weaned off  4/22 COVID retested, 1U PRBC given for Hct 26 and pressor requirement; Precedex started, but dc'd for bradycardia (pt had asystolic pause on precedex previously)  4/23 Trached, Received convalescent plasma  4/24 OG tube removed  4/25 Febrile, bradycardic, hypotensive, dobutamine started but now off. Pt made DNR  4/26 High airway pressures and hypoxia, paralyzed    REVIEW OF SYSTEMS:  Unable to obtain, vented     Vital Signs Last 24 Hrs  T(C): 34.5 (29 Apr 2020 10:00), Max: 37.2 (28 Apr 2020 16:00)  T(F): 94.1 (29 Apr 2020 10:00), Max: 99 (28 Apr 2020 16:00)  HR: 62 (29 Apr 2020 10:00) (61 - 84)  BP: --  BP(mean): --  RR: 35 (29 Apr 2020 10:00) (35 - 40)  SpO2: 100% (29 Apr 2020 10:00) (86% - 100%)    Physical Exam:  Gen: vented   CNS: sedated, not following commands   Neck: +trach, site intact   Res: clear, no wheezing  CVS: regular rhythm, normal S1/S2  Abd: soft, non-distended, bowel sounds present   Skin: no rash  Ext: no edema   Lines: RIJ TLC, L radial A line     ============================I/O===========================   I&O's Detail    28 Apr 2020 07:01  -  29 Apr 2020 07:00  --------------------------------------------------------  IN:    dextrose 5% + sodium chloride 0.45%.: 1125 mL    Enteral Tube Flush: 120 mL    Glucerna 1.5: 300 mL    IV PiggyBack: 600 mL    ketamine Infusion.: 193.6 mL    norepinephrine Infusion: 180.1 mL    Solution: 312.5 mL  Total IN: 2831.2 mL    OUT:    Indwelling Catheter - Urethral: 3015 mL  Total OUT: 3015 mL    Total NET: -183.8 mL      29 Apr 2020 07:01  -  29 Apr 2020 10:43  --------------------------------------------------------  IN:    dextrose 5% + sodium chloride 0.45%.: 300 mL    ketamine Infusion.: 36.8 mL    norepinephrine Infusion: 22.8 mL  Total IN: 359.6 mL    OUT:    Indwelling Catheter - Urethral: 1200 mL  Total OUT: 1200 mL    Total NET: -840.4 mL        ============================ LABS =========================                        8.2    11.79 )-----------( 272      ( 29 Apr 2020 01:04 )             28.6     04-29    143  |  97  |  17  ----------------------------<  134<H>  3.4<L>   |  41<H>  |  0.30<L>    Ca    8.8      29 Apr 2020 01:04  Phos  2.4     04-29  Mg     1.8     04-29    TPro  6.5  /  Alb  2.2<L>  /  TBili  0.3  /  DBili  x   /  AST  15  /  ALT  19  /  AlkPhos  144<H>  04-29    LIVER FUNCTIONS - ( 29 Apr 2020 01:04 )  Alb: 2.2 g/dL / Pro: 6.5 g/dL / ALK PHOS: 144 U/L / ALT: 19 U/L / AST: 15 U/L / GGT: x           PT/INR - ( 29 Apr 2020 01:04 )   PT: 16.8 sec;   INR: 1.44 ratio         PTT - ( 29 Apr 2020 01:04 )  PTT:37.4 sec  ABG - ( 29 Apr 2020 06:09 )  pH, Arterial: 7.47  pH, Blood: x     /  pCO2: 57    /  pO2: 60    / HCO3: 40    / Base Excess: 14.9  /  SaO2: 92                  ======================Microbiology/Radadiology=================  Culture: Reviewed   CXR: Reviewed  ======================================================  PAST MEDICAL & SURGICAL HISTORY:  Hyperlipidemia  Hypothyroid  Schizophrenia  No significant past surgical history  No significant past surgical history    ====================ASSESSMENT ==============  COVID 19 Positive   3/27 Intubated   Hypoxic respiratory failure s/p tracheotomy  Septic Shock  ARDS  Fluid overload    DNR  Plan:  ====================== NEUROLOGY=====================  Sedate with Klonopin, Ketamine   Haldol to help wean off sedation   Dilaudid and oxycodone for analgesia     acetaminophen    Suspension .. 650 milliGRAM(s) Oral every 6 hours PRN Temp greater or equal to 38.5C (101.3F)  clonazePAM  Tablet 2 milliGRAM(s) Oral every 8 hours  haloperidol     Tablet 0.5 milliGRAM(s) Oral every 12 hours  HYDROmorphone  Injectable 1 milliGRAM(s) IV Push every 4 hours PRN Moderate Pain (4 - 6)  ketamine Infusion. 0.25 mG/kG/Hr (1.93 mL/Hr) IV Continuous <Continuous>  oxyCODONE    IR 10 milliGRAM(s) Oral every 6 hours    ==================== RESPIRATORY======================  S/p trach, plan to d/c trach sutures tomorrow and keep umbilical tie   On full vent support, wean FiO2 as tolerated   Currently cooled to assess if it may bring down his oxygen demands   F/u calorimetric testing, may d/c artic sun pending results     Mechanical Ventilation:  Mode: AC/ CMV (Assist Control/ Continuous Mandatory Ventilation)  RR (machine): 35  TV (machine): 300  FiO2: 60  PEEP: 5  ITime: 1  MAP: 17  PIP: 41    Plateau Pressures: 36       ====================CARDIOVASCULAR==================  SR with PACs in 60s   On pressor support for hypotension, wean as tolerated   MAP 70-80s     norepinephrine Infusion 0.04 MICROgram(s)/kG/Min (5.78 mL/Hr) IV Continuous <Continuous>    ===================HEMATOLOGIC/ONC ===================  H&H 8.2/28.6, no active bleeding     aspirin  chewable 81 milliGRAM(s) Oral daily  VTE prophylaxis, enoxaparin Injectable 40 milliGRAM(s) SubCutaneous two times a day    ===================== RENAL =========================  Continue monitoring urine output, -3L overight   Net total  -200cc   Diuresis with Lasix   Bun/Cr 77/0.3    furosemide   Injectable 20 milliGRAM(s) IV Push every 12 hours    ==================== GASTROINTESTINAL===================  NPO while cooled, plan to restart tube feeds when warm     dextrose 5% + sodium chloride 0.45%. 1000 milliLiter(s) (75 mL/Hr) IV Continuous <Continuous>  GI prophylaxis, famotidine    Tablet 20 milliGRAM(s) Oral two times a day  polyethylene glycol 3350 17 Gram(s) Oral every 12 hours  senna Syrup 10 milliLiter(s) Oral at bedtime  sodium chloride 0.9% lock flush 10 milliLiter(s) IV Push every 1 hour PRN Pre/post blood products, medications, blood draw, and to maintain line patency    =======================    ENDOCRINE  =====================  Glucose control,   insulin lispro (HumaLOG) corrective regimen sliding scale   SubCutaneous every 6 hours    levothyroxine Injectable 75 MICROGram(s) IV Push at bedtime    ========================INFECTIOUS DISEASE================  Completed COVID medication (Plaquenil, Vit C/Thiamine, Anakirna, Solumedrol)  S/p convalescent plasma on 4/23   WBC 11.8, afebrile   C/w cefepime x 7 days for pseudomonas aeruginosa in sputum on 4/24     cefepime   IVPB      cefepime   IVPB 1000 milliGRAM(s) IV Intermittent every 8 hours      Patient requires continuous monitoring with bedside rhythm monitoring, pulse ox monitoring, and intermittent blood gas analysis. Care plan discussed with ICU care team. Patient remained critical and at risk for life threatening decompensation.    By signing my name below, IPretty, attest that this documentation has been prepared under the direction and in the presence of GERRY Hassan   Electronically signed: Rohan Lal, 04-29-20 @ 10:43    I, Shantanu Ricks, personally performed the services described in this documentation. all medical record entries made by the kevinibtrae were at my direction and in my presence. I have reviewed the chart and agree that the record reflects my personal performance and is accurate and complete  Electronically signed: GERRY Hassan

## 2020-04-29 NOTE — PROGRESS NOTE ADULT - PROBLEM SELECTOR PLAN 1
- HOB elevation  - Suction PRN  - Continue trach care  - No further ENT intervention   - Call with questions or concerns

## 2020-04-29 NOTE — CHART NOTE - NSCHARTNOTEFT_GEN_A_CORE
Nutrition Follow Up Note   Patient seen for: nutrition follow up on COVID ICU     Source: medical record, communication with team. Unable to speak to pt due to current airborne isolation contact precautions related to COVID-19. Pt remains intubated.     Chart reviewed, events noted. 70 year old male with PMHx of DM, hypothyroidism, HLD. Presented with fever, cough and SOB. Found to be COVID19+, required emergent intubation in ED. Pt remains intubated, sedated.  S/p trach. Pt on Arctic sun trial 2/2 hypermetabolic state.   Pt remains intubated, sedated and on pressors.     Diet Order: NPO while in hypothermic states     Nutrition Events: Pt had been previously tolerating Glucerna 1.2 @ 300 q6hrs via NGT.     GI:     Anthrpometric Measurements:   Height (cm): 175.26 (03-27-20 @ 16:15)  Weight (kg): 77.1 (03-27-20 @ 16:15)  BMI (kg/m2): 25.1 (03-27-20 @ 16:15)  IBW:   %IBW:    Medications: MEDICATIONS  (STANDING):  aspirin  chewable 81 milliGRAM(s) Oral daily  cefepime   IVPB      cefepime   IVPB 1000 milliGRAM(s) IV Intermittent every 8 hours  chlorhexidine 0.12% Liquid 15 milliLiter(s) Oral Mucosa every 12 hours  chlorhexidine 2% Cloths 1 Application(s) Topical <User Schedule>  clonazePAM  Tablet 2 milliGRAM(s) Oral every 8 hours  dextrose 5% + sodium chloride 0.45%. 1000 milliLiter(s) (75 mL/Hr) IV Continuous <Continuous>  enoxaparin Injectable 40 milliGRAM(s) SubCutaneous two times a day  famotidine    Tablet 20 milliGRAM(s) Oral two times a day  furosemide   Injectable 20 milliGRAM(s) IV Push every 12 hours  haloperidol     Tablet 0.5 milliGRAM(s) Oral every 12 hours  insulin lispro (HumaLOG) corrective regimen sliding scale   SubCutaneous every 6 hours  ketamine Infusion. 0.25 mG/kG/Hr (1.93 mL/Hr) IV Continuous <Continuous>  levothyroxine Injectable 75 MICROGram(s) IV Push at bedtime  norepinephrine Infusion 0.04 MICROgram(s)/kG/Min (5.78 mL/Hr) IV Continuous <Continuous>  oxyCODONE    IR 10 milliGRAM(s) Oral every 6 hours  petrolatum Ophthalmic Ointment 1 Application(s) Both EYES two times a day  polyethylene glycol 3350 17 Gram(s) Oral every 12 hours  senna Syrup 10 milliLiter(s) Oral at bedtime    MEDICATIONS  (PRN):  acetaminophen    Suspension .. 650 milliGRAM(s) Oral every 6 hours PRN Temp greater or equal to 38.5C (101.3F)  HYDROmorphone  Injectable 1 milliGRAM(s) IV Push every 4 hours PRN Moderate Pain (4 - 6)  sodium chloride 0.9% lock flush 10 milliLiter(s) IV Push every 1 hour PRN Pre/post blood products, medications, blood draw, and to maintain line patency    Labs: 04-29 @ 01:49: Sodium --, Potassium --, Calcium --, Magnesium --, Phosphorus --, BUN --, Creatinine --, Glucose --, Alk Phos --, ALT/SGPT --, AST/SGOT --, Albumin --, Prealbumin --, Total Bilirubin --, Hemoglobin --, Hematocrit --, Ferritin 558<H>, C-Reactive Protein --, Creatine Kinase <<27>  04-29 @ 01:48: Sodium --, Potassium --, Calcium --, Magnesium --, Phosphorus --, BUN --, Creatinine --, Glucose --, Alk Phos --, ALT/SGPT --, AST/SGOT --, Albumin --, Prealbumin --, Total Bilirubin --, Hemoglobin --, Hematocrit --, Ferritin --, C-Reactive Protein 15.09<H>, Creatine Kinase <<27>  04-29 @ 01:04: Sodium 143, Potassium 3.4<L>, Calcium 8.8, Magnesium 1.8, Phosphorus 2.4<L>, BUN 17, Creatinine 0.30<L>, Glucose 134<H>, Alk Phos 144<H>, ALT/SGPT 19, AST/SGOT 15, Albumin 2.2<L>, Prealbumin --, Total Bilirubin 0.3, Hemoglobin 8.2<L>, Hematocrit 28.6<L>, Ferritin --, C-Reactive Protein --, Creatine Kinase <<27>  04-28 @ 17:41: Sodium --, Potassium --, Calcium --, Magnesium --, Phosphorus --, BUN --, Creatinine --, Glucose --, Alk Phos --, ALT/SGPT --, AST/SGOT --, Albumin --, Prealbumin --, Total Bilirubin --, Hemoglobin --, Hematocrit --, Ferritin 560<H>, C-Reactive Protein --, Creatine Kinase <<27>  04-28 @ 17:39: Sodium --, Potassium --, Calcium --, Magnesium --, Phosphorus --, BUN --, Creatinine --, Glucose --, Alk Phos --, ALT/SGPT --, AST/SGOT --, Albumin --, Prealbumin --, Total Bilirubin --, Hemoglobin --, Hematocrit --, Ferritin --, C-Reactive Protein 19.68<H>, Creatine Kinase <<27>  04-28 @ 15:08: Sodium 146<H>, Potassium 4.3, Calcium 9.0, Magnesium --, Phosphorus --, BUN 20, Creatinine 0.38<L>, Glucose 116<H>, Alk Phos 168<H>, ALT/SGPT 20, AST/SGOT 12, Albumin 2.4<L>, Prealbumin --, Total Bilirubin 0.3, Hemoglobin --, Hematocrit --, Ferritin --, C-Reactive Protein --, Creatine Kinase <<27>  04-28 @ 15:07: Sodium --, Potassium --, Calcium --, Magnesium --, Phosphorus --, BUN --, Creatinine --, Glucose --, Alk Phos --, ALT/SGPT --, AST/SGOT --, Albumin --, Prealbumin --, Total Bilirubin --, Hemoglobin 8.4<L>, Hematocrit 29.2<L>, Ferritin --, C-Reactive Protein --, Creatine Kinase <<27>    Hemoglobin A1C, Whole Blood: 7.2 % (03-30-20 @ 08:24)      POCT Blood Glucose.: 127 mg/dL (04-29-20 @ 11:29)  POCT Blood Glucose.: 105 mg/dL (04-28-20 @ 16:20)    Skin:   Edema:     Estimated Needs:   Energy:  Protein:    [place] [employer] (REE):     Previous Nutrition Diagnosis:   Nutrition Diagnosis is:     New Nutrition Diagnosis:      Recommended Interventions:   1.   2.   3.   4.     Monitoring and Evaluation:   Continue to monitor nutrition provision and tolerance, weights, labs, skin integrity.   RD remains available upon request and will follow up per protocol. Nutrition Follow Up Note   Patient seen for: nutrition follow up on COVID ICU     Source: medical record, communication with team. Unable to speak to pt due to current airborne isolation contact precautions related to COVID-19. Pt remains intubated.     Chart reviewed, events noted. 70 year old male with PMHx of DM, hypothyroidism, HLD. Presented with fever, cough and SOB. Found to be COVID19+, required emergent intubation in ED. Pt remains intubated, sedated.  S/p trach. Pt on Arctic sun trial 2/2 hypermetabolic state.   Pt remains intubated, sedated and on pressors.     Diet Order: NPO while in hypothermic states     Nutrition Events: Pt had been previously tolerating Glucerna 1.2 @ 300 q6hrs via NGT but now NPO while hypothermic.   Pt receiving D5% and 1/2 NS @ 75ml/hr     GI: Last BM on 4/28 x2     Anthropometric Measurements:   Height (cm): 175.26 (03-27-20 @ 16:15)  Weight (kg): 77.1 (03-27-20 @ 16:15)  BMI (kg/m2): 25.1 (03-27-20 @ 16:15)    Medications: MEDICATIONS  (STANDING):  aspirin  chewable 81 milliGRAM(s) Oral daily  cefepime   IVPB      cefepime   IVPB 1000 milliGRAM(s) IV Intermittent every 8 hours  chlorhexidine 0.12% Liquid 15 milliLiter(s) Oral Mucosa every 12 hours  chlorhexidine 2% Cloths 1 Application(s) Topical <User Schedule>  clonazePAM  Tablet 2 milliGRAM(s) Oral every 8 hours  dextrose 5% + sodium chloride 0.45%. 1000 milliLiter(s) (75 mL/Hr) IV Continuous <Continuous>  enoxaparin Injectable 40 milliGRAM(s) SubCutaneous two times a day  famotidine    Tablet 20 milliGRAM(s) Oral two times a day  furosemide   Injectable 20 milliGRAM(s) IV Push every 12 hours  haloperidol     Tablet 0.5 milliGRAM(s) Oral every 12 hours  insulin lispro (HumaLOG) corrective regimen sliding scale   SubCutaneous every 6 hours  ketamine Infusion. 0.25 mG/kG/Hr (1.93 mL/Hr) IV Continuous <Continuous>  levothyroxine Injectable 75 MICROGram(s) IV Push at bedtime  norepinephrine Infusion 0.04 MICROgram(s)/kG/Min (5.78 mL/Hr) IV Continuous <Continuous>  oxyCODONE    IR 10 milliGRAM(s) Oral every 6 hours  petrolatum Ophthalmic Ointment 1 Application(s) Both EYES two times a day  polyethylene glycol 3350 17 Gram(s) Oral every 12 hours  senna Syrup 10 milliLiter(s) Oral at bedtime    MEDICATIONS  (PRN):  acetaminophen    Suspension .. 650 milliGRAM(s) Oral every 6 hours PRN Temp greater or equal to 38.5C (101.3F)  HYDROmorphone  Injectable 1 milliGRAM(s) IV Push every 4 hours PRN Moderate Pain (4 - 6)  sodium chloride 0.9% lock flush 10 milliLiter(s) IV Push every 1 hour PRN Pre/post blood products, medications, blood draw, and to maintain line patency    Labs: 04-29 @ 01:49: Sodium --, Potassium --, Calcium --, Magnesium --, Phosphorus --, BUN --, Creatinine --, Glucose --, Alk Phos --, ALT/SGPT --, AST/SGOT --, Albumin --, Prealbumin --, Total Bilirubin --, Hemoglobin --, Hematocrit --, Ferritin 558<H>, C-Reactive Protein --, Creatine Kinase <<27>  04-29 @ 01:48: Sodium --, Potassium --, Calcium --, Magnesium --, Phosphorus --, BUN --, Creatinine --, Glucose --, Alk Phos --, ALT/SGPT --, AST/SGOT --, Albumin --, Prealbumin --, Total Bilirubin --, Hemoglobin --, Hematocrit --, Ferritin --, C-Reactive Protein 15.09<H>, Creatine Kinase <<27>  04-29 @ 01:04: Sodium 143, Potassium 3.4<L>, Calcium 8.8, Magnesium 1.8, Phosphorus 2.4<L>, BUN 17, Creatinine 0.30<L>, Glucose 134<H>, Alk Phos 144<H>, ALT/SGPT 19, AST/SGOT 15, Albumin 2.2<L>, Prealbumin --, Total Bilirubin 0.3, Hemoglobin 8.2<L>, Hematocrit 28.6<L>, Ferritin --, C-Reactive Protein --, Creatine Kinase <<27>  04-28 @ 17:41: Sodium --, Potassium --, Calcium --, Magnesium --, Phosphorus --, BUN --, Creatinine --, Glucose --, Alk Phos --, ALT/SGPT --, AST/SGOT --, Albumin --, Prealbumin --, Total Bilirubin --, Hemoglobin --, Hematocrit --, Ferritin 560<H>, C-Reactive Protein --, Creatine Kinase <<27>  04-28 @ 17:39: Sodium --, Potassium --, Calcium --, Magnesium --, Phosphorus --, BUN --, Creatinine --, Glucose --, Alk Phos --, ALT/SGPT --, AST/SGOT --, Albumin --, Prealbumin --, Total Bilirubin --, Hemoglobin --, Hematocrit --, Ferritin --, C-Reactive Protein 19.68<H>, Creatine Kinase <<27>  04-28 @ 15:08: Sodium 146<H>, Potassium 4.3, Calcium 9.0, Magnesium --, Phosphorus --, BUN 20, Creatinine 0.38<L>, Glucose 116<H>, Alk Phos 168<H>, ALT/SGPT 20, AST/SGOT 12, Albumin 2.4<L>, Prealbumin --, Total Bilirubin 0.3, Hemoglobin --, Hematocrit --, Ferritin --, C-Reactive Protein --, Creatine Kinase <<27>  04-28 @ 15:07: Sodium --, Potassium --, Calcium --, Magnesium --, Phosphorus --, BUN --, Creatinine --, Glucose --, Alk Phos --, ALT/SGPT --, AST/SGOT --, Albumin --, Prealbumin --, Total Bilirubin --, Hemoglobin 8.4<L>, Hematocrit 29.2<L>, Ferritin --, C-Reactive Protein --, Creatine Kinase <<27>    Hemoglobin A1C, Whole Blood: 7.2 % (03-30-20 @ 08:24)      POCT Blood Glucose.: 127 mg/dL (04-29-20 @ 11:29)  POCT Blood Glucose.: 105 mg/dL (04-28-20 @ 16:20)    Skin: stage 2 pressure ulcer, left ear.   Edema:  +1 generalized and +3 aline hand     Estimated Needs: based on dosing wt 77.1Kg, with consideration for intubation  Estimated Energy Needs  · From (20 adelina/kg)	1542  · To (25 adelina/kg)	1927  [place] (REE): 1741 calories (23cal/Kg)    Estimated Protein Needs  · From (1.2 g/kg)	92.52  · To (1.4 g/kg)	107.94    Previous Nutrition Diagnosis: None   Nutrition Diagnosis is:  None     New Nutrition Diagnosis:  None     Recommended Interventions:   1. As medically feasible and Pt normothermic, would initiate Glucerna 1.5 @ 50ml q6hrs and increase by 50ml q6hrs to goal rate of Glucerna 1.5 @ 300ml bolus q6hrs; provides 1800 calories (23.3 adelina/Kg) and 99 grams protein (1,28 Gm/Kg) based on admission wt 77.1Kg  2. Continue bowel regimen; consider additional regimen for no BM    3. Defer free water to team  4. Trend BG levels, renal indices, LFT's, electrolytes and triglycerides      Monitoring and Evaluation:   Continue to monitor nutrition provision and tolerance, weights, labs, skin integrity.   RD remains available upon request and will follow up per protocol.  Sarah Siegler RD, RDN, [facility] Pager #[phone]

## 2020-04-29 NOTE — PROGRESS NOTE ADULT - SUBJECTIVE AND OBJECTIVE BOX
CLARITA POWELL  MRN-[id number]  Patient is a 70y old  Male who presents with a chief complaint of respiratory distress, COVID 19 (29 Apr 2020 10:43)    HPI:  70M with PMH presents with T dm, hypothyroidism, hld, p/w cough, low grade fever x 2-3 days. Today patient had worsening dyspnea at rest as well as exertion which prompted to come to the ED. Patient otherwise denied chest pain, leg swelling. He denies prior lung disease. Denies any history of smoking.     In ED, patient was noted to be hypoxic on 6L NC to 89% and significant tachypnea. VS otherwise, afebrile, HR in 80s, SBP 150s. Labs notable for CBC wnl. CMP with mild elevated transaminases AST 65 and ALT 47. PAtient noted to be positive for COVID 19. Due to worsening respiratory distress patient was emergently intubated in the ER. (27 Mar 2020 21:38)      Surgery/Hospital course:  COVID 19 Positive  Intubated 3/27  4/9 Paralysis weaned off yesterday, unproned with maintained sats, tolerating diuresis  4/12 10 sec asystole, back after epi x1  4/13 CPAP  4/14- 4/16 unable to tolerate CPAP   4/16 decadron course started  4/17 Failed CPAP. Continue with steroid taper and supportive therapy. Palliative care consult called and appreciated. Family yet to decide on end of life and prior to making any advanced decisions would like to see if any improvement with steroid taper treatment therapy.    4/18 Continues to CPAP trial of 20/5.  Peep lowered to 5 from 6.  Unable to tolerate sedation wean due to increased agitation causing hypoxia.  4/21 ketamine weaned off  4/22 COVID retested, 1U PRBC given for Hct 26 and pressor requirement; Precedex started, but dc'd for bradycardia (pt had asystolic pause on precedex previously)  4/23 Trached, Received convalescent plasma  4/24 OG tube removed  4/25 Febrile, bradycardic, hypotensive, dobutamine started but now off. Pt made DNR  4/26 High airway pressures and hypoxia, paralyzed        REVIEW OF SYSTEMS:  Unable to obtain, vented     Vital Signs Last 24 Hrs  T(C): 34.5 (29 Apr 2020 20:00), Max: 35 (29 Apr 2020 04:00)  T(F): 94.1 (29 Apr 2020 20:00), Max: 95 (29 Apr 2020 04:00)  HR: 68 (29 Apr 2020 22:36) (58 - 78)  BP: --  BP(mean): --  RR: 35 (29 Apr 2020 20:00) (35 - 40)  SpO2: 92% (29 Apr 2020 22:36) (86% - 100%)    ============================I/O===========================   I&O's Detail    28 Apr 2020 07:01  -  29 Apr 2020 07:00  --------------------------------------------------------  IN:    dextrose 5% + sodium chloride 0.45%.: 1125 mL    Enteral Tube Flush: 120 mL    Glucerna 1.5: 300 mL    IV PiggyBack: 600 mL    ketamine Infusion.: 193.6 mL    norepinephrine Infusion: 180.1 mL    Solution: 312.5 mL  Total IN: 2831.2 mL    OUT:    Indwelling Catheter - Urethral: 3015 mL  Total OUT: 3015 mL    Total NET: -183.8 mL      29 Apr 2020 07:01  -  29 Apr 2020 22:46  --------------------------------------------------------  IN:    dextrose 5% + sodium chloride 0.45%.: 1125 mL    Enteral Tube Flush: 50 mL    IV PiggyBack: 50 mL    ketamine Infusion.: 149.6 mL    norepinephrine Infusion: 104.9 mL  Total IN: 1479.5 mL    OUT:    Indwelling Catheter - Urethral: 2260 mL  Total OUT: 2260 mL    Total NET: -780.5 mL        ============================ LABS =========================                        8.2    11.79 )-----------( 272      ( 29 Apr 2020 01:04 )             28.6     04-29    143  |  97  |  17  ----------------------------<  134<H>  3.4<L>   |  41<H>  |  0.30<L>    Ca    8.8      29 Apr 2020 01:04  Phos  2.4     04-29  Mg     1.8     04-29    TPro  6.5  /  Alb  2.2<L>  /  TBili  0.3  /  DBili  x   /  AST  15  /  ALT  19  /  AlkPhos  144<H>  04-29    LIVER FUNCTIONS - ( 29 Apr 2020 01:04 )  Alb: 2.2 g/dL / Pro: 6.5 g/dL / ALK PHOS: 144 U/L / ALT: 19 U/L / AST: 15 U/L / GGT: x           PT/INR - ( 29 Apr 2020 01:04 )   PT: 16.8 sec;   INR: 1.44 ratio         PTT - ( 29 Apr 2020 01:04 )  PTT:37.4 sec  ABG - ( 29 Apr 2020 21:35 )  pH, Arterial: 7.48  pH, Blood: x     /  pCO2: 54    /  pO2: 61    / HCO3: 40    / Base Excess: 14.7  /  SaO2: 92                  ======================Microbiology/Radadiology=================  Culture: Reviewed   CXR: Reviewed  ======================================================  PAST MEDICAL & SURGICAL HISTORY:  Hyperlipidemia  Hypothyroid  Schizophrenia  No significant past surgical history  No significant past surgical history    ====================ASSESSMENT ==============  COVID 19 Positive   3/27 Intubated   Hypoxic respiratory failure s/p tracheotomy  Septic Shock  ARDS  Fluid overload    DNR    Plan:  ====================== NEUROLOGY=====================  PRN for fevers,  acetaminophen    Suspension .. 650 milliGRAM(s) Oral every 6 hours PRN Temp greater or equal to 38.5C (101.3F)    Sedate with Klonopin, Ketamine   clonazePAM  Tablet 2 milliGRAM(s) Oral every 8 hours  ketamine Infusion. 0.25 mG/kG/Hr (1.93 mL/Hr) IV Continuous <Continuous>    Haldol to help wean off sedation   haloperidol     Tablet 0.5 milliGRAM(s) Oral every 12 hours    Dilaudid and oxycodone for analgesia  HYDROmorphone  Injectable 1 milliGRAM(s) IV Push every 4 hours PRN Moderate Pain (4 - 6)  oxyCODONE    IR 10 milliGRAM(s) Oral every 6 hours    ==================== RESPIRATORY======================  S/p trach, plan to d/c trach sutures tomorrow and keep umbilical tie   On full vent support, wean FiO2 as tolerated   Cooled this AM to assess if oxygen demands decrease   F/u calorimetric testing, may d/c artic sun pending results     Mechanical Ventilation:  Mode: AC/ CMV (Assist Control/ Continuous Mandatory Ventilation)  RR (machine): 35  TV (machine): 300  FiO2: 60  PEEP: 5  ITime: 1  MAP: 18  PIP: 45      ====================CARDIOVASCULAR====================  On pressor support for hypotension, wean as tolerated   MAP 70-80s     norepinephrine Infusion 0.04 MICROgram(s)/kG/Min (5.78 mL/Hr) IV Continuous <Continuous>    ===================HEMATOLOGIC/ONC ===================  Continue ASA,  aspirin  chewable 81 milliGRAM(s) Oral daily    Continue DVT prophylaxis   enoxaparin Injectable 40 milliGRAM(s) SubCutaneous two times a day    ===================== RENAL =========================  Continue monitoring urine output  Monitor BUN/SCr     Continue diuresis with Lasix,  furosemide   Injectable 20 milliGRAM(s) IV Push every 12 hours    ==================== GASTROINTESTINAL===================  This AM, NPO while cooled, plan to restart tube feeds when warm     Bowel regimen,  dextrose 5% + sodium chloride 0.45%. 1000 milliLiter(s) (75 mL/Hr) IV Continuous <Continuous>  polyethylene glycol 3350 17 Gram(s) Oral every 12 hours  senna Syrup 10 milliLiter(s) Oral at bedtime  sodium chloride 0.9% lock flush 10 milliLiter(s) IV Push every 1 hour PRN Pre/post blood products, medications, blood draw, and to maintain line patency    Continue pepcid for GI prophylaxis   famotidine    Tablet 20 milliGRAM(s) Oral two times a day    =======================    ENDOCRINE  =====================  Glucose control,   insulin lispro (HumaLOG) corrective regimen sliding scale   SubCutaneous every 6 hours    Hypothyroidism control,  levothyroxine Injectable 75 MICROGram(s) IV Push at bedtime    ========================INFECTIOUS DISEASE================  Completed COVID medication (Plaquenil, Vit C/Thiamine, Anakirna, Solumedrol)  S/p convalescent plasma on 4/23   Monitor fever curve    C/w cefepime x 7 days for pseudomonas aeruginosa in sputum on 4/24   cefepime   IVPB      cefepime   IVPB 1000 milliGRAM(s) IV Intermittent every 8 hours          Patient requires continuous monitoring with bedside rhythm monitoring, pulse ox monitoring, and intermittent blood gas analysis. Care plan discussed with ICU care team. Patient remained critical and at risk for life threatening decompensation.    By signing my name below, I, Krystin Archer, attest that this documentation has been prepared under the direction and in the presence of Ashli Holguin PA.  Electronically signed: Rohan Pritchett, 04-29-20 @ 22:46    I, Ashli Holguin, personally performed the services described in this documentation. all medical record entries made by the kevinibe were at my direction and in my presence. I have reviewed the chart and agree that the record reflects my personal performance and is accurate and complete  Electronically signed: Ashli Holguin PA. CLARITA POWELL  MRN-[id number]  Patient is a 70y old  Male who presents with a chief complaint of respiratory distress, COVID 19 (29 Apr 2020 10:43)    HPI:  70M with PMH presents with T dm, hypothyroidism, hld, p/w cough, low grade fever x 2-3 days. Today patient had worsening dyspnea at rest as well as exertion which prompted to come to the ED. Patient otherwise denied chest pain, leg swelling. He denies prior lung disease. Denies any history of smoking.     In ED, patient was noted to be hypoxic on 6L NC to 89% and significant tachypnea. VS otherwise, afebrile, HR in 80s, SBP 150s. Labs notable for CBC wnl. CMP with mild elevated transaminases AST 65 and ALT 47. PAtient noted to be positive for COVID 19. Due to worsening respiratory distress patient was emergently intubated in the ER. (27 Mar 2020 21:38)      Surgery/Hospital course:  COVID 19 Positive  Intubated 3/27  4/9 Paralysis weaned off yesterday, unproned with maintained sats, tolerating diuresis  4/12 10 sec asystole, back after epi x1  4/13 CPAP  4/14- 4/16 unable to tolerate CPAP   4/16 decadron course started  4/17 Failed CPAP. Continue with steroid taper and supportive therapy. Palliative care consult called and appreciated. Family yet to decide on end of life and prior to making any advanced decisions would like to see if any improvement with steroid taper treatment therapy.    4/18 Continues to CPAP trial of 20/5.  Peep lowered to 5 from 6.  Unable to tolerate sedation wean due to increased agitation causing hypoxia.  4/21 ketamine weaned off  4/22 COVID retested, 1U PRBC given for Hct 26 and pressor requirement; Precedex started, but dc'd for bradycardia (pt had asystolic pause on precedex previously)  4/23 Trached, Received convalescent plasma  4/24 OG tube removed  4/25 Febrile, bradycardic, hypotensive, dobutamine started but now off. Pt made DNR  4/26 High airway pressures and hypoxia, paralyzed        REVIEW OF SYSTEMS:  Unable to obtain, vented     Vital Signs Last 24 Hrs  T(C): 34.5 (29 Apr 2020 20:00), Max: 35 (29 Apr 2020 04:00)  T(F): 94.1 (29 Apr 2020 20:00), Max: 95 (29 Apr 2020 04:00)  HR: 68 (29 Apr 2020 22:36) (58 - 78)  BP: --  BP(mean): --  RR: 35 (29 Apr 2020 20:00) (35 - 40)  SpO2: 92% (29 Apr 2020 22:36) (86% - 100%)    ============================I/O===========================   I&O's Detail    28 Apr 2020 07:01  -  29 Apr 2020 07:00  --------------------------------------------------------  IN:    dextrose 5% + sodium chloride 0.45%.: 1125 mL    Enteral Tube Flush: 120 mL    Glucerna 1.5: 300 mL    IV PiggyBack: 600 mL    ketamine Infusion.: 193.6 mL    norepinephrine Infusion: 180.1 mL    Solution: 312.5 mL  Total IN: 2831.2 mL    OUT:    Indwelling Catheter - Urethral: 3015 mL  Total OUT: 3015 mL    Total NET: -183.8 mL      29 Apr 2020 07:01  -  29 Apr 2020 22:46  --------------------------------------------------------  IN:    dextrose 5% + sodium chloride 0.45%.: 1125 mL    Enteral Tube Flush: 50 mL    IV PiggyBack: 50 mL    ketamine Infusion.: 149.6 mL    norepinephrine Infusion: 104.9 mL  Total IN: 1479.5 mL    OUT:    Indwelling Catheter - Urethral: 2260 mL  Total OUT: 2260 mL    Total NET: -780.5 mL        ============================ LABS =========================                        8.2    11.79 )-----------( 272      ( 29 Apr 2020 01:04 )             28.6     04-29    143  |  97  |  17  ----------------------------<  134<H>  3.4<L>   |  41<H>  |  0.30<L>    Ca    8.8      29 Apr 2020 01:04  Phos  2.4     04-29  Mg     1.8     04-29    TPro  6.5  /  Alb  2.2<L>  /  TBili  0.3  /  DBili  x   /  AST  15  /  ALT  19  /  AlkPhos  144<H>  04-29    LIVER FUNCTIONS - ( 29 Apr 2020 01:04 )  Alb: 2.2 g/dL / Pro: 6.5 g/dL / ALK PHOS: 144 U/L / ALT: 19 U/L / AST: 15 U/L / GGT: x           PT/INR - ( 29 Apr 2020 01:04 )   PT: 16.8 sec;   INR: 1.44 ratio         PTT - ( 29 Apr 2020 01:04 )  PTT:37.4 sec  ABG - ( 29 Apr 2020 21:35 )  pH, Arterial: 7.48  pH, Blood: x     /  pCO2: 54    /  pO2: 61    / HCO3: 40    / Base Excess: 14.7  /  SaO2: 92                  ======================Microbiology/Radadiology=================  Culture: Reviewed   CXR: Reviewed  ======================================================  PAST MEDICAL & SURGICAL HISTORY:  Hyperlipidemia  Hypothyroid  Schizophrenia  No significant past surgical history  No significant past surgical history    ====================ASSESSMENT ==============  COVID 19 Positive   3/27 Intubated   Hypoxic respiratory failure s/p tracheotomy  Septic Shock  ARDS  Fluid overload    DNR    Plan:  ====================== NEUROLOGY=====================  PRN for fevers,  acetaminophen    Suspension .. 650 milliGRAM(s) Oral every 6 hours PRN Temp greater or equal to 38.5C (101.3F)    Sedate with Klonopin, Ketamine   clonazePAM  Tablet 2 milliGRAM(s) Oral every 8 hours  ketamine Infusion. 0.25 mG/kG/Hr (1.93 mL/Hr) IV Continuous <Continuous>    Haldol to help wean off sedation   haloperidol     Tablet 0.5 milliGRAM(s) Oral every 12 hours    Dilaudid and oxycodone for analgesia  HYDROmorphone  Injectable 1 milliGRAM(s) IV Push every 4 hours PRN Moderate Pain (4 - 6)  oxyCODONE    IR 10 milliGRAM(s) Oral every 6 hours    ==================== RESPIRATORY======================  S/p trach, ENT to follow for trach suture removal. Umbilical tie removed.   On full vent support, wean FiO2 if tolerated. Monitor ABGs.  Cooled this AM to assess if oxygen demands decrease   F/u calorimetric testing, may d/c artic sun in AM pending results   Monitor for shivering     Mechanical Ventilation:  Mode: AC/ CMV (Assist Control/ Continuous Mandatory Ventilation)  RR (machine): 35  TV (machine): 300  FiO2: 60  PEEP: 5  ITime: 1  MAP: 18  PIP: 45  Pplat: 25      ====================CARDIOVASCULAR====================  NSR 60s  On pressor support for hypotension, wean as tolerated for goal MAP 65-70    norepinephrine Infusion 0.04 MICROgram(s)/kG/Min (5.78 mL/Hr) IV Continuous <Continuous>    ===================HEMATOLOGIC/ONC ===================  Continue ASA,  aspirin  chewable 81 milliGRAM(s) Oral daily    Continue DVT prophylaxis   enoxaparin Injectable 40 milliGRAM(s) SubCutaneous two times a day    ===================== RENAL =========================  Continue monitoring urine output  Monitor BUN/SCr     Continue diuresis with Lasix, monitor IOs, lytes  furosemide   Injectable 20 milliGRAM(s) IV Push every 12 hours    ==================== GASTROINTESTINAL===================  Keep NPO while patient is being cooled. Restart TF once rewarmed     Bowel regimen,  dextrose 5% + sodium chloride 0.45%. 1000 milliLiter(s) (75 mL/Hr) IV Continuous <Continuous>  polyethylene glycol 3350 17 Gram(s) Oral every 12 hours  senna Syrup 10 milliLiter(s) Oral at bedtime  sodium chloride 0.9% lock flush 10 milliLiter(s) IV Push every 1 hour PRN Pre/post blood products, medications, blood draw, and to maintain line patency    Continue pepcid for GI prophylaxis   famotidine    Tablet 20 milliGRAM(s) Oral two times a day    =======================    ENDOCRINE  =====================  Glucose control, ISS/FS q6  insulin lispro (HumaLOG) corrective regimen sliding scale   SubCutaneous every 6 hours    Hypothyroidism control,  levothyroxine Injectable 75 MICROGram(s) IV Push at bedtime    ========================INFECTIOUS DISEASE================  Completed COVID medication (Plaquenil, Vit C/Thiamine, Anakirna, Solumedrol)  S/p convalescent plasma on 4/23   Monitor fever curve once rewarmed    C/w cefepime x 7 days for pseudomonas aeruginosa in sputum on 4/24   cefepime   IVPB      cefepime   IVPB 1000 milliGRAM(s) IV Intermittent every 8 hours          Patient requires continuous monitoring with bedside rhythm monitoring, pulse ox monitoring, and intermittent blood gas analysis. Care plan discussed with ICU care team. Patient remained critical and at risk for life threatening decompensation.    By signing my name below, I, Krystin Archer, attest that this documentation has been prepared under the direction and in the presence of Ashli Holguin PA.  Electronically signed: Rohan Pritchett, 04-29-20 @ 22:46    I, Ashli Holguin, personally performed the services described in this documentation. all medical record entries made by the kevinibtrae were at my direction and in my presence. I have reviewed the chart and agree that the record reflects my personal performance and is accurate and complete  Electronically signed: Ashli Holguin PA.

## 2020-04-30 NOTE — PROGRESS NOTE ADULT - SUBJECTIVE AND OBJECTIVE BOX
CLARITA POWELL  MRN-[id number]  Patient is a 70y old  Male who presents with a chief complaint of respiratory distress, COVID 19 (29 Apr 2020 22:45)    HPI:  70M with PMH presents with T dm, hypothyroidism, hld, p/w cough, low grade fever x 2-3 days. Today patient had worsening dyspnea at rest as well as exertion which prompted to come to the ED. Patient otherwise denied chest pain, leg swelling. He denies prior lung disease. Denies any history of smoking.     In ED, patient was noted to be hypoxic on 6L NC to 89% and significant tachypnea. VS otherwise, afebrile, HR in 80s, SBP 150s. Labs notable for CBC wnl. CMP with mild elevated transaminases AST 65 and ALT 47. PAtient noted to be positive for COVID 19. Due to worsening respiratory distress patient was emergently intubated in the ER. (27 Mar 2020 21:38)      Surgery/Hospital course:  COVID 19 Positive  Intubated 3/27  4/9 Paralysis weaned off yesterday, unproned with maintained sats, tolerating diuresis  4/12 10 sec asystole, back after epi x1  4/13 CPAP  4/14- 4/16 unable to tolerate CPAP   4/16 decadron course started  4/17 Failed CPAP. Continue with steroid taper and supportive therapy. Palliative care consult called and appreciated. Family yet to decide on end of life and prior to making any advanced decisions would like to see if any improvement with steroid taper treatment therapy.    4/18 Continues to CPAP trial of 20/5.  Peep lowered to 5 from 6.  Unable to tolerate sedation wean due to increased agitation causing hypoxia.  4/21 ketamine weaned off  4/22 COVID retested, 1U PRBC given for Hct 26 and pressor requirement; Precedex started, but dc'd for bradycardia (pt had asystolic pause on precedex previously)  4/23 Trached, Received convalescent plasma  4/24 OG tube removed  4/25 Febrile, bradycardic, hypotensive, dobutamine started but now off. Pt made DNR  4/26 High airway pressures and hypoxia, paralyzed      REVIEW OF SYSTEMS:  Unable to obtain, vented     Vital Signs Last 24 Hrs  T(C): 34.5 (30 Apr 2020 04:00), Max: 34.7 (30 Apr 2020 00:00)  T(F): 94.1 (30 Apr 2020 04:00), Max: 94.5 (30 Apr 2020 00:00)  HR: 62 (30 Apr 2020 04:18) (58 - 69)  BP: --  BP(mean): --  RR: 36 (30 Apr 2020 04:00) (35 - 36)  SpO2: 98% (30 Apr 2020 04:18) (92% - 100%)    ============================I/O===========================   I&O's Detail    29 Apr 2020 07:01  -  30 Apr 2020 07:00  --------------------------------------------------------  IN:    dextrose 5% + sodium chloride 0.45%.: 1800 mL    Enteral Tube Flush: 50 mL    IV PiggyBack: 100 mL    ketamine Infusion.: 227.9 mL    norepinephrine Infusion: 161 mL  Total IN: 2338.9 mL    OUT:    Indwelling Catheter - Urethral: 3205 mL  Total OUT: 3205 mL    Total NET: -866.1 mL        ============================ LABS =========================                        9.0    11.27 )-----------( 271      ( 30 Apr 2020 00:46 )             29.3     04-30    138  |  94<L>  |  14  ----------------------------<  175<H>  3.9   |  37<H>  |  <0.30<L>    Ca    8.7      30 Apr 2020 00:46  Phos  2.7     04-30  Mg     2.0     04-30    TPro  6.5  /  Alb  2.3<L>  /  TBili  0.3  /  DBili  x   /  AST  15  /  ALT  18  /  AlkPhos  146<H>  04-30    LIVER FUNCTIONS - ( 30 Apr 2020 00:46 )  Alb: 2.3 g/dL / Pro: 6.5 g/dL / ALK PHOS: 146 U/L / ALT: 18 U/L / AST: 15 U/L / GGT: x           PT/INR - ( 30 Apr 2020 00:46 )   PT: 16.4 sec;   INR: 1.42 ratio         PTT - ( 30 Apr 2020 00:46 )  PTT:36.6 sec  ABG - ( 30 Apr 2020 08:09 )  pH, Arterial: 7.46  pH, Blood: x     /  pCO2: 54    /  pO2: 120   / HCO3: 38    / Base Excess: 13.0  /  SaO2: 99                  ======================Microbiology/Radadiology=================  Culture: Reviewed   CXR: Reviewed  ======================================================  PAST MEDICAL & SURGICAL HISTORY:  Hyperlipidemia  Hypothyroid  Schizophrenia  No significant past surgical history  No significant past surgical history    ====================ASSESSMENT ==============  COVID 19 Positive   3/27 Intubated   Hypoxic respiratory failure s/p tracheotomy  Septic Shock  ARDS  Fluid overload    DNR  Plan:  ====================== NEUROLOGY=====================  Sedated with Klonopin, Ketamine   Haldol to help wean off sedation   Dilaudid and oxycodone for analgesia     acetaminophen    Suspension .. 650 milliGRAM(s) Oral every 6 hours PRN Temp greater or equal to 38.5C (101.3F)  clonazePAM  Tablet 2 milliGRAM(s) Oral every 8 hours  haloperidol     Tablet 0.5 milliGRAM(s) Oral every 12 hours  HYDROmorphone  Injectable 1 milliGRAM(s) IV Push every 4 hours PRN Moderate Pain (4 - 6)  ketamine Infusion. 0.25 mG/kG/Hr (1.93 mL/Hr) IV Continuous <Continuous>  oxyCODONE    IR 10 milliGRAM(s) Oral every 6 hours    ==================== RESPIRATORY======================  On full vent support     Mechanical Ventilation:  Mode: AC/ CMV (Assist Control/ Continuous Mandatory Ventilation)  RR (machine): 35  TV (machine): 300  FiO2: 50  PEEP: 5  ITime: 1  MAP: 17  PIP: 41      ====================CARDIOVASCULAR==================  On pressor support for hypotension     norepinephrine Infusion 0.04 MICROgram(s)/kG/Min (5.78 mL/Hr) IV Continuous <Continuous>    ===================HEMATOLOGIC/ONC ===================  aspirin  chewable 81 milliGRAM(s) Oral daily  VTE prophylaxis, enoxaparin Injectable 40 milliGRAM(s) SubCutaneous two times a day    ===================== RENAL =========================  Continue monitoring urine output  Diuresis with Lasix    furosemide   Injectable 20 milliGRAM(s) IV Push every 12 hours  ==================== GASTROINTESTINAL===================  NPO, pt cooled with Artic Sun, will restart tube feeds once rewarmed     dextrose 5% + sodium chloride 0.45%. 1000 milliLiter(s) (75 mL/Hr) IV Continuous <Continuous>  GI prophylaxis, famotidine    Tablet 20 milliGRAM(s) Oral two times a day  polyethylene glycol 3350 17 Gram(s) Oral every 12 hours  potassium chloride  10 mEq/50 mL IVPB 10 milliEquivalent(s) IV Intermittent every 1 hour  senna Syrup 10 milliLiter(s) Oral at bedtime  sodium chloride 0.9% lock flush 10 milliLiter(s) IV Push every 1 hour PRN Pre/post blood products, medications, blood draw, and to maintain line patency    =======================    ENDOCRINE  =====================  Glucose control,   insulin lispro (HumaLOG) corrective regimen sliding scale   SubCutaneous every 6 hours    levothyroxine Injectable 75 MICROGram(s) IV Push at bedtime    ========================INFECTIOUS DISEASE================  Completed COVID medication (Plaquenil, Vit C/Thiamine, Anakirna, Solumedrol)  S/p convalescent plasma on 4/23   C/w cefepime x 7 days for pseudomonas aeruginosa in sputum on 4/24     cefepime   IVPB      cefepime   IVPB 1000 milliGRAM(s) IV Intermittent every 8 hours        Patient requires continuous monitoring with bedside rhythm monitoring, pulse ox monitoring, and intermittent blood gas analysis. Care plan discussed with ICU care team. Patient remained critical and at risk for life threatening decompensation.    By signing my name below, I, Pretty Wallace, attest that this documentation has been prepared under the direction and in the presence of uLc Roberson MD   Electronically signed: Rohan Lal, 04-30-20 @ 08:47    I, Luc Roberson, personally performed the services described in this documentation. all medical record entries made by the scribe were at my direction and in my presence. I have reviewed the chart and agree that the record reflects my personal performance and is accurate and complete  Electronically signed: Luc Roberson MD CLARITA POWELL  MRN-[id number]  Patient is a 70y old  Male who presents with a chief complaint of respiratory distress, COVID 19 (29 Apr 2020 22:45)    HPI:  70M with PMH presents with T dm, hypothyroidism, hld, p/w cough, low grade fever x 2-3 days. Today patient had worsening dyspnea at rest as well as exertion which prompted to come to the ED. Patient otherwise denied chest pain, leg swelling. He denies prior lung disease. Denies any history of smoking.     In ED, patient was noted to be hypoxic on 6L NC to 89% and significant tachypnea. VS otherwise, afebrile, HR in 80s, SBP 150s. Labs notable for CBC wnl. CMP with mild elevated transaminases AST 65 and ALT 47. PAtient noted to be positive for COVID 19. Due to worsening respiratory distress patient was emergently intubated in the ER. (27 Mar 2020 21:38)      Surgery/Hospital course:  COVID 19 Positive  Intubated 3/27  4/9 Paralysis weaned off yesterday, unproned with maintained sats, tolerating diuresis  4/12 10 sec asystole, back after epi x1  4/13 CPAP  4/14- 4/16 unable to tolerate CPAP   4/16 decadron course started  4/17 Failed CPAP. Continue with steroid taper and supportive therapy. Palliative care consult called and appreciated. Family yet to decide on end of life and prior to making any advanced decisions would like to see if any improvement with steroid taper treatment therapy.    4/18 Continues to CPAP trial of 20/5.  Peep lowered to 5 from 6.  Unable to tolerate sedation wean due to increased agitation causing hypoxia.  4/21 ketamine weaned off  4/22 COVID retested, 1U PRBC given for Hct 26 and pressor requirement; Precedex started, but dc'd for bradycardia (pt had asystolic pause on precedex previously)  4/23 Trached, Received convalescent plasma  4/24 OG tube removed  4/25 Febrile, bradycardic, hypotensive, dobutamine started but now off. Pt made DNR  4/26 High airway pressures and hypoxia, paralyzed      REVIEW OF SYSTEMS:  Unable to obtain, vented     Vital Signs Last 24 Hrs  T(C): 34.5 (30 Apr 2020 04:00), Max: 34.7 (30 Apr 2020 00:00)  T(F): 94.1 (30 Apr 2020 04:00), Max: 94.5 (30 Apr 2020 00:00)  HR: 62 (30 Apr 2020 04:18) (58 - 69)  BP: --  BP(mean): --  RR: 36 (30 Apr 2020 04:00) (35 - 36)  SpO2: 98% (30 Apr 2020 04:18) (92% - 100%)    ============================I/O===========================   I&O's Detail    29 Apr 2020 07:01  -  30 Apr 2020 07:00  --------------------------------------------------------  IN:    dextrose 5% + sodium chloride 0.45%.: 1800 mL    Enteral Tube Flush: 50 mL    IV PiggyBack: 100 mL    ketamine Infusion.: 227.9 mL    norepinephrine Infusion: 161 mL  Total IN: 2338.9 mL    OUT:    Indwelling Catheter - Urethral: 3205 mL  Total OUT: 3205 mL    Total NET: -866.1 mL        ============================ LABS =========================                        9.0    11.27 )-----------( 271      ( 30 Apr 2020 00:46 )             29.3     04-30    138  |  94<L>  |  14  ----------------------------<  175<H>  3.9   |  37<H>  |  <0.30<L>    Ca    8.7      30 Apr 2020 00:46  Phos  2.7     04-30  Mg     2.0     04-30    TPro  6.5  /  Alb  2.3<L>  /  TBili  0.3  /  DBili  x   /  AST  15  /  ALT  18  /  AlkPhos  146<H>  04-30    LIVER FUNCTIONS - ( 30 Apr 2020 00:46 )  Alb: 2.3 g/dL / Pro: 6.5 g/dL / ALK PHOS: 146 U/L / ALT: 18 U/L / AST: 15 U/L / GGT: x           PT/INR - ( 30 Apr 2020 00:46 )   PT: 16.4 sec;   INR: 1.42 ratio         PTT - ( 30 Apr 2020 00:46 )  PTT:36.6 sec  ABG - ( 30 Apr 2020 08:09 )  pH, Arterial: 7.46  pH, Blood: x     /  pCO2: 54    /  pO2: 120   / HCO3: 38    / Base Excess: 13.0  /  SaO2: 99                  ======================Microbiology/Radadiology=================  Culture: Reviewed   CXR: Reviewed  ======================================================  PAST MEDICAL & SURGICAL HISTORY:  Hyperlipidemia  Hypothyroid  Schizophrenia  No significant past surgical history  No significant past surgical history    ====================ASSESSMENT ==============  COVID 19 Positive   3/27 Intubated   Hypoxic respiratory failure s/p tracheotomy  Septic Shock  ARDS  Fluid overload    DNR  Plan:  ====================== NEUROLOGY=====================  Sedated with Klonopin, Ketamine   Haldol to help wean off sedation   Dilaudid and oxycodone for analgesia     acetaminophen    Suspension .. 650 milliGRAM(s) Oral every 6 hours PRN Temp greater or equal to 38.5C (101.3F)  clonazePAM  Tablet 2 milliGRAM(s) Oral every 8 hours  haloperidol     Tablet 0.5 milliGRAM(s) Oral every 12 hours  HYDROmorphone  Injectable 1 milliGRAM(s) IV Push every 4 hours PRN Moderate Pain (4 - 6)  ketamine Infusion. 0.25 mG/kG/Hr (1.93 mL/Hr) IV Continuous <Continuous>  oxyCODONE    IR 10 milliGRAM(s) Oral every 6 hours    ==================== RESPIRATORY======================  On full vent support     Mechanical Ventilation:  Mode: AC/ CMV (Assist Control/ Continuous Mandatory Ventilation)  RR (machine): 35  TV (machine): 300  FiO2: 50  PEEP: 5  ITime: 1  MAP: 17  PIP: 41      ====================CARDIOVASCULAR==================  On pressor support for hypotension     norepinephrine Infusion 0.04 MICROgram(s)/kG/Min (5.78 mL/Hr) IV Continuous <Continuous>    ===================HEMATOLOGIC/ONC ===================  aspirin  chewable 81 milliGRAM(s) Oral daily  VTE prophylaxis, enoxaparin Injectable 40 milliGRAM(s) SubCutaneous two times a day    ===================== RENAL =========================  Continue monitoring urine output  Diuresis with Lasix    furosemide   Injectable 20 milliGRAM(s) IV Push every 12 hours  ==================== GASTROINTESTINAL===================  NPO, pt cooled with Artic Sun, will restart tube feeds once rewarmed     dextrose 5% + sodium chloride 0.45%. 1000 milliLiter(s) (75 mL/Hr) IV Continuous <Continuous>  GI prophylaxis, famotidine    Tablet 20 milliGRAM(s) Oral two times a day  polyethylene glycol 3350 17 Gram(s) Oral every 12 hours  potassium chloride  10 mEq/50 mL IVPB 10 milliEquivalent(s) IV Intermittent every 1 hour  senna Syrup 10 milliLiter(s) Oral at bedtime  sodium chloride 0.9% lock flush 10 milliLiter(s) IV Push every 1 hour PRN Pre/post blood products, medications, blood draw, and to maintain line patency    =======================    ENDOCRINE  =====================  Glucose control,   insulin lispro (HumaLOG) corrective regimen sliding scale   SubCutaneous every 6 hours    levothyroxine Injectable 75 MICROGram(s) IV Push at bedtime    ========================INFECTIOUS DISEASE================  Completed COVID medication (Plaquenil, Vit C/Thiamine, Anakirna, Solumedrol)  S/p convalescent plasma on 4/23   C/w cefepime x 7 days for pseudomonas aeruginosa in sputum on 4/24     cefepime   IVPB      cefepime   IVPB 1000 milliGRAM(s) IV Intermittent every 8 hours    UPDATES:  Re-warming after cooling to decrease metabolic demand and O2 consumption/CO2 production  Hold sedation to assess mental status  Decrease FiO2  Metabolic alkalosis with elevated BUN/Cr ration, hold diuresis, target even  DNR    Patient requires continuous monitoring with bedside rhythm monitoring, pulse ox monitoring, and intermittent blood gas analysis. Care plan discussed with ICU care team. Patient remained critical and at risk for life threatening decompensation.    Time: 45 minutes    By signing my name below, IPretty, attest that this documentation has been prepared under the direction and in the presence of Luc Roberson MD   Electronically signed: Jonathan Lal, 04-30-20 @ 08:47    I, Luc Roberson, personally performed the services described in this documentation. all medical record entries made by the jonathan were at my direction and in my presence. I have reviewed the chart and agree that the record reflects my personal performance and is accurate and complete  Electronically signed: Luc Roberson MD

## 2020-04-30 NOTE — PROGRESS NOTE ADULT - SUBJECTIVE AND OBJECTIVE BOX
CLARITA POWELL  MRN-[id number]  Patient is a 70y old  Male who presents with a chief complaint of respiratory distress, COVID 19 (30 Apr 2020 08:47)    HPI:  70M with PMH presents with T dm, hypothyroidism, hld, p/w cough, low grade fever x 2-3 days. Today patient had worsening dyspnea at rest as well as exertion which prompted to come to the ED. Patient otherwise denied chest pain, leg swelling. He denies prior lung disease. Denies any history of smoking.     In ED, patient was noted to be hypoxic on 6L NC to 89% and significant tachypnea. VS otherwise, afebrile, HR in 80s, SBP 150s. Labs notable for CBC wnl. CMP with mild elevated transaminases AST 65 and ALT 47. PAtient noted to be positive for COVID 19. Due to worsening respiratory distress patient was emergently intubated in the ER. (27 Mar 2020 21:38)      Surgery/Hospital course:  COVID 19 Positive  Intubated 3/27  4/9 Paralysis weaned off yesterday, unproned with maintained sats, tolerating diuresis  4/12 10 sec asystole, back after epi x1  4/13 CPAP  4/14- 4/16 unable to tolerate CPAP   4/16 decadron course started  4/17 Failed CPAP. Continue with steroid taper and supportive therapy. Palliative care consult called and appreciated. Family yet to decide on end of life and prior to making any advanced decisions would like to see if any improvement with steroid taper treatment therapy.    4/18 Continues to CPAP trial of 20/5.  Peep lowered to 5 from 6.  Unable to tolerate sedation wean due to increased agitation causing hypoxia.  4/21 ketamine weaned off  4/22 COVID retested, 1U PRBC given for Hct 26 and pressor requirement; Precedex started, but dc'd for bradycardia (pt had asystolic pause on precedex previously)  4/23 Trached, Received convalescent plasma  4/24 OG tube removed  4/25 Febrile, bradycardic, hypotensive, dobutamine started but now off. Pt made DNR  4/26 High airway pressures and hypoxia, paralyzed      REVIEW OF SYSTEMS:  Unable to obtain as patient is on ventilator     Vital Signs Last 24 Hrs  T(C): 35.7 (30 Apr 2020 18:00), Max: 35.7 (30 Apr 2020 18:00)  T(F): 96.3 (30 Apr 2020 18:00), Max: 96.3 (30 Apr 2020 18:00)  HR: 73 (30 Apr 2020 18:00) (58 - 73)  BP: --  BP(mean): --  RR: 37 (30 Apr 2020 18:00) (35 - 37)  SpO2: 89% (30 Apr 2020 18:00) (89% - 99%)    ============================I/O===========================   I&O's Detail    29 Apr 2020 07:01  -  30 Apr 2020 07:00  --------------------------------------------------------  IN:    dextrose 5% + sodium chloride 0.45%.: 1800 mL    Enteral Tube Flush: 50 mL    IV PiggyBack: 100 mL    ketamine Infusion.: 227.9 mL    norepinephrine Infusion: 161 mL  Total IN: 2338.9 mL    OUT:    Indwelling Catheter - Urethral: 3205 mL  Total OUT: 3205 mL    Total NET: -866.1 mL      30 Apr 2020 07:01  -  30 Apr 2020 19:06  --------------------------------------------------------  IN:    dextrose 5% + sodium chloride 0.45%.: 900 mL    Enteral Tube Flush: 100 mL    IV PiggyBack: 150 mL    ketamine Infusion.: 92.4 mL    norepinephrine Infusion: 76.6 mL  Total IN: 1319 mL    OUT:    Indwelling Catheter - Urethral: 800 mL  Total OUT: 800 mL    Total NET: 519 mL        ============================ LABS =========================                        9.0    11.27 )-----------( 271      ( 30 Apr 2020 00:46 )             29.3     04-30    138  |  94<L>  |  14  ----------------------------<  175<H>  3.9   |  37<H>  |  <0.30<L>    Ca    8.7      30 Apr 2020 00:46  Phos  2.7     04-30  Mg     2.0     04-30    TPro  6.5  /  Alb  2.3<L>  /  TBili  0.3  /  DBili  x   /  AST  15  /  ALT  18  /  AlkPhos  146<H>  04-30    LIVER FUNCTIONS - ( 30 Apr 2020 00:46 )  Alb: 2.3 g/dL / Pro: 6.5 g/dL / ALK PHOS: 146 U/L / ALT: 18 U/L / AST: 15 U/L / GGT: x           PT/INR - ( 30 Apr 2020 00:46 )   PT: 16.4 sec;   INR: 1.42 ratio         PTT - ( 30 Apr 2020 00:46 )  PTT:36.6 sec  ABG - ( 30 Apr 2020 08:09 )  pH, Arterial: 7.46  pH, Blood: x     /  pCO2: 54    /  pO2: 120   / HCO3: 38    / Base Excess: 13.0  /  SaO2: 99                  ======================Microbiology/Radadiology=================  Culture: Reviewed   CXR: Reviewed  ======================================================  PAST MEDICAL & SURGICAL HISTORY:  Hyperlipidemia  Hypothyroid  Schizophrenia  No significant past surgical history  No significant past surgical history    ====================ASSESSMENT ==============  COVID 19 Positive   3/27 Intubated   Hypoxic respiratory failure s/p tracheotomy  Septic Shock  ARDS  Fluid overload    DNR         Plan:  ====================== NEUROLOGY=====================  Sedated with Klonopin, Ketamine   Continue Haldol to help wean off sedation   Dilaudid and oxycodone for analgesia     acetaminophen    Suspension .. 650 milliGRAM(s) Oral every 6 hours PRN Temp greater or equal to 38.5C (101.3F)  clonazePAM  Tablet 2 milliGRAM(s) Oral every 8 hours  haloperidol     Tablet 0.5 milliGRAM(s) Oral every 12 hours  HYDROmorphone  Injectable 1 milliGRAM(s) IV Push every 4 hours PRN Moderate Pain (4 - 6)  ketamine Infusion. 0.25 mG/kG/Hr (1.93 mL/Hr) IV Continuous <Continuous>  oxyCODONE    IR 10 milliGRAM(s) Oral every 6 hours    ==================== RESPIRATORY======================  On full vent support     Mechanical Ventilation:  Mode: AC/ CMV (Assist Control/ Continuous Mandatory Ventilation)  RR (machine): 35  TV (machine): 300  FiO2: 50  PEEP: 5  ITime: 1  MAP: 18  PIP: 40      ====================CARDIOVASCULAR==================  On pressor support for hypotension     norepinephrine Infusion 0.04 MICROgram(s)/kG/Min (5.78 mL/Hr) IV Continuous <Continuous>    ===================HEMATOLOGIC/ONC ===================  aspirin  chewable 81 milliGRAM(s) Oral daily  VTE prophylaxis, enoxaparin Injectable 40 milliGRAM(s) SubCutaneous two times a day    ===================== RENAL =========================  Continue monitoring urine output  Diuresis with Lasix    ==================== GASTROINTESTINAL===================  NPO, pt cooled with Artic Sun, will restart tube feeds once rewarmed     dextrose 5% + sodium chloride 0.45%. 1000 milliLiter(s) (75 mL/Hr) IV Continuous <Continuous>  GI prophylaxis, famotidine    Tablet 20 milliGRAM(s) Oral two times a day  polyethylene glycol 3350 17 Gram(s) Oral every 12 hours  senna Syrup 10 milliLiter(s) Oral at bedtime  sodium chloride 0.9% lock flush 10 milliLiter(s) IV Push every 1 hour PRN Pre/post blood products, medications, blood draw, and to maintain line patency    =======================    ENDOCRINE  =====================  Glucose control,   insulin lispro (HumaLOG) corrective regimen sliding scale   SubCutaneous every 6 hours    levothyroxine Injectable 75 MICROGram(s) IV Push at bedtime    ========================INFECTIOUS DISEASE================  Completed COVID medication (Plaquenil, Vit C/Thiamine, Anakirna, Solumedrol)  S/p convalescent plasma on 4/23   C/w cefepime x 7 days for pseudomonas aeruginosa in sputum on 4/24     cefepime   IVPB      cefepime   IVPB 1000 milliGRAM(s) IV Intermittent every 8 hours    UPDATES:      Patient requires continuous monitoring with bedside rhythm monitoring, pulse ox monitoring, and intermittent blood gas analysis. Care plan discussed with ICU care team. Patient remained critical and at risk for life threatening decompensation.    Time:     By signing my name below, I, Joan Richards, attest that this documentation has been prepared under the direction and in the presence of Patito Abbasi MD.   Electronically signed: Rohan Seals, 04-30-20 @ 19:06    IPatito , personally performed the services described in this documentation. all medical record entries made by the kevinibe were at my direction and in my presence. I have reviewed the chart and agree that the record reflects my personal performance and is accurate and complete  Electronically signed: Patito Abbasi MD. CLARITA POWELL  MRN-[id number]  Patient is a 70y old  Male who presents with a chief complaint of respiratory distress, COVID 19 (30 Apr 2020 08:47)    HPI:  70M with PMH presents with T dm, hypothyroidism, hld, p/w cough, low grade fever x 2-3 days. Today patient had worsening dyspnea at rest as well as exertion which prompted to come to the ED. Patient otherwise denied chest pain, leg swelling. He denies prior lung disease. Denies any history of smoking.     In ED, patient was noted to be hypoxic on 6L NC to 89% and significant tachypnea. VS otherwise, afebrile, HR in 80s, SBP 150s. Labs notable for CBC wnl. CMP with mild elevated transaminases AST 65 and ALT 47. Patient noted to be positive for COVID 19. Due to worsening respiratory distress patient was emergently intubated in the ER. (27 Mar 2020 21:38)      Surgery/Hospital course:  COVID 19 Positive  Intubated 3/27  4/9 Paralysis weaned off yesterday, unproned with maintained sats, tolerating diuresis  4/12 10 sec asystole, back after epi x1  4/13 CPAP  4/14- 4/16 unable to tolerate CPAP   4/16 decadron course started  4/17 Failed CPAP. Continue with steroid taper and supportive therapy. Palliative care consult called and appreciated. Family yet to decide on end of life and prior to making any advanced decisions would like to see if any improvement with steroid taper treatment therapy.    4/18 Continues to CPAP trial of 20/5.  Peep lowered to 5 from 6.  Unable to tolerate sedation wean due to increased agitation causing hypoxia.  4/21 ketamine weaned off  4/22 COVID retested, 1U PRBC given for Hct 26 and pressor requirement; Precedex started, but dc'd for bradycardia (pt had asystolic pause on precedex previously)  4/23 Trached, Received convalescent plasma  4/24 OG tube removed  4/25 Febrile, bradycardic, hypotensive, dobutamine started but now off. Pt made DNR  4/26 High airway pressures and hypoxia, paralyzed      REVIEW OF SYSTEMS:  Unable to obtain as patient is on ventilator     Vital Signs Last 24 Hrs  T(C): 35.7 (30 Apr 2020 18:00), Max: 35.7 (30 Apr 2020 18:00)  T(F): 96.3 (30 Apr 2020 18:00), Max: 96.3 (30 Apr 2020 18:00)  HR: 73 (30 Apr 2020 18:00) (58 - 73)  BP: --  BP(mean): --  RR: 37 (30 Apr 2020 18:00) (35 - 37)  SpO2: 89% (30 Apr 2020 18:00) (89% - 99%)    ============================I/O===========================   I&O's Detail    29 Apr 2020 07:01  -  30 Apr 2020 07:00  --------------------------------------------------------  IN:    dextrose 5% + sodium chloride 0.45%.: 1800 mL    Enteral Tube Flush: 50 mL    IV PiggyBack: 100 mL    ketamine Infusion.: 227.9 mL    norepinephrine Infusion: 161 mL  Total IN: 2338.9 mL    OUT:    Indwelling Catheter - Urethral: 3205 mL  Total OUT: 3205 mL    Total NET: -866.1 mL      30 Apr 2020 07:01  -  30 Apr 2020 19:06  --------------------------------------------------------  IN:    dextrose 5% + sodium chloride 0.45%.: 900 mL    Enteral Tube Flush: 100 mL    IV PiggyBack: 150 mL    ketamine Infusion.: 92.4 mL    norepinephrine Infusion: 76.6 mL  Total IN: 1319 mL    OUT:    Indwelling Catheter - Urethral: 800 mL  Total OUT: 800 mL    Total NET: 519 mL        ============================ LABS =========================                        9.0    11.27 )-----------( 271      ( 30 Apr 2020 00:46 )             29.3     04-30    138  |  94<L>  |  14  ----------------------------<  175<H>  3.9   |  37<H>  |  <0.30<L>    Ca    8.7      30 Apr 2020 00:46  Phos  2.7     04-30  Mg     2.0     04-30    TPro  6.5  /  Alb  2.3<L>  /  TBili  0.3  /  DBili  x   /  AST  15  /  ALT  18  /  AlkPhos  146<H>  04-30    LIVER FUNCTIONS - ( 30 Apr 2020 00:46 )  Alb: 2.3 g/dL / Pro: 6.5 g/dL / ALK PHOS: 146 U/L / ALT: 18 U/L / AST: 15 U/L / GGT: x           PT/INR - ( 30 Apr 2020 00:46 )   PT: 16.4 sec;   INR: 1.42 ratio         PTT - ( 30 Apr 2020 00:46 )  PTT:36.6 sec  ABG - ( 30 Apr 2020 08:09 )  pH, Arterial: 7.46  pH, Blood: x     /  pCO2: 54    /  pO2: 120   / HCO3: 38    / Base Excess: 13.0  /  SaO2: 99      ABG ( 21:45) 7.44/56/65 92%            ======================Microbiology/Radadiology=================  Culture: Reviewed   CXR: Reviewed  ======================================================  PAST MEDICAL & SURGICAL HISTORY:  Hyperlipidemia  Hypothyroid  Type 2 diabetes  No significant past surgical history  No significant past surgical history    ====================ASSESSMENT ==============  COVID 19 Positive   3/27 Intubated   Hypoxic respiratory failure s/p tracheotomy  Septic Shock  ARDS  Fluid overload    DNR         Plan:  ====================== NEUROLOGY=====================  Sedated with Klonopin, Ketamine   Continue Haldol to help wean off sedation   Dilaudid and oxycodone for analgesia     acetaminophen    Suspension .. 650 milliGRAM(s) Oral every 6 hours PRN Temp greater or equal to 38.5C (101.3F)  clonazePAM  Tablet 2 milliGRAM(s) Oral every 8 hours  haloperidol     Tablet 0.5 milliGRAM(s) Oral every 12 hours  HYDROmorphone  Injectable 1 milliGRAM(s) IV Push every 4 hours PRN Moderate Pain (4 - 6)  ketamine Infusion. 0.25 mG/kG/Hr (1.93 mL/Hr) IV Continuous <Continuous>  oxyCODONE    IR 10 milliGRAM(s) Oral every 6 hours    ==================== RESPIRATORY======================  On full vent support     Mechanical Ventilation:  Mode: AC/ CMV (Assist Control/ Continuous Mandatory Ventilation)  RR (machine): 35  TV (machine): 300  FiO2: 50  PEEP: 5  ITime: 1  MAP: 18  PIP: 40      ====================CARDIOVASCULAR==================  On vasopressor support for hypotension     norepinephrine Infusion 0.08 MICROgram(s)/kG/Min (5.78 mL/Hr) IV Continuous <Continuous>    ===================HEMATOLOGIC/ONC ===================  aspirin  chewable 81 milliGRAM(s) Oral daily  VTE prophylaxis, enoxaparin Injectable 40 milliGRAM(s) SubCutaneous two times a day    ===================== RENAL =========================  Continue monitoring urine output  Diuretics discontinued due to hypotension    ==================== GASTROINTESTINAL===================  NPO, pt cooled with Artic Sun, will restart tube feeds once rewarmed     dextrose 5% + sodium chloride 0.45%. 1000 milliLiter(s) (75 mL/Hr) IV Continuous <Continuous>  GI prophylaxis, famotidine    Tablet 20 milliGRAM(s) Oral two times a day  polyethylene glycol 3350 17 Gram(s) Oral every 12 hours  senna Syrup 10 milliLiter(s) Oral at bedtime  sodium chloride 0.9% lock flush 10 milliLiter(s) IV Push every 1 hour PRN Pre/post blood products, medications, blood draw, and to maintain line patency    =======================    ENDOCRINE  =====================  Glucose control,   insulin lispro (HumaLOG) corrective regimen sliding scale   SubCutaneous every 6 hours    levothyroxine Injectable 75 MICROGram(s) IV Push at bedtime    ========================INFECTIOUS DISEASE================  Completed COVID medication (Plaquenil, Vit C/Thiamine, Anakirna, Solumedrol)  S/p convalescent plasma on 4/23   C/w cefepime x 7 days for pseudomonas aeruginosa in sputum on 4/24     cefepime   IVPB      cefepime   IVPB 1000 milliGRAM(s) IV Intermittent every 8 hours    UPDATES:      Patient requires continuous monitoring with bedside rhythm monitoring, pulse ox monitoring, and intermittent blood gas analysis. Care plan discussed with ICU care team. Patient remained critical and at risk for life threatening decompensation.    Time:     By signing my name below, I, Joan Richards, attest that this documentation has been prepared under the direction and in the presence of Patito Abbasi MD.   Electronically signed: Rohan Seals, 04-30-20 @ 19:06    I, Patito Abbasi , personally performed the services described in this documentation. all medical record entries made by the kevinibe were at my direction and in my presence. I have reviewed the chart and agree that the record reflects my personal performance and is accurate and complete  Electronically signed: Patito Abbasi MD. CLARITA POWELL  MRN-[id number]  Patient is a 70y old  Male who presents with a chief complaint of respiratory distress, COVID 19 (30 Apr 2020 08:47)    HPI:  70M with PMH presents with T dm, hypothyroidism, hld, p/w cough, low grade fever x 2-3 days. Today patient had worsening dyspnea at rest as well as exertion which prompted to come to the ED. Patient otherwise denied chest pain, leg swelling. He denies prior lung disease. Denies any history of smoking.     In ED, patient was noted to be hypoxic on 6L NC to 89% and significant tachypnea. VS otherwise, afebrile, HR in 80s, SBP 150s. Labs notable for CBC wnl. CMP with mild elevated transaminases AST 65 and ALT 47. Patient noted to be positive for COVID 19. Due to worsening respiratory distress patient was emergently intubated in the ER. (27 Mar 2020 21:38)      Surgery/Hospital course:  COVID 19 Positive  Intubated 3/27  4/9 Paralysis weaned off yesterday, unproned with maintained sats, tolerating diuresis  4/12 10 sec asystole, back after epi x1  4/13 CPAP  4/14- 4/16 unable to tolerate CPAP   4/16 decadron course started  4/17 Failed CPAP. Continue with steroid taper and supportive therapy. Palliative care consult called and appreciated. Family yet to decide on end of life and prior to making any advanced decisions would like to see if any improvement with steroid taper treatment therapy.    4/18 Continues to CPAP trial of 20/5.  Peep lowered to 5 from 6.  Unable to tolerate sedation wean due to increased agitation causing hypoxia.  4/21 ketamine weaned off  4/22 COVID retested, 1U PRBC given for Hct 26 and pressor requirement; Precedex started, but dc'd for bradycardia (pt had asystolic pause on precedex previously)  4/23 Trached, Received convalescent plasma  4/24 OG tube removed  4/25 Febrile, bradycardic, hypotensive, dobutamine started but now off. Pt made DNR  4/26 High airway pressures and hypoxia, paralyzed      REVIEW OF SYSTEMS:  Unable to obtain as patient is on ventilator     Vital Signs Last 24 Hrs  T(C): 35.7 (30 Apr 2020 18:00), Max: 35.7 (30 Apr 2020 18:00)  T(F): 96.3 (30 Apr 2020 18:00), Max: 96.3 (30 Apr 2020 18:00)  HR: 73 (30 Apr 2020 18:00) (58 - 73)  BP: --  BP(mean): --  RR: 37 (30 Apr 2020 18:00) (35 - 37)  SpO2: 89% (30 Apr 2020 18:00) (89% - 99%)    ============================I/O===========================   I&O's Detail    29 Apr 2020 07:01  -  30 Apr 2020 07:00  --------------------------------------------------------  IN:    dextrose 5% + sodium chloride 0.45%.: 1800 mL    Enteral Tube Flush: 50 mL    IV PiggyBack: 100 mL    ketamine Infusion.: 227.9 mL    norepinephrine Infusion: 161 mL  Total IN: 2338.9 mL    OUT:    Indwelling Catheter - Urethral: 3205 mL  Total OUT: 3205 mL    Total NET: -866.1 mL      30 Apr 2020 07:01  -  30 Apr 2020 19:06  --------------------------------------------------------  IN:    dextrose 5% + sodium chloride 0.45%.: 900 mL    Enteral Tube Flush: 100 mL    IV PiggyBack: 150 mL    ketamine Infusion.: 92.4 mL    norepinephrine Infusion: 76.6 mL  Total IN: 1319 mL    OUT:    Indwelling Catheter - Urethral: 800 mL  Total OUT: 800 mL    Total NET: 519 mL        ============================ LABS =========================                        9.0    11.27 )-----------( 271      ( 30 Apr 2020 00:46 )             29.3     04-30    138  |  94<L>  |  14  ----------------------------<  175<H>  3.9   |  37<H>  |  <0.30<L>    Ca    8.7      30 Apr 2020 00:46  Phos  2.7     04-30  Mg     2.0     04-30    TPro  6.5  /  Alb  2.3<L>  /  TBili  0.3  /  DBili  x   /  AST  15  /  ALT  18  /  AlkPhos  146<H>  04-30    LIVER FUNCTIONS - ( 30 Apr 2020 00:46 )  Alb: 2.3 g/dL / Pro: 6.5 g/dL / ALK PHOS: 146 U/L / ALT: 18 U/L / AST: 15 U/L / GGT: x           PT/INR - ( 30 Apr 2020 00:46 )   PT: 16.4 sec;   INR: 1.42 ratio         PTT - ( 30 Apr 2020 00:46 )  PTT:36.6 sec  ABG - ( 30 Apr 2020 08:09 )  pH, Arterial: 7.46  pH, Blood: x     /  pCO2: 54    /  pO2: 120   / HCO3: 38    / Base Excess: 13.0  /  SaO2: 99      ABG ( 21:45) 7.44/56/65 92%            ======================Microbiology/Radadiology=================  Culture: Reviewed   CXR: Reviewed  ======================================================  PAST MEDICAL & SURGICAL HISTORY:  Hyperlipidemia  Hypothyroid  Type 2 diabetes  No significant past surgical history  No significant past surgical history    ====================ASSESSMENT ==============  COVID 19 Positive   3/27 Intubated   Hypoxic respiratory failure s/p tracheotomy  Septic Shock  ARDS  Fluid overload    DNR         Plan:  ====================== NEUROLOGY=====================  Sedated with Klonopin, Ketamine   Continue Haldol to help wean off sedation   Dilaudid and oxycodone for analgesia     acetaminophen    Suspension .. 650 milliGRAM(s) Oral every 6 hours PRN Temp greater or equal to 38.5C (101.3F)  clonazePAM  Tablet 2 milliGRAM(s) Oral every 8 hours  haloperidol     Tablet 0.5 milliGRAM(s) Oral every 12 hours  HYDROmorphone  Injectable 1 milliGRAM(s) IV Push every 4 hours PRN Moderate Pain (4 - 6)  ketamine Infusion. 0.25 mG/kG/Hr (1.93 mL/Hr) IV Continuous <Continuous>  oxyCODONE    IR 10 milliGRAM(s) Oral every 6 hours    ==================== RESPIRATORY======================  On full vent support, Fi02 increased this evening due to desaturation  Hypothermia protocol initiated 4/28 due to refractory hypercarbia with attempt to lower metabolic rate, now rewarming    Mechanical Ventilation:  Mode: AC/ CMV (Assist Control/ Continuous Mandatory Ventilation)  RR (machine): 35  TV (machine): 300  FiO2: 65%  PEEP: 5  ITime: 1  MAP: 18  PIP: 40      ====================CARDIOVASCULAR==================  On vasopressor support for hypotension     norepinephrine Infusion 0.08 MICROgram(s)/kG/Min (5.78 mL/Hr) IV Continuous <Continuous>    ===================HEMATOLOGIC/ONC ===================  aspirin  chewable 81 milliGRAM(s) Oral daily  VTE prophylaxis, enoxaparin Injectable 40 milliGRAM(s) SubCutaneous two times a day    ===================== RENAL =========================  Continue monitoring urine output  Diuretics discontinued due to hypotension    ==================== GASTROINTESTINAL===================  NPO, pt cooled with Artic Sun, will restart tube feeds once rewarmed     dextrose 5% + sodium chloride 0.45%. 1000 milliLiter(s) (75 mL/Hr) IV Continuous <Continuous>  GI prophylaxis, famotidine    Tablet 20 milliGRAM(s) Oral two times a day  polyethylene glycol 3350 17 Gram(s) Oral every 12 hours  senna Syrup 10 milliLiter(s) Oral at bedtime  sodium chloride 0.9% lock flush 10 milliLiter(s) IV Push every 1 hour PRN Pre/post blood products, medications, blood draw, and to maintain line patency    =======================    ENDOCRINE  =====================  Glucose control,   insulin lispro (HumaLOG) corrective regimen sliding scale   SubCutaneous every 6 hours    levothyroxine Injectable 75 MICROGram(s) IV Push at bedtime    ========================INFECTIOUS DISEASE================  Completed COVID medication (Plaquenil, Vit C/Thiamine, Anakirna, Solumedrol)  S/p convalescent plasma on 4/23   C/w cefepime x 7 days for pseudomonas aeruginosa in sputum on 4/24     cefepime   IVPB      cefepime   IVPB 1000 milliGRAM(s) IV Intermittent every 8 hours    UPDATES:      Patient requires continuous monitoring with bedside rhythm monitoring, arterial blood pressure, central venous pressure, pulse ox monitoring, and intermittent blood gas analysis. Care plan discussed with ICU care team. Patient remained critical and at risk for life threatening decompensation.    Time: 35 min    By signing my name below, I, Joan Richards, attest that this documentation has been prepared under the direction and in the presence of Patito Abbasi MD.   Electronically signed: Rohan Seals, 04-30-20 @ 19:06    I, Patito Abbasi , personally performed the services described in this documentation. all medical record entries made by the scribe were at my direction and in my presence. I have reviewed the chart and agree that the record reflects my personal performance and is accurate and complete  Electronically signed: Patito Abbasi MD.

## 2020-05-01 NOTE — PROGRESS NOTE ADULT - SUBJECTIVE AND OBJECTIVE BOX
CLARITA POWELL  MRN-[id number]  Patient is a 70y old  Male who presents with a chief complaint of respiratory distress, COVID 19 (01 May 2020 12:26)    HPI:  70M with PMH presents with T dm, hypothyroidism, hld, p/w cough, low grade fever x 2-3 days. Today patient had worsening dyspnea at rest as well as exertion which prompted to come to the ED. Patient otherwise denied chest pain, leg swelling. He denies prior lung disease. Denies any history of smoking.     In ED, patient was noted to be hypoxic on 6L NC to 89% and significant tachypnea. VS otherwise, afebrile, HR in 80s, SBP 150s. Labs notable for CBC wnl. CMP with mild elevated transaminases AST 65 and ALT 47. PAtient noted to be positive for COVID 19. Due to worsening respiratory distress patient was emergently intubated in the ER. (27 Mar 2020 21:38)      Surgery/Hospital course:  COVID 19 Positive  Intubated 3/27  4/9 Paralysis weaned off yesterday, unproned with maintained sats, tolerating diuresis  4/12 10 sec asystole, back after epi x1  4/13 CPAP  4/14- 4/16 unable to tolerate CPAP   4/16 decadron course started  4/17 Failed CPAP. Continue with steroid taper and supportive therapy. Palliative care consult called and appreciated. Family yet to decide on end of life and prior to making any advanced decisions would like to see if any improvement with steroid taper treatment therapy.    4/18 Continues to CPAP trial of 20/5.  Peep lowered to 5 from 6.  Unable to tolerate sedation wean due to increased agitation causing hypoxia.  4/21 ketamine weaned off  4/22 COVID retested, 1U PRBC given for Hct 26 and pressor requirement; Precedex started, but dc'd for bradycardia (pt had asystolic pause on precedex previously)  4/23 Trached, Received convalescent plasma  4/24 OG tube removed  4/25 Febrile, bradycardic, hypotensive, dobutamine started but now off. Pt made DNR  4/26 High airway pressures and hypoxia, paralyzed      REVIEW OF SYSTEMS:  Unable to obtain as patient is on ventilator     Vital Signs Last 24 Hrs  T(C): 36.7 (01 May 2020 19:00), Max: 37.1 (01 May 2020 04:00)  T(F): 98.1 (01 May 2020 19:00), Max: 98.8 (01 May 2020 04:00)  HR: 79 (01 May 2020 19:00) (73 - 90)  BP: --  BP(mean): --  RR: 35 (01 May 2020 19:00) (35 - 49)  SpO2: 93% (01 May 2020 19:00) (85% - 95%)    ============================I/O===========================   I&O's Detail    30 Apr 2020 07:01  -  01 May 2020 07:00  --------------------------------------------------------  IN:    dextrose 5% + sodium chloride 0.45%.: 1575 mL    Enteral Tube Flush: 100 mL    IV PiggyBack: 150 mL    ketamine Infusion.: 207.9 mL    norepinephrine Infusion: 198.1 mL  Total IN: 2231 mL    OUT:    Indwelling Catheter - Urethral: 1475 mL  Total OUT: 1475 mL    Total NET: 756 mL      01 May 2020 07:01  -  01 May 2020 20:37  --------------------------------------------------------  IN:    Enteral Tube Flush: 50 mL    Glucerna: 600 mL    IV PiggyBack: 50 mL    ketamine Infusion.: 143 mL    norepinephrine Infusion: 48 mL    norepinephrine Infusion: 62 mL  Total IN: 953 mL    OUT:    Indwelling Catheter - Urethral: 410 mL  Total OUT: 410 mL    Total NET: 543 mL        ============================ LABS =========================                        9.1    13.70 )-----------( 301      ( 01 May 2020 00:36 )             29.9     05-01    135  |  93<L>  |  11  ----------------------------<  188<H>  4.4   |  33<H>  |  0.31<L>    Ca    8.5      01 May 2020 00:36  Phos  3.1     05-01  Mg     1.8     05-01    TPro  6.5  /  Alb  2.1<L>  /  TBili  0.3  /  DBili  x   /  AST  12  /  ALT  18  /  AlkPhos  163<H>  05-01    LIVER FUNCTIONS - ( 01 May 2020 00:36 )  Alb: 2.1 g/dL / Pro: 6.5 g/dL / ALK PHOS: 163 U/L / ALT: 18 U/L / AST: 12 U/L / GGT: x           PT/INR - ( 01 May 2020 00:36 )   PT: 18.4 sec;   INR: 1.58 ratio         PTT - ( 01 May 2020 00:36 )  PTT:40.4 sec  ABG - ( 01 May 2020 13:42 )  pH, Arterial: 7.38  pH, Blood: x     /  pCO2: 60    /  pO2: 82    / HCO3: 35    / Base Excess: 9.1   /  SaO2: 96                  ======================Microbiology/Radadiology=================  Culture: Reviewed   CXR: Reviewed  ======================================================  PAST MEDICAL & SURGICAL HISTORY:  Hyperlipidemia  Hypothyroid  Schizophrenia  No significant past surgical history  No significant past surgical history    ====================ASSESSMENT ==============  COVID 19 Positive   3/27 Intubated   Hypoxic respiratory failure s/p tracheotomy  Septic Shock  ARDS  Fluid overload      Plan:  ====================== NEUROLOGY=====================  Continue sedation with klonopin and ketamine     Continue haldol to help wean off sedation     Dilaudid and oxycodone for analgesia     acetaminophen    Suspension .. 650 milliGRAM(s) Oral every 6 hours PRN Temp greater or equal to 38.5C (101.3F)  clonazePAM  Tablet 2 milliGRAM(s) Oral every 8 hours  haloperidol     Tablet 0.5 milliGRAM(s) Oral every 12 hours  ketamine Infusion. 0.25 mG/kG/Hr (1.93 mL/Hr) IV Continuous <Continuous>  oxyCODONE    IR 10 milliGRAM(s) Oral every 6 hours    ==================== RESPIRATORY======================  on full vent support, FiO2 decreased     Will follow CXRs, spO2, ABGs.     Mechanical Ventilation:  Mode: AC/ CMV (Assist Control/ Continuous Mandatory Ventilation)  RR (machine): 35  TV (machine): 300  FiO2: 60  PEEP: 5  ITime: 1  MAP: 16  PIP: 43      ====================CARDIOVASCULAR==================  Continue blood pressure support for hypotension,  norepinephrine Infusion 0.11 MICROgram(s)/kG/Min (7.95 mL/Hr) IV Continuous <Continuous>    ===================HEMATOLOGIC/ONC ===================  Continue ASA,  aspirin  chewable 81 milliGRAM(s) Oral daily    VTE prophylaxis, enoxaparin Injectable 40 milliGRAM(s) SubCutaneous two times a day    ===================== RENAL =========================  Continue monitoring urine output  No diuretics due to hypotension     ==================== GASTROINTESTINAL===================  Remains NPO    Pepcid prophylaxis,   famotidine    Tablet 20 milliGRAM(s) Oral two times a day    polyethylene glycol 3350 17 Gram(s) Oral every 12 hours  senna Syrup 10 milliLiter(s) Oral at bedtime  sodium chloride 0.9% lock flush 10 milliLiter(s) IV Push every 1 hour PRN Pre/post blood products, medications, blood draw, and to maintain line patency    =======================    ENDOCRINE  =====================  Glucose control,   insulin lispro (HumaLOG) corrective regimen sliding scale   SubCutaneous every 6 hours    Continue for hypothyroidism,   levothyroxine Injectable 75 MICROGram(s) IV Push at bedtime    ========================INFECTIOUS DISEASE================  Completed COVID medication (Plaquenil, Vit C/Thiamine, Anakirna, Solumedrol)  S/p convalescent plasma on 4/23     C/w cefepime x 7 days for pseudomonas aeruginosa in sputum on 4/24,  cefepime   IVPB      cefepime   IVPB 1000 milliGRAM(s) IV Intermittent every 8 hours    UPDATES:      I have personally spent     minutes of critical care with this patient.     Patient requires continuous monitoring with bedside rhythm monitoring, pulse ox monitoring, and intermittent blood gas analysis. Care plan discussed with ICU care team. Patient remained critical and at risk for life threatening decompensation.    By signing my name below, I, Joan Richards, attest that this documentation has been prepared under the direction and in the presence of Yoel Acevedo MD.   Electronically signed: Jonathan Seals, 05-01-20 @ 20:37    I, Yoel Acevedo, personally performed the services described in this documentation. all medical record entries made by the jonathan were at my direction and in my presence. I have reviewed the chart and agree that the record reflects my personal performance and is accurate and complete  Electronically signed: Yoel Acevedo MD. CLARITA POWELL  MRN-[id number]  Patient is a 70y old  Male who presents with a chief complaint of respiratory distress, COVID 19 (01 May 2020 12:26)    HPI:  70M with PMH presents with T dm, hypothyroidism, hld, p/w cough, low grade fever x 2-3 days. Today patient had worsening dyspnea at rest as well as exertion which prompted to come to the ED. Patient otherwise denied chest pain, leg swelling. He denies prior lung disease. Denies any history of smoking.     In ED, patient was noted to be hypoxic on 6L NC to 89% and significant tachypnea. VS otherwise, afebrile, HR in 80s, SBP 150s. Labs notable for CBC wnl. CMP with mild elevated transaminases AST 65 and ALT 47. PAtient noted to be positive for COVID 19. Due to worsening respiratory distress patient was emergently intubated in the ER. (27 Mar 2020 21:38)      Surgery/Hospital course:  COVID 19 Positive  Intubated 3/27  4/9 Paralysis weaned off yesterday, unproned with maintained sats, tolerating diuresis  4/12 10 sec asystole, back after epi x1  4/13 CPAP  4/14- 4/16 unable to tolerate CPAP   4/16 decadron course started  4/17 Failed CPAP. Continue with steroid taper and supportive therapy. Palliative care consult called and appreciated. Family yet to decide on end of life and prior to making any advanced decisions would like to see if any improvement with steroid taper treatment therapy.    4/18 Continues to CPAP trial of 20/5.  Peep lowered to 5 from 6.  Unable to tolerate sedation wean due to increased agitation causing hypoxia.  4/21 ketamine weaned off  4/22 COVID retested, 1U PRBC given for Hct 26 and pressor requirement; Precedex started, but dc'd for bradycardia (pt had asystolic pause on precedex previously)  4/23 Trached, Received convalescent plasma  4/24 OG tube removed  4/25 Febrile, bradycardic, hypotensive, dobutamine started but now off. Pt made DNR  4/26 High airway pressures and hypoxia, paralyzed      REVIEW OF SYSTEMS:  Unable to obtain as patient is on ventilator     Vital Signs Last 24 Hrs  T(C): 36.7 (01 May 2020 19:00), Max: 37.1 (01 May 2020 04:00)  T(F): 98.1 (01 May 2020 19:00), Max: 98.8 (01 May 2020 04:00)  HR: 79 (01 May 2020 19:00) (73 - 90)  BP: --  BP(mean): --  RR: 35 (01 May 2020 19:00) (35 - 49)  SpO2: 93% (01 May 2020 19:00) (85% - 95%)    ============================I/O===========================   I&O's Detail    30 Apr 2020 07:01  -  01 May 2020 07:00  --------------------------------------------------------  IN:    dextrose 5% + sodium chloride 0.45%.: 1575 mL    Enteral Tube Flush: 100 mL    IV PiggyBack: 150 mL    ketamine Infusion.: 207.9 mL    norepinephrine Infusion: 198.1 mL  Total IN: 2231 mL    OUT:    Indwelling Catheter - Urethral: 1475 mL  Total OUT: 1475 mL    Total NET: 756 mL      01 May 2020 07:01  -  01 May 2020 20:37  --------------------------------------------------------  IN:    Enteral Tube Flush: 50 mL    Glucerna: 600 mL    IV PiggyBack: 50 mL    ketamine Infusion.: 143 mL    norepinephrine Infusion: 48 mL    norepinephrine Infusion: 62 mL  Total IN: 953 mL    OUT:    Indwelling Catheter - Urethral: 410 mL  Total OUT: 410 mL    Total NET: 543 mL        ============================ LABS =========================                        9.1    13.70 )-----------( 301      ( 01 May 2020 00:36 )             29.9     05-01    135  |  93<L>  |  11  ----------------------------<  188<H>  4.4   |  33<H>  |  0.31<L>    Ca    8.5      01 May 2020 00:36  Phos  3.1     05-01  Mg     1.8     05-01    TPro  6.5  /  Alb  2.1<L>  /  TBili  0.3  /  DBili  x   /  AST  12  /  ALT  18  /  AlkPhos  163<H>  05-01    LIVER FUNCTIONS - ( 01 May 2020 00:36 )  Alb: 2.1 g/dL / Pro: 6.5 g/dL / ALK PHOS: 163 U/L / ALT: 18 U/L / AST: 12 U/L / GGT: x           PT/INR - ( 01 May 2020 00:36 )   PT: 18.4 sec;   INR: 1.58 ratio         PTT - ( 01 May 2020 00:36 )  PTT:40.4 sec  ABG - ( 01 May 2020 13:42 )  pH, Arterial: 7.38  pH, Blood: x     /  pCO2: 60    /  pO2: 82    / HCO3: 35    / Base Excess: 9.1   /  SaO2: 96                  ======================Microbiology/Radadiology=================  Culture: Reviewed   CXR: Reviewed  ======================================================  PAST MEDICAL & SURGICAL HISTORY:  Hyperlipidemia  Hypothyroid  Schizophrenia  No significant past surgical history  No significant past surgical history    ====================ASSESSMENT ==============  COVID 19 Positive   3/27 Intubated     Hypoxic respiratory failure s/p tracheotomy  Septic Shock  ARDS  Fluid overload  Pseudomonal Pneumonia       Plan:  ====================== NEUROLOGY=====================  Agitation, difficult to coordinate with Vent on lesser level of sedation.  Klonopin and ketamine     haldol to help wean off sedation     Dilaudid and oxycodone for analgesia       clonazePAM  Tablet 2 milliGRAM(s) Oral every 8 hours  haloperidol     Tablet 0.5 milliGRAM(s) Oral every 12 hours  ketamine Infusion. 0.25 mG/kG/Hr (1.93 mL/Hr) IV Continuous <Continuous>  oxyCODONE    IR 10 milliGRAM(s) Oral every 6 hours    ==================== RESPIRATORY======================  Viral pneumonia complicated with PA Pneumonia   S/P Trac   on full vent support, titrate FiO2 & peep     Will follow CXRs, spO2, ABGs.     Mechanical Ventilation:  Mode: AC/ CMV (Assist Control/ Continuous Mandatory Ventilation)  RR (machine): 35  TV (machine): 300  FiO2: 55  PEEP: 5  ITime: 1  MAP: 16  PIP: 43      ====================CARDIOVASCULAR==================  Continue blood pressure support for hypotension,  norepinephrine Infusion 0.11 MICROgram(s)/kG/Min (7.95 mL/Hr) IV Continuous <Continuous>    ===================HEMATOLOGIC/ONC ===================  Continue ASA,  aspirin  chewable 81 milliGRAM(s) Oral daily    VTE prophylaxis, enoxaparin Injectable 40 milliGRAM(s) SubCutaneous two times a day    ===================== RENAL =========================  Continue monitoring urine output  No diuretics due to hypotension     ==================== GASTROINTESTINAL===================  Remains NPO    Pepcid prophylaxis,   famotidine    Tablet 20 milliGRAM(s) Oral two times a day    polyethylene glycol 3350 17 Gram(s) Oral every 12 hours  senna Syrup 10 milliLiter(s) Oral at bedtime  sodium chloride 0.9% lock flush 10 milliLiter(s) IV Push every 1 hour PRN Pre/post blood products, medications, blood draw, and to maintain line patency    =======================    ENDOCRINE  =====================  Glucose control,   insulin lispro (HumaLOG) corrective regimen sliding scale   SubCutaneous every 6 hours    Continue for hypothyroidism,   levothyroxine Injectable 75 MICROGram(s) IV Push at bedtime  Enteral nutrition   ========================INFECTIOUS DISEASE================  Completed COVID medication (Plaquenil, Vit C/Thiamine, Anakirna, Solumedrol)  S/p convalescent plasma on 4/23     C/w cefepime x 7 days for pseudomonas aeruginosa in sputum on 4/24,  cefepime   IVPB      cefepime   IVPB 1000 milliGRAM(s) IV Intermittent every 8 hours    UPDATES:      I have personally spent     minutes of critical care with this patient.     Patient requires continuous monitoring with bedside rhythm monitoring, pulse ox monitoring, and intermittent blood gas analysis. Care plan discussed with ICU care team. Patient remained critical and at risk for life threatening decompensation.    By signing my name below, I, Joan Richards, attest that this documentation has been prepared under the direction and in the presence of Yoel Acevedo MD.   Electronically signed: Rohan Seals, 05-01-20 @ 20:37    I, Yoel Acevedo, personally performed the services described in this documentation. all medical record entries made by the kevinibtrae were at my direction and in my presence. I have reviewed the chart and agree that the record reflects my personal performance and is accurate and complete  Electronically signed: Yoel Acevedo MD. CLARITA POWELL  MRN-[id number]  Patient is a 70y old  Male who presents with a chief complaint of respiratory distress, COVID 19 (01 May 2020 12:26)    HPI:  70M with PMH presents with T dm, hypothyroidism, hld, p/w cough, low grade fever x 2-3 days. Today patient had worsening dyspnea at rest as well as exertion which prompted to come to the ED. Patient otherwise denied chest pain, leg swelling. He denies prior lung disease. Denies any history of smoking.     In ED, patient was noted to be hypoxic on 6L NC to 89% and significant tachypnea. VS otherwise, afebrile, HR in 80s, SBP 150s. Labs notable for CBC wnl. CMP with mild elevated transaminases AST 65 and ALT 47. PAtient noted to be positive for COVID 19. Due to worsening respiratory distress patient was emergently intubated in the ER. (27 Mar 2020 21:38)      Surgery/Hospital course:  COVID 19 Positive  Intubated 3/27  4/9 Paralysis weaned off yesterday, unproned with maintained sats, tolerating diuresis  4/12 10 sec asystole, back after epi x1  4/13 CPAP  4/14- 4/16 unable to tolerate CPAP   4/16 decadron course started  4/17 Failed CPAP. Continue with steroid taper and supportive therapy. Palliative care consult called and appreciated. Family yet to decide on end of life and prior to making any advanced decisions would like to see if any improvement with steroid taper treatment therapy.    4/18 Continues to CPAP trial of 20/5.  Peep lowered to 5 from 6.  Unable to tolerate sedation wean due to increased agitation causing hypoxia.  4/21 ketamine weaned off  4/22 COVID retested, 1U PRBC given for Hct 26 and pressor requirement; Precedex started, but dc'd for bradycardia (pt had asystolic pause on precedex previously)  4/23 Trached, Received convalescent plasma  4/24 OG tube removed  4/25 Febrile, bradycardic, hypotensive, dobutamine started but now off. Pt made DNR  4/26 High airway pressures and hypoxia, paralyzed      REVIEW OF SYSTEMS:  Unable to obtain as patient is on ventilator     Vital Signs Last 24 Hrs  T(C): 36.7 (01 May 2020 19:00), Max: 37.1 (01 May 2020 04:00)  T(F): 98.1 (01 May 2020 19:00), Max: 98.8 (01 May 2020 04:00)  HR: 79 (01 May 2020 19:00) (73 - 90)  BP: --  BP(mean): --  RR: 35 (01 May 2020 19:00) (35 - 49)  SpO2: 93% (01 May 2020 19:00) (85% - 95%)    ============================I/O===========================   I&O's Detail    30 Apr 2020 07:01  -  01 May 2020 07:00  --------------------------------------------------------  IN:    dextrose 5% + sodium chloride 0.45%.: 1575 mL    Enteral Tube Flush: 100 mL    IV PiggyBack: 150 mL    ketamine Infusion.: 207.9 mL    norepinephrine Infusion: 198.1 mL  Total IN: 2231 mL    OUT:    Indwelling Catheter - Urethral: 1475 mL  Total OUT: 1475 mL    Total NET: 756 mL      01 May 2020 07:01  -  01 May 2020 20:37  --------------------------------------------------------  IN:    Enteral Tube Flush: 50 mL    Glucerna: 600 mL    IV PiggyBack: 50 mL    ketamine Infusion.: 143 mL    norepinephrine Infusion: 48 mL    norepinephrine Infusion: 62 mL  Total IN: 953 mL    OUT:    Indwelling Catheter - Urethral: 410 mL  Total OUT: 410 mL    Total NET: 543 mL        ============================ LABS =========================                        9.1    13.70 )-----------( 301      ( 01 May 2020 00:36 )             29.9     05-01    135  |  93<L>  |  11  ----------------------------<  188<H>  4.4   |  33<H>  |  0.31<L>    Ca    8.5      01 May 2020 00:36  Phos  3.1     05-01  Mg     1.8     05-01    TPro  6.5  /  Alb  2.1<L>  /  TBili  0.3  /  DBili  x   /  AST  12  /  ALT  18  /  AlkPhos  163<H>  05-01    LIVER FUNCTIONS - ( 01 May 2020 00:36 )  Alb: 2.1 g/dL / Pro: 6.5 g/dL / ALK PHOS: 163 U/L / ALT: 18 U/L / AST: 12 U/L / GGT: x           PT/INR - ( 01 May 2020 00:36 )   PT: 18.4 sec;   INR: 1.58 ratio         PTT - ( 01 May 2020 00:36 )  PTT:40.4 sec  ABG - ( 01 May 2020 13:42 )  pH, Arterial: 7.38  pH, Blood: x     /  pCO2: 60    /  pO2: 82    / HCO3: 35    / Base Excess: 9.1   /  SaO2: 96                  ======================Microbiology/Radadiology=================  Culture: Reviewed   CXR: Reviewed  ======================================================  PAST MEDICAL & SURGICAL HISTORY:  Hyperlipidemia  Hypothyroid  Schizophrenia  No significant past surgical history  No significant past surgical history    ====================ASSESSMENT ==============  COVID 19 Positive   3/27 Intubated     Hypoxic respiratory failure s/p tracheotomy  Septic Shock  ARDS  Fluid overload  Pseudomonal Pneumonia       Plan:  ====================== NEUROLOGY=====================  Agitation, difficult to coordinate with Vent on lesser level of sedation.  Klonopin and ketamine     haldol to help wean off sedation     Dilaudid and oxycodone for analgesia       clonazePAM  Tablet 2 milliGRAM(s) Oral every 8 hours  haloperidol     Tablet 0.5 milliGRAM(s) Oral every 12 hours  ketamine Infusion. 0.25 mG/kG/Hr (1.93 mL/Hr) IV Continuous <Continuous>  oxyCODONE    IR 10 milliGRAM(s) Oral every 6 hours    ==================== RESPIRATORY======================  Viral pneumonia complicated with PA Pneumonia   S/P Trac   on full vent support, titrate FiO2 & peep     Will follow CXRs, spO2, ABGs.     Mechanical Ventilation:  Mode: AC/ CMV (Assist Control/ Continuous Mandatory Ventilation)  RR (machine): 35  TV (machine): 300  FiO2: 55  PEEP: 5  ITime: 1  MAP: 16  PIP: 43      ====================CARDIOVASCULAR==================  Continue blood pressure support for hypotension,  norepinephrine Infusion 0.11 MICROgram(s)/kG/Min (7.95 mL/Hr) IV Continuous <Continuous>    ===================HEMATOLOGIC/ONC ===================  Continue ASA,  aspirin  chewable 81 milliGRAM(s) Oral daily    VTE prophylaxis, enoxaparin Injectable 40 milliGRAM(s) SubCutaneous two times a day    ===================== RENAL =========================  Continue monitoring urine output  No diuretics due to hypotension     ==================== GASTROINTESTINAL===================  Remains NPO    Pepcid prophylaxis,   famotidine    Tablet 20 milliGRAM(s) Oral two times a day    polyethylene glycol 3350 17 Gram(s) Oral every 12 hours  senna Syrup 10 milliLiter(s) Oral at bedtime  sodium chloride 0.9% lock flush 10 milliLiter(s) IV Push every 1 hour PRN Pre/post blood products, medications, blood draw, and to maintain line patency    =======================    ENDOCRINE  =====================  Glucose control,   insulin lispro (HumaLOG) corrective regimen sliding scale   SubCutaneous every 6 hours    Continue for hypothyroidism,   levothyroxine Injectable 75 MICROGram(s) IV Push at bedtime  Enteral nutrition   ========================INFECTIOUS DISEASE================  Completed COVID medication (Plaquenil, Vit C/Thiamine, Anakirna, Solumedrol)  S/p convalescent plasma on 4/23     C/w cefepime x 7 days for pseudomonas aeruginosa in sputum on 4/24,  cefepime   IVPB      cefepime   IVPB 1000 milliGRAM(s) IV Intermittent every 8 hours    UPDATES:      I have personally spent  30   minutes of critical care with this patient.     Patient requires continuous monitoring with bedside rhythm monitoring, pulse ox monitoring, and intermittent blood gas analysis. Care plan discussed with ICU care team. Patient remained critical and at risk for life threatening decompensation.    By signing my name below, I, Joan Richards, attest that this documentation has been prepared under the direction and in the presence of Yoel Acevedo MD.   Electronically signed: Jonathan Seals, 05-01-20 @ 20:37    I, Yoel Acevedo, personally performed the services described in this documentation. all medical record entries made by the jonathan were at my direction and in my presence. I have reviewed the chart and agree that the record reflects my personal performance and is accurate and complete  Electronically signed: Yoel Acevedo MD.

## 2020-05-01 NOTE — CHART NOTE - NSCHARTNOTEFT_GEN_A_CORE
Spoke with wife Jolly this morning regarding her .  I informed her that prognosis is quite poor and he has shown no improvement with oxygenation and ventilation.  She is aware and brief discussion of withdrawal of care occurred.  She states she wants to give it through the weekend and talk to her family before she makes a decision.

## 2020-05-01 NOTE — PROGRESS NOTE ADULT - SUBJECTIVE AND OBJECTIVE BOX
CLARITA POWELL  MRN-[id number]  Patient is a 70y old  Male who presents with a chief complaint of respiratory distress, COVID 19 (30 Apr 2020 08:47)    HPI:  70M with PMH presents with T dm, hypothyroidism, hld, p/w cough, low grade fever x 2-3 days. Today patient had worsening dyspnea at rest as well as exertion which prompted to come to the ED. Patient otherwise denied chest pain, leg swelling. He denies prior lung disease. Denies any history of smoking.     In ED, patient was noted to be hypoxic on 6L NC to 89% and significant tachypnea. VS otherwise, afebrile, HR in 80s, SBP 150s. Labs notable for CBC wnl. CMP with mild elevated transaminases AST 65 and ALT 47. Patient noted to be positive for COVID 19. Due to worsening respiratory distress patient was emergently intubated in the ER. (27 Mar 2020 21:38)      Surgery/Hospital course:  COVID 19 Positive  Intubated 3/27  4/9 Paralysis weaned off yesterday, unproned with maintained sats, tolerating diuresis  4/12 10 sec asystole, back after epi x1  4/13 CPAP  4/14- 4/16 unable to tolerate CPAP   4/16 decadron course started  4/17 Failed CPAP. Continue with steroid taper and supportive therapy. Palliative care consult called and appreciated. Family yet to decide on end of life and prior to making any advanced decisions would like to see if any improvement with steroid taper treatment therapy.    4/18 Continues to CPAP trial of 20/5.  Peep lowered to 5 from 6.  Unable to tolerate sedation wean due to increased agitation causing hypoxia.  4/21 ketamine weaned off  4/22 COVID retested, 1U PRBC given for Hct 26 and pressor requirement; Precedex started, but dc'd for bradycardia (pt had asystolic pause on precedex previously)  4/23 Trached, Received convalescent plasma  4/24 OG tube removed  4/25 Febrile, bradycardic, hypotensive, dobutamine started but now off. Pt made DNR  4/26 High airway pressures and hypoxia, paralyzed      REVIEW OF SYSTEMS:  Unable to obtain as patient is on ventilator     ICU Vital Signs Last 24 Hrs  T(C): 37.1 (01 May 2020 04:00), Max: 37.1 (01 May 2020 04:00)  T(F): 98.8 (01 May 2020 04:00), Max: 98.8 (01 May 2020 04:00)  HR: 90 (01 May 2020 09:12) (60 - 90)  BP: --  BP(mean): --  ABP: 110/47 (01 May 2020 06:00) (98/44 - 122/54)  ABP(mean): 67 (01 May 2020 06:00) (61 - 76)  RR: 45 (01 May 2020 06:00) (35 - 49)  SpO2: 93% (01 May 2020 09:12) (85% - 95%)      ============================I/O===========================   I&O's Detail    29 Apr 2020 07:01  -  30 Apr 2020 07:00  --------------------------------------------------------  I&O's Summary    30 Apr 2020 07:01  -  01 May 2020 07:00  --------------------------------------------------------  IN: 2231 mL / OUT: 1475 mL / NET: 756 mL      ============================ LABS =========================                        9.1    13.70 )-----------( 301      ( 01 May 2020 00:36 )             29.9   05-01    135  |  93<L>  |  11  ----------------------------<  188<H>  4.4   |  33<H>  |  0.31<L>    Ca    8.5      01 May 2020 00:36  Phos  3.1     05-01  Mg     1.8     05-01    TPro  6.5  /  Alb  2.1<L>  /  TBili  0.3  /  DBili  x   /  AST  12  /  ALT  18  /  AlkPhos  163<H>  05-01    ======================Microbiology/Radadiology=================  Culture: Reviewed   CXR: Reviewed  ======================================================  PAST MEDICAL & SURGICAL HISTORY:  Hyperlipidemia  Hypothyroid  Type 2 diabetes  No significant past surgical history  No significant past surgical history    ====================ASSESSMENT ==============  COVID 19 Positive   3/27 Intubated   Hypoxic respiratory failure s/p tracheotomy  Septic Shock  ARDS  Fluid overload    DNR   Plan:  ====================== NEUROLOGY=====================  Sedated with Klonopin, Ketamine   Continue Haldol to help wean off sedation   Dilaudid and oxycodone for analgesia     acetaminophen    Suspension .. 650 milliGRAM(s) Oral every 6 hours PRN Temp greater or equal to 38.5C (101.3F)  clonazePAM  Tablet 2 milliGRAM(s) Oral every 8 hours  haloperidol     Tablet 0.5 milliGRAM(s) Oral every 12 hours  HYDROmorphone  Injectable 1 milliGRAM(s) IV Push every 4 hours PRN Moderate Pain (4 - 6)  ketamine Infusion. 0.25 mG/kG/Hr (1.93 mL/Hr) IV Continuous <Continuous>  oxyCODONE    IR 10 milliGRAM(s) Oral every 6 hours    ==================== RESPIRATORY======================  On full vent support, Fi02 increased this evening due to desaturation  Hypothermia protocol initiated 4/28 due to refractory hypercarbia with attempt to lower metabolic rate, now rewarming    Mechanical Ventilation:  Mode: AC/ CMV (Assist Control/ Continuous Mandatory Ventilation)  RR (machine): 35  TV (machine): 300  FiO2: 65%  PEEP: 5  ITime: 1  MAP: 18  PIP: 40  ====================CARDIOVASCULAR==================  On vasopressor support for hypotension     norepinephrine Infusion 0.08 MICROgram(s)/kG/Min (5.78 mL/Hr) IV Continuous <Continuous>    ===================HEMATOLOGIC/ONC ===================  aspirin  chewable 81 milliGRAM(s) Oral daily  VTE prophylaxis, enoxaparin Injectable 40 milliGRAM(s) SubCutaneous two times a day    ===================== RENAL =========================  Continue monitoring urine output  Diuretics discontinued due to hypotension    ==================== GASTROINTESTINAL===================  NPO, pt cooled with Artic Sun, will restart tube feeds once rewarmed     dextrose 5% + sodium chloride 0.45%. 1000 milliLiter(s) (75 mL/Hr) IV Continuous <Continuous>  GI prophylaxis, famotidine    Tablet 20 milliGRAM(s) Oral two times a day  polyethylene glycol 3350 17 Gram(s) Oral every 12 hours  senna Syrup 10 milliLiter(s) Oral at bedtime  sodium chloride 0.9% lock flush 10 milliLiter(s) IV Push every 1 hour PRN Pre/post blood products, medications, blood draw, and to maintain line patency    =======================    ENDOCRINE  =====================  Glucose control,   insulin lispro (HumaLOG) corrective regimen sliding scale   SubCutaneous every 6 hours    levothyroxine Injectable 75 MICROGram(s) IV Push at bedtime    ========================INFECTIOUS DISEASE================  Completed COVID medication (Plaquenil, Vit C/Thiamine, Anakirna, Solumedrol)  S/p convalescent plasma on 4/23   C/w cefepime x 7 days for pseudomonas aeruginosa in sputum on 4/24     cefepime   IVPB      cefepime   IVPB 1000 milliGRAM(s) IV Intermittent every 8 hours    UPDATES:      Patient requires continuous monitoring with bedside rhythm monitoring, arterial blood pressure, central venous pressure, pulse ox monitoring, and intermittent blood gas analysis. Care plan discussed with ICU care team. Patient remained critical and at risk for life threatening decompensation.    Time: 75 min

## 2020-05-02 NOTE — PROGRESS NOTE ADULT - SUBJECTIVE AND OBJECTIVE BOX
CLARITA POWELL  MRN-[id number]  Patient is a 70y old  Male who presents with a chief complaint of respiratory distress, COVID 19 (02 May 2020 11:48)    HPI:  70M with PMH presents with T dm, hypothyroidism, hld, p/w cough, low grade fever x 2-3 days. Today patient had worsening dyspnea at rest as well as exertion which prompted to come to the ED. Patient otherwise denied chest pain, leg swelling. He denies prior lung disease. Denies any history of smoking.     In ED, patient was noted to be hypoxic on 6L NC to 89% and significant tachypnea. VS otherwise, afebrile, HR in 80s, SBP 150s. Labs notable for CBC wnl. CMP with mild elevated transaminases AST 65 and ALT 47. PAtient noted to be positive for COVID 19. Due to worsening respiratory distress patient was emergently intubated in the ER. (27 Mar 2020 21:38)      Surgery/Hospital course:  COVID 19 Positive  Intubated 3/27  4/9 Paralysis weaned off yesterday, unproned with maintained sats, tolerating diuresis  4/12 10 sec asystole, back after epi x1  4/13 CPAP  4/14- 4/16 unable to tolerate CPAP   4/16 decadron course started  4/17 Failed CPAP. Continue with steroid taper and supportive therapy. Palliative care consult called and appreciated. Family yet to decide on end of life and prior to making any advanced decisions would like to see if any improvement with steroid taper treatment therapy.    4/18 Continues to CPAP trial of 20/5.  Peep lowered to 5 from 6.  Unable to tolerate sedation wean due to increased agitation causing hypoxia.  4/21 ketamine weaned off  4/22 COVID retested, 1U PRBC given for Hct 26 and pressor requirement; Precedex started, but dc'd for bradycardia (pt had asystolic pause on precedex previously)  4/23 Trached, Received convalescent plasma  4/24 OG tube removed  4/25 Febrile, bradycardic, hypotensive, dobutamine started but now off. Pt made DNR  4/26 High airway pressures and hypoxia, paralyzed      REVIEW OF SYSTEMS:  Unable to obtain, vented     Vital Signs Last 24 Hrs  T(C): 37 (02 May 2020 20:00), Max: 37.2 (02 May 2020 04:00)  T(F): 98.6 (02 May 2020 20:00), Max: 99 (02 May 2020 04:00)  HR: 87 (02 May 2020 20:00) (79 - 98)  BP: --  BP(mean): --  RR: 39 (02 May 2020 20:00) (35 - 52)  SpO2: 97% (02 May 2020 20:00) (88% - 98%)    ============================I/O===========================   I&O's Detail    01 May 2020 07:01  -  02 May 2020 07:00  --------------------------------------------------------  IN:    Enteral Tube Flush: 190 mL    Glucerna: 1200 mL    IV PiggyBack: 150 mL    ketamine Infusion.: 243.2 mL    ketamine Infusion.: 77 mL    norepinephrine Infusion: 48 mL    norepinephrine Infusion: 224 mL  Total IN: 2132.2 mL    OUT:    Indwelling Catheter - Urethral: 1225 mL  Total OUT: 1225 mL    Total NET: 907.2 mL      02 May 2020 07:01  -  02 May 2020 20:12  --------------------------------------------------------  IN:    Enteral Tube Flush: 120 mL    Glucerna: 600 mL    IV PiggyBack: 100 mL    ketamine Infusion.: 253.2 mL    norepinephrine Infusion: 213 mL  Total IN: 1286.2 mL    OUT:    Indwelling Catheter - Urethral: 705 mL  Total OUT: 705 mL    Total NET: 581.2 mL        ============================ LABS =========================                        8.3    15.29 )-----------( 260      ( 02 May 2020 00:23 )             28.3     05-02    140  |  99  |  12  ----------------------------<  151<H>  3.2<L>   |  31  |  0.35<L>    Ca    8.4      02 May 2020 00:23  Phos  2.3     05-02  Mg     2.3     05-02    TPro  6.5  /  Alb  2.4<L>  /  TBili  0.2  /  DBili  x   /  AST  10  /  ALT  17  /  AlkPhos  155<H>  05-02    LIVER FUNCTIONS - ( 02 May 2020 00:23 )  Alb: 2.4 g/dL / Pro: 6.5 g/dL / ALK PHOS: 155 U/L / ALT: 17 U/L / AST: 10 U/L / GGT: x           PT/INR - ( 02 May 2020 00:23 )   PT: 19.0 sec;   INR: 1.64 ratio         PTT - ( 02 May 2020 00:23 )  PTT:41.2 sec  ABG - ( 02 May 2020 12:25 )  pH, Arterial: 7.39  pH, Blood: x     /  pCO2: 55    /  pO2: 83    / HCO3: 33    / Base Excess: 7.2   /  SaO2: 97                  ======================Microbiology/Radadiology=================  Culture: Reviewed   CXR: Reviewed  ======================================================  PAST MEDICAL & SURGICAL HISTORY:  Hyperlipidemia  Hypothyroid  Schizophrenia  No significant past surgical history  No significant past surgical history    ====================ASSESSMENT ==============  COVID 19 Positive   3/27 Intubated   Hypoxic respiratory failure s/p tracheotomy  Septic Shock  ARDS  Pseudomonal Pneumonia    DNR   Plan:  ====================== NEUROLOGY=====================  Sedated with Klonopin, Ketamine   Haldol to help wean off sedation  Oxycodone for analgesia     acetaminophen    Suspension .. 650 milliGRAM(s) Oral every 6 hours PRN Temp greater or equal to 38.5C (101.3F)  clonazePAM  Tablet 2 milliGRAM(s) Oral every 8 hours  haloperidol     Tablet 0.5 milliGRAM(s) Oral every 12 hours  ketamine Infusion. 2 mG/kG/Hr (15.4 mL/Hr) IV Continuous <Continuous>  oxyCODONE    IR 10 milliGRAM(s) Oral every 6 hours    ==================== RESPIRATORY======================  S/p trach   On full vent support     Mechanical Ventilation:  Mode: AC/ CMV (Assist Control/ Continuous Mandatory Ventilation)  RR (machine): 35  TV (machine): 300  FiO2: 55  PEEP: 5  ITime: 1  MAP: 11  PIP: 40      ====================CARDIOVASCULAR==================  Blood pressure support for hypotension     norepinephrine Infusion 0.11 MICROgram(s)/kG/Min (7.95 mL/Hr) IV Continuous <Continuous>    ===================HEMATOLOGIC/ONC ===================  aspirin  chewable 81 milliGRAM(s) Oral daily  VTE prophylaxis, enoxaparin Injectable 40 milliGRAM(s) SubCutaneous two times a day    ===================== RENAL =========================  Continue monitoring urine output    ==================== GASTROINTESTINAL===================  Tolerating tube feeds, Glucerna 1.5 @ 300 [id number]     GI prophylaxis, famotidine    Tablet 20 milliGRAM(s) Oral two times a day  polyethylene glycol 3350 17 Gram(s) Oral every 12 hours  potassium acid phosphate/sodium acid phosphate tablet (K-PHOS No. 2) 1 Tablet(s) Oral every 8 hours  senna Syrup 10 milliLiter(s) Oral at bedtime  sodium chloride 0.9% lock flush 10 milliLiter(s) IV Push every 1 hour PRN Pre/post blood products, medications, blood draw, and to maintain line patency    =======================    ENDOCRINE  =====================  Glucose control,   insulin lispro (HumaLOG) corrective regimen sliding scale   SubCutaneous every 6 hours    levothyroxine Injectable 75 MICROGram(s) IV Push at bedtime    ========================INFECTIOUS DISEASE================  Completed COVID medication (Plaquenil, Vit C/Thiamine, Anakirna, Solumedrol)  S/p convalescent plasma on 4/23   C/w cefepime x 7 days for pseudomonas aeruginosa in sputum on 4/24,    cefepime   IVPB      cefepime   IVPB 1000 milliGRAM(s) IV Intermittent every 8 hours        Patient requires continuous monitoring with bedside rhythm monitoring, pulse ox monitoring, and intermittent blood gas analysis. Care plan discussed with ICU care team. Patient remained critical and at risk for life threatening decompensation.    By signing my name below, I, Pretty Wallace, attest that this documentation has been prepared under the direction and in the presence of Yg Vital MD   Electronically signed: Rohan Lal, 05-02-20 @ 20:12    I, Yg Vital, personally performed the services described in this documentation. all medical record entries made by the scribe were at my direction and in my presence. I have reviewed the chart and agree that the record reflects my personal performance and is accurate and complete  Electronically signed: Yg Vital MD CLARITA POWELL  MRN-[id number]  Patient is a 70y old  Male who presents with a chief complaint of respiratory distress, COVID 19 (02 May 2020 11:48)    HPI:  70M with PMH presents with T dm, hypothyroidism, hld, p/w cough, low grade fever x 2-3 days. Today patient had worsening dyspnea at rest as well as exertion which prompted to come to the ED. Patient otherwise denied chest pain, leg swelling. He denies prior lung disease. Denies any history of smoking.     In ED, patient was noted to be hypoxic on 6L NC to 89% and significant tachypnea. VS otherwise, afebrile, HR in 80s, SBP 150s. Labs notable for CBC wnl. CMP with mild elevated transaminases AST 65 and ALT 47. PAtient noted to be positive for COVID 19. Due to worsening respiratory distress patient was emergently intubated in the ER. (27 Mar 2020 21:38)      Surgery/Hospital course:  COVID 19 Positive  Intubated 3/27  4/9 Paralysis weaned off yesterday, unproned with maintained sats, tolerating diuresis  4/12 10 sec asystole, back after epi x1  4/13 CPAP  4/14- 4/16 unable to tolerate CPAP   4/16 decadron course started  4/17 Failed CPAP. Continue with steroid taper and supportive therapy. Palliative care consult called and appreciated. Family yet to decide on end of life and prior to making any advanced decisions would like to see if any improvement with steroid taper treatment therapy.    4/18 Continues to CPAP trial of 20/5.  Peep lowered to 5 from 6.  Unable to tolerate sedation wean due to increased agitation causing hypoxia.  4/21 ketamine weaned off  4/22 COVID retested, 1U PRBC given for Hct 26 and pressor requirement; Precedex started, but dc'd for bradycardia (pt had asystolic pause on precedex previously)  4/23 Trached, Received convalescent plasma  4/24 OG tube removed  4/25 Febrile, bradycardic, hypotensive, dobutamine started but now off. Pt made DNR  4/26 High airway pressures and hypoxia, paralyzed      REVIEW OF SYSTEMS:  Unable to obtain, vented     Vital Signs Last 24 Hrs  T(C): 37 (02 May 2020 20:00), Max: 37.2 (02 May 2020 04:00)  T(F): 98.6 (02 May 2020 20:00), Max: 99 (02 May 2020 04:00)  HR: 87 (02 May 2020 20:00) (79 - 98)  BP: --  BP(mean): --  RR: 39 (02 May 2020 20:00) (35 - 52)  SpO2: 97% (02 May 2020 20:00) (88% - 98%)    ============================I/O===========================   I&O's Detail    01 May 2020 07:01  -  02 May 2020 07:00  --------------------------------------------------------  IN:    Enteral Tube Flush: 190 mL    Glucerna: 1200 mL    IV PiggyBack: 150 mL    ketamine Infusion.: 243.2 mL    ketamine Infusion.: 77 mL    norepinephrine Infusion: 48 mL    norepinephrine Infusion: 224 mL  Total IN: 2132.2 mL    OUT:    Indwelling Catheter - Urethral: 1225 mL  Total OUT: 1225 mL    Total NET: 907.2 mL      02 May 2020 07:01  -  02 May 2020 20:12  --------------------------------------------------------  IN:    Enteral Tube Flush: 120 mL    Glucerna: 600 mL    IV PiggyBack: 100 mL    ketamine Infusion.: 253.2 mL    norepinephrine Infusion: 213 mL  Total IN: 1286.2 mL    OUT:    Indwelling Catheter - Urethral: 705 mL  Total OUT: 705 mL    Total NET: 581.2 mL        ============================ LABS =========================                        8.3    15.29 )-----------( 260      ( 02 May 2020 00:23 )             28.3     05-02    140  |  99  |  12  ----------------------------<  151<H>  3.2<L>   |  31  |  0.35<L>    Ca    8.4      02 May 2020 00:23  Phos  2.3     05-02  Mg     2.3     05-02    TPro  6.5  /  Alb  2.4<L>  /  TBili  0.2  /  DBili  x   /  AST  10  /  ALT  17  /  AlkPhos  155<H>  05-02    LIVER FUNCTIONS - ( 02 May 2020 00:23 )  Alb: 2.4 g/dL / Pro: 6.5 g/dL / ALK PHOS: 155 U/L / ALT: 17 U/L / AST: 10 U/L / GGT: x           PT/INR - ( 02 May 2020 00:23 )   PT: 19.0 sec;   INR: 1.64 ratio         PTT - ( 02 May 2020 00:23 )  PTT:41.2 sec  ABG - ( 02 May 2020 12:25 )  pH, Arterial: 7.39  pH, Blood: x     /  pCO2: 55    /  pO2: 83    / HCO3: 33    / Base Excess: 7.2   /  SaO2: 97                  ======================Microbiology/Radadiology=================  Culture: Reviewed   CXR: Reviewed  ======================================================  PAST MEDICAL & SURGICAL HISTORY:  Hyperlipidemia  Hypothyroid  Schizophrenia  No significant past surgical history  No significant past surgical history    ====================ASSESSMENT ==============  COVID 19 Positive   3/27 Intubated   Hypoxic respiratory failure s/p tracheotomy  Septic Shock  ARDS  Pseudomonal Pneumonia    DNR   Plan:  ====================== NEUROLOGY=====================  Sedated with Klonopin, Ketamine   Haldol to help wean off sedation  Oxycodone for analgesia     acetaminophen    Suspension .. 650 milliGRAM(s) Oral every 6 hours PRN Temp greater or equal to 38.5C (101.3F)  clonazePAM  Tablet 2 milliGRAM(s) Oral every 8 hours  haloperidol     Tablet 0.5 milliGRAM(s) Oral every 12 hours  ketamine Infusion. 2 mG/kG/Hr (15.4 mL/Hr) IV Continuous <Continuous>  oxyCODONE    IR 10 milliGRAM(s) Oral every 6 hours    ==================== RESPIRATORY======================  S/p trach   On full vent support     Mechanical Ventilation:  Mode: AC/ CMV (Assist Control/ Continuous Mandatory Ventilation)  RR (machine): 35  TV (machine): 300  FiO2: 55  PEEP: 5  ITime: 1  MAP: 11  PIP: 40      ====================CARDIOVASCULAR==================  -Cont pressor support for goal MAP>65mmHg    norepinephrine Infusion 0.11 MICROgram(s)/kG/Min (7.95 mL/Hr) IV Continuous <Continuous>    ===================HEMATOLOGIC/ONC ===================  aspirin  chewable 81 milliGRAM(s) Oral daily  VTE prophylaxis, enoxaparin Injectable 40 milliGRAM(s) SubCutaneous two times a day  -CBC daily and trend hgb  ===================== RENAL =========================  Continue monitoring urine output  -Strict I/O  ==================== GASTROINTESTINAL===================  Tolerating tube feeds, Glucerna 1.5 @ 300 [id number]     GI prophylaxis, famotidine    Tablet 20 milliGRAM(s) Oral two times a day  polyethylene glycol 3350 17 Gram(s) Oral every 12 hours  potassium acid phosphate/sodium acid phosphate tablet (K-PHOS No. 2) 1 Tablet(s) Oral every 8 hours  senna Syrup 10 milliLiter(s) Oral at bedtime  sodium chloride 0.9% lock flush 10 milliLiter(s) IV Push every 1 hour PRN Pre/post blood products, medications, blood draw, and to maintain line patency    =======================    ENDOCRINE  =====================  -FSBS goal 140-180, currently 118-164  -Cont Glucose control with AISS  -Hypothyroid: cont levothyroxine Injectable 75 MICROGram(s) IV Push at bedtime    ========================INFECTIOUS DISEASE================  Completed COVID medication (Plaquenil, Vit C/Thiamine, Anakirna, Solumedrol)  S/p convalescent plasma on 4/23   C/w cefepime x 7 days for pseudomonas aeruginosa in sputum on 4/24-5/2; Will d/c in am 5/3    cefepime   IVPB      cefepime   IVPB 1000 milliGRAM(s) IV Intermittent every 8 hours        Patient requires continuous monitoring with bedside rhythm monitoring, pulse ox monitoring, and intermittent blood gas analysis. Care plan discussed with ICU care team. Patient remained critical and at risk for life threatening decompensation.    By signing my name below, I, Pretty Wallace, attest that this documentation has been prepared under the direction and in the presence of Yg Vital MD   Electronically signed: Rohan Lal, 05-02-20 @ 20:12    I, Yg Vital, personally performed the services described in this documentation. all medical record entries made by the scribe were at my direction and in my presence. I have reviewed the chart and agree that the record reflects my personal performance and is accurate and complete  Electronically signed: Yg Vital MD

## 2020-05-02 NOTE — PROCEDURE NOTE - NSINDICATIONS_GEN_A_CORE
arterial puncture to obtain ABG's/monitoring purposes/critical patient
critical illness
emergency venous access

## 2020-05-02 NOTE — PROGRESS NOTE ADULT - SUBJECTIVE AND OBJECTIVE BOX
CLARITA POWELL  MRN-[id number]  Patient is a 70y old  Male who presents with a chief complaint of respiratory distress, COVID 19 (30 Apr 2020 08:47)    HPI:  70M with PMH presents with T dm, hypothyroidism, hld, p/w cough, low grade fever x 2-3 days. Today patient had worsening dyspnea at rest as well as exertion which prompted to come to the ED. Patient otherwise denied chest pain, leg swelling. He denies prior lung disease. Denies any history of smoking.     In ED, patient was noted to be hypoxic on 6L NC to 89% and significant tachypnea. VS otherwise, afebrile, HR in 80s, SBP 150s. Labs notable for CBC wnl. CMP with mild elevated transaminases AST 65 and ALT 47. Patient noted to be positive for COVID 19. Due to worsening respiratory distress patient was emergently intubated in the ER. (27 Mar 2020 21:38)      Surgery/Hospital course:  COVID 19 Positive  Intubated 3/27  4/9 Paralysis weaned off yesterday, unproned with maintained sats, tolerating diuresis  4/12 10 sec asystole, back after epi x1  4/13 CPAP  4/14- 4/16 unable to tolerate CPAP   4/16 decadron course started  4/17 Failed CPAP. Continue with steroid taper and supportive therapy. Palliative care consult called and appreciated. Family yet to decide on end of life and prior to making any advanced decisions would like to see if any improvement with steroid taper treatment therapy.    4/18 Continues to CPAP trial of 20/5.  Peep lowered to 5 from 6.  Unable to tolerate sedation wean due to increased agitation causing hypoxia.  4/21 ketamine weaned off  4/22 COVID retested, 1U PRBC given for Hct 26 and pressor requirement; Precedex started, but dc'd for bradycardia (pt had asystolic pause on precedex previously)  4/23 Trached, Received convalescent plasma  4/24 OG tube removed  4/25 Febrile, bradycardic, hypotensive, dobutamine started but now off. Pt made DNR  4/26 High airway pressures and hypoxia, paralyzed      REVIEW OF SYSTEMS:  Unable to obtain as patient is on ventilator     ICU Vital Signs Last 24 Hrs  T(C): 37.1 (02 May 2020 08:00), Max: 37.2 (02 May 2020 04:00)  T(F): 98.7 (02 May 2020 08:00), Max: 99 (02 May 2020 04:00)  HR: 82 (02 May 2020 10:00) (77 - 98)  BP: --  BP(mean): --  ABP: 141/45 (02 May 2020 10:00) (91/42 - 149/48)  ABP(mean): 70 (02 May 2020 10:00) (59 - 80)  RR: 35 (02 May 2020 10:00) (35 - 52)  SpO2: 88% (02 May 2020 10:00) (88% - 96%)        ============================I/O===========================  I&O's Summary    01 May 2020 07:01  -  02 May 2020 07:00  --------------------------------------------------------  IN: 2132.2 mL / OUT: 1225 mL / NET: 907.2 mL    02 May 2020 07:01  -  02 May 2020 11:49  --------------------------------------------------------  IN: 72.2 mL / OUT: 130 mL / NET: -57.8 mL      ============================ LABS =========================                        8.3    15.29 )-----------( 260      ( 02 May 2020 00:23 )             28.3   05-02    140  |  99  |  12  ----------------------------<  151<H>  3.2<L>   |  31  |  0.35<L>    Ca    8.4      02 May 2020 00:23  Phos  2.3     05-02  Mg     2.3     05-02    TPro  6.5  /  Alb  2.4<L>  /  TBili  0.2  /  DBili  x   /  AST  10  /  ALT  17  /  AlkPhos  155<H>  05-02      ======================Microbiology/Radadiology=================  Culture: Reviewed   CXR: Reviewed  ======================================================  PAST MEDICAL & SURGICAL HISTORY:  Hyperlipidemia  Hypothyroid  Type 2 diabetes  No significant past surgical history  No significant past surgical history    ====================ASSESSMENT ==============  COVID 19 Positive   3/27 Intubated   Hypoxic respiratory failure s/p tracheotomy  Septic Shock  ARDS  Fluid overload    DNR   Plan:  ====================== NEUROLOGY=====================  Sedated with Klonopin, Ketamine   Continue Haldol to help wean off sedation   Dilaudid and oxycodone for analgesia     acetaminophen    Suspension .. 650 milliGRAM(s) Oral every 6 hours PRN Temp greater or equal to 38.5C (101.3F)  clonazePAM  Tablet 2 milliGRAM(s) Oral every 8 hours  haloperidol     Tablet 0.5 milliGRAM(s) Oral every 12 hours  HYDROmorphone  Injectable 1 milliGRAM(s) IV Push every 4 hours PRN Moderate Pain (4 - 6)  ketamine Infusion. 0.25 mG/kG/Hr (1.93 mL/Hr) IV Continuous <Continuous>  oxyCODONE    IR 10 milliGRAM(s) Oral every 6 hours    ==================== RESPIRATORY======================  On full vent support, Fi02 increased this evening due to desaturation  Hypothermia protocol initiated 4/28 due to refractory hypercarbia with attempt to lower metabolic rate, now rewarming    Mechanical Ventilation:  Mode: AC/ CMV (Assist Control/ Continuous Mandatory Ventilation)  RR (machine): 35  TV (machine): 300  FiO2: 55  PEEP: 5  ITime: 1  MAP: 14  PIP: 42    ====================CARDIOVASCULAR==================  On vasopressor support for hypotension     norepinephrine Infusion 0.08 MICROgram(s)/kG/Min (5.78 mL/Hr) IV Continuous <Continuous>    ===================HEMATOLOGIC/ONC ===================  aspirin  chewable 81 milliGRAM(s) Oral daily  VTE prophylaxis, enoxaparin Injectable 40 milliGRAM(s) SubCutaneous two times a day    ===================== RENAL =========================  Continue monitoring urine output  Diuretics discontinued due to hypotension    ==================== GASTROINTESTINAL===================  NPO, pt cooled with Artic Sun, will restart tube feeds once rewarmed     dextrose 5% + sodium chloride 0.45%. 1000 milliLiter(s) (75 mL/Hr) IV Continuous <Continuous>  GI prophylaxis, famotidine    Tablet 20 milliGRAM(s) Oral two times a day  polyethylene glycol 3350 17 Gram(s) Oral every 12 hours  senna Syrup 10 milliLiter(s) Oral at bedtime  sodium chloride 0.9% lock flush 10 milliLiter(s) IV Push every 1 hour PRN Pre/post blood products, medications, blood draw, and to maintain line patency    =======================    ENDOCRINE  =====================  Glucose control,   insulin lispro (HumaLOG) corrective regimen sliding scale   SubCutaneous every 6 hours    levothyroxine Injectable 75 MICROGram(s) IV Push at bedtime    ========================INFECTIOUS DISEASE================  Completed COVID medication (Plaquenil, Vit C/Thiamine, Anakirna, Solumedrol)  S/p convalescent plasma on 4/23   C/w cefepime x 7 days for pseudomonas aeruginosa in sputum on 4/24     cefepime   IVPB      cefepime   IVPB 1000 milliGRAM(s) IV Intermittent every 8 hours    Patient requires continuous monitoring with bedside rhythm monitoring, arterial blood pressure, central venous pressure, pulse ox monitoring, and intermittent blood gas analysis. Care plan discussed with ICU care team. Patient remained critical and at risk for life threatening decompensation.    Time: 75 min

## 2020-05-03 NOTE — CHART NOTE - NSCHARTNOTEFT_GEN_A_CORE
Seen for: nutrition follow up on COVID ICU     Source: medical record, communication with team. Unable to speak to pt due to current airborne isolation contact precautions related to COVID-19. Pt remains intubated.     Chart reviewed, events noted. On 5/3 - Sedation was held to eval mental status pt over breathing the vent and re-sedated. Transfer to [facility]- report given to floor and family notified . Family meeting scheduled for 5/4 set up by CTU staff but palliative consult    Diet Order: Diet, NPO with Tube Feed:   Tube Feeding Modality: Nasogastric  Glucerna 1.5 Chandrakant (GLUCERNA1.5RTH)  Total Volume for 24 Hours (mL): 1200  Bolus  Total Volume of Bolus (mL):  300  Total # of Feeds: 4  Tube Feed Frequency: Every 6 hours   Tube Feed Start Time: 11:40  Bolus Feed Rate (mL per Hour): 300   Bolus Feed Duration (in Hours): 1 (05-01-20 @ 04:04)    CURRENT EN ORDER:  - Provides: 1200 ml formula, 1800 calories, 99 grams protein  - Meets: 23.3 kcal/kg, 1.28 gm/kg protein using dosing weight 77.1 kg    Nutrition Events:   - Today with Phos 1.8; noted order for K Phos No. 2  - Per previous RD Note pt was NPO 4/29 for hypothermia, feeds re-ordered 5/1  - EN Provision per chart: (5/3) 75% of goal thus far, (5/2) 100% of goal, (5/1) 50% of goal    GI per chart:   - Last BM: 5/3, x2 thus far, rectal tube placed today, output not yet documented  - Bowel regimen: discontinued in setting of diarrhea    Anthropometric Measurements:   Height (cm): 175.26 (03-27-20 @ 16:15)  Weight (kg): 77.1 (03-27-20 @ 16:15)  BMI (kg/m2): 25.1 (03-27-20 @ 16:15)    Medications: MEDICATIONS  (STANDING):  aspirin  chewable 81 milliGRAM(s) Oral daily  cefepime   IVPB      cefepime   IVPB 1000 milliGRAM(s) IV Intermittent every 8 hours  chlorhexidine 0.12% Liquid 15 milliLiter(s) Oral Mucosa every 12 hours  chlorhexidine 2% Cloths 1 Application(s) Topical <User Schedule>  clonazePAM  Tablet 2 milliGRAM(s) Oral every 8 hours  enoxaparin Injectable 40 milliGRAM(s) SubCutaneous two times a day  famotidine    Tablet 20 milliGRAM(s) Oral two times a day  haloperidol     Tablet 0.5 milliGRAM(s) Oral every 12 hours  insulin lispro (HumaLOG) corrective regimen sliding scale   SubCutaneous every 6 hours  ketamine Infusion. 2 mG/kG/Hr (15.4 mL/Hr) IV Continuous <Continuous>  levothyroxine Injectable 75 MICROGram(s) IV Push at bedtime  norepinephrine Infusion 0.11 MICROgram(s)/kG/Min (7.95 mL/Hr) IV Continuous <Continuous>  oxyCODONE    IR 10 milliGRAM(s) Oral every 6 hours  petrolatum Ophthalmic Ointment 1 Application(s) Both EYES two times a day  potassium acid phosphate/sodium acid phosphate tablet (K-PHOS No. 2) 1 Tablet(s) Oral every 8 hours    MEDICATIONS  (PRN):  acetaminophen    Suspension .. 650 milliGRAM(s) Oral every 6 hours PRN Temp greater or equal to 38.5C (101.3F)  sodium chloride 0.9% lock flush 10 milliLiter(s) IV Push every 1 hour PRN Pre/post blood products, medications, blood draw, and to maintain line patency    Labs: 05-03 @ 01:14: Sodium 139, Potassium 4.2, Calcium 8.4, Magnesium 2.0, Phosphorus 1.8<L>, BUN 16, Creatinine 0.31<L>, Glucose 139<H>, Alk Phos 137<H>, ALT/SGPT 14, AST/SGOT 10, Albumin 2.3<L>, Prealbumin --, Total Bilirubin 0.2, Hemoglobin 8.2<L>, Hematocrit 27.9<L>, Ferritin --, C-Reactive Protein --, Creatine Kinase <<27>    Hemoglobin A1C, Whole Blood: 7.2 % (03-30-20 @ 08:24)    POCT Blood Glucose.: 162 mg/dL (05-03-20 @ 04:35)  POCT Blood Glucose.: 164 mg/dL (05-02-20 @ 17:16)    Skin per chart: left ear stage 2 pressure injury  Edema per chart: 1+ generalized, 2+ bilateral arms/hands/scrotum    Estimated Needs:   Based on dosing wt 77.1Kg, with consideration for intubation  Energy (20-25 chandrakant/kg): 9157-5413 kcal/day   Protein (1.2-1.4 gm/kg): 92..94 gm/day    Previous Nutrition Diagnosis: none  Nutrition Diagnosis is: ongoing    New Nutrition Diagnosis: none    Recommended Interventions:   1. Continue Glucerna 1.5 @ 300ml bolus q6hrs; provides 1800 calories (23.3 chandrakant/Kg) and 99 grams protein (1,28 Gm/Kg) based on admission wt 77.1Kg  2. Defer bowel regimen to team, if any  3. Defer free water to team  4. Trend BG levels, renal indices, LFT's, electrolytes and triglycerides    Monitoring and Evaluation:   Continue to monitor nutrition provision and tolerance, weights, labs, skin integrity.   RD remains available upon request and will follow up per protocol.    Kristen Abdi RD, CDN   Pager # [phone]

## 2020-05-03 NOTE — PROGRESS NOTE ADULT - SUBJECTIVE AND OBJECTIVE BOX
CLARITA POWELL  MRN-[id number]  Patient is a 70y old  Male who presents with a chief complaint of respiratory distress, COVID 19 (02 May 2020 20:11)    HPI:  70M with PMH presents with T dm, hypothyroidism, hld, p/w cough, low grade fever x 2-3 days. Today patient had worsening dyspnea at rest as well as exertion which prompted to come to the ED. Patient otherwise denied chest pain, leg swelling. He denies prior lung disease. Denies any history of smoking.     In ED, patient was noted to be hypoxic on 6L NC to 89% and significant tachypnea. VS otherwise, afebrile, HR in 80s, SBP 150s. Labs notable for CBC wnl. CMP with mild elevated transaminases AST 65 and ALT 47. PAtient noted to be positive for COVID 19. Due to worsening respiratory distress patient was emergently intubated in the ER. (27 Mar 2020 21:38)      Surgery/Hospital course:        REVIEW OF SYSTEMS:  Unable to obtain, vented     Vital Signs Last 24 Hrs  T(C): 37.2 (03 May 2020 08:00), Max: 37.2 (03 May 2020 08:00)  T(F): 99 (03 May 2020 08:00), Max: 99 (03 May 2020 08:00)  HR: 79 (03 May 2020 09:00) (78 - 98)  BP: --  BP(mean): --  RR: 36 (03 May 2020 09:00) (35 - 49)  SpO2: 95% (03 May 2020 09:00) (88% - 98%)    ============================I/O===========================   I&O's Detail    02 May 2020 07:01  -  03 May 2020 07:00  --------------------------------------------------------  IN:    Enteral Tube Flush: 250 mL    Glucerna: 1200 mL    IV PiggyBack: 325.2 mL    ketamine Infusion.: 615.4 mL    norepinephrine Infusion: 456.1 mL  Total IN: 2846.7 mL    OUT:    Indwelling Catheter - Urethral: 1745 mL  Total OUT: 1745 mL    Total NET: 1101.7 mL      03 May 2020 07:01  -  03 May 2020 09:52  --------------------------------------------------------  IN:    IV PiggyBack: 125 mL    ketamine Infusion.: 60.4 mL    norepinephrine Infusion: 43.4 mL  Total IN: 228.8 mL    OUT:    Indwelling Catheter - Urethral: 120 mL  Total OUT: 120 mL    Total NET: 108.8 mL        ============================ LABS =========================                        8.2    18.01 )-----------( 236      ( 03 May 2020 01:14 )             27.9     05-03    139  |  102  |  16  ----------------------------<  139<H>  4.2   |  30  |  0.31<L>    Ca    8.4      03 May 2020 01:14  Phos  1.8     05-03  Mg     2.0     05-03    TPro  6.8  /  Alb  2.3<L>  /  TBili  0.2  /  DBili  x   /  AST  10  /  ALT  14  /  AlkPhos  137<H>  05-03    LIVER FUNCTIONS - ( 03 May 2020 01:14 )  Alb: 2.3 g/dL / Pro: 6.8 g/dL / ALK PHOS: 137 U/L / ALT: 14 U/L / AST: 10 U/L / GGT: x           PT/INR - ( 02 May 2020 00:23 )   PT: 19.0 sec;   INR: 1.64 ratio         PTT - ( 02 May 2020 00:23 )  PTT:41.2 sec  ABG - ( 03 May 2020 00:56 )  pH, Arterial: 7.36  pH, Blood: x     /  pCO2: 58    /  pO2: 75    / HCO3: 32    / Base Excess: 6.4   /  SaO2: 96                  ======================Microbiology/Radadiology=================  Culture: Reviewed   CXR: Reviewed  ======================================================  PAST MEDICAL & SURGICAL HISTORY:  Hyperlipidemia  Hypothyroid  Schizophrenia  No significant past surgical history  No significant past surgical history    ====================ASSESSMENT ==============  COVID 19 Positive   Hypoxic Respiratory failure       Plan:  ====================== NEUROLOGY=====================  acetaminophen    Suspension .. 650 milliGRAM(s) Oral every 6 hours PRN Temp greater or equal to 38.5C (101.3F)  clonazePAM  Tablet 2 milliGRAM(s) Oral every 8 hours  haloperidol     Tablet 0.5 milliGRAM(s) Oral every 12 hours  ketamine Infusion. 2 mG/kG/Hr (15.4 mL/Hr) IV Continuous <Continuous>  oxyCODONE    IR 10 milliGRAM(s) Oral every 6 hours    ==================== RESPIRATORY======================  Mechanical Ventilation:  Mode: AC/ CMV (Assist Control/ Continuous Mandatory Ventilation)  RR (machine): 36  TV (machine): 300  FiO2: 55  PEEP: 5  ITime: 1  MAP: 16  PIP: 40      ====================CARDIOVASCULAR==================  norepinephrine Infusion 0.11 MICROgram(s)/kG/Min (7.95 mL/Hr) IV Continuous <Continuous>    ===================HEMATOLOGIC/ONC ===================  aspirin  chewable 81 milliGRAM(s) Oral daily  enoxaparin Injectable 40 milliGRAM(s) SubCutaneous two times a day    ===================== RENAL =========================  Continue monitoring urine output    ==================== GASTROINTESTINAL===================  Tolerating tube feeds    famotidine    Tablet 20 milliGRAM(s) Oral two times a day  polyethylene glycol 3350 17 Gram(s) Oral every 12 hours  potassium acid phosphate/sodium acid phosphate tablet (K-PHOS No. 2) 1 Tablet(s) Oral every 8 hours  senna Syrup 10 milliLiter(s) Oral at bedtime  sodium chloride 0.9% lock flush 10 milliLiter(s) IV Push every 1 hour PRN Pre/post blood products, medications, blood draw, and to maintain line patency    =======================    ENDOCRINE  =====================  Glucose control,   insulin lispro (HumaLOG) corrective regimen sliding scale   SubCutaneous every 6 hours  levothyroxine Injectable 75 MICROGram(s) IV Push at bedtime    ========================INFECTIOUS DISEASE================  cefepime   IVPB      cefepime   IVPB 1000 milliGRAM(s) IV Intermittent every 8 hours        Patient requires continuous monitoring with bedside rhythm monitoring, pulse ox monitoring, and intermittent blood gas analysis. Care plan discussed with ICU care team. Patient remained critical and at risk for life threatening decompensation.    By signing my name below, Joan VANCE, attest that this documentation has been prepared under the direction and in the presence of _____  Electronically signed: Rohan Seals, 05-03-20 @ 09:52    PINKY, ____ , personally performed the services described in this documentation. all medical record entries made by the scribe were at my direction and in my presence. I have reviewed the chart and agree that the record reflects my personal performance and is accurate and complete  Electronically signed: ______ CLARITA POWELL  MRN-[id number]  Patient is a 70y old  Male who presents with a chief complaint of respiratory distress, COVID 19 (02 May 2020 20:11)    HPI:  70M with PMH presents with T dm, hypothyroidism, hld, p/w cough, low grade fever x 2-3 days. Today patient had worsening dyspnea at rest as well as exertion which prompted to come to the ED. Patient otherwise denied chest pain, leg swelling. He denies prior lung disease. Denies any history of smoking.     In ED, patient was noted to be hypoxic on 6L NC to 89% and significant tachypnea. VS otherwise, afebrile, HR in 80s, SBP 150s. Labs notable for CBC wnl. CMP with mild elevated transaminases AST 65 and ALT 47. PAtient noted to be positive for COVID 19. Due to worsening respiratory distress patient was emergently intubated in the ER. (27 Mar 2020 21:38)      Surgery/Hospital course:  COVID 19 Positive  Intubated 3/27  4/9 Paralysis weaned off yesterday, unproned with maintained sats, tolerating diuresis  4/12 10 sec asystole, back after epi x1  4/13 CPAP  4/14- 4/16 unable to tolerate CPAP   4/16 decadron course started  4/17 Failed CPAP. Continue with steroid taper and supportive therapy. Palliative care consult called and appreciated. Family yet to decide on end of life and prior to making any advanced decisions would like to see if any improvement with steroid taper treatment therapy.    4/18 Continues to CPAP trial of 20/5.  Peep lowered to 5 from 6.  Unable to tolerate sedation wean due to increased agitation causing hypoxia.  4/21 ketamine weaned off  4/22 COVID retested, 1U PRBC given for Hct 26 and pressor requirement; Precedex started, but dc'd for bradycardia (pt had asystolic pause on precedex previously)  4/23 Trached, Received convalescent plasma  4/24 OG tube removed  4/25 Febrile, bradycardic, hypotensive, dobutamine started but now off. Pt made DNR  4/26 High airway pressures and hypoxia, paralyzed        REVIEW OF SYSTEMS:  Unable to obtain, vented     Vital Signs Last 24 Hrs  T(C): 37.2 (03 May 2020 08:00), Max: 37.2 (03 May 2020 08:00)  T(F): 99 (03 May 2020 08:00), Max: 99 (03 May 2020 08:00)  HR: 79 (03 May 2020 09:00) (78 - 98)  BP: --  BP(mean): --  RR: 36 (03 May 2020 09:00) (35 - 49)  SpO2: 95% (03 May 2020 09:00) (88% - 98%)    ============================I/O===========================   I&O's Detail    02 May 2020 07:01  -  03 May 2020 07:00  --------------------------------------------------------  IN:    Enteral Tube Flush: 250 mL    Glucerna: 1200 mL    IV PiggyBack: 325.2 mL    ketamine Infusion.: 615.4 mL    norepinephrine Infusion: 456.1 mL  Total IN: 2846.7 mL    OUT:    Indwelling Catheter - Urethral: 1745 mL  Total OUT: 1745 mL    Total NET: 1101.7 mL      03 May 2020 07:01  -  03 May 2020 09:52  --------------------------------------------------------  IN:    IV PiggyBack: 125 mL    ketamine Infusion.: 60.4 mL    norepinephrine Infusion: 43.4 mL  Total IN: 228.8 mL    OUT:    Indwelling Catheter - Urethral: 120 mL  Total OUT: 120 mL    Total NET: 108.8 mL        ============================ LABS =========================                        8.2    18.01 )-----------( 236      ( 03 May 2020 01:14 )             27.9     05-03    139  |  102  |  16  ----------------------------<  139<H>  4.2   |  30  |  0.31<L>    Ca    8.4      03 May 2020 01:14  Phos  1.8     05-03  Mg     2.0     05-03    TPro  6.8  /  Alb  2.3<L>  /  TBili  0.2  /  DBili  x   /  AST  10  /  ALT  14  /  AlkPhos  137<H>  05-03    LIVER FUNCTIONS - ( 03 May 2020 01:14 )  Alb: 2.3 g/dL / Pro: 6.8 g/dL / ALK PHOS: 137 U/L / ALT: 14 U/L / AST: 10 U/L / GGT: x           PT/INR - ( 02 May 2020 00:23 )   PT: 19.0 sec;   INR: 1.64 ratio         PTT - ( 02 May 2020 00:23 )  PTT:41.2 sec  ABG - ( 03 May 2020 00:56 )  pH, Arterial: 7.36  pH, Blood: x     /  pCO2: 58    /  pO2: 75    / HCO3: 32    / Base Excess: 6.4   /  SaO2: 96                  ======================Microbiology/Radadiology=================  Culture: Reviewed   CXR: Reviewed  ======================================================  PAST MEDICAL & SURGICAL HISTORY:  Hyperlipidemia  Hypothyroid  Schizophrenia  No significant past surgical history  No significant past surgical history    ====================ASSESSMENT ==============  COVID 19 Positive   3/27 Intubated   Hypoxic respiratory failure s/p tracheotomy  Septic Shock  ARDS  Pseudomonal Pneumonia    DNR       Plan:  ====================== NEUROLOGY=====================  Continue sedation with Klonopin, Ketamine  Continue Haldol to help wean sedation   Oxycodone for analgesia    acetaminophen    Suspension .. 650 milliGRAM(s) Oral every 6 hours PRN Temp greater or equal to 38.5C (101.3F)  clonazePAM  Tablet 2 milliGRAM(s) Oral every 8 hours  haloperidol     Tablet 0.5 milliGRAM(s) Oral every 12 hours  ketamine Infusion. 2 mG/kG/Hr (15.4 mL/Hr) IV Continuous <Continuous>  oxyCODONE    IR 10 milliGRAM(s) Oral every 6 hours    ==================== RESPIRATORY======================  S/p trach   On full vent support     Mechanical Ventilation:  Mode: AC/ CMV (Assist Control/ Continuous Mandatory Ventilation)  RR (machine): 36  TV (machine): 300  FiO2: 55  PEEP: 5  ITime: 1  MAP: 16  PIP: 40      ====================CARDIOVASCULAR==================  Continue Levo for blood pressure support  Goal MAP >65    norepinephrine Infusion 0.11 MICROgram(s)/kG/Min (7.95 mL/Hr) IV Continuous <Continuous>    ===================HEMATOLOGIC/ONC ===================  aspirin  chewable 81 milliGRAM(s) Oral daily  VTE prophylaxis, enoxaparin Injectable 40 milliGRAM(s) SubCutaneous two times a day    Daily CBC and Trend hgb  ===================== RENAL =========================  Continue monitoring urine output  Strict I/O monitoring  ==================== GASTROINTESTINAL===================  Tolerating tube feeds,  Glucerna 1.5 @ 300 [id number]   Pepcid for GI prophylaxis   Continue bowel regimen     famotidine    Tablet 20 milliGRAM(s) Oral two times a day  polyethylene glycol 3350 17 Gram(s) Oral every 12 hours  potassium acid phosphate/sodium acid phosphate tablet (K-PHOS No. 2) 1 Tablet(s) Oral every 8 hours  senna Syrup 10 milliLiter(s) Oral at bedtime  sodium chloride 0.9% lock flush 10 milliLiter(s) IV Push every 1 hour PRN Pre/post blood products, medications, blood draw, and to maintain line patency    =======================    ENDOCRINE  =====================  Glucose control, FSBS monitoring with a goal of 140-180, continue AISS  insulin lispro (HumaLOG) corrective regimen sliding scale   SubCutaneous every 6 hours    Synthroid for Hypothyroidism,   levothyroxine Injectable 75 MICROGram(s) IV Push at bedtime    ========================INFECTIOUS DISEASE================  Completed COVID medication (Plaquenil, Vit C/Thiamine, Anakirna, Solumedrol)  S/p convalescent plasma on 4/23   C/w cefepime x 7 days for pseudomonas aeruginosa in sputum on 4/24-5/2    cefepime   IVPB      cefepime   IVPB 1000 milliGRAM(s) IV Intermittent every 8 hours        Patient requires continuous monitoring with bedside rhythm monitoring, pulse ox monitoring, and intermittent blood gas analysis. Care plan discussed with ICU care team. Patient remained critical and at risk for life threatening decompensation.    By signing my name below, IJoan, attest that this documentation has been prepared under the direction and in the presence of GERRY Smith.   Electronically signed: Rohan Seals, 05-03-20 @ 09:52    IMarcelina , personally performed the services described in this documentation. all medical record entries made by the kevinibe were at my direction and in my presence. I have reviewed the chart and agree that the record reflects my personal performance and is accurate and complete  Electronically signed: GERRY Smith. CLARITA POWELL  MRN-[id number]  Patient is a 70y old  Male who presents with a chief complaint of respiratory distress, COVID 19 (02 May 2020 20:11)    HPI:  70M with PMH presents with T2DM, hypothyroidism, HLD, p/w cough, low grade fever x 2-3 days, had worsened dyspnea at rest as well as exertion which prompted him to go to the ED. Patient otherwise denied chest pain, leg swelling. He denies prior lung disease. Denies any history of smoking.     In ED, patient was noted to be hypoxic on 6L NC to 89% and significant tachypnea. VS otherwise, afebrile, HR in 80s, SBP 150s. Labs notable for CBC wnl. CMP with mild elevated transaminases AST 65 and ALT 47. Patient positive for COVID 19. Due to worsening respiratory distress patient was emergently intubated in the ER. (27 Mar 2020 21:38)      Surgery/Hospital course:  COVID 19 Positive 3/27  Intubated 3/27  4/9 Paralysis weaned off yesterday, unproned with maintained sats, tolerating diuresis  4/12 10 sec asystole, back after epi x1  4/13 CPAP  4/14- 4/16 unable to tolerate CPAP   4/16 decadron course started  4/17 Failed CPAP. Continue with steroid taper and supportive therapy. Palliative care consult called and appreciated. Family yet to decide on end of life and prior to making any advanced decisions would like to see if any improvement with steroid taper treatment therapy.    4/18 Continues to CPAP trial of 20/5.  Peep lowered to 5 from 6.  Unable to tolerate sedation wean due to increased agitation causing hypoxia.  4/21 ketamine weaned off  4/22 COVID retested, 1U PRBC given for Hct 26 and pressor requirement; Precedex started, but dc'd for bradycardia (pt had asystolic pause on precedex previously)  4/23 Trached, Received convalescent plasma  4/24 OG tube removed  4/25 Febrile, bradycardic, hypotensive, dobutamine started but now off. Pt made DNR  4/26 High airway pressures and hypoxia, paralyzed        REVIEW OF SYSTEMS:  Unable to obtain, vented     Vital Signs Last 24 Hrs  T(C): 37.2 (03 May 2020 08:00), Max: 37.2 (03 May 2020 08:00)  T(F): 99 (03 May 2020 08:00), Max: 99 (03 May 2020 08:00)  HR: 79 (03 May 2020 09:00) (78 - 98)  BP: --  BP(mean): --  RR: 36 (03 May 2020 09:00) (35 - 49)  SpO2: 95% (03 May 2020 09:00) (88% - 98%)    ============================I/O===========================   I&O's Detail    02 May 2020 07:01  -  03 May 2020 07:00  --------------------------------------------------------  IN:    Enteral Tube Flush: 250 mL    Glucerna: 1200 mL    IV PiggyBack: 325.2 mL    ketamine Infusion.: 615.4 mL    norepinephrine Infusion: 456.1 mL  Total IN: 2846.7 mL    OUT:    Indwelling Catheter - Urethral: 1745 mL  Total OUT: 1745 mL    Total NET: 1101.7 mL      03 May 2020 07:01  -  03 May 2020 09:52  --------------------------------------------------------  IN:    IV PiggyBack: 125 mL    ketamine Infusion.: 60.4 mL    norepinephrine Infusion: 43.4 mL  Total IN: 228.8 mL    OUT:    Indwelling Catheter - Urethral: 120 mL  Total OUT: 120 mL    Total NET: 108.8 mL        ============================ LABS =========================                        8.2    18.01 )-----------( 236      ( 03 May 2020 01:14 )             27.9     05-03    139  |  102  |  16  ----------------------------<  139<H>  4.2   |  30  |  0.31<L>    Ca    8.4      03 May 2020 01:14  Phos  1.8     05-03  Mg     2.0     05-03    TPro  6.8  /  Alb  2.3<L>  /  TBili  0.2  /  DBili  x   /  AST  10  /  ALT  14  /  AlkPhos  137<H>  05-03    LIVER FUNCTIONS - ( 03 May 2020 01:14 )  Alb: 2.3 g/dL / Pro: 6.8 g/dL / ALK PHOS: 137 U/L / ALT: 14 U/L / AST: 10 U/L / GGT: x           PT/INR - ( 02 May 2020 00:23 )   PT: 19.0 sec;   INR: 1.64 ratio         PTT - ( 02 May 2020 00:23 )  PTT:41.2 sec  ABG - ( 03 May 2020 00:56 )  pH, Arterial: 7.36  pH, Blood: x     /  pCO2: 58    /  pO2: 75    / HCO3: 32    / Base Excess: 6.4   /  SaO2: 96                  ======================Microbiology/Radadiology=================  Culture: Reviewed   CXR: Reviewed  ======================================================  PAST MEDICAL & SURGICAL HISTORY:  Hyperlipidemia  Hypothyroid  Schizophrenia  No significant past surgical history  No significant past surgical history    ====================ASSESSMENT ==============  COVID 19 Positive   3/27 Intubated   Hypoxic respiratory failure s/p tracheotomy  Septic Shock  ARDS  Pseudomonal Pneumonia    DNR       Plan:  ====================== NEUROLOGY=====================  Continue sedation with Klonopin, Ketamine  Continue Haldol to help wean sedation   Oxycodone for analgesia    acetaminophen    Suspension .. 650 milliGRAM(s) Oral every 6 hours PRN Temp greater or equal to 38.5C (101.3F)  clonazePAM  Tablet 2 milliGRAM(s) Oral every 8 hours  haloperidol     Tablet 0.5 milliGRAM(s) Oral every 12 hours  ketamine Infusion. 2 mG/kG/Hr (15.4 mL/Hr) IV Continuous <Continuous>  oxyCODONE    IR 10 milliGRAM(s) Oral every 6 hours    ==================== RESPIRATORY======================  S/p trach   On full vent support     Mechanical Ventilation:  Mode: AC/ CMV (Assist Control/ Continuous Mandatory Ventilation)  RR (machine): 36  TV (machine): 300  FiO2: 55  PEEP: 5  ITime: 1  MAP: 16  PIP: 40      ====================CARDIOVASCULAR==================  Continue Levo for blood pressure support  Goal MAP >65    norepinephrine Infusion 0.11 MICROgram(s)/kG/Min (7.95 mL/Hr) IV Continuous <Continuous>    ===================HEMATOLOGIC/ONC ===================  aspirin  chewable 81 milliGRAM(s) Oral daily  VTE prophylaxis, enoxaparin Injectable 40 milliGRAM(s) SubCutaneous two times a day    Daily CBC and Trend hgb  ===================== RENAL =========================  Continue monitoring urine output  Strict I/O monitoring  ==================== GASTROINTESTINAL===================  Tolerating tube feeds,  Glucerna 1.5 @ 300 [id number]   Pepcid for GI prophylaxis   Continue bowel regimen     famotidine    Tablet 20 milliGRAM(s) Oral two times a day  polyethylene glycol 3350 17 Gram(s) Oral every 12 hours  potassium acid phosphate/sodium acid phosphate tablet (K-PHOS No. 2) 1 Tablet(s) Oral every 8 hours  senna Syrup 10 milliLiter(s) Oral at bedtime  sodium chloride 0.9% lock flush 10 milliLiter(s) IV Push every 1 hour PRN Pre/post blood products, medications, blood draw, and to maintain line patency    =======================    ENDOCRINE  =====================  Glucose control, FSBS monitoring with a goal of 140-180, continue AISS  insulin lispro (HumaLOG) corrective regimen sliding scale   SubCutaneous every 6 hours    Synthroid for Hypothyroidism,   levothyroxine Injectable 75 MICROGram(s) IV Push at bedtime    ========================INFECTIOUS DISEASE================  Completed COVID medication (Plaquenil, Vit C/Thiamine, Anakirna, Solumedrol)  S/p convalescent plasma on 4/23   C/w cefepime x 7 days for pseudomonas aeruginosa in sputum on 4/24-5/2    cefepime   IVPB      cefepime   IVPB 1000 milliGRAM(s) IV Intermittent every 8 hours        Patient requires continuous monitoring with bedside rhythm monitoring, pulse ox monitoring, and intermittent blood gas analysis. Care plan discussed with ICU care team. Patient remained critical and at risk for life threatening decompensation.    By signing my name below, I, Joan Richards, attest that this documentation has been prepared under the direction and in the presence of GERRY Smith.   Electronically signed: Rohan Seals, 05-03-20 @ 09:52    I, Marcelina Hoyt , personally performed the services described in this documentation. all medical record entries made by the kevinibe were at my direction and in my presence. I have reviewed the chart and agree that the record reflects my personal performance and is accurate and complete  Electronically signed: GERRY Smith. CLARITA POWELL  MRN-[id number]  Patient is a 70y old  Male who presents with a chief complaint of respiratory distress, COVID 19 (02 May 2020 20:11)    HPI:  70M with PMH presents with T2DM, hypothyroidism, HLD, p/w cough, low grade fever x 2-3 days, had worsened dyspnea at rest as well as exertion which prompted him to go to the ED. Patient otherwise denied chest pain, leg swelling. He denies prior lung disease. Denies any history of smoking.     In ED, patient was noted to be hypoxic on 6L NC to 89% and significant tachypnea. VS otherwise, afebrile, HR in 80s, SBP 150s. Labs notable for CBC wnl. CMP with mild elevated transaminases AST 65 and ALT 47. Patient positive for COVID 19. Due to worsening respiratory distress patient was emergently intubated in the ER. (27 Mar 2020 21:38)      Surgery/Hospital course:  COVID 19 Positive 3/27  Intubated 3/27  4/9 Paralysis weaned off yesterday, unproned with maintained sats, tolerating diuresis  4/12 10 sec asystole, back after epi x1  4/13 CPAP  4/14- 4/16 unable to tolerate CPAP   4/16 decadron course started  4/17 Failed CPAP. Continue with steroid taper and supportive therapy. Palliative care consult called and appreciated. Family yet to decide on end of life and prior to making any advanced decisions would like to see if any improvement with steroid taper treatment therapy.    4/18 Continues to CPAP trial of 20/5.  Peep lowered to 5 from 6.  Unable to tolerate sedation wean due to increased agitation causing hypoxia.  4/21 ketamine weaned off  4/22 COVID retested, 1U PRBC given for Hct 26 and pressor requirement; Precedex started, but dc'd for bradycardia (pt had asystolic pause on precedex previously)  4/23 Trached, Received convalescent plasma  4/24 OG tube removed  4/25 Febrile, bradycardic, hypotensive, dobutamine started but now off. Pt made DNR  4/26 High airway pressures and hypoxia, paralyzed  5/3  Sedation was held to eval mental status pt over breathing the vent and re-sedated. Transfer to [facility]- report given to floor and family notified          Family meeting scheduled for 5/4 set up by CTU staff but palliative consult        REVIEW OF SYSTEMS:  Unable to obtain, vented     Vital Signs Last 24 Hrs  T(C): 37.2 (03 May 2020 08:00), Max: 37.2 (03 May 2020 08:00)  T(F): 99 (03 May 2020 08:00), Max: 99 (03 May 2020 08:00)  HR: 79 (03 May 2020 09:00) (78 - 98)  BP: --  BP(mean): --  RR: 36 (03 May 2020 09:00) (35 - 49)  SpO2: 95% (03 May 2020 09:00) (88% - 98%)    ============================I/O===========================   I&O's Detail    02 May 2020 07:01  -  03 May 2020 07:00  --------------------------------------------------------  IN:    Enteral Tube Flush: 250 mL    Glucerna: 1200 mL    IV PiggyBack: 325.2 mL    ketamine Infusion.: 615.4 mL    norepinephrine Infusion: 456.1 mL  Total IN: 2846.7 mL    OUT:    Indwelling Catheter - Urethral: 1745 mL  Total OUT: 1745 mL    Total NET: 1101.7 mL      03 May 2020 07:01  -  03 May 2020 09:52  --------------------------------------------------------  IN:    IV PiggyBack: 125 mL    ketamine Infusion.: 60.4 mL    norepinephrine Infusion: 43.4 mL  Total IN: 228.8 mL    OUT:    Indwelling Catheter - Urethral: 120 mL  Total OUT: 120 mL    Total NET: 108.8 mL        ============================ LABS =========================                        8.2    18.01 )-----------( 236      ( 03 May 2020 01:14 )             27.9     05-03    139  |  102  |  16  ----------------------------<  139<H>  4.2   |  30  |  0.31<L>    Ca    8.4      03 May 2020 01:14  Phos  1.8     05-03  Mg     2.0     05-03    TPro  6.8  /  Alb  2.3<L>  /  TBili  0.2  /  DBili  x   /  AST  10  /  ALT  14  /  AlkPhos  137<H>  05-03    LIVER FUNCTIONS - ( 03 May 2020 01:14 )  Alb: 2.3 g/dL / Pro: 6.8 g/dL / ALK PHOS: 137 U/L / ALT: 14 U/L / AST: 10 U/L / GGT: x           PT/INR - ( 02 May 2020 00:23 )   PT: 19.0 sec;   INR: 1.64 ratio         PTT - ( 02 May 2020 00:23 )  PTT:41.2 sec  ABG - ( 03 May 2020 00:56 )  pH, Arterial: 7.36  pH, Blood: x     /  pCO2: 58    /  pO2: 75    / HCO3: 32    / Base Excess: 6.4   /  SaO2: 96                  ======================Microbiology/Radadiology=================  Culture: Reviewed   CXR: Reviewed  ======================================================  PAST MEDICAL & SURGICAL HISTORY:  Hyperlipidemia  Hypothyroid  Schizophrenia  No significant past surgical history  No significant past surgical history    ====================ASSESSMENT ==============  COVID 19 Positive   3/27 Intubated   Hypoxic respiratory failure s/p tracheotomy 4/23  Septic Shock  ARDS  Pseudomonal Pneumonia    DNR       Plan:  ====================== NEUROLOGY=====================  Continue sedation with Klonopin, Ketamine  Continue Haldol to help wean sedation   Oxycodone for analgesia    acetaminophen    Suspension .. 650 milliGRAM(s) Oral every 6 hours PRN Temp greater or equal to 38.5C (101.3F)  clonazePAM  Tablet 2 milliGRAM(s) Oral every 8 hours  haloperidol     Tablet 0.5 milliGRAM(s) Oral every 12 hours  ketamine Infusion. 2 mG/kG/Hr (15.4 mL/Hr) IV Continuous <Continuous>  oxyCODONE    IR 10 milliGRAM(s) Oral every 6 hours    ==================== RESPIRATORY======================  S/p trach   On full vent support     Mechanical Ventilation:  Mode: AC/ CMV (Assist Control/ Continuous Mandatory Ventilation)  RR (machine): 36  TV (machine): 300  FiO2: 55  PEEP: 7  ITime: 1  MAP: 16  PIP: 40      ====================CARDIOVASCULAR==================  Continue Levo for blood pressure support  Goal MAP >65    norepinephrine Infusion 0.11 MICROgram(s)/kG/Min (7.95 mL/Hr) IV Continuous <Continuous>    ===================HEMATOLOGIC/ONC ===================  aspirin  chewable 81 milliGRAM(s) Oral daily  VTE prophylaxis, enoxaparin Injectable 40 milliGRAM(s) SubCutaneous two times a day    Daily CBC and Trend hgb  ===================== RENAL =========================  Continue monitoring urine output  Strict I/O monitoring + 1,101  Hypophosp1.8- replaced  ==================== GASTROINTESTINAL===================  Tolerating tube feeds,  Glucerna 1.5 @ 300 [id number]   Pepcid for GI prophylaxis   Continue bowel regimen     famotidine    Tablet 20 milliGRAM(s) Oral two times a day  polyethylene glycol 3350 17 Gram(s) Oral every 12 hours  potassium acid phosphate/sodium acid phosphate tablet (K-PHOS No. 2) 1 Tablet(s) Oral every 8 hours  senna Syrup 10 milliLiter(s) Oral at bedtime  sodium chloride 0.9% lock flush 10 milliLiter(s) IV Push every 1 hour PRN Pre/post blood products, medications, blood draw, and to maintain line patency    =======================    ENDOCRINE  =====================  Glucose control, FSBS monitoring with a goal of 140-180, continue AISS  insulin lispro (HumaLOG) corrective regimen sliding scale   SubCutaneous every 6 hours    Synthroid for Hypothyroidism,   levothyroxine Injectable 75 MICROGram(s) IV Push at bedtime    ========================INFECTIOUS DISEASE================  Completed COVID medication (Plaquenil, Vit C/Thiamine, Anakirna, Solumedrol)  S/p convalescent plasma on 4/23   C/w cefepime x 7 days for pseudomonas aeruginosa in sputum on 4/24-5/2    cefepime   IVPB      cefepime   IVPB 1000 milliGRAM(s) IV Intermittent every 8 hours, Tmax 99, WBC increased 15 to 18, lactate 0.5, procal 0.35, CRP down to 11.8        Patient requires continuous monitoring with bedside rhythm monitoring, pulse ox monitoring, and intermittent blood gas analysis. Care plan discussed with ICU care team. Patient remained critical and at risk for life threatening decompensation.    By signing my name below, I, Joan Richards, attest that this documentation has been prepared under the direction and in the presence of GERRY Smith.   Electronically signed: Rohan Seals, 05-03-20 @ 09:52    I, Marcelina Hoyt , personally performed the services described in this documentation. all medical record entries made by the scribe were at my direction and in my presence. I have reviewed the chart and agree that the record reflects my personal performance and is accurate and complete  Electronically signed: GERRY Smith.

## 2020-05-03 NOTE — CHART NOTE - NSCHARTNOTEFT_GEN_A_CORE
70M with PMH presents with T2DM, hypothyroidism, HLD, p/w cough, low grade fever x 2-3 days, had worsened dyspnea at rest as well as exertion which prompted him to go to the ED.    Admit date: 3/27/20  COVID 19 Positive 3/27/20  Intubated 3/27/20  Trached: 4/23/20 4/12 10 sec asystole, back after epi x1  4/14- 4/16 unable to tolerate CPAP   4/16 decadron course started  4/17 Failed CPAP. Palliative care consult called and appreciated. Family yet to decide on end of life and prior to making any advanced decisions would like to see if any improvement with steroid taper treatment therapy.    4/22 COVID retested (positive), 1U PRBC given for Hct 26 and pressor requirement; Precedex started, but dc'd for bradycardia (pt had asystolic pause on precedex previously)  4/23 Trached, Received convalescent plasma  4/25 Pt made DNR  5/3  Sedation was held to eval mental status pt over breathing the vent and re-sedated. Transfer to [facility] ICU- report given to floor and family notified          Family meeting scheduled for 5/4 set up by CTU staff but palliative consult. Transferred to [address] [place] [address] from CTU around 1330. 70M with PMH presents with T2DM, hypothyroidism, HLD, p/w cough, low grade fever x 2-3 days, had worsened dyspnea at rest as well as exertion which prompted him to go to the ED.    Admit date: 3/27/20  COVID 19 Positive 3/27/20  Intubated 3/27/20  Trached: 4/23/20 4/12 10 sec asystole, back after epi x1  4/14- 4/16 unable to tolerate CPAP   4/16 decadron course started  4/17 Failed CPAP. Palliative care consult called and appreciated. Family yet to decide on end of life and prior to making any advanced decisions would like to see if any improvement with steroid taper treatment therapy.    4/22 COVID retested (positive), 1U PRBC given for Hct 26 and pressor requirement; Precedex started, but dc'd for bradycardia (pt had asystolic pause on precedex previously)  4/23 Trached, Received convalescent plasma  4/25 Pt made DNR  5/3  Sedation was held to eval mental status pt over breathing the vent and re-sedated. Transfer to [address] [place] [address] ICU- report given to floor and family notified          Family meeting scheduled for 5/4 set up by CTU staff but palliative consult. Transferred to [address] [place] [address] from CTU around 1330.        Plan    Neuro:   Continue sedation with Klonopin, Ketamine  Continue Haldol to help wean sedation   Oxycodone for analgesia        Resp:  S/p trach on 4/23/20    Mechanical Ventilation:  Mode: AC/ CMV (Assist Control/ Continuous Mandatory Ventilation)  RR (machine): 36  TV (machine): 300  FiO2: 55  PEEP: 5        CV:  Continue Levo for blood pressure support  Goal MAP >65      Heme:  aspirin  chewable 81 milliGRAM(s) Oral daily  VTE prophylaxis, enoxaparin Injectable 40 milliGRAM(s) SubCutaneous two times a day  Daily CBC and Trend hgb    :   Continue monitoring urine output  Strict I/O monitoring      GI:   Tolerating tube feeds,  Glucerna 1.5 @ 300 [id number]   Pepcid for GI prophylaxis   Continue bowel regimen     Endo:  Glucose control, FSBS monitoring with a goal of 140-180, continue AISS  insulin lispro (HumaLOG) corrective regimen sliding scale   SubCutaneous every 6 hours    Synthroid for Hypothyroidism,       ID:   Completed COVID medication (Plaquenil, Vit C/Thiamine, Anakirna, Solumedrol)  S/p convalescent plasma on 4/23   C/w cefepime x 7 days for pseudomonas aeruginosa in sputum on 4/24-5/2  f/u BC, UC, Sputum cx    WBC increased 15 to 18, lactate 0.5, procal 0.35, CRP down to 11.8 70M with PMH presents with T2DM, hypothyroidism, HLD, p/w cough, low grade fever x 2-3 days, had worsened dyspnea at rest as well as exertion which prompted him to go to the ED.    Admit date: 3/27/20  COVID 19 Positive 3/27/20  Intubated 3/27/20  Trached: 4/23/20 4/12 10 sec asystole, back after epi x1  4/14- 4/16 unable to tolerate CPAP   4/16 decadron course started  4/17 Failed CPAP. Palliative care consult called and appreciated. Family yet to decide on end of life and prior to making any advanced decisions would like to see if any improvement with steroid taper treatment therapy.    4/22 COVID retested (positive), 1U PRBC given for Hct 26 and pressor requirement; Precedex started, but dc'd for bradycardia (pt had asystolic pause on precedex previously)  4/23 Trached, Received convalescent plasma  4/25 Pt made DNR  5/3  Sedation was held to eval mental status pt over breathing the vent and re-sedated. Transfer to [address] [place] [address] ICU- report given to floor and family notified          Family meeting scheduled for 5/4 set up by CTU staff but palliative consult. Transferred to [address] [place] [address] from CTU around 1330.        Plan    Neuro:   Continue sedation with Klonopin, Ketamine at 4 (attempt to titrate down 0.5 daily)  Continue Haldol to help wean sedation   Oxycodone for analgesia        Resp:  S/p trach on 4/23/20    Mechanical Ventilation:  Mode: AC/ CMV (Assist Control/ Continuous Mandatory Ventilation)  RR (machine): 36  TV (machine): 300  FiO2: 55  PEEP: 5        CV:  Continue Levo for blood pressure support  Goal MAP >65      Heme:  aspirin  chewable 81 milliGRAM(s) Oral daily  VTE prophylaxis, enoxaparin Injectable 40 milliGRAM(s) SubCutaneous two times a day  Daily CBC and Trend hgb    :   Continue monitoring urine output  Strict I/O monitoring      GI:   Tolerating tube feeds,  Glucerna 1.5 @ 300 [id number]   Pepcid for GI prophylaxis   Continue bowel regimen     Endo:  Glucose control, FSBS monitoring with a goal of 140-180, continue AISS  insulin lispro (HumaLOG) corrective regimen sliding scale   SubCutaneous every 6 hours    Synthroid for Hypothyroidism,       ID:   Completed COVID medication (Plaquenil, Vit C/Thiamine, Anakirna, Solumedrol)  S/p convalescent plasma on 4/23   C/w cefepime x 7 days for pseudomonas aeruginosa in sputum on 4/24-5/2  f/u BC, UC, Sputum cx    WBC increased 15 to 18, lactate 0.5, procal 0.35, CRP down to 11.8 70M with PMH presents with T2DM, hypothyroidism, HLD, p/w cough, low grade fever x 2-3 days, had worsened dyspnea at rest as well as exertion which prompted him to go to the ED.    Admit date: 3/27/20  COVID 19 Positive 3/27/20  Intubated 3/27/20  Trached: 4/23/20 4/12 10 sec asystole, back after epi x1  4/14- 4/16 unable to tolerate CPAP   4/16 decadron course started  4/17 Failed CPAP. Palliative care consult called and appreciated. Family yet to decide on end of life and prior to making any advanced decisions would like to see if any improvement with steroid taper treatment therapy.    4/22 COVID retested (positive), 1U PRBC given for Hct 26 and pressor requirement; Precedex started, but dc'd for bradycardia (pt had asystolic pause on precedex previously)  4/23 Trached, Received convalescent plasma  4/25 Pt made DNR  5/3  Sedation was held to eval mental status pt over breathing the vent and re-sedated. Transfer to [address] [place] [address] ICU- report given to floor and family notified          Family meeting scheduled for 5/4 set up by CTU staff but palliative consult. Transferred to [address] [place] [address] from CTU around 1330.        Plan    Neuro:   Continue sedation with Klonopin, Ketamine at 4 (attempt to titrate down 0.5 daily)  Continue Haldol to help wean sedation   Oxycodone for analgesia    Resp:  S/p trach on 4/23/20    Mechanical Ventilation:  Mode: AC/ CMV (Assist Control/ Continuous Mandatory Ventilation)  RR (machine): 36  TV (machine): 300  FiO2: 55  PEEP: 5    CV:  Continue Levo for blood pressure support  Goal MAP >65    Heme:  aspirin  chewable 81 milliGRAM(s) Oral daily  VTE prophylaxis, enoxaparin Injectable 40 milliGRAM(s) SubCutaneous two times a day  Daily CBC and Trend hgb    :   Continue monitoring urine output  Strict I/O monitoring    GI:   Tolerating tube feeds,  Glucerna 1.5 @ 300 [id number]   Pepcid for GI prophylaxis   Continue bowel regimen discontinued due to diarrhea     Endo:  Glucose control, FSBS monitoring with a goal of 140-180, continue AISS  insulin lispro (HumaLOG) corrective regimen sliding scale   SubCutaneous every 6 hours    Synthroid for Hypothyroidism,     ID:   Completed COVID medication (Plaquenil, Vit C/Thiamine, Anakirna, Solumedrol)  S/p convalescent plasma on 4/23   C/w cefepime x 7 days for pseudomonas aeruginosa in sputum on 4/24-5/2  f/u BC, UC, Sputum cx    WBC increased 15 to 18, lactate 0.5, procal 0.35, CRP down to 11.8 70M with PMH presents with T2DM, hypothyroidism, HLD, p/w cough, low grade fever x 2-3 days, had worsened dyspnea at rest as well as exertion which prompted him to go to the ED.    Admit date: 3/27/20  COVID 19 Positive 3/27/20  Intubated 3/27/20  Trached: 4/23/20 4/12 10 sec asystole, back after epi x1  4/14- 4/16 unable to tolerate CPAP   4/16 decadron course started  4/17 Failed CPAP. Palliative care consult called and appreciated. Family yet to decide on end of life and prior to making any advanced decisions would like to see if any improvement with steroid taper treatment therapy.    4/22 COVID retested (positive), 1U PRBC given for Hct 26 and pressor requirement; Precedex started, but dc'd for bradycardia (pt had asystolic pause on precedex previously)  4/23 Trached, Received convalescent plasma  4/25 Pt made DNR  5/3  Sedation was held to eval mental status pt over breathing the vent and re-sedated. Transfer to [address] [place] [address] ICU- report given to floor and family notified          Family meeting scheduled for 5/4 set up by CTU staff but palliative consult. Transferred to [address] [place] [address] from CTU around 1330.        Plan    Neuro:   Continue sedation with Klonopin, Ketamine at 4 (attempt to titrate down 0.5 daily)  Continue Haldol to help wean sedation   Oxycodone for analgesia    Resp:  S/p trach on 4/23/20    Mechanical Ventilation:  Mode: AC/ CMV (Assist Control/ Continuous Mandatory Ventilation)  RR (machine): 36  TV (machine): 300  FiO2: 55  PEEP: 5    CV:  Continue Levo for blood pressure support  Goal MAP >65    Heme:  aspirin  chewable 81 milliGRAM(s) Oral daily  VTE prophylaxis, enoxaparin Injectable 40 milliGRAM(s) SubCutaneous two times a day  Daily CBC and Trend hgb    :   Continue monitoring urine output  Strict I/O monitoring    GI:   Tolerating tube feeds,  Glucerna 1.5 @ 300 [id number]   Pepcid for GI prophylaxis   Bowel regimen discontinued due to diarrhea     Endo:  Glucose control, FSBS monitoring with a goal of 140-180, continue AISS  insulin lispro (HumaLOG) corrective regimen sliding scale   SubCutaneous every 6 hours    Synthroid for Hypothyroidism,     ID:   Completed COVID medication (Plaquenil, Vit C/Thiamine, Anakirna, Solumedrol)  S/p convalescent plasma on 4/23   C/w cefepime x 7 days for pseudomonas aeruginosa in sputum on 4/24-5/2  f/u BC, UC, Sputum cx    WBC increased 15 to 18, lactate 0.5, procal 0.35, CRP down to 11.8 70M with PMH presents with T2DM, hypothyroidism, HLD, p/w cough, low grade fever x 2-3 days, had worsened dyspnea at rest as well as exertion which prompted him to go to the ED.    Admit date: 3/27/20  COVID 19 Positive 3/27/20  Intubated 3/27/20  Trached: 4/23/20 4/12 10 sec asystole, back after epi x1  4/14- 4/16 unable to tolerate CPAP   4/16 decadron course started  4/17 Failed CPAP. Palliative care consult called and appreciated. Family yet to decide on end of life and prior to making any advanced decisions would like to see if any improvement with steroid taper treatment therapy.    4/22 COVID retested (positive), 1U PRBC given for Hct 26 and pressor requirement; Precedex started, but dc'd for bradycardia (pt had asystolic pause on precedex previously)  4/23 Trached, Received convalescent plasma  4/25 Pt made DNR  5/3  Sedation was held to eval mental status pt over breathing the vent and re-sedated. Transfer to [address] [place] [address] ICU- report given to floor and family notified          Family meeting scheduled for 5/4 set up by CTU staff but palliative consult. Transferred to [address] [place] [address] from CTU around 1330.        Plan    Neuro:   Continue sedation with Klonopin, Ketamine at 4 (attempt to titrate down 0.5 daily)  Continue Haldol to help wean sedation   Oxycodone for analgesia    Resp:  S/p trach on 4/23/20    Mechanical Ventilation:  Mode: AC/ CMV (Assist Control/ Continuous Mandatory Ventilation)  RR (machine): 36  TV (machine): 300  FiO2: 55  PEEP: 5    CV:  Continue Levo for blood pressure support  Goal MAP >65    Heme:  aspirin  chewable 81 milliGRAM(s) Oral daily  VTE prophylaxis, enoxaparin Injectable 40 milliGRAM(s) SubCutaneous two times a day  Daily CBC and Trend hgb    :   Continue monitoring urine output  Strict I/O monitoring    GI:   Tolerating tube feeds,  Glucerna 1.5 @ 300 [id number]   Pepcid for GI prophylaxis   Bowel regimen discontinued due to diarrhea     Endo:  Glucose control, FSBS monitoring with a goal of 140-180, continue AISS  insulin lispro (HumaLOG) corrective regimen sliding scale   SubCutaneous every 6 hours    Synthroid for Hypothyroidism,     ID:   Completed COVID medication (Plaquenil, Vit C/Thiamine, Anakirna, Solumedrol)  S/p convalescent plasma on 4/23   C/w cefepime x 7 days for pseudomonas aeruginosa in sputum on 4/24-5/2  f/u BC, UC, Sputum cx      ATTENDING ATTESTATION  Patient with ARDS in setting of COVID prolonged hospital course now trach, still unable to wean sedation 2/2 agitation and overbreathing ventilator.     #ARDS  -s/p trach, thick secretions from trach, T=100.3, rising white count  -check sputum cultures, on cefepime already since 4/26 for pansensitive pseudomonas.   -D/C cefepime (day 8), repeat Bcx and sputum cultures  -continue trach settings  -per CTU, attempted to come off ketamine, became hypertensive, tachypnic, overbreathing vent and re-sedated  -can continue oxycodone, klonopin.  -would slowly wean off ketemine (is on max dose of 4), by around 0.5 to 1mg daily.  -if QTc below 500 can switch haldol to seroquel.       #FEVERS  -patient with rising WBC, T100.3  -on cefepime 8 days for VAP, pseudomonas, which is adequate treatment  -had liquid stool when came up to unit.  -last laxative 5/1, if continues will send c-diff given prolonged Abx use.   -BCx, sputum cultures as above, monitor off of Abx unless positive.   -maintain MAP>65  -LV on POCUS with normal function, indeterminate IVC, likely euvolemic.  Is on moderate dose of levo, if requirements go up will give bolus of 500cc lr.    #Neuro    Patient is critically ill and requires continued ICU level care.  Critical care time spent 65 minutes. 70M with PMH presents with T2DM, hypothyroidism, HLD, p/w cough, low grade fever x 2-3 days, had worsened dyspnea at rest as well as exertion which prompted him to go to the ED.    Admit date: 3/27/20  COVID 19 Positive 3/27/20  Intubated 3/27/20  Trached: 4/23/20 4/12 10 sec asystole, back after epi x1  4/14- 4/16 unable to tolerate CPAP   4/16 decadron course started  4/17 Failed CPAP. Palliative care consult called and appreciated. Family yet to decide on end of life and prior to making any advanced decisions would like to see if any improvement with steroid taper treatment therapy.    4/22 COVID retested (positive), 1U PRBC given for Hct 26 and pressor requirement; Precedex started, but dc'd for bradycardia (pt had asystolic pause on precedex previously)  4/23 Trached, Received convalescent plasma  4/25 Pt made DNR  5/3  Sedation was held to eval mental status pt over breathing the vent and re-sedated. Transfer to [address] [place] [address] ICU- report given to floor and family notified          Family meeting scheduled for 5/4 set up by CTU staff but palliative consult. Transferred to [address] [place] [address] from CTU around 1330.        Plan    Neuro:   Continue sedation with Klonopin, Ketamine at 4 (attempt to titrate down 0.5 daily)  Continue Haldol to help wean sedation. Haldol d/val. Seroquel 25mg BID started, QTc 343 (5/4)  Oxycodone for analgesia    Resp:  S/p trach on 4/23/20    Mechanical Ventilation:  Mode: AC/ CMV (Assist Control/ Continuous Mandatory Ventilation)  RR (machine): 36  TV (machine): 300  FiO2: 55  PEEP: 5    CV:  Continue Levo for blood pressure support  Goal MAP >65    Heme:  aspirin  chewable 81 milliGRAM(s) Oral daily  VTE prophylaxis, enoxaparin Injectable 40 milliGRAM(s) SubCutaneous two times a day  Daily CBC and Trend hgb    :   Continue monitoring urine output  Strict I/O monitoring    GI:   Tolerating tube feeds,  Glucerna 1.5 @ 300 [id number]   Pepcid for GI prophylaxis   Bowel regimen discontinued due to diarrhea     Endo:  Glucose control, FSBS monitoring with a goal of 140-180, continue AISS  insulin lispro (HumaLOG) corrective regimen sliding scale   SubCutaneous every 6 hours    Synthroid for Hypothyroidism,     ID:   Completed COVID medication (Plaquenil, Vit C/Thiamine, Anakirna, Solumedrol)  S/p convalescent plasma on 4/23   C/w cefepime x 7 days for pseudomonas aeruginosa in sputum on 4/24-5/2  f/u BC, UC, Sputum cx      ATTENDING ATTESTATION  Patient with ARDS in setting of COVID prolonged hospital course now trach, still unable to wean sedation 2/2 agitation and overbreathing ventilator.     #ARDS  -s/p trach, thick secretions from trach, T=100.3, rising white count  -check sputum cultures, on cefepime already since 4/26 for pansensitive pseudomonas.   -D/C cefepime (day 8), repeat Bcx and sputum cultures  -continue trach settings  -per CTU, attempted to come off ketamine, became hypertensive, tachypnic, overbreathing vent and re-sedated  -can continue oxycodone, klonopin.  -would slowly wean off ketemine (is on max dose of 4), by around 0.5 to 1mg daily.  -if QTc below 500 can switch haldol to seroquel.       #FEVERS  -patient with rising WBC, T100.3  -on cefepime 8 days for VAP, pseudomonas, which is adequate treatment  -had liquid stool when came up to unit.  -last laxative 5/1, if continues will send c-diff given prolonged Abx use.   -BCx, sputum cultures as above, monitor off of Abx unless positive.   -maintain MAP>65  -LV on POCUS with normal function, indeterminate IVC, likely euvolemic.  Is on moderate dose of levo, if requirements go up will give bolus of 500cc lr.    #Neuro    Patient is critically ill and requires continued ICU level care.  Critical care time spent 65 minutes.

## 2020-05-04 NOTE — PROGRESS NOTE ADULT - ATTENDING COMMENTS
ARDS in setting of COVID prolonged hospital course now trach, c/b encephalopathy and difficulty with sedation weaning, recent VAP, now complicated by new sepsis.     ARDS: S/P Trach, intubated >1 month. Persistently elevated airway pressures, however (potentially 2/2 VAP). We are weaning sedation slowly, to avoid withdrawal. Continue Ketamine wean as tolerated and continue PO Klonipin, Oxycodone.    Sepsis: Hypothermic, with significant leukocytosis, and increasing pressor requirements. Only potential localizing source is VAP-thick, copious secretions. Now on Vancomycin and Zosyn. Blood, sputum, urine cultures are pending.   Continue BS antibiotics. Lines have been exchanged 5/2.   If continues to decompensate, next step would be to add fungal coverage.    Goals of care: Discussed with family briefly this morning. They are leaning towards comfort (daughter), however, we will plan for a family meeting at 3 PM with palliative    Prognosis guarded. Patient remains criticaly ill, intubated, requires ICU level of care.  55 minutes critical care time

## 2020-05-04 NOTE — PROGRESS NOTE ADULT - SUBJECTIVE AND OBJECTIVE BOX
Admit Date: 20  Length of Stay: 38d    70yMale    Reason for admission to ICU:  Patient is in acute hypoxic respiratory distress requiring intubation and sedation. Imaging c/w ARDS. Admitted to MICU for further observation and management.    INTERVAL HPI/OVERNIGHT EVENTS:      MEDICATIONS  (STANDING):  aspirin  chewable 81 milliGRAM(s) Oral daily  chlorhexidine 0.12% Liquid 15 milliLiter(s) Oral Mucosa every 12 hours  chlorhexidine 2% Cloths 1 Application(s) Topical <User Schedule>  clonazePAM  Tablet 2 milliGRAM(s) Oral every 8 hours  enoxaparin Injectable 40 milliGRAM(s) SubCutaneous two times a day  famotidine    Tablet 20 milliGRAM(s) Oral daily  insulin lispro (HumaLOG) corrective regimen sliding scale   SubCutaneous every 6 hours  ketamine Infusion. 2 mG/kG/Hr (15.4 mL/Hr) IV Continuous <Continuous>  levothyroxine Injectable 75 MICROGram(s) IV Push at bedtime  norepinephrine Infusion 0.11 MICROgram(s)/kG/Min (7.95 mL/Hr) IV Continuous <Continuous>  oxyCODONE    IR 10 milliGRAM(s) Oral every 6 hours  petrolatum Ophthalmic Ointment 1 Application(s) Both EYES two times a day  piperacillin/tazobactam IVPB.. 3.375 Gram(s) IV Intermittent every 8 hours  propofol Infusion 20 MICROgram(s)/kG/Min (9.25 mL/Hr) IV Continuous <Continuous>  QUEtiapine 25 milliGRAM(s) Oral two times a day  vancomycin  IVPB 1000 milliGRAM(s) IV Intermittent every 12 hours    MEDICATIONS  (PRN):  acetaminophen    Suspension .. 650 milliGRAM(s) Oral every 6 hours PRN Temp greater or equal to 38.5C (101.3F)  sodium chloride 0.9% lock flush 10 milliLiter(s) IV Push every 1 hour PRN Pre/post blood products, medications, blood draw, and to maintain line patency      Vitals:  Vital Signs Last 24 Hrs  T(C): 37.4 (04 May 2020 08:00), Max: 37.9 (03 May 2020 13:30)  T(F): 99.4 (04 May 2020 08:00), Max: 100.3 (03 May 2020 13:30)  HR: 54 (04 May 2020 09:00) (54 - 109)  RR: 36 (04 May 2020 09:00) (36 - 47)  SpO2: 98% (04 May 2020 09:00) (91% - 98%)    Vitals (Invasive):  ABP: 165/67 (20 @ 09:00) (102/43 - 165/67)    I&O's Summary    03 May 2020 07:01  -  04 May 2020 07:00  --------------------------------------------------------  IN: 3265.3 mL / OUT: 3050 mL / NET: 215.3 mL    04 May 2020 07:  -  04 May 2020 11:03  --------------------------------------------------------  IN: 322.1 mL / OUT: 0 mL / NET: 322.1 mL      Physical Exam:  General: Intubated; sedated  GI: NG tube, bolus feeds, rectal tube; famotidine ppx  : Roque      Labs:  COVID-19 PCR: Detected (20 @ 23:31)                        8.0    26.19 )-----------( 231      ( 04 May 2020 00:00 )             27.8     05-04    139  |  99  |  14  ----------------------------<  149<H>  4.5   |  31  |  0.31<L>    Ca    8.2<L>      04 May 2020 00:00  Phos  2.1     05-04  Mg     1.9     05-04    TPro  6.4  /  Alb  2.1<L>  /  TBili  0.2  /  DBili  x   /  AST  14  /  ALT  14  /  AlkPhos  155<H>  05-04  PT/INR - ( 04 May 2020 09:40 )   PT: 17.3 sec;   INR: 1.49 ratio    PTT - ( 04 May 2020 09:40 )  PTT:41.1 sec    Urinalysis Basic - ( 03 May 2020 21:56 )  Color: Light Yellow / Appearance: Clear / S.015 / pH: x  Gluc: x / Ketone: Negative  / Bili: Negative / Urobili: <2 mg/dL   Blood: x / Protein: 30 mg/dL / Nitrite: Negative   Leuk Esterase: Negative / RBC: 28 /HPF / WBC 3 /HPF   Sq Epi: x / Non Sq Epi: 2 /HPF / Bacteria: Negative    Culture - Sputum (collected 20 @ 00:06)  Source: .Sputum   Gram Stain (20 @ 03:19):    Few polymorphonuclear leukocytes per low power field    Few Squamous epithelial cells per low power field    Numerous Gram Negative Rods seen per oil power field      COVID related labs:  04 May 2020 09:41  D-dimer:  x  Procalcitonin:  x  CRP:  x  LDH:  588<H>  CK:  19<L>  Troponin HS:  x  Ferritin, Serum: 607  BNP:  x      Blood Gases:  (ARTERIAL):  20 @ 09:29  pH 7.37 / pCO2 61 / pO2 106 / HCO3 34  Total CO2 36  FiO2 --  Oxygen Saturation 98      Radiology:  CT chest results consistent with multifocal bilateral ground glass opacities    Plan    Neuro:   - Continue sedation with Propofol & Ketamine at 1.75--> titrate down 0.5 daily  - Seroquel 25mg BID started, QTc .407 ()  - Oxycodone 10 IR [id number] analgesia  - Klonopin TID    Resp:  - S/p trach on 20  - Vent-->AC; 300/35/46%/5    CV:  - Continue Levo for blood pressure support  - Goal MAP >65    Heme:  - continue aspirin chewable 81 mg PO daily  - VTE prophylaxis, enoxaparin Injectable 40 mg BID  - Daily CBC and Trend hgb    :   - Continue monitoring urine output  - Strict I/O monitoring    GI:   - Tolerating tube feeds,  Glucerna 1.5 @ 300 [id number]   - Pepcid for GI prophylaxis     Endo:  - Glucose control, FSBS monitoring with a goal of 140-180, continue AISS  - insulin lispro (HumaLOG) corrective regimen sliding scale   SubCutaneous every 6 hours  - Synthroid for Hypothyroidism     ID:   - Completed COVID medication (Plaquenil, Vit C/Thiamine, Anakirna, Solumedrol)  - S/p convalescent plasma on    - s/p cefepime x 7 days for pseudomonas aeruginosa in sputum on -  - f/u BC, UC, Sputum cx  - c/w zosyn IV through   - d/c vanco if blood cx if neg      Code: DNR- Will follow up with family regarding goals of care after family meeting with palliative later today.     Disposition: Patient requires continued monitoring in ICU Admit Date: 20  Length of Stay: 38d    70yMale    Reason for admission to ICU:  Patient is in acute hypoxic respiratory distress requiring intubation and sedation. Imaging c/w ARDS. Admitted to MICU for further observation and management.    INTERVAL HPI/OVERNIGHT EVENTS:      MEDICATIONS  (STANDING):  aspirin  chewable 81 milliGRAM(s) Oral daily  chlorhexidine 0.12% Liquid 15 milliLiter(s) Oral Mucosa every 12 hours  chlorhexidine 2% Cloths 1 Application(s) Topical <User Schedule>  clonazePAM  Tablet 2 milliGRAM(s) Oral every 8 hours  enoxaparin Injectable 40 milliGRAM(s) SubCutaneous two times a day  famotidine    Tablet 20 milliGRAM(s) Oral daily  insulin lispro (HumaLOG) corrective regimen sliding scale   SubCutaneous every 6 hours  ketamine Infusion. 2 mG/kG/Hr (15.4 mL/Hr) IV Continuous <Continuous>  levothyroxine Injectable 75 MICROGram(s) IV Push at bedtime  norepinephrine Infusion 0.11 MICROgram(s)/kG/Min (7.95 mL/Hr) IV Continuous <Continuous>  oxyCODONE    IR 10 milliGRAM(s) Oral every 6 hours  petrolatum Ophthalmic Ointment 1 Application(s) Both EYES two times a day  piperacillin/tazobactam IVPB.. 3.375 Gram(s) IV Intermittent every 8 hours  propofol Infusion 20 MICROgram(s)/kG/Min (9.25 mL/Hr) IV Continuous <Continuous>  QUEtiapine 25 milliGRAM(s) Oral two times a day  vancomycin  IVPB 1000 milliGRAM(s) IV Intermittent every 12 hours    MEDICATIONS  (PRN):  acetaminophen    Suspension .. 650 milliGRAM(s) Oral every 6 hours PRN Temp greater or equal to 38.5C (101.3F)  sodium chloride 0.9% lock flush 10 milliLiter(s) IV Push every 1 hour PRN Pre/post blood products, medications, blood draw, and to maintain line patency      Vitals:  Vital Signs Last 24 Hrs  T(C): 37.4 (04 May 2020 08:00), Max: 37.9 (03 May 2020 13:30)  T(F): 99.4 (04 May 2020 08:00), Max: 100.3 (03 May 2020 13:30)  HR: 54 (04 May 2020 09:00) (54 - 109)  RR: 36 (04 May 2020 09:00) (36 - 47)  SpO2: 98% (04 May 2020 09:00) (91% - 98%)    Vitals (Invasive):  ABP: 165/67 (20 @ 09:00) (102/43 - 165/67)    I&O's Summary    03 May 2020 07:01  -  04 May 2020 07:00  --------------------------------------------------------  IN: 3265.3 mL / OUT: 3050 mL / NET: 215.3 mL    04 May 2020 07:  -  04 May 2020 11:03  --------------------------------------------------------  IN: 322.1 mL / OUT: 0 mL / NET: 322.1 mL      Physical Exam:  General: Intubated; sedated  GI: NG tube, bolus feeds, rectal tube; famotidine ppx  : Roque      Labs:  COVID-19 PCR: Detected (20 @ 23:31)                        8.0    26.19 )-----------( 231      ( 04 May 2020 00:00 )             27.8     05-04    139  |  99  |  14  ----------------------------<  149<H>  4.5   |  31  |  0.31<L>    Ca    8.2<L>      04 May 2020 00:00  Phos  2.1     05-04  Mg     1.9     05-04    TPro  6.4  /  Alb  2.1<L>  /  TBili  0.2  /  DBili  x   /  AST  14  /  ALT  14  /  AlkPhos  155<H>  05-04  PT/INR - ( 04 May 2020 09:40 )   PT: 17.3 sec;   INR: 1.49 ratio    PTT - ( 04 May 2020 09:40 )  PTT:41.1 sec    Urinalysis Basic - ( 03 May 2020 21:56 )  Color: Light Yellow / Appearance: Clear / S.015 / pH: x  Gluc: x / Ketone: Negative  / Bili: Negative / Urobili: <2 mg/dL   Blood: x / Protein: 30 mg/dL / Nitrite: Negative   Leuk Esterase: Negative / RBC: 28 /HPF / WBC 3 /HPF   Sq Epi: x / Non Sq Epi: 2 /HPF / Bacteria: Negative    Culture - Sputum (collected 20 @ 00:06)  Source: .Sputum   Gram Stain (20 @ 03:19):    Few polymorphonuclear leukocytes per low power field    Few Squamous epithelial cells per low power field    Numerous Gram Negative Rods seen per oil power field      COVID related labs:  04 May 2020 09:41  D-dimer:  x  Procalcitonin:  x  CRP:  x  LDH:  588<H>  CK:  19<L>  Troponin HS:  x  Ferritin, Serum: 607  BNP:  x      Blood Gases:  (ARTERIAL):  20 @ 09:29  pH 7.37 / pCO2 61 / pO2 106 / HCO3 34  Total CO2 36  FiO2 --  Oxygen Saturation 98      Radiology:  CT chest results consistent with multifocal bilateral ground glass opacities

## 2020-05-04 NOTE — GOALS OF CARE CONVERSATION - ADVANCED CARE PLANNING - CONVERSATION DETAILS
I spoke with wife and daughter via phone numbers listed above.    I provided updates regarding current status and plan of care for the day. Per thei wife they had never had any discussion regarding end of life care and so she doesn't know what he would want. Family understands patient remains critically ill at this time and that he is not making progress on the ventilator and that there is a good chance we won't be able to wean him off at all. The option for palliative extubation was offered as well as a DNR but prior to making any advanced decisions the daughter would like to know if any additional therapy such as steroids or proning could make any improvement, MD Kemp spoke with her as well and decision was made to give another course of steroids.   At this time the family would like to continue full supportive care and they will discuss amongst their family and let us know of any changes regarding advanced planning. Emotional support provided, social work assistance offered and the wife would like to set up a video call, social work notified and will arrange. All questions and concerns addressed.
Met with the patient's family (as above) and Dr. Quan. The patient's family described the patient as an introverted but independent person that enjoyed traveling, gardening, going to Buddhism,  and doing activities in the house including financial duties. Although he suffered from Delusional disorder, his illness was controlled on medications. His family was also able to give a good understanding about the patient's acute advanced illness (prolonged respiratory failure, vent dependent likely 2/2 to COVID 19 with severe functional impairment. Furthermore, still needing sedations). We discussed about the patient still being not fully stable and that if  for some reason here were to be able to be ready for DC that the next step will probably be a vent facility. After further d/w his family they agree on that based on substituted judgement, the patient, may not have wanted to prolong his life under the current conditions. They indicated that at this point he may have placed quality over quantity of life. His family was reassured into that the patient, the primary, as well as themselves have done everything possible, however, that unfortunately, COVID 19 can be an aggressive illness where meaningful recovery may not be possible, such as in this case.     His family will further discuss about a compassionate extubation. They will also try to define about their desire for coming in to visit the patient or face timing with him.     His family confirmed the patient is DNR.     Emotional support was provided.     Will call a chaplaincy referral.     Will f/u with them tomorrow.     40' were spent in ACP.

## 2020-05-04 NOTE — PROGRESS NOTE ADULT - ASSESSMENT
Plan    Neuro:   - Continue sedation with Propofol & Ketamine at 1.75--> titrate down 0.5 daily  - Seroquel 25mg BID started, QTc .407 (5/4)  - Oxycodone 10 IR [id number] analgesia  - Klonopin TID    Resp:  - S/p trach on 4/23/20  - Vent-->AC; 300/35/46%/5    CV:  - Continue Levo for blood pressure support  - Goal MAP >65    Heme:  - continue aspirin chewable 81 mg PO daily  - VTE prophylaxis, enoxaparin Injectable 40 mg BID  - Daily CBC and Trend hgb    :   - Continue monitoring urine output  - Strict I/O monitoring    GI:   - Tolerating tube feeds,  Glucerna 1.5 @ 300 [id number]   - Pepcid for GI prophylaxis     Endo:  - Glucose control, FSBS monitoring with a goal of 140-180, continue AISS  - insulin lispro (HumaLOG) corrective regimen sliding scale   SubCutaneous every 6 hours  - Synthroid for Hypothyroidism     ID:   - Completed COVID medication (Plaquenil, Vit C/Thiamine, Anakirna, Solumedrol)  - S/p convalescent plasma on 4/23   - s/p cefepime x 7 days for pseudomonas aeruginosa in sputum on 4/24-5/2  - f/u BC, UC, Sputum cx  - c/w zosyn IV through 5/11  - d/c vanco if blood cx if neg      Code: DNR- Will follow up with family regarding goals of care after family meeting with palliative later today.     Disposition: Patient requires continued monitoring in ICU

## 2020-05-04 NOTE — CHART NOTE - NSCHARTNOTEFT_GEN_A_CORE
Family meeting via conference call held today with Dr. Pitts (palliative), Myself, Patients wife and two children.   We discussed Mr. Reyes's clinical course in depth as well as his goals and his family's goals. We discussed what a road to recovery may look like for him, if at all possible. The family will take some time discuss, in general feel their father has fought for over a month and are now expressing that they feel their treatment focus may need to be steered towards quality of life, symptomatic treatment. They would like to come visit him prior to any extubation and his daughter would like to facetime (they called back later plan to visit tomorrow morning).     He is DNR. We will continue current level of care. Will plan for family visits/ facetimes. Appreciate input and guidance of palliative care.   See palliative care for more detailed meeting notes.

## 2020-05-05 NOTE — PROGRESS NOTE ADULT - PROBLEM SELECTOR PLAN 1
Planning for compassionate removal of the ventilator.   Will advise to change vent setting to CPAP 5 and Fi02 21% while changing alarm setting to 60". If RR > 28 and or signs of distress, then give Morphine 3 mg IV and changes mode back to AC. After 20' change mode to CPAP again and if still RR > 28 or labored breathing, then give Morphine 6 mg and change vent mode back to AC. After 20 minutes change back to CPAP and if still in distress, then give Ativan 0.5 to 1 mg IV and change mode back to AC. If after 20' there is still distress then call me ([phone]). If the patient's symptoms become controlled then schedule the dose of Morphine that achieved symptoms controlled q 4 h or multiply the dose by 6 and divide by 24 for and infusion rate. Even if an infusion is started, PRN IV doses  should be available. After extubation, pleaser have on hand Ativan 0.5 to 1 mg and Morphine 6 mg for acute symptoms post extubation.   Please also give glyco 0.4 mg prior to extubation. Planning for compassionate removal of the ventilator.   Will advise to change vent setting to CPAP 5 and Fi02 21% while changing alarm setting to 60". If RR > 28 and or signs of distress, then give Morphine 3 mg IV and changes mode back to AC. After 20' change mode to CPAP again and if still RR > 28 or labored breathing, then give Morphine 6 mg and change vent mode back to AC. After 20 minutes change back to CPAP and if still in distress, then give Ativan 0.5 to 1 mg IV and change mode back to AC. If after 20' there is still distress then call me ([phone]). If the patient's symptoms become controlled then schedule the dose of Morphine that achieved symptoms controlled q 4 h or multiply the dose by 6 and divide by 24 for and infusion rate. Even if an infusion is started, PRN IV doses should be available. After extubation, please have on hand Ativan 0.5 to 1 mg and Morphine 6 mg for acute symptoms post extubation.   Please also give glyco 0.4 mg prior to extubation.

## 2020-05-05 NOTE — PROGRESS NOTE ADULT - SUBJECTIVE AND OBJECTIVE BOX
SUBJECTIVE AND OBJECTIVE:  INTERVAL HPI/OVERNIGHT EVENTS:    DNR on chart: Yes    Allergies    mushrooms (Vomiting)  No Known Drug Allergies    Intolerances    MEDICATIONS  (STANDING):  aspirin  chewable 81 milliGRAM(s) Oral daily  chlorhexidine 0.12% Liquid 15 milliLiter(s) Oral Mucosa every 12 hours  chlorhexidine 2% Cloths 1 Application(s) Topical <User Schedule>  enoxaparin Injectable 40 milliGRAM(s) SubCutaneous two times a day  famotidine    Tablet 20 milliGRAM(s) Oral daily  glycopyrrolate Injectable 2 milliGRAM(s) IV Push once  insulin lispro (HumaLOG) corrective regimen sliding scale   SubCutaneous every 6 hours  ketamine Infusion. 2 mG/kG/Hr (15.4 mL/Hr) IV Continuous <Continuous>  levothyroxine Injectable 75 MICROGram(s) IV Push at bedtime  morphine  - Injectable 6 milliGRAM(s) IV Push once  morphine  Infusion 1.5 mG/Hr (1.5 mL/Hr) IV Continuous <Continuous>  norepinephrine Infusion 0.11 MICROgram(s)/kG/Min (7.95 mL/Hr) IV Continuous <Continuous>  oxyCODONE    IR 10 milliGRAM(s) Oral every 6 hours  petrolatum Ophthalmic Ointment 1 Application(s) Both EYES two times a day  piperacillin/tazobactam IVPB.. 3.375 Gram(s) IV Intermittent every 8 hours  propofol Infusion 20 MICROgram(s)/kG/Min (9.25 mL/Hr) IV Continuous <Continuous>  QUEtiapine 25 milliGRAM(s) Oral two times a day  vancomycin  IVPB 1000 milliGRAM(s) IV Intermittent every 12 hours    MEDICATIONS  (PRN):  acetaminophen    Suspension .. 650 milliGRAM(s) Oral every 6 hours PRN Temp greater or equal to 38.5C (101.3F)  sodium chloride 0.9% lock flush 10 milliLiter(s) IV Push every 1 hour PRN Pre/post blood products, medications, blood draw, and to maintain line patency      ITEMS UNCHECKED ARE NOT PRESENT    PRESENT SYMPTOMS: [ ]Unable to obtain due to poor mentation   Source if other than patient:  [ ]Family   [ ]Team     Pain:  [ ]yes [ ]no  QOL impact -   Location -                    Aggravating factors -  Quality -  Radiation -  Timing-  Severity (0-10 scale):  Minimal acceptable level (0-10 scale):     Dyspnea:                           [ ]Mild [ ]Moderate [ ]Severe  Anxiety:                             [ ]Mild [ ]Moderate [ ]Severe  Fatigue:                             [ ]Mild [ ]Moderate [ ]Severe  Nausea:                             [ ]Mild [ ]Moderate [ ]Severe  Loss of appetite:              [ ]Mild [ ]Moderate [ ]Severe  Constipation:                    [ ]Mild [ ]Moderate [ ]Severe    CPOT:    https://www.Psychiatric.org/getattachment/[email or web address]/Critical-Care-Pain-Observation-Tool-(CPOT)    PAIN AD Score:	  http://geriatrictoolkit.[facility]/cog/painad.pdf (Ctrl + left click to view)    Other Symptoms:  [ ]All other review of systems negative     Palliative Performance Status Version 2:         %      http://[facility].org/files/news/palliative_performance_scale_ppsv2.pdf  PHYSICAL EXAM:  Vital Signs Last 24 Hrs  T(C): 36.5 (05 May 2020 13:00), Max: 37.2 (04 May 2020 20:00)  T(F): 97.7 (05 May 2020 13:00), Max: 99 (04 May 2020 20:00)  HR: 73 (05 May 2020 13:00) (71 - 90)  BP: --  BP(mean): --  RR: 37 (05 May 2020 13:00) (22 - 44)  SpO2: 97% (05 May 2020 13:00) (91% - 99%) I&O's Summary    04 May 2020 07:  -  05 May 2020 07:00  --------------------------------------------------------  IN: 3068.4 mL / OUT: 1705 mL / NET: 1363.4 mL    05 May 2020 07:01  -  05 May 2020 13:04  --------------------------------------------------------  IN: 277.9 mL / OUT: 450 mL / NET: -172.1 mL       GENERAL:  [ ]Alert  [ ]Oriented x   [ ]Lethargic  [ ]Cachexia  [ ]Unarousable  [ ]Verbal  [ ]Non-Verbal  Behavioral:   [ ]Anxiety  [ ]Delirium [ ]Agitation [ ]Other  HEENT:  [ ]Normal   [ ]Dry mouth   [ ]ET Tube/Trach  [ ]Oral lesions  PULMONARY:   [ ]Clear [ ]Tachypnea  [ ]Audible excessive secretions   [ ]Rhonchi        [ ]Right [ ]Left [ ]Bilateral  [ ]Crackles        [ ]Right [ ]Left [ ]Bilateral  [ ]Wheezing     [ ]Right [ ]Left [ ]Bilateral  [ ]Diminished BS [ ] Right [ ]Left [ ]Bilateral  CARDIOVASCULAR:    [ ]Regular [ ]Irregular [ ]Tachy  [ ]Dario [ ]Murmur [ ]Other  GASTROINTESTINAL:  [ ]Soft  [ ]Distended   [ ]+BS  [ ]Non tender [ ]Tender  [ ]PEG [ ]OGT/ NGT   Last BM:   20 @ 07:01  -  20 @ 07:00  --------------------------------------------------------  OUT: 315 mL    20 @ 07:01  -  20 @ 07:00  --------------------------------------------------------  OUT: 100 mL       GENITOURINARY:  [ ]Normal [ ]Incontinent   [ ]Oliguria/Anuria   [ ]Roque  MUSCULOSKELETAL:   [ ]Normal   [ ]Weakness  [ ]Bed/Wheelchair bound [ ]Edema  NEUROLOGIC:   [ ]No focal deficits  [ ] Cognitive impairment  [ ] Dysphagia [ ]Dysarthria [ ] Paresis [ ]Other   SKIN:   [ ]Normal  [ ]Rash   [ ]Pressure ulcer(s) [ ]y [ ]n present on admission    CRITICAL CARE:  [ ]Shock Present  [ ]Septic [ ]Cardiogenic [ ]Neurologic [ ]Hypovolemic  [ ]Vasopressors [ ]Inotropes  [ ]Respiratory failure present [ ]Mechanical Ventilation [ ]Non-invasive ventilatory support [ ]High-Flow Mode: AC/ CMV (Assist Control/ Continuous Mandatory Ventilation), RR (machine): 35, TV (machine): 300, FiO2: 55, PEEP: 5, ITime: 1, MAP: 19, PIP: 50  [ ]Acute  [ ]Chronic [ ]Hypoxic  [ ]Hypercarbic [ ]Other  [ ]Other organ failure     LABS:                        8.1    16.80 )-----------( 226      ( 05 May 2020 00:49 )             27.1   05-    137  |  96  |  12  ----------------------------<  168<H>  4.3   |  32<H>  |  0.31<L>    Ca    8.6      05 May 2020 00:49  Phos  2.3     05-  Mg     2.1     -    TPro  6.7  /  Alb  2.2<L>  /  TBili  0.3  /  DBili  x   /  AST  14  /  ALT  15  /  AlkPhos  198<H>  05-  PT/INR - ( 05 May 2020 00:49 )   PT: 15.5 sec;   INR: 1.34 ratio         PTT - ( 05 May 2020 00:49 )  PTT:37.1 sec    Urinalysis Basic - ( 03 May 2020 21:56 )    Color: Light Yellow / Appearance: Clear / S.015 / pH: x  Gluc: x / Ketone: Negative  / Bili: Negative / Urobili: <2 mg/dL   Blood: x / Protein: 30 mg/dL / Nitrite: Negative   Leuk Esterase: Negative / RBC: 28 /HPF / WBC 3 /HPF   Sq Epi: x / Non Sq Epi: 2 /HPF / Bacteria: Negative      RADIOLOGY & ADDITIONAL STUDIES:    Protein Calorie Malnutrition Present: [ ]mild [ ]moderate [ ]severe [ ]underweight [ ]morbid obesity  https://www.andeal.org/vault/2660/web/files/ONC/Table_Clinical%20Characteristics%20to%20Document%20Malnutrition-White%20JV%20et%20al%2020.pdf    Height (cm): 175.26 (20 @ 16:15)  Weight (kg): 77.1 (20 @ 16:15)  BMI (kg/m2): 25.1 (20 @ 16:15)    [ ]PPSV2 < or = 30%  [ ]significant weight loss [ ]poor nutritional intake [ ]anasarca   Albumin, Serum: 2.2 g/dL (20 @ 00:49)   [ ]Artificial Nutrition    REFERRALS:   [ ]Chaplaincy  [ ]Hospice  [ ]Child Life  [ ]Social Work  [ ]Case management [ ]Holistic Therapy     Goals of Care Document:  SRoyce Pitts (20 @ 19:12)  Goals of Care Conversation:   Participants:  · Participants  Family; Staff  · Spouse  Jolly  · Child(du)  Dr. Lee (Daughter), Kulwant (Son)  · Provider  Radha Quan). Nitin Pitts MD    Advance Directives:  · Does patient have Advance Directive  No  · Do you want to complete the HCP and name a Max Care Agent  No  · Does Patient Have a Surrogate  Yes  · Surrogate's Name  wife    Conversation Discussion:  · Conversation  Diagnosis; Prognosis; Treatment Options  · Conversation Details  Met with the patient's family (as above) and Dr. Quan. The patient's family described the patient as an introverted but independent person that enjoyed traveling, gardening, going to [employer],  and doing activities in the house including financial duties. Although he suffered from Delusional disorder, his illness was controlled on medications. His family was also able to give a good understanding about the patient's acute advanced illness (prolonged respiratory failure, vent dependent likely 2/2 to COVID 19 with severe functional impairment. Furthermore, still needing sedations). We discussed about the patient still being not fully stable and that if  for some reason here were to be able to be ready for DC that the next step will probably be a vent facility. After further d/w his family they agree on that based on substituted judgement, the patient, may not have wanted to prolong his life under the current conditions. They indicated that at this point he may have placed quality over quantity of life. His family was reassured into that the patient, the primary, as well as themselves have done everything possible, however, that unfortunately, COVID 19 can be an aggressive illness where meaningful recovery may not be possible, such as in this case.     His family will further discuss about a compassionate extubation. They will also try to define about their desire for coming in to visit the patient or face timing with him.     His family confirmed the patient is DNR.     Emotional support was provided.     Will call a chaplaincy referral.     Will f/u with them tomorrow.     40' were spent in ACP.      Electronic Signatures:  Nitin Pitts)  (Signed 04-May-2020 19:31)  	Authored: Goals of Care Conversation      Last Updated: 04-May-2020 19:31 by Nitin Pitts) 71 yo male with DM2, hypothyroidism, hld, p/w cough, low grade fever x 2-3 days. He was dx with COVID 19 infection that c/b ARDS and becoming Vent dependent s/p trach without being able to be wean of the Vent. Furthermore, with sepsis and encephalopathy. Palliative care is seen for GOC and ACP.     SUBJECTIVE AND OBJECTIVE: Family decided for a compassionate removal of the ventilator today.   INTERVAL HPI/OVERNIGHT EVENTS: As above.     DNR on chart: Yes    Allergies    mushrooms (Vomiting)  No Known Drug Allergies    Intolerances    MEDICATIONS  (STANDING):  aspirin  chewable 81 milliGRAM(s) Oral daily  chlorhexidine 0.12% Liquid 15 milliLiter(s) Oral Mucosa every 12 hours  chlorhexidine 2% Cloths 1 Application(s) Topical <User Schedule>  enoxaparin Injectable 40 milliGRAM(s) SubCutaneous two times a day  famotidine    Tablet 20 milliGRAM(s) Oral daily  glycopyrrolate Injectable 2 milliGRAM(s) IV Push once  insulin lispro (HumaLOG) corrective regimen sliding scale   SubCutaneous every 6 hours  ketamine Infusion. 2 mG/kG/Hr (15.4 mL/Hr) IV Continuous <Continuous>  levothyroxine Injectable 75 MICROGram(s) IV Push at bedtime  morphine  - Injectable 6 milliGRAM(s) IV Push once  morphine  Infusion 1.5 mG/Hr (1.5 mL/Hr) IV Continuous <Continuous>  norepinephrine Infusion 0.11 MICROgram(s)/kG/Min (7.95 mL/Hr) IV Continuous <Continuous>  oxyCODONE    IR 10 milliGRAM(s) Oral every 6 hours  petrolatum Ophthalmic Ointment 1 Application(s) Both EYES two times a day  piperacillin/tazobactam IVPB.. 3.375 Gram(s) IV Intermittent every 8 hours  propofol Infusion 20 MICROgram(s)/kG/Min (9.25 mL/Hr) IV Continuous <Continuous>  QUEtiapine 25 milliGRAM(s) Oral two times a day  vancomycin  IVPB 1000 milliGRAM(s) IV Intermittent every 12 hours    MEDICATIONS  (PRN):  acetaminophen    Suspension .. 650 milliGRAM(s) Oral every 6 hours PRN Temp greater or equal to 38.5C (101.3F)  sodium chloride 0.9% lock flush 10 milliLiter(s) IV Push every 1 hour PRN Pre/post blood products, medications, blood draw, and to maintain line patency    No review of symptoms (ROS) or direct physical were performed in order to decrease unnecessary exposure of a provider due to current COVID 19 pandemic.     ITEMS UNCHECKED ARE NOT PRESENT    PRESENT SYMPTOMS: [x ]Unable to obtain due to poor mentation   Source if other than patient:  [ ]Family   [ ]Team     Pain:  [ ]yes [ ]no  QOL impact -   Location -                    Aggravating factors -  Quality -  Radiation -  Timing-  Severity (0-10 scale):  Minimal acceptable level (0-10 scale):     Dyspnea:                           [ ]Mild [ ]Moderate [ ]Severe  Anxiety:                             [ ]Mild [ ]Moderate [ ]Severe  Fatigue:                             [ ]Mild [ ]Moderate [ ]Severe  Nausea:                             [ ]Mild [ ]Moderate [ ]Severe  Loss of appetite:              [ ]Mild [ ]Moderate [ ]Severe  Constipation:                    [ ]Mild [ ]Moderate [ ]Severe    CPOT:    https://www.[facility].org/getattachment/[email or web address]/Critical-Care-Pain-Observation-Tool-(CPOT)    PAIN AD Score:	  http://geriatrictoolkit.[facility]/cog/painad.pdf (Ctrl + left click to view)    Other Symptoms:  [ ]All other review of systems negative     Palliative Performance Status Version 2:  10       %      http://npcrc.org/files/news/palliative_performance_scale_ppsv2.pdf  PHYSICAL EXAM:  Vital Signs Last 24 Hrs  T(C): 36.5 (05 May 2020 13:00), Max: 37.2 (04 May 2020 20:00)  T(F): 97.7 (05 May 2020 13:00), Max: 99 (04 May 2020 20:00)  HR: 73 (05 May 2020 13:00) (71 - 90)  BP: --  BP(mean): --  RR: 37 (05 May 2020 13:00) (22 - 44)  SpO2: 97% (05 May 2020 13:00) (91% - 99%) I&O's Summary    04 May 2020 07:01  -  05 May 2020 07:00  --------------------------------------------------------  IN: 3068.4 mL / OUT: 1705 mL / NET: 1363.4 mL    05 May 2020 07:  -  05 May 2020 13:04  --------------------------------------------------------  IN: 277.9 mL / OUT: 450 mL / NET: -172.1 mL    As per primary team:   General: Intubated; sedated  GI: NG tube, bolus feeds, rectal tube; famotidine ppx  : Jude       GENERAL:  [ ]Alert  [ ]Oriented x   [ ]Lethargic  [ ]Cachexia  [x ]Unarousable  [ ]Verbal  [ ]Non-Verbal  Behavioral:   [ ]Anxiety  [ ]Delirium [ ]Agitation [ ]Other  HEENT:  [ ]Normal   [ ]Dry mouth   [ ]ET Tube/Trach  [ ]Oral lesions  PULMONARY:   [ ]Clear [ ]Tachypnea  [ ]Audible excessive secretions   [ ]Rhonchi        [ ]Right [ ]Left [ ]Bilateral  [ ]Crackles        [ ]Right [ ]Left [ ]Bilateral  [ ]Wheezing     [ ]Right [ ]Left [ ]Bilateral  [ ]Diminished BS [ ] Right [ ]Left [ ]Bilateral  [x] labored breathing   CARDIOVASCULAR:    [ ]Regular [ ]Irregular [ ]Tachy  [ ]Dario [ ]Murmur [ ]Other  GASTROINTESTINAL:  [ ]Soft  [ ]Distended   [ ]+BS  [ ]Non tender [ ]Tender  [ ]PEG [ ]OGT/ NGT   Last BM:   20 @ 07:  -  20 @ 07:00  --------------------------------------------------------  OUT: 315 mL    20 @ 07:  -  20 @ 07:00  --------------------------------------------------------  OUT: 100 mL     GENITOURINARY:  [ ]Normal [ ]Incontinent   [ ]Oliguria/Anuria   [ ]Roque  MUSCULOSKELETAL:   [ ]Normal   [ ]Weakness  [ ]Bed/Wheelchair bound [ ]Edema  NEUROLOGIC:   [ ]No focal deficits  [ ] Cognitive impairment  [ ] Dysphagia [ ]Dysarthria [ ] Paresis [ ]Other   SKIN:   [ ]Normal  [ ]Rash   [ ]Pressure ulcer(s) [ ]y [ ]n present on admission    CRITICAL CARE:  [ ]Shock Present  [ ]Septic [ ]Cardiogenic [ ]Neurologic [ ]Hypovolemic  [ ]Vasopressors [ ]Inotropes  [ ]Respiratory failure present [ ]Mechanical Ventilation [ ]Non-invasive ventilatory support [ ]High-Flow Mode: AC/ CMV (Assist Control/ Continuous Mandatory Ventilation), RR (machine): 35, TV (machine): 300, FiO2: 55, PEEP: 5, ITime: 1, MAP: 19, PIP: 50  [ ]Acute  [ ]Chronic [ ]Hypoxic  [ ]Hypercarbic [ ]Other  [ ]Other organ failure     LABS:                        8.1    16.80 )-----------( 226      ( 05 May 2020 00:49 )             27.1   05-05    137  |  96  |  12  ----------------------------<  168<H>  4.3   |  32<H>  |  0.31<L>    Ca    8.6      05 May 2020 00:49  Phos  2.3     05-05  Mg     2.1     05-05    TPro  6.7  /  Alb  2.2<L>  /  TBili  0.3  /  DBili  x   /  AST  14  /  ALT  15  /  AlkPhos  198<H>  05-05  PT/INR - ( 05 May 2020 00:49 )   PT: 15.5 sec;   INR: 1.34 ratio         PTT - ( 05 May 2020 00:49 )  PTT:37.1 sec    Urinalysis Basic - ( 03 May 2020 21:56 )    Color: Light Yellow / Appearance: Clear / S.015 / pH: x  Gluc: x / Ketone: Negative  / Bili: Negative / Urobili: <2 mg/dL   Blood: x / Protein: 30 mg/dL / Nitrite: Negative   Leuk Esterase: Negative / RBC: 28 /HPF / WBC 3 /HPF   Sq Epi: x / Non Sq Epi: 2 /HPF / Bacteria: Negative      RADIOLOGY & ADDITIONAL STUDIES:  < from: Xray Chest 1 View- PORTABLE-Urgent (20 @ 12:29) >  EXAM:  XR CHEST PORTABLE URGENT 1V                            PROCEDURE DATE:  2020            INTERPRETATION:  A single chest x-ray was obtained on May 2, 2020.    Indication: New line placement on the left.    Impression:    The heart is normal in size. Diffuse airspace opacities are seen in both lungs which remain unchanged when compared to previous study done 2020. At 9:03 AM. A tracheostomy tube is in good position. A new central line  was placed on the left and the tip is in the superior vena cava. An additional line is seen on the right and the tip is in the superior vena cava. NG tube is in the stomach however its tip is not seen on the current study.                    HERMAN JENSEN M.D., ATTENDING RADIOLOGIST  This document has been electronically signed. May  2 2020 12:56PM    < end of copied text >    Protein Calorie Malnutrition Present: [ ]mild [ ]moderate [ ]severe [ ]underweight [ ]morbid obesity  https://www.andeal.org/vault/2440/web/files/ONC/Table_Clinical%20Characteristics%20to%20Document%20Malnutrition-White%20JV%20et%20al%195126.pdf    Height (cm): 175.26 (20 @ 16:15)  Weight (kg): 77.1 (20 @ 16:15)  BMI (kg/m2): 25.1 (20 @ 16:15)    [ ]PPSV2 < or = 30%  [ ]significant weight loss [ ]poor nutritional intake [ ]anasarca   Albumin, Serum: 2.2 g/dL (20 @ 00:49)   [ ]Artificial Nutrition    REFERRALS:   [ ]Chaplaincy  [ ]Hospice  [ ]Child Life  [ ]Social Work  [ ]Case management [ ]Holistic Therapy     Goals of Care Document:  DORA Pitts (20 @ 19:12)  Goals of Care Conversation:   Participants:  · Participants  Family; Staff  · Spouse  Jolly  · Child(du)  Dr. Lee (Daughter), Kulwant (Son)  · Provider  Radha Quan). Nitin Pitts MD    Advance Directives:  · Does patient have Advance Directive  No  · Do you want to complete the HCP and name a Max Care Agent  No  · Does Patient Have a Surrogate  Yes  · Surrogate's Name  wife    Conversation Discussion:  · Conversation  Diagnosis; Prognosis; Treatment Options  · Conversation Details  Met with the patient's family (as above) and Dr. Quan. The patient's family described the patient as an introverted but independent person that enjoyed traveling, gardening, going to [employer],  and doing activities in the house including financial duties. Although he suffered from Delusional disorder, his illness was controlled on medications. His family was also able to give a good understanding about the patient's acute advanced illness (prolonged respiratory failure, vent dependent likely 2/2 to COVID 19 with severe functional impairment. Furthermore, still needing sedations). We discussed about the patient still being not fully stable and that if  for some reason here were to be able to be ready for DC that the next step will probably be a vent facility. After further d/w his family they agree on that based on substituted judgement, the patient, may not have wanted to prolong his life under the current conditions. They indicated that at this point he may have placed quality over quantity of life. His family was reassured into that the patient, the primary, as well as themselves have done everything possible, however, that unfortunately, COVID 19 can be an aggressive illness where meaningful recovery may not be possible, such as in this case.     His family will further discuss about a compassionate extubation. They will also try to define about their desire for coming in to visit the patient or face timing with him.     His family confirmed the patient is DNR.     Emotional support was provided.     Will call a chaplaincy referral.     Will f/u with them tomorrow.     40' were spent in ACP.      Electronic Signatures:  Nitin Pitts)  (Signed 04-May-2020 19:31)  	Authored: Goals of Care Conversation      Last Updated: 04-May-2020 19:31 by Nitin Pitts)

## 2020-05-05 NOTE — PROGRESS NOTE ADULT - ATTENDING COMMENTS
ARDS in setting of COVID prolonged hospital course now trach, c/b encephalopathy and difficulty with sedation weaning, recent VAP, now complicated by new sepsis.     ARDS: S/P Trach, ventilator dependent >1 month with persistently elevated airway pressures, likely from a combination of COVID and VAP. Has not tolerated weaning trials prior to transfer, and at present. Per discussion with family, will likely remove from ventilator this afternoon. They will come visit prior.   Will continue sedation, ensure he is comfortable.    Sepsis: Hypothermic, with significant leukocytosis, and increasing pressor requirements. Only potential localizing source is VAP-thick, copious secretions. Now on Vancomycin and Zosyn. Blood, sputum, urine cultures are pending. Continue BS antibiotics. Lines have been exchanged 5/2.   We will continue full support until final decision is made with family re: comfort measures only.    Goals of care: See brief note. Family will come visit this morning. Appreciate input of our palliative care team, Dr. Pitts, who has been immensely helpful in navigating this difficult situation.    Prognosis guarded. Patient remains critically ill, ventilator dependent, requires ICU level of care.  55 minutes critical care time

## 2020-05-05 NOTE — PROGRESS NOTE ADULT - SUBJECTIVE AND OBJECTIVE BOX
Admit Date: 20  Length of Stay: 38d    70yMale    Reason for admission to ICU:  Patient is in acute hypoxic respiratory distress requiring intubation and sedation. Imaging c/w ARDS. Admitted to MICU for further observation and management.    INTERVAL HPI/OVERNIGHT EVENTS: No acute events overnight.      MEDICATIONS  (STANDING):  aspirin  chewable 81 milliGRAM(s) Oral daily  chlorhexidine 0.12% Liquid 15 milliLiter(s) Oral Mucosa every 12 hours  chlorhexidine 2% Cloths 1 Application(s) Topical <User Schedule>  clonazePAM  Tablet 2 milliGRAM(s) Oral every 8 hours  enoxaparin Injectable 40 milliGRAM(s) SubCutaneous two times a day  famotidine    Tablet 20 milliGRAM(s) Oral daily  insulin lispro (HumaLOG) corrective regimen sliding scale   SubCutaneous every 6 hours  ketamine Infusion. 2 mG/kG/Hr (15.4 mL/Hr) IV Continuous <Continuous>  levothyroxine Injectable 75 MICROGram(s) IV Push at bedtime  norepinephrine Infusion 0.11 MICROgram(s)/kG/Min (7.95 mL/Hr) IV Continuous <Continuous>  oxyCODONE    IR 10 milliGRAM(s) Oral every 6 hours  petrolatum Ophthalmic Ointment 1 Application(s) Both EYES two times a day  piperacillin/tazobactam IVPB.. 3.375 Gram(s) IV Intermittent every 8 hours  propofol Infusion 20 MICROgram(s)/kG/Min (9.25 mL/Hr) IV Continuous <Continuous>  QUEtiapine 25 milliGRAM(s) Oral two times a day  vancomycin  IVPB 1000 milliGRAM(s) IV Intermittent every 12 hours    MEDICATIONS  (PRN):  acetaminophen    Suspension .. 650 milliGRAM(s) Oral every 6 hours PRN Temp greater or equal to 38.5C (101.3F)  sodium chloride 0.9% lock flush 10 milliLiter(s) IV Push every 1 hour PRN Pre/post blood products, medications, blood draw, and to maintain line patency      Vital Signs Last 24 Hrs  ICU Vital Signs Last 24 Hrs  T(C): 36.7 (05 May 2020 08:00), Max: 37.2 (04 May 2020 20:00)  T(F): 98.1 (05 May 2020 08:00), Max: 99 (04 May 2020 20:00)  HR: 75 (05 May 2020 09:36) (53 - 90)  ABP: 171/69 (05 May 2020 09:36) (82/37 - 171/69)  ABP(mean): 106 (05 May 2020 09:36) (50 - 106)  RR: 44 (05 May 2020 09:36) (22 - 44)  SpO2: 92% (05 May 2020 09:36) (92% - 99%)      I&O's Summary    04 May 2020 07:01  -  05 May 2020 07:00  --------------------------------------------------------  IN: 3068.4 mL / OUT: 1705 mL / NET: 1363.4 mL    05 May 2020 07:01  -  05 May 2020 10:03  --------------------------------------------------------  IN: 103.9 mL / OUT: 0 mL / NET: 103.9 mL          Physical Exam:  General: Intubated; sedated  GI: NG tube, bolus feeds, rectal tube; famotidine ppx  : Roque    Labs:  COVID-19 PCR: Detected (20 @ 23:31)                          8.1    16.80 )-----------( 226      ( 05 May 2020 00:49 )             27.1       -    137  |  96  |  12  ----------------------------<  168<H>  4.3   |  32<H>  |  0.31<L>    Ca    8.6      05 May 2020 00:49  Phos  2.3     05-05  Mg     2.1     05-    TPro  6.7  /  Alb  2.2<L>  /  TBili  0.3  /  DBili  x   /  AST  14  /  ALT  15  /  AlkPhos  198<H>  05-05        ABG - ( 05 May 2020 00:40 )  pH, Arterial: 7.39  pH, Blood: x     /  pCO2: 60    /  pO2: 100   / HCO3: 35    / Base Excess: 9.6   /  SaO2: 98        PT/INR - ( 05 May 2020 00:49 )   PT: 15.5 sec;   INR: 1.34 ratio     PTT - ( 05 May 2020 00:49 )  PTT:37.1 sec    CARDIAC MARKERS ( 05 May 2020 00:49 )  x     / x     / 14 U/L / x     / x      CARDIAC MARKERS ( 04 May 2020 09:41 )  x     / x     / 19 U/L / x     / x          CAPILLARY BLOOD GLUCOSE  POCT Blood Glucose.: 175 mg/dL (04 May 2020 16:49)    Urinalysis Basic - ( 03 May 2020 21:56 )  Color: Light Yellow / Appearance: Clear / S.015 / pH: x  Gluc: x / Ketone: Negative  / Bili: Negative / Urobili: <2 mg/dL   Blood: x / Protein: 30 mg/dL / Nitrite: Negative   Leuk Esterase: Negative / RBC: 28 /HPF / WBC 3 /HPF   Sq Epi: x / Non Sq Epi: 2 /HPF / Bacteria: Negative    Culture - Sputum (collected 20 @ 00:06)  Source: .Sputum   Gram Stain (20 @ 03:19):    Few polymorphonuclear leukocytes per low power field    Few Squamous epithelial cells per low power field    Numerous Gram Negative Rods seen per oil power field      COVID related labs:  04 May 2020 09:41  D-dimer:  x  Procalcitonin:  x  CRP:  x  LDH:  588<H>  CK:  19<L>  Troponin HS:  x  Ferritin, Serum: 607  BNP:  x      Blood Gases:  (ARTERIAL):  20 @ 09:29  pH 7.37 / pCO2 61 / pO2 106 / HCO3 34  Total CO2 36  Oxygen Saturation 98      Radiology:  CT chest results consistent with multifocal bilateral ground glass opacities

## 2020-05-05 NOTE — DISCHARGE NOTE FOR THE EXPIRED PATIENT - HOSPITAL COURSE
69yo male, PMHx DM2, HLD, hypothyroidism, p/w cough & low grade fever x 2-3 days. On 3/27 patient had worsening dyspnea at rest as well as exertion which prompted him to come to the ED. Denied prior lung disease, denied any history of smoking. Patient was found to be COVID-19 positive. In the ED, the patient was noted to be hypoxic to 89% on 6L NC with significant tachypnea and labored breathing. Due to worsening respiratory distress, patient was emergently intubated in the ED on 3/27. Admitted to the ICU for acute hypoxic respiratory failure due to COVID-19. Patient mechanically ventilated utilizing lung protective strategies and requiring pressor support. Patient completed course of Plaquenil (3/27-4/1), Azithromycin (3/27-3/31), ascorbic acid, thiamine, Anakinra (3/28-3/31), solumedrol (3/28-4/2), Vancomycin & Cefipime (3/30-4/4), and second course of solumedrol (4/4-4/6). Despite supportive measures, patient was unable to tolerate CPAP trials, unable to be weaned from the vent. Failed CPAP trials 4/14-4/16. Course also complicated by intermittent episodes of bradycardia, which as per EP was likely due to vagal stimulation in setting of intubation, in combination with sedatives and narcotics. Patient received convalescent plasma on 4/23. Due to inability to be weaned from ventilator, patient underwent tracheostomy on 4/23. Completed course of Cefipime (4/24-5/2) for pseudomonas aeruginosa in sputum. Patient repeatedly failed CPAP trials. On 4/25, goals of care meeting was had and patient was made DNR. All supportive, diagnostic, and therapeutic measures continued. Despite continued supportive care measures, patient failed to display significant improvement with oxygenation and ventilation. Imaging consistent with ARDS. Sedation and analgesia regimen switched to favor Precedex, ketamine, Seroquel, and intermittent opioids as needed to provide comfort while also allowing for return of mental status. On 5/3, all sedation held to evaluate mental status, but patient unable to tolerate due to dysynchrony with vent.  On 5/4, goals of care meeting was held with family, and family demonstrated a good understanding of the patient’s acute advanced illness (prolonged respiratory failure, ventilator dependence likely 2/2 COVID-19 with severe functional impairment) and that meaningful recovery is unlikely. After further discussion with his family, they agreed, based on substituted judgment, that the patient would not have wanted to prolong life under the current conditions and that, at this point, he would likely have placed quality over quantity of life. His family agreed upon a compassionate extubation. Patient was palliatively extubated on 5/5, with appropriate comfort measures. On 5/5/2020 at 21:24, patient was pronounced dead by cardiopulmonary criteria. Family aware.

## 2020-05-05 NOTE — PROGRESS NOTE ADULT - NSREFPHYEXREFTO_GEN_ALL_CORE
ED Physical Exam
ED Physical Exam
Inpatient Physical Exam

## 2020-05-05 NOTE — PROGRESS NOTE ADULT - NSHPATTENDINGPLANDISCUSS_GEN_ALL_CORE
Dr. Ohara
Dr. Wallace
MD Kemp
MD Kemp
Palliative, MICU team
ICU team

## 2020-05-05 NOTE — PROGRESS NOTE ADULT - PROBLEM SELECTOR PLAN 3
Family was able to visit the patient this AM and his daughter was to face time with him through the procedure of compassionate removal of the vent.     I really appreciate the effort the primary team has placed for helping this family out and making sure through such a difficult time, this family has the best experience possible while their love one enters the EOL process.         Nitin Pitts MD   Geriatrics and Palliative Care ([facility]) Consult Service    of Geriatric and Palliative Medicine  [facility]      Please page the following number for clinical matters between the hours of 9 am and 5 pm from Monday through Friday : [phone]    After 5pm and on weekends, please see the contact information below:    In the event of newly developing, evolving, or worsening symptoms, please contact the Palliative Medicine team via pager (if the patient is at [facility] #[phone] or if the patient is at [facility] #[phone]) The Geriatric and Palliative Medicine service has coverage 24 hours a day/ 7 days a week to provide medical recommendations regarding symptom management needs via telephone

## 2020-05-05 NOTE — PROGRESS NOTE ADULT - ASSESSMENT
71 yo male with DM2, hypothyroidism, hld, p/w cough, low grade fever x 2-3 days. He was dx with COVID 19 infection that c/b ARDS and becoming Vent dependent s/p trach without being able to be wean of the Vent. Furthermore, with sepsis and encephalopathy. Palliative care is seen for GOC and ACP.

## 2020-05-05 NOTE — PROGRESS NOTE ADULT - ASSESSMENT
Plan    Neuro:   - Continue sedation with Propofol & Ketamine at 1  - Seroquel 25mg BID started, QTc .407 (5/4)  - Oxycodone 10 IR [id number] analgesia  - Klonopin TID    Resp:  - S/p trach on 4/23/20  - Vent-->AC; 300/35/46%/5    CV:  - Continue Levo for blood pressure support  - Goal MAP >65    Heme:  - continue aspirin chewable 81 mg PO daily  - VTE prophylaxis, enoxaparin Injectable 40 mg BID  - Daily CBC and Trend hgb    :   - Continue monitoring urine output  - Strict I/O monitoring    GI:   - Tolerating tube feeds,  Glucerna 1.5 @ 300 [id number]   - Pepcid for GI prophylaxis     Endo:  - Glucose control, FSBS monitoring with a goal of 140-180, continue AISS  - insulin lispro (HumaLOG) corrective regimen sliding scale   SubCutaneous every 6 hours  - Synthroid for Hypothyroidism     ID:   - Completed COVID medication (Plaquenil, Vit C/Thiamine, Anakirna, Solumedrol)  - S/p convalescent plasma on 4/23   - s/p cefepime x 7 days for pseudomonas aeruginosa in sputum on 4/24-5/2  - f/u BC, UC, Sputum cx  - c/w zosyn IV through 5/11  - d/c vanco if blood cx if neg      Code: DNR- Family currently at bedside. Will follow up regarding continued care vs. comfort measures/terminal extubation.     Disposition: Patient requires continued monitoring in ICU

## 2020-05-06 LAB — GLUCOSE BLDC GLUCOMTR-MCNC: 146 MG/DL — HIGH (ref 70–99)

## 2020-05-07 LAB
CULTURE RESULTS: SIGNIFICANT CHANGE UP
SPECIMEN SOURCE: SIGNIFICANT CHANGE UP

## 2020-05-08 LAB
CULTURE RESULTS: SIGNIFICANT CHANGE UP
CULTURE RESULTS: SIGNIFICANT CHANGE UP
SPECIMEN SOURCE: SIGNIFICANT CHANGE UP
SPECIMEN SOURCE: SIGNIFICANT CHANGE UP

## 2020-06-02 PROCEDURE — 87186 SC STD MICRODIL/AGAR DIL: CPT

## 2020-06-02 PROCEDURE — 87389 HIV-1 AG W/HIV-1&-2 AB AG IA: CPT

## 2020-06-02 PROCEDURE — 81001 URINALYSIS AUTO W/SCOPE: CPT

## 2020-06-02 PROCEDURE — 84100 ASSAY OF PHOSPHORUS: CPT

## 2020-06-02 PROCEDURE — 84436 ASSAY OF TOTAL THYROXINE: CPT

## 2020-06-02 PROCEDURE — 82533 TOTAL CORTISOL: CPT

## 2020-06-02 PROCEDURE — P9045: CPT

## 2020-06-02 PROCEDURE — 84132 ASSAY OF SERUM POTASSIUM: CPT

## 2020-06-02 PROCEDURE — 87635 SARS-COV-2 COVID-19 AMP PRB: CPT

## 2020-06-02 PROCEDURE — 82565 ASSAY OF CREATININE: CPT

## 2020-06-02 PROCEDURE — 80202 ASSAY OF VANCOMYCIN: CPT

## 2020-06-02 PROCEDURE — 87798 DETECT AGENT NOS DNA AMP: CPT

## 2020-06-02 PROCEDURE — 85520 HEPARIN ASSAY: CPT

## 2020-06-02 PROCEDURE — 82553 CREATINE MB FRACTION: CPT

## 2020-06-02 PROCEDURE — 87150 DNA/RNA AMPLIFIED PROBE: CPT

## 2020-06-02 PROCEDURE — 85610 PROTHROMBIN TIME: CPT

## 2020-06-02 PROCEDURE — 84480 ASSAY TRIIODOTHYRONINE (T3): CPT

## 2020-06-02 PROCEDURE — 80061 LIPID PANEL: CPT

## 2020-06-02 PROCEDURE — 82962 GLUCOSE BLOOD TEST: CPT

## 2020-06-02 PROCEDURE — L8699: CPT

## 2020-06-02 PROCEDURE — 82435 ASSAY OF BLOOD CHLORIDE: CPT

## 2020-06-02 PROCEDURE — 82947 ASSAY GLUCOSE BLOOD QUANT: CPT

## 2020-06-02 PROCEDURE — 83605 ASSAY OF LACTIC ACID: CPT

## 2020-06-02 PROCEDURE — 96374 THER/PROPH/DIAG INJ IV PUSH: CPT | Mod: XU

## 2020-06-02 PROCEDURE — 86803 HEPATITIS C AB TEST: CPT

## 2020-06-02 PROCEDURE — 87633 RESP VIRUS 12-25 TARGETS: CPT

## 2020-06-02 PROCEDURE — 82330 ASSAY OF CALCIUM: CPT

## 2020-06-02 PROCEDURE — 94003 VENT MGMT INPAT SUBQ DAY: CPT

## 2020-06-02 PROCEDURE — 85379 FIBRIN DEGRADATION QUANT: CPT

## 2020-06-02 PROCEDURE — 87486 CHLMYD PNEUM DNA AMP PROBE: CPT

## 2020-06-02 PROCEDURE — 94640 AIRWAY INHALATION TREATMENT: CPT | Mod: XU

## 2020-06-02 PROCEDURE — 84481 FREE ASSAY (FT-3): CPT

## 2020-06-02 PROCEDURE — 86706 HEP B SURFACE ANTIBODY: CPT

## 2020-06-02 PROCEDURE — 31500 INSERT EMERGENCY AIRWAY: CPT

## 2020-06-02 PROCEDURE — 87449 NOS EACH ORGANISM AG IA: CPT

## 2020-06-02 PROCEDURE — 87451 POLYVALENT MULT ORG EA AG IA: CPT

## 2020-06-02 PROCEDURE — 83036 HEMOGLOBIN GLYCOSYLATED A1C: CPT

## 2020-06-02 PROCEDURE — 83735 ASSAY OF MAGNESIUM: CPT

## 2020-06-02 PROCEDURE — 84478 ASSAY OF TRIGLYCERIDES: CPT

## 2020-06-02 PROCEDURE — 94002 VENT MGMT INPAT INIT DAY: CPT

## 2020-06-02 PROCEDURE — 84443 ASSAY THYROID STIM HORMONE: CPT

## 2020-06-02 PROCEDURE — 86923 COMPATIBILITY TEST ELECTRIC: CPT

## 2020-06-02 PROCEDURE — P9016: CPT

## 2020-06-02 PROCEDURE — 82728 ASSAY OF FERRITIN: CPT

## 2020-06-02 PROCEDURE — 85730 THROMBOPLASTIN TIME PARTIAL: CPT

## 2020-06-02 PROCEDURE — 85027 COMPLETE CBC AUTOMATED: CPT

## 2020-06-02 PROCEDURE — 71045 X-RAY EXAM CHEST 1 VIEW: CPT

## 2020-06-02 PROCEDURE — 82803 BLOOD GASES ANY COMBINATION: CPT

## 2020-06-02 PROCEDURE — 86704 HEP B CORE ANTIBODY TOTAL: CPT

## 2020-06-02 PROCEDURE — 87086 URINE CULTURE/COLONY COUNT: CPT

## 2020-06-02 PROCEDURE — 83615 LACTATE (LD) (LDH) ENZYME: CPT

## 2020-06-02 PROCEDURE — 87340 HEPATITIS B SURFACE AG IA: CPT

## 2020-06-02 PROCEDURE — 87521 HEPATITIS C PROBE&RVRS TRNSC: CPT

## 2020-06-02 PROCEDURE — 84484 ASSAY OF TROPONIN QUANT: CPT

## 2020-06-02 PROCEDURE — 36430 TRANSFUSION BLD/BLD COMPNT: CPT

## 2020-06-02 PROCEDURE — 86901 BLOOD TYPING SEROLOGIC RH(D): CPT

## 2020-06-02 PROCEDURE — 86360 T CELL ABSOLUTE COUNT/RATIO: CPT

## 2020-06-02 PROCEDURE — 86900 BLOOD TYPING SEROLOGIC ABO: CPT

## 2020-06-02 PROCEDURE — 87040 BLOOD CULTURE FOR BACTERIA: CPT

## 2020-06-02 PROCEDURE — 84145 PROCALCITONIN (PCT): CPT

## 2020-06-02 PROCEDURE — 80053 COMPREHEN METABOLIC PANEL: CPT

## 2020-06-02 PROCEDURE — 80048 BASIC METABOLIC PNL TOTAL CA: CPT

## 2020-06-02 PROCEDURE — 93005 ELECTROCARDIOGRAM TRACING: CPT

## 2020-06-02 PROCEDURE — 85384 FIBRINOGEN ACTIVITY: CPT

## 2020-06-02 PROCEDURE — 99291 CRITICAL CARE FIRST HOUR: CPT | Mod: 25

## 2020-06-02 PROCEDURE — 87324 CLOSTRIDIUM AG IA: CPT

## 2020-06-02 PROCEDURE — 83880 ASSAY OF NATRIURETIC PEPTIDE: CPT

## 2020-06-02 PROCEDURE — 84439 ASSAY OF FREE THYROXINE: CPT

## 2020-06-02 PROCEDURE — 96375 TX/PRO/DX INJ NEW DRUG ADDON: CPT | Mod: XU

## 2020-06-02 PROCEDURE — 74018 RADEX ABDOMEN 1 VIEW: CPT

## 2020-06-02 PROCEDURE — 85652 RBC SED RATE AUTOMATED: CPT

## 2020-06-02 PROCEDURE — 87581 M.PNEUMON DNA AMP PROBE: CPT

## 2020-06-02 PROCEDURE — 87070 CULTURE OTHR SPECIMN AEROBIC: CPT

## 2020-06-02 PROCEDURE — 86480 TB TEST CELL IMMUN MEASURE: CPT

## 2020-06-02 PROCEDURE — 80074 ACUTE HEPATITIS PANEL: CPT

## 2020-06-02 PROCEDURE — 82550 ASSAY OF CK (CPK): CPT

## 2020-06-02 PROCEDURE — 86140 C-REACTIVE PROTEIN: CPT

## 2020-06-02 PROCEDURE — 85014 HEMATOCRIT: CPT

## 2020-06-02 PROCEDURE — 86850 RBC ANTIBODY SCREEN: CPT

## 2020-06-02 PROCEDURE — 84295 ASSAY OF SERUM SODIUM: CPT

## 2021-01-13 NOTE — PROGRESS NOTE ADULT - PROVIDER SPECIALTY LIST ADULT
Critical Care Pt called she stated at her last visit with Dr Rodger Farrell she told the patient she would fill her birthcontrol for now   She wants to know if this can be done or should she make an appt? ? She would need it by the end of the week        [id number]- [facility] [place], [place] - [address]    Please call patient at [phone]

## 2021-11-01 NOTE — ED ADULT NURSE REASSESSMENT NOTE - NS ED NURSE REASSESS COMMENT FT1
Addended by: ADDI HAMILTON on: 11/1/2021 08:13 AM     Modules accepted: Orders    
MDI administered per order. pt on cardiac monitor. will cont to monitor.
Report given to Tiffany in CTU with h&p, ER course and pertinent clinical data reviweed. Awaiting RT and resident to transfer pt to unit
Report rec'd from offgoing RN. Notified from other team member sats were 84% on 100% Fio2. Pt tachypnic with labored breathing, anxious but mentating appropriately. Pt was rapidly intubated by MD team with intervention times as follows":  1947 20 etomidate, 70 rocoronium IVP by resident. intubated with #8 ETT secured at 26cm left lip with bilat breath sounds, +CO2 detection at 1950. Pt started on propofol gtt at 15mcgs/kgs/min. CXR confirmation pending, sats currently 97%, will follow

## 2021-11-06 NOTE — PROGRESS NOTE ADULT - REASON FOR ADMISSION
Pt arrives via EMS from home d/t fall from chair to floor and SO unable to get pt up. SO reports pt hasn't left house in 4 years. FTH x3 years per SO, not taking meds. Pt c/o nausea, 4 mg Zofran by EMS. Pt did not hit head, no LOC. No CP, SOB, cough or fever. Bilateral leg swelling. Pt bed-bound. ABC intact. A&Ox4.    respiratory distress, COVID 19

## 2022-12-14 NOTE — PROGRESS NOTE ADULT - PROVIDER SPECIALTY LIST ADULT
Critical Care Impression: Age-related nuclear cataract, bilateral Plan: The cataract(s) appear stable. Surgical treatment is not currently recommended based on level of vision. The patient will continue to monitor for changes and notify us should vision changes. Continue to monitor. Patient education provided.
